# Patient Record
Sex: FEMALE | Race: ASIAN | NOT HISPANIC OR LATINO | Employment: OTHER | URBAN - METROPOLITAN AREA
[De-identification: names, ages, dates, MRNs, and addresses within clinical notes are randomized per-mention and may not be internally consistent; named-entity substitution may affect disease eponyms.]

---

## 2017-01-23 ENCOUNTER — COMMUNICATION - HEALTHEAST (OUTPATIENT)
Dept: FAMILY MEDICINE | Facility: CLINIC | Age: 47
End: 2017-01-23

## 2017-01-23 DIAGNOSIS — E66.9 OBESITY: ICD-10-CM

## 2017-01-23 DIAGNOSIS — I16.0 HYPERTENSIVE URGENCY: ICD-10-CM

## 2017-01-23 DIAGNOSIS — E78.5 HYPERLIPIDEMIA: ICD-10-CM

## 2017-02-20 ENCOUNTER — RECORDS - HEALTHEAST (OUTPATIENT)
Dept: MAMMOGRAPHY | Facility: CLINIC | Age: 47
End: 2017-02-20

## 2017-02-20 ENCOUNTER — OFFICE VISIT - HEALTHEAST (OUTPATIENT)
Dept: FAMILY MEDICINE | Facility: CLINIC | Age: 47
End: 2017-02-20

## 2017-02-20 DIAGNOSIS — I10 ESSENTIAL HYPERTENSION WITH GOAL BLOOD PRESSURE LESS THAN 140/90: ICD-10-CM

## 2017-02-20 DIAGNOSIS — G43.109 MIGRAINE WITH AURA AND WITHOUT STATUS MIGRAINOSUS, NOT INTRACTABLE: ICD-10-CM

## 2017-02-20 DIAGNOSIS — Z12.39 ENCOUNTER FOR OTHER SCREENING FOR MALIGNANT NEOPLASM OF BREAST: ICD-10-CM

## 2017-02-20 DIAGNOSIS — E78.5 HYPERLIPIDEMIA, UNSPECIFIED HYPERLIPIDEMIA TYPE: ICD-10-CM

## 2017-02-20 DIAGNOSIS — E66.09 NON MORBID OBESITY DUE TO EXCESS CALORIES: ICD-10-CM

## 2017-03-30 ENCOUNTER — OFFICE VISIT - HEALTHEAST (OUTPATIENT)
Dept: FAMILY MEDICINE | Facility: CLINIC | Age: 47
End: 2017-03-30

## 2017-03-30 DIAGNOSIS — R51.9 CHRONIC RIGHT-SIDED HEADACHES: ICD-10-CM

## 2017-03-30 DIAGNOSIS — G89.29 CHRONIC RIGHT-SIDED HEADACHES: ICD-10-CM

## 2017-03-30 DIAGNOSIS — I10 ESSENTIAL HYPERTENSION WITH GOAL BLOOD PRESSURE LESS THAN 140/90: ICD-10-CM

## 2017-05-15 ENCOUNTER — OFFICE VISIT - HEALTHEAST (OUTPATIENT)
Dept: FAMILY MEDICINE | Facility: CLINIC | Age: 47
End: 2017-05-15

## 2017-05-15 DIAGNOSIS — R73.03 PREDIABETES: ICD-10-CM

## 2017-05-15 DIAGNOSIS — I10 ESSENTIAL HYPERTENSION WITH GOAL BLOOD PRESSURE LESS THAN 140/90: ICD-10-CM

## 2017-05-15 DIAGNOSIS — E78.5 HYPERLIPIDEMIA, UNSPECIFIED HYPERLIPIDEMIA TYPE: ICD-10-CM

## 2017-05-15 LAB — HBA1C MFR BLD: 6 % (ref 3.5–6)

## 2017-05-22 ENCOUNTER — COMMUNICATION - HEALTHEAST (OUTPATIENT)
Dept: FAMILY MEDICINE | Facility: CLINIC | Age: 47
End: 2017-05-22

## 2017-05-24 ENCOUNTER — COMMUNICATION - HEALTHEAST (OUTPATIENT)
Dept: FAMILY MEDICINE | Facility: CLINIC | Age: 47
End: 2017-05-24

## 2017-05-24 DIAGNOSIS — R51.9 PERSISTENT HEADACHES: ICD-10-CM

## 2017-06-21 ENCOUNTER — RECORDS - HEALTHEAST (OUTPATIENT)
Dept: ADMINISTRATIVE | Facility: OTHER | Age: 47
End: 2017-06-21

## 2017-06-23 ENCOUNTER — OFFICE VISIT - HEALTHEAST (OUTPATIENT)
Dept: NURSING | Facility: CLINIC | Age: 47
End: 2017-06-23

## 2017-06-23 DIAGNOSIS — G43.109 MIGRAINE WITH AURA: ICD-10-CM

## 2017-06-23 DIAGNOSIS — R12 HEARTBURN: ICD-10-CM

## 2017-06-23 DIAGNOSIS — I10 ESSENTIAL HYPERTENSION WITH GOAL BLOOD PRESSURE LESS THAN 140/90: ICD-10-CM

## 2017-07-21 ENCOUNTER — COMMUNICATION - HEALTHEAST (OUTPATIENT)
Dept: NURSING | Facility: CLINIC | Age: 47
End: 2017-07-21

## 2017-07-31 ENCOUNTER — OFFICE VISIT - HEALTHEAST (OUTPATIENT)
Dept: FAMILY MEDICINE | Facility: CLINIC | Age: 47
End: 2017-07-31

## 2017-07-31 DIAGNOSIS — I10 ESSENTIAL HYPERTENSION WITH GOAL BLOOD PRESSURE LESS THAN 140/90: ICD-10-CM

## 2017-07-31 DIAGNOSIS — H04.123 BILATERAL DRY EYES: ICD-10-CM

## 2017-07-31 DIAGNOSIS — E87.6 HYPOKALEMIA: ICD-10-CM

## 2017-08-01 ENCOUNTER — COMMUNICATION - HEALTHEAST (OUTPATIENT)
Dept: FAMILY MEDICINE | Facility: CLINIC | Age: 47
End: 2017-08-01

## 2017-08-01 ENCOUNTER — OFFICE VISIT - HEALTHEAST (OUTPATIENT)
Dept: CARDIOLOGY | Facility: CLINIC | Age: 47
End: 2017-08-01

## 2017-08-01 DIAGNOSIS — I20.89 EXERTIONAL ANGINA (H): ICD-10-CM

## 2017-08-01 DIAGNOSIS — I10 ESSENTIAL HYPERTENSION WITH GOAL BLOOD PRESSURE LESS THAN 140/90: ICD-10-CM

## 2017-08-01 ASSESSMENT — MIFFLIN-ST. JEOR: SCORE: 1307.61

## 2017-08-03 ENCOUNTER — RECORDS - HEALTHEAST (OUTPATIENT)
Dept: ADMINISTRATIVE | Facility: OTHER | Age: 47
End: 2017-08-03

## 2017-08-15 ENCOUNTER — OFFICE VISIT - HEALTHEAST (OUTPATIENT)
Dept: NURSING | Facility: CLINIC | Age: 47
End: 2017-08-15

## 2017-08-15 DIAGNOSIS — Z79.899 MEDICATION MANAGEMENT: ICD-10-CM

## 2017-08-15 DIAGNOSIS — R12 HEARTBURN: ICD-10-CM

## 2017-08-15 DIAGNOSIS — R51.9 PERSISTENT HEADACHES: ICD-10-CM

## 2017-08-15 DIAGNOSIS — I10 ESSENTIAL HYPERTENSION WITH GOAL BLOOD PRESSURE LESS THAN 140/90: ICD-10-CM

## 2017-08-21 ENCOUNTER — COMMUNICATION - HEALTHEAST (OUTPATIENT)
Dept: FAMILY MEDICINE | Facility: CLINIC | Age: 47
End: 2017-08-21

## 2017-08-21 DIAGNOSIS — G43.109 MIGRAINE WITH AURA: ICD-10-CM

## 2017-08-21 DIAGNOSIS — I16.0 HYPERTENSIVE URGENCY: ICD-10-CM

## 2017-08-30 ENCOUNTER — RECORDS - HEALTHEAST (OUTPATIENT)
Dept: ADMINISTRATIVE | Facility: OTHER | Age: 47
End: 2017-08-30

## 2017-09-19 ENCOUNTER — COMMUNICATION - HEALTHEAST (OUTPATIENT)
Dept: ADMINISTRATIVE | Facility: CLINIC | Age: 47
End: 2017-09-19

## 2017-10-29 ENCOUNTER — HEALTH MAINTENANCE LETTER (OUTPATIENT)
Age: 47
End: 2017-10-29

## 2017-10-31 ENCOUNTER — SURGERY - HEALTHEAST (OUTPATIENT)
Dept: CARDIOLOGY | Facility: CLINIC | Age: 47
End: 2017-10-31

## 2017-10-31 ASSESSMENT — MIFFLIN-ST. JEOR
SCORE: 1314.14
SCORE: 1328.93

## 2017-11-01 ENCOUNTER — COMMUNICATION - HEALTHEAST (OUTPATIENT)
Dept: FAMILY MEDICINE | Facility: CLINIC | Age: 47
End: 2017-11-01

## 2017-11-01 ASSESSMENT — MIFFLIN-ST. JEOR: SCORE: 1317.14

## 2017-11-03 ENCOUNTER — OFFICE VISIT - HEALTHEAST (OUTPATIENT)
Dept: FAMILY MEDICINE | Facility: CLINIC | Age: 47
End: 2017-11-03

## 2017-11-03 DIAGNOSIS — I50.23 ACUTE ON CHRONIC SYSTOLIC CONGESTIVE HEART FAILURE (H): ICD-10-CM

## 2017-11-03 ASSESSMENT — MIFFLIN-ST. JEOR: SCORE: 1303.08

## 2017-11-11 ENCOUNTER — COMMUNICATION - HEALTHEAST (OUTPATIENT)
Dept: FAMILY MEDICINE | Facility: CLINIC | Age: 47
End: 2017-11-11

## 2017-11-11 DIAGNOSIS — I10 ESSENTIAL HYPERTENSION WITH GOAL BLOOD PRESSURE LESS THAN 140/90: ICD-10-CM

## 2017-11-14 ENCOUNTER — AMBULATORY - HEALTHEAST (OUTPATIENT)
Dept: SLEEP MEDICINE | Facility: CLINIC | Age: 47
End: 2017-11-14

## 2017-11-14 ENCOUNTER — OFFICE VISIT - HEALTHEAST (OUTPATIENT)
Dept: CARDIOLOGY | Facility: CLINIC | Age: 47
End: 2017-11-14

## 2017-11-14 ENCOUNTER — AMBULATORY - HEALTHEAST (OUTPATIENT)
Dept: CARDIOLOGY | Facility: CLINIC | Age: 47
End: 2017-11-14

## 2017-11-14 ENCOUNTER — COMMUNICATION - HEALTHEAST (OUTPATIENT)
Dept: FAMILY MEDICINE | Facility: CLINIC | Age: 47
End: 2017-11-14

## 2017-11-14 DIAGNOSIS — I42.8 NONISCHEMIC CARDIOMYOPATHY (H): ICD-10-CM

## 2017-11-14 DIAGNOSIS — I10 ESSENTIAL HYPERTENSION: ICD-10-CM

## 2017-11-14 DIAGNOSIS — I50.22 CHRONIC SYSTOLIC HEART FAILURE (H): ICD-10-CM

## 2017-11-14 DIAGNOSIS — I20.0 ACCELERATING ANGINA (H): ICD-10-CM

## 2017-11-14 ASSESSMENT — MIFFLIN-ST. JEOR: SCORE: 1313.28

## 2017-11-20 ENCOUNTER — OFFICE VISIT - HEALTHEAST (OUTPATIENT)
Dept: FAMILY MEDICINE | Facility: CLINIC | Age: 47
End: 2017-11-20

## 2017-11-20 DIAGNOSIS — Z23 NEED FOR IMMUNIZATION AGAINST INFLUENZA: ICD-10-CM

## 2017-11-20 DIAGNOSIS — I10 ESSENTIAL HYPERTENSION: ICD-10-CM

## 2017-11-20 DIAGNOSIS — R00.0 TACHYCARDIA: ICD-10-CM

## 2017-11-20 DIAGNOSIS — E78.2 MIXED HYPERLIPIDEMIA: ICD-10-CM

## 2017-11-20 DIAGNOSIS — I50.22 CHRONIC SYSTOLIC HEART FAILURE (H): ICD-10-CM

## 2017-11-20 DIAGNOSIS — R09.89 PHLEGM IN THROAT: ICD-10-CM

## 2017-11-20 DIAGNOSIS — L85.3 DRY SKIN: ICD-10-CM

## 2017-11-20 DIAGNOSIS — I42.8 NONISCHEMIC CARDIOMYOPATHY (H): ICD-10-CM

## 2017-12-06 ENCOUNTER — OFFICE VISIT - HEALTHEAST (OUTPATIENT)
Dept: CARDIOLOGY | Facility: CLINIC | Age: 47
End: 2017-12-06

## 2017-12-06 ENCOUNTER — AMBULATORY - HEALTHEAST (OUTPATIENT)
Dept: CARDIOLOGY | Facility: CLINIC | Age: 47
End: 2017-12-06

## 2017-12-06 DIAGNOSIS — I50.22 CHRONIC SYSTOLIC HEART FAILURE (H): ICD-10-CM

## 2017-12-06 DIAGNOSIS — I42.8 NONISCHEMIC CARDIOMYOPATHY (H): ICD-10-CM

## 2017-12-06 ASSESSMENT — MIFFLIN-ST. JEOR: SCORE: 1349.57

## 2017-12-08 ENCOUNTER — COMMUNICATION - HEALTHEAST (OUTPATIENT)
Dept: FAMILY MEDICINE | Facility: CLINIC | Age: 47
End: 2017-12-08

## 2017-12-15 ENCOUNTER — OFFICE VISIT - HEALTHEAST (OUTPATIENT)
Dept: NURSING | Facility: CLINIC | Age: 47
End: 2017-12-15

## 2017-12-15 DIAGNOSIS — I42.8 NONISCHEMIC CARDIOMYOPATHY (H): ICD-10-CM

## 2017-12-15 DIAGNOSIS — Z79.899 MEDICATION MANAGEMENT: ICD-10-CM

## 2017-12-15 DIAGNOSIS — I20.89 EXERTIONAL ANGINA (H): ICD-10-CM

## 2017-12-15 DIAGNOSIS — I10 ESSENTIAL HYPERTENSION: ICD-10-CM

## 2017-12-15 DIAGNOSIS — R05.9 COUGH: ICD-10-CM

## 2017-12-21 ENCOUNTER — OFFICE VISIT - HEALTHEAST (OUTPATIENT)
Dept: CARDIOLOGY | Facility: CLINIC | Age: 47
End: 2017-12-21

## 2017-12-21 DIAGNOSIS — I50.22 CHRONIC SYSTOLIC HEART FAILURE (H): ICD-10-CM

## 2017-12-21 DIAGNOSIS — I10 ESSENTIAL HYPERTENSION: ICD-10-CM

## 2017-12-21 DIAGNOSIS — I42.8 NONISCHEMIC CARDIOMYOPATHY (H): ICD-10-CM

## 2017-12-21 ASSESSMENT — MIFFLIN-ST. JEOR: SCORE: 1331.43

## 2017-12-22 ENCOUNTER — COMMUNICATION - HEALTHEAST (OUTPATIENT)
Dept: CARDIOLOGY | Facility: CLINIC | Age: 47
End: 2017-12-22

## 2018-01-04 ENCOUNTER — OFFICE VISIT - HEALTHEAST (OUTPATIENT)
Dept: FAMILY MEDICINE | Facility: CLINIC | Age: 48
End: 2018-01-04

## 2018-01-04 ENCOUNTER — RECORDS - HEALTHEAST (OUTPATIENT)
Dept: GENERAL RADIOLOGY | Facility: CLINIC | Age: 48
End: 2018-01-04

## 2018-01-04 DIAGNOSIS — I42.8 NONISCHEMIC CARDIOMYOPATHY (H): ICD-10-CM

## 2018-01-04 DIAGNOSIS — R05.9 COUGH: ICD-10-CM

## 2018-01-04 DIAGNOSIS — R05.8 PRODUCTIVE COUGH: ICD-10-CM

## 2018-01-04 DIAGNOSIS — N39.46 MIXED INCONTINENCE: ICD-10-CM

## 2018-01-04 DIAGNOSIS — R73.03 PREDIABETES: ICD-10-CM

## 2018-01-04 DIAGNOSIS — J02.0 STREP THROAT: ICD-10-CM

## 2018-01-04 DIAGNOSIS — I10 ESSENTIAL HYPERTENSION: ICD-10-CM

## 2018-01-04 LAB
ALBUMIN UR-MCNC: NEGATIVE MG/DL
ANION GAP SERPL CALCULATED.3IONS-SCNC: 7 MMOL/L (ref 5–18)
APPEARANCE UR: CLEAR
BILIRUB UR QL STRIP: NEGATIVE
BUN SERPL-MCNC: 20 MG/DL (ref 8–22)
CALCIUM SERPL-MCNC: 8.7 MG/DL (ref 8.5–10.5)
CHLORIDE BLD-SCNC: 104 MMOL/L (ref 98–107)
CO2 SERPL-SCNC: 29 MMOL/L (ref 22–31)
COLOR UR AUTO: YELLOW
CREAT SERPL-MCNC: 0.72 MG/DL (ref 0.6–1.1)
DEPRECATED S PYO AG THROAT QL EIA: ABNORMAL
GFR SERPL CREATININE-BSD FRML MDRD: >60 ML/MIN/1.73M2
GLUCOSE BLD-MCNC: 112 MG/DL (ref 70–125)
GLUCOSE UR STRIP-MCNC: NEGATIVE MG/DL
HBA1C MFR BLD: 6.4 % (ref 3.5–6)
HGB UR QL STRIP: NEGATIVE
KETONES UR STRIP-MCNC: NEGATIVE MG/DL
LEUKOCYTE ESTERASE UR QL STRIP: NEGATIVE
NITRATE UR QL: NEGATIVE
PH UR STRIP: 6 [PH] (ref 5–8)
POTASSIUM BLD-SCNC: 3.9 MMOL/L (ref 3.5–5)
SODIUM SERPL-SCNC: 140 MMOL/L (ref 136–145)
SP GR UR STRIP: 1.01 (ref 1–1.03)
UROBILINOGEN UR STRIP-ACNC: NORMAL

## 2018-01-08 LAB
QTF INTERPRETATION: NORMAL
QTF MITOGEN - NIL: 5.89 IU/ML
QTF NIL: 0.16 IU/ML
QTF RESULT: NEGATIVE
QTF TB ANTIGEN - NIL: -0.01 IU/ML

## 2018-01-10 ENCOUNTER — COMMUNICATION - HEALTHEAST (OUTPATIENT)
Dept: FAMILY MEDICINE | Facility: CLINIC | Age: 48
End: 2018-01-10

## 2018-01-18 ENCOUNTER — OFFICE VISIT - HEALTHEAST (OUTPATIENT)
Dept: CARDIOLOGY | Facility: CLINIC | Age: 48
End: 2018-01-18

## 2018-01-18 DIAGNOSIS — E78.2 MIXED HYPERLIPIDEMIA: ICD-10-CM

## 2018-01-18 DIAGNOSIS — I50.22 CHRONIC SYSTOLIC HEART FAILURE (H): ICD-10-CM

## 2018-01-18 DIAGNOSIS — I25.119 CORONARY ARTERY DISEASE INVOLVING NATIVE CORONARY ARTERY OF NATIVE HEART WITH ANGINA PECTORIS (H): ICD-10-CM

## 2018-01-18 DIAGNOSIS — I42.8 NONISCHEMIC CARDIOMYOPATHY (H): ICD-10-CM

## 2018-01-18 DIAGNOSIS — I10 ESSENTIAL HYPERTENSION: ICD-10-CM

## 2018-01-18 ASSESSMENT — MIFFLIN-ST. JEOR: SCORE: 1340.5

## 2018-01-22 ENCOUNTER — OFFICE VISIT - HEALTHEAST (OUTPATIENT)
Dept: FAMILY MEDICINE | Facility: CLINIC | Age: 48
End: 2018-01-22

## 2018-01-22 DIAGNOSIS — R50.9 FEVER: ICD-10-CM

## 2018-01-22 DIAGNOSIS — R68.89 FLU-LIKE SYMPTOMS: ICD-10-CM

## 2018-01-22 LAB
DEPRECATED S PYO AG THROAT QL EIA: NORMAL
FLUAV AG SPEC QL IA: NORMAL
FLUBV AG SPEC QL IA: NORMAL

## 2018-01-23 LAB — GROUP A STREP BY PCR: NORMAL

## 2018-02-01 ENCOUNTER — COMMUNICATION - HEALTHEAST (OUTPATIENT)
Dept: FAMILY MEDICINE | Facility: CLINIC | Age: 48
End: 2018-02-01

## 2018-02-08 ENCOUNTER — OFFICE VISIT - HEALTHEAST (OUTPATIENT)
Dept: FAMILY MEDICINE | Facility: CLINIC | Age: 48
End: 2018-02-08

## 2018-02-08 DIAGNOSIS — Z09 HOSPITAL DISCHARGE FOLLOW-UP: ICD-10-CM

## 2018-02-08 DIAGNOSIS — I10 ESSENTIAL HYPERTENSION: ICD-10-CM

## 2018-02-08 DIAGNOSIS — S37.009A KIDNEY INJURY: ICD-10-CM

## 2018-02-08 LAB
ALBUMIN UR-MCNC: NEGATIVE MG/DL
APPEARANCE UR: CLEAR
BILIRUB UR QL STRIP: NEGATIVE
COLOR UR AUTO: YELLOW
GLUCOSE UR STRIP-MCNC: NEGATIVE MG/DL
HGB UR QL STRIP: NEGATIVE
KETONES UR STRIP-MCNC: NEGATIVE MG/DL
LEUKOCYTE ESTERASE UR QL STRIP: NEGATIVE
NITRATE UR QL: NEGATIVE
PH UR STRIP: 7.5 [PH] (ref 5–8)
SP GR UR STRIP: 1.02 (ref 1–1.03)
UROBILINOGEN UR STRIP-ACNC: NORMAL

## 2018-02-12 ENCOUNTER — OFFICE VISIT - HEALTHEAST (OUTPATIENT)
Dept: FAMILY MEDICINE | Facility: CLINIC | Age: 48
End: 2018-02-12

## 2018-02-12 DIAGNOSIS — N15.1 RENAL ABSCESS: ICD-10-CM

## 2018-02-12 DIAGNOSIS — R05.3 CHRONIC COUGH: ICD-10-CM

## 2018-02-12 DIAGNOSIS — N93.9 ABNORMAL UTERINE BLEEDING: ICD-10-CM

## 2018-02-12 LAB
ALBUMIN UR-MCNC: NEGATIVE MG/DL
APPEARANCE UR: CLEAR
BILIRUB UR QL STRIP: NEGATIVE
COLOR UR AUTO: YELLOW
FLUAV AG SPEC QL IA: NORMAL
FLUBV AG SPEC QL IA: NORMAL
GLUCOSE UR STRIP-MCNC: NEGATIVE MG/DL
HGB UR QL STRIP: NEGATIVE
KETONES UR STRIP-MCNC: NEGATIVE MG/DL
LEUKOCYTE ESTERASE UR QL STRIP: NEGATIVE
NITRATE UR QL: NEGATIVE
PH UR STRIP: 6 [PH] (ref 5–8)
SP GR UR STRIP: >=1.03 (ref 1–1.03)
UROBILINOGEN UR STRIP-ACNC: NORMAL

## 2018-02-15 ENCOUNTER — OFFICE VISIT - HEALTHEAST (OUTPATIENT)
Dept: NURSING | Facility: CLINIC | Age: 48
End: 2018-02-15

## 2018-02-15 DIAGNOSIS — R12 HEARTBURN: ICD-10-CM

## 2018-02-15 DIAGNOSIS — R30.0 BURNING WITH URINATION: ICD-10-CM

## 2018-02-15 DIAGNOSIS — I50.22 CHRONIC SYSTOLIC HEART FAILURE (H): ICD-10-CM

## 2018-02-15 DIAGNOSIS — R05.3 CHRONIC COUGH: ICD-10-CM

## 2018-02-15 DIAGNOSIS — I10 ESSENTIAL HYPERTENSION: ICD-10-CM

## 2018-02-16 ENCOUNTER — OFFICE VISIT - HEALTHEAST (OUTPATIENT)
Dept: CARDIOLOGY | Facility: CLINIC | Age: 48
End: 2018-02-16

## 2018-02-16 ENCOUNTER — COMMUNICATION - HEALTHEAST (OUTPATIENT)
Dept: FAMILY MEDICINE | Facility: CLINIC | Age: 48
End: 2018-02-16

## 2018-02-16 DIAGNOSIS — I50.22 CHRONIC SYSTOLIC HEART FAILURE (H): ICD-10-CM

## 2018-02-16 DIAGNOSIS — I42.8 NONISCHEMIC CARDIOMYOPATHY (H): ICD-10-CM

## 2018-02-16 LAB — BACTERIA SPEC CULT: NO GROWTH

## 2018-02-16 ASSESSMENT — MIFFLIN-ST. JEOR: SCORE: 1376.79

## 2018-02-20 ENCOUNTER — HOSPITAL ENCOUNTER (OUTPATIENT)
Dept: CT IMAGING | Facility: HOSPITAL | Age: 48
Discharge: HOME | End: 2018-02-20
Attending: INTERPRETER

## 2018-02-20 ENCOUNTER — COMMUNICATION - HEALTHEAST (OUTPATIENT)
Dept: FAMILY MEDICINE | Facility: CLINIC | Age: 48
End: 2018-02-20

## 2018-02-20 ENCOUNTER — HOSPITAL ENCOUNTER (OUTPATIENT)
Dept: ULTRASOUND IMAGING | Facility: HOSPITAL | Age: 48
Discharge: HOME | End: 2018-02-20
Attending: FAMILY MEDICINE

## 2018-02-20 DIAGNOSIS — N12 PYELONEPHRITIS: ICD-10-CM

## 2018-02-20 DIAGNOSIS — N93.9 ABNORMAL UTERINE BLEEDING: ICD-10-CM

## 2018-02-26 ENCOUNTER — OFFICE VISIT - HEALTHEAST (OUTPATIENT)
Dept: FAMILY MEDICINE | Facility: CLINIC | Age: 48
End: 2018-02-26

## 2018-02-26 DIAGNOSIS — R05.3 CHRONIC COUGH: ICD-10-CM

## 2018-02-26 DIAGNOSIS — R60.0 LOWER EXTREMITY EDEMA: ICD-10-CM

## 2018-03-01 ENCOUNTER — AMBULATORY - HEALTHEAST (OUTPATIENT)
Dept: PULMONOLOGY | Facility: OTHER | Age: 48
End: 2018-03-01

## 2018-03-01 DIAGNOSIS — R05.9 COUGH: ICD-10-CM

## 2018-03-12 ENCOUNTER — COMMUNICATION - HEALTHEAST (OUTPATIENT)
Dept: FAMILY MEDICINE | Facility: CLINIC | Age: 48
End: 2018-03-12

## 2018-03-12 DIAGNOSIS — I16.0 HYPERTENSIVE URGENCY: ICD-10-CM

## 2018-03-12 DIAGNOSIS — G43.109 MIGRAINE WITH AURA: ICD-10-CM

## 2018-03-12 DIAGNOSIS — I50.22 CHRONIC SYSTOLIC HEART FAILURE (H): ICD-10-CM

## 2018-03-20 DIAGNOSIS — R05.3 CHRONIC COUGH: Primary | ICD-10-CM

## 2018-03-22 ENCOUNTER — RECORDS - HEALTHEAST (OUTPATIENT)
Dept: ADMINISTRATIVE | Facility: OTHER | Age: 48
End: 2018-03-22

## 2018-03-22 ENCOUNTER — RADIANT APPOINTMENT (OUTPATIENT)
Dept: GENERAL RADIOLOGY | Facility: CLINIC | Age: 48
End: 2018-03-22
Attending: INTERNAL MEDICINE
Payer: MEDICARE

## 2018-03-22 ENCOUNTER — OFFICE VISIT (OUTPATIENT)
Dept: PULMONOLOGY | Facility: CLINIC | Age: 48
End: 2018-03-22
Attending: INTERNAL MEDICINE
Payer: MEDICARE

## 2018-03-22 VITALS
OXYGEN SATURATION: 99 % | RESPIRATION RATE: 16 BRPM | SYSTOLIC BLOOD PRESSURE: 114 MMHG | HEART RATE: 65 BPM | DIASTOLIC BLOOD PRESSURE: 70 MMHG

## 2018-03-22 DIAGNOSIS — R05.3 CHRONIC COUGH: ICD-10-CM

## 2018-03-22 DIAGNOSIS — J45.20 MILD INTERMITTENT ASTHMA, UNSPECIFIED WHETHER COMPLICATED: ICD-10-CM

## 2018-03-22 DIAGNOSIS — K21.9 GASTROESOPHAGEAL REFLUX DISEASE, ESOPHAGITIS PRESENCE NOT SPECIFIED: Primary | ICD-10-CM

## 2018-03-22 DIAGNOSIS — R05.9 COUGH: Primary | ICD-10-CM

## 2018-03-22 LAB
DLCOUNC-%PRED-PRE: 78 %
DLCOUNC-PRE: 16.61 ML/MIN/MMHG
DLCOUNC-PRED: 21.05 ML/MIN/MMHG
ERV-%PRED-PRE: 24 %
ERV-PRE: 0.1 L
ERV-PRED: 0.4 L
EXPTIME-PRE: 6.85 SEC
FEF2575-%PRED-PRE: 110 %
FEF2575-PRE: 2.64 L/SEC
FEF2575-PRED: 2.39 L/SEC
FEFMAX-%PRED-PRE: 76 %
FEFMAX-PRE: 4.65 L/SEC
FEFMAX-PRED: 6.11 L/SEC
FEV1-%PRED-PRE: 88 %
FEV1-PRE: 1.98 L
FEV1FEV6-PRE: 86 %
FEV1FEV6-PRED: 82 %
FEV1FVC-PRE: 86 %
FEV1FVC-PRED: 80 %
FEV1SVC-PRE: 89 %
FEV1SVC-PRED: 76 %
FIFMAX-PRE: 2.74 L/SEC
FRCPLETH-%PRED-PRE: 60 %
FRCPLETH-PRE: 1.47 L
FRCPLETH-PRED: 2.43 L
FVC-%PRED-PRE: 84 %
FVC-PRE: 2.3 L
FVC-PRED: 2.72 L
IC-%PRED-PRE: 83 %
IC-PRE: 2.13 L
IC-PRED: 2.54 L
RVPLETH-%PRED-PRE: 91 %
RVPLETH-PRE: 1.38 L
RVPLETH-PRED: 1.51 L
TLCPLETH-%PRED-PRE: 86 %
TLCPLETH-PRE: 3.61 L
TLCPLETH-PRED: 4.18 L
VA-%PRED-PRE: 67 %
VA-PRE: 2.93 L
VC-%PRED-PRE: 75 %
VC-PRE: 2.23 L
VC-PRED: 2.94 L

## 2018-03-22 RX ORDER — OMEPRAZOLE 40 MG/1
20 CAPSULE, DELAYED RELEASE ORAL 2 TIMES DAILY
COMMUNITY
Start: 2018-02-15 | End: 2018-03-22

## 2018-03-22 RX ORDER — ASPIRIN 81 MG/1
81 TABLET ORAL
COMMUNITY
Start: 2017-12-15 | End: 2021-12-07

## 2018-03-22 RX ORDER — ATORVASTATIN CALCIUM 80 MG/1
80 TABLET, FILM COATED ORAL
COMMUNITY
Start: 2017-11-14 | End: 2021-09-05

## 2018-03-22 RX ORDER — ACETAMINOPHEN 500 MG
500 TABLET ORAL
COMMUNITY
Start: 2018-01-22 | End: 2021-12-07

## 2018-03-22 RX ORDER — NITROGLYCERIN 0.4 MG/1
0.4 TABLET SUBLINGUAL
COMMUNITY
Start: 2017-11-01 | End: 2021-12-07

## 2018-03-22 RX ORDER — NORTRIPTYLINE HCL 25 MG
25 CAPSULE ORAL
COMMUNITY
End: 2021-07-16

## 2018-03-22 RX ORDER — POTASSIUM CHLORIDE 750 MG/1
10 TABLET, EXTENDED RELEASE ORAL
COMMUNITY
Start: 2017-12-06 | End: 2021-08-17

## 2018-03-22 RX ORDER — FLUTICASONE PROPIONATE 50 MCG
1-2 SPRAY, SUSPENSION (ML) NASAL DAILY
Qty: 3 BOTTLE | Refills: 1 | Status: SHIPPED | OUTPATIENT
Start: 2018-03-22 | End: 2021-07-16

## 2018-03-22 RX ORDER — FUROSEMIDE 20 MG
20 TABLET ORAL
COMMUNITY
End: 2021-07-16

## 2018-03-22 RX ORDER — ISOSORBIDE MONONITRATE 60 MG/1
60 TABLET, EXTENDED RELEASE ORAL
COMMUNITY
Start: 2018-01-18 | End: 2021-11-26

## 2018-03-22 RX ORDER — AMLODIPINE BESYLATE 10 MG/1
10 TABLET ORAL
COMMUNITY
Start: 2018-01-11 | End: 2021-07-16

## 2018-03-22 RX ORDER — METOPROLOL SUCCINATE 100 MG/1
100 TABLET, EXTENDED RELEASE ORAL
COMMUNITY
Start: 2017-11-14 | End: 2021-12-07

## 2018-03-22 RX ORDER — OXYCODONE HYDROCHLORIDE 5 MG/1
5 TABLET ORAL
COMMUNITY
Start: 2018-02-01 | End: 2021-07-16

## 2018-03-22 ASSESSMENT — PAIN SCALES - GENERAL: PAINLEVEL: NO PAIN (0)

## 2018-03-22 NOTE — LETTER
"3/22/2018       RE: Leora Sanchez  [address] [place] [address]     Dear Colleague,    Thank you for referring your patient, Leora Sanchez, to the [facility] at [facility]. Please see a copy of my visit note below.    Pulmonary Clinic New Patient Consult  Reason for Consult: Chronic Cough  History of Present Illness    Leora Sanchez is a 47 yof female with a history of HTN and GERD who presents to pulmonary clinic today for further evaluation of chronic cough.  Her cough started in October 2017 and was initially occurring \"on and off\".  At first, she thought it was the flu but she never really felt sick.  [facility] records suggest she was seen by pulmonary at [facility] as early as August but those records are not available for review. The cough worsened in January and has become consistent since then.   She describes feeling a \"spicy taste\" in her upper chest/lower throat that causes her throat to feel \"itchy\" and causes her to cough.  Cough was initially dry but is now largely productive of a white and sticky phlegm.  She denies any hemoptysis, fever, chills, or shortness of breath with rest or exertion.  She does describe shortness of breath with coughing jags as well as headache from coughing.  Sleeping flat seems to worsen the cough and it is a little better if she tries to sleep sitting up.  In the past she has tried Robitussin, Tessalon Perles, Flonase, and albuterol without significant relief.  She is also on Prilosec 40 mg daily.  She had a cardiology consult to look for cardiac etiology of her cough in February 2018.  Echo showed reduced ejection fraction of 50%, attributable to nonischemic cardiomyopathy, but normal valves and normal right ventricular size and function.  She had an ear procedure in 1996 for problems with what sounds like recurrent ear infections.  She denies any history of allergies, hayfever, recurrent sinus infections, " or recurrent pneumonias.  She denies any history of asthma.  She is currently on 40 mg daily of Prilosec.  She eats dinner between 6 and 730 and goes to bed around 10.  She denies any snacks but does drink tea and coffee in proximity to bedtime.  Her cough is worse at night.    She is a never smoker.  She lives in [place], [place].  She is never worked outside the home.  She denies any known exposure to asbestos.  She has no pets at home.  She denies any significant exposure to birds, pillows or comforter stuffed with down feathers, or recent travel outside the area.    No known family history of lung disease.        Review of Systems:  10 of 14 systems reviewed and are negative unless otherwise stated in HPI.    Past Medical History:   Diagnosis Date     GERD (gastroesophageal reflux disease)      Hypertension        Past Surgical History:   Procedure Laterality Date     ENT SURGERY         Social History     Social History     Marital status:      Spouse name: N/A     Number of children: N/A     Years of education: N/A     Social History Main Topics     Smoking status: Never Smoker     Smokeless tobacco: Never Used     Alcohol use None     Drug use: None     Sexual activity: Not Asked     Other Topics Concern     None     Social History Narrative         Allergies   Allergen Reactions     Nkda [No Known Drug Allergies]          Current Outpatient Prescriptions:      amLODIPine (NORVASC) 10 MG tablet, Take 10 mg by mouth, Disp: , Rfl:      aspirin EC 81 MG EC tablet, Take 81 mg by mouth, Disp: , Rfl:      atorvastatin (LIPITOR) 80 MG tablet, Take 80 mg by mouth, Disp: , Rfl:      isosorbide mononitrate (IMDUR) 60 MG 24 hr tablet, Take 60 mg by mouth, Disp: , Rfl:      acetaminophen (MAPAP) 500 MG tablet, Take 500 mg by mouth, Disp: , Rfl:      metoprolol succinate (TOPROL-XL) 100 MG 24 hr tablet, Take 100 mg by mouth, Disp: , Rfl:      nitroGLYcerin (NITROSTAT) 0.4 MG sublingual tablet, Place 0.4 mg under  the tongue, Disp: , Rfl:      potassium chloride (K-TAB,KLOR-CON) 10 MEQ tablet, Take 10 mEq by mouth, Disp: , Rfl:      oxyCODONE IR (ROXICODONE) 5 MG tablet, Take 5 mg by mouth, Disp: , Rfl:      omeprazole (PRILOSEC) 20 MG CR capsule, Take 1 capsule (20 mg) by mouth 2 times daily, Disp: 90 capsule, Rfl: 3     beclomethasone (QVAR) 40 MCG/ACT Inhaler, Inhale 2 puffs into the lungs 2 times daily, Disp: 3 Inhaler, Rfl: 1     fluticasone (FLONASE) 50 MCG/ACT spray, Spray 1-2 sprays into both nostrils daily, Disp: 3 Bottle, Rfl: 1     losartan-hydrochlorothiazide (HYZAAR) 100-25 MG per tablet, Take 1 tablet by mouth daily, Disp: 30 tablet, Rfl: 6     furosemide (LASIX) 20 MG tablet, Take 20 mg by mouth, Disp: , Rfl:      nortriptyline (PAMELOR) 25 MG capsule, Take 25 mg by mouth, Disp: , Rfl:      ibuprofen (ADVIL,MOTRIN) 800 MG tablet, Take 1 tablet (800 mg) by mouth 3 times daily (with meals) Take 800 mg by mouth 3 times daily (with meals). As needed, Disp: 90 tablet, Rfl: 3     meclizine (ANTIVERT) 25 MG tablet, Take 1 tablet by mouth 3 times daily as needed for 30 days., Disp: 90 tablet, Rfl: 1     citalopram (CELEXA) 40 MG tablet, Take 40 mg by mouth daily., Disp: , Rfl:       Physical Exam:  /70 (BP Location: Right arm, Patient Position: Chair, Cuff Size: Adult Regular)  Pulse 65  Resp 16  SpO2 99%  GENERAL: Well developed, well nourished, alert, and in no apparent distress.  HEENT: Normocephalic, atraumatic. PERRL, EOMI. Oral mucosa is moist. No perioral cyanosis.  NECK: supple, no masses, no thyromegaly.  RESP:  Normal respiratory effort.  CTAB.  No rales, wheezes, rhonchi.  Normal to percussion.  No cyanosis or clubbing.  CV: Normal S1, S2, regular rhythm, normal rate. No murmur.  No LE edema.   ABDOMEN:  Soft, non-tender, non-distended.   SKIN: warm and dry. No rash.  NEURO: AAOx3.  Normal gait.  No focal neuro deficits.  PSYCH: mentation appears normal. and affect normal/bright    Results:  PFTs:  Normal-appearing flow volume loop.  Ratio 86%, FVC 84%, FEV1 88%, TLC 86%, RV 91%, DLCO 78%.  Study demonstrates normal pulmonary function with normal lung volumes and normal gas exchange.  There is no prior study available for comparison.  Imaging (personally reviewed in clinic today):  CXR: Peribronchial cuffing with no focal airspace opacity.    Assessment and Plan:   Leora Sanchez is a 47 year old female with a history of hypertension and acid reflux who presents to pulmonary clinic today for further evaluation and management of chronic cough.  We discussed that the 3 most common causes of chronic cough in adults are postnasal drip, acid reflux, and reactive airways disease.  Current recommendations suggest treatment of all 3 concurrently for best results.  I acknowledged that she has been treated for a couple of these things before but, to the best of my and her knowledge, has not been on concurrent treatment for a prolonged period of time. Thus, I have suggested that she begin taking Flonase 2 squirts once daily and I have adjusted her Prilosec dosing to 20 mg twice daily.  She was reminded of importance of taking the first dose on an empty stomach 30 minutes prior to eating or drinking anything.  I additionally reviewed with her lifestyle modifications which may help reduce acid reflux including head of bed elevation, avoidance of trigger foods, and avoidance of eating or drinking anything within 2 hours of bedtime.  Based on her chest x-ray, there is higher suspicion for component of reactive airways disease and so I have also suggested the addition of low-dose inhaled steroid through a chamber.  She was instructed to rinse her mouth vigorously with water after each use to minimize risk of thrush.  To her that it does take some time for this therapy to work and that it was going to work I would expect that she would notice improvement within the next 4-6 weeks.  If upon her return she is not symptomatically  improved, we could consider further investigation with something such as HRCT to look for bronchiectasis or parenchymal lung disease could consider pH and impedance study to look for acid or nonacid reflux.  Consideration could also be given to treatment for neurogenic or have a cough with something such as gabapentin or amitriptyline.  The above findings and plan were explained to the patient at length via the .  She voiced understanding.  Questions and concerns were answered to the patient's satisfaction.  She was provided with my contact information should new questions or concerns arise in the interim.  she should return to clinic in 3 months.  Cristy Lind MD  Pulmonary and Critical Care Medicine    The above note was dictated using voice recognition software and may include typographical errors. Please contact the author for any clarifications.  I spent a total of 60 minutes face to face with Leora Geeg during today's office visit. Over 50% of this time was spent counseling the patient and/or coordinating care regarding their pulmonary disease.    Again, thank you for allowing me to participate in the care of your patient.      Sincerely,    Wendy Lind MD

## 2018-03-22 NOTE — PATIENT INSTRUCTIONS
-CHANGE Prilosec from 40 mg once daily to 20 mg twice daily -- please remember to take your morning dose on an empty stomach  -START Flonase 2 squirts each nostril once daily  -START inhaler as described in clinic

## 2018-03-22 NOTE — PROGRESS NOTES
"Pulmonary Clinic New Patient Consult  Reason for Consult: Chronic Cough  History of Present Illness    Leora Sanchez is a 47 yof female with a history of HTN and GERD who presents to pulmonary clinic today for further evaluation of chronic cough.  Her cough started in October 2017 and was initially occurring \"on and off\".  At first, she thought it was the flu but she never really felt sick.  [facility] records suggest she was seen by pulmonary at [facility] as early as August but those records are not available for review. The cough worsened in January and has become consistent since then.   She describes feeling a \"spicy taste\" in her upper chest/lower throat that causes her throat to feel \"itchy\" and causes her to cough.  Cough was initially dry but is now largely productive of a white and sticky phlegm.  She denies any hemoptysis, fever, chills, or shortness of breath with rest or exertion.  She does describe shortness of breath with coughing jags as well as headache from coughing.  Sleeping flat seems to worsen the cough and it is a little better if she tries to sleep sitting up.  In the past she has tried Robitussin, Tessalon Perles, Flonase, and albuterol without significant relief.  She is also on Prilosec 40 mg daily.  She had a cardiology consult to look for cardiac etiology of her cough in February 2018.  Echo showed reduced ejection fraction of 50%, attributable to nonischemic cardiomyopathy, but normal valves and normal right ventricular size and function.  She had an ear procedure in 1996 for problems with what sounds like recurrent ear infections.  She denies any history of allergies, hayfever, recurrent sinus infections, or recurrent pneumonias.  She denies any history of asthma.  She is currently on 40 mg daily of Prilosec.  She eats dinner between 6 and 730 and goes to bed around 10.  She denies any snacks but does drink tea and coffee in proximity to bedtime.  Her cough is worse at night.    She is a never " smoker.  She lives in [place], [place].  She is never worked outside the home.  She denies any known exposure to asbestos.  She has no pets at home.  She denies any significant exposure to birds, pillows or comforter stuffed with down feathers, or recent travel outside the area.    No known family history of lung disease.        Review of Systems:  10 of 14 systems reviewed and are negative unless otherwise stated in HPI.    Past Medical History:   Diagnosis Date     GERD (gastroesophageal reflux disease)      Hypertension        Past Surgical History:   Procedure Laterality Date     ENT SURGERY         Social History     Social History     Marital status:      Spouse name: N/A     Number of children: N/A     Years of education: N/A     Social History Main Topics     Smoking status: Never Smoker     Smokeless tobacco: Never Used     Alcohol use None     Drug use: None     Sexual activity: Not Asked     Other Topics Concern     None     Social History Narrative         Allergies   Allergen Reactions     Nkda [No Known Drug Allergies]          Current Outpatient Prescriptions:      amLODIPine (NORVASC) 10 MG tablet, Take 10 mg by mouth, Disp: , Rfl:      aspirin EC 81 MG EC tablet, Take 81 mg by mouth, Disp: , Rfl:      atorvastatin (LIPITOR) 80 MG tablet, Take 80 mg by mouth, Disp: , Rfl:      isosorbide mononitrate (IMDUR) 60 MG 24 hr tablet, Take 60 mg by mouth, Disp: , Rfl:      acetaminophen (MAPAP) 500 MG tablet, Take 500 mg by mouth, Disp: , Rfl:      metoprolol succinate (TOPROL-XL) 100 MG 24 hr tablet, Take 100 mg by mouth, Disp: , Rfl:      nitroGLYcerin (NITROSTAT) 0.4 MG sublingual tablet, Place 0.4 mg under the tongue, Disp: , Rfl:      potassium chloride (K-TAB,KLOR-CON) 10 MEQ tablet, Take 10 mEq by mouth, Disp: , Rfl:      oxyCODONE IR (ROXICODONE) 5 MG tablet, Take 5 mg by mouth, Disp: , Rfl:      omeprazole (PRILOSEC) 20 MG CR capsule, Take 1 capsule (20 mg) by mouth 2 times daily, Disp: 90  capsule, Rfl: 3     beclomethasone (QVAR) 40 MCG/ACT Inhaler, Inhale 2 puffs into the lungs 2 times daily, Disp: 3 Inhaler, Rfl: 1     fluticasone (FLONASE) 50 MCG/ACT spray, Spray 1-2 sprays into both nostrils daily, Disp: 3 Bottle, Rfl: 1     losartan-hydrochlorothiazide (HYZAAR) 100-25 MG per tablet, Take 1 tablet by mouth daily, Disp: 30 tablet, Rfl: 6     furosemide (LASIX) 20 MG tablet, Take 20 mg by mouth, Disp: , Rfl:      nortriptyline (PAMELOR) 25 MG capsule, Take 25 mg by mouth, Disp: , Rfl:      ibuprofen (ADVIL,MOTRIN) 800 MG tablet, Take 1 tablet (800 mg) by mouth 3 times daily (with meals) Take 800 mg by mouth 3 times daily (with meals). As needed, Disp: 90 tablet, Rfl: 3     meclizine (ANTIVERT) 25 MG tablet, Take 1 tablet by mouth 3 times daily as needed for 30 days., Disp: 90 tablet, Rfl: 1     citalopram (CELEXA) 40 MG tablet, Take 40 mg by mouth daily., Disp: , Rfl:       Physical Exam:  /70 (BP Location: Right arm, Patient Position: Chair, Cuff Size: Adult Regular)  Pulse 65  Resp 16  SpO2 99%  GENERAL: Well developed, well nourished, alert, and in no apparent distress.  HEENT: Normocephalic, atraumatic. PERRL, EOMI. Oral mucosa is moist. No perioral cyanosis.  NECK: supple, no masses, no thyromegaly.  RESP:  Normal respiratory effort.  CTAB.  No rales, wheezes, rhonchi.  Normal to percussion.  No cyanosis or clubbing.  CV: Normal S1, S2, regular rhythm, normal rate. No murmur.  No LE edema.   ABDOMEN:  Soft, non-tender, non-distended.   SKIN: warm and dry. No rash.  NEURO: AAOx3.  Normal gait.  No focal neuro deficits.  PSYCH: mentation appears normal. and affect normal/bright    Results:  PFTs: Normal-appearing flow volume loop.  Ratio 86%, FVC 84%, FEV1 88%, TLC 86%, RV 91%, DLCO 78%.  Study demonstrates normal pulmonary function with normal lung volumes and normal gas exchange.  There is no prior study available for comparison.  Imaging (personally reviewed in clinic today):  CXR:  Peribronchial cuffing with no focal airspace opacity.    Assessment and Plan:   Leora Sanchez is a 47 year old female with a history of hypertension and acid reflux who presents to pulmonary clinic today for further evaluation and management of chronic cough.  We discussed that the 3 most common causes of chronic cough in adults are postnasal drip, acid reflux, and reactive airways disease.  Current recommendations suggest treatment of all 3 concurrently for best results.  I acknowledged that she has been treated for a couple of these things before but, to the best of my and her knowledge, has not been on concurrent treatment for a prolonged period of time. Thus, I have suggested that she begin taking Flonase 2 squirts once daily and I have adjusted her Prilosec dosing to 20 mg twice daily.  She was reminded of importance of taking the first dose on an empty stomach 30 minutes prior to eating or drinking anything.  I additionally reviewed with her lifestyle modifications which may help reduce acid reflux including head of bed elevation, avoidance of trigger foods, and avoidance of eating or drinking anything within 2 hours of bedtime.  Based on her chest x-ray, there is higher suspicion for component of reactive airways disease and so I have also suggested the addition of low-dose inhaled steroid through a chamber.  She was instructed to rinse her mouth vigorously with water after each use to minimize risk of thrush.  To her that it does take some time for this therapy to work and that it was going to work I would expect that she would notice improvement within the next 4-6 weeks.  If upon her return she is not symptomatically improved, we could consider further investigation with something such as HRCT to look for bronchiectasis or parenchymal lung disease could consider pH and impedance study to look for acid or nonacid reflux.  Consideration could also be given to treatment for neurogenic or have a cough with  something such as gabapentin or amitriptyline.  The above findings and plan were explained to the patient at length via the .  She voiced understanding.  Questions and concerns were answered to the patient's satisfaction.  She was provided with my contact information should new questions or concerns arise in the interim.  she should return to clinic in 3 months.  Cristy Lind MD  Pulmonary and Critical Care Medicine    The above note was dictated using voice recognition software and may include typographical errors. Please contact the author for any clarifications.  I spent a total of 60 minutes face to face with Leora Sanchez during today's office visit. Over 50% of this time was spent counseling the patient and/or coordinating care regarding their pulmonary disease.

## 2018-03-22 NOTE — MR AVS SNAPSHOT
After Visit Summary   3/22/2018    Leora Sanchez    MRN: [id number]           Patient Information     Date Of Birth          1970        Visit Information        Provider Department      3/22/2018 3:45 PM Wendy Lind MD; PARAG/BARBI GREGORY [facility]        Today's Diagnoses     Gastroesophageal reflux disease, esophagitis presence not specified    -  1    Chronic cough        Mild intermittent asthma, unspecified whether complicated          Care Instructions    -CHANGE Prilosec from 40 mg once daily to 20 mg twice daily -- please remember to take your morning dose on an empty stomach  -START Flonase 2 squirts each nostril once daily  -START inhaler as described in clinic          Follow-ups after your visit        Follow-up notes from your care team     Return in about 3 months (around 6/22/2018).      Your next 10 appointments already scheduled     Jun 18, 2018 10:00 AM CDT   (Arrive by 9:45 AM)   Return Visit with Wendy Lind MD   [facility] ([facility])    [address] [place] [address]  [address] [place] [id number] [phone]              Who to contact     If you have questions or need follow up information about today's clinic visit or your schedule please contact [facility] directly at [phone].  Normal or non-critical lab and imaging results will be communicated to you by MyChart, letter or phone within 4 business days after the clinic has received the results. If you do not hear from us within 7 days, please contact the clinic through MyChart or phone. If you have a critical or abnormal lab result, we will notify you by phone as soon as possible.  Submit refill requests through EverCloud or call your pharmacy and they will forward the refill request to us. Please allow 3 business days for your refill to be completed.          Additional Information About  "Your Visit        [facility] [employer] Information     [employer] lets you send messages to your doctor, view your test results, renew your prescriptions, schedule appointments and more. To sign up, go to [email or web address] . Click on \"Log in\" on the left side of the screen, which will take you to the Welcome page. Then click on \"Sign up Now\" on the right side of the page.     You will be asked to enter the access code listed below, as well as some personal information. Please follow the directions to create your username and password.     Your access code is: [email or web address]  Expires: 2018  6:30 AM     Your access code will  in 90 days. If you need help or a new code, please call your [facility] clinic or [phone].        Care EveryWhere ID     This is your Care EveryWhere ID. This could be used by other organizations to access your [facility] medical records  [id number]        Your Vitals Were     Pulse Respirations Pulse Oximetry             65 16 99%          Blood Pressure from Last 3 Encounters:   18 114/70   13 124/75    Weight from Last 3 Encounters:   13 79.3 kg (174 lb 12.8 oz)              Today, you had the following     No orders found for display         Today's Medication Changes          These changes are accurate as of 3/22/18  4:26 PM.  If you have any questions, ask your nurse or doctor.               Start taking these medicines.        Dose/Directions    beclomethasone 40 MCG/ACT Inhaler   Commonly known as:  QVAR   Used for:  Gastroesophageal reflux disease, esophagitis presence not specified, Chronic cough, Mild intermittent asthma, unspecified whether complicated   Started by:  Wendy Lind MD        Dose:  2 puff   Inhale 2 puffs into the lungs 2 times daily   Quantity:  3 Inhaler   Refills:  1       fluticasone 50 MCG/ACT spray   Commonly known as:  FLONASE   Used for:  Gastroesophageal reflux disease, esophagitis presence not specified, Chronic cough, Mild " intermittent asthma, unspecified whether complicated   Started by:  Wendy Lind MD        Dose:  1-2 spray   Spray 1-2 sprays into both nostrils daily   Quantity:  3 Bottle   Refills:  1         These medicines have changed or have updated prescriptions.        Dose/Directions    omeprazole 20 MG CR capsule   Commonly known as:  priLOSEC   This may have changed:  medication strength   Used for:  Gastroesophageal reflux disease, esophagitis presence not specified, Chronic cough, Mild intermittent asthma, unspecified whether complicated   Changed by:  Wendy Lind MD        Dose:  20 mg   Take 1 capsule (20 mg) by mouth 2 times daily   Quantity:  90 capsule   Refills:  3            Where to get your medicines      These medications were sent to [facility] - [place], [place] - [address] [place], [place] [address]  [address], [place] [address]     Phone:  [phone]     beclomethasone 40 MCG/ACT Inhaler    fluticasone 50 MCG/ACT spray    omeprazole 20 MG CR capsule                Primary Care Provider Office Phone # Fax #    Jaky Gaspar [phone]       [address] [address]        Equal Access to Services     [facility] : Hadii gracia montelongo hadasho Soomaali, waaxda luqadaha, qaybta kaalmada adejoy, shara cloud . So [facility] [phone].    ATENCIÓN: Si habla español, tiene a gould disposición servicios gratuitos de asistencia lingüística. [facility] [phone].    We comply with applicable federal civil rights laws and [place] laws. We do not discriminate on the basis of race, color, national origin, age, disability, sex, sexual orientation, or gender identity.            Thank you!     Thank you for choosing [facility]  for your care. Our goal is always to provide you with excellent care. Hearing back from our patients is one way we can continue to improve our services. Please take a  few minutes to complete the written survey that you may receive in the mail after your visit with us. Thank you!             Your Updated Medication List - Protect others around you: Learn how to safely use, store and throw away your medicines at www.disposemymeds.org.          This list is accurate as of 3/22/18  4:26 PM.  Always use your most recent med list.                   Brand Name Dispense Instructions for use Diagnosis    amLODIPine 10 MG tablet    NORVASC     Take 10 mg by mouth        aspirin EC 81 MG EC tablet      Take 81 mg by mouth        atorvastatin 80 MG tablet    LIPITOR     Take 80 mg by mouth        beclomethasone 40 MCG/ACT Inhaler    QVAR    3 Inhaler    Inhale 2 puffs into the lungs 2 times daily    Gastroesophageal reflux disease, esophagitis presence not specified, Chronic cough, Mild intermittent asthma, unspecified whether complicated       citalopram 40 MG tablet    celeXA     Take 40 mg by mouth daily.        fluticasone 50 MCG/ACT spray    FLONASE    3 Bottle    Spray 1-2 sprays into both nostrils daily    Gastroesophageal reflux disease, esophagitis presence not specified, Chronic cough, Mild intermittent asthma, unspecified whether complicated       furosemide 20 MG tablet    LASIX     Take 20 mg by mouth        ibuprofen 800 MG tablet    ADVIL/MOTRIN    90 tablet    Take 1 tablet (800 mg) by mouth 3 times daily (with meals) Take 800 mg by mouth 3 times daily (with meals). As needed    Multiple joint pain       isosorbide mononitrate 60 MG 24 hr tablet    IMDUR     Take 60 mg by mouth        losartan-hydrochlorothiazide 100-25 MG per tablet    HYZAAR    30 tablet    Take 1 tablet by mouth daily    HTN (hypertension)       MAPAP 500 MG tablet   Generic drug:  acetaminophen      Take 500 mg by mouth        meclizine 25 MG tablet    ANTIVERT    90 tablet    Take 1 tablet by mouth 3 times daily as needed for 30 days.    Vertigo       metoprolol succinate 100 MG 24 hr tablet    TOPROL-XL      Take 100 mg by mouth        nitroGLYcerin 0.4 MG sublingual tablet    NITROSTAT     Place 0.4 mg under the tongue        nortriptyline 25 MG capsule    PAMELOR     Take 25 mg by mouth        omeprazole 20 MG CR capsule    priLOSEC    90 capsule    Take 1 capsule (20 mg) by mouth 2 times daily    Gastroesophageal reflux disease, esophagitis presence not specified, Chronic cough, Mild intermittent asthma, unspecified whether complicated       oxyCODONE IR 5 MG tablet    ROXICODONE     Take 5 mg by mouth        potassium chloride 10 MEQ tablet    K-TAB,KLOR-CON     Take 10 mEq by mouth

## 2018-03-26 ENCOUNTER — TELEPHONE (OUTPATIENT)
Dept: PULMONOLOGY | Facility: CLINIC | Age: 48
End: 2018-03-26

## 2018-03-26 DIAGNOSIS — R05.9 COUGH: Primary | ICD-10-CM

## 2018-03-26 DIAGNOSIS — J45.20 MILD INTERMITTENT ASTHMA: ICD-10-CM

## 2018-03-26 RX ORDER — FLUTICASONE PROPIONATE 44 UG/1
2 AEROSOL, METERED RESPIRATORY (INHALATION) 2 TIMES DAILY
Qty: 1 INHALER | Refills: 1 | Status: SHIPPED | OUTPATIENT
Start: 2018-03-26 | End: 2018-06-12

## 2018-03-26 NOTE — TELEPHONE ENCOUNTER
Contacted by pt's pharmacy stating QVAR not on formulary.  Order place for flovent HFA per Dr. Lind

## 2018-03-29 ENCOUNTER — OFFICE VISIT - HEALTHEAST (OUTPATIENT)
Dept: FAMILY MEDICINE | Facility: CLINIC | Age: 48
End: 2018-03-29

## 2018-03-29 DIAGNOSIS — R12 HEARTBURN: ICD-10-CM

## 2018-03-29 DIAGNOSIS — R05.3 CHRONIC COUGH: ICD-10-CM

## 2018-03-29 DIAGNOSIS — N15.1 RENAL ABSCESS: ICD-10-CM

## 2018-03-29 DIAGNOSIS — I16.0 HYPERTENSIVE URGENCY: ICD-10-CM

## 2018-03-29 DIAGNOSIS — R06.09 DOE (DYSPNEA ON EXERTION): ICD-10-CM

## 2018-03-29 DIAGNOSIS — I10 ESSENTIAL HYPERTENSION: ICD-10-CM

## 2018-03-29 DIAGNOSIS — R60.0 LOWER EXTREMITY EDEMA: ICD-10-CM

## 2018-03-29 DIAGNOSIS — I50.22 CHRONIC SYSTOLIC HEART FAILURE (H): ICD-10-CM

## 2018-03-29 LAB
ALBUMIN SERPL-MCNC: 3.4 G/DL (ref 3.5–5)
ALBUMIN UR-MCNC: NEGATIVE MG/DL
ALP SERPL-CCNC: 78 U/L (ref 45–120)
ALT SERPL W P-5'-P-CCNC: 22 U/L (ref 0–45)
ANION GAP SERPL CALCULATED.3IONS-SCNC: 7 MMOL/L (ref 5–18)
APPEARANCE UR: CLEAR
AST SERPL W P-5'-P-CCNC: 19 U/L (ref 0–40)
BASOPHILS # BLD AUTO: 0 THOU/UL (ref 0–0.2)
BASOPHILS NFR BLD AUTO: 0 % (ref 0–2)
BILIRUB SERPL-MCNC: 0.4 MG/DL (ref 0–1)
BILIRUB UR QL STRIP: NEGATIVE
BUN SERPL-MCNC: 13 MG/DL (ref 8–22)
CALCIUM SERPL-MCNC: 8.6 MG/DL (ref 8.5–10.5)
CHLORIDE BLD-SCNC: 107 MMOL/L (ref 98–107)
CO2 SERPL-SCNC: 26 MMOL/L (ref 22–31)
COLOR UR AUTO: YELLOW
CREAT SERPL-MCNC: 0.67 MG/DL (ref 0.6–1.1)
D DIMER PPP FEU-MCNC: 0.5 FEU UG/ML
EOSINOPHIL # BLD AUTO: 0.2 THOU/UL (ref 0–0.4)
EOSINOPHIL NFR BLD AUTO: 4 % (ref 0–6)
ERYTHROCYTE [DISTWIDTH] IN BLOOD BY AUTOMATED COUNT: 12.6 % (ref 11–14.5)
GFR SERPL CREATININE-BSD FRML MDRD: >60 ML/MIN/1.73M2
GLUCOSE BLD-MCNC: 115 MG/DL (ref 70–125)
GLUCOSE UR STRIP-MCNC: NEGATIVE MG/DL
HCT VFR BLD AUTO: 37.7 % (ref 35–47)
HGB BLD-MCNC: 12.1 G/DL (ref 12–16)
HGB UR QL STRIP: NEGATIVE
KETONES UR STRIP-MCNC: NEGATIVE MG/DL
LEUKOCYTE ESTERASE UR QL STRIP: NEGATIVE
LYMPHOCYTES # BLD AUTO: 1.6 THOU/UL (ref 0.8–4.4)
LYMPHOCYTES NFR BLD AUTO: 34 % (ref 20–40)
MCH RBC QN AUTO: 30.5 PG (ref 27–34)
MCHC RBC AUTO-ENTMCNC: 32.2 G/DL (ref 32–36)
MCV RBC AUTO: 95 FL (ref 80–100)
MONOCYTES # BLD AUTO: 0.3 THOU/UL (ref 0–0.9)
MONOCYTES NFR BLD AUTO: 7 % (ref 2–10)
NEUTROPHILS # BLD AUTO: 2.6 THOU/UL (ref 2–7.7)
NEUTROPHILS NFR BLD AUTO: 55 % (ref 50–70)
NITRATE UR QL: NEGATIVE
PH UR STRIP: 7 [PH] (ref 5–8)
PLATELET # BLD AUTO: 261 THOU/UL (ref 140–440)
PMV BLD AUTO: 7.4 FL (ref 7–10)
POTASSIUM BLD-SCNC: 4.5 MMOL/L (ref 3.5–5)
PROT SERPL-MCNC: 6.7 G/DL (ref 6–8)
RBC # BLD AUTO: 3.97 MILL/UL (ref 3.8–5.4)
SODIUM SERPL-SCNC: 140 MMOL/L (ref 136–145)
SP GR UR STRIP: 1.02 (ref 1–1.03)
TROPONIN I SERPL-MCNC: <0.01 NG/ML (ref 0–0.29)
UROBILINOGEN UR STRIP-ACNC: NORMAL
WBC: 4.7 THOU/UL (ref 4–11)

## 2018-03-30 LAB
ATRIAL RATE - MUSE: 62 BPM
DIASTOLIC BLOOD PRESSURE - MUSE: NORMAL MMHG
INTERPRETATION ECG - MUSE: NORMAL
P AXIS - MUSE: 7 DEGREES
PR INTERVAL - MUSE: 152 MS
QRS DURATION - MUSE: 84 MS
QT - MUSE: 466 MS
QTC - MUSE: 472 MS
R AXIS - MUSE: -1 DEGREES
SYSTOLIC BLOOD PRESSURE - MUSE: NORMAL MMHG
T AXIS - MUSE: 192 DEGREES
VENTRICULAR RATE- MUSE: 62 BPM

## 2018-04-02 ENCOUNTER — OFFICE VISIT - HEALTHEAST (OUTPATIENT)
Dept: CARDIOLOGY | Facility: CLINIC | Age: 48
End: 2018-04-02

## 2018-04-02 ENCOUNTER — RECORDS - HEALTHEAST (OUTPATIENT)
Dept: ADMINISTRATIVE | Facility: OTHER | Age: 48
End: 2018-04-02

## 2018-04-02 ENCOUNTER — AMBULATORY - HEALTHEAST (OUTPATIENT)
Dept: CARDIOLOGY | Facility: CLINIC | Age: 48
End: 2018-04-02

## 2018-04-02 DIAGNOSIS — I10 ESSENTIAL HYPERTENSION: ICD-10-CM

## 2018-04-02 DIAGNOSIS — R60.0 LOWER EXTREMITY EDEMA: ICD-10-CM

## 2018-04-02 DIAGNOSIS — I50.23 ACUTE ON CHRONIC SYSTOLIC HEART FAILURE (H): ICD-10-CM

## 2018-04-02 ASSESSMENT — MIFFLIN-ST. JEOR: SCORE: 1363.18

## 2018-04-08 ENCOUNTER — COMMUNICATION - HEALTHEAST (OUTPATIENT)
Dept: NURSING | Facility: CLINIC | Age: 48
End: 2018-04-08

## 2018-04-08 DIAGNOSIS — R12 HEARTBURN: ICD-10-CM

## 2018-04-26 ENCOUNTER — OFFICE VISIT - HEALTHEAST (OUTPATIENT)
Dept: FAMILY MEDICINE | Facility: CLINIC | Age: 48
End: 2018-04-26

## 2018-04-26 DIAGNOSIS — I50.23 ACUTE ON CHRONIC SYSTOLIC HEART FAILURE (H): ICD-10-CM

## 2018-04-26 DIAGNOSIS — M54.6 ACUTE BILATERAL THORACIC BACK PAIN: ICD-10-CM

## 2018-04-26 DIAGNOSIS — R60.0 LOWER EXTREMITY EDEMA: ICD-10-CM

## 2018-05-02 ENCOUNTER — OFFICE VISIT - HEALTHEAST (OUTPATIENT)
Dept: CARDIOLOGY | Facility: CLINIC | Age: 48
End: 2018-05-02

## 2018-05-02 DIAGNOSIS — I42.8 NONISCHEMIC CARDIOMYOPATHY (H): ICD-10-CM

## 2018-05-02 DIAGNOSIS — I50.20 HEART FAILURE WITH REDUCED EJECTION FRACTION, NYHA CLASS III (H): ICD-10-CM

## 2018-05-02 DIAGNOSIS — I25.10 CORONARY ARTERY DISEASE INVOLVING NATIVE CORONARY ARTERY OF NATIVE HEART: ICD-10-CM

## 2018-05-02 DIAGNOSIS — R60.0 LOWER EXTREMITY EDEMA: ICD-10-CM

## 2018-05-02 DIAGNOSIS — I10 ESSENTIAL HYPERTENSION: ICD-10-CM

## 2018-05-02 LAB
ANION GAP SERPL CALCULATED.3IONS-SCNC: 9 MMOL/L (ref 5–18)
BUN SERPL-MCNC: 12 MG/DL (ref 8–22)
CALCIUM SERPL-MCNC: 8.7 MG/DL (ref 8.5–10.5)
CHLORIDE BLD-SCNC: 104 MMOL/L (ref 98–107)
CO2 SERPL-SCNC: 27 MMOL/L (ref 22–31)
CREAT SERPL-MCNC: 0.6 MG/DL (ref 0.6–1.1)
GFR SERPL CREATININE-BSD FRML MDRD: >60 ML/MIN/1.73M2
GLUCOSE BLD-MCNC: 80 MG/DL (ref 70–125)
POTASSIUM BLD-SCNC: 4 MMOL/L (ref 3.5–5)
SODIUM SERPL-SCNC: 140 MMOL/L (ref 136–145)

## 2018-05-02 ASSESSMENT — MIFFLIN-ST. JEOR: SCORE: 1399.47

## 2018-05-03 ENCOUNTER — COMMUNICATION - HEALTHEAST (OUTPATIENT)
Dept: CARDIOLOGY | Facility: CLINIC | Age: 48
End: 2018-05-03

## 2018-05-04 ENCOUNTER — COMMUNICATION - HEALTHEAST (OUTPATIENT)
Dept: CARDIOLOGY | Facility: CLINIC | Age: 48
End: 2018-05-04

## 2018-05-04 DIAGNOSIS — R06.09 DYSPNEA ON EXERTION: ICD-10-CM

## 2018-05-17 ENCOUNTER — OFFICE VISIT - HEALTHEAST (OUTPATIENT)
Dept: PHARMACY | Facility: CLINIC | Age: 48
End: 2018-05-17

## 2018-05-17 DIAGNOSIS — I25.10 CORONARY ARTERY DISEASE INVOLVING NATIVE CORONARY ARTERY OF NATIVE HEART: ICD-10-CM

## 2018-05-17 DIAGNOSIS — I42.8 NONISCHEMIC CARDIOMYOPATHY (H): ICD-10-CM

## 2018-05-17 DIAGNOSIS — I20.89 EXERTIONAL ANGINA (H): ICD-10-CM

## 2018-05-17 DIAGNOSIS — R05.3 CHRONIC COUGH: ICD-10-CM

## 2018-05-17 DIAGNOSIS — I10 ESSENTIAL HYPERTENSION: ICD-10-CM

## 2018-05-21 ENCOUNTER — HOSPITAL ENCOUNTER (OUTPATIENT)
Dept: CARDIOLOGY | Facility: HOSPITAL | Age: 48
Discharge: HOME | End: 2018-05-21
Attending: INTERNAL MEDICINE

## 2018-05-21 ENCOUNTER — HOSPITAL ENCOUNTER (OUTPATIENT)
Dept: NUCLEAR MEDICINE | Facility: HOSPITAL | Age: 48
Discharge: HOME | End: 2018-05-21
Attending: INTERNAL MEDICINE

## 2018-05-21 DIAGNOSIS — R06.09 DYSPNEA ON EXERTION: ICD-10-CM

## 2018-05-21 DIAGNOSIS — R06.09 OTHER FORMS OF DYSPNEA: ICD-10-CM

## 2018-05-21 LAB
CV STRESS CURRENT BP HE: NORMAL
CV STRESS CURRENT HR HE: 106
CV STRESS CURRENT HR HE: 106
CV STRESS CURRENT HR HE: 108
CV STRESS CURRENT HR HE: 108
CV STRESS CURRENT HR HE: 109
CV STRESS CURRENT HR HE: 111
CV STRESS CURRENT HR HE: 114
CV STRESS CURRENT HR HE: 116
CV STRESS CURRENT HR HE: 117
CV STRESS CURRENT HR HE: 120
CV STRESS CURRENT HR HE: 120
CV STRESS CURRENT HR HE: 121
CV STRESS CURRENT HR HE: 127
CV STRESS CURRENT HR HE: 128
CV STRESS CURRENT HR HE: 136
CV STRESS CURRENT HR HE: 137
CV STRESS CURRENT HR HE: 144
CV STRESS CURRENT HR HE: 146
CV STRESS CURRENT HR HE: 149
CV STRESS CURRENT HR HE: 150
CV STRESS CURRENT HR HE: 154
CV STRESS CURRENT HR HE: 155
CV STRESS CURRENT HR HE: 156
CV STRESS CURRENT HR HE: 96
CV STRESS DEVIATION TIME HE: NORMAL
CV STRESS ECHO PERCENT HR HE: NORMAL
CV STRESS EXERCISE STAGE HE: NORMAL
CV STRESS EXERCISE STAGE REACHED HE: NORMAL
CV STRESS FINAL RESTING BP HE: NORMAL
CV STRESS FINAL RESTING HR HE: 114
CV STRESS MAX HR HE: 156
CV STRESS MAX TREADMILL GRADE HE: 12
CV STRESS MAX TREADMILL SPEED HE: 2.5
CV STRESS PEAK DIA BP HE: NORMAL
CV STRESS PEAK SYS BP HE: NORMAL
CV STRESS PHASE HE: NORMAL
CV STRESS PROTOCOL HE: NORMAL
CV STRESS RESTING PT POSITION HE: NORMAL
CV STRESS ST DEVIATION AMOUNT HE: NORMAL
CV STRESS ST DEVIATION ELEVATION HE: NORMAL
CV STRESS ST EVELATION AMOUNT HE: NORMAL
CV STRESS TEST TYPE HE: NORMAL
CV STRESS TOTAL STAGE TIME MIN 1 HE: NORMAL
NUC STRESS EJECTION FRACTION: 64 %
STRESS ECHO BASELINE BP: NORMAL
STRESS ECHO BASELINE HR: 86
STRESS ECHO CALCULATED PERCENT HR: 91 %
STRESS ECHO LAST STRESS BP: NORMAL
STRESS ECHO LAST STRESS HR: 154
STRESS ECHO POST ESTIMATED WORKLOAD: 7.1
STRESS ECHO POST EXERCISE DUR MIN: 5
STRESS ECHO POST EXERCISE DUR SEC: 50
STRESS ECHO TARGET HR: 146

## 2018-05-21 ASSESSMENT — MIFFLIN-ST. JEOR: SCORE: 1377.93

## 2018-05-24 ENCOUNTER — OFFICE VISIT - HEALTHEAST (OUTPATIENT)
Dept: CARDIOLOGY | Facility: CLINIC | Age: 48
End: 2018-05-24

## 2018-05-24 DIAGNOSIS — I50.22 CHRONIC SYSTOLIC HEART FAILURE (H): ICD-10-CM

## 2018-05-24 DIAGNOSIS — I42.8 NONISCHEMIC CARDIOMYOPATHY (H): ICD-10-CM

## 2018-05-24 DIAGNOSIS — I10 ESSENTIAL HYPERTENSION: ICD-10-CM

## 2018-05-24 ASSESSMENT — MIFFLIN-ST. JEOR: SCORE: 1373.39

## 2018-05-30 ENCOUNTER — RECORDS - HEALTHEAST (OUTPATIENT)
Dept: ADMINISTRATIVE | Facility: OTHER | Age: 48
End: 2018-05-30

## 2018-06-12 DIAGNOSIS — J45.20 MILD INTERMITTENT ASTHMA: ICD-10-CM

## 2018-06-12 DIAGNOSIS — R05.9 COUGH: ICD-10-CM

## 2018-06-12 RX ORDER — FLUTICASONE PROPIONATE 44 UG/1
2 AEROSOL, METERED RESPIRATORY (INHALATION) 2 TIMES DAILY
Qty: 1 INHALER | Refills: 3 | Status: SHIPPED | OUTPATIENT
Start: 2018-06-12 | End: 2018-10-09

## 2018-06-22 ENCOUNTER — COMMUNICATION - HEALTHEAST (OUTPATIENT)
Dept: FAMILY MEDICINE | Facility: CLINIC | Age: 48
End: 2018-06-22

## 2018-06-22 DIAGNOSIS — I50.23 ACUTE ON CHRONIC SYSTOLIC HEART FAILURE (H): ICD-10-CM

## 2018-06-22 DIAGNOSIS — R60.0 LOWER EXTREMITY EDEMA: ICD-10-CM

## 2018-06-25 ENCOUNTER — COMMUNICATION - HEALTHEAST (OUTPATIENT)
Dept: FAMILY MEDICINE | Facility: CLINIC | Age: 48
End: 2018-06-25

## 2018-07-25 ENCOUNTER — OFFICE VISIT - HEALTHEAST (OUTPATIENT)
Dept: FAMILY MEDICINE | Facility: CLINIC | Age: 48
End: 2018-07-25

## 2018-07-25 ENCOUNTER — AMBULATORY - HEALTHEAST (OUTPATIENT)
Dept: PULMONOLOGY | Facility: OTHER | Age: 48
End: 2018-07-25

## 2018-07-25 DIAGNOSIS — M54.6 RIGHT-SIDED THORACIC BACK PAIN: ICD-10-CM

## 2018-07-25 DIAGNOSIS — R05.3 CHRONIC COUGH: ICD-10-CM

## 2018-07-25 DIAGNOSIS — R68.89 FLU-LIKE SYMPTOMS: ICD-10-CM

## 2018-07-25 DIAGNOSIS — R05.9 COUGH: ICD-10-CM

## 2018-07-25 LAB
ALBUMIN SERPL-MCNC: 3.9 G/DL (ref 3.5–5)
ALBUMIN UR-MCNC: NEGATIVE MG/DL
ALP SERPL-CCNC: 96 U/L (ref 45–120)
ALT SERPL W P-5'-P-CCNC: 25 U/L (ref 0–45)
APPEARANCE UR: CLEAR
AST SERPL W P-5'-P-CCNC: 20 U/L (ref 0–40)
BILIRUB DIRECT SERPL-MCNC: 0.2 MG/DL
BILIRUB SERPL-MCNC: 0.8 MG/DL (ref 0–1)
BILIRUB UR QL STRIP: NEGATIVE
COLOR UR AUTO: YELLOW
GLUCOSE UR STRIP-MCNC: NEGATIVE MG/DL
HGB UR QL STRIP: NEGATIVE
KETONES UR STRIP-MCNC: NEGATIVE MG/DL
LEUKOCYTE ESTERASE UR QL STRIP: NEGATIVE
LIPASE SERPL-CCNC: 26 U/L (ref 0–52)
NITRATE UR QL: NEGATIVE
PH UR STRIP: 7.5 [PH] (ref 5–8)
PROT SERPL-MCNC: 7.2 G/DL (ref 6–8)
SP GR UR STRIP: 1.01 (ref 1–1.03)
UROBILINOGEN UR STRIP-ACNC: NORMAL
WBC: 6.6 THOU/UL (ref 4–11)

## 2018-07-26 LAB — BACTERIA SPEC CULT: NO GROWTH

## 2018-08-02 ENCOUNTER — COMMUNICATION - HEALTHEAST (OUTPATIENT)
Dept: PHARMACY | Facility: CLINIC | Age: 48
End: 2018-08-02

## 2018-08-06 ENCOUNTER — OFFICE VISIT - HEALTHEAST (OUTPATIENT)
Dept: CARDIOLOGY | Facility: CLINIC | Age: 48
End: 2018-08-06

## 2018-08-06 DIAGNOSIS — E78.2 MIXED HYPERLIPIDEMIA: ICD-10-CM

## 2018-08-06 DIAGNOSIS — I10 ESSENTIAL HYPERTENSION: ICD-10-CM

## 2018-08-06 DIAGNOSIS — I42.8 NONISCHEMIC CARDIOMYOPATHY (H): ICD-10-CM

## 2018-08-06 DIAGNOSIS — I25.10 CORONARY ARTERY DISEASE INVOLVING NATIVE CORONARY ARTERY OF NATIVE HEART WITHOUT ANGINA PECTORIS: ICD-10-CM

## 2018-08-06 DIAGNOSIS — I50.22 CHRONIC SYSTOLIC HEART FAILURE (H): ICD-10-CM

## 2018-08-06 ASSESSMENT — MIFFLIN-ST. JEOR: SCORE: 1385.86

## 2018-08-15 ENCOUNTER — OFFICE VISIT - HEALTHEAST (OUTPATIENT)
Dept: PHARMACY | Facility: CLINIC | Age: 48
End: 2018-08-15

## 2018-08-15 ENCOUNTER — OFFICE VISIT - HEALTHEAST (OUTPATIENT)
Dept: FAMILY MEDICINE | Facility: CLINIC | Age: 48
End: 2018-08-15

## 2018-08-15 DIAGNOSIS — G44.209 TENSION HEADACHE: ICD-10-CM

## 2018-08-15 DIAGNOSIS — R60.0 LOWER EXTREMITY EDEMA: ICD-10-CM

## 2018-08-15 DIAGNOSIS — I50.23 ACUTE ON CHRONIC SYSTOLIC HEART FAILURE (H): ICD-10-CM

## 2018-08-15 DIAGNOSIS — R05.3 CHRONIC COUGH: ICD-10-CM

## 2018-08-15 DIAGNOSIS — G44.229 CHRONIC TENSION-TYPE HEADACHE, NOT INTRACTABLE: ICD-10-CM

## 2018-08-15 DIAGNOSIS — I10 ESSENTIAL HYPERTENSION: ICD-10-CM

## 2018-08-15 DIAGNOSIS — Z12.31 VISIT FOR SCREENING MAMMOGRAM: ICD-10-CM

## 2018-08-21 ENCOUNTER — OFFICE VISIT - HEALTHEAST (OUTPATIENT)
Dept: FAMILY MEDICINE | Facility: CLINIC | Age: 48
End: 2018-08-21

## 2018-08-21 DIAGNOSIS — E78.2 MIXED HYPERLIPIDEMIA: ICD-10-CM

## 2018-08-21 DIAGNOSIS — N39.46 MIXED INCONTINENCE: ICD-10-CM

## 2018-08-21 DIAGNOSIS — I10 ESSENTIAL HYPERTENSION: ICD-10-CM

## 2018-08-21 DIAGNOSIS — R42 VERTIGO: ICD-10-CM

## 2018-08-21 DIAGNOSIS — R73.03 PREDIABETES: ICD-10-CM

## 2018-08-21 LAB
ALBUMIN UR-MCNC: NEGATIVE MG/DL
ANION GAP SERPL CALCULATED.3IONS-SCNC: 6 MMOL/L (ref 5–18)
APPEARANCE UR: CLEAR
BILIRUB UR QL STRIP: NEGATIVE
BUN SERPL-MCNC: 17 MG/DL (ref 8–22)
CALCIUM SERPL-MCNC: 8.8 MG/DL (ref 8.5–10.5)
CHLORIDE BLD-SCNC: 108 MMOL/L (ref 98–107)
CO2 SERPL-SCNC: 27 MMOL/L (ref 22–31)
COLOR UR AUTO: YELLOW
CREAT SERPL-MCNC: 0.77 MG/DL (ref 0.6–1.1)
GFR SERPL CREATININE-BSD FRML MDRD: >60 ML/MIN/1.73M2
GLUCOSE BLD-MCNC: 118 MG/DL (ref 70–125)
GLUCOSE UR STRIP-MCNC: NEGATIVE MG/DL
HBA1C MFR BLD: 6.1 % (ref 3.5–6)
HGB UR QL STRIP: NEGATIVE
KETONES UR STRIP-MCNC: NEGATIVE MG/DL
LDLC SERPL CALC-MCNC: 92 MG/DL
LEUKOCYTE ESTERASE UR QL STRIP: NEGATIVE
NITRATE UR QL: NEGATIVE
PH UR STRIP: 7 [PH] (ref 5–8)
POTASSIUM BLD-SCNC: 4.8 MMOL/L (ref 3.5–5)
SODIUM SERPL-SCNC: 141 MMOL/L (ref 136–145)
SP GR UR STRIP: 1.01 (ref 1–1.03)
UROBILINOGEN UR STRIP-ACNC: NORMAL

## 2018-08-24 ENCOUNTER — COMMUNICATION - HEALTHEAST (OUTPATIENT)
Dept: FAMILY MEDICINE | Facility: CLINIC | Age: 48
End: 2018-08-24

## 2018-09-18 ENCOUNTER — OFFICE VISIT - HEALTHEAST (OUTPATIENT)
Dept: PULMONOLOGY | Facility: OTHER | Age: 48
End: 2018-09-18

## 2018-09-18 ENCOUNTER — RECORDS - HEALTHEAST (OUTPATIENT)
Dept: ADMINISTRATIVE | Facility: OTHER | Age: 48
End: 2018-09-18

## 2018-09-18 ENCOUNTER — RECORDS - HEALTHEAST (OUTPATIENT)
Dept: PULMONOLOGY | Facility: OTHER | Age: 48
End: 2018-09-18

## 2018-09-18 DIAGNOSIS — R05.3 CHRONIC COUGH: ICD-10-CM

## 2018-09-18 DIAGNOSIS — R05.9 COUGH: ICD-10-CM

## 2018-09-18 ASSESSMENT — MIFFLIN-ST. JEOR: SCORE: 1376.79

## 2018-09-20 ENCOUNTER — RECORDS - HEALTHEAST (OUTPATIENT)
Dept: ADMINISTRATIVE | Facility: OTHER | Age: 48
End: 2018-09-20

## 2018-09-24 ENCOUNTER — COMMUNICATION - HEALTHEAST (OUTPATIENT)
Dept: PULMONOLOGY | Facility: OTHER | Age: 48
End: 2018-09-24

## 2018-10-07 ENCOUNTER — COMMUNICATION - HEALTHEAST (OUTPATIENT)
Dept: FAMILY MEDICINE | Facility: CLINIC | Age: 48
End: 2018-10-07

## 2018-10-07 DIAGNOSIS — G43.109 MIGRAINE WITH AURA: ICD-10-CM

## 2018-10-09 DIAGNOSIS — J45.20 MILD INTERMITTENT ASTHMA: ICD-10-CM

## 2018-10-09 DIAGNOSIS — R05.9 COUGH: ICD-10-CM

## 2018-10-09 RX ORDER — FLUTICASONE PROPIONATE 44 UG/1
2 AEROSOL, METERED RESPIRATORY (INHALATION) 2 TIMES DAILY
Qty: 1 INHALER | Refills: 3 | Status: SHIPPED | OUTPATIENT
Start: 2018-10-09 | End: 2019-01-30

## 2018-10-15 ENCOUNTER — COMMUNICATION - HEALTHEAST (OUTPATIENT)
Dept: FAMILY MEDICINE | Facility: CLINIC | Age: 48
End: 2018-10-15

## 2018-10-15 ENCOUNTER — AMBULATORY - HEALTHEAST (OUTPATIENT)
Dept: NURSING | Facility: CLINIC | Age: 48
End: 2018-10-15

## 2018-10-15 DIAGNOSIS — R05.3 CHRONIC COUGH: ICD-10-CM

## 2018-10-15 DIAGNOSIS — Z23 NEED FOR INFLUENZA VACCINATION: ICD-10-CM

## 2018-10-17 ENCOUNTER — OFFICE VISIT - HEALTHEAST (OUTPATIENT)
Dept: PULMONOLOGY | Facility: OTHER | Age: 48
End: 2018-10-17

## 2018-10-17 DIAGNOSIS — I10 HTN (HYPERTENSION): ICD-10-CM

## 2018-10-17 DIAGNOSIS — K21.9 GERD (GASTROESOPHAGEAL REFLUX DISEASE): ICD-10-CM

## 2018-10-17 DIAGNOSIS — R04.2 HEMOPTYSIS: ICD-10-CM

## 2018-10-17 DIAGNOSIS — R05.9 COUGH: ICD-10-CM

## 2018-10-17 ASSESSMENT — MIFFLIN-ST. JEOR: SCORE: 1390.39

## 2018-11-01 ENCOUNTER — OFFICE VISIT - HEALTHEAST (OUTPATIENT)
Dept: CARDIOLOGY | Facility: CLINIC | Age: 48
End: 2018-11-01

## 2018-11-01 DIAGNOSIS — I10 ESSENTIAL HYPERTENSION: ICD-10-CM

## 2018-11-01 DIAGNOSIS — I42.8 NONISCHEMIC CARDIOMYOPATHY (H): ICD-10-CM

## 2018-11-01 DIAGNOSIS — I50.22 CHRONIC SYSTOLIC HEART FAILURE (H): ICD-10-CM

## 2018-11-01 LAB
ANION GAP SERPL CALCULATED.3IONS-SCNC: 12 MMOL/L (ref 5–18)
BUN SERPL-MCNC: 18 MG/DL (ref 8–22)
CALCIUM SERPL-MCNC: 9.4 MG/DL (ref 8.5–10.5)
CHLORIDE BLD-SCNC: 102 MMOL/L (ref 98–107)
CO2 SERPL-SCNC: 22 MMOL/L (ref 22–31)
CREAT SERPL-MCNC: 0.7 MG/DL (ref 0.6–1.1)
GFR SERPL CREATININE-BSD FRML MDRD: >60 ML/MIN/1.73M2
GLUCOSE BLD-MCNC: 153 MG/DL (ref 70–125)
POTASSIUM BLD-SCNC: 4.8 MMOL/L (ref 3.5–5)
SODIUM SERPL-SCNC: 136 MMOL/L (ref 136–145)

## 2018-11-01 ASSESSMENT — MIFFLIN-ST. JEOR: SCORE: 1390.39

## 2018-11-26 ENCOUNTER — COMMUNICATION - HEALTHEAST (OUTPATIENT)
Dept: ADMINISTRATIVE | Facility: CLINIC | Age: 48
End: 2018-11-26

## 2018-12-06 ENCOUNTER — COMMUNICATION - HEALTHEAST (OUTPATIENT)
Dept: CARDIOLOGY | Facility: CLINIC | Age: 48
End: 2018-12-06

## 2018-12-06 DIAGNOSIS — I50.22 CHRONIC SYSTOLIC HEART FAILURE (H): ICD-10-CM

## 2018-12-15 ENCOUNTER — COMMUNICATION - HEALTHEAST (OUTPATIENT)
Dept: PULMONOLOGY | Facility: OTHER | Age: 48
End: 2018-12-15

## 2018-12-15 DIAGNOSIS — R05.9 COUGH: ICD-10-CM

## 2018-12-17 ENCOUNTER — COMMUNICATION - HEALTHEAST (OUTPATIENT)
Dept: CARE COORDINATION | Facility: CLINIC | Age: 48
End: 2018-12-17

## 2018-12-18 ENCOUNTER — COMMUNICATION - HEALTHEAST (OUTPATIENT)
Dept: FAMILY MEDICINE | Facility: CLINIC | Age: 48
End: 2018-12-18

## 2018-12-18 DIAGNOSIS — I50.23 ACUTE ON CHRONIC SYSTOLIC HEART FAILURE (H): ICD-10-CM

## 2018-12-18 DIAGNOSIS — R60.0 LOWER EXTREMITY EDEMA: ICD-10-CM

## 2018-12-21 ENCOUNTER — OFFICE VISIT - HEALTHEAST (OUTPATIENT)
Dept: FAMILY MEDICINE | Facility: CLINIC | Age: 48
End: 2018-12-21

## 2018-12-21 DIAGNOSIS — Z09 HOSPITAL DISCHARGE FOLLOW-UP: ICD-10-CM

## 2018-12-21 DIAGNOSIS — J06.9 URI (UPPER RESPIRATORY INFECTION): ICD-10-CM

## 2018-12-21 DIAGNOSIS — I10 ESSENTIAL HYPERTENSION: ICD-10-CM

## 2018-12-21 DIAGNOSIS — I25.10 NONOCCLUSIVE CORONARY ATHEROSCLEROSIS OF NATIVE CORONARY ARTERY: ICD-10-CM

## 2018-12-21 DIAGNOSIS — E66.01 MORBID OBESITY (H): ICD-10-CM

## 2018-12-21 DIAGNOSIS — I50.22 CHRONIC SYSTOLIC HEART FAILURE (H): ICD-10-CM

## 2018-12-21 ASSESSMENT — MIFFLIN-ST. JEOR: SCORE: 1388.13

## 2018-12-30 ENCOUNTER — COMMUNICATION - HEALTHEAST (OUTPATIENT)
Dept: CARDIOLOGY | Facility: CLINIC | Age: 48
End: 2018-12-30

## 2018-12-30 DIAGNOSIS — I25.119 CORONARY ARTERY DISEASE INVOLVING NATIVE CORONARY ARTERY OF NATIVE HEART WITH ANGINA PECTORIS (H): ICD-10-CM

## 2019-01-10 ENCOUNTER — COMMUNICATION - HEALTHEAST (OUTPATIENT)
Dept: CARDIOLOGY | Facility: CLINIC | Age: 49
End: 2019-01-10

## 2019-01-10 DIAGNOSIS — I20.0 ACCELERATING ANGINA (H): ICD-10-CM

## 2019-01-11 ENCOUNTER — OFFICE VISIT - HEALTHEAST (OUTPATIENT)
Dept: PHARMACY | Facility: CLINIC | Age: 49
End: 2019-01-11

## 2019-01-11 DIAGNOSIS — I20.0 ACCELERATING ANGINA (H): ICD-10-CM

## 2019-01-11 DIAGNOSIS — G43.109 MIGRAINE WITH AURA AND WITHOUT STATUS MIGRAINOSUS, NOT INTRACTABLE: ICD-10-CM

## 2019-01-11 DIAGNOSIS — I50.22 CHRONIC SYSTOLIC HEART FAILURE (H): ICD-10-CM

## 2019-01-11 DIAGNOSIS — I25.10 CORONARY ARTERY DISEASE INVOLVING NATIVE CORONARY ARTERY OF NATIVE HEART, ANGINA PRESENCE UNSPECIFIED: ICD-10-CM

## 2019-01-11 DIAGNOSIS — I42.8 NONISCHEMIC CARDIOMYOPATHY (H): ICD-10-CM

## 2019-01-11 DIAGNOSIS — R06.09 DYSPNEA ON EXERTION: ICD-10-CM

## 2019-01-11 DIAGNOSIS — G44.229 CHRONIC TENSION-TYPE HEADACHE, NOT INTRACTABLE: ICD-10-CM

## 2019-01-11 DIAGNOSIS — I10 ESSENTIAL HYPERTENSION: ICD-10-CM

## 2019-01-11 DIAGNOSIS — G43.109 MIGRAINE WITH AURA: ICD-10-CM

## 2019-01-28 ENCOUNTER — OFFICE VISIT - HEALTHEAST (OUTPATIENT)
Dept: PULMONOLOGY | Facility: OTHER | Age: 49
End: 2019-01-28

## 2019-01-28 DIAGNOSIS — K21.9 GASTROESOPHAGEAL REFLUX DISEASE WITHOUT ESOPHAGITIS: ICD-10-CM

## 2019-01-28 DIAGNOSIS — R09.82 PND (POST-NASAL DRIP): ICD-10-CM

## 2019-01-28 DIAGNOSIS — R05.3 CHRONIC COUGH: ICD-10-CM

## 2019-01-28 ASSESSMENT — MIFFLIN-ST. JEOR: SCORE: 1391.54

## 2019-01-30 DIAGNOSIS — J45.20 MILD INTERMITTENT ASTHMA: ICD-10-CM

## 2019-01-30 DIAGNOSIS — R05.9 COUGH: ICD-10-CM

## 2019-01-30 RX ORDER — FLUTICASONE PROPIONATE 44 UG/1
2 AEROSOL, METERED RESPIRATORY (INHALATION) 2 TIMES DAILY
Qty: 1 INHALER | Refills: 1 | Status: SHIPPED | OUTPATIENT
Start: 2019-01-30 | End: 2019-04-05

## 2019-02-22 ENCOUNTER — OFFICE VISIT - HEALTHEAST (OUTPATIENT)
Dept: PHARMACY | Facility: CLINIC | Age: 49
End: 2019-02-22

## 2019-02-22 DIAGNOSIS — J30.2 SEASONAL ALLERGIC RHINITIS: ICD-10-CM

## 2019-02-22 DIAGNOSIS — R06.02 SOB (SHORTNESS OF BREATH): ICD-10-CM

## 2019-02-22 DIAGNOSIS — I25.10 CORONARY ARTERY DISEASE INVOLVING NATIVE CORONARY ARTERY OF NATIVE HEART, ANGINA PRESENCE UNSPECIFIED: ICD-10-CM

## 2019-02-22 DIAGNOSIS — R42 DIZZINESS: ICD-10-CM

## 2019-02-22 DIAGNOSIS — G43.109 MIGRAINE WITH AURA AND WITHOUT STATUS MIGRAINOSUS, NOT INTRACTABLE: ICD-10-CM

## 2019-02-22 DIAGNOSIS — I10 ESSENTIAL HYPERTENSION: ICD-10-CM

## 2019-02-22 DIAGNOSIS — J30.2 SEASONAL ALLERGIC RHINITIS, UNSPECIFIED TRIGGER: ICD-10-CM

## 2019-02-22 DIAGNOSIS — G44.229 CHRONIC TENSION-TYPE HEADACHE, NOT INTRACTABLE: ICD-10-CM

## 2019-03-04 ENCOUNTER — OFFICE VISIT - HEALTHEAST (OUTPATIENT)
Dept: FAMILY MEDICINE | Facility: CLINIC | Age: 49
End: 2019-03-04

## 2019-03-04 DIAGNOSIS — I10 ESSENTIAL HYPERTENSION: ICD-10-CM

## 2019-03-04 DIAGNOSIS — R35.89 POLYURIA: ICD-10-CM

## 2019-03-04 DIAGNOSIS — R73.03 PREDIABETES: ICD-10-CM

## 2019-03-04 DIAGNOSIS — E66.01 MORBID OBESITY (H): ICD-10-CM

## 2019-03-04 DIAGNOSIS — F32.1 MODERATE MAJOR DEPRESSION (H): ICD-10-CM

## 2019-03-04 DIAGNOSIS — R05.3 CHRONIC COUGH: ICD-10-CM

## 2019-03-04 DIAGNOSIS — Z12.31 VISIT FOR SCREENING MAMMOGRAM: ICD-10-CM

## 2019-03-04 DIAGNOSIS — I50.22 CHRONIC SYSTOLIC HEART FAILURE (H): ICD-10-CM

## 2019-03-04 LAB
ALBUMIN UR-MCNC: ABNORMAL MG/DL
ANION GAP SERPL CALCULATED.3IONS-SCNC: 10 MMOL/L (ref 5–18)
APPEARANCE UR: CLEAR
BACTERIA #/AREA URNS HPF: ABNORMAL HPF
BILIRUB UR QL STRIP: NEGATIVE
BUN SERPL-MCNC: 16 MG/DL (ref 8–22)
CALCIUM SERPL-MCNC: 9.5 MG/DL (ref 8.5–10.5)
CHLORIDE BLD-SCNC: 103 MMOL/L (ref 98–107)
CO2 SERPL-SCNC: 30 MMOL/L (ref 22–31)
COLOR UR AUTO: YELLOW
CREAT SERPL-MCNC: 0.7 MG/DL (ref 0.6–1.1)
GFR SERPL CREATININE-BSD FRML MDRD: >60 ML/MIN/1.73M2
GLUCOSE BLD-MCNC: 140 MG/DL (ref 70–125)
GLUCOSE UR STRIP-MCNC: NEGATIVE MG/DL
HBA1C MFR BLD: 6.2 % (ref 3.5–6)
HGB UR QL STRIP: NEGATIVE
KETONES UR STRIP-MCNC: NEGATIVE MG/DL
LEUKOCYTE ESTERASE UR QL STRIP: ABNORMAL
NITRATE UR QL: NEGATIVE
PH UR STRIP: 6 [PH] (ref 5–8)
POTASSIUM BLD-SCNC: 3.7 MMOL/L (ref 3.5–5)
RBC #/AREA URNS AUTO: ABNORMAL HPF
SODIUM SERPL-SCNC: 143 MMOL/L (ref 136–145)
SP GR UR STRIP: 1.02 (ref 1–1.03)
SQUAMOUS #/AREA URNS AUTO: ABNORMAL LPF
UROBILINOGEN UR STRIP-ACNC: ABNORMAL
WBC #/AREA URNS AUTO: ABNORMAL HPF

## 2019-03-05 LAB
BACTERIA SPEC CULT: NO GROWTH
BNP SERPL-MCNC: 45 PG/ML (ref 0–71)

## 2019-03-07 ENCOUNTER — COMMUNICATION - HEALTHEAST (OUTPATIENT)
Dept: FAMILY MEDICINE | Facility: CLINIC | Age: 49
End: 2019-03-07

## 2019-03-07 DIAGNOSIS — I20.89 EXERTIONAL ANGINA (H): ICD-10-CM

## 2019-03-07 DIAGNOSIS — I42.8 NONISCHEMIC CARDIOMYOPATHY (H): ICD-10-CM

## 2019-03-07 DIAGNOSIS — G43.109 MIGRAINE WITH AURA: ICD-10-CM

## 2019-04-04 ENCOUNTER — COMMUNICATION - HEALTHEAST (OUTPATIENT)
Dept: NURSING | Facility: CLINIC | Age: 49
End: 2019-04-04

## 2019-04-04 ENCOUNTER — COMMUNICATION - HEALTHEAST (OUTPATIENT)
Dept: CARDIOLOGY | Facility: CLINIC | Age: 49
End: 2019-04-04

## 2019-04-04 DIAGNOSIS — I25.119 CORONARY ARTERY DISEASE INVOLVING NATIVE CORONARY ARTERY OF NATIVE HEART WITH ANGINA PECTORIS (H): ICD-10-CM

## 2019-04-04 DIAGNOSIS — R12 HEARTBURN: ICD-10-CM

## 2019-04-05 DIAGNOSIS — R05.9 COUGH: ICD-10-CM

## 2019-04-05 DIAGNOSIS — J45.20 MILD INTERMITTENT ASTHMA: ICD-10-CM

## 2019-04-05 RX ORDER — FLUTICASONE PROPIONATE 44 UG/1
2 AEROSOL, METERED RESPIRATORY (INHALATION) 2 TIMES DAILY
Qty: 1 INHALER | Refills: 1 | Status: SHIPPED | OUTPATIENT
Start: 2019-04-05 | End: 2021-07-16

## 2019-04-24 ENCOUNTER — COMMUNICATION - HEALTHEAST (OUTPATIENT)
Dept: FAMILY MEDICINE | Facility: CLINIC | Age: 49
End: 2019-04-24

## 2019-04-24 DIAGNOSIS — G43.109 MIGRAINE WITH AURA AND WITHOUT STATUS MIGRAINOSUS, NOT INTRACTABLE: ICD-10-CM

## 2019-05-13 ENCOUNTER — SURGERY - HEALTHEAST (OUTPATIENT)
Dept: CARDIOLOGY | Facility: CLINIC | Age: 49
End: 2019-05-13

## 2019-05-16 ENCOUNTER — COMMUNICATION - HEALTHEAST (OUTPATIENT)
Dept: CARE COORDINATION | Facility: CLINIC | Age: 49
End: 2019-05-16

## 2019-05-20 ENCOUNTER — OFFICE VISIT - HEALTHEAST (OUTPATIENT)
Dept: FAMILY MEDICINE | Facility: CLINIC | Age: 49
End: 2019-05-20

## 2019-05-20 DIAGNOSIS — I20.0 ACCELERATING ANGINA (H): ICD-10-CM

## 2019-05-20 DIAGNOSIS — R12 HEARTBURN: ICD-10-CM

## 2019-05-20 DIAGNOSIS — M79.661 PAIN IN BOTH LOWER LEGS: ICD-10-CM

## 2019-05-20 DIAGNOSIS — I25.10 CORONARY ARTERY DISEASE INVOLVING NATIVE CORONARY ARTERY OF NATIVE HEART: ICD-10-CM

## 2019-05-20 DIAGNOSIS — I50.22 CHRONIC SYSTOLIC HEART FAILURE (H): ICD-10-CM

## 2019-05-20 DIAGNOSIS — M79.662 PAIN IN BOTH LOWER LEGS: ICD-10-CM

## 2019-05-20 DIAGNOSIS — R60.0 LOWER EXTREMITY EDEMA: ICD-10-CM

## 2019-05-20 DIAGNOSIS — I10 ESSENTIAL HYPERTENSION: ICD-10-CM

## 2019-06-14 ENCOUNTER — OFFICE VISIT - HEALTHEAST (OUTPATIENT)
Dept: FAMILY MEDICINE | Facility: CLINIC | Age: 49
End: 2019-06-14

## 2019-06-14 ENCOUNTER — AMBULATORY - HEALTHEAST (OUTPATIENT)
Dept: ADMINISTRATIVE | Facility: CLINIC | Age: 49
End: 2019-06-14

## 2019-06-14 DIAGNOSIS — R42 VERTIGO: ICD-10-CM

## 2019-06-14 DIAGNOSIS — H90.5 SENSORINEURAL HEARING LOSS (SNHL) OF RIGHT EAR, UNSPECIFIED HEARING STATUS ON CONTRALATERAL SIDE: ICD-10-CM

## 2019-06-14 DIAGNOSIS — R51.9 CHRONIC NONINTRACTABLE HEADACHE, UNSPECIFIED HEADACHE TYPE: ICD-10-CM

## 2019-06-14 DIAGNOSIS — I10 ESSENTIAL HYPERTENSION: ICD-10-CM

## 2019-06-14 DIAGNOSIS — Z12.31 VISIT FOR SCREENING MAMMOGRAM: ICD-10-CM

## 2019-06-14 DIAGNOSIS — G89.29 CHRONIC NONINTRACTABLE HEADACHE, UNSPECIFIED HEADACHE TYPE: ICD-10-CM

## 2019-06-14 LAB
ANION GAP SERPL CALCULATED.3IONS-SCNC: 9 MMOL/L (ref 5–18)
BUN SERPL-MCNC: 14 MG/DL (ref 8–22)
CALCIUM SERPL-MCNC: 8.9 MG/DL (ref 8.5–10.5)
CHLORIDE BLD-SCNC: 108 MMOL/L (ref 98–107)
CO2 SERPL-SCNC: 26 MMOL/L (ref 22–31)
CREAT SERPL-MCNC: 0.7 MG/DL (ref 0.6–1.1)
GFR SERPL CREATININE-BSD FRML MDRD: >60 ML/MIN/1.73M2
GLUCOSE BLD-MCNC: 102 MG/DL (ref 70–125)
POTASSIUM BLD-SCNC: 4.1 MMOL/L (ref 3.5–5)
SODIUM SERPL-SCNC: 143 MMOL/L (ref 136–145)

## 2019-06-17 ENCOUNTER — COMMUNICATION - HEALTHEAST (OUTPATIENT)
Dept: FAMILY MEDICINE | Facility: CLINIC | Age: 49
End: 2019-06-17

## 2019-07-16 ENCOUNTER — HOSPITAL ENCOUNTER (OUTPATIENT)
Dept: CT IMAGING | Facility: HOSPITAL | Age: 49
Discharge: HOME | End: 2019-07-16
Attending: FAMILY MEDICINE

## 2019-07-25 ENCOUNTER — COMMUNICATION - HEALTHEAST (OUTPATIENT)
Dept: FAMILY MEDICINE | Facility: CLINIC | Age: 49
End: 2019-07-25

## 2019-08-08 ENCOUNTER — COMMUNICATION - HEALTHEAST (OUTPATIENT)
Dept: FAMILY MEDICINE | Facility: CLINIC | Age: 49
End: 2019-08-08

## 2019-08-08 DIAGNOSIS — R42 VERTIGO: ICD-10-CM

## 2019-08-08 DIAGNOSIS — H90.5 SENSORINEURAL HEARING LOSS (SNHL) OF RIGHT EAR, UNSPECIFIED HEARING STATUS ON CONTRALATERAL SIDE: ICD-10-CM

## 2019-08-20 ENCOUNTER — COMMUNICATION - HEALTHEAST (OUTPATIENT)
Dept: OTOLARYNGOLOGY | Facility: CLINIC | Age: 49
End: 2019-08-20

## 2019-08-22 ENCOUNTER — OFFICE VISIT - HEALTHEAST (OUTPATIENT)
Dept: FAMILY MEDICINE | Facility: CLINIC | Age: 49
End: 2019-08-22

## 2019-08-22 DIAGNOSIS — R73.03 PREDIABETES: ICD-10-CM

## 2019-08-22 DIAGNOSIS — I10 ESSENTIAL HYPERTENSION: ICD-10-CM

## 2019-08-22 DIAGNOSIS — I25.10 CORONARY ARTERY DISEASE INVOLVING NATIVE CORONARY ARTERY OF NATIVE HEART: ICD-10-CM

## 2019-08-22 DIAGNOSIS — Z12.11 COLON CANCER SCREENING: ICD-10-CM

## 2019-08-22 DIAGNOSIS — Z11.4 ENCOUNTER FOR SCREENING FOR HIV: ICD-10-CM

## 2019-08-22 DIAGNOSIS — I50.22 CHRONIC SYSTOLIC HEART FAILURE (H): ICD-10-CM

## 2019-08-22 LAB
ANION GAP SERPL CALCULATED.3IONS-SCNC: 10 MMOL/L (ref 5–18)
BUN SERPL-MCNC: 12 MG/DL (ref 8–22)
CALCIUM SERPL-MCNC: 9.2 MG/DL (ref 8.5–10.5)
CHLORIDE BLD-SCNC: 105 MMOL/L (ref 98–107)
CO2 SERPL-SCNC: 27 MMOL/L (ref 22–31)
CREAT SERPL-MCNC: 0.72 MG/DL (ref 0.6–1.1)
GFR SERPL CREATININE-BSD FRML MDRD: >60 ML/MIN/1.73M2
GLUCOSE BLD-MCNC: 154 MG/DL (ref 70–125)
HBA1C MFR BLD: 6.3 % (ref 3.5–6)
HIV 1+2 AB+HIV1 P24 AG SERPL QL IA: NEGATIVE
POTASSIUM BLD-SCNC: 4.4 MMOL/L (ref 3.5–5)
SODIUM SERPL-SCNC: 142 MMOL/L (ref 136–145)

## 2019-08-23 ENCOUNTER — COMMUNICATION - HEALTHEAST (OUTPATIENT)
Dept: FAMILY MEDICINE | Facility: CLINIC | Age: 49
End: 2019-08-23

## 2019-09-20 ENCOUNTER — OFFICE VISIT - HEALTHEAST (OUTPATIENT)
Dept: OTOLARYNGOLOGY | Facility: CLINIC | Age: 49
End: 2019-09-20

## 2019-09-20 ENCOUNTER — OFFICE VISIT - HEALTHEAST (OUTPATIENT)
Dept: AUDIOLOGY | Facility: CLINIC | Age: 49
End: 2019-09-20

## 2019-09-20 DIAGNOSIS — H90.A31 MIXED CONDUCTIVE AND SENSORINEURAL HEARING LOSS OF RIGHT EAR WITH RESTRICTED HEARING OF LEFT EAR: ICD-10-CM

## 2019-09-20 DIAGNOSIS — H70.91 INFECTION OF MASTOID BOWL, RIGHT: ICD-10-CM

## 2019-09-29 ENCOUNTER — COMMUNICATION - HEALTHEAST (OUTPATIENT)
Dept: CARDIOLOGY | Facility: CLINIC | Age: 49
End: 2019-09-29

## 2019-09-29 DIAGNOSIS — I25.119 CORONARY ARTERY DISEASE INVOLVING NATIVE CORONARY ARTERY OF NATIVE HEART WITH ANGINA PECTORIS (H): ICD-10-CM

## 2019-10-09 ENCOUNTER — OFFICE VISIT - HEALTHEAST (OUTPATIENT)
Dept: OTOLARYNGOLOGY | Facility: CLINIC | Age: 49
End: 2019-10-09

## 2019-10-09 DIAGNOSIS — R42 VERTIGO: ICD-10-CM

## 2019-10-09 DIAGNOSIS — H70.91 INFECTION OF MASTOID BOWL, RIGHT: ICD-10-CM

## 2019-10-23 ENCOUNTER — OFFICE VISIT - HEALTHEAST (OUTPATIENT)
Dept: CARDIOLOGY | Facility: CLINIC | Age: 49
End: 2019-10-23

## 2019-10-23 DIAGNOSIS — I25.10 NONOCCLUSIVE CORONARY ATHEROSCLEROSIS OF NATIVE CORONARY ARTERY: ICD-10-CM

## 2019-10-23 DIAGNOSIS — I10 ESSENTIAL HYPERTENSION: ICD-10-CM

## 2019-10-23 DIAGNOSIS — I42.8 NONISCHEMIC CARDIOMYOPATHY (H): ICD-10-CM

## 2019-10-23 DIAGNOSIS — I50.22 CHRONIC SYSTOLIC HEART FAILURE (H): ICD-10-CM

## 2019-10-23 ASSESSMENT — MIFFLIN-ST. JEOR: SCORE: 1400.61

## 2019-10-25 ENCOUNTER — OFFICE VISIT - HEALTHEAST (OUTPATIENT)
Dept: FAMILY MEDICINE | Facility: CLINIC | Age: 49
End: 2019-10-25

## 2019-10-25 DIAGNOSIS — I10 ESSENTIAL HYPERTENSION: ICD-10-CM

## 2019-10-25 DIAGNOSIS — E66.01 CLASS 2 SEVERE OBESITY DUE TO EXCESS CALORIES WITH SERIOUS COMORBIDITY AND BODY MASS INDEX (BMI) OF 39.0 TO 39.9 IN ADULT (H): ICD-10-CM

## 2019-10-25 DIAGNOSIS — R07.9 CHEST PAIN, UNSPECIFIED TYPE: ICD-10-CM

## 2019-10-25 DIAGNOSIS — R12 HEARTBURN: ICD-10-CM

## 2019-10-25 DIAGNOSIS — E66.812 CLASS 2 SEVERE OBESITY DUE TO EXCESS CALORIES WITH SERIOUS COMORBIDITY AND BODY MASS INDEX (BMI) OF 39.0 TO 39.9 IN ADULT (H): ICD-10-CM

## 2019-10-25 DIAGNOSIS — I25.10 NONOCCLUSIVE CORONARY ATHEROSCLEROSIS OF NATIVE CORONARY ARTERY: ICD-10-CM

## 2019-10-25 DIAGNOSIS — Z71.85 VACCINE COUNSELING: ICD-10-CM

## 2019-10-25 LAB
ALBUMIN UR-MCNC: NEGATIVE MG/DL
APPEARANCE UR: CLEAR
BILIRUB UR QL STRIP: NEGATIVE
COLOR UR AUTO: YELLOW
GLUCOSE UR STRIP-MCNC: NEGATIVE MG/DL
HGB UR QL STRIP: NEGATIVE
KETONES UR STRIP-MCNC: NEGATIVE MG/DL
LEUKOCYTE ESTERASE UR QL STRIP: NEGATIVE
NITRATE UR QL: NEGATIVE
PH UR STRIP: 7 [PH] (ref 5–8)
SP GR UR STRIP: 1.02 (ref 1–1.03)
UROBILINOGEN UR STRIP-ACNC: NORMAL

## 2019-10-25 ASSESSMENT — PATIENT HEALTH QUESTIONNAIRE - PHQ9: SUM OF ALL RESPONSES TO PHQ QUESTIONS 1-9: 4

## 2019-10-31 ENCOUNTER — COMMUNICATION - HEALTHEAST (OUTPATIENT)
Dept: FAMILY MEDICINE | Facility: CLINIC | Age: 49
End: 2019-10-31

## 2019-11-21 ENCOUNTER — OFFICE VISIT - HEALTHEAST (OUTPATIENT)
Dept: FAMILY MEDICINE | Facility: CLINIC | Age: 49
End: 2019-11-21

## 2019-11-21 DIAGNOSIS — R91.8 PULMONARY NODULES: ICD-10-CM

## 2019-11-21 DIAGNOSIS — R12 HEARTBURN: ICD-10-CM

## 2019-11-21 DIAGNOSIS — I25.10 CORONARY ARTERY DISEASE INVOLVING NATIVE CORONARY ARTERY OF NATIVE HEART: ICD-10-CM

## 2019-11-21 DIAGNOSIS — I10 ESSENTIAL HYPERTENSION: ICD-10-CM

## 2019-11-21 DIAGNOSIS — G89.29 CHRONIC NONINTRACTABLE HEADACHE, UNSPECIFIED HEADACHE TYPE: ICD-10-CM

## 2019-11-21 DIAGNOSIS — R51.9 CHRONIC NONINTRACTABLE HEADACHE, UNSPECIFIED HEADACHE TYPE: ICD-10-CM

## 2019-11-21 DIAGNOSIS — I50.22 CHRONIC SYSTOLIC HEART FAILURE (H): ICD-10-CM

## 2019-11-21 DIAGNOSIS — Z12.11 COLON CANCER SCREENING: ICD-10-CM

## 2019-11-21 DIAGNOSIS — N83.202 CYST OF LEFT OVARY: ICD-10-CM

## 2019-12-04 ENCOUNTER — OFFICE VISIT - HEALTHEAST (OUTPATIENT)
Dept: CARDIOLOGY | Facility: CLINIC | Age: 49
End: 2019-12-04

## 2019-12-04 DIAGNOSIS — I42.8 NONISCHEMIC CARDIOMYOPATHY (H): ICD-10-CM

## 2019-12-04 DIAGNOSIS — I25.10 NONOCCLUSIVE CORONARY ATHEROSCLEROSIS OF NATIVE CORONARY ARTERY: ICD-10-CM

## 2019-12-04 DIAGNOSIS — I10 ESSENTIAL HYPERTENSION: ICD-10-CM

## 2019-12-04 DIAGNOSIS — E78.5 HYPERLIPIDEMIA LDL GOAL <70: ICD-10-CM

## 2019-12-04 DIAGNOSIS — I50.22 CHRONIC SYSTOLIC HEART FAILURE (H): ICD-10-CM

## 2019-12-04 ASSESSMENT — MIFFLIN-ST. JEOR: SCORE: 1396.07

## 2019-12-13 ENCOUNTER — COMMUNICATION - HEALTHEAST (OUTPATIENT)
Dept: FAMILY MEDICINE | Facility: CLINIC | Age: 49
End: 2019-12-13

## 2019-12-13 ENCOUNTER — HOSPITAL ENCOUNTER (OUTPATIENT)
Dept: ULTRASOUND IMAGING | Facility: HOSPITAL | Age: 49
Discharge: HOME | End: 2019-12-13
Attending: FAMILY MEDICINE

## 2019-12-13 DIAGNOSIS — N83.202 CYST OF LEFT OVARY: ICD-10-CM

## 2020-03-09 ENCOUNTER — COMMUNICATION - HEALTHEAST (OUTPATIENT)
Dept: FAMILY MEDICINE | Facility: CLINIC | Age: 50
End: 2020-03-09

## 2020-03-16 ENCOUNTER — COMMUNICATION - HEALTHEAST (OUTPATIENT)
Dept: FAMILY MEDICINE | Facility: CLINIC | Age: 50
End: 2020-03-16

## 2020-03-23 ENCOUNTER — COMMUNICATION - HEALTHEAST (OUTPATIENT)
Dept: CARDIOLOGY | Facility: CLINIC | Age: 50
End: 2020-03-23

## 2020-03-23 DIAGNOSIS — I25.119 CORONARY ARTERY DISEASE INVOLVING NATIVE CORONARY ARTERY OF NATIVE HEART WITH ANGINA PECTORIS (H): ICD-10-CM

## 2020-04-01 ENCOUNTER — OFFICE VISIT - HEALTHEAST (OUTPATIENT)
Dept: FAMILY MEDICINE | Facility: CLINIC | Age: 50
End: 2020-04-01

## 2020-04-01 DIAGNOSIS — I10 ESSENTIAL HYPERTENSION: ICD-10-CM

## 2020-04-01 DIAGNOSIS — I50.22 CHRONIC SYSTOLIC HEART FAILURE (H): ICD-10-CM

## 2020-04-01 DIAGNOSIS — I25.10 NONOCCLUSIVE CORONARY ATHEROSCLEROSIS OF NATIVE CORONARY ARTERY: ICD-10-CM

## 2020-04-01 DIAGNOSIS — R12 HEARTBURN: ICD-10-CM

## 2020-04-01 DIAGNOSIS — R07.89 ATYPICAL CHEST PAIN: ICD-10-CM

## 2020-04-02 ENCOUNTER — AMBULATORY - HEALTHEAST (OUTPATIENT)
Dept: LAB | Facility: CLINIC | Age: 50
End: 2020-04-02

## 2020-04-02 ENCOUNTER — AMBULATORY - HEALTHEAST (OUTPATIENT)
Dept: NURSING | Facility: CLINIC | Age: 50
End: 2020-04-02

## 2020-04-02 DIAGNOSIS — I10 ESSENTIAL HYPERTENSION: ICD-10-CM

## 2020-04-02 DIAGNOSIS — I50.22 CHRONIC SYSTOLIC HEART FAILURE (H): ICD-10-CM

## 2020-04-02 LAB
ANION GAP SERPL CALCULATED.3IONS-SCNC: 13 MMOL/L (ref 5–18)
BUN SERPL-MCNC: 43 MG/DL (ref 8–22)
CALCIUM SERPL-MCNC: 9.7 MG/DL (ref 8.5–10.5)
CHLORIDE BLD-SCNC: 101 MMOL/L (ref 98–107)
CO2 SERPL-SCNC: 29 MMOL/L (ref 22–31)
CREAT SERPL-MCNC: 1.12 MG/DL (ref 0.6–1.1)
GFR SERPL CREATININE-BSD FRML MDRD: 51 ML/MIN/1.73M2
GLUCOSE BLD-MCNC: 94 MG/DL (ref 70–125)
POTASSIUM BLD-SCNC: 4.3 MMOL/L (ref 3.5–5)
SODIUM SERPL-SCNC: 143 MMOL/L (ref 136–145)

## 2020-04-03 ENCOUNTER — COMMUNICATION - HEALTHEAST (OUTPATIENT)
Dept: FAMILY MEDICINE | Facility: CLINIC | Age: 50
End: 2020-04-03

## 2020-04-23 ENCOUNTER — OFFICE VISIT - HEALTHEAST (OUTPATIENT)
Dept: CARDIOLOGY | Facility: CLINIC | Age: 50
End: 2020-04-23

## 2020-04-23 DIAGNOSIS — I25.119 CORONARY ARTERY DISEASE INVOLVING NATIVE CORONARY ARTERY OF NATIVE HEART WITH ANGINA PECTORIS (H): ICD-10-CM

## 2020-06-04 ENCOUNTER — OFFICE VISIT - HEALTHEAST (OUTPATIENT)
Dept: CARDIOLOGY | Facility: CLINIC | Age: 50
End: 2020-06-04

## 2020-06-04 DIAGNOSIS — I50.22 CHRONIC SYSTOLIC HEART FAILURE (H): ICD-10-CM

## 2020-06-04 DIAGNOSIS — E78.5 HYPERLIPIDEMIA LDL GOAL <70: ICD-10-CM

## 2020-06-04 DIAGNOSIS — I10 ESSENTIAL HYPERTENSION: ICD-10-CM

## 2020-06-04 DIAGNOSIS — I42.8 NONISCHEMIC CARDIOMYOPATHY (H): ICD-10-CM

## 2020-06-04 DIAGNOSIS — I25.119 CORONARY ARTERY DISEASE INVOLVING NATIVE CORONARY ARTERY OF NATIVE HEART WITH ANGINA PECTORIS (H): ICD-10-CM

## 2020-06-06 ENCOUNTER — COMMUNICATION - HEALTHEAST (OUTPATIENT)
Dept: FAMILY MEDICINE | Facility: CLINIC | Age: 50
End: 2020-06-06

## 2020-06-06 DIAGNOSIS — R60.0 LOWER EXTREMITY EDEMA: ICD-10-CM

## 2020-06-06 DIAGNOSIS — I50.22 CHRONIC SYSTOLIC HEART FAILURE (H): ICD-10-CM

## 2020-06-16 ENCOUNTER — COMMUNICATION - HEALTHEAST (OUTPATIENT)
Dept: FAMILY MEDICINE | Facility: CLINIC | Age: 50
End: 2020-06-16

## 2020-06-16 DIAGNOSIS — R12 HEARTBURN: ICD-10-CM

## 2020-06-24 ENCOUNTER — COMMUNICATION - HEALTHEAST (OUTPATIENT)
Dept: FAMILY MEDICINE | Facility: CLINIC | Age: 50
End: 2020-06-24

## 2020-06-24 DIAGNOSIS — I25.10 CORONARY ARTERY DISEASE INVOLVING NATIVE CORONARY ARTERY OF NATIVE HEART: ICD-10-CM

## 2020-07-18 ENCOUNTER — COMMUNICATION - HEALTHEAST (OUTPATIENT)
Dept: FAMILY MEDICINE | Facility: CLINIC | Age: 50
End: 2020-07-18

## 2020-07-18 DIAGNOSIS — R12 HEARTBURN: ICD-10-CM

## 2020-07-18 DIAGNOSIS — I50.22 CHRONIC SYSTOLIC HEART FAILURE (H): ICD-10-CM

## 2020-07-18 DIAGNOSIS — I10 ESSENTIAL HYPERTENSION: ICD-10-CM

## 2020-08-10 ENCOUNTER — COMMUNICATION - HEALTHEAST (OUTPATIENT)
Dept: FAMILY MEDICINE | Facility: CLINIC | Age: 50
End: 2020-08-10

## 2020-08-10 DIAGNOSIS — I50.22 CHRONIC SYSTOLIC HEART FAILURE (H): ICD-10-CM

## 2020-08-10 DIAGNOSIS — I25.10 CORONARY ARTERY DISEASE INVOLVING NATIVE CORONARY ARTERY OF NATIVE HEART: ICD-10-CM

## 2020-08-10 DIAGNOSIS — I10 ESSENTIAL HYPERTENSION: ICD-10-CM

## 2020-08-19 ENCOUNTER — COMMUNICATION - HEALTHEAST (OUTPATIENT)
Dept: FAMILY MEDICINE | Facility: CLINIC | Age: 50
End: 2020-08-19

## 2020-08-20 ENCOUNTER — OFFICE VISIT - HEALTHEAST (OUTPATIENT)
Dept: FAMILY MEDICINE | Facility: CLINIC | Age: 50
End: 2020-08-20

## 2020-08-20 DIAGNOSIS — Z12.31 ENCOUNTER FOR SCREENING MAMMOGRAM FOR BREAST CANCER: ICD-10-CM

## 2020-08-20 DIAGNOSIS — Z12.11 SCREEN FOR COLON CANCER: ICD-10-CM

## 2020-08-20 DIAGNOSIS — I20.0 ACCELERATING ANGINA (H): ICD-10-CM

## 2020-08-20 DIAGNOSIS — R07.89 OTHER CHEST PAIN: ICD-10-CM

## 2020-08-20 DIAGNOSIS — F32.1 MODERATE MAJOR DEPRESSION (H): ICD-10-CM

## 2020-08-20 DIAGNOSIS — E66.01 CLASS 2 SEVERE OBESITY DUE TO EXCESS CALORIES WITH SERIOUS COMORBIDITY AND BODY MASS INDEX (BMI) OF 39.0 TO 39.9 IN ADULT (H): ICD-10-CM

## 2020-08-20 DIAGNOSIS — I16.0 HYPERTENSIVE URGENCY: ICD-10-CM

## 2020-08-20 DIAGNOSIS — E11.65 TYPE 2 DIABETES MELLITUS WITH HYPERGLYCEMIA, WITHOUT LONG-TERM CURRENT USE OF INSULIN (H): ICD-10-CM

## 2020-08-20 DIAGNOSIS — E78.5 HYPERLIPIDEMIA LDL GOAL <70: ICD-10-CM

## 2020-08-20 DIAGNOSIS — I50.20 HEART FAILURE WITH REDUCED EJECTION FRACTION (H): ICD-10-CM

## 2020-08-20 DIAGNOSIS — I10 ESSENTIAL HYPERTENSION: ICD-10-CM

## 2020-08-20 DIAGNOSIS — R10.11 RUQ PAIN: ICD-10-CM

## 2020-08-20 DIAGNOSIS — N18.30 CKD (CHRONIC KIDNEY DISEASE) STAGE 3, GFR 30-59 ML/MIN (H): ICD-10-CM

## 2020-08-20 DIAGNOSIS — E66.812 CLASS 2 SEVERE OBESITY DUE TO EXCESS CALORIES WITH SERIOUS COMORBIDITY AND BODY MASS INDEX (BMI) OF 39.0 TO 39.9 IN ADULT (H): ICD-10-CM

## 2020-08-20 DIAGNOSIS — R06.01 ORTHOPNEA: ICD-10-CM

## 2020-08-20 LAB
ALBUMIN SERPL-MCNC: 4.1 G/DL (ref 3.5–5)
ALBUMIN UR-MCNC: NEGATIVE MG/DL
ALP SERPL-CCNC: 79 U/L (ref 45–120)
ALT SERPL W P-5'-P-CCNC: 61 U/L (ref 0–45)
ANION GAP SERPL CALCULATED.3IONS-SCNC: 14 MMOL/L (ref 5–18)
APPEARANCE UR: CLEAR
AST SERPL W P-5'-P-CCNC: 37 U/L (ref 0–40)
ATRIAL RATE - MUSE: 86 BPM
BILIRUB SERPL-MCNC: 0.5 MG/DL (ref 0–1)
BILIRUB UR QL STRIP: NEGATIVE
BUN SERPL-MCNC: 32 MG/DL (ref 8–22)
CALCIUM SERPL-MCNC: 9.8 MG/DL (ref 8.5–10.5)
CHLORIDE BLD-SCNC: 97 MMOL/L (ref 98–107)
CO2 SERPL-SCNC: 33 MMOL/L (ref 22–31)
COLOR UR AUTO: YELLOW
CREAT SERPL-MCNC: 0.88 MG/DL (ref 0.6–1.1)
CREAT UR-MCNC: 23.6 MG/DL
DIASTOLIC BLOOD PRESSURE - MUSE: NORMAL
GFR SERPL CREATININE-BSD FRML MDRD: >60 ML/MIN/1.73M2
GLUCOSE BLD-MCNC: 141 MG/DL (ref 70–125)
GLUCOSE UR STRIP-MCNC: NEGATIVE MG/DL
HBA1C MFR BLD: 8.5 %
HGB UR QL STRIP: NEGATIVE
INTERPRETATION ECG - MUSE: NORMAL
KETONES UR STRIP-MCNC: NEGATIVE MG/DL
LDLC SERPL CALC-MCNC: 116 MG/DL
LEUKOCYTE ESTERASE UR QL STRIP: ABNORMAL
MICROALBUMIN UR-MCNC: <0.5 MG/DL (ref 0–1.99)
MICROALBUMIN/CREAT UR: NORMAL MG/G{CREAT}
NITRATE UR QL: NEGATIVE
P AXIS - MUSE: 18 DEGREES
PH UR STRIP: 7 [PH] (ref 5–8)
POTASSIUM BLD-SCNC: 3.4 MMOL/L (ref 3.5–5)
PR INTERVAL - MUSE: 136 MS
PROT SERPL-MCNC: 7.5 G/DL (ref 6–8)
QRS DURATION - MUSE: 92 MS
QT - MUSE: 418 MS
QTC - MUSE: 500 MS
R AXIS - MUSE: -14 DEGREES
RBC #/AREA URNS AUTO: ABNORMAL HPF
SODIUM SERPL-SCNC: 144 MMOL/L (ref 136–145)
SP GR UR STRIP: 1.02 (ref 1–1.03)
SQUAMOUS #/AREA URNS AUTO: ABNORMAL LPF
SYSTOLIC BLOOD PRESSURE - MUSE: NORMAL
T AXIS - MUSE: 144 DEGREES
TROPONIN I SERPL-MCNC: <0.01 NG/ML (ref 0–0.29)
UROBILINOGEN UR STRIP-ACNC: ABNORMAL
VENTRICULAR RATE- MUSE: 86 BPM
WBC #/AREA URNS AUTO: ABNORMAL HPF

## 2020-08-20 ASSESSMENT — PATIENT HEALTH QUESTIONNAIRE - PHQ9: SUM OF ALL RESPONSES TO PHQ QUESTIONS 1-9: 15

## 2020-08-21 LAB
BACTERIA SPEC CULT: NO GROWTH
BNP SERPL-MCNC: 12 PG/ML (ref 0–74)
D DIMER PPP FEU-MCNC: 0.36 FEU UG/ML

## 2020-09-03 ENCOUNTER — COMMUNICATION - HEALTHEAST (OUTPATIENT)
Dept: PHARMACY | Facility: CLINIC | Age: 50
End: 2020-09-03

## 2020-09-08 ENCOUNTER — COMMUNICATION - HEALTHEAST (OUTPATIENT)
Dept: FAMILY MEDICINE | Facility: CLINIC | Age: 50
End: 2020-09-08

## 2020-09-11 ENCOUNTER — OFFICE VISIT - HEALTHEAST (OUTPATIENT)
Dept: PHARMACY | Facility: CLINIC | Age: 50
End: 2020-09-11

## 2020-09-11 DIAGNOSIS — I25.10 CORONARY ARTERY DISEASE INVOLVING NATIVE CORONARY ARTERY OF NATIVE HEART: ICD-10-CM

## 2020-09-11 DIAGNOSIS — I10 ESSENTIAL HYPERTENSION: ICD-10-CM

## 2020-09-11 DIAGNOSIS — I50.22 CHRONIC SYSTOLIC HEART FAILURE (H): ICD-10-CM

## 2020-09-11 DIAGNOSIS — I20.0 ACCELERATING ANGINA (H): ICD-10-CM

## 2020-09-11 DIAGNOSIS — F33.41 RECURRENT MAJOR DEPRESSIVE DISORDER, IN PARTIAL REMISSION (H): ICD-10-CM

## 2020-09-11 DIAGNOSIS — E11.65 TYPE 2 DIABETES MELLITUS WITH HYPERGLYCEMIA, WITHOUT LONG-TERM CURRENT USE OF INSULIN (H): ICD-10-CM

## 2020-09-11 DIAGNOSIS — I25.10 CORONARY ARTERY DISEASE INVOLVING NATIVE CORONARY ARTERY OF NATIVE HEART, ANGINA PRESENCE UNSPECIFIED: ICD-10-CM

## 2020-09-11 DIAGNOSIS — I25.119 CORONARY ARTERY DISEASE INVOLVING NATIVE CORONARY ARTERY OF NATIVE HEART WITH ANGINA PECTORIS (H): ICD-10-CM

## 2020-09-11 DIAGNOSIS — R60.0 LOWER EXTREMITY EDEMA: ICD-10-CM

## 2020-09-11 DIAGNOSIS — R12 HEARTBURN: ICD-10-CM

## 2020-09-11 DIAGNOSIS — G44.229 CHRONIC TENSION-TYPE HEADACHE, NOT INTRACTABLE: ICD-10-CM

## 2020-09-11 PROCEDURE — 99605 MTMS BY PHARM NP 15 MIN: CPT | Performed by: PHARMACIST

## 2020-09-11 PROCEDURE — 99607 MTMS BY PHARM ADDL 15 MIN: CPT | Performed by: PHARMACIST

## 2020-09-14 ENCOUNTER — OFFICE VISIT - HEALTHEAST (OUTPATIENT)
Dept: EDUCATION SERVICES | Facility: CLINIC | Age: 50
End: 2020-09-14

## 2020-09-14 DIAGNOSIS — E11.9 DIABETES MELLITUS, TYPE 2 (H): ICD-10-CM

## 2020-09-17 ENCOUNTER — COMMUNICATION - HEALTHEAST (OUTPATIENT)
Dept: CARDIOLOGY | Facility: CLINIC | Age: 50
End: 2020-09-17

## 2020-09-18 ENCOUNTER — OFFICE VISIT - HEALTHEAST (OUTPATIENT)
Dept: CARDIOLOGY | Facility: CLINIC | Age: 50
End: 2020-09-18

## 2020-09-18 DIAGNOSIS — I50.20 HEART FAILURE WITH REDUCED EJECTION FRACTION (H): ICD-10-CM

## 2020-09-18 DIAGNOSIS — I42.8 NONISCHEMIC CARDIOMYOPATHY (H): ICD-10-CM

## 2020-09-18 DIAGNOSIS — N18.30 CKD (CHRONIC KIDNEY DISEASE) STAGE 3, GFR 30-59 ML/MIN (H): ICD-10-CM

## 2020-09-18 DIAGNOSIS — I10 ESSENTIAL HYPERTENSION: ICD-10-CM

## 2020-09-18 LAB
ANION GAP SERPL CALCULATED.3IONS-SCNC: 10 MMOL/L (ref 5–18)
BUN SERPL-MCNC: 27 MG/DL (ref 8–22)
CALCIUM SERPL-MCNC: 9.4 MG/DL (ref 8.5–10.5)
CHLORIDE BLD-SCNC: 105 MMOL/L (ref 98–107)
CO2 SERPL-SCNC: 29 MMOL/L (ref 22–31)
CREAT SERPL-MCNC: 1.25 MG/DL (ref 0.6–1.1)
GFR SERPL CREATININE-BSD FRML MDRD: 45 ML/MIN/1.73M2
GLUCOSE BLD-MCNC: 137 MG/DL (ref 70–125)
POTASSIUM BLD-SCNC: 4.1 MMOL/L (ref 3.5–5)
SODIUM SERPL-SCNC: 144 MMOL/L (ref 136–145)

## 2020-09-18 ASSESSMENT — MIFFLIN-ST. JEOR: SCORE: 1409.22

## 2020-09-21 ENCOUNTER — COMMUNICATION - HEALTHEAST (OUTPATIENT)
Dept: CARDIOLOGY | Facility: CLINIC | Age: 50
End: 2020-09-21

## 2020-10-06 ENCOUNTER — COMMUNICATION - HEALTHEAST (OUTPATIENT)
Dept: FAMILY MEDICINE | Facility: CLINIC | Age: 50
End: 2020-10-06

## 2020-10-06 DIAGNOSIS — R60.0 LOWER EXTREMITY EDEMA: ICD-10-CM

## 2020-10-06 DIAGNOSIS — I50.22 CHRONIC SYSTOLIC HEART FAILURE (H): ICD-10-CM

## 2020-11-17 ENCOUNTER — COMMUNICATION - HEALTHEAST (OUTPATIENT)
Dept: CARDIOLOGY | Facility: CLINIC | Age: 50
End: 2020-11-17

## 2020-11-27 ENCOUNTER — OFFICE VISIT - HEALTHEAST (OUTPATIENT)
Dept: FAMILY MEDICINE | Facility: CLINIC | Age: 50
End: 2020-11-27

## 2020-11-27 DIAGNOSIS — I10 ESSENTIAL HYPERTENSION: ICD-10-CM

## 2020-11-27 DIAGNOSIS — E11.65 TYPE 2 DIABETES MELLITUS WITH HYPERGLYCEMIA, WITHOUT LONG-TERM CURRENT USE OF INSULIN (H): ICD-10-CM

## 2020-11-27 DIAGNOSIS — R91.8 PULMONARY NODULES: ICD-10-CM

## 2020-11-27 DIAGNOSIS — N18.31 STAGE 3A CHRONIC KIDNEY DISEASE (H): ICD-10-CM

## 2020-11-27 DIAGNOSIS — R60.0 LOWER EXTREMITY EDEMA: ICD-10-CM

## 2020-11-27 DIAGNOSIS — E78.5 HYPERLIPIDEMIA LDL GOAL <70: ICD-10-CM

## 2020-11-27 DIAGNOSIS — I50.22 CHRONIC SYSTOLIC HEART FAILURE (H): ICD-10-CM

## 2020-11-27 DIAGNOSIS — F33.41 RECURRENT MAJOR DEPRESSIVE DISORDER, IN PARTIAL REMISSION (H): ICD-10-CM

## 2020-11-30 ENCOUNTER — COMMUNICATION - HEALTHEAST (OUTPATIENT)
Dept: NURSING | Facility: CLINIC | Age: 50
End: 2020-11-30

## 2020-12-08 ENCOUNTER — OFFICE VISIT - HEALTHEAST (OUTPATIENT)
Dept: CARDIOLOGY | Facility: CLINIC | Age: 50
End: 2020-12-08

## 2020-12-08 DIAGNOSIS — I42.8 NONISCHEMIC CARDIOMYOPATHY (H): ICD-10-CM

## 2020-12-08 DIAGNOSIS — I25.10 CORONARY ARTERY DISEASE INVOLVING NATIVE CORONARY ARTERY OF NATIVE HEART WITHOUT ANGINA PECTORIS: ICD-10-CM

## 2020-12-08 DIAGNOSIS — I10 ESSENTIAL HYPERTENSION: ICD-10-CM

## 2020-12-08 DIAGNOSIS — N18.31 STAGE 3A CHRONIC KIDNEY DISEASE (H): ICD-10-CM

## 2020-12-08 DIAGNOSIS — I50.20 HEART FAILURE WITH REDUCED EJECTION FRACTION (H): ICD-10-CM

## 2020-12-08 DIAGNOSIS — E78.5 HYPERLIPIDEMIA LDL GOAL <70: ICD-10-CM

## 2020-12-08 ASSESSMENT — MIFFLIN-ST. JEOR: SCORE: 1427.36

## 2020-12-10 ENCOUNTER — COMMUNICATION - HEALTHEAST (OUTPATIENT)
Dept: NURSING | Facility: CLINIC | Age: 50
End: 2020-12-10

## 2020-12-10 DIAGNOSIS — E11.65 TYPE 2 DIABETES MELLITUS WITH HYPERGLYCEMIA, WITHOUT LONG-TERM CURRENT USE OF INSULIN (H): ICD-10-CM

## 2020-12-10 ASSESSMENT — ACTIVITIES OF DAILY LIVING (ADL): DEPENDENT_IADLS:: INDEPENDENT;TRANSPORTATION

## 2020-12-15 ENCOUNTER — AMBULATORY - HEALTHEAST (OUTPATIENT)
Dept: LAB | Facility: CLINIC | Age: 50
End: 2020-12-15

## 2020-12-15 ENCOUNTER — AMBULATORY - HEALTHEAST (OUTPATIENT)
Dept: NURSING | Facility: CLINIC | Age: 50
End: 2020-12-15

## 2020-12-15 DIAGNOSIS — Z01.30 BLOOD PRESSURE CHECK: ICD-10-CM

## 2020-12-15 DIAGNOSIS — I10 ESSENTIAL HYPERTENSION: ICD-10-CM

## 2020-12-15 DIAGNOSIS — E11.65 TYPE 2 DIABETES MELLITUS WITH HYPERGLYCEMIA, WITHOUT LONG-TERM CURRENT USE OF INSULIN (H): ICD-10-CM

## 2020-12-15 DIAGNOSIS — E78.5 HYPERLIPIDEMIA LDL GOAL <70: ICD-10-CM

## 2020-12-15 LAB
ALBUMIN SERPL-MCNC: 3.9 G/DL (ref 3.5–5)
ALP SERPL-CCNC: 74 U/L (ref 45–120)
ALT SERPL W P-5'-P-CCNC: 33 U/L (ref 0–45)
ANION GAP SERPL CALCULATED.3IONS-SCNC: 9 MMOL/L (ref 5–18)
AST SERPL W P-5'-P-CCNC: 24 U/L (ref 0–40)
BILIRUB SERPL-MCNC: 0.8 MG/DL (ref 0–1)
BUN SERPL-MCNC: 31 MG/DL (ref 8–22)
CALCIUM SERPL-MCNC: 9.3 MG/DL (ref 8.5–10.5)
CHLORIDE BLD-SCNC: 103 MMOL/L (ref 98–107)
CHOLEST SERPL-MCNC: 177 MG/DL
CO2 SERPL-SCNC: 30 MMOL/L (ref 22–31)
CREAT SERPL-MCNC: 0.95 MG/DL (ref 0.6–1.1)
FASTING STATUS PATIENT QL REPORTED: YES
GFR SERPL CREATININE-BSD FRML MDRD: >60 ML/MIN/1.73M2
GLUCOSE BLD-MCNC: 158 MG/DL (ref 70–125)
HBA1C MFR BLD: 6.4 %
HDLC SERPL-MCNC: 55 MG/DL
LDLC SERPL CALC-MCNC: 88 MG/DL
POTASSIUM BLD-SCNC: 4.4 MMOL/L (ref 3.5–5)
PROT SERPL-MCNC: 6.8 G/DL (ref 6–8)
SODIUM SERPL-SCNC: 142 MMOL/L (ref 136–145)
TRIGL SERPL-MCNC: 170 MG/DL
TSH SERPL DL<=0.005 MIU/L-ACNC: 1.97 UIU/ML (ref 0.3–5)

## 2020-12-15 ASSESSMENT — MIFFLIN-ST. JEOR: SCORE: 1419.41

## 2020-12-16 ENCOUNTER — COMMUNICATION - HEALTHEAST (OUTPATIENT)
Dept: FAMILY MEDICINE | Facility: CLINIC | Age: 50
End: 2020-12-16

## 2021-01-11 ENCOUNTER — COMMUNICATION - HEALTHEAST (OUTPATIENT)
Dept: NURSING | Facility: CLINIC | Age: 51
End: 2021-01-11

## 2021-01-18 ENCOUNTER — COMMUNICATION - HEALTHEAST (OUTPATIENT)
Dept: NURSING | Facility: CLINIC | Age: 51
End: 2021-01-18

## 2021-01-25 ENCOUNTER — COMMUNICATION - HEALTHEAST (OUTPATIENT)
Dept: NURSING | Facility: CLINIC | Age: 51
End: 2021-01-25

## 2021-02-01 ENCOUNTER — COMMUNICATION - HEALTHEAST (OUTPATIENT)
Dept: NURSING | Facility: CLINIC | Age: 51
End: 2021-02-01

## 2021-02-01 ENCOUNTER — COMMUNICATION - HEALTHEAST (OUTPATIENT)
Dept: CARE COORDINATION | Facility: CLINIC | Age: 51
End: 2021-02-01

## 2021-02-01 DIAGNOSIS — I10 ESSENTIAL HYPERTENSION: ICD-10-CM

## 2021-02-01 DIAGNOSIS — E11.65 TYPE 2 DIABETES MELLITUS WITH HYPERGLYCEMIA, WITHOUT LONG-TERM CURRENT USE OF INSULIN (H): ICD-10-CM

## 2021-02-03 ENCOUNTER — AMBULATORY - HEALTHEAST (OUTPATIENT)
Dept: FAMILY MEDICINE | Facility: CLINIC | Age: 51
End: 2021-02-03

## 2021-02-03 DIAGNOSIS — I10 ESSENTIAL HYPERTENSION: ICD-10-CM

## 2021-02-03 DIAGNOSIS — E11.65 TYPE 2 DIABETES MELLITUS WITH HYPERGLYCEMIA, WITHOUT LONG-TERM CURRENT USE OF INSULIN (H): ICD-10-CM

## 2021-02-15 ENCOUNTER — COMMUNICATION - HEALTHEAST (OUTPATIENT)
Dept: NURSING | Facility: CLINIC | Age: 51
End: 2021-02-15

## 2021-02-17 ENCOUNTER — COMMUNICATION - HEALTHEAST (OUTPATIENT)
Dept: FAMILY MEDICINE | Facility: CLINIC | Age: 51
End: 2021-02-17

## 2021-02-17 DIAGNOSIS — E11.65 TYPE 2 DIABETES MELLITUS WITH HYPERGLYCEMIA, WITHOUT LONG-TERM CURRENT USE OF INSULIN (H): ICD-10-CM

## 2021-02-18 ENCOUNTER — HOSPITAL ENCOUNTER (OUTPATIENT)
Dept: MRI IMAGING | Facility: HOSPITAL | Age: 51
Discharge: HOME | End: 2021-02-18
Attending: INTERNAL MEDICINE

## 2021-02-18 DIAGNOSIS — I25.10 CORONARY ARTERY DISEASE INVOLVING NATIVE CORONARY ARTERY OF NATIVE HEART WITHOUT ANGINA PECTORIS: ICD-10-CM

## 2021-02-18 LAB
CREAT BLD-MCNC: 0.8 MG/DL (ref 0.6–1.1)
GFR SERPL CREATININE-BSD FRML MDRD: >60 ML/MIN/1.73M2

## 2021-02-18 ASSESSMENT — MIFFLIN-ST. JEOR: SCORE: 1422.15

## 2021-02-19 ENCOUNTER — COMMUNICATION - HEALTHEAST (OUTPATIENT)
Dept: SCHEDULING | Facility: CLINIC | Age: 51
End: 2021-02-19

## 2021-02-19 ENCOUNTER — NURSE TRIAGE (OUTPATIENT)
Dept: NURSING | Facility: CLINIC | Age: 51
End: 2021-02-19

## 2021-02-19 ENCOUNTER — COMMUNICATION - HEALTHEAST (OUTPATIENT)
Dept: FAMILY MEDICINE | Facility: CLINIC | Age: 51
End: 2021-02-19

## 2021-02-19 LAB
ATRIAL RATE - MUSE: 47 BPM
ATRIAL RATE - MUSE: 59 BPM
CCTA EJECTION FRACTION: 67 %
CCTA INTERVENTRICULAR SETPUM: 1 CM (ref 0.6–1.1)
CCTA LEFT INTERNAL DIMENSION IN SYSTOLE: 3.2 CM (ref 2.1–4)
CCTA LEFT VENTRICULAR INTERNAL DIMENSION IN DIASTOLE: 5.3 CM (ref 3.5–6)
CCTA LEFT VENTRICULAR MASS: 174.52 G
CCTA POSTERIOR WALL: 0.8 CM (ref 0.6–1.1)
DIASTOLIC BLOOD PRESSURE - MUSE: NORMAL
DIASTOLIC BLOOD PRESSURE - MUSE: NORMAL
INTERPRETATION ECG - MUSE: NORMAL
INTERPRETATION ECG - MUSE: NORMAL
MR CARDIAC LEFT VENTRIAL CARDIAC INDEX: 2 L/MIN/M2 (ref 1.75–3.8)
MR CARDIAC LEFT VENTRICAL CARDIAC OUTPUT: 3.7 L/MIN (ref 2.8–8.8)
MR CARDIAC LEFT VENTRICULAR DIASTOLIC VOLUME INDEX: 65.09 ML/M2 (ref 41–81)
MR CARDIAC LEFT VENTRICULAR MASS INDEX: 68.89 G/M2 (ref 63–95)
MR CARDIAC LEFT VENTRICULAR MASS: 127 G (ref 75–175)
MR CARDIAC LEFT VENTRICULAR STROKE VOLUME INDEX: 42.31 ML/M2 (ref 26–56)
MR CARDIAC LEFT VENTRICULAR SYSTOLIC VOLUME INDEX: 22.78 ML/M2 (ref 12–20)
MR EJECTION FRACTION: 65 %
MR HEIGHT: 1.51 M
MR LEFT VENTRICULAR DYSTOLIC VOLUMEN: 120 ML (ref 52–141)
MR LEFT VENTRICULAR STROKE VOLUMEN: 78 ML (ref 33–97)
MR LEFT VENTRICULAR SYSTOLIC VOLUME: 42 ML (ref 13–51)
MR WEIGHT: 89.4 KG
P AXIS - MUSE: 13 DEGREES
P AXIS - MUSE: 21 DEGREES
PR INTERVAL - MUSE: 158 MS
PR INTERVAL - MUSE: 164 MS
QRS DURATION - MUSE: 90 MS
QRS DURATION - MUSE: 92 MS
QT - MUSE: 400 MS
QT - MUSE: 456 MS
QTC - MUSE: 396 MS
QTC - MUSE: 403 MS
R AXIS - MUSE: -10 DEGREES
R AXIS - MUSE: -11 DEGREES
SYSTOLIC BLOOD PRESSURE - MUSE: NORMAL
SYSTOLIC BLOOD PRESSURE - MUSE: NORMAL
T AXIS - MUSE: -13 DEGREES
T AXIS - MUSE: -52 DEGREES
VENTRICULAR RATE- MUSE: 47 BPM
VENTRICULAR RATE- MUSE: 59 BPM

## 2021-02-22 ENCOUNTER — COMMUNICATION - HEALTHEAST (OUTPATIENT)
Dept: NURSING | Facility: CLINIC | Age: 51
End: 2021-02-22

## 2021-02-23 ENCOUNTER — OFFICE VISIT - HEALTHEAST (OUTPATIENT)
Dept: CARDIOLOGY | Facility: CLINIC | Age: 51
End: 2021-02-23

## 2021-02-23 DIAGNOSIS — E78.5 HYPERLIPIDEMIA LDL GOAL <70: ICD-10-CM

## 2021-02-23 DIAGNOSIS — I25.10 NONOCCLUSIVE CORONARY ATHEROSCLEROSIS OF NATIVE CORONARY ARTERY: ICD-10-CM

## 2021-02-23 DIAGNOSIS — I42.8 NONISCHEMIC CARDIOMYOPATHY (H): ICD-10-CM

## 2021-02-23 DIAGNOSIS — E11.65 TYPE 2 DIABETES MELLITUS WITH HYPERGLYCEMIA, WITHOUT LONG-TERM CURRENT USE OF INSULIN (H): ICD-10-CM

## 2021-02-23 DIAGNOSIS — I50.32 CHRONIC DIASTOLIC HEART FAILURE WITH PRESERVED EJECTION FRACTION (H): ICD-10-CM

## 2021-02-23 DIAGNOSIS — I10 ESSENTIAL HYPERTENSION: ICD-10-CM

## 2021-02-23 ASSESSMENT — MIFFLIN-ST. JEOR: SCORE: 1438.73

## 2021-03-01 ENCOUNTER — COMMUNICATION - HEALTHEAST (OUTPATIENT)
Dept: NURSING | Facility: CLINIC | Age: 51
End: 2021-03-01

## 2021-03-02 ENCOUNTER — OFFICE VISIT - HEALTHEAST (OUTPATIENT)
Dept: FAMILY MEDICINE | Facility: CLINIC | Age: 51
End: 2021-03-02

## 2021-03-02 DIAGNOSIS — G44.229 CHRONIC TENSION-TYPE HEADACHE, NOT INTRACTABLE: ICD-10-CM

## 2021-03-02 DIAGNOSIS — Z23 ENCOUNTER FOR IMMUNIZATION: ICD-10-CM

## 2021-03-02 DIAGNOSIS — F33.41 RECURRENT MAJOR DEPRESSIVE DISORDER, IN PARTIAL REMISSION (H): ICD-10-CM

## 2021-03-02 DIAGNOSIS — E11.65 TYPE 2 DIABETES MELLITUS WITH HYPERGLYCEMIA, WITHOUT LONG-TERM CURRENT USE OF INSULIN (H): ICD-10-CM

## 2021-03-02 DIAGNOSIS — E66.812 CLASS 2 SEVERE OBESITY DUE TO EXCESS CALORIES WITH SERIOUS COMORBIDITY AND BODY MASS INDEX (BMI) OF 39.0 TO 39.9 IN ADULT (H): ICD-10-CM

## 2021-03-02 DIAGNOSIS — E66.01 CLASS 2 SEVERE OBESITY DUE TO EXCESS CALORIES WITH SERIOUS COMORBIDITY AND BODY MASS INDEX (BMI) OF 39.0 TO 39.9 IN ADULT (H): ICD-10-CM

## 2021-03-02 DIAGNOSIS — Z12.11 COLON CANCER SCREENING: ICD-10-CM

## 2021-03-02 DIAGNOSIS — Z12.31 ENCOUNTER FOR SCREENING MAMMOGRAM FOR MALIGNANT NEOPLASM OF BREAST: ICD-10-CM

## 2021-03-02 DIAGNOSIS — I25.119 CORONARY ARTERY DISEASE INVOLVING NATIVE CORONARY ARTERY OF NATIVE HEART WITH ANGINA PECTORIS (H): ICD-10-CM

## 2021-03-02 DIAGNOSIS — N18.31 STAGE 3A CHRONIC KIDNEY DISEASE (H): ICD-10-CM

## 2021-03-02 ASSESSMENT — PATIENT HEALTH QUESTIONNAIRE - PHQ9: SUM OF ALL RESPONSES TO PHQ QUESTIONS 1-9: 0

## 2021-03-05 ENCOUNTER — AMBULATORY - HEALTHEAST (OUTPATIENT)
Dept: FAMILY MEDICINE | Facility: CLINIC | Age: 51
End: 2021-03-05

## 2021-03-05 DIAGNOSIS — Z12.11 COLON CANCER SCREENING: ICD-10-CM

## 2021-03-16 ENCOUNTER — COMMUNICATION - HEALTHEAST (OUTPATIENT)
Dept: CARE COORDINATION | Facility: CLINIC | Age: 51
End: 2021-03-16

## 2021-03-17 ENCOUNTER — COMMUNICATION - HEALTHEAST (OUTPATIENT)
Dept: FAMILY MEDICINE | Facility: CLINIC | Age: 51
End: 2021-03-17

## 2021-03-23 ENCOUNTER — OFFICE VISIT - HEALTHEAST (OUTPATIENT)
Dept: FAMILY MEDICINE | Facility: CLINIC | Age: 51
End: 2021-03-23

## 2021-03-23 DIAGNOSIS — R51.9 CHRONIC NONINTRACTABLE HEADACHE, UNSPECIFIED HEADACHE TYPE: ICD-10-CM

## 2021-03-23 DIAGNOSIS — I25.10 NONOCCLUSIVE CORONARY ATHEROSCLEROSIS OF NATIVE CORONARY ARTERY: ICD-10-CM

## 2021-03-23 DIAGNOSIS — Z12.11 SCREEN FOR COLON CANCER: ICD-10-CM

## 2021-03-23 DIAGNOSIS — N18.31 STAGE 3A CHRONIC KIDNEY DISEASE (H): ICD-10-CM

## 2021-03-23 DIAGNOSIS — I10 ESSENTIAL HYPERTENSION: ICD-10-CM

## 2021-03-23 DIAGNOSIS — E11.65 TYPE 2 DIABETES MELLITUS WITH HYPERGLYCEMIA, WITHOUT LONG-TERM CURRENT USE OF INSULIN (H): ICD-10-CM

## 2021-03-23 DIAGNOSIS — G89.29 CHRONIC NONINTRACTABLE HEADACHE, UNSPECIFIED HEADACHE TYPE: ICD-10-CM

## 2021-03-25 ENCOUNTER — COMMUNICATION - HEALTHEAST (OUTPATIENT)
Dept: NURSING | Facility: CLINIC | Age: 51
End: 2021-03-25

## 2021-03-29 ENCOUNTER — COMMUNICATION - HEALTHEAST (OUTPATIENT)
Dept: FAMILY MEDICINE | Facility: CLINIC | Age: 51
End: 2021-03-29

## 2021-03-29 DIAGNOSIS — I50.22 CHRONIC SYSTOLIC HEART FAILURE (H): ICD-10-CM

## 2021-03-29 DIAGNOSIS — R60.0 LOWER EXTREMITY EDEMA: ICD-10-CM

## 2021-04-01 ENCOUNTER — COMMUNICATION - HEALTHEAST (OUTPATIENT)
Dept: NURSING | Facility: CLINIC | Age: 51
End: 2021-04-01

## 2021-04-05 ENCOUNTER — COMMUNICATION - HEALTHEAST (OUTPATIENT)
Dept: FAMILY MEDICINE | Facility: CLINIC | Age: 51
End: 2021-04-05

## 2021-04-14 ENCOUNTER — COMMUNICATION - HEALTHEAST (OUTPATIENT)
Dept: NURSING | Facility: CLINIC | Age: 51
End: 2021-04-14

## 2021-04-15 ENCOUNTER — AMBULATORY - HEALTHEAST (OUTPATIENT)
Dept: FAMILY MEDICINE | Facility: CLINIC | Age: 51
End: 2021-04-15

## 2021-04-15 ENCOUNTER — AMBULATORY - HEALTHEAST (OUTPATIENT)
Dept: NURSING | Facility: CLINIC | Age: 51
End: 2021-04-15

## 2021-04-15 ENCOUNTER — AMBULATORY - HEALTHEAST (OUTPATIENT)
Dept: LAB | Facility: CLINIC | Age: 51
End: 2021-04-15

## 2021-04-15 ENCOUNTER — COMMUNICATION - HEALTHEAST (OUTPATIENT)
Dept: FAMILY MEDICINE | Facility: CLINIC | Age: 51
End: 2021-04-15

## 2021-04-15 DIAGNOSIS — E11.65 TYPE 2 DIABETES MELLITUS WITH HYPERGLYCEMIA, WITHOUT LONG-TERM CURRENT USE OF INSULIN (H): ICD-10-CM

## 2021-04-15 DIAGNOSIS — I10 ESSENTIAL HYPERTENSION: ICD-10-CM

## 2021-04-15 DIAGNOSIS — I25.119 CORONARY ARTERY DISEASE INVOLVING NATIVE CORONARY ARTERY OF NATIVE HEART WITH ANGINA PECTORIS (H): ICD-10-CM

## 2021-04-15 LAB
ANION GAP SERPL CALCULATED.3IONS-SCNC: 10 MMOL/L (ref 5–18)
BUN SERPL-MCNC: 28 MG/DL (ref 8–22)
CALCIUM SERPL-MCNC: 9 MG/DL (ref 8.5–10.5)
CHLORIDE BLD-SCNC: 107 MMOL/L (ref 98–107)
CO2 SERPL-SCNC: 26 MMOL/L (ref 22–31)
CREAT SERPL-MCNC: 1.16 MG/DL (ref 0.6–1.1)
ERYTHROCYTE [DISTWIDTH] IN BLOOD BY AUTOMATED COUNT: 12.8 % (ref 11–14.5)
GFR SERPL CREATININE-BSD FRML MDRD: 49 ML/MIN/1.73M2
GLUCOSE BLD-MCNC: 110 MG/DL (ref 70–125)
HBA1C MFR BLD: 7 %
HCT VFR BLD AUTO: 36.8 % (ref 35–47)
HGB BLD-MCNC: 11.9 G/DL (ref 12–16)
MCH RBC QN AUTO: 30.4 PG (ref 27–34)
MCHC RBC AUTO-ENTMCNC: 32.3 G/DL (ref 32–36)
MCV RBC AUTO: 94 FL (ref 80–100)
PLATELET # BLD AUTO: 280 THOU/UL (ref 140–440)
PMV BLD AUTO: 9.6 FL (ref 7–10)
POTASSIUM BLD-SCNC: 4.5 MMOL/L (ref 3.5–5)
RBC # BLD AUTO: 3.91 MILL/UL (ref 3.8–5.4)
SODIUM SERPL-SCNC: 143 MMOL/L (ref 136–145)
WBC: 5.9 THOU/UL (ref 4–11)

## 2021-04-15 ASSESSMENT — MIFFLIN-ST. JEOR: SCORE: 1430.56

## 2021-04-16 ENCOUNTER — COMMUNICATION - HEALTHEAST (OUTPATIENT)
Dept: FAMILY MEDICINE | Facility: CLINIC | Age: 51
End: 2021-04-16

## 2021-05-03 ENCOUNTER — COMMUNICATION - HEALTHEAST (OUTPATIENT)
Dept: FAMILY MEDICINE | Facility: CLINIC | Age: 51
End: 2021-05-03

## 2021-05-03 DIAGNOSIS — E11.65 TYPE 2 DIABETES MELLITUS WITH HYPERGLYCEMIA, WITHOUT LONG-TERM CURRENT USE OF INSULIN (H): ICD-10-CM

## 2021-05-05 ENCOUNTER — OFFICE VISIT - HEALTHEAST (OUTPATIENT)
Dept: PHARMACY | Facility: CLINIC | Age: 51
End: 2021-05-05

## 2021-05-05 DIAGNOSIS — F33.41 RECURRENT MAJOR DEPRESSIVE DISORDER, IN PARTIAL REMISSION (H): ICD-10-CM

## 2021-05-05 DIAGNOSIS — I20.0 ACCELERATING ANGINA (H): ICD-10-CM

## 2021-05-05 DIAGNOSIS — I50.22 CHRONIC SYSTOLIC HEART FAILURE (H): ICD-10-CM

## 2021-05-05 DIAGNOSIS — G44.229 CHRONIC TENSION-TYPE HEADACHE, NOT INTRACTABLE: ICD-10-CM

## 2021-05-05 DIAGNOSIS — I25.10 CORONARY ARTERY DISEASE INVOLVING NATIVE CORONARY ARTERY OF NATIVE HEART: ICD-10-CM

## 2021-05-05 DIAGNOSIS — I25.119 CORONARY ARTERY DISEASE INVOLVING NATIVE CORONARY ARTERY OF NATIVE HEART WITH ANGINA PECTORIS (H): ICD-10-CM

## 2021-05-05 DIAGNOSIS — I10 ESSENTIAL HYPERTENSION: ICD-10-CM

## 2021-05-05 DIAGNOSIS — I25.10 CORONARY ARTERY DISEASE INVOLVING NATIVE CORONARY ARTERY OF NATIVE HEART, ANGINA PRESENCE UNSPECIFIED: ICD-10-CM

## 2021-05-05 DIAGNOSIS — E11.65 TYPE 2 DIABETES MELLITUS WITH HYPERGLYCEMIA, WITHOUT LONG-TERM CURRENT USE OF INSULIN (H): ICD-10-CM

## 2021-05-05 PROCEDURE — 99605 MTMS BY PHARM NP 15 MIN: CPT | Performed by: PHARMACIST

## 2021-05-05 PROCEDURE — 99607 MTMS BY PHARM ADDL 15 MIN: CPT | Performed by: PHARMACIST

## 2021-05-06 ENCOUNTER — COMMUNICATION - HEALTHEAST (OUTPATIENT)
Dept: CARE COORDINATION | Facility: CLINIC | Age: 51
End: 2021-05-06

## 2021-05-19 ENCOUNTER — COMMUNICATION - HEALTHEAST (OUTPATIENT)
Dept: NURSING | Facility: CLINIC | Age: 51
End: 2021-05-19

## 2021-05-20 ENCOUNTER — RECORDS - HEALTHEAST (OUTPATIENT)
Dept: ADMINISTRATIVE | Facility: OTHER | Age: 51
End: 2021-05-20

## 2021-05-20 ENCOUNTER — AMBULATORY - HEALTHEAST (OUTPATIENT)
Dept: NURSING | Facility: CLINIC | Age: 51
End: 2021-05-20

## 2021-05-25 ENCOUNTER — AMBULATORY - HEALTHEAST (OUTPATIENT)
Dept: PHARMACY | Facility: CLINIC | Age: 51
End: 2021-05-25

## 2021-05-25 DIAGNOSIS — I50.22 CHRONIC SYSTOLIC HEART FAILURE (H): ICD-10-CM

## 2021-05-25 DIAGNOSIS — E11.9 DIABETES MELLITUS, TYPE 2 (H): ICD-10-CM

## 2021-05-25 DIAGNOSIS — E11.65 TYPE 2 DIABETES MELLITUS WITH HYPERGLYCEMIA, WITHOUT LONG-TERM CURRENT USE OF INSULIN (H): ICD-10-CM

## 2021-05-25 DIAGNOSIS — R12 HEARTBURN: ICD-10-CM

## 2021-05-25 DIAGNOSIS — G44.229 CHRONIC TENSION-TYPE HEADACHE, NOT INTRACTABLE: ICD-10-CM

## 2021-05-25 DIAGNOSIS — I10 ESSENTIAL HYPERTENSION: ICD-10-CM

## 2021-05-25 DIAGNOSIS — I25.10 CORONARY ARTERY DISEASE INVOLVING NATIVE CORONARY ARTERY OF NATIVE HEART, ANGINA PRESENCE UNSPECIFIED: ICD-10-CM

## 2021-05-25 PROCEDURE — 99607 MTMS BY PHARM ADDL 15 MIN: CPT | Performed by: PHARMACIST

## 2021-05-25 PROCEDURE — 99606 MTMS BY PHARM EST 15 MIN: CPT | Performed by: PHARMACIST

## 2021-05-26 ASSESSMENT — PATIENT HEALTH QUESTIONNAIRE - PHQ9: SUM OF ALL RESPONSES TO PHQ QUESTIONS 1-9: 4

## 2021-05-27 VITALS
DIASTOLIC BLOOD PRESSURE: 87 MMHG | DIASTOLIC BLOOD PRESSURE: 85 MMHG | SYSTOLIC BLOOD PRESSURE: 137 MMHG | HEART RATE: 59 BPM | SYSTOLIC BLOOD PRESSURE: 153 MMHG | HEART RATE: 61 BPM

## 2021-05-27 NOTE — TELEPHONE ENCOUNTER
Refill Approved    Rx renewed per Medication Renewal Policy. Medication was last renewed on 4/9/18.    Ana Maria Chinchilla, Care Connection Triage/Med Refill 4/5/2019     Requested Prescriptions   Pending Prescriptions Disp Refills     omeprazole (PRILOSEC) 40 MG capsule [Pharmacy Med Name: 0MEPRAZOLE DR 40 MG CAPSULE 40 CAP] 30 capsule 11     Sig: TAKE 1 PILL BY MOUTH EVERY DAY/ TXHUA HNUB NOJ 1 LUB TSHUAJ PAB ZOO LUB PLAB    GI Medications Refill Protocol Passed - 4/4/2019 10:07 AM       Passed - PCP or prescribing provider visit in last 12 or next 3 months.    Last office visit with prescriber/PCP: 3/4/2019 Jaky Gaspar MD OR same dept: Visit date not found OR same specialty: Visit date not found  Last physical: Visit date not found Last MTM visit: 2/15/2018 Ariane Ly, PharmD   Next visit within 3 mo: Visit date not found  Next physical within 3 mo: Visit date not found  Prescriber OR PCP: Jaky Gaspar MD  Last diagnosis associated with med order: 1. Heartburn  - omeprazole (PRILOSEC) 40 MG capsule [Pharmacy Med Name: 0MEPRAZOLE DR 40 MG CAPSULE 40 CAP]; TAKE 1 PILL BY MOUTH EVERY DAY/ TXHUA HNUB NOJ 1 LUB TSHUAJ PAB ZOO LUB PLAB  Dispense: 30 capsule; Refill: 11    If protocol passes may refill for 12 months if within 3 months of last provider visit (or a total of 15 months).

## 2021-05-28 ENCOUNTER — COMMUNICATION - HEALTHEAST (OUTPATIENT)
Dept: ADMINISTRATIVE | Facility: CLINIC | Age: 51
End: 2021-05-28

## 2021-05-28 ENCOUNTER — COMMUNICATION - HEALTHEAST (OUTPATIENT)
Dept: CARDIOLOGY | Facility: CLINIC | Age: 51
End: 2021-05-28
Payer: COMMERCIAL

## 2021-05-28 DIAGNOSIS — I25.119 CORONARY ARTERY DISEASE INVOLVING NATIVE CORONARY ARTERY OF NATIVE HEART WITH ANGINA PECTORIS (H): ICD-10-CM

## 2021-05-28 ASSESSMENT — ASTHMA QUESTIONNAIRES: ACT_TOTALSCORE: 25

## 2021-05-28 NOTE — TELEPHONE ENCOUNTER
RN cannot approve Refill Request    RN can NOT refill this medication PCP messaged that patient is overdue for Labs.      Ana Maria Chinchlila, Care Connection Triage/Med Refill 4/26/2019    Requested Prescriptions   Pending Prescriptions Disp Refills     venlafaxine (EFFEXOR-XR) 75 MG 24 hr capsule [Pharmacy Med Name: VENLAFAXINE HCL ER 75 MG CA 75 CAP] 30 capsule 10     Sig: TAKE 1 CAPSULE (75 MG TOTAL) BY MOUTH DAILY/ TXHUA HNUB NOJ 1 LUB TSHUAJ PAB YANET NYUAJ SIAB       Venlafaxine/Desvenlafaxine Refill Protocol Failed - 4/24/2019 12:59 PM        Failed - Fasting lipid cascade in last year     Cholesterol   Date Value Ref Range Status   10/31/2017 195 <=199 mg/dL Final     Triglycerides   Date Value Ref Range Status   10/31/2017 118 <=149 mg/dL Final     HDL Cholesterol   Date Value Ref Range Status   10/31/2017 56 >=50 mg/dL Final     LDL Calculated   Date Value Ref Range Status   10/31/2017 115 <=129 mg/dL Final     Patient Fasting > 8hrs?   Date Value Ref Range Status   06/07/2016 Yes  Final             Passed - LFT or AST or ALT in last year     Albumin   Date Value Ref Range Status   12/11/2018 3.5 3.5 - 5.0 g/dL Final     Bilirubin, Total   Date Value Ref Range Status   12/11/2018 0.3 0.0 - 1.0 mg/dL Final     Bilirubin, Direct   Date Value Ref Range Status   12/11/2018 <0.1 <=0.5 mg/dL Final     Alkaline Phosphatase   Date Value Ref Range Status   12/11/2018 77 45 - 120 U/L Final     AST   Date Value Ref Range Status   12/11/2018 18 0 - 40 U/L Final     ALT   Date Value Ref Range Status   12/11/2018 17 0 - 45 U/L Final     Protein, Total   Date Value Ref Range Status   12/11/2018 6.4 6.0 - 8.0 g/dL Final                Passed - PCP or prescribing provider visit in last year     Last office visit with prescriber/PCP: 3/4/2019 Jaky Gaspar MD OR same dept: 3/4/2019 Jaky Gaspar MD OR same specialty: 3/4/2019 Jaky Gaspar MD  Last physical: Visit date not found Last MTM visit: Visit date not  found   Next visit within 3 mo: Visit date not found  Next physical within 3 mo: Visit date not found  Prescriber OR PCP: Jaky Gaspar MD  Last diagnosis associated with med order: 1. Migraine with aura and without status migrainosus, not intractable  - venlafaxine (EFFEXOR-XR) 75 MG 24 hr capsule [Pharmacy Med Name: VENLAFAXINE HCL ER 75 MG CA 75 CAP]; TAKE 1 CAPSULE (75 MG TOTAL) BY MOUTH DAILY/ DAGOBERTO HNUB NOJ 1 LUB TSHUAJ PAB YANET NYUAJ SIAB  Dispense: 30 capsule; Refill: 10    If protocol passes may refill for 12 months if within 3 months of last provider visit (or a total of 15 months).             Passed - Blood Pressure in last year     BP Readings from Last 1 Encounters:   03/04/19 160/90

## 2021-05-28 NOTE — PROGRESS NOTES
[facility] Office Visit    Chief Complaint:  Chief Complaint   Patient presents with     Hospital Visit Follow Up     Heart Concern         Assessment/Plan:  1. Coronary artery disease involving native coronary artery of native heart  Recent angios without any high-grade stenosis.  Most recent chest pain related to musculoskeletal and anxiety.  Continue aspirin and statin therapy with focus on blood pressure control.  Continue ARB.  Continue Lasix and Owens, beta-blocker.  Careful about chronic PPI use.  - aspirin 81 MG EC tablet; Take 1 tablet (81 mg total) by mouth daily.  Dispense: 90 tablet; Refill: 4  - atorvastatin (LIPITOR) 80 MG tablet; TAKE 1 TABLET (80 MG TOTAL) BY MOUTH DAILY/ TXJANEA HNUB NOJ 1 LUB [facility] PAB YANET NTSV MUAJ ROJ  Dispense: 90 tablet; Refill: 4  - losartan (COZAAR) 25 MG tablet; Take 1 tablet (25 mg total) by mouth daily.  Dispense: 90 tablet; Refill: 4    2. Accelerating angina (H)  Resolved likely not related to heart.    3. Lower extremity edema  Stable on Lasix 40 mg twice daily.  Will recheck labs at her next scheduled follow-up mid June.  - furosemide (LASIX) 40 MG tablet; TAKE 1 TABLET (40 MG TOTAL) BY MOUTH 2 (TWO) TIMES A DAY AT 9AM AND 6PM./ NOJ 1 LUB 2 ZAUG IB HNUB THAUM 9AM THIAB 6PM PAB YANET PHOB VOG  Dispense: 60 tablet; Refill: 11    4. Heartburn  Stable on daily PPI.  Continue to consider stepdown therapy  - omeprazole (PRILOSEC) 40 MG capsule; TAKE 1 PILL BY MOUTH EVERY DAY/ TXJANEA UB NOJ 1 LUB [facility] PAB ZOO LUB PLAB  Dispense: 90 capsule; Refill: 4    5. Chronic systolic heart failure (H)  Stable on Lasix twice daily, ARB, potassium replacement, beta-blocker, statin therapy, aspirin  - furosemide (LASIX) 40 MG tablet; TAKE 1 TABLET (40 MG TOTAL) BY MOUTH 2 (TWO) TIMES A DAY AT 9AM AND 6PM./ NOJ 1 LUB 2 ZAUG IB HNUB THAUM 9AM THIAB 6PM PAB YANET PHOB VOG  Dispense: 60 tablet; Refill: 11  - losartan (COZAAR) 25 MG tablet; Take 1 tablet (25 mg total) by  mouth daily.  Dispense: 90 tablet; Refill: 4  - potassium chloride (KLOR-CON) 10 MEQ CR tablet; TAKE 1 PILL BY MOUTH EVERY DAY/TXHUA HNUB NOJ 1 LUB TSHUAJ  Dispense: 90 tablet; Refill: 4    6. Essential hypertension  BP Readings from Last 3 Encounters:   05/20/19 112/72   05/14/19 121/71   05/13/19 137/90       well controlled today.  Plan for bloodpressure management includes ongoing focus on healthy DASH type diet and increased activity, encouraged to avoid tobacco products and limit alcohol use, stress reduction, continue amlodipine 5 mg daily, Lasix 40 mg twice daily, Imdur 60 mg daily, losartan 25 mg daily, metoprolol  mg daily.  Labwork and meds ordered and reviewed as below  Lab Results   Component Value Date    K 4.3 05/13/2019      Lab Results   Component Value Date    CREATININE 0.81 05/13/2019       - amLODIPine (NORVASC) 5 MG tablet; Take 1 tablet (5 mg total) by mouth daily.  Dispense: 90 tablet; Refill: 4  - losartan (COZAAR) 25 MG tablet; Take 1 tablet (25 mg total) by mouth daily.  Dispense: 90 tablet; Refill: 4    7. Pain in both lower legs  Likely multifactorial.  Continue to stay active and walk.  Continue Tylenol therapy.  - acetaminophen (MAPAP EXTRA STRENGTH) 500 MG tablet; Take 1 tablet (500 mg total) by mouth 3 (three) times a day as needed.  Dispense: 100 tablet; Refill: 4      Return in about 1 month (around 6/20/2019) for Recheck.    Patient Education/AVS:  There are no Patient Instructions on file for this visit.    HPI:   Leora Sanchez is a 48 y.o. female c/o hospital f/u as below.  Provider was running late and intepreter was not available at same time as provider so patient had to leave before full evaluation today.  Were able to talk briefly in English and with help of MA today. Pt notes she has f/u scheduled 6/14/19 for ongoing cares.  Overall feeling well no more chest pain.  Needs refills before going on a trip to [place] next week.  Family is taking her and her mom on a trip to help  them relax so they don't get sick.      Not taking any of her depression meds b/c cause her bp to go high and makes her feel dizzy.      Pain from knees and toes and wondering what she can take for pain      ROS:  Constitutional, CV, Resp, GI, , MSK, skin, neuro, psych all negative except as outlined in the HPI above and patient denies any other symptoms.      History summarized from1-2:d/c summary 5/10-5/14.  Admitted for chest discomfort.  abnormla stress test on 5/10/19 and so transferred to [facility] for antgio.  Angio did not have any high-grade stenosis.  Chest pain resolved and asked to f/u with pcp.  [place] to be related to anxiety.  ekg did show T wave inversion but trop were neg.  Elevated bp in eD related to anxiety resolved with rest and resuming home meds.    Radiology tests reviewed-1: Chest x-ray May 10 stable no acute pulmonary findings.  Lab tests reviewed-1: 5/2019  Medicine tests reviewed-1: Echo- technically limited.  45% EF with global hypokinesis.    Angio: Conclusion          Prox LAD lesion is 50% stenosed.    Radial access was not utilized for .    1st Mrg lesion is 45% stenosed.    Radial access was not utilized for .    The LM vessel was moderate and is considered normal.    Estimated blood loss was <20 ml.    The RCA was moderate.         Physical Exam:  /72 (Patient Site: Right Arm, Patient Position: Sitting, Cuff Size: Adult Regular)   Pulse 64   Wt 190 lb (86.2 kg)   BMI 38.38 kg/m   Body mass index is 38.38 kg/m . No LMP recorded. Patient is perimenopausal.  Vital signs reviewed  Wt Readings from Last 3 Encounters:   05/20/19 190 lb (86.2 kg)   05/14/19 192 lb 3.2 oz (87.2 kg)   05/13/19 187 lb 12.8 oz (85.2 kg)     Social History     Tobacco Use   Smoking Status Never Smoker   Smokeless Tobacco Never Used   Tobacco Comment    no passive exposure     Social History     Substance and Sexual Activity   Sexual Activity Yes     Partners: Male     Birth control/protection: None      No data recorded  PHQ-9 Total Score: 16 (2/22/2019  4:00 PM)    PHQ-2 Total Score: 5 (2/22/2019  4:00 PM)  Depression Follow-up Plan: mental health care education (2/22/2019  4:00 PM)    No data recorded    All normal as below except abnormalities include: Weight close to her baseline 1 88-1 90.  Patient appears well grossly normal physical.  General is a  48 y.o. female sitting comfortably in no apparent distress.   Neck: Supple without lymphadenopathy or thyromegally  CV: Regular rate and rhythm S1S2 without rubs, murmurs or gallops,   Lungs: Clear to auscultation bilaterally  Extremities: Warm, No Edema, 2+ Pedal and radial pulses bilaterally  Skin: No lesions or rashes noted  Neuro/MSK: Able to ambulate around the exam room with equal movement, strength and normal coordination of the upper and lower extremeties symmetrically    Results for orders placed or performed during the hospital encounter of 05/13/19   D-dimer, Quantitative   Result Value Ref Range    D-Dimer, Quant 0.39 <=0.50 FEU ug/mL   POCT ACT (Celite)   Result Value Ref Range    Activated Clotting Time  (H) 105 - 167 seconds   Echo Complete   Result Value Ref Range    Weight 3,004.8 lbs    /75 mmHg    HR 55 bpm    IVSd 0.812 0.6 - 0.9 cm    LVIDd 5.18 3.8 - 5.2 cm    LVIDs 4.06 (!) 2.2 - 3.5 cm    LVOT diam 2.2 cm    LVOT mean gradient 1 mmHg    LVOT peak VTI 15.2 cm    LVOT mean foc 52.2 cm/s    LVOT peak fco 76.9 cm/s    LVOT peak gradient 2 mmHg    LV PWd 0.777 0.6 - 0.9 cm    MV E' lat fco 5.11 cm/s    MV E' med fco 4.57 cm/s    AV mean fco 78.1 cm/s    AV mean gradient 3 mmHg    AV VTI 21.4 cm    AV peak fco 108 cm/s    AO ascending 3.5 cm    LA size 3.2 cm    MV decel time 180 ms    MV peak A fco 64.5 cm/s    MV peak E fco 60.6 cm/s    IVS/PW ratio 1.0     LV FS 21.6 28 - 44 %    LA volume 43.7 mL    LV mass 143.0 g    AV area 2.7 cm2    AV DIM IND fco 0.7     MV E/A Ratio 0.9     LVOT area 3.80 cm2    LVOT SV 57.8 cm3    AV  peak gradient 4.7 mmHg    LV volume diastolic 129 46 - 106 cm3    TAPSE 1.9 cm    MV med E/e' ratio 13.3     MV lat E/e' ratio 11.9     LV CO 3.2 l/min    LA area 2 15.4 cm2    LA area 1 15.7 cm2    Weight 188 lbs    MV Avg E/e' Ratio 12.5 cm/s    LA length 4.7 cm    AV DIM IND VTI 0.7     Echo LVEF Estimated 45 %       Med list and active problem list reviewed and updated as part of this encounter    Current Outpatient Medications on File Prior to Visit   Medication Sig Dispense Refill     isosorbide mononitrate (IMDUR) 60 MG 24 hr tablet TAKE 1 TABLET (60 MG TOTAL) BY MOUTH DAILY/ TXHUA HNUB NOJ 1 LUB TSHUAJ PAB LUB PLAWV 90 tablet 1     metoprolol succinate (TOPROL-XL) 100 MG 24 hr tablet TAKE 1 TABLET (100 MG TOTAL) BY MOUTH DAILY/ TXHUA HNUB NOJ 1 LUB TSHUAJ PAB ZOO NTSHAV SIAB 90 tablet 3     nitroglycerin (NITROSTAT) 0.4 MG SL tablet Place 1 tablet (0.4 mg total) under the tongue every 5 (five) minutes as needed for chest pain. 30 tablet 1     No current facility-administered medications on file prior to visit.          Jaky Gaspar MD

## 2021-05-28 NOTE — PROGRESS NOTES
"TCM DISCHARGE FOLLOW UP CALL    Discharge Date:  5/14/2019  Reason for hospital stay (discharge diagnosis)::  Abnormal stress test, Chest pain, Dyspnea on exertion, EKG abnormalities  Are you feeling better, the same or worse since your discharge?:  Patient is feeling better (Denies CP, SOB.  States \"I feel fine.\"  Puncture site (radial) \"healing.\")  Do you feel like you have a plan in the event of a health emergency?: Yes ()    As part of your discharge plan, were  home care services ordered for you?: No    Did you receive any new medications, or was there a change to your medications?: No    Do you have any follow up visits scheduled with your PCP or Specialist?:  Yes, with PCP (Dr. Gaspar 5/20/19)  (RN) Is PCP appt scheduled soon enough (within 14 days of discharge date)?: Yes        Rita Morales RN Care Manager, Population Health    "

## 2021-05-29 NOTE — TELEPHONE ENCOUNTER
"Called and left message for pt to call clinic back using  line: Ian ID#[id number].  \"okay to relay message below\"  "
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
## 2021-05-30 VITALS — BODY MASS INDEX: 37.22 KG/M2 | WEIGHT: 189 LBS

## 2021-05-30 VITALS — WEIGHT: 188 LBS | BODY MASS INDEX: 37.02 KG/M2

## 2021-05-31 VITALS — WEIGHT: 172 LBS | BODY MASS INDEX: 33.59 KG/M2

## 2021-05-31 VITALS — WEIGHT: 177 LBS | HEIGHT: 60 IN | BODY MASS INDEX: 34.75 KG/M2

## 2021-05-31 VITALS — HEIGHT: 60 IN | WEIGHT: 181 LBS | BODY MASS INDEX: 35.53 KG/M2

## 2021-05-31 VITALS — HEIGHT: 60 IN | BODY MASS INDEX: 33.18 KG/M2 | WEIGHT: 169 LBS

## 2021-05-31 VITALS — WEIGHT: 173 LBS | HEIGHT: 60 IN | BODY MASS INDEX: 33.96 KG/M2

## 2021-05-31 VITALS — HEIGHT: 60 IN | WEIGHT: 170 LBS | BODY MASS INDEX: 33.38 KG/M2

## 2021-05-31 VITALS — WEIGHT: 172.1 LBS | HEIGHT: 60 IN | BODY MASS INDEX: 33.79 KG/M2

## 2021-05-31 VITALS — BODY MASS INDEX: 34.16 KG/M2 | WEIGHT: 172 LBS

## 2021-05-31 VITALS — BODY MASS INDEX: 35.14 KG/M2 | HEIGHT: 60 IN | WEIGHT: 179 LBS

## 2021-05-31 VITALS — WEIGHT: 181 LBS | BODY MASS INDEX: 35.65 KG/M2

## 2021-05-31 VITALS — WEIGHT: 181 LBS | BODY MASS INDEX: 35.95 KG/M2

## 2021-05-31 NOTE — TELEPHONE ENCOUNTER
I was able to review results of her ear CT scan.  I would like her to f/u with ENT- Dr Service next to see what he thinks about the CT scan and if there is anything that could help her dizziness and hearing issues.

## 2021-05-31 NOTE — PROGRESS NOTES
[facility] Office Visit    Chief Complaint:  Chief Complaint   Patient presents with     Follow-up         Assessment/Plan:  1. Essential hypertension  BP Readings from Last 3 Encounters:   08/22/19 136/86   06/14/19 136/88   05/20/19 112/72       sub optimal controlled today.  Plan for bloodpressure management includes ongoing focus on healthy DASH type diet and increased activity, encouraged to avoid tobacco products and limit alcohol use, stress reduction, increase losartan to 50mg daily, continue amlodipine 5mg daily, metoprolol xl 100mg daily.  Labwork and meds ordered and reviewed as below  Lab Results   Component Value Date    K 4.1 06/14/2019      Lab Results   Component Value Date    CREATININE 0.70 06/14/2019       - Basic Metabolic Panel  - losartan (COZAAR) 50 MG tablet; Take 1 tablet (50 mg total) by mouth daily.  Dispense: 30 tablet; Refill: 3    2. Prediabetes  Continue to monitor labs every 6 months.  Work on low carb diet and daily walking.    - Glycosylated Hemoglobin A1c  - Basic Metabolic Panel    3. Encounter for screening for HIV  - HIV Antigen/Antibody Screening [place]    4. Coronary artery disease involving native coronary artery of native heart  Asymptomatic currently.  Increase ARB to help with bp control.  Continue high dose statin, asa, b-blocker, imdur, prn ntg.  Careful about chronic use of ppi.    - losartan (COZAAR) 50 MG tablet; Take 1 tablet (50 mg total) by mouth daily.  Dispense: 30 tablet; Refill: 3    5. Chronic systolic heart failure (H)  As above.   - losartan (COZAAR) 50 MG tablet; Take 1 tablet (50 mg total) by mouth daily.  Dispense: 30 tablet; Refill: 3    6. Colon cancer screening  - Josh    Return in about 3 months (around 11/22/2019) for Recheck.  The following high BMI interventions were performed this visit: encouragement to exercise and lifestyle education regarding diet    Patient Education/AVS:  There are no Patient Instructions on file for  this visit.    HPI:   Leora Sanchez is a 49 y.o. female c/o f/u on her high blood pressure and her head/ ear CT scan. Reviewed CT report and recommendations to f/u with Dr Barber ENT for next steps on her hearing and dizziness.      Pt is otherwise doing well.  No shortness of breath or significant cough over past month.  Minimal heartburn.  Minimal swelling.  No chest pain or use of NTG.  Has been taking meds as prescribed and does note that bp has been higher and not lower last 1-2 months 130-140s/80-90s.  Headache stable along with dizziness and hearing/ringing in ear that had surgery.      ROS:  Constitutional, CV, Resp, GI, , MSK, skin, neuro, psych all negative except as outlined in the HPI above and patient denies any other symptoms.      CT of auditory canals 7/2019 reviewed:   CONCLUSION:  1.  There is osseous thinning with possible dehiscence of the superior margins of the superior semicircular canals bilaterally. Recommend clinical correlation for sound-induced vertigo (Tullio phenomenon).  2.  Postoperative changes of right canal wall down mastoidectomy and near total ossicular resection. There is moderate soft tissue fullness within the right middle ear cavity. The mastoidectomy bowl is otherwise clear.  3.  Retraction of the right tympanic membrane.    Lab tests reviewed-1: 3/2019    Physical Exam:  /86 (Patient Site: Left Arm, Patient Position: Sitting, Cuff Size: Adult Large)   Pulse 84   Resp 24   Wt 192 lb (87.1 kg)   LMP 03/14/2019 (Approximate)   BMI 38.78 kg/m   Body mass index is 38.78 kg/m . Patient's last menstrual period was 03/14/2019 (approximate).  Vital signs reviewed  Wt Readings from Last 3 Encounters:   08/22/19 192 lb (87.1 kg)   06/14/19 192 lb (87.1 kg)   05/20/19 190 lb (86.2 kg)     Social History     Tobacco Use   Smoking Status Never Smoker   Smokeless Tobacco Never Used   Tobacco Comment    no passive exposure     Social History     Substance and Sexual Activity    Sexual Activity Yes     Partners: Male     Birth control/protection: None     No data recorded  PHQ-9 Total Score: 9 (6/14/2019  1:00 PM)    PHQ-2 Total Score: 1 (6/14/2019  1:00 PM)  Depression Follow-up Plan: mental health care education (2/22/2019  4:00 PM)    ACT Total Score: 25 (6/14/2019  1:37 PM)      All normal as below except abnormalities include: pt appears well at baseline.  No cough or shortness of breath noted today.  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    General is a  49 y.o. female sitting comfortably in no apparent distress.   HEENT:  TM are clear bilaterally.  Eye, nasal, oral exams within normal   Neck: Supple without lymphadenopathy or thyromegally  CV: Regular rate and rhythm S1S2 without rubs, murmurs or gallops,   Lungs: Clear to auscultation bilaterally  Abd:  +BS, soft NT/ND,  No masses or organomegally  Extremities: Warm, No Edema, 2+ Pedal and radial pulses bilaterally  Skin: No lesions or rashes noted  Neuro/MSK: Able to ambulate around the exam room with equal movement, strength and normal coordination of the upper and lower extremeties symmetrically    Results for orders placed or performed in visit on 08/22/19   Glycosylated Hemoglobin A1c   Result Value Ref Range    Hemoglobin A1c 6.3 (H) 3.5 - 6.0 %       Med list and active problem list reviewed and updated as part of this encounter    Current Outpatient Medications on File Prior to Visit   Medication Sig Dispense Refill     acetaminophen (MAPAP EXTRA STRENGTH) 500 MG tablet Take 1 tablet (500 mg total) by mouth 3 (three) times a day as needed. 100 tablet 4     amLODIPine (NORVASC) 5 MG tablet Take 1 tablet (5 mg total) by mouth daily. 90 tablet 4     aspirin 81 MG EC tablet Take 1 tablet (81 mg total) by mouth daily. 90 tablet 4     atorvastatin (LIPITOR) 80 MG tablet TAKE 1 TABLET (80 MG TOTAL) BY MOUTH DAILY/ TXHUA HNUB NOJ 1 LUB TSHUAJ PAB YANET NTSHAV MUAJ ROJ 90 tablet 4     furosemide (LASIX) 40 MG tablet  TAKE 1 TABLET (40 MG TOTAL) BY MOUTH 2 (TWO) TIMES A DAY AT 9AM AND 6PM./ NOJ 1 LUB 2 ZAUG IB HNUB THAUM 9AM THIAB 6PM PAB YANET PHOB VOG 60 tablet 11     gabapentin (NEURONTIN) 300 MG capsule Take 1 capsule (300 mg total) by mouth 3 (three) times a day as needed (headache). 90 capsule 3     isosorbide mononitrate (IMDUR) 60 MG 24 hr tablet TAKE 1 TABLET (60 MG TOTAL) BY MOUTH DAILY/ TXHUA HNUB NOJ 1 LUB [facility] PAB LUB PLAWV 90 tablet 1     metoprolol succinate (TOPROL-XL) 100 MG 24 hr tablet TAKE 1 TABLET (100 MG TOTAL) BY MOUTH DAILY/ TXA HNUB NOJ 1 [facility] ZOO NTSHAV SIAB 90 tablet 3     nitroglycerin (NITROSTAT) 0.4 MG SL tablet Place 1 tablet (0.4 mg total) under the tongue every 5 (five) minutes as needed for chest pain. 30 tablet 1     nortriptyline (PAMELOR) 50 MG capsule TAKE 1 CAPSULE (50 MG TOTAL) BY MOUTH AT BEDTIME/ TXA O THAUM MUS PW NOJ 1 LUB.  3     omeprazole (PRILOSEC) 40 MG capsule TAKE 1 PILL BY MOUTH EVERY DAY/ TXA HNUB NOJ 1 LUB [facility] ZOO LUB PLAB 90 capsule 4     potassium chloride (KLOR-CON) 10 MEQ CR tablet TAKE 1 PILL BY MOUTH EVERY DAY/TXHUA HNUB NOJ 1 LUB TSHUAJ 90 tablet 4     venlafaxine (EFFEXOR-XR) 75 MG 24 hr capsule TAKE 1 CAPSULE (75 MG TOTAL) BY MOUTH DAILY/ TXHUA HNUB NOJ 1 LUB [facility] PAB YANET NYUAJ SIAB  11     [DISCONTINUED] losartan (COZAAR) 25 MG tablet Take 1 tablet (25 mg total) by mouth daily. 90 tablet 4     No current facility-administered medications on file prior to visit.          Jaky Gaspar MD

## 2021-06-01 VITALS — BODY MASS INDEX: 37.16 KG/M2 | WEIGHT: 184 LBS

## 2021-06-01 VITALS — WEIGHT: 187 LBS | BODY MASS INDEX: 36.71 KG/M2 | HEIGHT: 60 IN

## 2021-06-01 VITALS — WEIGHT: 189 LBS | HEIGHT: 60 IN | BODY MASS INDEX: 37.11 KG/M2

## 2021-06-01 VITALS — BODY MASS INDEX: 37.14 KG/M2 | WEIGHT: 187 LBS

## 2021-06-01 VITALS — HEIGHT: 60 IN | BODY MASS INDEX: 36.12 KG/M2 | WEIGHT: 184 LBS

## 2021-06-01 VITALS — BODY MASS INDEX: 37.73 KG/M2 | WEIGHT: 190 LBS

## 2021-06-01 VITALS — WEIGHT: 186 LBS | BODY MASS INDEX: 37.57 KG/M2

## 2021-06-01 VITALS — BODY MASS INDEX: 37.69 KG/M2 | WEIGHT: 192 LBS | HEIGHT: 60 IN

## 2021-06-01 VITALS — WEIGHT: 191 LBS | BODY MASS INDEX: 37.93 KG/M2

## 2021-06-01 VITALS — BODY MASS INDEX: 38.1 KG/M2 | WEIGHT: 189 LBS | HEIGHT: 59 IN

## 2021-06-01 VITALS — WEIGHT: 190 LBS | BODY MASS INDEX: 37.73 KG/M2

## 2021-06-01 VITALS — WEIGHT: 193 LBS | BODY MASS INDEX: 38.33 KG/M2

## 2021-06-01 VITALS — WEIGHT: 184 LBS | BODY MASS INDEX: 37.16 KG/M2

## 2021-06-01 VITALS — HEIGHT: 59 IN | WEIGHT: 188 LBS | BODY MASS INDEX: 37.9 KG/M2

## 2021-06-01 VITALS — WEIGHT: 183 LBS | BODY MASS INDEX: 36.34 KG/M2

## 2021-06-01 NOTE — PROGRESS NOTES
Leora Sanchez is a 49 y.o. female seen in consultation at the request of Dr. Gaspar for vertigo.  Onset: After revision CWD mastoidectomy a couple of years ago  Episodic vs Chronic: chronic with waxing and wanning  Length of episodes: NA  Description of episodes: sammy  Last episode: NA  Frequency of episodes: NA  Positional: SHe notes it turning her head side to side  Change in hearing with episodes:  no  Otologic history of infections or surgery: Has history of two mastoid surgeries in the past.  Has been treated by ENT in the past couple of years requiring mastoid bowl cleanings.  Las surgery was 2 years ago.  History of headaches: yes - particularly on th right side  Headaches with episodes: yes  History of head trauma: no  Previous evaluations: Her prior surgeon indicated they did not know why she was dizzy per patient.    Previous medications: no    ALLERGY:  No Known Allergies    MEDICATIONS:     Current Outpatient Medications on File Prior to Visit   Medication Sig Dispense Refill     acetaminophen (MAPAP EXTRA STRENGTH) 500 MG tablet Take 1 tablet (500 mg total) by mouth 3 (three) times a day as needed. 100 tablet 4     amLODIPine (NORVASC) 5 MG tablet Take 1 tablet (5 mg total) by mouth daily. 90 tablet 4     aspirin 81 MG EC tablet Take 1 tablet (81 mg total) by mouth daily. 90 tablet 4     atorvastatin (LIPITOR) 80 MG tablet TAKE 1 TABLET (80 MG TOTAL) BY MOUTH DAILY/ TXHUA HNUB NOJ 1 LUB [facility] PAB YANET NTSHAV MUAJ ROJ 90 tablet 4     furosemide (LASIX) 40 MG tablet TAKE 1 TABLET (40 MG TOTAL) BY MOUTH 2 (TWO) TIMES A DAY AT 9AM AND 6PM./ NOJ 1 LUB 2 ZAUG IB HNUB THAUM 9AM THIAB 6PM PAB YANET PHOB VOG 60 tablet 11     gabapentin (NEURONTIN) 300 MG capsule Take 1 capsule (300 mg total) by mouth 3 (three) times a day as needed (headache). 90 capsule 3     isosorbide mononitrate (IMDUR) 60 MG 24 hr tablet TAKE 1 TABLET (60 MG TOTAL) BY MOUTH DAILY/ TXHUA HNUB NOJ 1 LUB TSHUAJ PAB LUB PLAWV 90 tablet 1      losartan (COZAAR) 50 MG tablet Take 1 tablet (50 mg total) by mouth daily. 30 tablet 3     metoprolol succinate (TOPROL-XL) 100 MG 24 hr tablet TAKE 1 TABLET (100 MG TOTAL) BY MOUTH DAILY/ TXHUA HNUB NOJ 1 LUB [facility] PAB ZOO NTSHAV SIAB 90 tablet 3     nitroglycerin (NITROSTAT) 0.4 MG SL tablet Place 1 tablet (0.4 mg total) under the tongue every 5 (five) minutes as needed for chest pain. 30 tablet 1     nortriptyline (PAMELOR) 50 MG capsule TAKE 1 CAPSULE (50 MG TOTAL) BY MOUTH AT BEDTIME/ TXHUA O THAUM MUS PW NOJ 1 LUB.  3     omeprazole (PRILOSEC) 40 MG capsule TAKE 1 PILL BY MOUTH EVERY DAY/ TXHUA HNUB NOJ 1 LUB [facility] PAB ZOO LUB PLAB 90 capsule 4     potassium chloride (KLOR-CON) 10 MEQ CR tablet TAKE 1 PILL BY MOUTH EVERY DAY/TXHUA HNUB NOJ 1 [facility] 4     venlafaxine (EFFEXOR-XR) 75 MG 24 hr capsule TAKE 1 CAPSULE (75 MG TOTAL) BY MOUTH DAILY/ TXHUA HNUB NOJ 1 LUB [facility] YANET NYUAJ SIAB  11     No current facility-administered medications on file prior to visit.        Past Medical/Surgical History, Family History and Social History reviewed in detail and documented separately in the medical record.    Complete Review of Systems:  A 10-point review was performed.  Pertinent positives are noted in the HPI and on a separate scanned document in the chart.    EXAM:  There were no vitals filed for this visit.    Nurse documentation reviewed  and documented separately.    General Appearance: Pleasant, alert, appropriate appearance for age. No acute distress    Head Exam: Normal. Normocephalic, atraumatic.    Eye Exam: Normal external eye, conjunctiva, lids, cornea. Extra-ocular movements are intact.    Left external ear: normal  Left otoscopic exam: Normal EAC. Normal TM     Right external ear: normal  Right otoscopic exam: some dried green purulence seen medial mastoid cavity.  PROCEDURE  Mastoid debridement  The right ear is examined under the microscope and crusts are removed with suction and  microdissection technique.  THere appears to be a cartilage graft but the inferior aspect of the drum appears to be absent.  There is mucous and purulence in the middle ear.    Nose Exam: Normal external nose. Septum midline. Nasal mucosa normal.  Inferior turbinates normal.    OroPharynx Exam: Dental hygiene adequate. Normal tongue. Normal buccal mucosa. Normal palate.  Normal pharynx. Normal tonsils.    Neck Exam: Supple, no masses or nodes. Trachea and larynx midline.    Thyroid Exam: No tenderness, nodules or enlargement.    Salivary Glands: nontender without masses    Neuro: Alert and oriented times 3, CN 2-12 grossly intact, no nystagmus, PERRL, EOMI, normal speech and gait    Chest/Respiratory Exam: Normal chest wall motion and respiratory effort. No audible stridor or wheezing.    Cardiovascular Exam: Regular rate and rhythm.  No cyanosis, clubbing or edema.    Pulses: carotid pulses normal    Vestibular:  Gait is normal, tandem gait is normal, Romberg is normal, [place]-Hallpike is normal, Finger-nose-finger is normal, Fukuda is normal    ASSESSMENT:  1. Infection of mastoid bowl, right        PLAN: Findings, assessment, and management options were discussed. Will initially treat the infection to see what effect this has on her symptoms.  If no improvement, consider OT/PT.  It is possible with the headaches that she has a central component as well.  VNG difficult secondary to operative changes on the right.

## 2021-06-02 ENCOUNTER — RECORDS - HEALTHEAST (OUTPATIENT)
Dept: ADMINISTRATIVE | Facility: CLINIC | Age: 51
End: 2021-06-02

## 2021-06-02 VITALS — BODY MASS INDEX: 37.3 KG/M2 | WEIGHT: 190 LBS | HEIGHT: 60 IN

## 2021-06-02 VITALS — WEIGHT: 191.25 LBS | HEIGHT: 59 IN | BODY MASS INDEX: 38.56 KG/M2

## 2021-06-02 VITALS — BODY MASS INDEX: 38.78 KG/M2 | WEIGHT: 192 LBS

## 2021-06-02 VITALS — WEIGHT: 187 LBS | BODY MASS INDEX: 36.71 KG/M2 | HEIGHT: 60 IN

## 2021-06-02 VITALS — WEIGHT: 193 LBS | BODY MASS INDEX: 38.98 KG/M2

## 2021-06-02 VITALS — BODY MASS INDEX: 38.71 KG/M2 | HEIGHT: 59 IN | WEIGHT: 192 LBS

## 2021-06-02 VITALS — WEIGHT: 196 LBS | BODY MASS INDEX: 39.59 KG/M2

## 2021-06-02 NOTE — PROGRESS NOTES
HPI:  She reports her ear feels improved since last we saw her.  She notes her headache is better as well.  She has had the same imbalance since her original surgery in 1996.    Past medical history, surgical history, social history, family history, medications, and allergies have been reviewed with the patient and are documented above.    Review of Systems: a 10-system review was performed. Pertinent positives are noted in the HPI and on a separate scanned document in the chart.    PHYSICAL EXAMINATION:  GEN: no acute distress, normocephalic  EYES: extraocular movements are intact, pupils are equal and round. Sclera clear.   EARS: Right mastoid bowl clean. There is a perforation to the tympanic membrane with a pedicled cartilage graft  NEURO: CN II-XII are intact bilaterally. alert and oriented. No nystagmus. Gait is normal.  PULM: breathing comfortably on room air, normal chest expansion with respiration  HEART: regular rate and rhythm, no peripheral edema      MEDICAL DECISION-MAKING: Will get her set up with OT to see if we can improve what is likely an uncompensated peripheral weakness from surgery.  Follow up 3-6 for mastoid bowl cleaning.

## 2021-06-02 NOTE — PATIENT INSTRUCTIONS - HE
Leora Sanchez,    It was a pleasure to see you today at [facility].     My recommendations after this visit include:  - Please follow up with Dr Roy in 2 months   - Please follow up with Sendy Banda in 6 months   - No changes to your medications    Sendy Banda, CNP

## 2021-06-02 NOTE — TELEPHONE ENCOUNTER
Called and informed patient of test results. Patient verbalized understanding. Closing encounter

## 2021-06-02 NOTE — PROGRESS NOTES
[facility] Office Visit    Chief Complaint:  Chief Complaint   Patient presents with     Follow-up     10/12/19 at [place] - Chest Pain          Assessment/Plan:  1. Chest pain, unspecified type  reveiwed ED visit including some vague symptoms as below including polyuria and abd discomfort.  Will continue eval checking for urinary issues.  Otherwise improved.  See plan as below for heartburn management    - Urinalysis-UC if Indicated    2. Essential hypertension  BP Readings from Last 3 Encounters:   10/25/19 138/82   10/23/19 130/80   10/12/19 112/67       well controlled today.  Plan for bloodpressure management includes ongoing focus on healthy DASH type diet and increased activity, encouraged to avoid tobacco products and limit alcohol use, stress reduction, continue amlodipine 5mg daily, lasix 40mg daily, imdur 60mg daily, losartan 50mg daily, metoprolol xl 100mg daily wiht kcl 10meq daily.  Labwork and meds ordered and reviewed as below  Lab Results   Component Value Date    K 3.6 10/12/2019      Lab Results   Component Value Date    CREATININE 1.16 (H) 10/12/2019         3. Class 2 severe obesity due to excess calories with serious comorbidity and body mass index (BMI) of 39.0 to 39.9 in adult (H)  Reviewed dietary changes to help with heartburn and diabetes prevention and reviewed importance of daily movement.      4. Nonocclusive coronary atherosclerosis of native coronary artery  Stable- continue scheduled fu with cardiology as scheduled.      5. Vaccine counseling    - Pneumococcal polysaccharide vaccine 23-valent 3 yo or older, subq/IM    6. Heartburn  Chronic despite ppi daily- conitnue prilosec 40mg daily.  Weight loss and dietary changes needed.  Tends to eat very spicy foods and can't tolerate this any more.  rx for carafate sent to pharmacy to use as needed.  Check stool for H pylori and treat as indicated.  Consider GI consult next for on going sx.  No anemia noted to suggest  GI bleeding.    - sucralfate (CARAFATE) 1 gram tablet; Take 1 tablet (1 g total) by mouth 4 (four) times a day.  Dispense: 120 tablet; Refill: 0  - H. pylori Antigen, Stool(HPSAG)      Return in about 4 weeks (around 11/22/2019) for Recheck.  The following high BMI interventions were performed this visit: encouragement to exercise and lifestyle education regarding diet    Patient Education/AVS:  There are no Patient Instructions on file for this visit.    HPI:   Leora Sanchez is a 49 y.o. female c/o f/u on ED visit for chest pain.  Pt notes she had some chest pain that radiated to her upper back and headache.  Noted some chills, polyuria and difficulty passing urine.  Tried NTG.  Not better and went to ED.  Pain only resolved with drinking sticky liquid to help her stomach.  Has continued to take ppi stomach pill chronically and didn't start the carafate yet.  Still getting stomach pain and wondering if she is getting an ulcer again.  She notes that if she gets hungry or eats anything spicy/fatty she will get a burning sensation and nausea. Has never had a scope before. Has cut back on rice and eating more fruits now and this seems to make stinging/burning sensation worse.  Episode to the ED started after eating hot/spicy pepper. Finds that taking 2 of her stomach pills when she gets this problem to be helpful.      ROS:  Constitutional, CV, Resp, GI, , MSK, skin, neuro, psych all negative except as outlined in the HPI above and patient denies any other symptoms.      History summarized from1-2:ED visit 10/12/19 for chest pain.  Not cardiac.  F/u with cardiology 10/23/19 scheduled. Likely GI and put on carafate and PPI.   10/23/19- cardiology f/u reviewed- rapid access clinic.  No cardiac sx.  Continue curent meds.  F/u with primary cardiologist in [address] [place] [address] and heart failure clinic in 6 months.  Work on weight loss, low sodium diet.   Radiology tests reviewed-1: normal abd u/s- stable fatty liver.   CXR- dilated  thoracic aorta.   Chest/abd/pelvic CTA-  Stable 6mm groundglass nodule MONIQUE  Lab tests reviewed-1: 10/2019 all wnl- no urine testing done  Medicine tests reviewed-1: ekg x 2 in ED stable.      Physical Exam:  /82 (Patient Site: Left Arm, Patient Position: Sitting, Cuff Size: Adult Large)   Pulse 68   Resp 16   Wt 196 lb (88.9 kg)   LMP 03/14/2019 (Approximate)   BMI 39.59 kg/m   Body mass index is 39.59 kg/m . Patient's last menstrual period was 03/14/2019 (approximate).  Vital signs reviewed  Wt Readings from Last 3 Encounters:   10/25/19 196 lb (88.9 kg)   10/23/19 194 lb (88 kg)   10/12/19 190 lb (86.2 kg)     Social History     Tobacco Use   Smoking Status Never Smoker   Smokeless Tobacco Never Used   Tobacco Comment    no passive exposure     Social History     Substance and Sexual Activity   Sexual Activity Yes     Partners: Male     Birth control/protection: None     No data recorded  PHQ-9 Total Score: 4 (10/25/2019 11:00 AM)    PHQ-2 Total Score: 0 (10/25/2019 11:00 AM)  Depression Follow-up Plan: mental health care education (2/22/2019  4:00 PM)    ACT Total Score: 25 (6/14/2019  1:37 PM)      All normal as below except abnormalities include: pt appears well and at her baseline.  Comfortable and able to move around room without difficulty. No shortness of breath or cough noted today.  Mild epigastric pain with deep palpation but otherwise benign abd exam.  No cva/flank pain.    General is a  49 y.o. female sitting comfortably in no apparent distress.   HEENT:  oral exams within normal   Neck: Supple without lymphadenopathy or thyromegally  CV: Regular rate and rhythm S1S2 without rubs, murmurs or gallops,   Lungs: Clear to auscultation bilaterally  Abd:  +BS, soft ND,  No masses or organomegally  Extremities: Warm, No Edema, 2+ Pedal and radial pulses bilaterally  Skin: No lesions or rashes noted  Neuro/MSK: Able to ambulate around the exam room with equal movement, strength and normal  coordination of the upper and lower extremeties symmetrically    Results for orders placed or performed in visit on 10/25/19   Urinalysis-UC if Indicated   Result Value Ref Range    Color, UA Yellow Colorless, Yellow, Straw, Light Yellow    Clarity, UA Clear Clear    Glucose, UA Negative Negative    Bilirubin, UA Negative Negative    Ketones, UA Negative Negative    Specific Gravity, UA 1.020 1.005 - 1.030    Blood, UA Negative Negative    pH, UA 7.0 5.0 - 8.0    Protein, UA Negative Negative mg/dL    Urobilinogen, UA 0.2 E.U./dL 0.2 E.U./dL, 1.0 E.U./dL    Nitrite, UA Negative Negative    Leukocytes, UA Negative Negative       Med list and active problem list reviewed and updated as part of this encounter    Current Outpatient Medications on File Prior to Visit   Medication Sig Dispense Refill     acetaminophen (MAPAP EXTRA STRENGTH) 500 MG tablet Take 1 tablet (500 mg total) by mouth 3 (three) times a day as needed. 100 tablet 4     amLODIPine (NORVASC) 5 MG tablet Take 1 tablet (5 mg total) by mouth daily. 90 tablet 4     aspirin 81 MG EC tablet Take 1 tablet (81 mg total) by mouth daily. 90 tablet 4     atorvastatin (LIPITOR) 80 MG tablet TAKE 1 TABLET (80 MG TOTAL) BY MOUTH DAILY/ TXHUA HNUB NOJ 1 LUB [facility] PAB YANET NTSHAV MUAJ ROJ 90 tablet 4     FLOVENT HFA 44 mcg/actuation inhaler        furosemide (LASIX) 40 MG tablet TAKE 1 TABLET (40 MG TOTAL) BY MOUTH 2 (TWO) TIMES A DAY AT 9AM AND 6PM./ NOJ 1 LUB 2 ZAUG IB HNUB THAUM 9AM THIAB 6PM PAB YANET PHOB VOG 60 tablet 11     gabapentin (NEURONTIN) 300 MG capsule Take 1 capsule (300 mg total) by mouth 3 (three) times a day as needed (headache). 90 capsule 3     isosorbide mononitrate (IMDUR) 60 MG 24 hr tablet TAKE 1 TABLET (60 MG TOTAL) BY MOUTH DAILY/ TXHUA HNUB NOJ 1 LUB TSHUAJ PAB LUB PLAWV 90 tablet 1     losartan (COZAAR) 50 MG tablet Take 1 tablet (50 mg total) by mouth daily. 30 tablet 3     metoprolol succinate (TOPROL-XL) 100 MG 24 hr tablet TAKE 1 TABLET  (100 MG TOTAL) BY MOUTH DAILY/ TXHUA HNUB NOJ 1 LUB [facility] ZOO NTSHAV SIAB 90 tablet 3     nitroglycerin (NITROSTAT) 0.4 MG SL tablet Place 1 tablet (0.4 mg total) under the tongue every 5 (five) minutes as needed for chest pain. 30 tablet 1     nortriptyline (PAMELOR) 50 MG capsule TAKE 1 CAPSULE (50 MG TOTAL) BY MOUTH AT BEDTIME/ TXA O THAUM MUS PW NOJ 1 LUB.  3     omeprazole (PRILOSEC) 40 MG capsule TAKE 1 PILL BY MOUTH EVERY DAY/ TXHUA HNUB NOJ 1 LUB [facility] ZOO LUB PLAB 90 capsule 4     potassium chloride (KLOR-CON) 10 MEQ CR tablet TAKE 1 PILL BY MOUTH EVERY DAY/TXHUA HNUB NOJ 1 [facility] 90 tablet 4     venlafaxine (EFFEXOR-XR) 75 MG 24 hr capsule TAKE 1 CAPSULE (75 MG TOTAL) BY MOUTH DAILY/ TXHUA HNUB NOJ 1 [facility] YANET NYUAJ SIAB  11     No current facility-administered medications on file prior to visit.          Jaky Gaspar MD

## 2021-06-03 ENCOUNTER — COMMUNICATION - HEALTHEAST (OUTPATIENT)
Dept: FAMILY MEDICINE | Facility: CLINIC | Age: 51
End: 2021-06-03

## 2021-06-03 VITALS
WEIGHT: 196 LBS | DIASTOLIC BLOOD PRESSURE: 82 MMHG | HEART RATE: 68 BPM | SYSTOLIC BLOOD PRESSURE: 138 MMHG | RESPIRATION RATE: 16 BRPM | BODY MASS INDEX: 39.59 KG/M2

## 2021-06-03 VITALS — BODY MASS INDEX: 38.38 KG/M2 | WEIGHT: 190 LBS

## 2021-06-03 VITALS — WEIGHT: 192 LBS | BODY MASS INDEX: 38.78 KG/M2

## 2021-06-03 VITALS
SYSTOLIC BLOOD PRESSURE: 130 MMHG | HEART RATE: 79 BPM | WEIGHT: 194 LBS | RESPIRATION RATE: 14 BRPM | BODY MASS INDEX: 39.11 KG/M2 | DIASTOLIC BLOOD PRESSURE: 80 MMHG | HEIGHT: 59 IN

## 2021-06-03 VITALS — WEIGHT: 192.2 LBS | BODY MASS INDEX: 38.82 KG/M2

## 2021-06-03 DIAGNOSIS — E11.9 DIABETES MELLITUS, TYPE 2 (H): ICD-10-CM

## 2021-06-03 NOTE — PROGRESS NOTES
[facility] Office Visit    Chief Complaint:  Chief Complaint   Patient presents with     Follow-up     Medication Refill         Assessment/Plan:  1. Heartburn  Chronic and recurrent likely related to dietary choices and weight.  Awaiting h pylori stool testing from last visit- mom with similar sx.  Continue high dosePPI daily along with carafate that she needs to .  Careful about KCL     2. Essential hypertension  BP Readings from Last 3 Encounters:   12/04/19 130/80   11/21/19 (!) 172/98   10/25/19 138/82       uncontrolled today.  Plan for bloodpressure management includes ongoing focus on healthy DASH type diet and increased activity, encouraged to avoid tobacco products and limit alcohol use, stress reduction, continue metoprolol xl 100mg daily, increase losartan from 50 to 100mg daily, isosorbide 60mg daily, norvasc 5mg daily.  Labwork and meds ordered and reviewed as below  Lab Results   Component Value Date    K 3.6 10/12/2019      Lab Results   Component Value Date    CREATININE 1.16 (H) 10/12/2019       - losartan (COZAAR) 100 MG tablet; Take 1 tablet (100 mg total) by mouth daily.  Dispense: 30 tablet; Refill: 4  - Amb Referral to Medication Management    3. Coronary artery disease involving native coronary artery of native heart  Reviewed need for improved bp control as above.  Continue asa daily, statin, arb, b-blocker, imdur.  Wean down on ppi as able.    - losartan (COZAAR) 100 MG tablet; Take 1 tablet (100 mg total) by mouth daily.  Dispense: 30 tablet; Refill: 4    4. Chronic systolic heart failure (H)  As above  - losartan (COZAAR) 100 MG tablet; Take 1 tablet (100 mg total) by mouth daily.  Dispense: 30 tablet; Refill: 4    5. Chronic nonintractable headache, unspecified headache type  Improved at this point.  Continue tylenol as needed.  bp control needed.  notriptyline hs- not sure if pt is taking.  Appreciate MTM.  Several meds can certainly contribute to headaches.       6. Pulmonary nodules  Noted and f/u scheduled in her chart.  Stable from 12/2018 to 10/2019.  Repeat CT in 2 years until 5 years have passed- repeat 2021.      7. Cyst of left ovary  Due for f/u with pelvic us  - US Pelvis With Transvaginal Non OB; Future    8. Colon cancer screening  - Cologuard      Return in about 4 months (around 3/21/2020) for Recheck.  The following high BMI interventions were performed this visit: encouragement to exercise and lifestyle education regarding diet    Patient Education/AVS:  There are no Patient Instructions on file for this visit.    HPI:   Leora Sanchez is a 49 y.o. female c/o scheduled f/u and needing refills.  Pt notes she is tired.  Did take bp meds this morning as usual.  Has been having a head cold with pressure and headache.  Did take some tylenol this am to help with pain.  Did also get some OTC meds for headache sx that she has been taking for a couple of days - excedrin migraine- 2 tabs this morning.      Heartburn still getting discomfort and symptoms.  Stomach medicine she got last time doesn't seem to be strong enough.  Did get another spell since her last ED visit where she got severe chest/epigastric pain that came on after she was hungry and ate some food.  The she got very pale and weak.  Pt notes she is taking her PPI daily on an empty stomach.  Did get the carafate and has been trying to eat one whenever she eats.  Trying to be careful with her diet- less spicy and fatty.  Notes that if she goes too long without eating will get very hungry and after eating will feel sick.  Did not do the H pylori stool test yet- has needed supplies at home.      Asthma- pt notes she doesn't have asthma any more.  Not using the inhaler any more.     No longer taking her depression meds b/c not covered by insurance and didn't want to pay for this.  Notes her mood is doing okay.     ROS:  Constitutional, CV, Resp, GI, , MSK, skin, neuro, psych all negative except as outlined  in the HPI above and patient denies any other symptoms.      Radiology tests reviewed-1: 2/2018 pelvic us showing moderately complex 1.1cm left ovarian cyst  6mm lung nodule 2019 CT scan.   Lab tests reviewed-1: 2019      Physical Exam:  BP (!) 172/98   Pulse 70   Resp 20   Wt 192 lb (87.1 kg)   LMP 03/14/2019 (Approximate)   SpO2 96%   BMI 38.78 kg/m   Body mass index is 38.78 kg/m . Patient's last menstrual period was 03/14/2019 (approximate).  Vital signs reviewed  Wt Readings from Last 3 Encounters:   12/04/19 193 lb (87.5 kg)   11/21/19 192 lb (87.1 kg)   10/25/19 196 lb (88.9 kg)     Social History     Tobacco Use   Smoking Status Never Smoker   Smokeless Tobacco Never Used   Tobacco Comment    no passive exposure     Social History     Substance and Sexual Activity   Sexual Activity Yes     Partners: Male     Birth control/protection: None     No data recorded  PHQ-9 Total Score: 4 (10/25/2019 11:00 AM)    PHQ-2 Total Score: 0 (10/25/2019 11:00 AM)  Depression Follow-up Plan: mental health care education (2/22/2019  4:00 PM)    ACT Total Score: 25 (6/14/2019  1:37 PM)      All normal as below except abnormalities include: pt appears well today.  Grossly normal and stable exam.  Mild epigastric pain on palpation.    General is a  49 y.o. female sitting comfortably in no apparent distress.   Neck: Supple without lymphadenopathy or thyromegally  CV: Regular rate and rhythm S1S2 without rubs, murmurs or gallops,   Lungs: Clear to auscultation bilaterally  Abd:  +BS, soft ND,  No masses or organomegally  Extremities: Warm, No Edema, 2+ Pedal and radial pulses bilaterally  Skin: No lesions or rashes noted  Neuro/MSK: Able to ambulate around the exam room with equal movement, strength and normal coordination of the upper and lower extremeties symmetrically    Results for orders placed or performed in visit on 10/25/19   Urinalysis-UC if Indicated   Result Value Ref Range    Color, UA Yellow Colorless, Yellow,  Straw, Light Yellow    Clarity, UA Clear Clear    Glucose, UA Negative Negative    Bilirubin, UA Negative Negative    Ketones, UA Negative Negative    Specific Gravity, UA 1.020 1.005 - 1.030    Blood, UA Negative Negative    pH, UA 7.0 5.0 - 8.0    Protein, UA Negative Negative mg/dL    Urobilinogen, UA 0.2 E.U./dL 0.2 E.U./dL, 1.0 E.U./dL    Nitrite, UA Negative Negative    Leukocytes, UA Negative Negative       Med list and active problem list reviewed and updated as part of this encounter    Current Outpatient Medications on File Prior to Visit   Medication Sig Dispense Refill     acetaminophen (MAPAP EXTRA STRENGTH) 500 MG tablet Take 1 tablet (500 mg total) by mouth 3 (three) times a day as needed. 100 tablet 4     amLODIPine (NORVASC) 5 MG tablet Take 1 tablet (5 mg total) by mouth daily. 90 tablet 4     aspirin 81 MG EC tablet Take 1 tablet (81 mg total) by mouth daily. 90 tablet 4     atorvastatin (LIPITOR) 80 MG tablet TAKE 1 TABLET (80 MG TOTAL) BY MOUTH DAILY/ TXHUA HNUB NOJ 1 [facility] ROJ 90 tablet 4     furosemide (LASIX) 40 MG tablet TAKE 1 TABLET (40 MG TOTAL) BY MOUTH 2 (TWO) TIMES A DAY AT 9AM AND 6PM./ NOJ 1 LUB 2 ZAUG IB HNUB THAUM 9AM THIAB 6PM [facility] PHOB VOG 60 tablet 11     gabapentin (NEURONTIN) 300 MG capsule Take 1 capsule (300 mg total) by mouth 3 (three) times a day as needed (headache). 90 capsule 3     isosorbide mononitrate (IMDUR) 60 MG 24 hr tablet TAKE 1 TABLET (60 MG TOTAL) BY MOUTH DAILY/ TXHUA HNUB NOJ 1 LUB [facility] LUB PLAWV 90 tablet 1     metoprolol succinate (TOPROL-XL) 100 MG 24 hr tablet TAKE 1 TABLET (100 MG TOTAL) BY MOUTH DAILY/ TXHUA HNUB NOJ 1 LUB [facility] 90 tablet 3     nitroglycerin (NITROSTAT) 0.4 MG SL tablet Place 1 tablet (0.4 mg total) under the tongue every 5 (five) minutes as needed for chest pain. 30 tablet 1     nortriptyline (PAMELOR) 50 MG capsule TAKE 1 CAPSULE (50 MG TOTAL) BY MOUTH AT BEDTIME/ [facility]  THAUM MUS PW NOJ 1 LUB.  3     omeprazole (PRILOSEC) 40 MG capsule TAKE 1 PILL BY MOUTH EVERY DAY/ TXHUA HNUB NOJ 1 LUB TSHUAJ PAB ZOO LUB PLAB 90 capsule 4     potassium chloride (KLOR-CON) 10 MEQ CR tablet TAKE 1 PILL BY MOUTH EVERY DAY/TXHUA HNUB NOJ 1 LUB TSHUAJ 90 tablet 4     sucralfate (CARAFATE) 1 gram tablet Take 1 tablet (1 g total) by mouth 4 (four) times a day. 120 tablet 0     No current facility-administered medications on file prior to visit.          Jaky Gaspar MD

## 2021-06-04 VITALS
HEART RATE: 70 BPM | SYSTOLIC BLOOD PRESSURE: 172 MMHG | DIASTOLIC BLOOD PRESSURE: 98 MMHG | RESPIRATION RATE: 20 BRPM | BODY MASS INDEX: 38.78 KG/M2 | WEIGHT: 192 LBS | OXYGEN SATURATION: 96 %

## 2021-06-04 VITALS
DIASTOLIC BLOOD PRESSURE: 80 MMHG | HEIGHT: 59 IN | SYSTOLIC BLOOD PRESSURE: 130 MMHG | WEIGHT: 193 LBS | BODY MASS INDEX: 38.91 KG/M2 | RESPIRATION RATE: 16 BRPM | HEART RATE: 88 BPM

## 2021-06-04 NOTE — TELEPHONE ENCOUNTER
Please let pt know that uterus appears healthy.     They were not able to see her ovaries because of gas in her colon.      This is usually a good thing and means the ovaries are not swollen.      We can consider getting another ultrasound in the future if we are worried about her ovaries.     Okay to just give this some time.

## 2021-06-04 NOTE — TELEPHONE ENCOUNTER
Called and spoke with patient via  line #[phone].  Relayed message from Dr. Gaspar. Patient verbalized understanding.

## 2021-06-05 VITALS — WEIGHT: 197 LBS | BODY MASS INDEX: 38.68 KG/M2 | HEIGHT: 60 IN

## 2021-06-06 NOTE — TELEPHONE ENCOUNTER
DOD: called and spoke to the pt and per pt said she stopped taking this months ago and does not need a refill. Please delete/denied Rx so we can close encounter. Thanks.

## 2021-06-06 NOTE — TELEPHONE ENCOUNTER
DOD: Dr. Lemon, Medication refill requested. Please authorize medication if appropriate. Thank you.

## 2021-06-06 NOTE — TELEPHONE ENCOUNTER
Spoke with patient's mother regarding cancelling her appointment.  Patient's mother stated she would inform her daughter of the cancellation as well

## 2021-06-07 NOTE — PROGRESS NOTES
I met with Leora Sanchez at the request of doc  to recheck her blood pressure.  Blood pressure medications on the MAR were reviewed with patient.    Patient has taken all medications as per usual regimen: Yes  Patient reports tolerating them without any issues or concerns: Yes    Vitals:    04/02/20 1439   BP: 118/74   Patient Site: Left Arm   Patient Position: Sitting   Cuff Size: Adult Large   Pulse: 60       Blood pressure was taken, previous encounter was reviewed, recorded blood pressure below 140/90.  Patient was discharged and the note will be sent to the provider for final review.

## 2021-06-07 NOTE — PROGRESS NOTES
Review of Systems - ALL WNL    PT SHE SAYS IF SHES CARRYING HEAVY STUFF SHE GETS shortness of breath    NO OTHER CONCERNS    Florence Sarabia, CMA

## 2021-06-07 NOTE — PATIENT INSTRUCTIONS - HE
May MARICHUY Sanchez,    It was a pleasure to see you today at [facility].     My recommendations after this visit include:  - Please follow up with Dr Roy in June  - Please follow up with Sendy Banda in 6 months  - Continue current medications    Sendy Banda, CNP      
Patient information on fall and injury prevention

## 2021-06-07 NOTE — TELEPHONE ENCOUNTER
----- Message from Shen Null MD sent at 4/3/2020  7:54 AM CDT -----  Please call patient.  Tell patient:  Lab and bp good.  Phone visit one month

## 2021-06-07 NOTE — PROGRESS NOTES
"Leora Sanchez is a 49 y.o. female who is being evaluated via a billable telephone visit.      The patient has been notified of following:     \"This telephone visit will be conducted via a call between you and your physician/provider. We have found that certain health care needs can be provided without the need for a physical exam.  This service lets us provide the care you need with a short phone conversation.  If a prescription is necessary we can send it directly to your pharmacy.  If lab work is needed we can place an order for that and you can then stop by our lab to have the test done at a later time.    If during the course of the call the physician/provider feels a telephone visit is not appropriate, you will not be charged for this service.\"     Patient has given verbal consent to a Telephone visit? Yes    Leora Sanchez complains of    Chief Complaint   Patient presents with     Hospital Visit Follow Up     for chest pain and headaches       I have reviewed and updated the patient's Past Medical History, Social History, Family History and Medication List.    ALLERGIES  Patient has no known allergies.     In house  used: [facility]        Phone call duration:  33 minutes  Post hosp visit.  Left hosp two days ago  1120  Has sxs two wks prior to hosp visit.  Dizzy headache sweaty chest pain.  hosp overnight.  Was told check kidney heart and ok.  But bp high and potassium not good.  Was given medication.  prehosp had two meds and was under stress.  For Hypertension     States did not miss any medication.    Amlodipine was taking but was told to stop yesterday  losart /d   100  Tramadol 50 new   Every six hours.   Only four left.  K daily  10  Sucralfate q 4 h  80 /d statin  Metoprolol 25/d  Metoprolol 50/d  Lasix 40 two times a day  isosor 60/d  omep 40  nortrip 50 hs    No more chest pain but still mild headache mild dizzy  Coronary disease denies chest pain  Hyperlipidemia on statin denies muscle " "pain  Hypertension denies chest pain      ASSESSMENT/PLAN:    Atypical cp   Uncontrolled Hypertension     follow up hosp visit  Pt not taking toprol correctly.  Educated. 75 two times a day   follow up labs tomorrow and follow up visit per results of bp and bmp     1. Essential hypertension  Basic Metabolic Panel   2. Nonocclusive coronary atherosclerosis of native coronary artery     3. Atypical chest pain     4. Heartburn     5. Chronic systolic heart failure (H)  Basic Metabolic Panel     Chronic issues stable/ same treatment  Current Outpatient Medications on File Prior to Visit   Medication Sig Dispense Refill     acetaminophen (TYLENOL) 500 MG tablet Take 1,000 mg by mouth 3 (three) times a day.       aspirin 81 MG EC tablet Take 1 tablet (81 mg total) by mouth daily. 90 tablet 4     atorvastatin (LIPITOR) 80 MG tablet TAKE 1 TABLET (80 MG TOTAL) BY MOUTH DAILY/ [facility]J 1 [facility] NT[facility] MUAJ ROJ 90 tablet 4     blood pressure monitor Kit Check your blood pressure daily, 1-2 hours after taking your blood pressure medicine.  Rest for 5 minutes before checking blood pressure, sitting quietly with feet supported on floor and your back resting against a chair. If blood pressure is >170/105 then call MD.  Dx: essential HTN 1 each 0     camphor-menthoL (TIGER BALM) Oint Apply 1 application topically 2 (two) times a day as needed. \"Tiger Balm\"       furosemide (LASIX) 40 MG tablet TAKE 1 TABLET (40 MG TOTAL) BY MOUTH 2 (TWO) TIMES A DAY AT 9AM AND 6PM./ NOJ 1 LUB 2 ZAUG IB HNUB THAUM 9AM THIAB 6PM PAB YANET PHOB VOG 60 tablet 11     isosorbide mononitrate (IMDUR) 60 MG 24 hr tablet TAKE 1 TABLET (60 MG TOTAL) BY MOUTH DAILY/ TXOUMAR HNUB NOJ 1 LUB TSHUAJ PAB LUB PLAWV 90 tablet 1     losartan (COZAAR) 100 MG tablet Take 1 tablet (100 mg total) by mouth daily. 30 tablet 4     metoprolol succinate (TOPROL-XL) 25 MG Take 3 tablets (75 mg total) by mouth 2 (two) times a day. 60 tablet 0     nitroglycerin " (NITROSTAT) 0.4 MG SL tablet Place 1 tablet (0.4 mg total) under the tongue every 5 (five) minutes as needed for chest pain. 30 tablet 1     nortriptyline (PAMELOR) 50 MG capsule TAKE 1 CAPSULE (50 MG TOTAL) BY MOUTH AT BEDTIME/ TXHUA HMO THAUM MUS PW NOJ 1 LUB.  3     omeprazole (PRILOSEC) 40 MG capsule TAKE 1 PILL BY MOUTH EVERY DAY/ TXHUA HNUB NOJ 1 LUB TSHUAJ PAB ZOO LUB PLAB 90 capsule 4     potassium chloride (K-DUR,KLOR-CON) 20 MEQ tablet Take 1 tablet (20 mEq total) by mouth daily. 30 tablet 0     sucralfate (CARAFATE) 1 gram tablet Take 1 tablet (1 g total) by mouth 4 (four) times a day. (Patient taking differently: Take 1 g by mouth 4 (four) times a day as needed. ) 120 tablet 0     traMADoL (ULTRAM) 50 mg tablet Take 1 tablet (50 mg total) by mouth every 6 (six) hours as needed. 8 tablet 0     No current facility-administered medications on file prior to visit.       11:53 AM

## 2021-06-08 NOTE — PROGRESS NOTES
Review of Systems - History obtained from the patient with   General ROS: negative  Psychological ROS: positive for - anxiety  Ophthalmic ROS: positive for - visual disturbance  ENT ROS: positive for - hearing loss  Hematological and Lymphatic ROS: positive for - easy bruising  Respiratory ROS: negative  Cardiovascular ROS: positive for - shortness of breath with activity, palpitations, chest pain (more like tightness) and leg swelling  Gastrointestinal ROS: negative  Genito-Urinary ROS: positive for - frequent urination at night  Musculoskeletal ROS: positive for - joint pain  Neurological ROS: positive for - confusion  Dermatological ROS: negative

## 2021-06-08 NOTE — TELEPHONE ENCOUNTER
Requested Prescriptions     Pending Prescriptions Disp Refills     furosemide (LASIX) 40 MG tablet [Pharmacy Med Name: FUROSEMIDE 40 MG TABS 40 TAB] 60 tablet 3     Sig: TAKE 1 TABLET (40 MG TOTAL) BY MOUTH 2 (TWO) TIMES A DAY AT 9AM AND 6PM./ NOCHILO 1 LUB 2 JOSE MIGUEL BLISS THAUM 9AM THIAB 6PM LAURA PUCKETTG

## 2021-06-09 ENCOUNTER — COMMUNICATION - HEALTHEAST (OUTPATIENT)
Dept: CARE COORDINATION | Facility: CLINIC | Age: 51
End: 2021-06-09

## 2021-06-09 ENCOUNTER — AMBULATORY - HEALTHEAST (OUTPATIENT)
Dept: PHARMACY | Facility: CLINIC | Age: 51
End: 2021-06-09

## 2021-06-09 DIAGNOSIS — G44.229 CHRONIC TENSION-TYPE HEADACHE, NOT INTRACTABLE: ICD-10-CM

## 2021-06-09 DIAGNOSIS — I10 ESSENTIAL HYPERTENSION: ICD-10-CM

## 2021-06-09 DIAGNOSIS — E11.65 TYPE 2 DIABETES MELLITUS WITH HYPERGLYCEMIA, WITHOUT LONG-TERM CURRENT USE OF INSULIN (H): ICD-10-CM

## 2021-06-09 DIAGNOSIS — I25.10 CORONARY ARTERY DISEASE INVOLVING NATIVE CORONARY ARTERY OF NATIVE HEART, ANGINA PRESENCE UNSPECIFIED: ICD-10-CM

## 2021-06-09 DIAGNOSIS — I50.22 CHRONIC SYSTOLIC HEART FAILURE (H): ICD-10-CM

## 2021-06-09 PROCEDURE — 99606 MTMS BY PHARM EST 15 MIN: CPT | Performed by: PHARMACIST

## 2021-06-09 PROCEDURE — 99607 MTMS BY PHARM ADDL 15 MIN: CPT | Performed by: PHARMACIST

## 2021-06-09 NOTE — PROGRESS NOTES
[facility] Office Visit    Chief Complaint:  Chief Complaint   Patient presents with     Follow-up     Medication          Assessment/Plan:  1. Essential hypertension with goal blood pressure less than 140/90  Uncontrolled off medications for couple of days now.  Patient should restart her amlodipine 10 mg and losartan 100/25 mg daily she is to pick these up at the pharmacy and start taking them again she just recently got a refill after being out for a couple of days.  Recheck in 6 weeks.  Historically well-controlled on this drug regimen.    2. Chronic right-sided headaches  Certainly getting her blood pressure down will be helpful.  Continue Tylenol as needed.  Try course of Flexeril as needed for headaches.  And continue amitriptyline 25 mg at bedtime.  Consider gabapentin or trigger point injections to help with her headaches in the future as needed.    Return in about 6 weeks (around 5/11/2017) for Recheck.      Patient Education/AVS:  There are no Patient Instructions on file for this visit.    HPI:   Leora Sanchez is a 46 y.o. female c/o f/u on her migraines and blood pressure.      Seen by ENT yesterday regarding her right ear pain and headaches. Had her ears cleared out and given some ear drops to help with the redness and recurrent infections after her ear surgery.  ENT does agree that some of her headaches may be related to her ear issues.  Has a hard time sleeping flat on her back due to increased pressure and pain.      Told that her bp was really good yesterday at her ENT office.  They refilled one of her bp meds (she thinks it is the bigger one with the stronger dose) and it was still in her 's car this morning so didn't take both of her bp meds today.  Liked taking this med at bedtime to help her sleep and not have head pain.      Has been using the imitrex for her headaches and notes that she needs to take 2 every time she gets a headache.  Finds that when she takes 1 pill it  helps a little but is cautious about the headache returning and so will take a 2nd pill and the headache will go away for 5-6 hours before it comes back again.  Tends to get headaches every day but will get intense headaches less often.      Physical Exam:  BP (!) 180/94 (Patient Site: Left Arm, Patient Position: Sitting, Cuff Size: Adult Large)  Pulse 76  Resp 20  Wt 189 lb (85.7 kg)  BMI 37.22 kg/m2 Body mass index is 37.22 kg/(m^2). No LMP recorded.  Vital signs reviewed  Wt Readings from Last 3 Encounters:   03/30/17 189 lb (85.7 kg)   02/20/17 188 lb (85.3 kg)   12/29/16 183 lb (83 kg)     History   Smoking Status     Never Smoker   Smokeless Tobacco     Never Used     History   Sexual Activity     Sexual activity: Yes     Partners: Male     Birth control/ protection: None     No Data Recorded  No Data Recorded  PHQ-2 Total Score: 6 (2/20/2017  3:00 PM)  Depression Follow-up Plan: mental health care assessment (2/20/2017  3:00 PM)  No Data Recorded    All normal as below except abnormalities include: Evidence of surgery to the right ear canal present.  No obvious infection or drainage noted today.  Patient does note tenderness around this area chronically and tightness.  General is a  46 y.o. female sitting comfortably in no apparent distress.   HEENT:  TM are clear bilaterally.  Eye, nasal, oral exams within normal   Neck: Supple without lymphadenopathy or thyromegally  CV: Regular rate and rhythm S1S2 without rubs, murmurs or gallops,   Lungs: Clear to auscultation bilaterally  Extremities: Warm, No Edema, 2+ Pedal and radial pulses bilaterally  Skin: No lesions or rashes noted  Neuro/MSK: Able to ambulate around the exam room with equal movement, strength and normal coordination of the upper and lower extremeties symmetrically    Results for orders placed or performed in visit on 12/29/16   Basic Metabolic Panel   Result Value Ref Range    Sodium 141 136 - 145 mmol/L    Potassium 3.6 3.5 - 5.0 mmol/L     Chloride 103 98 - 107 mmol/L    CO2 27 22 - 31 mmol/L    Anion Gap, Calculation 11 5 - 18 mmol/L    Glucose 125 70 - 125 mg/dL    Calcium 9.8 8.5 - 10.5 mg/dL    BUN 13 8 - 22 mg/dL    Creatinine 0.72 0.60 - 1.10 mg/dL    GFR MDRD Af Amer >60 >60 mL/min/1.73m2    GFR MDRD Non Af Amer >60 >60 mL/min/1.73m2       ROS:  10 point review of symptoms all negative except as outlined in the HPI above.    Med list and active problem list reviewed and updated as part of this encounter    Current Outpatient Prescriptions on File Prior to Visit   Medication Sig Dispense Refill     amitriptyline (ELAVIL) 25 MG tablet TAKE 1 PILL BY MOUTH EVERY DAY AT BEDTIME/TXHUA HMO THAUM MUS PW NOJ 1 LUB. 30 tablet 10     amLODIPine (NORVASC) 10 MG tablet TAKE 1 PILL BY MOUTH DAILY/ TXHUA HNUB NOJ 1 LUB TSHUAJ PAB YANET NTSHAV SIAB 30 tablet 6     artificial tears,hypromellose, (GENTEAL) 0.3 % Drop 1-2gtts/eye q1hour as needed 15 mL 3     losartan-hydrochlorothiazide (HYZAAR) 100-25 mg per tablet Take 1 tablet by mouth daily. 90 tablet 4     MAPAP EXTRA STRENGTH 500 mg tablet   5     omeprazole (PRILOSEC) 20 MG capsule Take 1 capsule (20 mg total) by mouth daily. 90 capsule 4     [DISCONTINUED] SUMAtriptan (IMITREX) 50 MG tablet Take 1 at the onset of headache and repeat again in 2 hours if needed.  Only 2 pills in 24 hours 10 tablet 0     No current facility-administered medications on file prior to visit.          Jaky Gaspar MD    This document was created using voice recognition software which may contain typographical errors.

## 2021-06-09 NOTE — PROGRESS NOTES
[facility] Office Visit    Chief Complaint:  Chief Complaint   Patient presents with     Follow-up         Assessment/Plan:  1. Essential hypertension with goal blood pressure less than 140/90  Well controlled.  Continue on Losartan/HCTZ 100/25mg daily, amlodipine 10mg daily.  F/u in 3 months to recheck labs.      2. Hyperlipidemia, unspecified hyperlipidemia type  No h/o dm or CVD.  Not currently on birth control and still menstruating.  Okay to stop statin and recheck fasting lipids in 3-6 months.      3. Non morbid obesity due to excess calories  Continue to work on activity and diet.      4. Migraine with aura and without status migrainosus, not intractable  Sx very consistent with migraines.  No red flags.  Will try imitrex as needed and f/u in 6 weeks.  Consider prophylactic meds due to frequency of headaches.    - SUMAtriptan (IMITREX) 50 MG tablet; Take 1 at the onset of headache and repeat again in 2 hours if needed.  Only 2 pills in 24 hours  Dispense: 10 tablet; Refill: 0    Return in about 6 weeks (around 4/3/2017) for Recheck.      Patient Education/AVS:  There are no Patient Instructions on file for this visit.    HPI:   Leora Sanchez is a 46 y.o. female c/o f/u on her meds and her headaches.  Met with pharmacist to help with med education. Stopped the hydralazine and still getting some dizziness when she gets a severe headache. Wondering what she can take for severe headache- when she gets it she cannot bend or move at all.  Has had mild headaches for 10+ years and this is okay.  Will get severe headaches 3-4x/month and it will last for about 1 full day and night.  Cannot leave the house or do anything due to dizziness, blurry vision, severe pain.  Has to lay still in bed in a dark quiet room.  Right eye is very sensitive to light and will get a watery eye on the right side.  With light will have increased pressure behind her right eye.  Does get nauseated but no vomiting.  Headaches  started when she got her ear surgery in 1995/1996 when she was in her mid twenties.  Still getting a monthly period but getting lighter but some periods will be heavy.  Headaches are not any different with her periods.  Has never tried medication for migraines in the past.  Will get a tingly/pounding sensation on the right side of her head and then have some sparkling lights on the right side before she gets her headache.      Did get a mammogram today    History summarized from1-2: Patient met with pharmacist on December 29-recommended trying amitriptyline on a regular basis at bedtime to help with sleep and headaches.  For her blood pressure hydralazine was stopped and continue losartan hydrochlorothiazide and amlodipine.  For her heartburn continue on a PPI and not ranitidine.  She is on a statin without clear indication to consider stopping this in the future.  Old Records-1:na  Radiology tests reviewed-1: na  Lab tests reviewed-1: 2016  Medicine tests reviewed-1: na    Physical Exam:  Visit Vitals     /78 (Patient Site: Right Arm, Patient Position: Sitting, Cuff Size: Adult Regular)     Pulse 76     Resp 20     Wt 188 lb (85.3 kg)     LMP 02/13/2017 (Within Days)     BMI 37.02 kg/m2    Body mass index is 37.02 kg/(m^2). Patient's last menstrual period was 02/13/2017 (within days).  Vital signs reviewed  Wt Readings from Last 3 Encounters:   02/20/17 188 lb (85.3 kg)   12/29/16 183 lb (83 kg)   11/07/16 185 lb (83.9 kg)     History   Smoking Status     Never Smoker   Smokeless Tobacco     Never Used     No Data Recorded  No Data Recorded  PHQ-2 Total Score: 6 (2/20/2017  3:00 PM)  Depression Follow-up Plan: mental health care assessment (2/20/2017  3:00 PM)    All normal as below except abnormalities include: grossly normal exam.    General is a  46 y.o. female sitting comfortably in no apparent distress.   HEENT:  TM are clear bilaterally.  Eye, nasal, oral exams within normal   Neck: Supple without  lymphadenopathy or thyromegally  Extremities: Warm, No Edema, 2+ Pedal and radial pulses bilaterally  Skin: No lesions or rashes noted  Neuro/MSK: Able to ambulate around the exam room with equal movement, strength and normal coordination of the upper and lower extremeties symmetrically    Results for orders placed or performed in visit on 12/29/16   Basic Metabolic Panel   Result Value Ref Range    Sodium 141 136 - 145 mmol/L    Potassium 3.6 3.5 - 5.0 mmol/L    Chloride 103 98 - 107 mmol/L    CO2 27 22 - 31 mmol/L    Anion Gap, Calculation 11 5 - 18 mmol/L    Glucose 125 70 - 125 mg/dL    Calcium 9.8 8.5 - 10.5 mg/dL    BUN 13 8 - 22 mg/dL    Creatinine 0.72 0.60 - 1.10 mg/dL    GFR MDRD Af Amer >60 >60 mL/min/1.73m2    GFR MDRD Non Af Amer >60 >60 mL/min/1.73m2       ROS:  10 point review of symptoms all negative except as outlined in the HPI above.    Med list and active problem list reviewed and updated as part of this encounter    Current Outpatient Prescriptions on File Prior to Visit   Medication Sig Dispense Refill     amitriptyline (ELAVIL) 25 MG tablet TAKE 1 PILL BY MOUTH EVERY DAY AT BEDTIME/TXA O THAUM MUS PW NOJ 1 LUB. 30 tablet 10     amLODIPine (NORVASC) 10 MG tablet TAKE 1 PILL BY MOUTH DAILY/ TXHUA HNUB NOJ 1 LUB TSHUAJ PAB YANET NTSHAV SIAB 30 tablet 6     artificial tears,hypromellose, (GENTEAL) 0.3 % Drop 1-2gtts/eye q1hour as needed 15 mL 3     losartan-hydrochlorothiazide (HYZAAR) 100-25 mg per tablet Take 1 tablet by mouth daily. 90 tablet 4     omeprazole (PRILOSEC) 20 MG capsule Take 1 capsule (20 mg total) by mouth daily. 90 capsule 4     [DISCONTINUED] atorvastatin (LIPITOR) 20 MG tablet TAKE 1 PILL BY MOUTH EVERY DAY/ TXHUA HNUB NOJ 1 LUB TSHUAJ PAB YANET NTSHAV MUAJ ROJ 30 tablet 6     No current facility-administered medications on file prior to visit.          Jaky Gaspar MD    This document was created using voice recognition software which may contain typographical errors.

## 2021-06-09 NOTE — TELEPHONE ENCOUNTER
RN cannot approve Refill Request    RN can NOT refill this medication medication not on med list. Last office visit: 11/21/2019 Jaky Gaspar MD Last Physical: Visit date not found Last MTM visit: Visit date not found Last visit same specialty: 11/21/2019 Jaky Gaspar MD.  Next visit within 3 mo: Visit date not found  Next physical within 3 mo: Visit date not found      Charla Hurst, [facility] Triage/Med Refill 7/19/2020    Requested Prescriptions   Pending Prescriptions Disp Refills     amLODIPine (NORVASC) 5 MG tablet [Pharmacy Med Name: AMLODIPINE BESYLATE 5 MG TA 5 TAB] 90 tablet 4     Sig: TAKE 1 PILL BY MOUTH DAILY FOR BLOOD PRESSURE / TXHUA HNUB NOJ 1 LUB [facility] NTSHAV SIAB       Calcium-Channel Blockers Protocol Passed - 7/18/2020 10:31 AM        Passed - PCP or prescribing provider visit in past 12 months or next 3 months     Last office visit with prescriber/PCP: 11/21/2019 Jaky Gaspar MD OR same dept: 11/21/2019 Jaky Gaspar MD OR same specialty: 11/21/2019 Jaky Gaspar MD  Last physical: Visit date not found Last MTM visit: Visit date not found   Next visit within 3 mo: Visit date not found  Next physical within 3 mo: Visit date not found  Prescriber OR PCP: Jaky Gaspar MD  Last diagnosis associated with med order: 1. Essential hypertension  - amLODIPine (NORVASC) 5 MG tablet [Pharmacy Med Name: AMLODIPINE BESYLATE 5 MG TA 5 TAB]; TAKE 1 PILL BY MOUTH DAILY FOR BLOOD PRESSURE / TXHUA HNUB NOJ 1 LUB [facility] PAB ZOO YANET NTSHAV SIAB  Dispense: 90 tablet; Refill: 4    2. Chronic systolic heart failure (H)  - potassium chloride (KLOR-CON) 10 MEQ CR tablet [Pharmacy Med Name: POTASSIUM CHLORIDE ER 10 ME 10 TAB]; TAKE 1 PILL BY MOUTH EVERY DAY/TXHUA HNUB NOJ 1 LUB TSHUAJ  Dispense: 90 tablet; Refill: 4    If protocol passes may refill for 12 months if within 3 months of last provider visit (or a total of 15 months).             Passed - Blood  pressure filed in past 12 months     BP Readings from Last 1 Encounters:   04/02/20 118/74                potassium chloride (KLOR-CON) 10 MEQ CR tablet [Pharmacy Med Name: POTASSIUM CHLORIDE ER 10 ME 10 TAB] 90 tablet 4     Sig: TAKE 1 PILL BY MOUTH EVERY DAY/TXA HNUB NOJ 1 LUB TSHUAJ       Potassium Supplements Refill Protocol Passed - 7/18/2020 10:31 AM        Passed - PCP or prescribing provider visit in past 12 months       Last office visit with prescriber/PCP: 11/21/2019 Jaky Gaspar MD OR same dept: 11/21/2019 Jaky Gaspar MD OR same specialty: 11/21/2019 Jaky Gaspar MD  Last physical: Visit date not found Last MTM visit: Visit date not found   Next visit within 3 mo: Visit date not found  Next physical within 3 mo: Visit date not found  Prescriber OR PCP: Jaky Gaspar MD  Last diagnosis associated with med order: 1. Essential hypertension  - amLODIPine (NORVASC) 5 MG tablet [Pharmacy Med Name: AMLODIPINE BESYLATE 5 MG TA 5 TAB]; TAKE 1 PILL BY MOUTH DAILY [facility] BLOOD PRESSURE [facility]UB TSHUA PAB ZOO YANET NTSHAV SIAB  Dispense: 90 tablet; Refill: 4    2. Chronic systolic heart failure (H)  - potassium chloride (KLOR-CON) 10 MEQ CR tablet [Pharmacy Med Name: POTASSIUM CHLORIDE ER 10 ME 10 TAB]; TAKE 1 PILL BY MOUTH EVERY DAY/TXHUA HNUB NOJ 1 LUB TSHUAJ  Dispense: 90 tablet; Refill: 4    If protocol passes may refill for 12 months if within 3 months of last provider visit (or a total of 15 months).             Passed - Potassium level in last 12 months     Lab Results   Component Value Date    Potassium 4.3 04/02/2020

## 2021-06-10 ENCOUNTER — COMMUNICATION - HEALTHEAST (OUTPATIENT)
Dept: FAMILY MEDICINE | Facility: CLINIC | Age: 51
End: 2021-06-10

## 2021-06-10 ENCOUNTER — COMMUNICATION - HEALTHEAST (OUTPATIENT)
Dept: ADMINISTRATIVE | Facility: CLINIC | Age: 51
End: 2021-06-10

## 2021-06-10 DIAGNOSIS — E11.65 TYPE 2 DIABETES MELLITUS WITH HYPERGLYCEMIA, WITHOUT LONG-TERM CURRENT USE OF INSULIN (H): ICD-10-CM

## 2021-06-10 NOTE — PROGRESS NOTES
[facility] IN-OFFICE Visit    Chief Complaint:  Chief Complaint   Patient presents with     Abdominal Pain     Medication Refill         Assessment/Plan:  1. RUQ pain  Pt indicates that she feels like she has a UTI.  Check for uti and treat as indicated.  Other labs as below to continue workup for pain.  Relatively benign but limited exam today due to body habitus and previous surgeries.    - Urinalysis-UC if Indicated  - Culture, Urine    2. Type 2 diabetes mellitus with hyperglycemia, without long-term current use of insulin (H)  Un Controlled- new onset.  Addressed smoking status and aspirin therapy.  Recommended annual eye exam and dental cares. Reviewed foot cares and foot exam.  Blood pressure and lipid management reviewed today.  Vaccines reviewed and updated.  Plan for glucose management includes ongoing focus on healthy diabetic diet and increased activity, restart metformin xr 500mg once daily and f/u with mtm and diabetes education.  Labs ordered as below including:     Lab Results   Component Value Date    HGBA1C 8.5 (H) 08/20/2020   , No results found for: LDL,   Lab Results   Component Value Date    CREATININE 0.88 08/20/2020       - Comprehensive Metabolic Panel  - Glycosylated Hemoglobin A1c  - Microalbumin, Random Urine    3. CKD (chronic kidney disease) stage 3, GFR 30-59 ml/min (H)  Cr improved and stable. Watch cr carefully with starting metformin.    - Comprehensive Metabolic Panel    4. Moderate major depression (H)  PHQ-9 Total Score: 15 (8/20/2020  2:07 PM)  pt mood worse with pain and debility.  cuttently off meds.  Was on effexor 37.5-75mg daily up to this year.  Pt declines restarting but will continue to monitor.      5. Class 2 severe obesity due to excess calories with serious comorbidity and body mass index (BMI) of 39.0 to 39.9 in adult (H)  Continue to work on LSM to improve glycemic control- diabetic diet.  Activity as tolerated on daily basis.      6.  Screen for colon cancer  - Josh    7. Essential hypertension  BP Readings from Last 3 Encounters:   08/20/20 (!) 148/98   04/02/20 118/74   03/30/20 (!) 162/94       uncontrolled today.  Plan for bloodpressure management includes ongoing focus on healthy DASH type diet and increased activity, encouraged to avoid tobacco products and limit alcohol use, stress reduction, Mtm to review meds.  Should be on amlodipine 5mg (10mg did cause edema) losartan 100mg daily, metoprolol xl 75mg bid (Per cardiology should be on 100mg daily- will send in this rx), imdur 60mg daily, .  Labwork and meds ordered and reviewed as below  Lab Results   Component Value Date    K 3.4 (L) 08/20/2020      Lab Results   Component Value Date    CREATININE 0.88 08/20/2020       - Comprehensive Metabolic Panel    8. Hyperlipidemia LDL goal <70  Continue high dose statin lipitor 80mg  - LDL Cholesterol, Direct    9. Orthopnea  Concerning for underlying cardiac or pulmonary issue.  Pt may not be following med regimen as reviewed today- pt not sure what meds she takes, etc.  Check for acute exacerbation of CHF with CXR and BNP today.  Check for anemia as below.  F/u cardiac injury with troponin and EKG as below.  R/o PE with d-dimer as below.  Check for infectious issues with CXR.  O2 sat 94% without clear cause. Weight up 5lb?  Okay to increase imdur next if needed per cardiology consul 6/2020  - Comprehensive Metabolic Panel  - BNP(B-type Natriuretic Peptide)  - XR Chest 2 Views    10. Heart failure with reduced ejection fraction (H)  As above- check for acute exacerbation. Should be CCB, b-blocker, ARB, lasix, imdur, asa, statin.  Needs improved glucose control again as above.      11. Accelerating angina (H)  Mild chest pain but more pulmonary symptoms currently.  Refill ntg as requested.    - nitroglycerin (NITROSTAT) 0.4 MG SL tablet; Place 1 tablet (0.4 mg total) under the tongue every 5 (five) minutes as needed for chest pain.  Dispense:  30 tablet; Refill: 1  - Troponin I  - D-dimer, Quantitative  - Electrocardiogram Perform and Read    12. Encounter for screening mammogram for breast cancer  - Mammo Screening Bilateral; Future      Return in about 3 months (around 11/20/2020) for Clinic visit.  The following are part of a depression follow up plan for the patient:  implementation of measures to provide psychological support  The following high BMI interventions were performed this visit: encouragement to exercise and lifestyle education regarding diet    Patient Education/AVS:  There are no Patient Instructions on file for this visit.    HPI:   Leora Sanchez is a 50 y.o. female c/o WORRIED ABOUT her kidneys.  Started about 2 weeks ago.  Started with some mild stomach pain but getting to have increased low back pain similar to last time she had kidney problems.  In the past she had urinary tract infection with painful urination and difficulty urinating and blood in her urine.  Notes pain in upper back and right upper chest as part of this process.  Radiates down by her right kidney. Notes that she feels full/bloated in her abd/chest and has to be sitting up otherwise she has trouble breathing.  Feels like there is fluid in her chest and around her kidney.  Has noted some weight gain from so much fluid in her upper abdomen and back and chest.     Hospitalized 3/2020 and found to have elevated a1c but pt wasn't sure if she has diabetes.  Mom has diabetes.  Hasn't been checking sugars or doing anything about this because she wasn't sure.      Pt did not bring in meds but did review based on recall.  Pt's sister speaks English and works as MA- she helps make sure her meds are refilled and her  helps her take them correctly.    Asa, amlodipine, cholesterol pill, water pill she increased to 3x/day due to stomach bloating, heart pill 60mg daily, blood pressure pill 3 tabs am and pm maybe?, stomach pill in morning, potassium small one now.      Needs a  refill on the pill you put under your tongue.      Has been out of the 100mg bp med for 2 months- did get refill but waiting for delivery.     ROS:  Constitutional, ENT, CV, Resp, GI, , MSK, skin, neuro, psych all negative except as outlined in the HPI above and patient denies any other symptoms.      History summarized from1-2:virtual cardiac f/u 6/4/20 reviewed- lasix 40mg two times a day, losartan 100mg daily, metoprolol xl 100mg daily.   Old Records-1: Outside allergies, meds, problems and immunizations were reconciled as needed  Radiology tests reviewed-1: 2020  Lab tests reviewed-1: 2020  Medicine tests reviewed-1:   EKG from 2020 reviewed    Health Maintenance reviewed and ordered as appropriate as part of shared decision making with patient.     Social History     Tobacco Use   Smoking Status Never Smoker   Smokeless Tobacco Never Used   Tobacco Comment    no passive exposure     Social History     Substance and Sexual Activity   Sexual Activity Yes     Partners: Male     Birth control/protection: None     Social History     Social History Narrative    Lives with  who can read and speak English and helps her with meds       Physical Exam:  BP (!) 148/98 (Patient Site: Left Arm, Patient Position: Sitting, Cuff Size: Adult Large)   Pulse 90   Temp 97.9  F (36.6  C) (Oral)   Wt 200 lb (90.7 kg)   LMP 03/14/2019 (Approximate)   BMI 40.40 kg/m   Body mass index is 40.4 kg/m . Patient's last menstrual period was 03/14/2019 (approximate).  Vital signs reviewed  Wt Readings from Last 3 Encounters:   08/20/20 200 lb (90.7 kg)   06/04/20 193 lb (87.5 kg)   03/30/20 195 lb 3.2 oz (88.5 kg)     No data recorded  PHQ-9 Total Score: 15 (8/20/2020  2:07 PM)    PHQ-2 Total Score: 2 (8/20/2020  2:07 PM)    ACT Total Score: 25 (8/20/2020  2:10 PM)      All normal as below except abnormalities include: pt appears mildly dyspneic but able to talk in complete sentences.  Able to walk to lab with walker comfortably  and at a normal pace.  Normal heart exam.  Lungs clear and normal.  Tr edema in both feet/ankles but minimal in shins.    General is a  50 y.o. female sitting comfortably in no apparent distress wearing a mask.  HEENT:  Eye exam normal   Neck: Supple without lymphadenopathy or thyromegally  CV: Regular rate and rhythm S1S2 without rubs, murmurs or gallops,   Lungs: Clear to auscultation bilaterally  Abd:  +BS, soft NT/ND,  No masses or organomegally  Extremities: Warm, tr Edema, 2+ Pedal and radial pulses bilaterally  Skin: No lesions or rashes noted  Neuro/MSK: Able to ambulate around the exam room with equal movement, strength and normal coordination of the upper and lower extremeties symmetrically    Results for orders placed or performed in visit on 08/20/20   Culture, Urine    Specimen: Urine   Result Value Ref Range    Culture No Growth    Comprehensive Metabolic Panel   Result Value Ref Range    Sodium 144 136 - 145 mmol/L    Potassium 3.4 (L) 3.5 - 5.0 mmol/L    Chloride 97 (L) 98 - 107 mmol/L    CO2 33 (H) 22 - 31 mmol/L    Anion Gap, Calculation 14 5 - 18 mmol/L    Glucose 141 (H) 70 - 125 mg/dL    BUN 32 (H) 8 - 22 mg/dL    Creatinine 0.88 0.60 - 1.10 mg/dL    GFR MDRD Af Amer >60 >60 mL/min/1.73m2    GFR MDRD Non Af Amer >60 >60 mL/min/1.73m2    Bilirubin, Total 0.5 0.0 - 1.0 mg/dL    Calcium 9.8 8.5 - 10.5 mg/dL    Protein, Total 7.5 6.0 - 8.0 g/dL    Albumin 4.1 3.5 - 5.0 g/dL    Alkaline Phosphatase 79 45 - 120 U/L    AST 37 0 - 40 U/L    ALT 61 (H) 0 - 45 U/L   LDL Cholesterol, Direct   Result Value Ref Range    Direct  <=129 mg/dl   Glycosylated Hemoglobin A1c   Result Value Ref Range    Hemoglobin A1c 8.5 (H) <=5.6 %   Microalbumin, Random Urine   Result Value Ref Range    Microalbumin, Random Urine <0.50 0.00 - 1.99 mg/dL    Creatinine, Urine 23.6 mg/dL    Microalbumin/Creatinine Ratio Random Urine     BNP(B-type Natriuretic Peptide)   Result Value Ref Range    BNP 12 0 - 74 pg/mL    Urinalysis-UC if Indicated   Result Value Ref Range    Color, UA Yellow Colorless, Yellow, Straw, Light Yellow    Clarity, UA Clear Clear    Glucose, UA Negative Negative    Bilirubin, UA Negative Negative    Ketones, UA Negative Negative    Specific Gravity, UA 1.020 1.005 - 1.030    Blood, UA Negative Negative    pH, UA 7.0 5.0 - 8.0    Protein, UA Negative Negative mg/dL    Urobilinogen, UA 0.2 E.U./dL 0.2 E.U./dL, 1.0 E.U./dL    Nitrite, UA Negative Negative    Leukocytes, UA Trace (!) Negative    RBC, UA 0-2 None Seen, 0-2 hpf    WBC, UA 0-5 None Seen, 0-5 hpf    Squam Epithel, UA 0-5 None Seen, 0-5 lpf   Troponin I   Result Value Ref Range    Troponin I <0.01 0.00 - 0.29 ng/mL   D-dimer, Quantitative   Result Value Ref Range    D-Dimer, Quant 0.36 <=0.50 FEU ug/mL   Electrocardiogram Perform and Read   Result Value Ref Range    SYSTOLIC BLOOD PRESSURE      DIASTOLIC BLOOD PRESSURE      VENTRICULAR RATE 86 BPM    ATRIAL RATE 86 BPM    P-R INTERVAL 136 ms    QRS DURATION 92 ms    Q-T INTERVAL 418 ms    QTC CALCULATION (BEZET) 500 ms    P Axis 18 degrees    R AXIS -14 degrees    T AXIS 144 degrees    MUSE DIAGNOSIS       Normal sinus rhythm  Left ventricular hypertrophy with repolarization abnormality  Abnormal ECG  When compared with ECG of 29-MAR-2020 18:12,  T wave inversion no longer evident in Anterior leads  QT has lengthened  Confirmed by PHILOMENA VINSON, GWEN LOC:JN ([id number]) on 8/20/2020 4:07:33 PM         Med list and active problem list reviewed and updated as part of this encounter    Current Outpatient Medications on File Prior to Visit   Medication Sig Dispense Refill     acetaminophen (TYLENOL) 500 MG tablet Take 1,000 mg by mouth 3 (three) times a day.       amLODIPine (NORVASC) 5 MG tablet TAKE 1 PILL BY MOUTH DAILY FOR BLOOD PRESSURE / TXHUA HNUB NOJ 1 LUB TSHUAJ PAB ZOO YANET NTSHAV SIAB 90 tablet 4     aspirin 81 MG EC tablet Take 1 tablet (81 mg total) by mouth daily. 90 tablet 4     atorvastatin  "(LIPITOR) 80 MG tablet TAKE 1 TABLET (80 MG TOTAL) BY MOUTH DAILY/ [facility] ROJ 90 tablet 4     blood pressure monitor Kit Check your blood pressure daily, 1-2 hours after taking your blood pressure medicine.  Rest for 5 minutes before checking blood pressure, sitting quietly with feet supported on floor and your back resting against a chair. If blood pressure is >170/105 then call MD.  Dx: essential HTN 1 each 0     camphor-menthoL (TIGER BALM) Oint Apply 1 application topically 2 (two) times a day as needed. \"Tiger Balm\"       furosemide (LASIX) 40 MG tablet TAKE 1 TABLET (40 MG TOTAL) BY MOUTH 2 (TWO) TIMES A DAY AT 9AM AND 6PM./ NO 1 LUB 2 ZAUG IB UB THAUM 9AM THIAB 6PM [facility] PHOB VOG 60 tablet 3     isosorbide mononitrate (IMDUR) 60 MG 24 hr tablet Take 1 tablet (60 mg total) by mouth daily. 90 tablet 3     losartan (COZAAR) 100 MG tablet TAKE 1 PILL BY MOUTH DAILY FOR BLOOD PRESSURE / [facility] tablet 11     metoprolol succinate (TOPROL-XL) 25 MG Take 3 tablets (75 mg total) by mouth 2 (two) times a day. 60 tablet 0     nortriptyline (PAMELOR) 50 MG capsule TAKE 1 CAPSULE (50 MG TOTAL) BY MOUTH AT BEDTIME/ TXA O [facility] MUS  NOJ 1 LUB.  3     omeprazole (PRILOSEC) 40 MG capsule TAKE 1 PILL BY MOUTH EVERY DAY/ TXA UB NOJ 1 [facility] LUB PLAB 90 capsule 4     potassium chloride (KLOR-CON) 10 MEQ CR tablet TAKE 1 PILL BY MOUTH EVERY DAY/TXA UB NOJ 1 [facility] 90 tablet 4     sucralfate (CARAFATE) 1 gram tablet Take 1 tablet (1 g total) by mouth 4 (four) times a day as needed.       [DISCONTINUED] traMADoL (ULTRAM) 50 mg tablet Take 1 tablet (50 mg total) by mouth every 6 (six) hours as needed. 8 tablet 0     No current facility-administered medications on file prior to visit.          Jaky Gaspar MD    "

## 2021-06-10 NOTE — TELEPHONE ENCOUNTER
----- Message from Jaky Gaspar MD sent at 8/18/2020 12:22 PM CDT -----  Please schedule pt for in person visit this week for abdominal pain

## 2021-06-10 NOTE — PROGRESS NOTES
[facility] Office Visit    Chief Complaint:  Chief Complaint   Patient presents with     Follow-up     medication          Assessment/Plan:  1. Essential hypertension with goal blood pressure less than 140/90  Uncontrolled when she doesn't take her meds!  Labs as below to screen for secondary causes for her high blood pressure. Refilled meds and reminded to take meds and get prompt refills.  Start metoprolol XR 25mg daily, Continue losartan-hctz 100/25mg daily and amlodipine 10mg daily.  Stress reduction and basic self cares needed.  Sleep study may be helpful if needed.    - Basic Metabolic Panel  - Glycosylated Hemoglobin A1c  - Renin Activity  - Aldosterone, Serum  - metoprolol succinate (TOPROL-XL) 50 MG 24 hr tablet; Take 1 tablet (50 mg total) by mouth daily.  Dispense: 30 tablet; Refill: 3  - Drug Abuse 1+, Urine  - Microalbumin, Random Urine    2. Hyperlipidemia, unspecified hyperlipidemia type  No indication for statin at this point- family h/o diabetes.    - Glycosylated Hemoglobin A1c    3. Prediabetes   Continue to work on diet and exercise, etc.  Screen for diabetes every 6-12 months.    - Glycosylated Hemoglobin A1c    Return in about 2 months (around 7/15/2017) for Recheck.  The following high BMI interventions were performed this visit: encouragement to exercise and lifestyle education regarding diet    Patient Education/AVS:  Patient Instructions   Start new blood pressure pill metoprolol 50mg daily    Keep taking your other 2 blood pills every day (total of 3 blood pressure pills now)    Think about a sleep study- this may be making your blood pressure go high    See Ariane in 4 weeks    See Dr Gaspar in 2 months      HPI:   Leora Sanchez is a 46 y.o. female c/o recheck on her blood pressure.  Pt notes that over the past several days she hasn't been able to rest much because of working with a Shaman to help her mom get better.  Has been eating her bp meds daily and did check at  [employer] last week and top 134.  Hasn't slept in over 36hrs at this point because of hosting relatives, up all night talking with them.  Has 2 funerals coming up in the next month and stress really won't get better in near future.  Has mild headache and not as severe as a couple weeks ago.  No herbal or drug use.  Sleeps well with meds from midnight- 4/5am, otherwise will toss and turn all night.  No snoring but her  has commented that when she lays flat on her back she stops breathing until he wakes her up.  Pt will think about a sleep study.      Has upcoming apt with ear doctor regarding some ear drainage again.      Lab tests reviewed-1: 2017  Medicine tests reviewed-1: na    Physical Exam:  BP (!) 185/106  Pulse 72  Resp 20  Wt 181 lb (82.1 kg)  LMP 05/08/2017  BMI 35.65 kg/m2 Body mass index is 35.65 kg/(m^2). Patient's last menstrual period was 05/08/2017.  Vital signs reviewed  Wt Readings from Last 3 Encounters:   05/15/17 181 lb (82.1 kg)   03/30/17 189 lb (85.7 kg)   02/20/17 188 lb (85.3 kg)     History   Smoking Status     Never Smoker   Smokeless Tobacco     Never Used     History   Sexual Activity     Sexual activity: Yes     Partners: Male     Birth control/ protection: None     No Data Recorded  No Data Recorded  PHQ-2 Total Score: 6 (2/20/2017  3:00 PM)  Depression Follow-up Plan: mental health care assessment (2/20/2017  3:00 PM)  No Data Recorded    All normal as below except abnormalities include: appears tired, flat affect  General is a  46 y.o. female sitting comfortably in no apparent distress.   Neck: Supple without lymphadenopathy or thyromegally  CV: Regular rate and rhythm S1S2 without rubs, murmurs or gallops,   Lungs: Clear to auscultation bilaterally  Extremities: Warm, No Edema, 2+ Pedal and radial pulses bilaterally  Skin: No lesions or rashes noted  Neuro/MSK: Able to ambulate around the exam room with equal movement, strength and normal coordination of the upper and  lower extremeties symmetrically    Results for orders placed or performed in visit on 05/15/17   Basic Metabolic Panel   Result Value Ref Range    Sodium 143 136 - 145 mmol/L    Potassium 3.7 3.5 - 5.0 mmol/L    Chloride 108 (H) 98 - 107 mmol/L    CO2 24 22 - 31 mmol/L    Anion Gap, Calculation 11 5 - 18 mmol/L    Glucose 89 70 - 125 mg/dL    Calcium 8.9 8.5 - 10.5 mg/dL    BUN 19 8 - 22 mg/dL    Creatinine 0.72 0.60 - 1.10 mg/dL    GFR MDRD Af Amer >60 >60 mL/min/1.73m2    GFR MDRD Non Af Amer >60 >60 mL/min/1.73m2   Glycosylated Hemoglobin A1c   Result Value Ref Range    Hemoglobin A1c 6.0 3.5 - 6.0 %   Renin Activity   Result Value Ref Range    Renin Activity 3.7 ng/mL/h   Aldosterone, Serum   Result Value Ref Range    ALDOSTERONE, S 13 <=21 ng/dL   Drug Abuse 1+, Urine   Result Value Ref Range    Amphetamines Screen Negative Screen Negative    Benzodiazepines Screen Negative Screen Negative    Opiates Screen Negative Screen Negative    Phencyclidine Screen Negative Screen Negative    THC Screen Negative Screen Negative    Barbiturates Screen Negative Screen Negative    Cocaine Metabolite Screen Negative Screen Negative    Methadone Screen Negative Screen Negative    Oxycodone Screen Negative Screen Negative    Creatinine, Urine 130.2 mg/dL   Microalbumin, Random Urine   Result Value Ref Range    Microalbumin, Random Urine 4.62 (H) 0.00 - 1.99 mg/dL    Creatinine, Urine 130.2 mg/dL    Microalbumin/Creatinine Ratio Random Urine 35.5 (H) <=19.9 mg/g       ROS:  10 point review of symptoms all negative except as outlined in the HPI above.    Med list and active problem list reviewed and updated as part of this encounter    Current Outpatient Prescriptions on File Prior to Visit   Medication Sig Dispense Refill     amLODIPine (NORVASC) 10 MG tablet TAKE 1 PILL BY MOUTH DAILY/ TXHUA HNUB NOJ 1 LUB TSHUAJ PAB YANET NTSHAV SIAB 30 tablet 6     artificial tears,hypromellose, (GENTEAL) 0.3 % Drop 1-2gtts/eye q1hour as  needed 15 mL 3     cyclobenzaprine (FLEXERIL) 10 MG tablet Take 1 tablet (10 mg total) by mouth 2 (two) times a day as needed for muscle spasms. 60 tablet 1     losartan-hydrochlorothiazide (HYZAAR) 100-25 mg per tablet Take 1 tablet by mouth daily. 90 tablet 4     MAPAP EXTRA STRENGTH 500 mg tablet   5     omeprazole (PRILOSEC) 20 MG capsule Take 1 capsule (20 mg total) by mouth daily. 90 capsule 4     No current facility-administered medications on file prior to visit.          Jaky Gaspar MD    This document was created using voice recognition software which may contain typographical errors.

## 2021-06-11 NOTE — TELEPHONE ENCOUNTER
Wellness Screening Tool  Symptom Screening:  Do you have one of the following NEW symptoms:    Fever (subjective or >100.0)?  No    A new cough?  No    Shortness of breath?  No     Chills? No     New loss of taste or smell? No     Generalized body aches? No     New persistent headache? No     New sore throat? No     Nausea, vomiting, or diarrhea?  No    Within the past 2 weeks, have you been exposed to someone with a known positive illness below:    COVID-19 (known or suspected)?  No    Chicken pox?  No    Mealses?  No    Pertussis?  No    Patient notified of visitor policy- They may have one person accompany them to their appointment, but they will need to wear a mask and will be screened upon arrival for symptoms: Yes  Pt informed to wear a mask: Yes  Pt notified if they develop any symptoms listed above, prior to their appointment, they are to call the clinic directly at [phone] for further instructions.  Yes  Patient's appointment status: Patient will be seen in clinic as scheduled on 9/18

## 2021-06-11 NOTE — PATIENT INSTRUCTIONS - HE
May MARICHUY Sanchez,    It was a pleasure to see you today at [facility].     My recommendations after this visit include:  - Please follow up with Dr oRy in November   - Please follow up with Sendy Banda in 5 months  - I have checked labs and will contact you with results  - Continue current medications    Sendy Banda, CNP

## 2021-06-11 NOTE — PROGRESS NOTES
"                        Medication Therapy Management Follow Up Visit       ASSESSMENT AND PLAN    Hypertension: Controlled to goal of less than 140/90. It appears she is tolerating the metoprolol well.   -Continue current therapy.     Chronic Headache: Currently uncontrolled. Discussed with May that the amitriptyline could help with the headaches if she takes it every night. However, it is likely that the dry mouth and excessive drowsiness are side effects of the amitriptyline. Discussed switching to nortriptyline, which is less anticholinergic, and therefore likely to cause side effects.  - Stopped amitriptyline  - Started nortriptyline 25 mg qHS (headaches and sleep)    Heartburn: Currently uncontrolled. Discussed with May that taking the omeprazole every day will work better than just as needed at controlling her heartburn symptoms, and will likely prevent the symptoms. May is agreeable to try this at this time.  - Take omeprazole 20 mg daily      FOLLOW-UP PLAN  May was advised to follow up in one month.      SUBJECTIVE AND OBJECTIVE  Leora Sanchez is a 46 y.o. female who was referred by Jaky Gaspar MD for [facility] services.  May's chief concern today is medication management.   is accompanied by a [employer] .    Hypertension: She is currently on amlodipine 10 mg daily, losartan-hctz 100/25 mg daily, and metoprolol XL 50 mg daily. The metoprolol was started about a month ago and she notes that she is tolerating it well. Hx of dizziness and fainting spells for \"years\" - long before starting medication. She does occasionally feel off balance when standing up too quickly. She thinks the metoprolol improves the \"pressure in her head when bending over\".     BP Readings from Last 3 Encounters:   06/23/17 132/86   05/15/17 (!) 185/106   03/30/17 (!) 180/94       Chronic Headache: She complains of a constant, daily headache. She takes tylenol 1,000 mg every day in the morning and notes that helps for about " "an hour and then the pain returns. She states the pain is burning and throbbing. In addition to the tylenol, she uses tigerbalm and needles to help with pain. She also uses amitriptyline as a \"sleeping pill\". She does not take the amitriptyline every night because it causes her to fall into a deep sleep and then she wakes up with bladder pain. She notes the bladder pain is because she is not able to wake up to go to the bathroom during the night. Normally, she wakes up because she drinks 5 bottles of water before bedtime to help with her dry throat.    Heartburn: She is currently on Omeprazole. She notes that she takes this as needed for heartburn. She gets heartburn almost every day.      We reviewed May's medication list with them, discussing reason for use, directions for use, and potential side effects of each medication.  Indication, safety, efficacy, and convenience was assessed for all reviewed medications.      Current Outpatient Prescriptions   Medication Sig Dispense Refill     amitriptyline (ELAVIL) 25 MG tablet TAKE 1 PILL BY MOUTH EVERY DAY AT BEDTIME/TXHUA HMO THAUM MUS PW NOJ 1 LUB. 30 tablet 10     amLODIPine (NORVASC) 10 MG tablet TAKE 1 PILL BY MOUTH DAILY/ TXHUA HNUB NOJ 1 LUB TSHUAJ PAB YANET NTSHAV SIAB 30 tablet 6     artificial tears,hypromellose, (GENTEAL) 0.3 % Drop 1-2gtts/eye q1hour as needed 15 mL 3     cyclobenzaprine (FLEXERIL) 10 MG tablet Take 1 tablet (10 mg total) by mouth 2 (two) times a day as needed for muscle spasms. 60 tablet 1     losartan-hydrochlorothiazide (HYZAAR) 100-25 mg per tablet Take 1 tablet by mouth daily. 90 tablet 4     MAPAP EXTRA STRENGTH 500 mg tablet   5     metoprolol succinate (TOPROL-XL) 50 MG 24 hr tablet Take 1 tablet (50 mg total) by mouth daily. 30 tablet 3     omeprazole (PRILOSEC) 20 MG capsule Take 1 capsule (20 mg total) by mouth daily. 90 capsule 4     No current facility-administered medications for this visit.        May was provided with a " printed AVS, and this care plan was communicated via EMR with her primary care provider, Jaky Gaspar MD, and is the authorizing prescriber for this visit.  Direct supervision was available by either the patient's PCP or another available physician when needed.    This note has been dictated using voice recognition software. Any grammatical or context distortions are unintentional and inherent to the software.       Time spent: 60 minutes    Quinton ChristieD  MTM Pharmacist at [facility]

## 2021-06-11 NOTE — TELEPHONE ENCOUNTER
----- Message from Jaky Gaspar MD sent at 9/2/2020  2:05 PM CDT -----  Please call patient with following message:   She has diabetes now- I'd like her to start metformin once a day- I sent this to her pharmacy  I'd like her to meet with our pharmacist and our diabetes educator.   Her heart and lung tests looked okay.    Her cholesterol is still high- I'd like her to work with our pharmacist to make sure her meds are correct.      No urine infection.

## 2021-06-11 NOTE — TELEPHONE ENCOUNTER
Lab results are reviewed today with the assist of the [facility] . May was advised to improve her fluid intake to 60oz daily. She will work on this to better hydrate and flush her kidneys. sk/RN

## 2021-06-11 NOTE — TELEPHONE ENCOUNTER
Received referral from Dr Gaspar for patient to schedule MTM appt with Art Thakkar. If patient calls back please schedule.

## 2021-06-11 NOTE — PROGRESS NOTES
Assessment: pt seen today for DM education. She is newly diagnosed.  is present as well and interprets for visit. He notes he has a daughter from another marriage that was diagnosed with T1 DM at age 15. So he is very familiar with DM. Discussed the new diagnosis and difference between T1 and T2. Pt's mother has T2 DM.   Reviewed BG and A1c goals. Pt was provided a meter and was able to check BG w/o difficulty. BG was 186 mg/dl in clinic today about 2 hours after breakfast of black coffee and a croissant.   Discussed DM meal plan, cho vs non cho foods, portions and goals.   Pt notes her appetite is low and all her meals are light as she does not want her BG to be too high.   She is typically eating 3 light meals/day.   Discussed the importance of exercise and encourage pt to increase as she is able. She does some walking outside right now and has a garden in her back yard that she takes care of.     Plan: start checking BG 1-3x/day rotating before and after meals. OK to eat cho foods, watch portions, goal 3-4/meal and 1-2/snacks. Work on increasing exercise.     Subjective and Objective:      Leora Sanchez is referred by Dr. Gaspar for Diabetes Education.     Lab Results   Component Value Date    HGBA1C 8.5 (H) 08/20/2020       Current diabetes medications:  Metformin 500 mg two times a day      Follow up:   Pt will f/u with PCP in November as scheduled for A1c check. She will f/u here if any questions/concerns or if her A1c is not improving.     Education:     Monitoring   Meter (per above goals): Assessed, Discussed, Literature provided and Patient returned demonstration  Monitoring: Assessed, Discussed and Literature provided  BG goals: Discussed and Literature provided    Nutrition Management  Nutrition Management: Assessed, Discussed and Literature provided  Weight: Discussed  Portions/Balance: Assessed, Discussed and Literature provided  Carb ID/Count: Assessed, Discussed and Literature provided  Label  Reading: Assessed, Discussed and Literature provided  Heart Healthy Fats: Assessed, Discussed and Literature provided  Menu Planning: Assessed and Discussed  Dining Out: Needs instruction/review at follow-up  Physical Activity: Assessed and Discussed  Medications: Discussed    Diabetes Disease Process: Discussed    Acute Complications: Prevent, Detect, Treat:  Hypoglycemia: Discussed  Hyperglycemia: Assessed and Discussed  Sick Days: Discussed  Driving: Needs instruction/review at follow-up    Chronic Complications  Foot Care:Discussed  Skin Care: Needs instruction/review at follow-up  Eye: Discussed  ABC: Discussed and Literature provided  Teeth:Needs instruction/review at follow-up  Goal Setting and Problem Solving: Assessed and Discussed  Barriers: Assessed and Discussed  Psychosocial Adjustments: Assessed and Discussed      Time spent with the patient: 60 minutes for diabetes education and counseling.   Previous Education: no  Visit Type:DSMT  Hours Remaining: DSMT 9 and MNT 3      Jael Bennett  9/14/2020

## 2021-06-12 NOTE — PROGRESS NOTES
Called [facility] and spoke with Cristy from Medical Records. Cristy will fax most recent notes to the clinic

## 2021-06-12 NOTE — PROGRESS NOTES
[facility] Office Visit    Chief Complaint:  Chief Complaint   Patient presents with     Follow-up     blood pressure, requesting eye drops     Sore Throat     worsen last week, painful to eat and swallow, chest tightness, went to hospital on 7/28          Assessment/Plan:  1. Essential hypertension with goal blood pressure less than 140/90  Uncontrolled- has been borderline for a while and now with pseudophed too high.  Stop pseudophed...  Continue amlodipine 10mg daily.  Continue losaratn/hctz 100/25mg daily.  Add metoprolol 50mg daily.  Pt needs to be VERY careful about avoiding pregnancy since she is still actively menstruating and not using formal birth control.  She states she doesn't think she is able to get pregnant any more.   - metoprolol succinate (TOPROL-XL) 50 MG 24 hr tablet; Take 1 tablet (50 mg total) by mouth daily.  Dispense: 90 tablet; Refill: 3    2. Hypokalemia  Problem in past likely related to bp meds and some acute illness- recheck today.    - Basic Metabolic Panel    3. Bilateral dry eyes  Pt to use artificial tears as needed for unclear eye sx.    - artificial tears, hypromellose, (GONIOVISC) 2.5 % ophthalmic solution; 1-2gtts/eye as needed for discomfort  Dispense: 15 mL; Refill: 12    Return in about 6 weeks (around 9/11/2017).  The following high BMI interventions were performed this visit: encouragement to exercise and lifestyle education regarding diet    Patient Education/AVS:  Patient Instructions   Stop taking pseudophed    Add new blood pressure pill once a day- metoprolol 50mg daily    Eye drops as needed for pus or pressure      HPI:   Leroa Sanchez is a 47 y.o. female c/o f/u from eD visit last week for throat/chest pain.  Did start potassium pills, pseudophed and zofran to help as recommended.  Sx started last week that progressively got worse with Last week felt like her throat was swollen- couldn't swallow.  Used tiger balm on throat to help. Noted that  whenever she would eat or drink it would come back up again.  No sick contacts. Did feel like a cold at first but never got better- got worse.  Was sleeping on couch so she could sit up more and woke up in middle of night with a bad headache and when she got up to get her  she almost passed out similar to when she had a very heavy period and was anemic.  Was told in ED that her hgb was low and that she was deydrated from not eating or drinking enough from her throat swelling.  Pt notes she is feelign dizzy and wants to check to see if the hole in her ear is back again.  Has h/o ear surgery and generally speaking when she can plug her ear her dizziness gets better. Has been having a lot of drainage from her nose down the back of her throat.  Constantly clearing her throat of clear bubbly mucous.  This mucous makes it very hard for her to breath, especially at night when she is laying flat.  Last week had red tongue with sores/blisters on it.  Wondering if she can get a refill on an eye pill medication she took for pus once.  Notes that when she pushes on the soft tissue below her eyes she notes yellow pus will come out.  Has heart f/u tomorrow regarding her chest pain. Pt notes she has been having chest tightening with walking on treadmill over past year that has gotten worse and now happens at rest.  Throat is feeling better but not back to normal - swelling and burning is better but not normal.     Did not bring other meds with her- notes her  sets up pillbox for her.  2 bp pill, 1 stomach pill and headache pill in the mornings.  At night 1 bp pill and sleeping pill and headache pill.      Pt states she saw ENT earlier this summer and has f/u in Sept.  Dx with infection and tx with ear drops.      History summarized from1-2:ED visit 7/28/17- presented with Chest pain, throat congestion/tightness/pain for past 24hrs.  Dx with viral cough and hypokalemia.  Sent home with zofran, potassium and  [facility]    ENT referral summer 2016 for dizziness- prescribed hallpike maneuver's to help.     Old Records-1:[facility] records requested from 2017  Radiology tests reviewed-1: normal CXR  Lab tests reviewed-1: strep test neg, Potassium 2.6, , d-dimer neg, trop neg, normal Hm2,   Medicine tests reviewed-1: EKG- normal- stable    Physical Exam:  /86  Pulse 96  Resp 18  Wt 172 lb (78 kg)  LMP 07/28/2017  BMI 33.59 kg/m2 Body mass index is 33.59 kg/(m^2). Patient's last menstrual period was 07/28/2017.  Vital signs reviewed  Wt Readings from Last 3 Encounters:   08/01/17 170 lb (77.1 kg)   07/31/17 172 lb (78 kg)   07/28/17 172 lb (78 kg)     History   Smoking Status     Never Smoker   Smokeless Tobacco     Never Used     History   Sexual Activity     Sexual activity: Yes     Partners: Male     Birth control/ protection: None     No Data Recorded  PHQ-9 Total Score: 21 (7/31/2017  5:00 PM)  PHQ-2 Total Score: 6 (7/31/2017  5:00 PM)  Depression Follow-up Plan: mental health care assessment (2/20/2017  3:00 PM)  No Data Recorded    All normal as below except abnormalities include: bilateral TM rupture.  Right ear surgery defect noted to be clean and dry.  Pt unable to express pus from her eyes as explained.    General is a  47 y.o. female sitting comfortably in no apparent distress.   HEENT:  Eye, nasal, oral exams within normal   Neck: Supple without lymphadenopathy or thyromegally  CV: Regular rate and rhythm S1S2 without rubs, murmurs or gallops,   Lungs: Clear to auscultation bilaterally  Abd:  +BS, soft NT/ND,  No masses or organomegally  Extremities: Warm, No Edema, 2+ Pedal and radial pulses bilaterally  Skin: No lesions or rashes noted  Neuro/MSK: Able to ambulate around the exam room with equal movement, strength and normal coordination of the upper and lower extremeties symmetrically    Results for orders placed or performed in visit on 07/31/17   Basic Metabolic Panel   Result Value Ref  Range    Sodium 141 136 - 145 mmol/L    Potassium 4.7 3.5 - 5.0 mmol/L    Chloride 107 98 - 107 mmol/L    CO2 27 22 - 31 mmol/L    Anion Gap, Calculation 7 5 - 18 mmol/L    Glucose 111 70 - 125 mg/dL    Calcium 9.3 8.5 - 10.5 mg/dL    BUN 14 8 - 22 mg/dL    Creatinine 0.86 0.60 - 1.10 mg/dL    GFR MDRD Af Amer >60 >60 mL/min/1.73m2    GFR MDRD Non Af Amer >60 >60 mL/min/1.73m2       ROS:  10 point review of symptoms all negative except as outlined in the HPI above.    Med list and active problem list reviewed and updated as part of this encounter    Current Outpatient Prescriptions on File Prior to Visit   Medication Sig Dispense Refill     amLODIPine (NORVASC) 10 MG tablet Take 10 mg by mouth daily. TXHUA HNUB NOJ 1 LUB TSHUAJ PAB YANET NTSHAV SIAB       losartan-hydrochlorothiazide (HYZAAR) 100-25 mg per tablet Take 1 tablet by mouth daily. 90 tablet 4     potassium chloride SA (K-DUR,KLOR-CON) 10 MEQ tablet Take 1 tablet (10 mEq total) by mouth 2 (two) times a day. 20 tablet 0     cyclobenzaprine (FLEXERIL) 10 MG tablet Take 1 tablet (10 mg total) by mouth 2 (two) times a day as needed for muscle spasms. 60 tablet 1     MAPAP EXTRA STRENGTH 500 mg tablet Take 500-1,000 mg by mouth every 6 (six) hours as needed.   5     nortriptyline (PAMELOR) 25 MG capsule Take 1 capsule (25 mg total) by mouth at bedtime. 30 capsule 1     omeprazole (PRILOSEC) 20 MG capsule Take 1 capsule (20 mg total) by mouth daily. 90 capsule 4     No current facility-administered medications on file prior to visit.          Jaky Gaspar MD    This document was created using voice recognition software which may contain typographical errors.

## 2021-06-12 NOTE — PROGRESS NOTES
Called and left a detail message for Medical records to send records to fax machine [phone]. Will be on the look out.   [facility] phone number: [phone]

## 2021-06-12 NOTE — PROGRESS NOTES
"                        Medication Therapy Management Follow Up Visit       ASSESSMENT AND PLAN    1. Heartburn  Uncontrolled. Refilled:   - omeprazole (PRILOSEC) 20 MG capsule; Take 1 capsule (20 mg total) by mouth daily.  Dispense: 90 capsule; Refill: 4    2. Essential hypertension with goal blood pressure less than 140/90  Borderline controlled  -Continue current therapy  -Noted Qtc > 450. Borderline prolonged. Recommend recheck EKG prior to starting any additional QTc prolonging medications in the future    3. Persistent headaches  Uncontrolled. Discussed that nortriptyline would be much more effective if taken daily rather than PRN. Unclear which sleeping medication patient feels that this interacts with.   -Patient will call and clarify name of sleeping medication  -Continue nortriptyline 25 mg qHS    4. Medication management  Unclear if patient knows the indication of her medication well. Description of medication seems that there may be some confusion. She will call to clarify sleep medication. Will follow up with her sooner if, in fact, she is mistaken about indications.   -Bring in medications to follow up    FOLLOW-UP PLAN  May was advised to follow up in will await clarification phone call. If all according to plan, 3 month follow up. Otherwise 1 month.      SUBJECTIVE AND OBJECTIVE  Leora Sanchez is a 47 y.o. female who was referred by Jaky Gaspar MD for MTM services.  May's chief concern today is \"I am out of my stomach medication\".   is accompanied by a [employer] . She did not bring in meds today and does not know the names.     May's main concern today is running out of omeprazole. Ran out ~ 1 month ago and has begun experiencing worsened heartburn symptoms. She is not certain why it wasn't refilled.     She was recently seen in the ED and by cardiology 2/2 exertional chest pain/angina. Started on isosorbide MN 30 mg daily, in addition to her standing regimen of amlodipine 5 mg daily, " "Metoprolol ER 50 mg daily, and lisinopril/HCTZ 100/25 mg daily. No side effects or concerns with therapy. She feels that her headaches have improved since adding more BP medication. No chest pain or SOB. Noted QTc 459 on EKG. Occasional coffee/tea. Does not eat much salt.     Still experiencing daily headaches. \"Mild every day, but severe 1-2 times per week\". Takes occasional APAP as needed and OTC ibuprofen. Switched from amitriptyline to nortriptyline at previous [facility] visit 2/2 side effects. She is very happy with the nortriptyline - \"it doesn't make me drowsy\", but she still takes this only on an as needed basis, 1-2 times per week. \"It makes the headache go away\". She doesn't take every day because \"I take too many medications\", and when she takes in combo with her \"sleeping medicine\", she becomes overly drowsy and cannot wake up easily. Slept so hard with the combo that it caused her to we the bed. Unclear what the sleeping medication is. \"It is brown\". Not diphenhydramine. Per pharmacy, none filled recently. ??? OTC product. Acupuncture is assistive for her headaches.    Still complaining of \"thick saliva\". Has follow up scheduled with ENT.     Patient's  sets up pillboxes.     BP Readings from Last 3 Encounters:   08/15/17 140/82   08/01/17 130/88   07/31/17 153/86     We reviewed May's medication list with them, discussing reason for use, directions for use, and potential side effects of each medication.  Indication, safety, efficacy, and convenience was assessed for all reviewed medications.      Current Outpatient Prescriptions   Medication Sig Dispense Refill     amLODIPine (NORVASC) 10 MG tablet Take 10 mg by mouth daily. TXHUA HNUB NOJ 1 LUB TSHUAJ PAB YANET NTSHAV SIAB       artificial tears, hypromellose, (GONIOVISC) 2.5 % ophthalmic solution 1-2gtts/eye as needed for discomfort 15 mL 12     aspirin 81 MG EC tablet Take 1 tablet (81 mg total) by mouth daily. 30 tablet 6     cyclobenzaprine " (FLEXERIL) 10 MG tablet Take 1 tablet (10 mg total) by mouth 2 (two) times a day as needed for muscle spasms. 60 tablet 1     isosorbide mononitrate (IMDUR) 30 MG 24 hr tablet Take 1 tablet (30 mg total) by mouth daily. 30 tablet 6     losartan-hydrochlorothiazide (HYZAAR) 100-25 mg per tablet Take 1 tablet by mouth daily. 90 tablet 4     MAPAP EXTRA STRENGTH 500 mg tablet Take 500-1,000 mg by mouth every 6 (six) hours as needed.   5     metoprolol succinate (TOPROL-XL) 50 MG 24 hr tablet Take 1 tablet (50 mg total) by mouth daily. 90 tablet 3     nortriptyline (PAMELOR) 25 MG capsule Take 1 capsule (25 mg total) by mouth at bedtime. 30 capsule 1     omeprazole (PRILOSEC) 20 MG capsule Take 1 capsule (20 mg total) by mouth daily. 90 capsule 4     potassium chloride (KLOR-CON) 10 MEQ CR tablet   0     potassium chloride SA (K-DUR,KLOR-CON) 10 MEQ tablet Take 1 tablet (10 mEq total) by mouth 2 (two) times a day. 20 tablet 0     No current facility-administered medications for this visit.        May was provided with a printed AVS, and this care plan was communicated via EMR with her primary care provider, Jaky Gaspar MD, and is the authorizing prescriber for this visit.  Direct supervision was available by either the patient's PCP or another available physician when needed.    This note has been dictated using voice recognition software. Any grammatical or context distortions are unintentional and inherent to the software.       Time spent: 45 minutes    Ariane Ly, QuintonD  MTM Pharmacist at [facility]

## 2021-06-13 NOTE — PROGRESS NOTES
Clinic Care Coordination Contact:  Community Health Worker Initial Outreach    Reason: [id number] - Ambulatory referral to Care Management (Primary Care)  Note    Pt needs help with drug coverage        CHW Initial Information Gathering:  Referral Source: PCP  Preferred Hospital: [facility]  [phone]  Preferred Urgent Care: [facility], [phone]  Current living arrangement:: I live in a private home with family, I live in a private home with spouse  Type of residence:: Other(house)  Community Resources: [facility]  Supplies Currently Used at Home: None  Equipment Currently Used at Home: none  Informal Support system:: Children, Family, Spouse  No PCP office visit in Past Year: No  Transportation means:: Family, Regular car, Other(has spouse and children for help)  CHW Additional Questions  MyChart active?: No  Patient agreeable to assistance with activating MyChart?: No    Patient accepts CC: Yes. Patient scheduled for assessment with [facility] on 12- at 10:00AM. Patient noted desire to discuss medical insurance coverage.  request per pt.      Patient reported:   -Current rental: $1,100.00 monthly.  -In the process of moving to the new [place] in December 2020. Monthly mortgage will be $1,500.00-$$1,600.00 a month.   -Live with family. Household of 4 people (my mother, spouse, son, and myself).  -drive and have a car.   -Spouse and son helps when needed.   -Use no cane or other equipment when up and walk.     Plan:  Patient initial assessment appt scheduled 12/10/2020 at 10:00AM with [facility]  via phone visit appt.

## 2021-06-13 NOTE — PROGRESS NOTES
ASSESSMENT/PLAN:  1. Acute on chronic systolic congestive heart failure  Basic Metabolic Panel       This is a 48 yo female with known underlying cardiac disease.  Was recently hospitalized due to acute on chronic CHF.  She does not have good understanding about this, nor of her hospitalization. We have reviewed notes, consultations, labs and procedures.   She appears stable currently  - we have reviewed medications and medication changes.  Will check BMP and have patient follow up in 2 weeks with her primary provider.        There are no discontinued medications.  Patient Instructions   Follow up with Dr. Gaspar in 2 weeks.   Continue with current medications.        Chief Complaint:  Chief Complaint   Patient presents with     Hospital Visit Follow Up     [facility]       HPI:   Leora Sanchez is a 47 y.o. female c/o  Here for inpatient follow up    Seen at [facility] - for chest pain, shortness of breath;  thought it was the heart  Sent to [facility] -  For angios,   Discharge summary shows diagnoses:  1. Sob     2. Acute Hypoxic respiratory failure     3. Acute systolic congestive heart failure     4. CMP with EF 37%     5. Conary artery disease with angina : s/p coronary angiogram - no significant abnormality     6. Dyslipidemia     7. Essential hypertension     8. Pneumonia, community acquired    Patient believes they fixed her arteries - although there is one more artery they need to fix - given Nitroglycerin to use prn for pain  Went home on Wednesday night, 11/1 - hasn't had to use the medicine     Angio report reads:    Estimated blood loss was <20 ml.    The LM vessel was injected.    The LAD vessel was injected .    Mid LAD lesion 50% stenosed.    Pressure wire/FFR was performed on the lesion. pre diagnositic: 0.94. post diagnostic: 0.83.    Pressure wire/FFR was performed on the lesion.    The left circumflex was injected.    The RCA was injected.    Echo report reads:    Left Ventricle: The calculated  "left ventricular ejection fraction is 37%. This represents a moderately decreased ejection fraction. Cavity is mildly increased. E/e' > 15, suggesting elevated LV filling pressures.    Right Ventricle: Normal size and systolic function. TAPSE is normal,    Estimated central venous pressure equal to 3 mmHg.    No tricuspid valve regurgitation. Pulmonary artery pressure could not be obtained.    No previous study for comparison.    Patient is feeling better since hospitalization -   Thinks she had lots of saliva in past - thinks it was related to her heart  Now that they \"fixed\" that artery, she thinks her saliva is increased   Did make a scheduled appointment with cardiology       PMH:   Patient Active Problem List    Diagnosis Date Noted     CHF (congestive heart failure) 10/30/2017     Precordial pain 10/30/2017     Hypokalemia      Accelerating angina      Dysidria      Hyperlipemia 2016     Ganglion cyst of left foot 2016     Essential hypertension 2016     Non morbid obesity due to excess calories 2016     Heartburn 2016     Migraine with aura 2016     Bilateral dry eyes 2016     Mixed incontinence 2016     Past Medical History:   Diagnosis Date     Anxiety      CHF (congestive heart failure)      Depression      Hyperlipidemia      Hypertension      Pregnancy          Spontaneous       TM (tympanic membrane disorder)      Past Surgical History:   Procedure Laterality Date     CV LEFT HEART CATHETERIZATION WO LEFT VETRICULOGRAM Left 10/31/2017    Procedure: Left Heart Catheterization Without Left Ventriculogram;  Surgeon: Jonathan Duque MD;  Location: [facility] Cath Lab;  Service:      DILATION AND CURETTAGE OF UTERUS       INNER EAR SURGERY Right      MASTOIDECTOMY Right      AL CATH PLACEMENT & NJX CORONARY ART ANGIO IMG S&I N/A 10/31/2017    Procedure: Coronary Angiogram;  Surgeon: Jonathan Duque MD;  Location: [facility]" Cath Lab;  Service: Cardiology     Social History     Social History     Marital status:      Spouse name: N/A     Number of children: 8     Years of education: N/A     Occupational History     [employer] Not Employed     For chronic headaches since ear surgery     Social History Main Topics     Smoking status: Never Smoker     Smokeless tobacco: Never Used     Alcohol use No     Drug use: No     Sexual activity: Yes     Partners: Male     Birth control/ protection: None     Other Topics Concern     Not on file     Social History Narrative    Lives with  who can read and speak English and helps her with meds       Meds:    Current Outpatient Prescriptions:      amLODIPine (NORVASC) 10 MG tablet, Take 10 mg by mouth daily. TXHUA HNUB NOJ 1 LUB TSHUAJ PAB YANET NTSHAV SIAB, Disp: , Rfl:      artificial tears, hypromellose, (GONIOLSOL) 2.5 % ophthalmic solution, Administer 1-2 drops to both eyes 4 (four) times a day as needed., Disp: , Rfl:      aspirin 81 MG EC tablet, Take 1 tablet (81 mg total) by mouth daily., Disp: 30 tablet, Rfl: 6     atorvastatin (LIPITOR) 80 MG tablet, Take 1 tablet (80 mg total) by mouth daily., Disp: 30 tablet, Rfl: 1     cetirizine (ZYRTEC) 10 MG tablet, Take 10 mg by mouth every evening. , Disp: , Rfl:      cyclobenzaprine (FLEXERIL) 10 MG tablet, Take 1 tablet (10 mg total) by mouth 2 (two) times a day as needed for muscle spasms., Disp: 60 tablet, Rfl: 1     fluticasone (FLONASE) 50 mcg/actuation nasal spray, 1 spray into each nostril daily as needed., Disp: , Rfl:      furosemide (LASIX) 40 MG tablet, Take 1 tablet (40 mg total) by mouth daily., Disp: 30 tablet, Rfl: 1     isosorbide mononitrate (IMDUR) 30 MG 24 hr tablet, Take 1 tablet (30 mg total) by mouth daily., Disp: 30 tablet, Rfl: 6     MAPAP EXTRA STRENGTH 500 mg tablet, Take 500-1,000 mg by mouth every 6 (six) hours as needed. , Disp: , Rfl: 5     metoprolol succinate (TOPROL-XL) 100 MG 24 hr tablet, Take 1 tablet (100  mg total) by mouth daily., Disp: 30 tablet, Rfl: 1     nitroglycerin (NITROSTAT) 0.4 MG SL tablet, Place 1 tablet (0.4 mg total) under the tongue every 5 (five) minutes as needed for chest pain., Disp: 30 tablet, Rfl: 1     nortriptyline (PAMELOR) 25 MG capsule, Take 25 mg by mouth at bedtime. TXHUA HMO THAUM MUS PW NOJ 1 LUB., Disp: , Rfl:      omeprazole (PRILOSEC) 20 MG capsule, Take 1 capsule (20 mg total) by mouth daily., Disp: 90 capsule, Rfl: 4     senna-docusate (PERICOLACE) 8.6-50 mg tablet, Take 1 tablet by mouth daily as needed for constipation., Disp: , Rfl: 0     VENTOLIN HFA 90 mcg/actuation inhaler, Inhale 1-2 puffs every 4 (four) hours as needed., Disp: , Rfl:     Allergies:  No Known Allergies    ROS:  Pertinent positives as noted in HPI; otherwise 12 point ROS negative.      Physical Exam:  EXAM:  /62  Pulse 97  Resp 20  Ht 5' (1.524 m)  Wt 169 lb (76.7 kg)  SpO2 95%  BMI 33.01 kg/m2   Gen:  NAD, appears well, well-hydrated  HEENT:  TMs nl, oropharynx benign, nasal mucosa nl, conjunctiva clear  Neck:  Supple, no adenopathy, no thyromegaly, no carotid bruits, no JVD  Lungs:  Clear to auscultation bilaterally  Cor:  RRR no murmur  Abd:  Soft, nontender, BS+, no masses, no guarding or rebound, no HSM  Extr:  Neg.  Neuro:  No asymmetry  Skin:  Warm/dry        Results:  Results for orders placed or performed in visit on 11/03/17   Basic Metabolic Panel   Result Value Ref Range    Sodium 141 136 - 145 mmol/L    Potassium 3.8 3.5 - 5.0 mmol/L    Chloride 105 98 - 107 mmol/L    CO2 26 22 - 31 mmol/L    Anion Gap, Calculation 10 5 - 18 mmol/L    Glucose 121 70 - 125 mg/dL    Calcium 9.2 8.5 - 10.5 mg/dL    BUN 23 (H) 8 - 22 mg/dL    Creatinine 0.76 0.60 - 1.10 mg/dL    GFR MDRD Af Amer >60 >60 mL/min/1.73m2    GFR MDRD Non Af Amer >60 >60 mL/min/1.73m2

## 2021-06-13 NOTE — PROGRESS NOTES
"I met with Leora Sanchez at the request of Jaky Gaspar MD   to recheck her blood pressure.  Blood pressure medications on the MAR were reviewed with patient.    Patient has taken all medications as per usual regimen: Yes  Patient reports tolerating them without any issues or concerns: Yes    Vitals:    12/15/20 0913   BP: 103/67   Patient Site: Right Arm   Patient Position: Sitting   Cuff Size: Adult Large   Pulse: 62   Temp: 97  F (36.1  C)   TempSrc: Other   Weight: 197 lb 8 oz (89.6 kg)   Height: 4' 11.5\" (1.511 m)       Blood pressure was taken, previous encounter was reviewed, recorded blood pressure below 140/90.  Patient was discharged and the note will be sent to the provider for final review.            "

## 2021-06-14 NOTE — PROGRESS NOTES
Assessment/Plan:     1. Nonischemic cardiomyopathy with systolic dysfunction, NYHA class III: Leora Sanchez appears not compensated.  She complains of a productive cough at night today.  Denies any fevers or chills.  She continues to have dyspnea on exertion and fatigue.  She does have trace to mild lower extremity edema today.  I will check a BMP and BNP.  Based on notes in her chart her primary doctor reduced her Lasix to 20 mg daily.  She states she is taking 40 mg daily and never change this.  I encouraged her to check her medications at home and call us if she is not taking 40 mg daily.  Her weights have increased the last few weeks due to not following a low-sodium diet.  I increased her lisinopril to 5 mg daily today also.      Heart failure treatment includes:  - Beta blocker therapy with metroprolol succinate 100 mg daily  - ACEI therapy with lisinopril 5 mg daily  - Diuretic therapy with furosemide 40 mg daily    2. Hypertension: Uncontrolled.  Blood pressure 152/84.  She continues metoprolol succinate 100 mg daily, amlodipine 10 mg daily and I increased her lisinopril to 5 mg daily.    3. Obstructive sleep apnea: I ordered a sleep referral for a sleep study at our last appointment.  Sleep medicine received the order and it was approved.  She has not scheduled a sleep study yet.    Follow-up in the heart failure clinic in 2 weeks and with Dr. Roy on January 17 as scheduled    Subjective:     Leora Sanchez is seen at [facility] heart failure clinic today for continued follow-up.  She follows up for nonischemic cardiomyopathy with systolic dysfunction.  Her most recent echocardiogram was done October 30, 2017 which showed an ejection fraction of 37% and elevated LV filling pressures.  She had a coronary angiogram on October 31, 2017 which showed nonobstructive CAD with approximately 50% mid LAD lesion.  She has a past medical history significant for hypertension and hyperlipidemia.    Today, she comes in  with complaints of a productive cough at night that has cleared to white sputum.  This has been worse for the last week or so.  She is sleeping with her head elevated on 3 pillows.  She continues to have dyspnea on exertion which has not worsened.  She continues to have dizziness due to inner ear problems.  She also has fatigue and mild lower extremity edema.  She denies any fevers or chills.  She denies any PND.  She denies PND, palpitations, chest pain and abdominal fullness/bloating.      She is monitoring home weights which have increased from 171 pounds prior to  to 178 pounds currently.   She is trying to follow a low sodium diet.  She states she has family in town and she has been eating out frequently.    Review of Systems:   General: WNL  Eyes: WNL  Ears/Nose/Throat: WNL  Lungs: WNL  Heart: Shortness of Breath with activity  Stomach: WNL  Bladder: Frequent Urination at Night  Muscle/Joints: Muscle Weakness  Skin: WNL  Nervous System: Dizziness  Mental Health: Depression, Anxiety     Blood: Easy Bruising     Patient Active Problem List   Diagnosis     Ganglion cyst of left foot     Essential hypertension     Non morbid obesity due to excess calories     Heartburn     Migraine with aura     Bilateral dry eyes     Mixed incontinence     Hyperlipemia     Accelerating angina     Dysidria     Nonischemic cardiomyopathy     Heart failure with reduced ejection fraction       Past Medical History:   Diagnosis Date     Anxiety      CHF (congestive heart failure)      Depression      Hyperlipidemia      Hypertension      Pregnancy          Spontaneous       TM (tympanic membrane disorder)        Past Surgical History:   Procedure Laterality Date     CV LEFT HEART CATHETERIZATION WO LEFT VETRICULOGRAM Left 10/31/2017    Procedure: Left Heart Catheterization Without Left Ventriculogram;  Surgeon: Jonathan Duque MD;  Location: [facility] Cath Lab;  Service:      DILATION AND CURETTAGE OF  UTERUS  2010     INNER EAR SURGERY Right 1994     MASTOIDECTOMY Right 1996     TX CATH PLACEMENT & NJX CORONARY ART ANGIO IMG S&I N/A 10/31/2017    Procedure: Coronary Angiogram;  Surgeon: Jonathan Duque MD;  Location: [facility] Cath Lab;  Service: Cardiology       Family History   Problem Relation Age of Onset     Diabetes Mother      Hypertension Mother      Uterine cancer Mother      Diverticulitis Mother      No Medical Problems Father      No Medical Problems Sister      No Medical Problems Daughter      No Medical Problems Son        Social History     Social History     Marital status:      Spouse name: N/A     Number of children: 8     Years of education: N/A     Occupational History     [employer] Not Employed     For chronic headaches since ear surgery     Social History Main Topics     Smoking status: Never Smoker     Smokeless tobacco: Never Used     Alcohol use No     Drug use: No     Sexual activity: Yes     Partners: Male     Birth control/ protection: None     Other Topics Concern     Not on file     Social History Narrative    Lives with  who can read and speak English and helps her with meds       Current Outpatient Prescriptions   Medication Sig Dispense Refill     amLODIPine (NORVASC) 10 MG tablet Take 10 mg by mouth daily. TXHUA HNUB NOJ 1 LUB TSHUAJ PAB YANET NTSHAV SIAB       artificial tears, hypromellose, (GONIOLSOL) 2.5 % ophthalmic solution Administer 1-2 drops to both eyes 4 (four) times a day as needed.       aspirin 81 MG EC tablet Take 1 tablet (81 mg total) by mouth daily. 30 tablet 6     atorvastatin (LIPITOR) 80 MG tablet Take 1 tablet (80 mg total) by mouth daily. 90 tablet 3     cetirizine (ZYRTEC) 10 MG tablet Take 10 mg by mouth every evening.        cyclobenzaprine (FLEXERIL) 10 MG tablet Take 1 tablet (10 mg total) by mouth 2 (two) times a day as needed for muscle spasms. 60 tablet 1     fluticasone (FLONASE) 50 mcg/actuation nasal spray 1 spray into each nostril  daily as needed.       isosorbide mononitrate (IMDUR) 30 MG 24 hr tablet Take 1 tablet (30 mg total) by mouth daily. 30 tablet 6     MAPAP EXTRA STRENGTH 500 mg tablet Take 500-1,000 mg by mouth every 6 (six) hours as needed.   5     metoprolol succinate (TOPROL-XL) 100 MG 24 hr tablet Take 1 tablet (100 mg total) by mouth daily. 90 tablet 3     nitroglycerin (NITROSTAT) 0.4 MG SL tablet Place 1 tablet (0.4 mg total) under the tongue every 5 (five) minutes as needed for chest pain. 30 tablet 1     nortriptyline (PAMELOR) 25 MG capsule Take 25 mg by mouth at bedtime. TXHUA HMO THAUM MUS PW NOJ 1 LUB.       omeprazole (PRILOSEC) 20 MG capsule Take 1 capsule (20 mg total) by mouth daily. 90 capsule 4     senna-docusate (PERICOLACE) 8.6-50 mg tablet Take 1 tablet by mouth daily as needed for constipation.  0     VENTOLIN HFA 90 mcg/actuation inhaler Inhale 1-2 puffs every 4 (four) hours as needed.       furosemide (LASIX) 40 MG tablet Take 1 tablet (40 mg total) by mouth daily. 90 tablet 3     lisinopril (PRINIVIL,ZESTRIL) 5 MG tablet Take 1 tablet (5 mg total) by mouth daily. 30 tablet 11     No current facility-administered medications for this visit.        No Known Allergies    Objective:     Vitals:    12/06/17 0805   BP: 152/84   Pulse: 66   Resp: 18     Wt Readings from Last 3 Encounters:   12/06/17 181 lb (82.1 kg)   11/20/17 172 lb (78 kg)   11/14/17 173 lb (78.5 kg)       General Appearance:   Alert, cooperative and in no acute distress.   HEENT:  No scleral icterus; the mucous membranes were pink and moist.   Neck: JVP normal. No HJR.   Chest: The spine was straight. The chest was symmetric.   Lungs:   Respirations unlabored; the lungs are clear to auscultation.   Cardiovascular:   Regular rhythm. S1 and S2 without murmur, clicks or rubs. Radial and posterior tibial pulses are intact and symmetrical.    Abdomen:  Soft, nontender, nondistended, bowel sounds present   Extremities: No cyanosis, clubbing. Trace  bilateral lower extremtiy edema.   Skin: No xanthelasma.   Neurologic: Mood and affect are appropriate.         Lab Review   BMP and BNP pending          Cardiographics  Echocardiogram: 10/30/2017  Summary     Left Ventricle: The calculated left ventricular ejection fraction is 37%. This represents a moderately decreased ejection fraction. Cavity is mildly increased. E/e' > 15, suggesting elevated LV filling pressures.    Right Ventricle: Normal size and systolic function. TAPSE is normal,    Estimated central venous pressure equal to 3 mmHg.    No tricuspid valve regurgitation. Pulmonary artery pressure could not be obtained.    No previous study for comparison.     Coronary angiogram 10/31/2017  Conclusion        Estimated blood loss was <20 ml.    The LM vessel was injected.    The LAD vessel was injected .    Mid LAD lesion 50% stenosed.    Pressure wire/FFR was performed on the lesion. pre diagnositic: 0.94. post diagnostic: 0.83.    Pressure wire/FFR was performed on the lesion.    The left circumflex was injected.    The RCA was injected.      Leora Sanchez is a 47 y.o. old female with HTN and CMP EF ~37 who is here for w/u of CHF and CP     Findings:  LM:long, mildly irregular  LAD:50% mid-vessel narrowing immediately proximal to a moderate sized D branch. FFR 0.94 rest 0.83 w/ adenosine x3 mins  Lcx:large, mildly irregular  RCA:dominant, 10-30% diffuse mild irregularity     LVEDP:28         25 minutes were spent face to face with the patient with greater than 50% spent on education and counseling.      Sendy Banda, [facility]   Heart Failure Clinic

## 2021-06-14 NOTE — PROGRESS NOTES
Assessment/Plan:     1. Nonischemic cardiomyopathy with systolic dysfunction, NYHA class III: Leora Sanchez appears well compensated.  No signs of fluid retention today.  We discussed heart failure, following a low salt diet, monitoring weights, and heart failure treatment. She met with a heart failure nurse clinician to discuss heart failure management.  I started her on lisinopril 2.5 mg daily.  She will have a BMP at her primary doctor next week.  She states she will purchase a scale to start monitoring daily weights.      Heart failure treatment includes:  - Beta blocker therapy with metroprolol succinate 100 mg daily  - ACEI therapy with lisinopril 2.5 mg daily  - Diuretic therapy with furosemide 40 mg daily    2. Obstructive sleep apnea: Clover Sleepiness Scale score was 17 and STOP bang score of 3.  We discussed the correlation between heart failure and obstructive sleep apnea.  I have placed a referral for a sleep study.    3. Hypertension: Uncontrolled.  Blood pressure 150/90 with a recheck of 130/80.  Started her on lisinopril 2.5 mg daily.  She continues metoprolol 100 mg daily and amlodipine 10 mg daily.    4. Chest tightness: This occurs when she is dyspneic with activity.  On her coronary angiogram there was no obstructive coronary artery disease.  She continues isosorbide 30 mg daily.  She has not taken nitroglycerin for the chest tightness.  We went over on how to administer this medication today.    Follow-up in the heart failure clinic in 3 weeks and with Dr. Roy in 6 weeks    Subjective:     Leora Sanchez is seen at [facility] heart failure clinic today for post-hospitalization follow-up.  An  is present for the duration of the appointment.  She was hospitalized at [facility] from October 30 - November 1, 2017 with chest pain, shortness of breath, community-acquired pneumonia.  Her BNP was 469.  She was at [facility] and was transferred to [facility] for coronary  angiogram due to chest pain.  Her coronary angiogram showed no obstructive coronary artery disease.  She was started on antibiotics for her pneumonia and IV diuretics which improved symptoms.  She states her cough has improved.  The most recent evaluation of Her ejection fraction was 37% from an  echo on 10/30/2017.  Her past medical history is also significant for hypertension and dyslipidemia.      Since being discharged from the hospital, May feels that she is improving.She continues to have fatigue, dyspnea on exertion, and dizziness. Her dizziness is not new and has been present for 20 years due to inner ear surgery.  She complains of chest tightness on occasion when it is difficult for her to breathe with activity.  Denies shortness of breath, orthopnea, PND, palpitations, abdominal fullness/bloating and lower extremity edema.      Leora Sanchez s weight at discharge was 172 pounds.  She is not monitoring home weights due to not having a scale. Her clinic weight today is 173 pounds. She is following a low sodium diet.      Medication reconciliation was done today.    Review of Systems:   General: Night Sweats  Eyes: Visual Distubance  Ears/Nose/Throat: Hearing Loss  Lungs: WNL  Heart: WNL  Stomach: WNL  Bladder: WNL  Muscle/Joints: Muscle Weakness  Skin: WNL  Nervous System: Daytime Sleepiness, Dizziness  Mental Health: Depression, Anxiety     Blood: WNL    Patient Active Problem List   Diagnosis     Ganglion cyst of left foot     Essential hypertension     Non morbid obesity due to excess calories     Heartburn     Migraine with aura     Bilateral dry eyes     Mixed incontinence     Hyperlipemia     Precordial pain     Hypokalemia     Accelerating angina     Dysidria     Nonischemic cardiomyopathy     Heart failure with reduced ejection fraction       Past Medical History:   Diagnosis Date     Anxiety      CHF (congestive heart failure)      Depression      Hyperlipidemia      Hypertension      Pregnancy           Spontaneous       TM (tympanic membrane disorder)        Past Surgical History:   Procedure Laterality Date     CV LEFT HEART CATHETERIZATION WO LEFT VETRICULOGRAM Left 10/31/2017    Procedure: Left Heart Catheterization Without Left Ventriculogram;  Surgeon: Jonathan Duque MD;  Location: [facility] Cath Lab;  Service:      DILATION AND CURETTAGE OF UTERUS  2010     INNER EAR SURGERY Right 1994     MASTOIDECTOMY Right 1996     VT CATH PLACEMENT & NJX CORONARY ART ANGIO IMG S&I N/A 10/31/2017    Procedure: Coronary Angiogram;  Surgeon: Jonathan Duque MD;  Location: [facility] Cath Lab;  Service: Cardiology       Family History   Problem Relation Age of Onset     Diabetes Mother      Hypertension Mother      Uterine cancer Mother      Diverticulitis Mother      No Medical Problems Father      No Medical Problems Sister      No Medical Problems Daughter      No Medical Problems Son        Social History     Social History     Marital status:      Spouse name: N/A     Number of children: 8     Years of education: N/A     Occupational History     [employer] Not Employed     For chronic headaches since ear surgery     Social History Main Topics     Smoking status: Never Smoker     Smokeless tobacco: Never Used     Alcohol use No     Drug use: No     Sexual activity: Yes     Partners: Male     Birth control/ protection: None     Other Topics Concern     Not on file     Social History Narrative    Lives with  who can read and speak English and helps her with meds       Current Outpatient Prescriptions   Medication Sig Dispense Refill     amLODIPine (NORVASC) 10 MG tablet Take 10 mg by mouth daily. TXHUA HNUB NOJ 1 LUB TSHUAJ PAB YANET NTSHAV SIAB       artificial tears, hypromellose, (GONIOLSOL) 2.5 % ophthalmic solution Administer 1-2 drops to both eyes 4 (four) times a day as needed.       atorvastatin (LIPITOR) 80 MG tablet Take 1 tablet (80 mg total) by mouth daily. 90 tablet 3      cetirizine (ZYRTEC) 10 MG tablet Take 10 mg by mouth every evening.        fluticasone (FLONASE) 50 mcg/actuation nasal spray 1 spray into each nostril daily as needed.       furosemide (LASIX) 40 MG tablet Take 1 tablet (40 mg total) by mouth daily. 90 tablet 3     isosorbide mononitrate (IMDUR) 30 MG 24 hr tablet Take 1 tablet (30 mg total) by mouth daily. 30 tablet 6     metoprolol succinate (TOPROL-XL) 100 MG 24 hr tablet Take 1 tablet (100 mg total) by mouth daily. 90 tablet 3     nitroglycerin (NITROSTAT) 0.4 MG SL tablet Place 1 tablet (0.4 mg total) under the tongue every 5 (five) minutes as needed for chest pain. 30 tablet 1     nortriptyline (PAMELOR) 25 MG capsule Take 25 mg by mouth at bedtime. TXHUA HMO THAUM MUS PW NOJ 1 LUB.       VENTOLIN HFA 90 mcg/actuation inhaler Inhale 1-2 puffs every 4 (four) hours as needed.       aspirin 81 MG EC tablet Take 1 tablet (81 mg total) by mouth daily. 30 tablet 6     cyclobenzaprine (FLEXERIL) 10 MG tablet Take 1 tablet (10 mg total) by mouth 2 (two) times a day as needed for muscle spasms. 60 tablet 1     lisinopril (PRINIVIL,ZESTRIL) 2.5 MG tablet Take 1 tablet (2.5 mg total) by mouth daily. 30 tablet 11     MAPAP EXTRA STRENGTH 500 mg tablet Take 500-1,000 mg by mouth every 6 (six) hours as needed.   5     omeprazole (PRILOSEC) 20 MG capsule Take 1 capsule (20 mg total) by mouth daily. 90 capsule 4     senna-docusate (PERICOLACE) 8.6-50 mg tablet Take 1 tablet by mouth daily as needed for constipation.  0     No current facility-administered medications for this visit.        No Known Allergies    Objective:     Vitals:    11/14/17 0907   BP: 130/80   Pulse:    Resp:      Wt Readings from Last 3 Encounters:   11/14/17 173 lb (78.5 kg)   11/03/17 169 lb (76.7 kg)   11/01/17 172 lb 1.6 oz (78.1 kg)       General Appearance:   Alert, cooperative and in no acute distress.   HEENT:  No scleral icterus; the mucous membranes were pink and moist.   Neck: JVP normal. No  HJR   Chest: The spine was straight. The chest was symmetric.   Lungs:   Respirations unlabored; the lungs are clear to auscultation.   Cardiovascular:   Regular rhythm. S1 and S2 without murmur, clicks or rubs. Radial and posterior tibial pulses are intact and symmetrical.    Abdomen:  Soft, nontender, nondistended, bowel sounds present   Extremities: No cyanosis or edema.   Skin: No xanthelasma.   Neurologic: Mood and affect are appropriate.         Lab Review   Lab Results   Component Value Date    CREATININE 0.76 11/03/2017    BUN 23 (H) 11/03/2017     11/03/2017    K 3.8 11/03/2017     11/03/2017    CO2 26 11/03/2017     Lab Results   Component Value Date     (H) 10/30/2017     BNP (pg/mL)   Date Value   10/30/2017 469 (H)   07/28/2017 172 (H)     Creatinine (mg/dL)   Date Value   11/03/2017 0.76   11/01/2017 0.64   10/30/2017 0.62   07/31/2017 0.86       Cardiographics  Echocardiogram: 10/30/2017  Summary     Left Ventricle: The calculated left ventricular ejection fraction is 37%. This represents a moderately decreased ejection fraction. Cavity is mildly increased. E/e' > 15, suggesting elevated LV filling pressures.    Right Ventricle: Normal size and systolic function. TAPSE is normal,    Estimated central venous pressure equal to 3 mmHg.    No tricuspid valve regurgitation. Pulmonary artery pressure could not be obtained.    No previous study for comparison.         Coronary angiogram 10/31/2017  May MARICHUY Sanchez is a 47 y.o. old female with HTN and CMP EF ~37 who is here for w/u of CHF and CP     Findings:  LM:long, mildly irregular  LAD:50% mid-vessel narrowing immediately proximal to a moderate sized D branch. FFR 0.94 rest 0.83 w/ adenosine x3 mins  Lcx:large, mildly irregular  RCA:dominant, 10-30% diffuse mild irregularity     LVEDP:28     Access:  R Radial artery     Closure:   VascBand     Impression/plan:  Pt. is a 47 y.o. old female with HTN and CMP EF ~37 who is here for w/u of CHF  and CP.  - no obstructive CAD w/ ~50% mid-LAD lesion that was FFR-negative w/ a value of 0.83. Elevated L-sided filling pressures  - will continue losaran/HCTZ, imdur, amlodipine, increase furosemide to 40mg daily  - continue ASA 81mg daily , ongoing w/u for possible causes of her CMP, could consider MRI viability/ischemia assessment  - continue aggressive risk factor modification    40 minutes were face to face spent with the patient with greater than 50% spent on education and counseling.      Sendy Banda, [facility]   Heart Failure Clinic

## 2021-06-14 NOTE — PROGRESS NOTES
Clinic Care Coordination Contact    Follow Up Progress Note      Assessment: RN CC outreach via interpretive services  Patient only spoke briefly as she noted she was not feeling well and wanted to rest, attempt to assess and offer to schedule follow up with PCP , patient declined      Patient reports that she does not use the meter at home that she purchases as it does not seem to be accuate an that she has spoke with PCP about managing and would prefer to rest.     RN CC followed up with  and he is available to support patient,  RN CC discussed options for getting monitor and coverage status  Patient  will follow up with medicare navigator to discuss if still an option to add part D to plan, will report back to CCC team    Discussed follow up with DE to support understanding, declined at this time -RN CC will offer consideration at nextr outreach to continue to review options    Medication not reconcile during call as patient wanted to rest  RN CC will review at next outreach    RN CC asked that patient or  call [facility] with update following call with senior linkage line and schedule follow up with SW and RN          Goals addressed this encounter:   Goals Addressed                 This Visit's Progress       Patient Stated      Other (pt-stated)   10%     Goal Statement: I would like to discuss with CCC team seeking advise for a blood pressure monitor and glucose monitor within the next 2-4 weeks.     Date Goal set: 1-  Barriers: Language  Strengths: spouse  Date to Achieve By: 4-  Patient expressed understanding of goal: yes  Action steps to achieve this goal:  1. I will take med as my  set it into my pill/med box for me.  2. I will attend phone visit appt scheduled today, 1- at 3:00PM with CCC RN to discuss MEV and seeking advise for a blood pressure and glucose monitors.   3. I will follow CCC RN recommendation.   4. I will connect with PCP as needed.   4  I will  connect with ccc team for updates.     01/11/21  RN spoke with patient and spouse -pt states meter that she purchased OOP is not very good.   Discussed options - patient  will follow up on coverage and follow up  at next outreach           Problem Solving (pt-stated)        Goal Statement: I want to have a Medicare Part D plan within 1 month.    Date Goal set: 12/10/20  Barriers: Language  Strengths:   Date to Achieve By: 1/30/2020  Patient expressed understanding of goal: Yes  Action steps to achieve this goal:  1. I will shared with CCC RN today, 1- at 3:00PM that my  did called Senior Linkage Line and spoke with an Medicare Navigator about Part D plans and it was denied due to my income is over.   2. I will check with the CCC RN today for any other option and possible to discuss any pharmacy program.   01/11/21  RN CC spoke with   and patient, shared contact inforamtion and action steps.   will call and report back to CCC team with update             Plan:   Patient and  will call senior linkage line and discuss coverage  Patient and  will follow up with [facility] team to support goals and plan of care   Community Health Worker to outreach per standard work and updated on goal progression      RN CC will outreach in 4-6 weeks to support ongoing recommendations and plan of care will be available sooner if needed.

## 2021-06-14 NOTE — PROGRESS NOTES
Clinic Care Coordination Contact    Follow Up Progress Note      Assessment: RN CC outreach and spoke with patient   Patient states that she does have her medications and that  sets up medication box for her   was not home, patient unable to review medications    Patient notes that she got a new glucometer yesterday, pharmacy was able to support understanding of use  Patient has not used at home yet, RN CC offered to support initial use at home, patient declined - will wait for  to come home   RN CC asked for patient to record reading to share with care team at next outreach - patient verbalized understanding  Chart review notes patient missed visit with [facility] to discuss insurance coverage for medication - patient recalls missed call,open to reschedule, RN CC recommended  also join call to support understanding - patient in agreement - reschedule visit    Reviewed up coming visits scheduled    RN CC schedule follow up to review findings and goal progression            Goals addressed this encounter:   Goals Addressed                 This Visit's Progress       Patient Stated      Other (pt-stated)   20%     Goal Statement: I will develop regular use of glucometer and blood pressure monitor within 2 months      Date Goal set: 1-  Barriers: Language  Strengths: informal supports - spouse  Date to Achieve By: 4-  Patient expressed understanding of goal: yes  Action steps to achieve this goal:  1. I will purchase BP machine and use and record findings to care team   2. I will use newly purchased glucometer and record findings and report to care team   3. report to my Community Health Worker if any additional resources or support needed.    01/11/21  RN spoke with patient and spouse -pt states meter that she purchased OOP is not very good.   Discussed options - patient  will follow up on coverage and follow up  at next outreach     01/25/21  Patient states that recently  purchased Glucometer but has not used it yet.  Rn CC offered to support understanding of use - patient states that pharmacy showed them - does not need any additional instructions  Will report reading to RN with next outreach          Problem Solving (pt-stated)   10%     Goal Statement: I want to have a Medicare Part D plan within 1 month.    Date Goal set: 12/10/20  Barriers: Language  Strengths: informal support   Date to Achieve By: 1/30/2020  Patient expressed understanding of goal: Yes  Action steps to achieve this goal:  1. I will shared with [facility] RN today, 1- at 3:00PM that my  did called [facility] Line and spoke with an Medicare Navigator about Part D plans and it was denied due to my income is over.   2. I will check with the CCC RN today for any other option and possible to discuss any pharmacy program.   01/11/21  RN CC spoke with   and patient, shared contact inforamtion and action steps.   will call and report back to CCC team with update    01/25/21  Patient missed call with SW CC, rescheduled for 2/1   was not home at time of call - no new updates              Plan:     Patient to use new glucometer to monitor blood sugars and review finding with care team   Patient to follow recommendations for medication dosing  Patient and  will speak with SW CC to support medication coverage concern   Community Health Worker to outreach per standard work and updated on goal progression      RN CC will outreach in 4-6 weeks to support ongoing recommendations and plan of care will be available sooner if needed.

## 2021-06-14 NOTE — PROGRESS NOTES
[facility] Office Visit    Chief Complaint:  Chief Complaint   Patient presents with     Follow-up     ER on 10/29, chest pain, short of breath, heart beats rapidly sometimes, requesting medication for pneumonia         Assessment/Plan:  1. Need for immunization against influenza  - Influenza, Seasonal Quad, Preservative Free 36+ Months    2. Essential hypertension  BP Readings from Last 3 Encounters:   12/06/17 152/84   11/20/17 96/58   11/14/17 130/80       well controlled today.  Plan for bloodpressure management includes ongoing focus on healthy DASH type diet and increased activity, encouraged to avoid tobacco products and limit alcohol use, stress reduction, daily lisinopril 2.5 mg daily, Imdur 30 mg daily, amlodipine 10 mg daily, metoprolol 100mg daily (did not have this pill bottle with her today),  Needs MTM to review meds- high risk for med mix up and adherence concerns.   Labwork and meds ordered and reviewed as below  Lab Results   Component Value Date    K 3.3 (L) 12/06/2017      Lab Results   Component Value Date    CREATININE 0.66 12/06/2017       - Amb Referral to Medication Management  - Comprehensive Metabolic Panel    3. Chronic systolic heart failure  Follow-up with congestive heart failure clinic next week as scheduled.  Continue ACE inhibitor and beta-blocker, aspirin diuresis and statin therapy.  - Amb Referral to Medication Management  - Comprehensive Metabolic Panel  - BNP(B-type Natriuretic Peptide)  - Thyroid Cascade    4. Mixed hyperlipidemia  Continue high-dose statin therapy atorvastatin 80 mg daily.    5. Nonischemic cardiomyopathy  Plan as above.  Continue to monitor thyroid.  - Thyroid Cascade    6. Dry skin  Check for thyroid issues with her dry skin and heart issues recently getting worse.  - Thyroid Cascade    7. Tachycardia  Patient does have tachycardia noted today in clinic.  She does not have her beta-blocker with her unsure if she is actually taking this  medication or if she just left the bottle at home today.  Continue to monitor.    8. Phlegm in throat  She has a sensation of some phlegm and mucus in the back of her throat.  Wonder about reflux versus dehydration and side effect from medications.  Patient is encouraged to push hydration fluids and consider sucking on hard candy gum chewing etc. to help with salivation.    Return in about 3 months (around 2018) for Recheck.    Patient Education/AVS:  There are no Patient Instructions on file for this visit.    HPI:   Leora Sanchez is a 47 y.o. female c/o f/u from recent hospitalization.  Pt notes that she is under a lot of stress caring for her mother with chronic illness and herself.  Still notes palipiations and fast heart beat.  Still has a lot of mucous for her lungs and wondering if she can get something to help with her phlegm.  Will just have saliva come up from back of her throat.  Did have cousin who  of throat cancer and did see EnT and told everything looked okay.      Did bring in meds to review:  Nortriptyline 25mg hs, lisinopril 2.5mg daily, imdur 30mg daily, omeprazole 20mg daily, amlodipine 10mg daily, cetirizine 10mg daily, furosemide 40mg daily, ntg 0.4mg daily, atorvastatin 80mg daily,     Not feeling dizzy or lightheaded.  Has stable headache.  Feels like heart is shaky.  No chest pain today.  Hasn't taken NTG since she was in the hospital.  When she has mild chest pain her pain goes away with massage to her chest.  Has sleep study scheduled.  No edema or SOB.  Hands and skin has been very dry since leaving hospital.      Took a med 7 years ago from his allergy doctor- was suppose to be for her stomach.  Took 5 days and the stomach got better.  Told taht only 2 pharmacies in [place] will fill this medication.  Would like to have this pill refilled again.      ROS:  Constitutional, CV, Resp, GI, , MSK, skin, neuro, psych all negative except as outlined in the HPI above.    History summarized  from1-2:d/c summary 11/1/17 reviewed- dx with pneumonia with hypoxia and respiratory failure and acute CHF with EF 37%.  Started on lasix 40mg daily.  Metoprolol increased from 50 to 100mg daily due to high bp.  Started on atorvastatin 80mg daily.  Complete course of doxycycline.  ntg prn.  Senn[place]ot prn.    Pulmonary sleep study has been ordered.   Dr Gonsalez 11/3/17- bmp checked and med changes reviewed.   CHF clinic 11/14/17- ongoing htg- continue metoprolol 100mg daily and amlodipine 10mg.  Started on lisinopril 2.5mg daily.     Old Records-1:na  Radiology tests reviewed-1: CXR mild cardiomegaly and pulmonary congestion.  Right perihilar infiltrate.    Lab tests reviewed-1: 2017  Medicine tests reviewed-1: coronary Angio without significant stenosis noted.     Physical Exam:  BP 96/58 (Patient Site: Left Arm, Patient Position: Sitting, Cuff Size: Adult Large)  Pulse (!) 104  Resp 18  Wt 172 lb (78 kg)  LMP 10/16/2017 (Approximate)  BMI 34.16 kg/m2 Body mass index is 34.16 kg/(m^2). Patient's last menstrual period was 10/16/2017 (approximate).  Vital signs reviewed  Wt Readings from Last 3 Encounters:   12/06/17 181 lb (82.1 kg)   11/20/17 172 lb (78 kg)   11/14/17 173 lb (78.5 kg)     History   Smoking Status     Never Smoker   Smokeless Tobacco     Never Used     History   Sexual Activity     Sexual activity: Yes     Partners: Male     Birth control/ protection: None     No Data Recorded  PHQ-9 Total Score: 17 (11/20/2017  5:00 PM)  PHQ-2 Total Score: 5 (11/20/2017  5:00 PM)  Depression Follow-up Plan: mental health care assessment (2/20/2017  3:00 PM)  No Data Recorded    All normal as below except abnormalities include: Patient appears well.  She is here with her mother who also is scheduled to see me today both have chronic medical conditions including hypertension and heart disease.  She is moving around the room without difficulty.  She is alert and oriented ×3 and engaged in history and planning.  She  is somewhat knowledgeable about her medications but is also not totally clear on everything.  Tachycardia noted but heart exam otherwise normal.  Bibasilar atelectasis noted in lung exam otherwise good air entry.  General is a  47 y.o. female sitting comfortably in no apparent distress.   HEENT:  TM are clear bilaterally.  Eye, nasal, oral exams within normal   Neck: Supple without lymphadenopathy or thyromegally  CV: Regular rhythm S1S2 without rubs, murmurs or gallops,   Abd:  +BS, soft NT/ND,  No masses or organomegally  Extremities: Warm, No Edema, 2+ Pedal and radial pulses bilaterally  Skin: No lesions or rashes noted  Neuro/MSK: Able to ambulate around the exam room with equal movement, strength and normal coordination of the upper and lower extremeties symmetrically    Results for orders placed or performed in visit on 11/20/17   Comprehensive Metabolic Panel   Result Value Ref Range    Sodium 147 (H) 136 - 145 mmol/L    Potassium 3.3 (L) 3.5 - 5.0 mmol/L    Chloride 108 (H) 98 - 107 mmol/L    CO2 19 (L) 22 - 31 mmol/L    Anion Gap, Calculation 20 (H) 5 - 18 mmol/L    Glucose 164 (H) 70 - 125 mg/dL    BUN 19 8 - 22 mg/dL    Creatinine 0.74 0.60 - 1.10 mg/dL    GFR MDRD Af Amer >60 >60 mL/min/1.73m2    GFR MDRD Non Af Amer >60 >60 mL/min/1.73m2    Bilirubin, Total 0.6 0.0 - 1.0 mg/dL    Calcium 8.9 8.5 - 10.5 mg/dL    Protein, Total 7.2 6.0 - 8.0 g/dL    Albumin 3.6 3.5 - 5.0 g/dL    Alkaline Phosphatase 77 45 - 120 U/L    AST 20 0 - 40 U/L    ALT 23 0 - 45 U/L   BNP(B-type Natriuretic Peptide)   Result Value Ref Range    BNP 10 0 - 69 pg/mL   Thyroid Cascade   Result Value Ref Range    TSH 1.63 0.30 - 5.00 uIU/mL       Med list and active problem list reviewed and updated as part of this encounter    Current Outpatient Prescriptions on File Prior to Visit   Medication Sig Dispense Refill     amLODIPine (NORVASC) 10 MG tablet Take 10 mg by mouth daily. TXHUA HNUB NOJ 1 LUB TSHUAJ PAB YANET NTSHAV SIAB        artificial tears, hypromellose, (GONIOLSOL) 2.5 % ophthalmic solution Administer 1-2 drops to both eyes 4 (four) times a day as needed.       aspirin 81 MG EC tablet Take 1 tablet (81 mg total) by mouth daily. 30 tablet 6     atorvastatin (LIPITOR) 80 MG tablet Take 1 tablet (80 mg total) by mouth daily. 90 tablet 3     cetirizine (ZYRTEC) 10 MG tablet Take 10 mg by mouth every evening.        cyclobenzaprine (FLEXERIL) 10 MG tablet Take 1 tablet (10 mg total) by mouth 2 (two) times a day as needed for muscle spasms. 60 tablet 1     fluticasone (FLONASE) 50 mcg/actuation nasal spray 1 spray into each nostril daily as needed.       isosorbide mononitrate (IMDUR) 30 MG 24 hr tablet Take 1 tablet (30 mg total) by mouth daily. 30 tablet 6     MAPAP EXTRA STRENGTH 500 mg tablet Take 500-1,000 mg by mouth every 6 (six) hours as needed.   5     metoprolol succinate (TOPROL-XL) 100 MG 24 hr tablet Take 1 tablet (100 mg total) by mouth daily. 90 tablet 3     nitroglycerin (NITROSTAT) 0.4 MG SL tablet Place 1 tablet (0.4 mg total) under the tongue every 5 (five) minutes as needed for chest pain. 30 tablet 1     nortriptyline (PAMELOR) 25 MG capsule Take 25 mg by mouth at bedtime. TXHUA HMO THAUM MUS PW NOJ 1 LUB.       omeprazole (PRILOSEC) 20 MG capsule Take 1 capsule (20 mg total) by mouth daily. 90 capsule 4     senna-docusate (PERICOLACE) 8.6-50 mg tablet Take 1 tablet by mouth daily as needed for constipation.  0     VENTOLIN HFA 90 mcg/actuation inhaler Inhale 1-2 puffs every 4 (four) hours as needed.       No current facility-administered medications on file prior to visit.          Jaky Gaspar MD

## 2021-06-14 NOTE — PROGRESS NOTES
Clinic Care Coordination Contact  Care Team Conversations     SW attempted to reach May with an  to follow up on if they were able to get Medicare Part D with the help of [employer]. May didn't answer, GEO left voicemail. May has an appointment with CCC RN on 1/25/2021

## 2021-06-14 NOTE — PROGRESS NOTES
Medication Therapy Management Follow Up Visit       ASSESSMENT AND PLAN    1. Exertional angina  No symptoms. Refilled:  - aspirin 81 MG EC tablet; Take 1 tablet (81 mg total) by mouth daily.  Dispense: 30 tablet; Refill: 6  - isosorbide mononitrate (IMDUR) 30 MG 24 hr tablet; Take 1 tablet (30 mg total) by mouth daily.  Dispense: 30 tablet; Refill: 6    2. Nonischemic cardiomyopathy  On appropriate therapy. Patient mistakenly did not yet increase lisinopril or start KCl as directed. Per pharmacy, these were dispensed on 12/6, she should have a large supply of each at home. Bottle of 2.5 mg lisinopril marked. She was encouraged to find the other medications and start both. Patient may be a good Entresto candidate.     3. Essential hypertension  Uncontrolled - due to not taking amlodipine and incorrect strength of lisinopril.   -D/C Amlodipine (will plan to maximize other therapies first)  -Increase lisinopril to 5 mg daily as prescribed    4. Cough  Possibly could be related to ACEI, however, timeline doesn't quite fit. Cough started while on losartan pre-hospitalization, which has lower likelihood for cough side effect due to lesser effect on bradykinin. Cough did not change when changed to lisinopril. Continue to monitor. Consider trial off (or change to valsartan) if cough does not resolve over time, or worsens with ACEI dose increases. Mucus undergoing workup.   -- guaiFENesin ER (MUCINEX) 600 mg 12 hr tablet; 1-2 tablets every 12 hours as needed for mucus  Dispense: 120 tablet; Refill: 0    5. Medication management  Difficulty managing medication changes. Given pillbox. Instructed on appropriate use.     FOLLOW-UP PLAN  May was advised to follow up in 6 weeks.        SUBJECTIVE AND OBJECTIVE  Leora Sanchez is a 47 y.o. female who was referred by Jaky Gaspar MD for MTM services.  May's chief concern today is medication management.   is accompanied by a [facility]  and her  "mother.     She did bring her medications today which are administered from the bottles daily by her . Brought the following: Atorvastatin 80 mg, lasix 40 mg, imdur 30 mg (bottle empty - \"last took this morning\"), lisinopril 2.5 mg, metoprolol  mg, nortriptyline 25 mg, and omeprazole 20 mg. Of note, she is missing aspirin 81 mg tablet (\"ran out yesterday\"), KCl (recently started), amlodipine (\"haven't had for many months\") and the increased lisinopril dose (5 mg).     Discharged on 11/1/17 s/p new diagnosis of CHF w/ reduced EF. Following with cardiology. No SOB today. No chest pain. No dizziness. No edema noted today.    She complains of dry cough that started in July. \"Different type of cough than my acid reflux cough\". This is very bothersome. She also complains of \"stick and soapy saliva' that has been present for many months. She has tried cough syrups from OTC with moderate success. States this is being worked up by her PCP.     BP Readings from Last 3 Encounters:   12/15/17 140/82   12/06/17 152/84   11/20/17 96/58     Current Outpatient Prescriptions   Medication Sig Dispense Refill     artificial tears, hypromellose, (GONIOLSOL) 2.5 % ophthalmic solution Administer 1-2 drops to both eyes 4 (four) times a day as needed.       aspirin 81 MG EC tablet Take 1 tablet (81 mg total) by mouth daily. 30 tablet 6     atorvastatin (LIPITOR) 80 MG tablet Take 1 tablet (80 mg total) by mouth daily. 90 tablet 3     cetirizine (ZYRTEC) 10 MG tablet Take 10 mg by mouth every evening.        fluticasone (FLONASE) 50 mcg/actuation nasal spray 1 spray into each nostril daily as needed.       furosemide (LASIX) 40 MG tablet Take 1 tablet (40 mg total) by mouth daily. 90 tablet 3     guaiFENesin ER (MUCINEX) 600 mg 12 hr tablet 1-2 tablets every 12 hours as needed for mucus 120 tablet 0     isosorbide mononitrate (IMDUR) 30 MG 24 hr tablet Take 1 tablet (30 mg total) by mouth daily. 30 tablet 6     lisinopril " (PRINIVIL,ZESTRIL) 5 MG tablet Take 1 tablet (5 mg total) by mouth daily. 30 tablet 11     MAPAP EXTRA STRENGTH 500 mg tablet Take 500-1,000 mg by mouth every 6 (six) hours as needed.   5     metoprolol succinate (TOPROL-XL) 100 MG 24 hr tablet Take 1 tablet (100 mg total) by mouth daily. 90 tablet 3     nitroglycerin (NITROSTAT) 0.4 MG SL tablet Place 1 tablet (0.4 mg total) under the tongue every 5 (five) minutes as needed for chest pain. 30 tablet 1     nortriptyline (PAMELOR) 25 MG capsule Take 25 mg by mouth at bedtime. TXHUA HMO THAUM MUS PW NOJ 1 LUB.       omeprazole (PRILOSEC) 20 MG capsule Take 1 capsule (20 mg total) by mouth daily. 90 capsule 4     potassium chloride SA (K-DUR,KLOR-CON) 10 MEQ tablet Take 1 tablet (10 mEq total) by mouth daily. 90 tablet 3     senna-docusate (PERICOLACE) 8.6-50 mg tablet Take 1 tablet by mouth daily as needed for constipation.  0     VENTOLIN HFA 90 mcg/actuation inhaler Inhale 1-2 puffs every 4 (four) hours as needed.       No current facility-administered medications for this visit.        We reviewed May's medication list with them, discussing reason for use, directions for use, and potential side effects of each medication.  Indication, safety, efficacy, and convenience was assessed for all reviewed medications.      May was provided with a printed AVS, and this care plan was communicated via EMR with her primary care provider, Jaky Gaspar MD, and is the authorizing prescriber for this visit.  Direct supervision was available by either the patient's PCP or another available physician when needed.    This note has been dictated using voice recognition software. Any grammatical or context distortions are unintentional and inherent to the software.       Time spent: 45 minutes    Ariane Ly, PharmD  MTM Pharmacist at [facility]

## 2021-06-14 NOTE — PROGRESS NOTES
Patient seen in clinic for HF education s/p recent hospital discharge 10/31/17  Reviewed HF Binder that includes the  HF Sx Awareness & Action plan  handout and  A Stronger Pump  booklet and Weight log booklet highlighting :  __X_patient s type of heart failure _X__Na management in diet  __X_importance of daily wts  _X__Fluid Guidelines, if applicable  __X_medication review and importance of compliance     Instructed patient in signs and sx of heart failure, reiterated when to call clinic - reviewed HF hotline # [phone] and after hours call # [phone].  Majority of time was spent reviewing: Reviewing low sodium diet. Patient will buy a scale and have  review all written education. All education was done with the use of  services.   Patient verbalized understanding of HF discussion.  Plan for f/u with continued HF education reviewed.  Patient will f/up with HF CNP in 3 weeks and Dr. Roy in 6 weeks.

## 2021-06-14 NOTE — PROGRESS NOTES
Assessment/Plan:     1. Nonischemic cardiomyopathy with systolic dysfunction, NYHA class II: Leora Sanchez appears well compensated.  No signs of fluid retention today.  She continues to have chronic productive cough and dyspnea on exertion.  I encouraged her to follow-up with her primary doctor regarding this productive cough.  She denies any fevers or chills.  She is not weighing herself on a daily basis which I recommend that she start doing again.  She is following a low sodium diet.  I increased her lisinopril to 10 mg daily.  BMP pending.      Heart failure treatment includes:  - Beta blocker therapy with metroprolol succinate 100 mg daily  - ACEI therapy with lisinopril 10 mg daily  - Diuretic therapy with furosemide 40 mg daily    Follow-up with Dr. Roy on January 17 as scheduled in the heart failure clinic in 8 weeks or sooner if needed    Subjective:     Leora Sanchez is seen at [facility] heart failure clinic today for continued follow-up.  She follows up for nonischemic cardiomyopathy with systolic dysfunction.  Her most recent echocardiogram was done October 30, 2017 which showed an ejection fraction at 37% and elevated LV filling pressures.  She had a coronary angiogram on October 31, 2017 which showed nonobstructive CAD with approximately 50% mid LAD lesion.  She has a past medical history significant for hypertension and hyperlipidemia.    During the last clinic visit, I increased her lisinopril to 5 mg daily.  She states she tolerated this well.  I also started her on potassium supplement due to hypokalemia.  He continues to have a productive cough and states that the guaifenesin that the pharmacist ordered did not help.  She denies any fevers or chills.  She continues to have fatigue and dyspnea on exertion.  She denies any other acute heart failure symptoms.  She denies shortness of breath, orthopnea, PND, palpitations, chest pain, abdominal fullness/bloating and lower extremity edema.       She is not monitoring home weights.  Her clinic weight is down 4 pounds since our last visit.  She is following a low sodium diet.       Review of Systems:   General: WNL  Eyes: Visual Distubance  Ears/Nose/Throat: Hearing Loss  Lungs: Cough  Heart: WNL  Stomach: WNL  Bladder: WNL  Muscle/Joints: WNL  Skin: WNL  Nervous System: WNL  Mental Health: WNL     Blood: WNL     Patient Active Problem List   Diagnosis     Ganglion cyst of left foot     Essential hypertension     Non morbid obesity due to excess calories     Heartburn     Migraine with aura     Bilateral dry eyes     Mixed incontinence     Hyperlipemia     Accelerating angina     Dysidria     Nonischemic cardiomyopathy     Heart failure with reduced ejection fraction       Past Medical History:   Diagnosis Date     Anxiety      CHF (congestive heart failure)      Depression      Hyperlipidemia      Hypertension      Pregnancy          Spontaneous       TM (tympanic membrane disorder)        Past Surgical History:   Procedure Laterality Date     CV LEFT HEART CATHETERIZATION WO LEFT VETRICULOGRAM Left 10/31/2017    Procedure: Left Heart Catheterization Without Left Ventriculogram;  Surgeon: Jonathan Duque MD;  Location: [facility] Cath Lab;  Service:      DILATION AND CURETTAGE OF UTERUS  2010     INNER EAR SURGERY Right      MASTOIDECTOMY Right      MI CATH PLACEMENT & NJX CORONARY ART ANGIO IMG S&I N/A 10/31/2017    Procedure: Coronary Angiogram;  Surgeon: Jonathan Duque MD;  Location: [facility] Cath Lab;  Service: Cardiology       Family History   Problem Relation Age of Onset     Diabetes Mother      Hypertension Mother      Uterine cancer Mother      Diverticulitis Mother      No Medical Problems Father      No Medical Problems Sister      No Medical Problems Daughter      No Medical Problems Son        Social History     Social History     Marital status:      Spouse name: N/A     Number of children: 8      Years of education: N/A     Occupational History     [employer] Not Employed     For chronic headaches since ear surgery     Social History Main Topics     Smoking status: Never Smoker     Smokeless tobacco: Never Used     Alcohol use No     Drug use: No     Sexual activity: Yes     Partners: Male     Birth control/ protection: None     Other Topics Concern     Not on file     Social History Narrative    Lives with  who can read and speak English and helps her with meds       Current Outpatient Prescriptions   Medication Sig Dispense Refill     artificial tears, hypromellose, (GONIOLSOL) 2.5 % ophthalmic solution Administer 1-2 drops to both eyes 4 (four) times a day as needed.       aspirin 81 MG EC tablet Take 1 tablet (81 mg total) by mouth daily. 30 tablet 6     atorvastatin (LIPITOR) 80 MG tablet Take 1 tablet (80 mg total) by mouth daily. 90 tablet 3     cetirizine (ZYRTEC) 10 MG tablet Take 10 mg by mouth every evening.        fluticasone (FLONASE) 50 mcg/actuation nasal spray 1 spray into each nostril daily as needed.       furosemide (LASIX) 40 MG tablet Take 1 tablet (40 mg total) by mouth daily. 90 tablet 3     guaiFENesin ER (MUCINEX) 600 mg 12 hr tablet 1-2 tablets every 12 hours as needed for mucus 120 tablet 0     isosorbide mononitrate (IMDUR) 30 MG 24 hr tablet Take 1 tablet (30 mg total) by mouth daily. 30 tablet 6     MAPAP EXTRA STRENGTH 500 mg tablet Take 500-1,000 mg by mouth every 6 (six) hours as needed.   5     metoprolol succinate (TOPROL-XL) 100 MG 24 hr tablet Take 1 tablet (100 mg total) by mouth daily. 90 tablet 3     nitroglycerin (NITROSTAT) 0.4 MG SL tablet Place 1 tablet (0.4 mg total) under the tongue every 5 (five) minutes as needed for chest pain. 30 tablet 1     nortriptyline (PAMELOR) 25 MG capsule Take 25 mg by mouth at bedtime. TXHUA HMO THAUM MUS PW NOJ 1 LUB.       omeprazole (PRILOSEC) 20 MG capsule Take 1 capsule (20 mg total) by mouth daily. 90 capsule 4     potassium  chloride SA (K-DUR,KLOR-CON) 10 MEQ tablet Take 1 tablet (10 mEq total) by mouth daily. 90 tablet 3     senna-docusate (PERICOLACE) 8.6-50 mg tablet Take 1 tablet by mouth daily as needed for constipation.  0     VENTOLIN HFA 90 mcg/actuation inhaler Inhale 1-2 puffs every 4 (four) hours as needed.       lisinopril (PRINIVIL,ZESTRIL) 10 MG tablet Take 1 tablet (10 mg total) by mouth daily. 30 tablet 11     No current facility-administered medications for this visit.        No Known Allergies    Objective:     Vitals:    12/21/17 1529   BP: 120/82   Pulse: 68   Resp: 20     Wt Readings from Last 3 Encounters:   12/21/17 177 lb (80.3 kg)   12/06/17 181 lb (82.1 kg)   11/20/17 172 lb (78 kg)       General Appearance:   Alert, cooperative and in no acute distress.   HEENT:  No scleral icterus; the mucous membranes were pink and moist.   Neck: JVP normal. No HJR.   Chest: The spine was straight. The chest was symmetric.   Lungs:   Respirations unlabored; the lungs are clear to auscultation.   Cardiovascular:   Regular rhythm. S1 and S2 without murmur, clicks or rubs. Radial and posterior tibial pulses are intact and symmetrical.    Abdomen:  Soft, nontender, nondistended, bowel sounds present   Extremities: No cyanosis, clubbing, or edema.   Skin: No xanthelasma.   Neurologic: Mood and affect are appropriate.         Lab Review   BMP pending          Cardiographics  Echocardiogram: 10/30/2017  Summary     Left Ventricle: The calculated left ventricular ejection fraction is 37%. This represents a moderately decreased ejection fraction. Cavity is mildly increased. E/e' > 15, suggesting elevated LV filling pressures.    Right Ventricle: Normal size and systolic function. TAPSE is normal,    Estimated central venous pressure equal to 3 mmHg.    No tricuspid valve regurgitation. Pulmonary artery pressure could not be obtained.    No previous study for comparison.         Coronary angiogram 10/31/2017  Conclusion          Estimated blood loss was <20 ml.    The LM vessel was injected.    The LAD vessel was injected .    Mid LAD lesion 50% stenosed.    Pressure wire/FFR was performed on the lesion. pre diagnositic: 0.94. post diagnostic: 0.83.    Pressure wire/FFR was performed on the lesion.    The left circumflex was injected.    The RCA was injected.      Leora Sanchez is a 47 y.o. old female with HTN and CMP EF ~37 who is here for w/u of CHF and CP      Findings:  LM:long, mildly irregular  LAD:50% mid-vessel narrowing immediately proximal to a moderate sized D branch. FFR 0.94 rest 0.83 w/ adenosine x3 mins  Lcx:large, mildly irregular  RCA:dominant, 10-30% diffuse mild irregularity      LVEDP:28            25 minutes were spent face to face with the patient with greater than 50% spent on education and counseling.      Sendy Banda, [facility]   Heart Failure Clinic

## 2021-06-14 NOTE — TELEPHONE ENCOUNTER
I spoke with patient's  regarding a Glucometer and Blood Pressure Monitor. He spoke with a Medicare Navigator and the Pharmacy, he is wondering if a [facility] these two items [facility] He was informed if it goes to one of those it would be covered. Please assist, thank you!!

## 2021-06-14 NOTE — PROGRESS NOTES
Clinic Care Coordination Contact:    Community Health Worker Follow Up    Goals:   Goals Addressed                 This Visit's Progress      Other (pt-stated)        Goal Statement: I would like to discuss with CCC team seeking advise for a blood pressure monitor and glucose monitor within the next 2-4 weeks.     Date Goal set: 1-  Barriers: Language  Strengths: spouse  Date to Achieve By: 4-  Patient expressed understanding of goal: yes  Action steps to achieve this goal:  1. I will take med as my  set it into my pill/med box for me.  2. I will attend phone visit appt scheduled today, 1- at 3:00PM with CCC RN to discuss MEV and seeking advise for a blood pressure and glucose monitors.   3. I will follow CCC RN recommendation.   4. I will connect with PCP as needed.   4  I will connect with ccc team for updates.           Problem Solving (pt-stated)   10%     Goal Statement: I want to have a Medicare Part D plan within 1 month.    Date Goal set: 12/10/20  Barriers: Language  Strengths:   Date to Achieve By: 1/30/2020  Patient expressed understanding of goal: Yes  Action steps to achieve this goal:  1. I will shared with CCC RN today, 1- at 3:00PM that my  did called [facility] and spoke with an Medicare Navigator about Part D plans and it was denied due to my income is over.   2. I will check with the CCC RN today for any other option and possible to discuss any pharmacy program.     (Updated: 1-; MX)          Discussion: The CHW was able to connect with the pt today. Explain reason of called. Goal updated. Pt add a new goal and pt agreed to appt with CCC RN. Pt is scheduled today, 1- at 3:00PM with CCC RN (Note: CCC RN is updated). Pt declined to speak with a triage nurse regarding vision and pt reported below. Pt confirmed that she will connect with 911 emergency line for help or go to the ED/hospital if need and family is home.     Patient  reported:   -spouse set up med/pill box.   -don't know my med.   -had ear surgery 30 years ago and been cause light dizziness but no major concerns.   -Have high blood sugar and high blood pressure. Needs help with getting a BP monitor.   -My medical insurance not cover blood sugar supplies and testing strips.   -Been having blurring vision for long time due to blood sugar. Still have blurring vision and tolerable; not urgent concerns. Needs help with getting a glucose monitor.   -No other needs at this time.     CHW Next Follow-up: one month following up goals.    CHW Plan: 2-.

## 2021-06-14 NOTE — PROGRESS NOTES
Clinic Care Coordination Contact    Follow Up Progress Note      Assessment: CCC SW attempted to reach May with an . Her  answered, he did not need an .    He reported he spoke with [facility] and May does not qualify for Medicare Part D right now.    He was able to get medications at [facility]. He reported that Medicare Navigator and [facility] said he will need prescriptions for Blood Pressure Monitor and Glucometer sent to [facility] or [facility] to get covered.     SW messaged PCP care pool for support    Goals addressed this encounter:   Goals Addressed                 This Visit's Progress      COMPLETED: Problem Solving (pt-stated)        I do not qualify for Medicare Part D at this time             Intervention/Education provided during outreach: See above     Outreach Frequency: monthly    Plan:   Standard Outreach

## 2021-06-15 NOTE — TELEPHONE ENCOUNTER
Patient called and would like to know if provider is okay to schedule a  VV on Tuesday regarding rt eye drainage with yellow and blood discharge for 1 week. Patient is available on Tuesdays only. Please call patient to advise.

## 2021-06-15 NOTE — TELEPHONE ENCOUNTER
Refill Approved    Rx renewed per Medication Renewal Policy. Medication was last renewed on 9/11/20.    Rojelio Garcia, [facility] Triage/Med Refill 2/18/2021     Requested Prescriptions   Pending Prescriptions Disp Refills     metFORMIN (GLUCOPHAGE-XR) 500 MG 24 hr tablet [Pharmacy Med Name: METFORMIN HCL  MG  Tablet] 90 tablet 1     Sig: TAKE 1 PILL (500 MG TOTAL) BY MOUTH DAILY WITH BREAKFAST/ TXHUA HNUB NOJ 1 LUB NROG TSHAIS PAB ZOO NTSHAV QAB ZIB       Metformin Refill Protocol Passed - 2/17/2021  4:42 PM        Passed - Blood pressure in last 12 months     BP Readings from Last 1 Encounters:   12/15/20 103/67             Passed - LFT or AST or ALT in last 12 months     Albumin   Date Value Ref Range Status   12/15/2020 3.9 3.5 - 5.0 g/dL Final     Bilirubin, Total   Date Value Ref Range Status   12/15/2020 0.8 0.0 - 1.0 mg/dL Final     Bilirubin, Direct   Date Value Ref Range Status   03/29/2020 0.2 <=0.5 mg/dL Final     Alkaline Phosphatase   Date Value Ref Range Status   12/15/2020 74 45 - 120 U/L Final     AST   Date Value Ref Range Status   12/15/2020 24 0 - 40 U/L Final     ALT   Date Value Ref Range Status   12/15/2020 33 0 - 45 U/L Final     Protein, Total   Date Value Ref Range Status   12/15/2020 6.8 6.0 - 8.0 g/dL Final                Passed - GFR or Serum Creatinine in last 6 months     GFR MDRD Non Af Amer   Date Value Ref Range Status   12/15/2020 >60 >60 mL/min/1.73m2 Final     GFR MDRD Af Amer   Date Value Ref Range Status   12/15/2020 >60 >60 mL/min/1.73m2 Final             Passed - Visit with PCP or prescribing provider visit in last 6 months or next 3 months     Last office visit with prescriber/PCP: Visit date not found OR same dept: 8/20/2020 Jaky Gaspar MD OR same specialty: 8/20/2020 Jaky Gaspar MD Last physical: Visit date not found Last MTM visit: Visit date not found         Next appt within 3 mo: Visit date not found  Next physical within 3 mo: Visit  date not found  Prescriber OR PCP: Jaky Gaspar MD  Last diagnosis associated with med order: 1. Type 2 diabetes mellitus with hyperglycemia, without long-term current use of insulin (H)  - metFORMIN (GLUCOPHAGE-XR) 500 MG 24 hr tablet [Pharmacy Med Name: METFORMIN HCL  MG  Tablet]; TAKE 1 PILL (500 MG TOTAL) BY MOUTH DAILY WITH BREAKFAST/ TXHUA HNUB NOJ 1 LUB NROG TSHAIS PAB ZOO NTSHAV QAB ZIB  Dispense: 90 tablet; Refill: 1     If protocol passes may refill for 12 months if within 3 months of last provider visit (or a total of 15 months).           Passed - A1C in last 6 months     Hemoglobin A1c   Date Value Ref Range Status   12/15/2020 6.4 (H) <=5.6 % Final               Passed - Microalbumin in last year      Microalbumin, Random Urine   Date Value Ref Range Status   08/20/2020 <0.50 0.00 - 1.99 mg/dL Final

## 2021-06-15 NOTE — PROGRESS NOTES
[facility] Office Visit    Chief Complaint:  Chief Complaint   Patient presents with     Cough     short of breath, unable to sleep, sore throat, mucus, x2 weeks, painful when eating/swallowing, denies fever     Urinary Incontinence     x3 weeks, patient states sometimes not aware that she peed on herself         Assessment/Plan:  1. Strep throat  Certainly contributing to her symptoms.  Difficult to really determine timing of her symptoms in relationship to each other.  We will treat her strep throat with Bicillin injection per patient preference.  This will certainly help with her sore throat and may help with some of her difficulties at nighttime when she lays down with swallowing and phlegm possibly.  - Rapid Strep A Screen-Throat  - penicillin G benzathine injection 1.2 Million Units (BICILLIN-LA); Inject 2 mL (1.2 Million Units total) into the shoulder, thigh, or buttocks once.    2. Mixed incontinence  Patient is a long-standing history of urinary incontinence that seems to be getting worse.  Once again difficult to assess the timing of the change in the symptoms that this is been more gradual or more sudden change.  Patient has had some high sugars in the past will check A1c which does show some prediabetes.  This certainly can contribute to urinary issues.  No evidence of urinary tract infection noted today.  Certainly her frequent cough and infections may be contributing to her incontinence as well.  Certainly her diuresis may be contributing to once again unclear if she is taking this and if she is taking the 20 or 40 mg dosing.  She is asked to bring her pills to her next appointment with cardiology.  At this point will work on her acute shoes and consider consultation with either urology or OB/GYN.  - Urinalysis-UC if Indicated  - Basic Metabolic Panel  - Glycosylated Hemoglobin A1c    3. Essential hypertension  BP Readings from Last 3 Encounters:   01/04/18 116/78   12/21/17 120/82    12/15/17 140/82       well controlled today.  Plan for bloodpressure management includes ongoing focus on healthy DASH type diet and increased activity, encouraged to avoid tobacco products and limit alcohol use, stress reduction, patient did not bring in her medications for review today.  Did review med list with her through an  he seems to be on isosorbide 30 mg daily, lisinopril probably increased dose of 10 mg daily, metoprolol  mg daily.  Unclear if patient is taking furosemide at all and if she is if she is taking 20 or 40 mg daily.  Labwork and meds ordered and reviewed as below  Lab Results   Component Value Date    K 3.9 01/04/2018      Lab Results   Component Value Date    CREATININE 0.72 01/04/2018       - Basic Metabolic Panel    4. Nonischemic cardiomyopathy  Continue ACE inhibitor, data blocker, aspirin therapy, high-dose statin therapy Lipitor 80 mg daily, isosorbide.  Continue to monitor sugars for diabetes and work on lipid glucose and blood pressure management.  She is not a smoker.    5. Productive cough  Patient has had cough on and off for the last several months may be as long as a year.  Chest x-ray from October hospitalization reviewed showing right perihilar infiltrate.  Repeat chest x-ray today is improved.  Screening for tuberculosis today treatment would be for latent TB if positive.  She does not attend an adult day center in the [place].  Discussed the importance of taking her medications including her diuresis since her cough does seem to get worse at night.  Also discussed the importance of reflux management, watching her intake close to bedtime including limiting her beverages and food intake 2 hours prior to bedtime taking her heartburn medications etc.  - QTF-Mycobacterium tuberculosis by QuantiFERON-TB Gold  - XR Chest 2 Views; Future    6. Prediabetes  Discussed dietary changes increased activity medications etc.  Continue to monitor.  - Glycosylated  Hemoglobin A1c    Administrations This Visit     penicillin G benzathine injection 1.2 Million Units (BICILLIN-LA)     Admin Date Action Dose Route Administered By             01/04/2018 Given 1.2 Million Units Intramuscular Anel Sanchez CMA                        Return in about 3 months (around 4/4/2018) for Recheck.  The following high BMI interventions were performed this visit: encouragement to exercise and lifestyle education regarding diet    Patient Education/AVS:  There are no Patient Instructions on file for this visit.    HPI:   Leora Sanchez is a 47 y.o. female c/o not feeling well for past couple of weeks.      Has had a sore throat and a lot of mucous.  Hard to lay flat in bed due to pain and mucous.  Coughing up a lot of phlegm.  Pt notes productive cough for a long time now.  Notes that when she coughs she will get chest pain and then will use her SL NTG to help with chest pain and this seems to help her breath better.  Pt doesn't think she has ever had a mantoux that she knows of.  Has never been told she has TB that she remembers.  Notes she has been coughing since before her hospitalization in 10/2017 and then got a little better but has come back again in past couple of weeks.  Cough seems to be worse at night when she lays flat.  Has to sleep sitting up otherwise feels like something is coming up out of her chest/throat.  Cough getting worse in past couple of nights.  couging up white sticky mucous- no blood.  Sore throat really bad last couple of days.      Has increased urinary incontinence- has always had a problem leaking urine but last couple of weeks pt finds she will leak urine without any urge to go to the bathroom.  Has to wear a diaper at all times now.  Has checked her sugar with her mom's meter and this morning it was 81.  Hands are much dryer than usual.  No swelling in her feet/legs.      ROS:  Constitutional, CV, Resp, GI, , MSK, skin, neuro, psych all negative except as outlined  in the HPI above.    History summarized from1-2:2/21/17- seen by cardiology and noted to have a chronic productive cough.  Asked to f/u with PMD.  Lisinopril increased from 5 to 10mg.  Continue lasix 40mg daily.    Old Records-1:na  Radiology tests reviewed-1: CXR 10/2017- mild cardiac enlargement and pulmonary congestion.  Right perihilar infiltrate.  Trace effusions.    Lab tests reviewed-1: 2017- normal tSH in past several months.    Medicine tests reviewed-1: na    Physical Exam:  /78 (Patient Site: Left Arm, Patient Position: Sitting, Cuff Size: Adult Regular)  Pulse 100  Temp 98.2  F (36.8  C) (Oral)   Resp 22  Wt 181 lb (82.1 kg)  LMP 12/14/2017 (Approximate)  SpO2 97%  BMI 35.95 kg/m2 Body mass index is 35.95 kg/(m^2). Patient's last menstrual period was 12/14/2017 (approximate).  Vital signs reviewed  Wt Readings from Last 3 Encounters:   01/04/18 181 lb (82.1 kg)   12/21/17 177 lb (80.3 kg)   12/06/17 181 lb (82.1 kg)     History   Smoking Status     Never Smoker   Smokeless Tobacco     Never Used     History   Sexual Activity     Sexual activity: Yes     Partners: Male     Birth control/ protection: None     No Data Recorded  PHQ-9 Total Score: 17 (11/20/2017  5:00 PM)  PHQ-2 Total Score: 5 (11/20/2017  5:00 PM)  Depression Follow-up Plan: mental health care assessment (2/20/2017  3:00 PM)  No Data Recorded    All normal as below except abnormalities include: Patient appears well although tired.  She does have a hoarse quality to her voice.  Tonsils are mildly enlarged and erythematous no exudate noted however.  No lymphadenopathy apathy present.  Remainder of HEENT exam is normal.  No obvious cough noted on examination today but she does seem to clear her throat a lot.  Heart sounds are distant regular but she does seem to have a soft 2 out of 6 murmur systolic.  Decreased breath sounds at the lung bases although lungs do sound clear no obvious inspiratory crackles or wheezing heard.  No  edema noted in her lower extremities today.  General is a  47 y.o. female sitting comfortably in no apparent distress.   HEENT:  TM are clear bilaterally.  Eye, nasal exams within normal   Neck: Supple without lymphadenopathy or thyromegally  CV: Regular rate and rhythm S1S2 without rubs, murmurs or gallops,   Lungs: Clear to auscultation bilaterally  Abd:  +BS, soft NT/ND,  No masses or organomegally  Extremities: Warm, No Edema, 2+ Pedal and radial pulses bilaterally  Skin: No lesions or rashes noted  Neuro/MSK: Able to ambulate around the exam room with equal movement, strength and normal coordination of the upper and lower extremeties symmetrically    Results for orders placed or performed in visit on 01/04/18   Rapid Strep A Screen-Throat   Result Value Ref Range    Rapid Strep A Antigen Group A Strep detected (!) No Group A Strep detected, presumptive negative   Urinalysis-UC if Indicated   Result Value Ref Range    Color, UA Yellow Colorless, Yellow, Straw, Light Yellow    Clarity, UA Clear Clear    Glucose, UA Negative Negative    Bilirubin, UA Negative Negative    Ketones, UA Negative Negative    Specific Gravity, UA 1.015 1.005 - 1.030    Blood, UA Negative Negative    pH, UA 6.0 5.0 - 8.0    Protein, UA Negative Negative mg/dL    Urobilinogen, UA 0.2 E.U./dL 0.2 E.U./dL, 1.0 E.U./dL    Nitrite, UA Negative Negative    Leukocytes, UA Negative Negative   Basic Metabolic Panel   Result Value Ref Range    Sodium 140 136 - 145 mmol/L    Potassium 3.9 3.5 - 5.0 mmol/L    Chloride 104 98 - 107 mmol/L    CO2 29 22 - 31 mmol/L    Anion Gap, Calculation 7 5 - 18 mmol/L    Glucose 112 70 - 125 mg/dL    Calcium 8.7 8.5 - 10.5 mg/dL    BUN 20 8 - 22 mg/dL    Creatinine 0.72 0.60 - 1.10 mg/dL    GFR MDRD Af Amer >60 >60 mL/min/1.73m2    GFR MDRD Non Af Amer >60 >60 mL/min/1.73m2   Glycosylated Hemoglobin A1c   Result Value Ref Range    Hemoglobin A1c 6.4 (H) 3.5 - 6.0 %       Med list and active problem list reviewed  and updated as part of this encounter    Current Outpatient Prescriptions on File Prior to Visit   Medication Sig Dispense Refill     artificial tears, hypromellose, (GONIOLSOL) 2.5 % ophthalmic solution Administer 1-2 drops to both eyes 4 (four) times a day as needed.       aspirin 81 MG EC tablet Take 1 tablet (81 mg total) by mouth daily. 30 tablet 6     atorvastatin (LIPITOR) 80 MG tablet Take 1 tablet (80 mg total) by mouth daily. 90 tablet 3     cetirizine (ZYRTEC) 10 MG tablet Take 10 mg by mouth every evening.        fluticasone (FLONASE) 50 mcg/actuation nasal spray 1 spray into each nostril daily as needed.       guaiFENesin ER (MUCINEX) 600 mg 12 hr tablet 1-2 tablets every 12 hours as needed for mucus 120 tablet 0     isosorbide mononitrate (IMDUR) 30 MG 24 hr tablet Take 1 tablet (30 mg total) by mouth daily. 30 tablet 6     lisinopril (PRINIVIL,ZESTRIL) 10 MG tablet Take 1 tablet (10 mg total) by mouth daily. 30 tablet 11     MAPAP EXTRA STRENGTH 500 mg tablet Take 500-1,000 mg by mouth every 6 (six) hours as needed.   5     metoprolol succinate (TOPROL-XL) 100 MG 24 hr tablet Take 1 tablet (100 mg total) by mouth daily. 90 tablet 3     nitroglycerin (NITROSTAT) 0.4 MG SL tablet Place 1 tablet (0.4 mg total) under the tongue every 5 (five) minutes as needed for chest pain. 30 tablet 1     nortriptyline (PAMELOR) 25 MG capsule Take 25 mg by mouth at bedtime. TXHUA HMO THAUM MUS PW NOJ 1 LUB.       omeprazole (PRILOSEC) 20 MG capsule Take 1 capsule (20 mg total) by mouth daily. 90 capsule 4     potassium chloride SA (K-DUR,KLOR-CON) 10 MEQ tablet Take 1 tablet (10 mEq total) by mouth daily. 90 tablet 3     VENTOLIN HFA 90 mcg/actuation inhaler Inhale 1-2 puffs every 4 (four) hours as needed.       senna-docusate (PERICOLACE) 8.6-50 mg tablet Take 1 tablet by mouth daily as needed for constipation.  0     No current facility-administered medications on file prior to visit.          Jaky Gaspar MD

## 2021-06-15 NOTE — PROGRESS NOTES
Clinic Care Coordination Contact    Follow Up Progress Note      Assessment: RN CC outreach and spoke with patient   Patient was recently evaluated in ED for eye drainage  Recommend follow up with PCP  Support scheduling follow up and will outreach following visit to support PCP recommendations for follow up     Goals addressed this encounter:   Goals Addressed                 This Visit's Progress       Patient Stated      Medical (pt-stated)        Goal Statement: I will attend visit with PCP within 1-2 weeks   Date Goal set: 02/22/21    Barriers: access  Strengths: pt engagement  Date to Achieve By: March   Patient expressed understanding of goal: yes  Action steps to achieve this goal:  1. I will schedule and attend visit   2. I will report to my Community Health Worker if any additional resources or support needed.                      Outreach Frequency: monthly    Plan:   Patient will attend visit with PCP  RN CC will outreach within one month to review visit and support recommendations

## 2021-06-15 NOTE — TELEPHONE ENCOUNTER
C/o yellow discharge and pain in R eye. Occasionally pinkish-yellow discharge. Sx began 1 week ago. Denies injury to eye. Denies FB or chemical in eye.  Denies redness or swelling of eyelid or fever. Advised see provider w/i 4 hrs. Intends to go to [facility] now.    Additional Information    Negative: Eye exposure to chemical or fumes    Negative: Redness of white of eye (sclera), but no pus or only a small amount of brief pus    Negative: SEVERE eye pain (e.g., excruciating)    Negative: [1] Eyelids are very swollen (shut or almost) AND [2] fever    Negative: [1] Eyelid (outer) is very red (or tender to touch) AND [2] fever    Negative: Patient sounds very sick or weak to the triager    MODERATE eye pain (e.g., interferes with normal activities)    Protocols used: EYE - PUS OR [id number]

## 2021-06-15 NOTE — PROGRESS NOTES
Leora Sanchez is a 50 y.o. female who is being evaluated via a billable telephone visit.      What phone number would you like to be contacted at? [phone]  How would you like to obtain your AVS? AVS Preference: Mail a copy.    [facility] PH[facility] Visit  Phone : Kandy  Pt called 12:48 for her visit message left that I would try back later today.   Called again at 1:20 and left another message I would try back one more time this afternoon.   Unable to reach pt today.      Chief Complaint:  Chief Complaint   Patient presents with     Medication Refill       Assessment/Plan:  1. Recurrent major depressive disorder, in partial remission (H)    2. Coronary artery disease involving native coronary artery of native heart with angina pectoris (H)  - HM2(CBC w/o Differential); Future    3. Stage 3a chronic kidney disease    4. Chronic tension-type headache, not intractable  - acetaminophen (TYLENOL) 500 MG tablet; Take 2 tablets (1,000 mg total) by mouth 3 (three) times a day as needed.  Dispense: 100 tablet; Refill: 6    5. Class 2 severe obesity due to excess calories with serious comorbidity and body mass index (BMI) of 39.0 to 39.9 in adult (H)    6. Encounter for immunization  - Tdap vaccine,  8yo or older,  IM; Future    7. Colon cancer screening  - Cologuard; Future    8. Encounter for screening mammogram for malignant neoplasm of breast  - Mammo Screening Bilateral; Future    9. Type 2 diabetes mellitus with hyperglycemia, without long-term current use of insulin (H)  - Glycosylated Hemoglobin A1c; Future  - Basic Metabolic Panel; Future    Return in about 4 weeks (around 3/30/2021) for Clinic visit.      HPI:   Leora Sanchez is a 50 y.o. female and presents to clinic today for the following health issues needing refills and labs.  Unable to reach pt today so she rescheduled.      Social History     Social History Narrative    Lives with  who can read and speak English and helps  her with meds       Physical Exam:  Vitals from last visit reviewed.   Per pt report at home:      Patient's last menstrual period was 03/14/2019 (approximate).  Wt Readings from Last 3 Encounters:   02/23/21 201 lb 12.8 oz (91.5 kg)   02/19/21 197 lb (89.4 kg)   02/18/21 197 lb (89.4 kg)       No data recorded  PHQ-9 Total Score: 0 (3/2/2021 10:41 AM)    PHQ-2 Total Score: 0 (3/2/2021 10:41 AM)    ACT Total Score: 25 (8/20/2020  2:10 PM)      GENERAL: Patient sounds well and able to participate in history and planning without difficulty.       Phone call duration:  0 minutes    Jaky Gaspar MD  [facility]

## 2021-06-15 NOTE — PROGRESS NOTES
This encounter was created solely for the purpose of releasing the future order that was placed for Cologuard.  This is a necessary step in order for the results to be abstracted once they are available.    Lurdes Moser

## 2021-06-15 NOTE — PROGRESS NOTES
Clinic Care Coordination Contact:    Community Health Worker Follow Up    Goals:   Goals Addressed                 This Visit's Progress      Medical (pt-stated)   50%     Goal Statement: I will attend visit with PCP within 1-2 weeks.    Date Goal set: 02/22/21    Barriers: access  Strengths: pt engagement  Date to Achieve By: March   Patient expressed understanding of goal: yes  Action steps to achieve this goal:  1. I will attend phone visit appt scheduled 3- at 10:40AM with Dr. Gaspar for further advise regards to this goal and possible Rx script send to [facility] pharmacy instead of [facility].   2. I will report to my Community Health Worker if any additional resources or support needed.     (Updated: 3-)          Other (pt-stated)   30%     Goal Statement: I will develop regular use of glucometer and blood pressure monitor within 2 months      Date Goal set: 1-  Barriers: Language  Strengths: informal supports - spouse  Date to Achieve By: 4-  Patient expressed understanding of goal: yes  Action steps to achieve this goal:  1. I will purchase BP machine and use and record findings to care team.  2. I will use newly purchased glucometer and record findings and report to care team.  3. I will attend phone visit appt scheduled 3- at 10:40AM with Dr. Gaspar for further advise regards to this goal and possible Rx script send to [facility] pharmacy instead of [facility].   4. I will report to my Community Health Worker if any additional resources or support needed.    01/11/21  RN spoke with patient and spouse -pt states meter that she purchased OOP is not very good.   Discussed options - patient  will follow up on coverage and follow up  at next outreach     01/25/21  Patient states that recently purchased Glucometer but has not used it yet.  Rn CC offered to support understanding of use - patient states that pharmacy showed them - does not need any additional instructions  Will  report reading to RN with next outreach    (Updated: 3-)            Discussion: CHW was able to connect with the patient. Reminds and encouraged pt to attend appt scheduled tomorrow, 3-2-2021 at 10:40AM with Dr. Gaspar via phone visit appt per appt desk. Goals were updated.     Patient reported; Prefer pharmacy is [facility] but unable to provide BP monitor or glucose monitor for me due to medical insurance reason. [facility] pharmacy refer me to discuss with my doctor about send Rx's to [facility] pharmacy for further assistance. I am doing well. Will attend tomorrow appt with PCP. No other questions or concerns at this time.     CHW Next Follow-up: goals follow up. Possible to discuss goal's completion.     Outreach frequency: monthly    CHW Plan: 4-

## 2021-06-15 NOTE — TELEPHONE ENCOUNTER
RN attempted to call patient via [facility] :     - left a non-detailed message for patient to call back nurse line and speak to any triage RN. (Nurseline given through voicemail by ). Per MD note below Dr. Gaspar would like patients sx triaged.     Kelley Moreira RN, BSN Nurse Triage Advisor 6:16 PM 2/19/2021

## 2021-06-15 NOTE — TELEPHONE ENCOUNTER
Yes- we can try this if needed  Okay to see one of my partners    Okay to go to walk in care over the weekend.     Please call to triage these concerning symptoms.

## 2021-06-15 NOTE — PROGRESS NOTES
[facility] Clinic Office Visit    Chief Complaint:  Chief Complaint   Patient presents with     Cough     seen on 1/4/18 for cough and strep, felt better after penicillin injection, 10 days later cough came back, mucus, chills and fever last night, bodyaches         Assessment/Plan:  1. Flu-like symptoms  Testing today reassuring- no evidence for flu or strep.  Pt reporting worsening sx likely related to an acute infection.  Cough predominant sx at this time.  Will treat with z-pack and cough syrup and f/u next week if not better, sooner if she gets worse.    - Influenza A/B Rapid Test  - Rapid Strep A Screen-Throat  - Group A Strep, RNA Direct Detection, Throat  - azithromycin (ZITHROMAX Z-STEFAN) 250 MG tablet; 2 tabs (500 mg ) day #1, then 1 tab (250 mg) days #2-5, total 5 days  Dispense: 6 tablet; Refill: 0  - dextromethorphan-guaifenesin (GUAIFENESIN-DM)  mg/5 mL Liqd; Take 5 mL by mouth every 6 (six) hours as needed.  Dispense: 237 mL; Refill: 1  - MAPAP EXTRA STRENGTH 500 mg tablet; Take 1 tablet (500 mg total) by mouth every 4 (four) hours as needed.  Dispense: 100 tablet; Refill: 5    2. Fever  - Rapid Strep A Screen-Throat  - Group A Strep, RNA Direct Detection, Throat      Return in about 3 months (around 4/22/2018), or if symptoms worsen or fail to improve, for Recheck.    Patient Education/AVS:  There are no Patient Instructions on file for this visit.    HPI:   Leora Sanchez is a 47 y.o. female c/o not feeling well in past couple of days.     Pt was seen 1/4/18 and dx with strep throat.  Got shot of bicillin at that time and started to feel better quickly.  Starting last week 1/18/17 she started to feel sick again- headaches, hard to sleep due to postnasal drainage and cough.  Notes she is coughing up mucous like white soap.  Has to sleep on couch to sit up more. Chills and sleeping with 4 blankets to stay warm.  Wondering if she can get that shot again.  Coughing to the point of leaking  urine.  Notes some wheezing sensation but otherwise breathing okay.  Got a recurrent cold sore on her lips last week as well.  2-3 days ago was coughing so much she also got some chest pain and tightness and took NTG x2 doses and this helped.      ROS:  Constitutional, CV, Resp, GI, , MSK, skin, neuro, psych all negative except as outlined in the HPI above.    History summarized from1-2:cardiology visit last week reviewed- increase isosorbide from 30 to 60mg  Old Records-1:na  Radiology tests reviewed-1: CXR 1/4/18 reviewed- negative/clear.    Lab tests reviewed-1: strep positive 1/4/18  Medicine tests reviewed-1: na    Physical Exam:  /62 (Patient Site: Left Arm, Patient Position: Sitting, Cuff Size: Adult Regular)  Pulse 79  Temp 97.8  F (36.6  C)  Wt 183 lb (83 kg)  LMP 12/25/2017 (Approximate)  SpO2 97%  BMI 36.34 kg/m2 Body mass index is 36.34 kg/(m^2). Patient's last menstrual period was 12/25/2017 (approximate).  Vital signs reviewed  Wt Readings from Last 3 Encounters:   01/22/18 183 lb (83 kg)   01/18/18 179 lb (81.2 kg)   01/04/18 181 lb (82.1 kg)     History   Smoking Status     Never Smoker   Smokeless Tobacco     Never Used     History   Sexual Activity     Sexual activity: Yes     Partners: Male     Birth control/ protection: None     No Data Recorded  PHQ-9 Total Score: 17 (11/20/2017  5:00 PM)  PHQ-2 Total Score: 5 (11/20/2017  5:00 PM)  Depression Follow-up Plan: mental health care assessment (2/20/2017  3:00 PM)  No Data Recorded    All normal as below except abnormalities include: pt appears tired.  Hoarse quality to her voice.  Frequent clearing of her throat- post nasal drainage seen.  Chronic L TM rupture stable- ear dry.  Bibasilar atalectasis/crackles heard. Moving air well.  No wheezing.  Does not fully clear.    General is a  47 y.o. female sitting comfortably in no apparent distress.   HEENT:  TM are clear bilaterally.  Eye, nasal, oral exams within normal   Neck: Supple  without lymphadenopathy or thyromegally  CV: Regular rate and rhythm S1S2 without rubs, murmurs or gallops,   Abd:  +BS, soft NT/ND,  No masses or organomegally  Extremities: Warm, No Edema, 2+ Pedal and radial pulses bilaterally  Skin: No lesions or rashes noted  Neuro/MSK: Able to ambulate around the exam room with equal movement, strength and normal coordination of the upper and lower extremeties symmetrically    Results for orders placed or performed in visit on 01/22/18   Influenza A/B Rapid Test   Result Value Ref Range    Influenza  A, Rapid Antigen No Influenza A antigen detected No Influenza A antigen detected    Influenza B, Rapid Antigen No Influenza B antigen detected No Influenza B antigen detected   Rapid Strep A Screen-Throat   Result Value Ref Range    Rapid Strep A Antigen No Group A Strep detected, presumptive negative No Group A Strep detected, presumptive negative   Group A Strep, RNA Direct Detection, Throat   Result Value Ref Range    Group A Strep by PCR No Group A Strep rRNA detected No Group A Strep rRNA detected       Med list and active problem list reviewed and updated as part of this encounter    Current Outpatient Prescriptions on File Prior to Visit   Medication Sig Dispense Refill     amLODIPine (NORVASC) 10 MG tablet Take 10 mg by mouth daily.  6     artificial tears, hypromellose, (GONIOLSOL) 2.5 % ophthalmic solution Administer 1-2 drops to both eyes 4 (four) times a day as needed.       aspirin 81 MG EC tablet Take 1 tablet (81 mg total) by mouth daily. 30 tablet 6     atorvastatin (LIPITOR) 80 MG tablet Take 1 tablet (80 mg total) by mouth daily. 90 tablet 3     cetirizine (ZYRTEC) 10 MG tablet Take 10 mg by mouth every evening.        fluticasone (FLONASE) 50 mcg/actuation nasal spray 1 spray into each nostril daily as needed.       furosemide (LASIX) 20 MG tablet Take 20 mg by mouth daily.       guaiFENesin ER (MUCINEX) 600 mg 12 hr tablet 1-2 tablets every 12 hours as needed  for mucus 120 tablet 0     isosorbide mononitrate (IMDUR) 60 MG 24 hr tablet Take 1 tablet (60 mg total) by mouth daily. 30 tablet 11     lisinopril (PRINIVIL,ZESTRIL) 10 MG tablet Take 1 tablet (10 mg total) by mouth daily. 30 tablet 11     lisinopril (PRINIVIL,ZESTRIL) 5 MG tablet Take 5 mg by mouth daily.  11     metoprolol succinate (TOPROL-XL) 100 MG 24 hr tablet Take 1 tablet (100 mg total) by mouth daily. 90 tablet 3     nitroglycerin (NITROSTAT) 0.4 MG SL tablet Place 1 tablet (0.4 mg total) under the tongue every 5 (five) minutes as needed for chest pain. 30 tablet 1     nortriptyline (PAMELOR) 25 MG capsule Take 25 mg by mouth at bedtime. TXHUA O THAUM MUS PW NOJ 1 LUB.       omeprazole (PRILOSEC) 20 MG capsule Take 1 capsule (20 mg total) by mouth daily. 90 capsule 4     senna-docusate (PERICOLACE) 8.6-50 mg tablet Take 1 tablet by mouth daily as needed for constipation.  0     VENTOLIN HFA 90 mcg/actuation inhaler Inhale 1-2 puffs every 4 (four) hours as needed.       No current facility-administered medications on file prior to visit.          Jaky Gaspar MD

## 2021-06-15 NOTE — PROGRESS NOTES
Clinic Care Coordination Contact:  [facility]/Voicemail    Reason: CCC CHW Follow Up    Clinical Data: Care Coordinator Outreach:  Outreach attempted x 1.  Left message on patient's voicemail with call back information and requested return call.  Plan: Care Coordinator will try to reach patient again in 10 business days around 3/1/2021.

## 2021-06-15 NOTE — PROGRESS NOTES
[facility] Clinic Office Visit    Chief Complaint:  Chief Complaint   Patient presents with     Hospital Visit Follow Up     pyelonephritis, went to [facility] on 1/28-2/1, feeling okay now     Cough     x2 days, at night         Assessment/Plan:  1. Kidney injury  Due to pyelo now treating.  Has f/u with urology scheduled after her CT scan.  Mild sx ongoing but better now.  U/a reassuring.  Complete full course of ab as prescribed and f/u as scheduled.  No need to recheck labs today since d/c labs were reassuring back to baseline.    - Urinalysis    2. Hospital discharge follow-up  Pt stable after recent bout of pyelo and possible renal abscess.  Continue meds as prescribed.      3. Essential hypertension  BP Readings from Last 3 Encounters:   02/26/18 128/76   02/16/18 134/84   02/15/18 100/62       well controlled today.  Plan for bloodpressure management includes ongoing focus on healthy DASH type diet and increased activity, encouraged to avoid tobacco products and limit alcohol use, stress reduction, continue metoprolol xl 100mg daily, isosorbide 60mg daily, and amlodipine 10mg daily.  Labwork and meds ordered and reviewed as below  Lab Results   Component Value Date    K 4.5 02/01/2018      Lab Results   Component Value Date    CREATININE 0.71 02/01/2018         No Follow-up on file.  The following high BMI interventions were performed this visit: encouragement to exercise and lifestyle education regarding diet    Patient Education/AVS:  There are no Patient Instructions on file for this visit.    HPI:   Leora Sanchez is a 47 y.o. female c/o f/u from recent hospitalization for pyelo.  No more fever.  Completing full course of levaquin now.  Has ct scan and f/u urology scheduled.  Flank pain is decreased and only there when standing for long periods of time.  Taking oxycodone 5mg a couple times a day as needed for pain.  Urine is normal color and appearance and volume and frequency.  No changes in her  home meds. Normal eating and drinking.     ROS:  Constitutional, CV, Resp, GI, , MSK, skin, neuro, psych all negative except as outlined in the HPI above.    History summarized from1-2:d/c summary 2/1/18 reviewed:  Admitted with UTI and found to have right pyelo with ESBL positive UCx.  Right renal abscess.  BETITO resolved with tx and hydration.  Needs f/u CT in 1 week to f/u on renal abscess.  Sent home on levoquin 750mg daily for 10 more days.  Oxycodone as needed for pain.  Fu/ with [facility]D [facility] scheduled for 2/21/18 at 8am.    Old Records-1:na  Radiology tests reviewed-1: CT 1/28/18 slight perinephric soft tissue stranding.    CT 1/29/18- right pyelo possible 1cm intrarenal abscess.  No stone or hydronephrosis.    Lab tests reviewed-1: WBC 13.1 down to 7.5 on dc.  Hgb 11 stable.  Cr 1.2 down to 0.71.    Medicine tests reviewed-1: EKG no significant change since 10/2017.    Echo- EF 50%.  No valvular heart disease noted.  Mild left atrial enlargement.  Improvement from 10/2017.    Physical Exam:  /88 (Patient Site: Right Arm, Patient Position: Sitting, Cuff Size: Adult Regular)  Pulse 84  Resp 20  Wt 184 lb (83.5 kg)  BMI 37.16 kg/m2 Body mass index is 37.16 kg/(m^2). No LMP recorded. Patient is not currently having periods (Reason: Perimenopausal).  Vital signs reviewed  Wt Readings from Last 3 Encounters:   02/26/18 187 lb (84.8 kg)   02/16/18 187 lb (84.8 kg)   02/12/18 184 lb (83.5 kg)     History   Smoking Status     Never Smoker   Smokeless Tobacco     Never Used     History   Sexual Activity     Sexual activity: Yes     Partners: Male     Birth control/ protection: None     No Data Recorded  PHQ-9 Total Score: 17 (11/20/2017  5:00 PM)  PHQ-2 Total Score: 5 (11/20/2017  5:00 PM)  No Data Recorded    All normal as below except abnormalities include: exam was done by student NP under my supervision.  Normal exam.  No CVA tenderness.    General is a  47 y.o. female sitting comfortably in no  apparent distress.   CV: Regular rate and rhythm S1S2 without rubs, murmurs or gallops,   Lungs: Clear to auscultation bilaterally  Abd:  +BS, soft NT/ND,  No masses or organomegally  Skin: No lesions or rashes noted  Neuro/MSK: Able to ambulate around the exam room with equal movement, strength and normal coordination of the upper and lower extremeties symmetrically    Results for orders placed or performed in visit on 02/08/18   Urinalysis   Result Value Ref Range    Color, UA Yellow Colorless, Yellow, Straw, Light Yellow    Clarity, UA Clear Clear    Glucose, UA Negative Negative    Bilirubin, UA Negative Negative    Ketones, UA Negative Negative    Specific Gravity, UA 1.025 1.005 - 1.030    Blood, UA Negative Negative    pH, UA 7.5 5.0 - 8.0    Protein, UA Negative Negative mg/dL    Urobilinogen, UA 0.2 E.U./dL 0.2 E.U./dL, 1.0 E.U./dL    Nitrite, UA Negative Negative    Leukocytes, UA Negative Negative       Med list and active problem list reviewed and updated as part of this encounter    Current Outpatient Prescriptions on File Prior to Visit   Medication Sig Dispense Refill     amLODIPine (NORVASC) 10 MG tablet Take 10 mg by mouth daily.  6     aspirin 81 MG EC tablet Take 1 tablet (81 mg total) by mouth daily. 30 tablet 6     atorvastatin (LIPITOR) 80 MG tablet Take 1 tablet (80 mg total) by mouth daily. 90 tablet 3     isosorbide mononitrate (IMDUR) 60 MG 24 hr tablet Take 1 tablet (60 mg total) by mouth daily. 30 tablet 11     MAPAP EXTRA STRENGTH 500 mg tablet Take 1 tablet (500 mg total) by mouth every 4 (four) hours as needed. 100 tablet 5     metoprolol succinate (TOPROL-XL) 100 MG 24 hr tablet Take 1 tablet (100 mg total) by mouth daily. 90 tablet 3     nitroglycerin (NITROSTAT) 0.4 MG SL tablet Place 1 tablet (0.4 mg total) under the tongue every 5 (five) minutes as needed for chest pain. 30 tablet 1     nortriptyline (PAMELOR) 25 MG capsule Take 25 mg by mouth at bedtime. TXHUA O THAUM MUS PW  NOJ 1 LUB.       oxyCODONE (ROXICODONE) 5 MG immediate release tablet Take 1 tablet (5 mg total) by mouth every 8 (eight) hours as needed. 20 tablet 0     potassium chloride (KLOR-CON) 10 MEQ CR tablet Take 10 mEq by mouth daily.        furosemide (LASIX) 20 MG tablet Take 20 mg by mouth daily.       No current facility-administered medications on file prior to visit.          Jaky Gaspar MD

## 2021-06-16 NOTE — TELEPHONE ENCOUNTER
Left message x 2. Please review message thread below and advise the patient as indicated. Please schedule if necessary or indicated in message thread.

## 2021-06-16 NOTE — TELEPHONE ENCOUNTER
----- Message from STEPHENIE Melo sent at 3/16/2021  2:54 PM CDT -----  Regarding: Schedule  Hello,    Patient missed appointment with Dr. Gaspar on 3/02, please reach out to reschedule.    Thank you!

## 2021-06-16 NOTE — TELEPHONE ENCOUNTER
Called pt to relay below results.  Left VM to call clinic. Thanks.         ----- Message from Jaky Gaspar MD sent at 4/15/2021  7:31 PM CDT -----  Please call patient with following message:   Her blood pressure is still a little high- I would like her to schedule MTM next to review meds  Her diabetes is also a little worse  Her kidney tests and anemia are about the same.

## 2021-06-16 NOTE — PROGRESS NOTES
"                        Medication Therapy Management Initial Visit       ASSESSMENT AND PLAN    1. Burning with urination  Discussed with PCP. Verbal order for urine culture   - Culture, Urine    2. Chronic cough  Uncontrolled. Etiology not clear. Too soon to know if ACEI has been contributing to cough. Advised patient that if it is related to ACEI, it may take several weeks to resolve.   - Per discussion with Dr. Gaspar: benzonatate (TESSALON) 200 MG capsule; Take 1 capsule (200 mg total) by mouth 3 (three) times a day as needed for cough.  Dispense: 30 capsule; Refill: 0    3. Chronic systolic heart failure  On appropriate therapy (exception ACEI which is appropriately held). Continue current therapy.     4. Essential hypertension  Controlled. Continue current therapy    5. Heartburn  Symptoms of burning controlled. Possible that cough may be related to reflux per discussion with PCP  -Encouraged daily adherence to PPI  - Slight dose increase: omeprazole (PRILOSEC) 40 MG capsule; Take 1 capsule (40 mg total) by mouth daily.  Dispense: 30 capsule; Refill: 1      FOLLOW-UP PLAN  May was advised to follow up in three months.        SUBJECTIVE AND OBJECTIVE  Leora Sanchez is a 47 y.o. female who was referred by Jaky Gaspar MD for MT services.  May's chief concern today is cough.   is accompanied by a [facility] .    Hospitalized from 1/28-2/1 for ESBL pyelonephritis with sensitivity to levofloxacin. Discharged home to complete 10 day course of levofloxacin, which she completed. Today still has some burning with urination. Saw Dr. Gaspar two days ago -  wnl at that time.     Largest concern still is cough which has been present since the summer. Cough is present all the time but mostly worse at bedtime. Dry cough with occasional \"foamy mucus\". She feels that her cough improved when she was on antibiotics. Undergoing workup with PCP. May stopped her lisinopril 2 days ago. No change to cough yet. Per " "discussion with PCP, possible component of GERD as well. She has omeprazole 20 mg daily which she admits to taking \"as needed\", but usually 4-5 days per week. She sleeps in a separate room from her  since the cough is so problematic. Sleeps with head propped up on pillows. Coughs yrup not helpful     CHF w/ reduced EF. EF of 50% in January. Compensated per cardiology. Follows with cardiology. On amlodipine 10 mg daily (new w/ held lisinopril), metoprolol  mg daily, aspirin 81 mg daily, Imdur ER 60 mg daily, and lasix 20 mg daily. On atorvastatin 80 mg daily as well. KCl 10 meq daily    Nortriptyline 25 mg qHS for sleep and pain. She feels this helps.     Oxycodone PRN pain. She doesn't use often. Has nearly full bottle remaining. More often uses tylenol with success.     Current Outpatient Prescriptions   Medication Sig Dispense Refill     amLODIPine (NORVASC) 10 MG tablet Take 10 mg by mouth daily.  6     aspirin 81 MG EC tablet Take 1 tablet (81 mg total) by mouth daily. 30 tablet 6     atorvastatin (LIPITOR) 80 MG tablet Take 1 tablet (80 mg total) by mouth daily. 90 tablet 3     benzonatate (TESSALON) 200 MG capsule Take 1 capsule (200 mg total) by mouth 3 (three) times a day as needed for cough. 30 capsule 0     dextromethorphan-guaifenesin (GUAIFENESIN-DM)  mg/5 mL Liqd Take 5 mL by mouth every 6 (six) hours as needed. 237 mL 1     furosemide (LASIX) 20 MG tablet Take 20 mg by mouth daily.       isosorbide mononitrate (IMDUR) 60 MG 24 hr tablet Take 1 tablet (60 mg total) by mouth daily. 30 tablet 11     MAPAP EXTRA STRENGTH 500 mg tablet Take 1 tablet (500 mg total) by mouth every 4 (four) hours as needed. 100 tablet 5     metoprolol succinate (TOPROL-XL) 100 MG 24 hr tablet Take 1 tablet (100 mg total) by mouth daily. 90 tablet 3     nitroglycerin (NITROSTAT) 0.4 MG SL tablet Place 1 tablet (0.4 mg total) under the tongue every 5 (five) minutes as needed for chest pain. 30 tablet 1     " nortriptyline (PAMELOR) 25 MG capsule Take 25 mg by mouth at bedtime. TXHUA HMO THAUM MUS PW NOJ 1 LUB.       omeprazole (PRILOSEC) 40 MG capsule Take 1 capsule (40 mg total) by mouth daily. 30 capsule 1     oxyCODONE (ROXICODONE) 5 MG immediate release tablet Take 1 tablet (5 mg total) by mouth every 8 (eight) hours as needed. 20 tablet 0     potassium chloride (KLOR-CON) 10 MEQ CR tablet Take 10 mEq by mouth daily.        VENTOLIN HFA 90 mcg/actuation inhaler Inhale 1-2 puffs every 4 (four) hours as needed.       No current facility-administered medications for this visit.        We reviewed May's medication list with them, discussing reason for use, directions for use, and potential side effects of each medication.  Indication, safety, efficacy, and convenience was assessed for all medications.     May was provided with a printed AVS, and this care plan was communicated via EMR with her primary care provider, Jaky Gaspar MD, and is the authorizing prescriber for this visit.  Direct supervision was available by either the patient's PCP or another available physician when needed.    This note has been dictated using voice recognition software. Any grammatical or context distortions are unintentional and inherent to the software.       Time spent: 30 minutes    Ariane Ly, QuintonD  MTM Pharmacist at [facility]

## 2021-06-16 NOTE — PROGRESS NOTES
Clinic Care Coordination Contact    Situation: Patient chart reviewed by care coordinator.    Background: SW completed chart review    Assessment: Patient missed appointment with PCP on 3/02/2021. SW messaged C scheduling to please reach out to reschedule patient. Patient goals are now more medications focused, CCC SW removed from care team and CCC RN is now lead. Patient has appointment with CCC RN on 4/01/2021    Plan/Recommendations: Standard Outreach

## 2021-06-16 NOTE — TELEPHONE ENCOUNTER
----- Message from Amanuel Escobar CMA sent at 3/25/2021  2:08 PM CDT -----    ----- Message -----  From: Jaky Gaspar MD  Sent: 3/23/2021  11:39 AM CDT  To: [facility] Scheduling Registration Pool    Please schedule pt for labs and vitals and mammogram.    Schedule AWV in 3-4 months

## 2021-06-16 NOTE — PROGRESS NOTES
Clinic Care Coordination Contact  [facility]/Voicemail       Clinical Data: Care Coordinator Outreach  Outreach attempted x 1.  Left message on patient's voicemail with call back information and requested return call.  Plan: Community Health Worker to outreach per standard work and updated on goal progression  Community Health Worker Delegation:   Delegation: No Show for RN Appointment. Please Follow unsuccessful outreach, no show standard work.

## 2021-06-16 NOTE — TELEPHONE ENCOUNTER
Called and spoke with patient. Per patient she is trying to apply for insurance. Will call back once she gets her insurance all set up. Patient declined to schedule at this time. Completing task.

## 2021-06-16 NOTE — PROGRESS NOTES
Assessment/Plan:     1. Nonischemic cardiomyopathy with systolic dysfunction, NYHA class II: Leora Sanchez appears well compensated.  No signs of fluid retention.  She has fatigue and mild dyspnea on exertion.  Her ejection fraction improved from 37% to 50%.  No changes to medications.  She continues to follow a low-sodium diet.    Follow-up in the heart failure clinic in 3 months and with Dr. Roy in July    Subjective:     Leora Sanchez is seen at [facility] heart failure clinic today for continued follow-up.  She follows up for nonischemic cardiomyopathy with systolic dysfunction.  Her most recent echocardiogram on January 29, 2018 showed an improved ejection fraction from 37% to 50%. She had a coronary angiogram on October 31, 2017 which showed nonobstructive CAD with approximately 50% mid LAD lesion.  She has a past medical history significant for hypertension and hyperlipidemia.     Today, she comes in with complaints of fatigue and mild dyspnea on exertion.  She denies any orthopnea or PND.  She was recently hospitalized from January 28 - February 1, 2018 due to right pyelonephritis and the right intrarenal abscess.  She was treated with antibiotics.  She denies any fevers or chills.  She denies lightheadedness, shortness of breath, orthopnea, PND, palpitations, chest pain, abdominal fullness/bloating and lower extremity edema.      She is not monitoring home weights.  She is following a low sodium diet.  She participates in regular physical activity including walking.      Review of Systems:   General: WNL  Eyes: WNL  Ears/Nose/Throat: WNL  Lungs: WNL  Heart: WNL  Stomach: WNL  Bladder: WNL  Muscle/Joints: WNL  Skin: WNL  Nervous System: WNL  Mental Health: WNL     Blood: WNL     Patient Active Problem List   Diagnosis     Ganglion cyst of left foot     Essential hypertension     Non morbid obesity due to excess calories     Heartburn     Migraine with aura     Bilateral dry eyes     Mixed incontinence      Hyperlipemia     Dysidria     Nonischemic cardiomyopathy     Heart failure with reduced ejection fraction     Prediabetes     Pyelonephritis     BETITO (acute kidney injury)     Leukocytosis, unspecified type     Renal abscess     Right flank pain     Chronic cough       Past Medical History:   Diagnosis Date     Anxiety      CHF (congestive heart failure)      Depression      Hyperlipidemia      Hypertension      Pregnancy          Spontaneous       TM (tympanic membrane disorder)        Past Surgical History:   Procedure Laterality Date     CV LEFT HEART CATHETERIZATION WO LEFT VETRICULOGRAM Left 10/31/2017    Procedure: Left Heart Catheterization Without Left Ventriculogram;  Surgeon: Jonathan Duque MD;  Location: [facility] Cath Lab;  Service:      DILATION AND CURETTAGE OF UTERUS  2010     INNER EAR SURGERY Right 1994     MASTOIDECTOMY Right 1996     HI CATH PLACEMENT & NJX CORONARY ART ANGIO IMG S&I N/A 10/31/2017    Procedure: Coronary Angiogram;  Surgeon: Jonathan Duque MD;  Location: [facility] Cath Lab;  Service: Cardiology       Family History   Problem Relation Age of Onset     Diabetes Mother      Hypertension Mother      Uterine cancer Mother      Diverticulitis Mother      No Medical Problems Father      No Medical Problems Sister      No Medical Problems Daughter      No Medical Problems Son        Social History     Social History     Marital status:      Spouse name: N/A     Number of children: 8     Years of education: N/A     Occupational History     [employer] Not Employed     For chronic headaches since ear surgery     Social History Main Topics     Smoking status: Never Smoker     Smokeless tobacco: Never Used     Alcohol use No     Drug use: No     Sexual activity: Yes     Partners: Male     Birth control/ protection: None     Other Topics Concern     Not on file     Social History Narrative    Lives with  who can read and speak English and helps her with meds        Current Outpatient Prescriptions   Medication Sig Dispense Refill     amLODIPine (NORVASC) 10 MG tablet Take 10 mg by mouth daily.  6     aspirin 81 MG EC tablet Take 1 tablet (81 mg total) by mouth daily. 30 tablet 6     atorvastatin (LIPITOR) 80 MG tablet Take 1 tablet (80 mg total) by mouth daily. 90 tablet 3     benzonatate (TESSALON) 200 MG capsule Take 1 capsule (200 mg total) by mouth 3 (three) times a day as needed for cough. 30 capsule 0     dextromethorphan-guaifenesin (GUAIFENESIN-DM)  mg/5 mL Liqd Take 5 mL by mouth every 6 (six) hours as needed. 237 mL 1     furosemide (LASIX) 20 MG tablet Take 20 mg by mouth daily.       isosorbide mononitrate (IMDUR) 60 MG 24 hr tablet Take 1 tablet (60 mg total) by mouth daily. 30 tablet 11     MAPAP EXTRA STRENGTH 500 mg tablet Take 1 tablet (500 mg total) by mouth every 4 (four) hours as needed. 100 tablet 5     metoprolol succinate (TOPROL-XL) 100 MG 24 hr tablet Take 1 tablet (100 mg total) by mouth daily. 90 tablet 3     nitroglycerin (NITROSTAT) 0.4 MG SL tablet Place 1 tablet (0.4 mg total) under the tongue every 5 (five) minutes as needed for chest pain. 30 tablet 1     nortriptyline (PAMELOR) 25 MG capsule Take 25 mg by mouth at bedtime. TXHUA HMO THAUM MUS PW NOJ 1 LUB.       omeprazole (PRILOSEC) 40 MG capsule Take 1 capsule (40 mg total) by mouth daily. 30 capsule 1     oxyCODONE (ROXICODONE) 5 MG immediate release tablet Take 1 tablet (5 mg total) by mouth every 8 (eight) hours as needed. 20 tablet 0     potassium chloride (KLOR-CON) 10 MEQ CR tablet Take 10 mEq by mouth daily.        VENTOLIN HFA 90 mcg/actuation inhaler Inhale 1-2 puffs every 4 (four) hours as needed.       No current facility-administered medications for this visit.        No Known Allergies    Objective:     Vitals:    02/16/18 1518   BP: 134/84   Pulse: 72   Resp: 20     Wt Readings from Last 3 Encounters:   02/16/18 187 lb (84.8 kg)   02/12/18 184 lb (83.5 kg)    02/08/18 184 lb (83.5 kg)       General Appearance:   Alert, cooperative and in no acute distress.   HEENT:  No scleral icterus; the mucous membranes were pink and moist.   Neck: JVP normal. No HJR.   Chest: The spine was straight. The chest was symmetric.   Lungs:   Respirations unlabored; the lungs are clear to auscultation.   Cardiovascular:   Regular rhythm. S1 and S2 without murmur, clicks or rubs. Radial and posterior tibial pulses are intact and symmetrical.    Abdomen:  Soft, nontender, nondistended, bowel sounds present   Extremities: No cyanosis, clubbing, or edema.   Skin: No xanthelasma.   Neurologic: Mood and affect are appropriate.         Lab Review   Lab Results   Component Value Date    CREATININE 0.71 02/01/2018    BUN 13 02/01/2018     02/01/2018    K 4.5 02/01/2018     02/01/2018    CO2 27 02/01/2018     Lab Results   Component Value Date    BNP 95 (H) 12/06/2017     BNP (pg/mL)   Date Value   12/06/2017 95 (H)   11/20/2017 10   10/30/2017 469 (H)     Creatinine (mg/dL)   Date Value   02/01/2018 0.71   01/30/2018 0.83   01/29/2018 0.85   01/28/2018 1.21 (H)       Cardiographics  Echocardiogram: 1/29/2018  Summary   1. The left ventricle is normal in size. Left ventricular systolic performance is at the lower limits of normal. The ejection fraction is estimated to be 50%.   2. No significant valvular heart disease is identified on this study.   3. Normal right ventricular size and systolic performance.   4. There is mild left atrial enlargement.             20 minutes were spent face to face with the patient with greater than 50% spent on education and counseling.      Sendy Banda, [facility]   Heart Failure Clinic

## 2021-06-16 NOTE — TELEPHONE ENCOUNTER
Left message #2 at [phone]. Sending letter out and postponing task out to 2 weeks and will try again if an appointment hasn't been made. If patient returns call back, please help patient schedule an appointment per message below. Thanks!

## 2021-06-16 NOTE — PROGRESS NOTES
Clinic Care Coordination Contact:    Opened encounter by error.    CHW Next Follow-up: goals follow up. Possible to discuss goal's completion.      Outreach frequency: monthly     CHW Plan: 4-

## 2021-06-16 NOTE — PROGRESS NOTES
[facility] Clinic Office Visit    Chief Complaint:  Chief Complaint   Patient presents with     Cough     ongoing for 2 months, requesting medication for cough, chills, mucus, denies fever, denies sore throat         Assessment/Plan:  1. Chronic cough  Reviewing records pt has seen doctors for similar cough multiple times since at least early summer 2017.  Does not seem to be different or worsening, just comes and goes.  Unclear etiology.  Unlikely to be cardiac based on cardiology evaluation.  Possible postnasal drainage/rhinitis that may be allergic.  Seems to always improve with antibiotics per pt.  Seems to be worse at night and has frothy mucous  Associated with it.  Wonder about reflux?  Continue PPI.  Consider reglan and GI evaluation?  Was on ARB last summer and switched to ACE-I Nov 2017.  Stop ACE-I to see if this helps cough.  F/u to recheck bp and if cough not better consider pulmonary consultation next.    - Influenza A/B Rapid Test    2. Abnormal uterine bleeding  Mom with h/o uterine cancer.  Bleeding likely perimenopausal pattern.  Check u/s as next step if normal continue to monitor and anticipate menopause soon.    - US Pelvis With Transvaginal Non OB; Future    3. Renal abscess  Completed course of antibiotics.  Has CT and urology f/u scheduled.  Recheck urine today due to ongoing urinary sx.  Pt is on lasix that may explain persistent polyuria.    - Urinalysis-UC if Indicated      Return in about 6 weeks (around 3/26/2018) for Recheck.    Patient Education/AVS:  Patient Instructions   Stop your lisinopril to see if this helps with your cough    If not better in next couple of weeks will get you back in with lung doctor as next step.        HPI:   Leora Sanchez is a 47 y.o. female c/o ongoing cough.  Starts at night and has bubbling saliva and often chokes.  Has to sit up on sofa to rest.  Feels like there is something in her throat that is causing her to cough.  This first started  earlier this year and was dx with strept throat 1/4/18. Normal CXR at that time.  Treated with PCN shot and got better.  Cough came back after 10-14 days and came back for evaluation 1/22/18 and tx with azithromycin at that time.  Pt notes taht the cough and bubbling mucous went away after 1-2 days.  She then developed UTI/Pyelo with positive blood cx.  Just completed a 10 day course of Levaquin yesterday.   This cough started 2-3 days ago.  Cough seems to be getting worse.  At night she reports bubbling soap in her throat.  Whenever she walks fast she has a lot of saliva that just comes out. Does not SOB with walking fast or if she walks while lifting something that started about 2months ago.  Did have anigo 2017 and has known CVD  Most recent EF 50% 1/2018.  Had 2 baloon's placed in her coronary arteries.  LAGUNAS is getting worse compared to 10/2017 when she had her angioplasty.   Cough with bubbles started in July and went to ED for chest pain and throat pain. Treated cough with OTC that seemed to help.  Was on ARB at that time.  Cough was off and on.  Did go to ENT and pulmonary at [facility] in late summer for scope and told the saliva seemed to be from her lungs- see review below.   Cough got worse again in October and went to hospital bc she almost passed out.  Only new pills after the cardiac hosp was lasix.  Has been on losartan/hctz and this was stopped with hospitalization 10/2017.  Started on lisinopril 2.5mg 11/2017 and dose slowly increased to 10mg daily.       Has noted that she is peeing a lot more again- during day every couple of hours.  At night every hour.  Starting to burn a little when she pees.      Pt remembers doctors in hospital recently telling her that she had something wrong with her uterus.  She and her  have talked and decided that she should have her uterus removed at this point.  They are not going to have babies and her mom had uterine cancer.  Pt has been having abnormal uterine  bleeding.  Notes her period is normal for first 2 days and then has bleeding like black coffee followed by a couple days of heavy bleeding.  This has been ongoing for 6 months.   Last 2 months have been irregular- getting a little spaced out.  Around 11/15 again 12/20 and again around 1/20.  Worried about getting pregnant with all of her health problems.  Had bleeding every day with nexplanon after her last baby was born so had this removed and then tried the pill in 2010 and stopped it b/c she never got a period.      ROS:  Constitutional, CV, Resp, GI, , MSK, skin, neuro, psych all negative except as outlined in the HPI above.    History summarized from1-2:12/15/17- MTM noting cough maybe started in July.    8/30/17 [facility]- dr Gordillo.  Dx with chronic cough, postnasal drainage, allergic and non allergic rhinitis.  tx with prednisone, zpack, albuterol, probiotics, albuterol, and f/u in 1-2 weeks if not better.    Old Records-1:Care Everywhere consent signed and records obtained  Radiology tests reviewed-1: CT abd/pelvis 1/29/18 showed normal uterus and adnexa.    Lab tests reviewed-1: [id number]  Medicine tests reviewed-1: normal PFTs 2017 with [facility]    Physical Exam:  /76 (Patient Site: Right Arm, Patient Position: Sitting, Cuff Size: Adult Regular)  Pulse 88  Temp 97.2  F (36.2  C) (Oral)   Resp 20  Wt 184 lb (83.5 kg)  SpO2 98%  BMI 37.16 kg/m2 Body mass index is 37.16 kg/(m^2). No LMP recorded. Patient is not currently having periods (Reason: Perimenopausal).  Vital signs reviewed  Wt Readings from Last 3 Encounters:   02/26/18 187 lb (84.8 kg)   02/16/18 187 lb (84.8 kg)   02/12/18 184 lb (83.5 kg)     History   Smoking Status     Never Smoker   Smokeless Tobacco     Never Used     History   Sexual Activity     Sexual activity: Yes     Partners: Male     Birth control/ protection: None     No Data Recorded  PHQ-9 Total Score: 17 (11/20/2017  5:00 PM)  PHQ-2 Total Score: 5 (11/20/2017   5:00 PM)  No Data Recorded    All normal as below except abnormalities include: pt has nasal quality to her voice and is seen frequently clearing her throat/coughing slightly.  Otherwise normal exam. No wheezing or crackles. Moving air well.  Right ear canal s/p surgical opening.  No epigastric pain.  No GI or pelvic masses but exam limited due to body habitus.  No pelvic tenderness or cva/flank pain.    General is a  47 y.o. female sitting comfortably in no apparent distress.   HEENT:  TM are clear bilaterally.  Eye, nasal, oral exams within normal   Neck: Supple without lymphadenopathy or thyromegally  CV: Regular rate and rhythm S1S2 without rubs, murmurs or gallops,   Lungs: Clear to auscultation bilaterally  Abd:  +BS, soft NT/ND,  No masses or organomegally  Extremities: Warm, No Edema, 2+ Pedal and radial pulses bilaterally  Skin: No lesions or rashes noted  Neuro/MSK: Able to ambulate around the exam room with equal movement, strength and normal coordination of the upper and lower extremeties symmetrically    Results for orders placed or performed in visit on 02/12/18   Influenza A/B Rapid Test   Result Value Ref Range    Influenza  A, Rapid Antigen No Influenza A antigen detected No Influenza A antigen detected    Influenza B, Rapid Antigen No Influenza B antigen detected No Influenza B antigen detected   Urinalysis-UC if Indicated   Result Value Ref Range    Color, UA Yellow Colorless, Yellow, Straw, Light Yellow    Clarity, UA Clear Clear    Glucose, UA Negative Negative    Bilirubin, UA Negative Negative    Ketones, UA Negative Negative    Specific Gravity, UA >=1.030 1.005 - 1.030    Blood, UA Negative Negative    pH, UA 6.0 5.0 - 8.0    Protein, UA Negative Negative mg/dL    Urobilinogen, UA 0.2 E.U./dL 0.2 E.U./dL, 1.0 E.U./dL    Nitrite, UA Negative Negative    Leukocytes, UA Negative Negative       Med list and active problem list reviewed and updated as part of this encounter    Current Outpatient  Prescriptions on File Prior to Visit   Medication Sig Dispense Refill     amLODIPine (NORVASC) 10 MG tablet Take 10 mg by mouth daily.  6     aspirin 81 MG EC tablet Take 1 tablet (81 mg total) by mouth daily. 30 tablet 6     atorvastatin (LIPITOR) 80 MG tablet Take 1 tablet (80 mg total) by mouth daily. 90 tablet 3     furosemide (LASIX) 20 MG tablet Take 20 mg by mouth daily.       isosorbide mononitrate (IMDUR) 60 MG 24 hr tablet Take 1 tablet (60 mg total) by mouth daily. 30 tablet 11     MAPAP EXTRA STRENGTH 500 mg tablet Take 1 tablet (500 mg total) by mouth every 4 (four) hours as needed. 100 tablet 5     metoprolol succinate (TOPROL-XL) 100 MG 24 hr tablet Take 1 tablet (100 mg total) by mouth daily. 90 tablet 3     nitroglycerin (NITROSTAT) 0.4 MG SL tablet Place 1 tablet (0.4 mg total) under the tongue every 5 (five) minutes as needed for chest pain. 30 tablet 1     nortriptyline (PAMELOR) 25 MG capsule Take 25 mg by mouth at bedtime. TXHUA HMO THAUM MUS PW NOJ 1 LUB.       oxyCODONE (ROXICODONE) 5 MG immediate release tablet Take 1 tablet (5 mg total) by mouth every 8 (eight) hours as needed. 20 tablet 0     potassium chloride (KLOR-CON) 10 MEQ CR tablet Take 10 mEq by mouth daily.        No current facility-administered medications on file prior to visit.          Jaky Gaspar MD

## 2021-06-16 NOTE — TELEPHONE ENCOUNTER
Refill Approved    Rx renewed per Medication Renewal Policy. Medication was last renewed on 11/27/20.    Rojelio Garcia, [facility] Triage/Med Refill 3/30/2021     Requested Prescriptions   Pending Prescriptions Disp Refills     furosemide (LASIX) 40 MG tablet [Pharmacy Med Name: FUROSEMIDE 40 MG TAB 40 Tablet] 60 tablet 3     Sig: TAKE 1 TABLET (40 MG TOTAL) BY MOUTH 2 (TWO) TIMES A DAY AT 9AM AND 6PM./ TXJOBY BLISS NO 1 [facility] 2 [facility] 9AM THIAB 6PM PAB PHOB VOG       Diuretics/Combination Diuretics Refill Protocol  Passed - 3/29/2021  8:23 AM        Passed - Visit with PCP or prescribing provider visit in past 12 months     Last office visit with prescriber/PCP: 8/20/2020 Jaky Gaspar MD OR same dept: 8/20/2020 Jaky Gaspar MD OR same specialty: 8/20/2020 Jaky Gaspar MD  Last physical: Visit date not found Last MTM visit: Visit date not found   Next visit within 3 mo: Visit date not found  Next physical within 3 mo: Visit date not found  Prescriber OR PCP: Jaky Gaspar MD  Last diagnosis associated with med order: 1. Lower extremity edema  - furosemide (LASIX) 40 MG tablet [Pharmacy Med Name: FUROSEMIDE 40 MG TAB 40 Tablet]; TAKE 1 TABLET (40 MG TOTAL) BY MOUTH 2 (TWO) TIMES A DAY AT 9AM AND 6PM./ DAGOBERTO BLISS NO 1 [facility] 2 [facility] 9AM THIAB 6PM PAB PHOB VOG  Dispense: 60 tablet; Refill: 3    2. Chronic systolic heart failure (H)  - furosemide (LASIX) 40 MG tablet [Pharmacy Med Name: FUROSEMIDE 40 MG TAB 40 Tablet]; TAKE 1 TABLET (40 MG TOTAL) BY MOUTH 2 (TWO) TIMES A DAY AT 9AM AND 6PM./ TXJOBY BLISS NO 1 [facility] 2 [facility] THA 9AM THIAB 6PM PAB PHOB VOG  Dispense: 60 tablet; Refill: 3    If protocol passes may refill for 12 months if within 3 months of last provider visit (or a total of 15 months).             Passed - Serum Potassium in past 12 months      Lab Results   Component Value Date    Potassium 4.4 12/15/2020             Passed - Serum Sodium in past 12  months      Lab Results   Component Value Date    Sodium 142 12/15/2020             Passed - Blood pressure on file in past 12 months     BP Readings from Last 1 Encounters:   02/23/21 122/82             Passed - Serum Creatinine in past 12 months      Creatinine   Date Value Ref Range Status   12/15/2020 0.95 0.60 - 1.10 mg/dL Final

## 2021-06-16 NOTE — PROGRESS NOTES
Chief Complaint:  Chief Complaint   Patient presents with     Cough     x2 months, mucus, pain in lower back on right side when coughing, unable to sleep at night, have to sit up to sleep     Edema     noticed yesterday night, both legs from knees down to feet       HPI:   Leora Sanchez is a 47 y.o. female with past history of pyelonephritis and cardiac issues, here with concerns for cough and edema.  1.  Cough.  She has had a chronic cough for some time.  She has had multiple workups for this.  She saw a pulmonary specialist at [facility] on 8/30/2017, please see that note for details.  Workup there was essentially normal.  She feels her cough might be getting worse.  She says it often starts with burning in her chest, then moved to a dry cough, then she coughs up some phlegm.  This all causes her to feel very tired and weak.  This can start at random times, no obvious triggers.  She notes that sleeping flat seems to make her cough much worse.  She has been able to prop herself up with several pillows which decreases coughing.  However, over the past few days she has propped herself up with numerous pillows and it has not been as helpful as in the past.  She has tried multiple treatments for cough including Robitussin-DM, Tessalon Perles, steroid nasal spray, cough drops, increased water, albuterol inhaler.  None of these have been very helpful.  She tested negative for flu on 2/12/2018.  She last saw cardiology in 2/16/2018, note reviewed today.  They felt that her cough was likely not due to cardiac issues.    2.  Lower extremity edema.  She first noticed lower extremity edema bilaterally last night.  She was been sleeping upright sitting on the couch, as this helps her cough less and sleep a little better.  When she sleeps this way, her legs hang down vertically.  She is wondering if sleeping this way is causing her legs to be a bit swollen.  Swelling does seem to improve slightly during the day.      Current Outpatient  Prescriptions:      amLODIPine (NORVASC) 10 MG tablet, Take 10 mg by mouth daily., Disp: , Rfl: 6     aspirin 81 MG EC tablet, Take 1 tablet (81 mg total) by mouth daily., Disp: 30 tablet, Rfl: 6     atorvastatin (LIPITOR) 80 MG tablet, Take 1 tablet (80 mg total) by mouth daily., Disp: 90 tablet, Rfl: 3     furosemide (LASIX) 20 MG tablet, Take 20 mg by mouth daily., Disp: , Rfl:      isosorbide mononitrate (IMDUR) 60 MG 24 hr tablet, Take 1 tablet (60 mg total) by mouth daily., Disp: 30 tablet, Rfl: 11     MAPAP EXTRA STRENGTH 500 mg tablet, Take 1 tablet (500 mg total) by mouth every 4 (four) hours as needed., Disp: 100 tablet, Rfl: 5     metoprolol succinate (TOPROL-XL) 100 MG 24 hr tablet, Take 1 tablet (100 mg total) by mouth daily., Disp: 90 tablet, Rfl: 3     nitroglycerin (NITROSTAT) 0.4 MG SL tablet, Place 1 tablet (0.4 mg total) under the tongue every 5 (five) minutes as needed for chest pain., Disp: 30 tablet, Rfl: 1     nortriptyline (PAMELOR) 25 MG capsule, Take 25 mg by mouth at bedtime. CORBYA O ANGYUM MUS PW NOJ 1 LUB., Disp: , Rfl:      omeprazole (PRILOSEC) 40 MG capsule, Take 1 capsule (40 mg total) by mouth daily., Disp: 30 capsule, Rfl: 1     oxyCODONE (ROXICODONE) 5 MG immediate release tablet, Take 1 tablet (5 mg total) by mouth every 8 (eight) hours as needed., Disp: 20 tablet, Rfl: 0     potassium chloride (KLOR-CON) 10 MEQ CR tablet, Take 10 mEq by mouth daily. , Disp: , Rfl:     Physical Exam:  Vitals:    02/26/18 1447   BP: 128/76   Pulse: 100   Resp: 18   Temp: 98.7  F (37.1  C)   SpO2: 96%     Body mass index is 37.14 kg/(m^2).    Vital signs stable as recorded above  General: Patient is alert and oriented x 3, in no apparent distress  Ears: TMs non-erythematous with good light reflex bilaterally  Throat: no erythema, edema, or exudate noted  Lymphatic: No cervical lymph node enlargement  Cardiac: Regular rate and rhythm; no murmurs  Pulmonary: Lung clear to auscultation bilaterally;  no crackles, rales, rhonchi, or wheezing noted  Musculoskeletal: Bilateral lower extremities have minimal 1+ slightly pitting edema    Results for orders placed or performed in visit on 02/15/18   Culture, Urine   Result Value Ref Range    Culture No Growth      Assessment/Plan:  1.  Chronic cough.  She has had multiple workups for this before.  Saw [facility] on 8/30/2017, note reviewed today.  Workup there was negative.  She has not followed up with them.  Has also been following with cardiology.  Most recent visit on 2/16/2018.  They did not feel cough had a cardiac origin.  Negative influenza test earlier this month.  She has tried and failed multiple medicines for cough including Robitussin-DM, Tessalon Perles, steroid nasal spray, albuterol inhaler, cough drops.  For now continue with symptomatic treatment for cough.  Encouraged increased water.  Referral made to pulmonology for follow-up.  Could also consider GI.  She is taking 40 mg of omeprazole daily.    2.  Lower extremity edema.  Seems to be most likely due to her sleeping position, which has been upright on a couch recently due to her coughing.  I reviewed some lifestyle changes to encourage her to keep her legs more horizontal with her body when trying to sleep.  Also discussed trying to elevate her legs during the day if possible.  Continue to monitor.  Edema is pretty minimal today.    This dictation uses voice recognition software, which may contain typographical errors.

## 2021-06-16 NOTE — PROGRESS NOTES
"Clinic Care Coordination Contact  [facility]/Voicemail    Reason:   -CHW Outreach Follow Up Call  -Per CCC RN encounter 4-: \"Community Health Worker Delegation:   Delegation: No Show for RN Appointment. Please Follow unsuccessful outreach, no show standard work.\"    Clinical Data: Care Coordinator Outreach:  Outreach attempted x 1.  Left message on patient's voicemail with call back information and requested return call.  Plan: Care Coordinator will try to reach patient again in 7-10 business days around 4-.               "

## 2021-06-16 NOTE — PROGRESS NOTES
Clinic Care Coordination Contact    Community Health Worker Follow Up    Goals:   Goals Addressed                 This Visit's Progress      Medical (pt-stated)   70%     Goal Statement: I will attend visit with PCP within 1-2 weeks.    Date Goal set: 02/22/21    Barriers: access  Strengths: pt engagement  Date to Achieve By: March   Patient expressed understanding of goal: yes  Action steps to achieve this goal:  1. I will attend phone visit appt scheduled 3- and 3- with Dr. Gaspar.  2. I will attend tomorrow appt 4- at 9:45AM with [facility] CSS/nurse for vital check.   3. I will report to my Community Health Worker if any additional resources or support needed.     (Updated: 4-)          COMPLETED: Other (pt-stated)   100%     Goal Statement: I will develop regular use of glucometer and blood pressure monitor within 2 months.     Date Goal set: 1-  Barriers: Language  Strengths: informal supports - spouse  Date to Achieve By: 4-  Patient expressed understanding of goal: yes  Personal action steps:   1. I will purchase BP machine and use and record findings to care team.  2. I will use newly purchased glucometer and record findings and report to care team.  3. I was aware that my health care medical insurance do not cover the blood pressure monitor. Will think about purchase one out of pocket.  4. I has a clear understanding. Met goal.     01/11/21  RN spoke with patient and spouse -pt states meter that she purchased OOP is not very good.   Discussed options - patient  will follow up on coverage and follow up  at next outreach     01/25/21  Patient states that recently purchased Glucometer but has not used it yet.  Rn CC offered to support understanding of use - patient states that pharmacy showed them - does not need any additional instructions  Will report reading to RN with next outreach    (Date goal completed: 4-)            Discussion: [facility] CHW was able to  connect with the patient today regards to follow up. Goals updated. One goal completed due to pt met goal per pt. Reminded pt for tomorrow appt with [facility] CSS/nurse. Pt is aware to connect with [facility] service with any additional resources need and PCP office for any questions and blood pressure check.     Patient reported:   -Glucometer and blood pressure monitor: blood pressure monitor not cover by health medical insurance. Was able to purchased an glucometer out of pocket. Will think about purchasing an blood pressure monitor out of pocket.   -Spouse unemployed.   -Will attend tomorrow appt with [facility] nurse.   -Doing well. No other questions.     CHW Next Follow-up: goal following up.    Outreach frequency: monthly    CHW Plan: 5-

## 2021-06-16 NOTE — TELEPHONE ENCOUNTER
Left message #3 at [phone]. If patient returns call back, please help patient schedule an appointment per message below. Thanks! No letter was sent out. Sending letter out and postponing task out to two weeks and will check to see if patient made an appointment. If no appointment is made when task comes back, we are completing the task.

## 2021-06-16 NOTE — TELEPHONE ENCOUNTER
Left message #1 at [phone]. Postponing task out to a week and will try again. If patient returns call back, please help patient schedule an appointment per message below. Thanks!

## 2021-06-16 NOTE — TELEPHONE ENCOUNTER
----- Message from Jaky Gaspar MD sent at 4/15/2021  7:31 PM CDT -----  Please help pt schedule covid vaccine    Please help schedule AWV and mammogram in next 1-2 months.

## 2021-06-16 NOTE — PROGRESS NOTES
Leora Sanchez is a 50 y.o. female who is being evaluated via a billable telephone visit.      What phone number would you like to be contacted at? [phone]   How would you like to obtain your AVS? AVS Preference: Mail a copy.    [facility] PHONE Visit  Phone : mansi    Chief Complaint:  Chief Complaint   Patient presents with     Follow-up       Assessment/Plan:  1. Screen for colon cancer  Never got mailed to her ealier this month.  Will order today.    - Cologuard    2. Essential hypertension  Historically well controlled.  Refills are all up to date.  Advise vital and lab check this spring and follow-up in person in 3-4 months     3. Type 2 diabetes mellitus with hyperglycemia, without long-term current use of insulin (H)  Historically well controlled.  Labs this spring and follow-up in person 3-4 months.  covid vaccine scheduled.      4. Stage 3a chronic kidney disease  Continue on current meds and follow-up in 3-4 months.      5. Chronic nonintractable headache, unspecified headache type  Continue with tylenol 1000mg three times a day as needed.      6. Nonocclusive coronary atherosclerosis of native coronary artery  Utd with cardiology recommendations.  Able to stay active and watch [employer].  Continue same meds.      Return in about 4 months (around 7/23/2021) for Annual physical.      HPI:   Leora Sanchez is a 50 y.o. female and presents to clinic today for the following health issues follow up.     Still getting pus draining from eyes and irritation- wanting a medicine for this.  Did go to ED for this.    Wants meds refilled as needed.    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      scheduled to get covid vaccine next week.     Willing to do mammogram and cologard but hasn't done this yet.      Headache okay with 1000mg tylenol three times a day.     Social History     Social History Narrative    Lives with  who can read and speak English and helps her with meds        Physical Exam:  Vitals from last visit reviewed.   Per pt report at home:      Patient's last menstrual period was 03/14/2019 (approximate).  Wt Readings from Last 3 Encounters:   02/23/21 201 lb 12.8 oz (91.5 kg)   02/19/21 197 lb (89.4 kg)   02/18/21 197 lb (89.4 kg)       No data recorded  PHQ-9 Total Score: 0 (3/2/2021 10:41 AM)    PHQ-2 Total Score: 0 (3/2/2021 10:41 AM)    ACT Total Score: 25 (8/20/2020  2:10 PM)      GENERAL: Patient sounds well and able to participate in history and planning without difficulty.     Phone call duration:  16 minutes    Jaky Gaspar MD  [facility]

## 2021-06-17 NOTE — PROGRESS NOTES
Clinic Care Coordination Contact    Situation: Patient chart reviewed by care coordinator.    Background: RN CC review for status update      Assessment: Patient attended visit with PCP   PCP notes need for mammogram   Patient notes trying to apply for insurance    Plan/Recommendations:  Upon next CHW outreach, schedule reassessment with CC to support review of follow up with recommendations and resource needs

## 2021-06-17 NOTE — PROGRESS NOTES
5/6 Addendum: Per cardiology, increase Imdur to 90 mg daily. Sent Rx for 30 mg tabs to be taken with 60 mg tabs.      Called pt with  to inform.     Pt notes she wasn't able to get her metformin when she went to the pharmacy. [facility]   Wants prescriptions sent [facility]. Resent.     Art Thakkar, PharmD  Medication Therapy Management (MTM) Pharmacist  [facility]

## 2021-06-17 NOTE — PROGRESS NOTES
MTM Initial Encounter  Assessment & Plan                                                       Medication Adherence/Access: Med use appropriate as described.     Diabetes: Last A1C at goal <8% (ADA). But fasting sugars elevated as pt has been out of metformin. Will send updated Rx for two times a day dosing.     CAD:  LDL above goal <70. Statin adherence seems appropriate. Based on pt's 2013 LDL, seems she did get the expected 50% reduction from high intensity statin. Likely will need additional agents to get to goal. This was not discussed today due to time. Review at follow-up. As Ezetimibe only expected to lower LDL by about 15%, likely would not be enough to bring pt to goal. May need to consider alternatives such as PCSK-9 inhibitors. Given CAD, would benefit from continued aspirin therapy. Will refill Nitroglycerin today - as pt is needing consistently, may benefit from increased dose of Isosorbide.     CHF/Hypertension: Last BP above goal <130/80. Pulse below goal . Will have pt come in for repeat BP check before adjusting medications.     Depression/Headaches/Pain: Did not use Duloxetine for pain/headaches because sedation prevented pt from waking at night to urinate. Can try Venlafaxine as this is not likely to cause drowsiness but may help with pain. Reviewed with patient that it will take several weeks and may need some dose titration to be effective.     Urinary Frequency: Not addressed in detail today. Recommended avoiding fluids for 2 hours before bedtime. Briefly discussed Kegel exercises, but not sure pt understood how to do them. Will reviews at follow-up.    Heartburn: Not addressed today due to time. Will review at follow-up.      Plan                                                       -Refill metformin   -Refill aspirin and nitroglycerin   -Consider increased Isosorbide (PharmD to discuss with Cardiology)   -Start Venlafaxine 75 mg ER once daily       Follow Up  2 weeks nurse visit for  "BP check   Return in about 3 weeks (around 5/26/2021) for Medication Management Pharmacist, Via Phone.   *Pt states she can only come to clinic on Thursdays       Subjective & Objective                                                     May MARICHUY Sanchez is a 50 y.o. female called for an initial visit for Medication Therapy Management. She was referred to me from Jaky Gaspar MD. [employer]  available by phone.     Pt previously seen by MTM in Sept 2020 then lost to follow-up.     Chief Complaint: Medication Management - Has been out of Metformin for two weeks.       Medication Adherence/Access: Meds reviewed over the phone.     Requesting more refills on Lasix and Imdur.         Diabetes:  Pt currently taking Metformin 500 mg ER daily. Was using 2 tablets daily since last MTM visit. Recently she ran out in 45 days vs 90 days and pharmacy wouldn't refill.     patient is not currently experiencing side effects.   SMBG: once daily 134 this morning. Over the last few weeks has been 120-130s. Prior to running out of metformin fasting was in the low 100s.     Patient is not experiencing hypoglycemia   Recent symptoms of high blood sugar? Polyuria, polydipsia.   ACEi/ARB:  Losartan 100 mg daily   Statin: Atorvastatin 80 mg daily   Aspirin 81 mg daily    Diet/Exercise: Not addressed today.     Lab Results   Component Value Date    HGBA1C 7.0 (H) 04/15/2021    HGBA1C 6.4 (H) 12/15/2020    HGBA1C 8.5 (H) 08/20/2020     Lab Results   Component Value Date    MICROALBUR <0.50 08/20/2020    LDLCALC 88 12/15/2020    CREATININE 1.16 (H) 04/15/2021         CAD: Prescribed Aspirin 81 mg daily, Atorvastatin 80 mg daily, Isosorbide mononitrate 60 mg ER daily, Nitroglycerin 0.4 mg as needed.     Out of Nitroglycerin. Having chest pain 1-2 times per month. Each episode lasts for about 30 minutes.     Doesn't have aspirin \"the doctor didn't give me anymore\"       Lab Results   Component Value Date    LDLCALC 88 " "12/15/2020        CHF/Hypertension: Prescribed Furosemide 40 mg two times a day, Losartan 100 mg daily, Metoprolol Succinate 100 mg daily, and Potassium 10 mEq daily     Doesn't have a cuff to check BP at home.     Wt Readings from Last 3 Encounters:   04/15/21 200 lb (90.7 kg)   02/23/21 201 lb 12.8 oz (91.5 kg)   02/19/21 197 lb (89.4 kg)     BP Readings from Last 3 Encounters:   04/15/21 145/88   02/23/21 122/82   02/19/21 (!) 186/94     Pulse Readings from Last 3 Encounters:   04/15/21 (!) 52   02/23/21 (!) 56   02/19/21 87         Results for orders placed or performed in visit on 04/15/21   Basic Metabolic Panel   Result Value Ref Range    Sodium 143 136 - 145 mmol/L    Potassium 4.5 3.5 - 5.0 mmol/L    Chloride 107 98 - 107 mmol/L    CO2 26 22 - 31 mmol/L    Anion Gap, Calculation 10 5 - 18 mmol/L    Glucose 110 70 - 125 mg/dL    Calcium 9.0 8.5 - 10.5 mg/dL    BUN 28 (H) 8 - 22 mg/dL    Creatinine 1.16 (H) 0.60 - 1.10 mg/dL    GFR MDRD Af Amer 60 (L) >60 mL/min/1.73m2    GFR MDRD Non Af Amer 49 (L) >60 mL/min/1.73m2          Depression/Pain/Headaches: Prescribed Acetaminophen 500 mg 2 tabs three times a day, camphor-menthol ointment two times a day as needed,     Notes she's having bad headaches and wondering if there's something stronger than Acetaminophen that can be used.     Had neurosurgery in the 90s. Notes that she has \"needle pinch\" headaches in her head. Headache is preventing her from sleeping.     Not currently using antidepressant.     At last MTM visit in September, prescribed Duloxetine to help with mood, headaches, and insomnia, but pt discontinued. Per provider notes from November - pt report it caused drowsiness. Today patient reports it wasn't helpful.     Also reports that she's urinating frequently at night 5-6 times per night. When she was using Duloxetine, was so drowsy that she wasn't waking up and was having incontinence.     No more stress or worries. Children have been helping so " not feeling so stressed.       PHQ-9 Total Score: 0 (3/2/2021 10:41 AM)    No results found for: [id number]       Heartburn: Prescribed Omeprazole 40 mg daily         PMH: reviewed in EPIC   Allergies/ADRs: reviewed in EPIC   Alcohol:   Social History     Substance and Sexual Activity   Alcohol Use No        Tobacco:   Social History     Tobacco Use   Smoking Status Never Smoker   Smokeless Tobacco Never Used   Tobacco Comment    no passive exposure     Today's Vitals:   There were no vitals filed for this visit.  ----------------    The patient declined an after visit summary    I spent 53 minutes with this patient today.   All changes were made via collaborative practice agreement with Jaky Gaspar MD. A copy of the visit note was provided to the patient's provider.     Art Thakkar PharmD  Medication Therapy Management (MTM) Pharmacist  [facility]      Telemedicine Visit Details    Type of service:  Telephone     Start Time: 11:10 PM  End Time (time video/phone call stopped): 12:03 PM    Originating Location (pt. Location): Home    Distant Location (provider location):  [facility] MEDICATION THERAPY [facility]NAGEMENT [facility]    Mode of Communication:   Telephone        Current Outpatient Medications   Medication Sig Dispense Refill     atorvastatin (LIPITOR) 80 MG tablet TAKE 1 TABLET (80 MG TOTAL) BY MOUTH DAILY/ TXJANEA HNUB NOJ 1 LUB TSHUAJ PAB YANET NTSHAV MUAJ ROJ 90 tablet 4     blood glucose meter (GLUCOMETER) Use 1 each As Directed daily. Dispense glucometer brand per patient's insurance at pharmacy discretion. 1 each 0     blood glucose test (CONTOUR NEXT TEST STRIPS) strips Use 1 each As Directed 3 (three) times a day. 100 strip 4     blood pressure monitor Kit Use to check blood pressure every morning 1 each 0     furosemide (LASIX) 40 MG tablet TAKE 1 TABLET (40 MG TOTAL) BY MOUTH 2 (TWO) TIMES A DAY AT 9AM AND 6PM./ TXHUA HNUB NOJ 1 LUB TSHUAJ 2 ZAUG THAUM 9AM THIAB 6PM PAB  "PHOB  tablet 2     generic lancets Use 1 each As Directed 3 (three) times a day. 100 each 3     isosorbide mononitrate (IMDUR) 60 MG 24 hr tablet Take 1 tablet (60 mg total) by mouth daily. 90 tablet 3     losartan (COZAAR) 100 MG tablet TAKE 1 PILL BY MOUTH DAILY FOR BLOOD PRESSURE / TXHUA HNUB NOJ 1 LUB TSHUAJ PAB ZOO YANET NTSHAV SIAB 30 tablet 11     metoprolol succinate (TOPROL-XL) 100 MG 24 hr tablet Take 1 tablet (100 mg total) by mouth daily. 90 tablet 4     omeprazole (PRILOSEC) 40 MG capsule        potassium chloride (KLOR-CON) 10 MEQ CR tablet TAKE 1 PILL BY MOUTH EVERY DAY/TXHUA HNUB NOJ 1 LUB TSHUAJ 90 tablet 4     acetaminophen (TYLENOL) 500 MG tablet Take 2 tablets (1,000 mg total) by mouth 3 (three) times a day as needed. 100 tablet 6     aspirin 81 MG EC tablet Take 1 tablet (81 mg total) by mouth daily. 90 tablet 4     camphor-menthoL (TIGER BALM) Oint Apply 1 application topically 2 (two) times a day as needed. \"Tiger Balm\"       metFORMIN (GLUCOPHAGE-XR) 500 MG 24 hr tablet Take 1 tablet (500 mg total) by mouth 2 (two) times a day. 180 tablet 1     nitroglycerin (NITROSTAT) 0.4 MG SL tablet Place 1 tablet (0.4 mg total) under the tongue every 5 (five) minutes as needed for chest pain. 30 tablet 1     venlafaxine (EFFEXOR-XR) 75 MG 24 hr capsule Take 1 capsule (75 mg total) by mouth daily. 30 capsule 3     No current facility-administered medications for this visit.         Medication Therapy Recommendations  Accelerating angina (H)    Current Medication: isosorbide mononitrate (IMDUR) 60 MG 24 hr tablet   Rationale: Dose too low - Dosage too low - Effectiveness   Recommendation: Increase Dose   Status: Contact Provider - Awaiting Response         Chronic headaches    Rationale: Untreated condition - Needs additional medication therapy - Indication   Recommendation: venlafaxine 75 MG 24 hr capsule - daily   Status: Accepted per CPA         Type 2 diabetes mellitus with hyperglycemia, without " long-term current use of insulin (H)    Current Medication: metFORMIN (GLUCOPHAGE-XR) 500 MG 24 hr tablet (Discontinued)   Rationale: Medication product not available - Adherence - Adherence   Recommendation: Provide Adherence Intervention   Status: Accepted per CPA   Note: Refill metformin, aspirin, nitroglycerin

## 2021-06-17 NOTE — PROGRESS NOTES
[facility] Office Visit    Chief Complaint:  Chief Complaint   Patient presents with     Hospital Visit Follow Up     left flank pain, cough, still experiencing pain     Follow-up     medication and lab         Assessment/Plan:  1. Acute on chronic systolic heart failure  Pt did not bring her medications today for review.  Patient insists that she is on 40 mg of furosemide but pharmacy records indicate that they administered 20 mg of furosemide April 8.  Whenever patient is on she is currently taking it once a day.  Of asked her to double whatever she is on and take 1 in the morning and 1 in midday.  She will follow-up with the cardiology heart failure clinic next week as scheduled.  She does have pharmacy consultation scheduled for later in May to review her medications and make sure everything is accurate and correct.  Her symptoms of heart failure seem to be increased mucus sputum production and cough when reclining or in supine position.  Weight gain shortness of breath and dyspnea on exertion also noted.  - furosemide (LASIX) 40 MG tablet; Take 1 tablet (40 mg total) by mouth 2 (two) times a day at 9am and 6pm.  Dispense: 60 tablet; Refill: 1    2. Lower extremity edema  She does have some trace to 1+ edema in both of her feet and ankles to the midshin but not overtly dramatic compared to her symptoms that she is describing.  Plan as above with regards to doubling her Lasix twice daily and follow-up with cardiology as scheduled.  - furosemide (LASIX) 40 MG tablet; Take 1 tablet (40 mg total) by mouth 2 (two) times a day at 9am and 6pm.  Dispense: 60 tablet; Refill: 1    3. Acute bilateral thoracic back pain  Left flank pain for which she went to the emergency room has resolved with some massage and time.  She is now having some right sided mid back pain.  Patient reassured that there is no evidence of urinary tract infection or kidney stones.  She has no CVA tenderness.  I think this is more  of a musculoskeletal issue possibly related to her shortness of breath and change in her beat breathing pattern muscles used for breathing etc.  Increase her diuresis as above.  Consider chiropractic or massage therapy.  Tylenol as needed for pain.    Return in about 3 months (around 7/26/2018).  The following high BMI interventions were performed this visit: encouragement to exercise and lifestyle education regarding diet    Patient Education/AVS:  There are no Patient Instructions on file for this visit.    HPI:   Leora Sanchez is a 47 y.o. female c/o f/u from ED 4/15/18 for cough and back pain.      Left flank/back pain for a couple weeks that got bad on night before evaluation.  Pain worse with exertion.  Deep pain.  Cough worse than usual.  Gets a bad back pain on the left side and makes it hard to walk and bend.  Worried it may be her kidney and was reassured that it wasn't her kidney.  Pain on left side is a little better and now having similar pain on the right flank/mid back. Only notes pain with bending over, twisting or grabbing.  No pain at rest.  Pt did have relative help with massage and this helped her left back pain.      Over the winter pt got increased chest pain, LAGUNAS, swollen, tight, etc.  Was seen on 3/29/18 and found to have increased weight and some EKG changes.  Increased lasix to 40mg daily and this helped her pee more.  Seen by Cardiology and they had her take lasix 40mg bid for a couple of days and then back to 40mg daily.  Pt notes now that her body feels tight, her chest and back feel tight and painful. Was supposed to cut her amlodipine to 5mg but list from pharmacy indicates they are giving her amlodipine 10mg.  Also furosemide is 20mg.  Lisinopril 10mg.  Whenever her body feels tight she struggles with phlegm, cough and breathing whenever she lays flat.  Pt notes she drinks a lot of water due to dry throat.     Weight 1/30/18 182 (RSC)  2/16/18 187 (RSC)  3/29/18 191 (RSC- increased to  40mg daily)  4/2/18 184 (cardiology clinic)  4/15/18 180 (ED)  4/26/18 193 (RSC)    ROS:  Constitutional, CV, Resp, GI, , MSK, skin, neuro, psych all negative except as outlined in the HPI above.    History summarized from1-2:ED 4/15/18- eval for left flank/back pain.  Normal imaging, urine and blood tests. Pain improved with toradol.  F/u with pMD for further eval as needed.    Cardiology 4/2/18- increased lasix to 40mg bid and then back to daily after 4 days.  If ongoing LAGUNAS consider repeat cardiac cath.    Radiology tests reviewed-1: neg CXR.  Normal CT abd/pelvis.    Lab tests reviewed-1: all normal   Medicine tests reviewed-1: EKG improved from 3/2018.      Physical Exam:  /88 (Patient Site: Right Arm, Patient Position: Sitting, Cuff Size: Adult Large)  Pulse 70  Resp 18  Wt 193 lb (87.5 kg)  SpO2 96%  BMI 38.33 kg/m2 Body mass index is 38.33 kg/(m^2). No LMP recorded. Patient is not currently having periods (Reason: Perimenopausal).  Vital signs reviewed  Wt Readings from Last 3 Encounters:   04/26/18 193 lb (87.5 kg)   04/15/18 180 lb (81.6 kg)   04/02/18 184 lb (83.5 kg)     History   Smoking Status     Never Smoker   Smokeless Tobacco     Never Used     History   Sexual Activity     Sexual activity: Yes     Partners: Male     Birth control/ protection: None     No Data Recorded  PHQ-9 Total Score: 17 (4/26/2018  5:00 PM)  PHQ-2 Total Score: 2 (4/26/2018  5:00 PM)  Depression Follow-up Plan: mental health care assessment (4/26/2018  5:00 PM)  No Data Recorded    All normal as below except abnormalities include: Patient appears well and at her baseline.  She is talking comfortably in complete sentences.  She has trace to 1+ edema in her feet and ankles and mid shins bilaterally.  No evidence of erythema warmth or tenderness.  No skin breakdown noted.  Is no CVA or flank tenderness specifically.  He has some diffuse musculoskeletal pain in her right mid back on palpation.  No point tenderness over  the spinal processes.  Abdominal exam is benign.  Remainder of exam is normal including lungs that are clear.  General is a  47 y.o. female sitting comfortably in no apparent distress.   Neck: Supple without lymphadenopathy or thyromegally  CV: Regular rate and rhythm S1S2 without rubs, murmurs or gallops,   Lungs: Clear to auscultation bilaterally  Abd:  +BS, soft NT/ND,  No masses or organomegally  Extremities: Warm, 2+ Pedal and radial pulses bilaterally  Skin: No lesions or rashes noted  Neuro/MSK: Able to ambulate around the exam room with equal movement, strength and normal coordination of the upper and lower extremeties symmetrically    Results for orders placed or performed during the hospital encounter of 04/15/18   Urinalysis-UC if Indicated   Result Value Ref Range    Color, UA Yellow Colorless, Yellow, Straw, Light Yellow    Clarity, UA Clear Clear    Glucose, UA Negative Negative    Bilirubin, UA Negative Negative    Ketones, UA Negative Negative, 60 mg/dL    Specific Gravity, UA 1.009 1.001 - 1.030    Blood, UA Negative Negative    pH, UA 5.0 4.5 - 8.0    Protein, UA Negative Negative mg/dL    Urobilinogen, UA <2.0 E.U./dL <2.0 E.U./dL, 2.0 E.U./dL    Nitrite, UA Negative Negative    Leukocytes, UA Negative Negative   Comprehensive Metabolic Panel   Result Value Ref Range    Sodium 138 136 - 145 mmol/L    Potassium 4.2 3.5 - 5.0 mmol/L    Chloride 108 (H) 98 - 107 mmol/L    CO2 21 (L) 22 - 31 mmol/L    Anion Gap, Calculation 9 5 - 18 mmol/L    Glucose 119 70 - 125 mg/dL    BUN 19 8 - 22 mg/dL    Creatinine 0.70 0.60 - 1.10 mg/dL    GFR MDRD Af Amer >60 >60 mL/min/1.73m2    GFR MDRD Non Af Amer >60 >60 mL/min/1.73m2    Bilirubin, Total 0.6 0.0 - 1.0 mg/dL    Calcium 8.8 8.5 - 10.5 mg/dL    Protein, Total 7.2 6.0 - 8.0 g/dL    Albumin 3.5 3.5 - 5.0 g/dL    Alkaline Phosphatase 80 45 - 120 U/L    AST 17 0 - 40 U/L    ALT 11 0 - 45 U/L   Lactic Acid   Result Value Ref Range    Lactic Acid 1.0 0.5 - 2.2  mmol/L   Lipase   Result Value Ref Range    Lipase 28 0 - 52 U/L   HM2 (CBC W/O DIFF)   Result Value Ref Range    WBC 7.3 4.0 - 11.0 thou/uL    RBC 4.04 3.80 - 5.40 mill/uL    Hemoglobin 12.4 12.0 - 16.0 g/dL    Hematocrit 36.9 35.0 - 47.0 %    MCV 91 80 - 100 fL    MCH 30.7 27.0 - 34.0 pg    MCHC 33.6 32.0 - 36.0 g/dL    RDW 12.3 11.0 - 14.5 %    Platelets 263 140 - 440 thou/uL    MPV 9.4 8.5 - 12.5 fL   BNP(B-type Natriuretic Peptide)   Result Value Ref Range    BNP 18 0 - 69 pg/mL   Troponin I   Result Value Ref Range    Troponin I <0.01 0.00 - 0.29 ng/mL   POCT pregnancy, urine   Result Value Ref Range    POC Preg, Urine Negative Negative    POCt Kit Lot Number [id number]     POCT Kit Expiration Date 11/30/2019     Pos Control Valid Control Valid Control    Neg Control Valid Control Valid Control    Dipstick Lot Number [id number]     Dipstick Expiration Date 12/31/2018     POC Specific Gravity, Urine 1.015    ECG 12 lead nursing unit performed   Result Value Ref Range    SYSTOLIC BLOOD PRESSURE  mmHg    DIASTOLIC BLOOD PRESSURE  mmHg    VENTRICULAR RATE 73 BPM    ATRIAL RATE 73 BPM    P-R INTERVAL 152 ms    QRS DURATION 84 ms    Q-T INTERVAL 390 ms    QTC CALCULATION (BEZET) 429 ms    P Axis 22 degrees    R AXIS -3 degrees    T AXIS 255 degrees    MUSE DIAGNOSIS       Normal sinus rhythm  Nonspecific T wave abnormality  Abnormal ECG  When compared with ECG of 29-MAR-2018 16:41,  No significant change was found  Confirmed by IVANNA VINSON, LUPE LOC:SJ ([id number]) on 4/15/2018 2:47:19 PM         Med list and active problem list reviewed and updated as part of this encounter    Current Outpatient Prescriptions on File Prior to Visit   Medication Sig Dispense Refill     albuterol (PROAIR HFA;PROVENTIL HFA;VENTOLIN HFA) 90 mcg/actuation inhaler Inhale 2 puffs every 6 (six) hours as needed for wheezing. 1 each 0     amLODIPine (NORVASC) 5 MG tablet Take 1 tablet (5 mg total) by mouth daily. 30 tablet 1     aspirin 81 MG EC  tablet Take 1 tablet (81 mg total) by mouth daily. 30 tablet 6     atorvastatin (LIPITOR) 80 MG tablet Take 1 tablet (80 mg total) by mouth daily. 90 tablet 3     fluticasone (FLONASE) 50 mcg/actuation nasal spray   1     fluticasone (FLOVENT HFA) 44 mcg/actuation inhaler Inhale 2 puffs.       isosorbide mononitrate (IMDUR) 60 MG 24 hr tablet Take 1 tablet (60 mg total) by mouth daily. 30 tablet 11     MAPAP EXTRA STRENGTH 500 mg tablet Take 1 tablet (500 mg total) by mouth every 4 (four) hours as needed. 100 tablet 5     metoprolol succinate (TOPROL-XL) 100 MG 24 hr tablet Take 1 tablet (100 mg total) by mouth daily. 90 tablet 3     nitroglycerin (NITROSTAT) 0.4 MG SL tablet Place 1 tablet (0.4 mg total) under the tongue every 5 (five) minutes as needed for chest pain. 30 tablet 1     nortriptyline (PAMELOR) 25 MG capsule Take 1 capsule (25 mg total) by mouth at bedtime. TXHUA HMO THAUM MUS PW NOJ 1 LUB 30 capsule 6     omeprazole (PRILOSEC) 40 MG capsule TAKE 1 PILL BY MOUTH EVERY DAY/ TXHUA HNUB NOJ 1 LUB TSHUAJ PAB ZOO LUB PLAB 30 capsule 11     potassium chloride (KLOR-CON) 10 MEQ CR tablet Take 10 mEq by mouth daily.        No current facility-administered medications on file prior to visit.          Jaky Gaspar MD

## 2021-06-17 NOTE — PROGRESS NOTES
Assessment/Plan:     1. History of nonischemic cardiomyopathy with systolic dysfunction, [facility] Class III:.  Her most recent echocardiogram showed an EF of 50% in February 2018.  However, she continues to have shortness of breath on overexertion or lifting heavy objects.  She also complains of feeling abdominal bloating, lower extremity edema and mild orthopnea.  She saw Dr. Kang in early April and was increased her furosemide dose.  She has been urinating more but not feeling improvement in her symptoms.  She has been drinking at least 3-3 and half liters of fluid per day.  She continues to have cough-seen by pulmonary who recently.  Her primary doctor stopped her lisinopril but has not noted improvement in her cough.    Recommended to continue on the same dose of furosemide 40 mg twice a day.  Obtaining BMP level today.  Most recent BNP level is within normal limits.  Instructed to limit fluid intake to less than 2 L per day.  Also recommended to stay on low salt diet <2g/day, monitor daily weight, and stay physically active as tolerated. Further encouraged to call if experiencing persistent weight gain with HF symptoms.      Heart failure treatment includes:  -  Last BMP was stable on 4/15/2018.  - Diuretic therapy with furosemide 40 mg twice a day    2. Hypertension: Blood pressure today was 150/90 and recheck was 140/80.  Reports being compliant with blood pressure medicines.  We discussed about low-sodium diet, weight loss, DASH diet and regular physical exercise.  She is currently on metoprolol succinate 100 mg every day, furosemide 40 mg twice a day, and Norvasc 5 mg every day.    3. History of nonobstructive coronary artery disease: She reports some chest tightness with shortness of breath on overexertion or lifting heavy objects.  Her last coronary angiogram in October 2017 showed 50% stenosis and mid LAD.  Dr. Kang has strongly recommended to consider repeat cardiac catheterization if her symptoms does not  improve with increased  diuresis.  Will discuss this with Dr. Roy for further recommendation or workup.    4.  Obstructive sleep apnea: She scored high for the sleep apnea assessment in the past and was recommended to follow-up with the sleep medicine.  She has not scheduled appointment yet and is not quite interested.  We discussed about the correlation between sleep apnea, overweight, hypertension, and coronary artery disease.    Follow-up with Sendy Banda CNP in 2 weeks and follow-up with Manuela in July 2018 per his recommendation.     Subjective:     Leora Sanchez is a 47 years old [facility] female with significant PMH of nonischemic cardiomyopathy, hypertension, hyperlipidemia, overweight with a BMI of 38, and nonobstructive coronary artery disease who is seen at [facility] heart failure clinic today for continued follow-up.  Her most recent echocardiogram done in February 2018 showed an EF of 50%.  She was recently seen by Dr. Kang in first week of April with worsening heart failure symptoms her furosemide dose was increased and recommended to follow-up in the heart failure clinic in a week.     Patient presented to the heart failure clinic accompanied by an .  She continues to have shortness of breath and chest tightness on overexertion or lifting heavy objects abdominal bloating,, and swelling in her lower extremities.  She has been taking the higher dose of furosemide 40 mg twice a day.  She has been urinating more but does not feel improvement in her symptoms.  She reports drinking about 3-3 and half liter fluid per day. She denies fatigue, lightheadedness, shortness of breath, PND, palpitations and chest pain.     She reports following low-sodium diet but she does not weigh herself at home.  [place]    Review of Systems:   General: WNL  Eyes: WNL  Ears/Nose/Throat: WNL  Lungs: WNL  Heart: WNL  Stomach: WNL  Bladder: WNL  Muscle/Joints:  WNL  Skin: WNL  Nervous System: WNL  Mental Health: WNL     Blood: WNL     Patient Active Problem List   Diagnosis     Ganglion cyst of left foot     Essential hypertension     Non morbid obesity due to excess calories     Heartburn     Migraine with aura     Bilateral dry eyes     Mixed incontinence     Hyperlipemia     Dysidria     Nonischemic cardiomyopathy     Heart failure with reduced ejection fraction     Prediabetes     Pyelonephritis     Renal abscess     Chronic cough     Cyst of left ovary     Lower extremity edema     Coronary artery disease involving native coronary artery of native heart       Past Medical History:   Diagnosis Date     Anxiety      CHF (congestive heart failure)      Depression      Hyperlipidemia      Hypertension      Pregnancy          Spontaneous       TM (tympanic membrane disorder)        Past Surgical History:   Procedure Laterality Date     CV LEFT HEART CATHETERIZATION WO LEFT VETRICULOGRAM Left 10/31/2017    Procedure: Left Heart Catheterization Without Left Ventriculogram;  Surgeon: Jonathan Duque MD;  Location: [facility];  Service:      DILATION AND CURETTAGE OF UTERUS  2010     INNER EAR SURGERY Right 1994     MASTOIDECTOMY Right      SD CATH PLACEMENT & NJX CORONARY ART ANGIO IMG S&I N/A 10/31/2017    Procedure: Coronary Angiogram;  Surgeon: Jonathan Duque MD;  Location: [facility];  Service: Cardiology       Family History   Problem Relation Age of Onset     Diabetes Mother      Hypertension Mother      Uterine cancer Mother      Diverticulitis Mother      No Medical Problems Father      No Medical Problems Sister      No Medical Problems Daughter      No Medical Problems Son        Social History     Social History     Marital status:      Spouse name: N/A     Number of children: 8     Years of education: N/A     Occupational History     [employer] Not Employed     For chronic headaches since ear surgery     Social History  Main Topics     Smoking status: Never Smoker     Smokeless tobacco: Never Used     Alcohol use No     Drug use: No     Sexual activity: Yes     Partners: Male     Birth control/ protection: None     Other Topics Concern     Not on file     Social History Narrative    Lives with  who can read and speak English and helps her with meds       Current Outpatient Prescriptions   Medication Sig Dispense Refill     albuterol (PROAIR HFA;PROVENTIL HFA;VENTOLIN HFA) 90 mcg/actuation inhaler Inhale 2 puffs every 6 (six) hours as needed for wheezing. 1 each 0     amLODIPine (NORVASC) 5 MG tablet Take 1 tablet (5 mg total) by mouth daily. 30 tablet 1     aspirin 81 MG EC tablet Take 1 tablet (81 mg total) by mouth daily. 30 tablet 6     atorvastatin (LIPITOR) 80 MG tablet Take 1 tablet (80 mg total) by mouth daily. 90 tablet 3     fluticasone (FLONASE) 50 mcg/actuation nasal spray   1     fluticasone (FLOVENT HFA) 44 mcg/actuation inhaler Inhale 2 puffs.       furosemide (LASIX) 40 MG tablet Take 1 tablet (40 mg total) by mouth 2 (two) times a day at 9am and 6pm. 60 tablet 1     isosorbide mononitrate (IMDUR) 60 MG 24 hr tablet Take 1 tablet (60 mg total) by mouth daily. 30 tablet 11     MAPAP EXTRA STRENGTH 500 mg tablet Take 1 tablet (500 mg total) by mouth every 4 (four) hours as needed. 100 tablet 5     metoprolol succinate (TOPROL-XL) 100 MG 24 hr tablet Take 1 tablet (100 mg total) by mouth daily. 90 tablet 3     nitroglycerin (NITROSTAT) 0.4 MG SL tablet Place 1 tablet (0.4 mg total) under the tongue every 5 (five) minutes as needed for chest pain. 30 tablet 1     nortriptyline (PAMELOR) 25 MG capsule Take 1 capsule (25 mg total) by mouth at bedtime. TXHUA HMO THAUM MUS PW NOJ 1 LUB 30 capsule 6     omeprazole (PRILOSEC) 40 MG capsule TAKE 1 PILL BY MOUTH EVERY DAY/ TXHUA HNUB NOJ 1 LUB TSHUAJ PAB ZOO LUB PLAB 30 capsule 11     potassium chloride (KLOR-CON) 10 MEQ CR tablet Take 10 mEq by mouth daily.        No  current facility-administered medications for this visit.        No Known Allergies    Objective:     Vitals:    05/02/18 1326   BP: 150/90   Pulse: 71     Wt Readings from Last 3 Encounters:   05/02/18 192 lb (87.1 kg)   04/26/18 193 lb (87.5 kg)   04/15/18 180 lb (81.6 kg)       General Appearance:   Alert, cooperative and in no acute distress.   HEENT:  No scleral icterus; the mucous membranes were pink and moist.   Neck: JVP mild. No HJR.   Chest: The spine was straight. The chest was symmetric.   Lungs:   Respirations unlabored; the lungs are clear to auscultation.   Cardiovascular:   Regular rhythm. S1 and S2 without murmur, clicks or rubs. Radial and posterior tibial pulses are intact and symmetrical.    Abdomen:  Soft, nontender, nondistended, bowel sounds present   Extremities: No cyanosis, clubbing, except mild edema in lower extremities.   Skin: No xanthelasma.   Neurologic: Mood and affect are appropriate.         Lab Review   Lab Results   Component Value Date    CREATININE 0.70 04/15/2018    BUN 19 04/15/2018     04/15/2018    K 4.2 04/15/2018     (H) 04/15/2018    CO2 21 (L) 04/15/2018     Lab Results   Component Value Date    BNP 18 04/15/2018     BNP (pg/mL)   Date Value   04/15/2018 18   12/06/2017 95 (H)   11/20/2017 10     Creatinine (mg/dL)   Date Value   04/15/2018 0.70   03/29/2018 0.67   02/01/2018 0.71   01/30/2018 0.83     Cardiographics  Summary   1. The left ventricle is normal in size. Left ventricular systolic performance is at the lower limits of normal. The ejection fraction is estimated to be 50%.   2. No significant valvular heart disease is identified on this study.   3. Normal right ventricular size and systolic performance.   4. There is mild left atrial enlargement.      When compared to the prior real-time echocardiogram dated 30 October 2017, there has been an interval improvement in left ventricular systolic performance.       35  minutes were spent face to face  with the patient with greater than 50% spent on education and counseling.      Cody Garcia, [facility]   Heart Failure Clinic    This note has been dictated using voice recognition software. Any grammatical, typographical, or context distortions are unintentional and inherent to the software

## 2021-06-17 NOTE — PROGRESS NOTES
I met with Leora Sanchez at the request of   to recheck her blood pressure.  Blood pressure medications on the MAR were reviewed with patient.    Patient has taken all medications as per usual regimen: Yes  Patient reports tolerating them without any issues or concerns: Yes    Vitals:    05/20/21 0825 05/20/21 0827   BP: 153/87 137/85   Patient Site: Left Arm Left Arm   Patient Position: Sitting Sitting   Cuff Size: Adult Large Adult Large   Pulse: (!) 59 61       After 5 minutes, the patient's blood pressure was < 140/90, the previous encounter was reviewed, recorded blood pressure below 140/90.  Patient was discharged and the note will be sent to the provider for final review.

## 2021-06-17 NOTE — PROGRESS NOTES
"Clinic Care Coordination Contact    Community Health Worker Follow Up    Goals:   Goals Addressed                 This Visit's Progress      Medical (pt-stated)   70%     Goal Statement: I will attend visit with PCP within 1-2 weeks.    Date Goal set: 02/22/21    Barriers: access  Strengths: pt engagement  Date to Achieve By: March   Patient expressed understanding of goal: yes  Action steps to achieve this goal:  1. I have completed appt visit with Dr. Gaspar on 3/02/2021, 3/23/2021, and PharmD on 5/05/2021.   2. I will attend tomorrow appt 5/20/2021 at 8:30AM with [facility] CSS/nurse for BP check.  3. I will check with [facility]  scheduling tomorrow in the clinic regarding to reschedule  my appt 5/25/2021 at 10:30AM with PharmD to different date due to baby sitting my daughter-in-law baby on that day.   4. I will report to my Community Health Worker if any additional resources or support needed.     (Updated: 5-)            Discussion: [facility] CHW was able to connect with the patient today regarding to follow up. Goal updated. Pt stated that she is doing well and no other questions or concerns at this time. Pt is reminded for the following appt: 5- at 8:30AM with [facility] Nurse for \"BP check\" per appt desk/notes, and 5- at 10:30AM with PharmD for \"med review f/u\" per appt desk/notes. Pt stated that she is baby sitting her daughter-in-law baby and might reschedule her appt scheduled 5/25 with PharmD. CHW refer pt to check with [facility] scheduling team tomorrow 5/20 in the clinic for further assistance regarding to appt with PharmD. CHW suggested pt to follow up with PCP as recommendation and connect with [facility] team for any additional resources need. Pt confirmed understand.     CHW Next Follow-up: goal follow up.    Outreach frequency: monthly    CHW Plan: 6/21/2021  "

## 2021-06-17 NOTE — TELEPHONE ENCOUNTER
DOD  Patient called and stated that she has been out of her diabetes medication for 2 weeks. Please review and sign if appropriate.

## 2021-06-17 NOTE — PROGRESS NOTES
Called [facility] after the visit.      Venlafaxine was scheduled to fill on 5/28 - unclear why   Isosorbide 30 mg ER -  Not sure why it wasn't filled before - will filled today   Aspirin 81 mg - Not covered. Pharmacy will inform pt to purchase OTC.

## 2021-06-17 NOTE — PROGRESS NOTES
[facility] Office Visit    Chief Complaint:  Chief Complaint   Patient presents with     Follow-up     Sore Throat     x3-4 days, sore from coughing, headaches, chest pain     Edema     both feet and legs, x3-4 weeks         Assessment/Plan:  1. Chronic cough  Has seen [facility] pulm summer 2017 and [facility] this month.  No clear cause.  Off ACE-I for 6+ weeks with no improvement.  Probably okay to restart.  Cardiac source for lung congestion- CHF now with increased swelling and LAGUNAS.  Cough and mucous worse with laying flat- orthopnea vs GERD? Continue high dose PPI daily.  Continue tx of postnasal drainage with nasal steroid.  Asthma component likely- pt has not started on inhaled steroid b/c no formulary covered med sent to pharmacy yet- will let pulmonary address.  Albuterol rescue inhaler was sent in for pt to use when she does have chough or SoB to start with.  Recommend f/u with MTM to reivew inhaler technique and meds.    - albuterol (PROAIR HFA;PROVENTIL HFA;VENTOLIN HFA) 90 mcg/actuation inhaler; Inhale 2 puffs every 6 (six) hours as needed for wheezing.  Dispense: 1 each; Refill: 0  - Amb Referral to Medication Management    2. Essential hypertension  BP Readings from Last 3 Encounters:   03/29/18 116/80   02/26/18 128/76   02/16/18 134/84       well controlled today.  Plan for bloodpressure management includes ongoing focus on healthy DASH type diet and increased activity, encouraged to avoid tobacco products and limit alcohol use, stress reduction, cut back on amlodipine to 5mg daily due to increased swellign and low bp today.  Consider restarting ACE-I since no improvement in cough off this med. May need to increase imdur due to ongoing chest pain-defer to cardiology.  Continue metoprolol xl 100mg daily  .  Labwork and meds ordered and reviewed as below  Lab Results   Component Value Date    K 4.5 03/29/2018      Lab Results   Component Value Date    CREATININE 0.67 03/29/2018       -  Amb Referral to Medication Management  - amLODIPine (NORVASC) 5 MG tablet; Take 1 tablet (5 mg total) by mouth daily.  Dispense: 30 tablet; Refill: 1    4. Chronic systolic heart failure  Now with ongoing and worsening cough, LAGUNAS, possible orthopnea, edema, fatigue, etc.  Decrease amlodipine- may need to stop.  Compression stockings.  Increase lasix to 40mg daily.  Continue b-blocker, statin, asa, imdur.  EKG changes noted- cardiology contacted and they will work on getting her in to rapid access clinic at an appropriate interval.    - Amb Referral to Medication Management  - amLODIPine (NORVASC) 5 MG tablet; Take 1 tablet (5 mg total) by mouth daily.  Dispense: 30 tablet; Refill: 1  - Compression stockings  - furosemide (LASIX) 40 MG tablet; Take 1 tablet (40 mg total) by mouth daily.  Dispense: 30 tablet; Refill: 1  - Ambulatory referral to Cardiology    5. Heartburn  Continue high dose PPI to help with heartburn and chronic cough.      6. Lower extremity edema  Weight gain noted.  Known CHF and recent cardiac stent placement.  Compression stockings ordered.  Increase lasix to 40mg daily.  Decrease or stop amlodipine.  F/u with cardiology as recommended.    - Amb Referral to Medication Management  - Compression stockings  - furosemide (LASIX) 40 MG tablet; Take 1 tablet (40 mg total) by mouth daily.  Dispense: 30 tablet; Refill: 1    7. LAGUNAS (dyspnea on exertion)  Plan as above with regards to med changes and f/u with cardiology based on EkG changes, weight gain, symptom progression, etc.  Unable to get CXR today b/c radiology closed.    - HM1(CBC and Differential)  - Comprehensive Metabolic Panel  - D-dimer, Quantitative  - Troponin I  - Electrocardiogram Perform - Clinic  - [facility] (CBC with Diff)  - Ambulatory referral to Cardiology    8. Renal abscess  Normal u/a today reassuring and no likely contributing to her current sx.    - Urinalysis-UC if Indicated    Return in about 4 weeks (around 4/26/2018) for  Recheck.    Patient Education/AVS:  Patient Instructions   Cut your amlodipine 1/2 (5mg) once a day and when you get your new bottle 1 a day    Take 2 water pills every morning and when you get new bottle it will be 1 pill a day.      We will order compression stockings for your legs    Use the rescue inhaler 2 puffs every 4 hours as needed for coughing    Start the new inhaler from the lung doctor when you get that    See Ariane in 2 weeks     See Dr Gaspar in 4 weeks      HPI:   Leora Sanchez is a 47 y.o. female c/o cough and sore throat, fatigue, and increased swelling in her legs.      Did f/u with lung doctor at [place] last week as outlined below.  Gave her a rx for omeprazole 20mg daily but she already has 40mg tabs at home.  Pt thought this was a cough medicine and so was taking it twice a day for past week. Did get nasal spray to use in each nostril twice a day.  Did not get any inhalers at this point.  Does not have a rescue inhaler.  Not taking any ARB or ACE-I at this time- this was stopped 2/2018 and cough not any better or worse.       Both legs get very swollen if she has been standing for 30 minutes.  Better if she sits and rests.  Does take her water pill daily.  Not sure if she is taking potassium with this.  Swelling is in both legs below the knee.  No pain.  No redness or open sores.  Would be open to getting compression stockings if possible.  Pt is getting more winded even with speaking on the phone for long periods of time or with housework/chores.  No chest pain but does get a tightness in chest on and off for past week- present at rest and work with chores.  Also started to get pain in her throat and neck over past week with coughing more.      Still peeing a lot every 30-60min and leaks urine when she walks around.  Can't make it to the bathroom.  Has f/u with urology coming up to recheck on her recent renal abscess.      ROS:  Constitutional, CV, Resp, GI, , MSK, skin, neuro, psych all  negative except as outlined in the HPI above.    History summarized from1-2:pulmonary consult at [facility]:  Chronic cough- likely multifactorial.  Recommend PPI daily for 2-3 months, flonase daily for 2-3 months,   Lab tests reviewed-1: 2017/2018  Medicine tests reviewed-1: EKG from 2017 and 2018 compared to today's EKG with t-wave inversion noted and prolonged QT noted today.      Physical Exam:  /80 (Patient Site: Left Arm, Patient Position: Sitting, Cuff Size: Adult Large)  Pulse 70  Temp 97.4  F (36.3  C) (Oral)   Resp 16  Wt 191 lb (86.6 kg)  SpO2 97%  BMI 37.93 kg/m2 Body mass index is 37.93 kg/(m^2). No LMP recorded. Patient is not currently having periods (Reason: Perimenopausal).  Vital signs reviewed  Wt Readings from Last 3 Encounters:   03/29/18 191 lb (86.6 kg)   02/26/18 187 lb (84.8 kg)   02/16/18 187 lb (84.8 kg)     History   Smoking Status     Never Smoker   Smokeless Tobacco     Never Used     History   Sexual Activity     Sexual activity: Yes     Partners: Male     Birth control/ protection: None     No Data Recorded  PHQ-9 Total Score: 17 (11/20/2017  5:00 PM)  PHQ-2 Total Score: 5 (11/20/2017  5:00 PM)  No Data Recorded    All normal as below except abnormalities include: pt appears tired with flat affect.  Able to speak easily and move around with out obvious dyspnea.  No obvious JVD noted today.  1+ pitting edema in both legs up to the knees- no redness or pain on palpation of calves.  Lungs are clear and moving air well- no crackles or wheezing heard today.  Pt has nasal quality to her voice and is frequently clearing her throat and spitting up mucous/phlegm- white/clear/frothy.  No significant cough noted today.  Cardiac exam limited due to distant heart sounds but regular without obvious murmur noted.    General is a  47 y.o. female sitting comfortably in no apparent distress.   HEENT:  Eye, nasal, oral exams within normal   Neck: Supple without lymphadenopathy or thyromegally  CV:  Regular rate and rhythm S1S2 without rubs, murmurs or gallops,   Lungs: Clear to auscultation bilaterally  Abd:  +BS, soft NT/ND,  No masses or organomegally  Extremities: Warm, 2+ Pedal and radial pulses bilaterally  Skin: No lesions or rashes noted  Neuro/MSK: Able to ambulate around the exam room with equal movement, strength and normal coordination of the upper and lower extremeties symmetrically    Results for orders placed or performed in visit on 03/29/18   Comprehensive Metabolic Panel   Result Value Ref Range    Sodium 140 136 - 145 mmol/L    Potassium 4.5 3.5 - 5.0 mmol/L    Chloride 107 98 - 107 mmol/L    CO2 26 22 - 31 mmol/L    Anion Gap, Calculation 7 5 - 18 mmol/L    Glucose 115 70 - 125 mg/dL    BUN 13 8 - 22 mg/dL    Creatinine 0.67 0.60 - 1.10 mg/dL    GFR MDRD Af Amer >60 >60 mL/min/1.73m2    GFR MDRD Non Af Amer >60 >60 mL/min/1.73m2    Bilirubin, Total 0.4 0.0 - 1.0 mg/dL    Calcium 8.6 8.5 - 10.5 mg/dL    Protein, Total 6.7 6.0 - 8.0 g/dL    Albumin 3.4 (L) 3.5 - 5.0 g/dL    Alkaline Phosphatase 78 45 - 120 U/L    AST 19 0 - 40 U/L    ALT 22 0 - 45 U/L   D-dimer, Quantitative   Result Value Ref Range    D-Dimer, Quant 0.50 <=0.50 FEU ug/mL   Troponin I   Result Value Ref Range    Troponin I <0.01 0.00 - 0.29 ng/mL   Urinalysis-UC if Indicated   Result Value Ref Range    Color, UA Yellow Colorless, Yellow, Straw, Light Yellow    Clarity, UA Clear Clear    Glucose, UA Negative Negative    Bilirubin, UA Negative Negative    Ketones, UA Negative Negative    Specific Gravity, UA 1.020 1.005 - 1.030    Blood, UA Negative Negative    pH, UA 7.0 5.0 - 8.0    Protein, UA Negative Negative mg/dL    Urobilinogen, UA 0.2 E.U./dL 0.2 E.U./dL, 1.0 E.U./dL    Nitrite, UA Negative Negative    Leukocytes, UA Negative Negative   HM1 (CBC with Diff)   Result Value Ref Range    WBC 4.7 4.0 - 11.0 thou/uL    RBC 3.97 3.80 - 5.40 mill/uL    Hemoglobin 12.1 12.0 - 16.0 g/dL    Hematocrit 37.7 35.0 - 47.0 %    MCV 95  80 - 100 fL    MCH 30.5 27.0 - 34.0 pg    MCHC 32.2 32.0 - 36.0 g/dL    RDW 12.6 11.0 - 14.5 %    Platelets 261 140 - 440 thou/uL    MPV 7.4 7.0 - 10.0 fL    Neutrophils % 55 50 - 70 %    Lymphocytes % 34 20 - 40 %    Monocytes % 7 2 - 10 %    Eosinophils % 4 0 - 6 %    Basophils % 0 0 - 2 %    Neutrophils Absolute 2.6 2.0 - 7.7 thou/uL    Lymphocytes Absolute 1.6 0.8 - 4.4 thou/uL    Monocytes Absolute 0.3 0.0 - 0.9 thou/uL    Eosinophils Absolute 0.2 0.0 - 0.4 thou/uL    Basophils Absolute 0.0 0.0 - 0.2 thou/uL   Electrocardiogram Perform - Clinic   Result Value Ref Range    SYSTOLIC BLOOD PRESSURE  mmHg    DIASTOLIC BLOOD PRESSURE  mmHg    VENTRICULAR RATE 62 BPM    ATRIAL RATE 62 BPM    P-R INTERVAL 152 ms    QRS DURATION 84 ms    Q-T INTERVAL 466 ms    QTC CALCULATION (BEZET) 472 ms    P Axis 7 degrees    R AXIS -1 degrees    T AXIS 192 degrees    MUSE DIAGNOSIS       Normal sinus rhythm  ST & T wave abnormality, consider inferior ischemia  ST & T wave abnormality, consider anterolateral ischemia  Prolonged QT  Abnormal ECG  When compared with ECG of 28-JAN-2018 20:57,  Significant changes have occurred  Confirmed by SMITHA GARY MD LOC:[facility] ([id number]) on 3/30/2018 11:15:28 AM         Med list and active problem list reviewed and updated as part of this encounter    Current Outpatient Prescriptions on File Prior to Visit   Medication Sig Dispense Refill     aspirin 81 MG EC tablet Take 1 tablet (81 mg total) by mouth daily. 30 tablet 6     atorvastatin (LIPITOR) 80 MG tablet Take 1 tablet (80 mg total) by mouth daily. 90 tablet 3     isosorbide mononitrate (IMDUR) 60 MG 24 hr tablet Take 1 tablet (60 mg total) by mouth daily. 30 tablet 11     MAPAP EXTRA STRENGTH 500 mg tablet Take 1 tablet (500 mg total) by mouth every 4 (four) hours as needed. 100 tablet 5     metoprolol succinate (TOPROL-XL) 100 MG 24 hr tablet Take 1 tablet (100 mg total) by mouth daily. 90 tablet 3     nitroglycerin (NITROSTAT) 0.4 MG SL  tablet Place 1 tablet (0.4 mg total) under the tongue every 5 (five) minutes as needed for chest pain. 30 tablet 1     nortriptyline (PAMELOR) 25 MG capsule Take 1 capsule (25 mg total) by mouth at bedtime. TXHUA HMO THAUM MUS PW NOJ 1 LUB 30 capsule 6     omeprazole (PRILOSEC) 40 MG capsule Take 1 capsule (40 mg total) by mouth daily. 30 capsule 1     potassium chloride (KLOR-CON) 10 MEQ CR tablet Take 10 mEq by mouth daily.        No current facility-administered medications on file prior to visit.          Jaky Gaspar MD

## 2021-06-17 NOTE — PROGRESS NOTES
Medication Therapy Management (MTM) Encounter    Assessment:                                                      Medication Adherence/Access: Continued difficulty with adherence. PharmD to contact pharmacy regarding medications that were not filled.     Diabetes: Last A1C at goal <8% (ADA). But fasting improving now that pt is back on metformin two times a day. Pt has limited testing because she's been purchasing strips OTC. Reviewed they would be covered at a pharmacy that can bill Part B. Will send to [facility] per pt request.     CAD:  LDL above goal <70. Statin adherence seems appropriate. Based on pt's 2013 LDL, seems she did get the expected 50% reduction from high intensity statin. Likely will need additional agents to get to goal. This was not discussed today due to time. Review at follow-up. As Ezetimibe only expected to lower LDL by about 15%, likely would not be enough to bring pt to goal. May need to consider alternatives such as PCSK-9 inhibitors. Given CAD, would benefit from continued aspirin therapy.     CHF/Hypertension: Last BP above goal <130/80. Pulse at goal . Did not get the increased dose of isosorbide. Will plan for BP recheck after pt has started.     Depression/Headaches/Pain: Did not get Venlafaxine from pharmacy. PharmD to call to inquire.       Heartburn: Not addressed today due to time. Will review at follow-up.        Plan:                                                       -Pharm.D. to call [facility] regarding isosorbide, aspirin, venlafaxine  -Test strips and lancets sent to [facility]    Follow Up   Return in about 2 weeks (around 6/8/2021) for Medication Management Pharmacist, Via Phone.      Subjective & Objective                                                     Leora Sanchez is a 50 y.o. female called for a follow up visit for Medication Therapy Management. She was referred to me from Jaky Gaspar MD.  [employer]  (Vishnu) by phone.     Reason for visit:  Medication management follow-up    Medication Adherence/Access: Med reviewed over the phone.    Was not able to get the medicine sent in at last MTM visit except for Metformin and nitroglycerin.  Patient unsure why.    Diabetes:  Pt currently prescribed Metformin 500 mg ER twice daily. patient is not currently experiencing side effects.   SMBG: once daily    Ranges (patient reported) :     Fastin yesterday.  148 on May 20 was the highest in the last week.    Patient is not experiencing hypoglycemia   Recent symptoms of high blood sugar?  Polyuria, polydipsia  ACEi/ARB: Losartan 100 mg daily  Statin: Atorvastatin 80 mg daily  Aspirin: taking 81mg daily,       Lab Results   Component Value Date    HGBA1C 7.0 (H) 04/15/2021    HGBA1C 6.4 (H) 12/15/2020    HGBA1C 8.5 (H) 2020     Lab Results   Component Value Date    MICROALBUR <0.50 2020    LDLCALC 88 12/15/2020    CREATININE 1.16 (H) 04/15/2021         CAD: Prescribed aspirin 81 mg daily, atorvastatin 80 mg daily, isosorbide mononitrate 60 mg ER daily +30 mg ER daily (dose increased at last MTM visit), nitroglycerin 0.4 mg as needed.    Still having chest pains 1-2 times per month each episode lasts for about 30 minutes.    Still unable to get aspirin-unsure why    Lab Results   Component Value Date    LDLCALC 88 12/15/2020        CHF/Hypertension: Prescribed furosemide 40 mg twice daily, losartan 100 mg daily, metoprolol succinate 100 mg daily, and potassium 10 mEq daily    Does not have BP cuff at home    Wt Readings from Last 3 Encounters:   04/15/21 200 lb (90.7 kg)   21 201 lb 12.8 oz (91.5 kg)   21 197 lb (89.4 kg)     BP Readings from Last 3 Encounters:   21 137/85   04/15/21 145/88   21 122/82     Pulse Readings from Last 3 Encounters:   21 61   04/15/21 (!) 52   21 (!) 56      Results for orders placed or performed in visit on 04/15/21   Basic Metabolic Panel   Result Value Ref Range    Sodium 143 136 -  145 mmol/L    Potassium 4.5 3.5 - 5.0 mmol/L    Chloride 107 98 - 107 mmol/L    CO2 26 22 - 31 mmol/L    Anion Gap, Calculation 10 5 - 18 mmol/L    Glucose 110 70 - 125 mg/dL    Calcium 9.0 8.5 - 10.5 mg/dL    BUN 28 (H) 8 - 22 mg/dL    Creatinine 1.16 (H) 0.60 - 1.10 mg/dL    GFR MDRD Af Amer 60 (L) >60 mL/min/1.73m2    GFR MDRD Non Af Amer 49 (L) >60 mL/min/1.73m2        Pain/headaches: Prescribed acetaminophen 500 mg 2 tabs 3 times a day, camphor-menthol ointment twice daily as needed.  At last visit, started venlafaxine 75 mg ER once daily.    Did not get venlafaxine-still having bad headaches    Heartburn: Prescribed omeprazole 40 mg daily        PMH: reviewed in EPIC   Allergies/ADRs: reviewed in EPIC   Alcohol:   Social History     Substance and Sexual Activity   Alcohol Use No       Tobacco:   Social History     Tobacco Use   Smoking Status Never Smoker   Smokeless Tobacco Never Used   Tobacco Comment    no passive exposure     Today's Vitals: There were no vitals filed for this visit.  ----------------      The patient declined an after visit summary    I spent 22 minutes with this patient today.   All changes were made via collaborative practice agreement with Jaky Gaspar MD. A copy of the visit note was provided to the patient's provider.     Art Thakkar PharmD  Medication Therapy Management (MTM) Pharmacist  [facility]      Telemedicine Visit Details    Type of service:  Telephone     Start Time: 10:44 AM  End Time: 11:06 AM     Originating Location (pt. Location): Home    Distant Location (provider location):  [place] MEDICATION THERAPY MANAGEMENT [facility]    Mode of Communication: Telephone    Current Outpatient Medications   Medication Sig Dispense Refill     acetaminophen (TYLENOL) 500 MG tablet Take 2 tablets (1,000 mg total) by mouth 3 (three) times a day as needed. 100 tablet 6     aspirin 81 MG EC tablet Take 1 tablet (81 mg total) by mouth daily. 90  "tablet 4     atorvastatin (LIPITOR) 80 MG tablet TAKE 1 TABLET (80 MG TOTAL) BY MOUTH DAILY/ [facility] YANET [facility] MUAJ ROJ 90 tablet 4     blood glucose meter (GLUCOMETER) Use 1 each As Directed daily. Dispense glucometer brand per patient's insurance at pharmacy discretion. 1 each 0     blood glucose test (CONTOUR NEXT TEST STRIPS) strips Use 1 each As Directed 3 (three) times a day. 100 strip 4     camphor-menthoL (TIGER BALM) Oint Apply 1 application topically 2 (two) times a day as needed. \"Tiger Balm\"       furosemide (LASIX) 40 MG tablet TAKE 1 TABLET (40 MG TOTAL) BY MOUTH 2 (TWO) TIMES A DAY AT 9AM AND 6PM./ TXJOBY [facility] NO 1 [facility] 2 ZAUG THAUM 9AM THIAB 6PM PAB PHOB  tablet 2     generic lancets Use 1 each As Directed 3 (three) times a day. 100 each 3     isosorbide mononitrate (IMDUR) 30 MG 24 hr tablet Take 1 tablet (30 mg total) by mouth daily. Take with 60 mg tab for total of 90 mg daily. 30 tablet 1     isosorbide mononitrate (IMDUR) 60 MG 24 hr tablet Take 1 tablet (60 mg total) by mouth daily. 90 tablet 3     losartan (COZAAR) 100 MG tablet TAKE 1 PILL BY MOUTH DAILY FOR BLOOD PRESSURE / [facility] ZOO [facility] SIAB 30 tablet 11     metFORMIN (GLUCOPHAGE-XR) 500 MG 24 hr tablet Take 1 tablet (500 mg total) by mouth 2 (two) times a day. 180 tablet 1     metoprolol succinate (TOPROL-XL) 100 MG 24 hr tablet Take 1 tablet (100 mg total) by mouth daily. 90 tablet 4     nitroglycerin (NITROSTAT) 0.4 MG SL tablet Place 1 tablet (0.4 mg total) under the tongue every 5 (five) minutes as needed for chest pain. 30 tablet 1     omeprazole (PRILOSEC) 40 MG capsule        potassium chloride (KLOR-CON) 10 MEQ CR tablet TAKE 1 PILL BY MOUTH EVERY DAY[facility] 90 tablet 4     venlafaxine (EFFEXOR-XR) 75 MG 24 hr capsule Take 1 capsule (75 mg total) by mouth daily. 30 capsule 3     No current facility-administered medications for this visit.  "        Medication Therapy Recommendations  Coronary artery disease involving native coronary artery of native heart, angina presence unspecified    Current Medication: aspirin 81 MG EC tablet   Rationale: Medication product not available - Adherence - Adherence   Recommendation: Provide Adherence Intervention   Status: Accepted per CPA         Type 2 diabetes mellitus with hyperglycemia, without long-term current use of insulin (H)    Current Medication: blood glucose test (CONTOUR NEXT TEST STRIPS) strips (Discontinued)   Rationale: Medication product not available - Adherence - Adherence   Recommendation: Provide Adherence Intervention   Status: Accepted per CPA

## 2021-06-18 NOTE — PROGRESS NOTES
MTM Follow Up Encounter    Assessment/Plan     1. Chronic cough  Uncontrolled. Discussed rationale for nasal spray and flovent inhaler. Discussion about controller vs rescue types of inhalers. Recommended utilization of spacer.   -Continue current therapy. Encouraged to start Flovent and flonase    2. Coronary artery disease involving native coronary artery of native heart  3. Nonischemic cardiomyopathy  4. Exertional angina  5. Essential hypertension  Stable. SOB slightly improved with increased lasix dose. BP well controlled as patient self-resumed lisinopril and has been taking a higher dose of amlodipine than prescribed. Medication list updated to reflect her current use.  - Restart: aspirin 81 MG EC tablet; Take 1 tablet (81 mg total) by mouth daily.  Dispense: 100 tablet; Refill: 11  - Refilled: lisinopril (PRINIVIL,ZESTRIL) 10 MG tablet; Take 1 tablet (10 mg total) by mouth daily.  Dispense: 90 tablet; Refill: 3  - Refill: metoprolol succinate (TOPROL-XL) 100 MG 24 hr tablet; Take 1 tablet (100 mg total) by mouth daily.  Dispense: 90 tablet; Refill: 3  -Continue amlodipine 10 mg daily and imdur 60 mg daily   -Recommended recheck BMP at cardiology follow up (patient declined lab today)  -Continue atorvastatin 80 mg daily    Medication Adherence/Access: Several errors with med refills noted today. Will contact pharmacy and ensure previous medications are discontinued to avoid accidental refills. Patient given updated medication list to reflect her currently described use. Her son will contact clinic if he is filling meds differently than she provided today.     Follow Up  Return in about 3 months (around 8/17/2018). 2 weeks with cardiology. 6 weeks PCP.         Subjective/Objective   May MARICHUY Sanchez is a 47 y.o. female coming in for a follow up visit for Medication Therapy Management. She was referred to me from Jaky Gaspar MD.     Chronic Cough: Recently saw pulmonary - they recommended flonase BID,  "omeprazole, and initiation of flovent 2 puffs BID. She has an albuterol inhaler at home. She didn't start her flovent yet. She doesn't understand the differences between the inhalers. She doesn't have s/s of heartburn. She still complains of cough, primarily worse at night. Cough didn't resolve with holding ACEI - she self-resumed this 2-3 weeks ago.     Hx of CAD, nonischemic cardiomyopathy, angina, and HTN: lisinopril had been held for several months to rule out ACEI induced cough. She brought a half-empty bottle with her today. She has resumed taking lisinopril 10 mg daily \"2-3 weeks ago\". The pharmacy filled it and her son placed it in her box. She has increased lasix to 40 mg BID as prescribed. No LE edema. Taking metoprolol  mg daily, amlodipine 10 mg daily (prescribed 5 mg dose), imdur 60 mg daily, KCL, and atorvastatin 80 mg daily. No adverse effects. No dizziness, blurry vision, or lightheadedness.     She brought in all of her medications today. All are accounted for with the exception of aspirin. She doesn't think she takes any longer. Son is setting up medications in pillbox but he is not here today. She identified each tablet from the bottles today and is positive that these are the only medications in the boxes. Pillboxes helped with adherence.   Errors: Refilled 10 mg amlodipine rather than 5 mg amlodipine. Lisinopril has been refilled consistently since 2017. Patient reports holding but didn't notice improvement in cough\", so she has resumed 2-3 weeks ago    PMH: reviewed in EPIC   Allergies/ADRs: reviewed in EPIC   Alcohol: reviewed in EPIC   Tobacco:   History   Smoking Status     Never Smoker   Smokeless Tobacco     Never Used     Today's Vitals:   Vitals:    05/17/18 1455   BP: 122/70   Pulse: 60   Weight: 190 lb (86.2 kg)     ----------------    Much or all of the text in this note was generated through the use of Dragon Dictate voice-to-text software. Errors in spelling or words which " seem out of context are unintentional. Sound alike errors, in particular, may have escaped editing.    The patient was given a summary of these recommendations as an after visit summary    I spent 30 minutes with this patient today; .. All changes were made via collaborative practice agreement with Jaky Gaspar MD. A copy of the visit note was provided to the patient's provider.     Ariane Ly, PharmD, BCACP  MTM Pharmacist   [facility]         Current Outpatient Prescriptions   Medication Sig Dispense Refill     amLODIPine (NORVASC) 10 MG tablet Take 10 mg by mouth daily.       atorvastatin (LIPITOR) 80 MG tablet Take 1 tablet (80 mg total) by mouth daily. 90 tablet 3     fluticasone (FLONASE) 50 mcg/actuation nasal spray   1     fluticasone (FLOVENT HFA) 44 mcg/actuation inhaler Inhale 2 puffs.       furosemide (LASIX) 40 MG tablet Take 1 tablet (40 mg total) by mouth 2 (two) times a day at 9am and 6pm. 60 tablet 1     isosorbide mononitrate (IMDUR) 60 MG 24 hr tablet Take 1 tablet (60 mg total) by mouth daily. 30 tablet 11     lisinopril (PRINIVIL,ZESTRIL) 10 MG tablet Take 1 tablet (10 mg total) by mouth daily. 90 tablet 3     metoprolol succinate (TOPROL-XL) 100 MG 24 hr tablet Take 1 tablet (100 mg total) by mouth daily. 90 tablet 3     nortriptyline (PAMELOR) 25 MG capsule Take 1 capsule (25 mg total) by mouth at bedtime. TXHUA HMO THAUM MUS PW NOJ 1 LUB 30 capsule 6     omeprazole (PRILOSEC) 40 MG capsule TAKE 1 PILL BY MOUTH EVERY DAY/ TXHUA HNUB NOJ 1 LUB TSHUAJ PAB ZOO LUB PLAB 30 capsule 11     potassium chloride (KLOR-CON) 10 MEQ CR tablet Take 10 mEq by mouth daily.        albuterol (PROAIR HFA;PROVENTIL HFA;VENTOLIN HFA) 90 mcg/actuation inhaler Inhale 2 puffs every 6 (six) hours as needed for wheezing. 1 each 0     aspirin 81 MG EC tablet Take 1 tablet (81 mg total) by mouth daily. 100 tablet 11     MAPAP EXTRA STRENGTH 500 mg tablet Take 1 tablet (500 mg  total) by mouth every 4 (four) hours as needed. 100 tablet 5     nitroglycerin (NITROSTAT) 0.4 MG SL tablet Place 1 tablet (0.4 mg total) under the tongue every 5 (five) minutes as needed for chest pain. 30 tablet 1     No current facility-administered medications for this visit.

## 2021-06-19 NOTE — PROGRESS NOTES
MTM Follow Up Encounter  Assessment & Plan                                                       1. Chronic cough  Uncontrolled. From a medication standpoint, she had already stopped lisinopril with no reduction in cough (resumed several months ago). Symptoms started ~ 1-2 months after change from amitriptyline to nortriptyline. Possible that dry mouth could precipitate cough. Discussed that this may be more unlikely, but since patient reports no benefit from nortriptyline, it may be beneficial to hold nortriptyline at this point to rule this out  -Stopped nortriptyline  -FU with pulmonary as scheduled    2. Essential hypertension  3. Chronic systolic heart failure (H)  4. Lower extremity edema  Uncontrolled (/104).  In light of patient's significant cardiac comorbidities and symptoms (headache, blurry vision), I did consult Dr. Brown to rule out an acute cause.  He believes that headaches are related to unintentional nonadherence to amlodipine and severe tension headache.  She does not have significant edema since self reducing her Lasix.  No significant weight changes.  Reasonable to continue with her same dose.  Dose updated in epic (40 mg daily).  -Restart amlodipine (low-dose of 5 mg daily as blood pressure was well controlled previously at cardiology clinic 2 weeks ago with the expectation that BP will lower once headache pain resolves). Future escalation of lisinopril due to mortality benefit in CHF. Will try to consolidate and reduce pill burden once BPs are under better control. Monitor for edema with amlodipine  -Continue Imdur 60 mg daily, lisinopril 10 mg daily, metoprolol  mg daily, and Lasix 40 mg daily at bedtime.    5. Chronic tension-type headache, not intractable  Uncontrolled. Nortriptyline has not been assistive. Reasonable to stop due to concerns about cough/dry mouth (see above). Continue PRN APAP. Future consideration for effexor or other headache prevention therapies    Follow  "Up  1 week BP recheck. 1 month MTM      Subjective & Objective                                                       May MARICHUY Sanchez is a 48 y.o. female coming in for a follow up visit for Medication Therapy Management. She was referred to me from Jaky Gaspar MD     Chronic cough: Stopped taking flovent as she doesn't feel that it is working. She had been taking 2 puffs two times a day. Now has been off for at least 3-4 months. Using flonase PRN stuffy nose with some success. Still complaining of cough and sputum. Also experiencing dry mouth. She is frustrated because she doesn't know what's causing it.     Side/Back Pain: She does have side/back pain and saw Sheila recently. Has been taking PRN tylenol, but it hasn't been working. She reports that it is slowly improving.     Pt with complex cardiac hx significant for: Hx of CAD, nonischemic cardiomyopathy, angina, and HTN: Current meds include: lasix 40 mg daily at bedtime (not taking prescribed AM dose due to frequent urination during the day), imdur 60 mg daily, metoprolol  mg daily, aspirin 81 mg daily (bottle not present but she states it is at home and assures me she is taking), and amlodipine 10 mg (bottle not present and she believes she hasn't taken recently, last filled in 4/2018). She isn't having any LE edema. No chest pain or shortness of breath. She is experiencing dizziness and headaches which have been \"pretty bad\" for the last 2 days. She does have hx of chronic headaches on nortriptyline. Last /104 today    Chronic headache: On nortriptyline approximately 1 year.  She does not feel like it has been providing benefit for headache prevention or sleep.  She does not sleep well in general.  She doesn't believe it works well for her depression either. Noting some dry mouth    PMH: reviewed in EPIC   Allergies/ADRs: reviewed in EPIC   Alcohol: reviewed in EPIC   Tobacco:   History   Smoking Status     Never Smoker   Smokeless Tobacco     " Never Used     Today's Vitals:   Vitals:    08/15/18 1505 08/15/18 1511   BP: (!) 158/96 170/90   Pulse: 67 70   Weight: 190 lb (86.2 kg)      ----------------    Much or all of the text in this note was generated through the use of Dragon Dictate voice-to-text software. Errors in spelling or words which seem out of context are unintentional. Sound alike errors, in particular, may have escaped editing.    The patient declined an after visit summary    I spent 60 minutes with this patient today;   All changes were made via collaborative practice agreement with Jaky Gaspar MD. A copy of the visit note was provided to the patient's provider.     Ariane Ly, PharmD, BCACP  MTM Pharmacist   [facility]         Current Outpatient Prescriptions   Medication Sig Dispense Refill     acetaminophen (MAPAP EXTRA STRENGTH) 500 MG tablet Take 1 tablet (500 mg total) by mouth every 4 (four) hours as needed. 100 tablet 0     albuterol (PROAIR HFA;PROVENTIL HFA;VENTOLIN HFA) 90 mcg/actuation inhaler Inhale 2 puffs every 6 (six) hours as needed for wheezing. 1 each 0     amLODIPine (NORVASC) 10 MG tablet Take 10 mg by mouth daily.       aspirin 81 MG EC tablet Take 1 tablet (81 mg total) by mouth daily. 100 tablet 11     atorvastatin (LIPITOR) 80 MG tablet Take 1 tablet (80 mg total) by mouth daily. 90 tablet 3     capsaicin (ZOSTRIX) 0.025 % cream Apply to affected areas 2-3 times a day, as needed. 60 g 0     fluticasone (FLONASE) 50 mcg/actuation nasal spray   1     fluticasone (FLOVENT HFA) 44 mcg/actuation inhaler Inhale 2 puffs.       furosemide (LASIX) 40 MG tablet TAKE 1 TABLET (40 MG TOTAL) BY MOUTH 2 (TWO) TIMES A DAY AT 9AM AND 6PM./ NOJ 1 LUB 2 ZAUG IB HNUB THAUM 9AM THIAB 6PM PAB YANET PHOB VOG 60 tablet 5     isosorbide mononitrate (IMDUR) 60 MG 24 hr tablet Take 1 tablet (60 mg total) by mouth daily. 30 tablet 11     lisinopril (PRINIVIL,ZESTRIL) 10 MG tablet Take 1 tablet (10 mg  total) by mouth daily. 90 tablet 3     metoprolol succinate (TOPROL-XL) 100 MG 24 hr tablet Take 1 tablet (100 mg total) by mouth daily. 90 tablet 3     nitroglycerin (NITROSTAT) 0.4 MG SL tablet Place 1 tablet (0.4 mg total) under the tongue every 5 (five) minutes as needed for chest pain. 30 tablet 1     nortriptyline (PAMELOR) 25 MG capsule Take 1 capsule (25 mg total) by mouth at bedtime. TXHUA HMO THAUM MUS PW NOJ 1 LUB 30 capsule 6     omeprazole (PRILOSEC) 40 MG capsule TAKE 1 PILL BY MOUTH EVERY DAY/ TXJANEA HNUB NOJ 1 LUB TSHUAJ PAB ZOO LUB PLAB 30 capsule 11     potassium chloride (KLOR-CON) 10 MEQ CR tablet Take 10 mEq by mouth daily.        No current facility-administered medications for this visit.

## 2021-06-19 NOTE — PROGRESS NOTES
Chief Complaint:  Chief Complaint   Patient presents with     back pain-Rt side     she thinks its her kidney that is acting up again.     follow up chronic cough-worsen at night when laying down     she would like something to help with this.       HPI:   Leora Sanchez is a 48 y.o. female multiple chronic medical issues including heart failure, c/o 2 concerns:    1.  Right back pain.  She has had pain on the right mid back for 1 week.  It seemed to get worse yesterday.  Her pain is not improved today.  She feels like her stomach is very bloated.  She notes that movement or carrying things make her pain worse.  Pain is also worse when she is coughing.  No fevers, vomiting, or nausea.  She then states she occasionally vomits if she is coughing too much.  No dysuria, no blood in her urine.  She has had increased urinary frequency.  She was hospitalized in January 2018 for pyelonephritis.  Discharge summary reviewed today.  She was again seen at the ER in April 2018 for similar concern, but ultimately workup was negative for pyelonephritis.  She saw PCP on 4/26/2018 for thoracic back pain, among other concerns.  In reviewing that note, symptoms today are fairly similar to what they were at that visit in April.  Ultimately PCP advised symptomatic treatment for probable musculoskeletal pain.    2.  Chronic cough.  Cough is worse at nighttime.  She has had multiple evaluations for this before, including ENT, pulmonary, and diagnostic studies.  She is tried several treatments including nasal spray, Flovent, omeprazole.  Last pulmonology visit from March 2018 was reviewed today.  Patient has not followed up with them.  Also reviewed most recent notes from PCP, cardiology, and ENT.  Patient says cough has not really changed.  Cough is definitely less if she uses 1 or 2 pillows underneath her head.  No lower extremity edema noted.  And 1 of the specialists notes, there is a mention made the patient scored high on a sleep apnea  screening test, so she should follow-up with sleep clinic.  She never did this.  She says today that she is no longer having any apneic episodes, as reported by her , so she does not think she needs to follow-up with sleep medicine.  She also notes sometimes she gets a burning in her stomach before the cough starts.  Her cough often bothers her right-sided back pain as well.  She is currently using steroid nasal spray and taking omeprazole.  These have not helped her cough.  She has a controller inhaler and albuterol inhaler.  She appears to be using is appropriately.  She says neither one of these are helpful with her cough.      Current Outpatient Prescriptions:      acetaminophen (MAPAP EXTRA STRENGTH) 500 MG tablet, Take 1 tablet (500 mg total) by mouth every 4 (four) hours as needed., Disp: 100 tablet, Rfl: 0     albuterol (PROAIR HFA;PROVENTIL HFA;VENTOLIN HFA) 90 mcg/actuation inhaler, Inhale 2 puffs every 6 (six) hours as needed for wheezing., Disp: 1 each, Rfl: 0     amLODIPine (NORVASC) 10 MG tablet, Take 10 mg by mouth daily., Disp: , Rfl:      aspirin 81 MG EC tablet, Take 1 tablet (81 mg total) by mouth daily., Disp: 100 tablet, Rfl: 11     atorvastatin (LIPITOR) 80 MG tablet, Take 1 tablet (80 mg total) by mouth daily., Disp: 90 tablet, Rfl: 3     capsaicin (ZOSTRIX) 0.025 % cream, Apply to affected areas 2-3 times a day, as needed., Disp: 60 g, Rfl: 0     fluticasone (FLONASE) 50 mcg/actuation nasal spray, , Disp: , Rfl: 1     fluticasone (FLOVENT HFA) 44 mcg/actuation inhaler, Inhale 2 puffs., Disp: , Rfl:      furosemide (LASIX) 40 MG tablet, TAKE 1 TABLET (40 MG TOTAL) BY MOUTH 2 (TWO) TIMES A DAY AT 9AM AND 6PM./ NOJ 1 LUB 2 ZAUG IB HNUB THAUM 9AM THIAB 6PM PAB YANET JERONIMO VOG, Disp: 60 tablet, Rfl: 5     isosorbide mononitrate (IMDUR) 60 MG 24 hr tablet, Take 1 tablet (60 mg total) by mouth daily., Disp: 30 tablet, Rfl: 11     lisinopril (PRINIVIL,ZESTRIL) 10 MG tablet, Take 1 tablet (10 mg  total) by mouth daily., Disp: 90 tablet, Rfl: 3     metoprolol succinate (TOPROL-XL) 100 MG 24 hr tablet, Take 1 tablet (100 mg total) by mouth daily., Disp: 90 tablet, Rfl: 3     nitroglycerin (NITROSTAT) 0.4 MG SL tablet, Place 1 tablet (0.4 mg total) under the tongue every 5 (five) minutes as needed for chest pain., Disp: 30 tablet, Rfl: 1     nortriptyline (PAMELOR) 25 MG capsule, Take 1 capsule (25 mg total) by mouth at bedtime. TXHUA HMO THAUM MUS PW NOJ 1 LUB, Disp: 30 capsule, Rfl: 6     omeprazole (PRILOSEC) 40 MG capsule, TAKE 1 PILL BY MOUTH EVERY DAY/ TXA HNUB NOJ 1 LUB TSHUAJ PAB ZOO LUB PLAB, Disp: 30 capsule, Rfl: 11     potassium chloride (KLOR-CON) 10 MEQ CR tablet, Take 10 mEq by mouth daily. , Disp: , Rfl:     Physical Exam:  Vitals:    07/25/18 0922   BP: 128/78   Pulse: 84   Temp: 97.3  F (36.3  C)     Body mass index is 37.57 kg/(m^2).    Vital signs stable as recorded above  General: Patient is alert and oriented x 3, in no apparent distress, appropriately groomed with slightly flat affect  Throat: no erythema, edema, or exudate noted  Lymphatic: No cervical lymph node enlargement  Cardiac: Regular rate and rhythm; no murmurs  Pulmonary: Lung clear to auscultation bilaterally; no crackles, rales, rhonchi, or wheezing noted  Musculoskeletal: Normal strength in major muscle groups in upper and lower extremities bilaterally, patient walks a normal tandem gait.  Main area of pain is the right lateral lower ribs, she has mild pain with direct palpation at the site  Abdomen: Non tender to palpation, no hepatosplenomegaly, negative Sal's sign, no pain over McBurney's point, positive bowel sounds, no masses palpable.  She reports mild discomfort in the right and left pelvic area.  Skin: Skin in the affected area on the right torso is healthy and normal, no rashes    Results for orders placed or performed in visit on 07/25/18   Urinalysis   Result Value Ref Range    Color, UA Yellow Colorless,  Yellow, Straw, Light Yellow    Clarity, UA Clear Clear    Glucose, UA Negative Negative    Bilirubin, UA Negative Negative    Ketones, UA Negative Negative    Specific Gravity, UA 1.015 1.005 - 1.030    Blood, UA Negative Negative    pH, UA 7.5 5.0 - 8.0    Protein, UA Negative Negative mg/dL    Urobilinogen, UA 0.2 E.U./dL 0.2 E.U./dL, 1.0 E.U./dL    Nitrite, UA Negative Negative    Leukocytes, UA Negative Negative   Hepatic Profile   Result Value Ref Range    Bilirubin, Total 0.8 0.0 - 1.0 mg/dL    Bilirubin, Direct 0.2 <=0.5 mg/dL    Protein, Total 7.2 6.0 - 8.0 g/dL    Albumin 3.9 3.5 - 5.0 g/dL    Alkaline Phosphatase 96 45 - 120 U/L    AST 20 0 - 40 U/L    ALT 25 0 - 45 U/L   Lipase   Result Value Ref Range    Lipase 26 0 - 52 U/L   White Blood Count (WBC)   Result Value Ref Range    WBC 6.6 4.0 - 11.0 thou/uL   Urine culture pending.    Assessment/Plan:  1.  Right lateral rib pain.  Based on exam, symptoms, and lab results, this is most likely musculoskeletal in origin.  Patient is very concerned this may be pyelonephritis.  I would have very low suspicion of that given exam and lab results thus far.  We will contact her when urine culture is back.  Reassurance provided.  Tylenol and capsaicin cream prescribed.  She says she is unable to take ibuprofen due to GI issues.  Discussed other symptomatic treatment.  She declines referral for physical therapy.  She will of course contact us if symptoms worsen or change.    2.  Chronic cough, worst at nighttime.  Patient has had significant evaluation for this before, including labs and diagnostic studies.  She is taking omeprazole, using inhalers as recommended, and also using steroid nasal spray.  None of these seem to be helping.  A previous provider had reported that she scored high on a screening test for sleep apnea, and recommended she follow-up with sleep clinic.  She never did that.  She says today that her  reports she is having no more apneic  episodes, so she does not think she needs to follow-up with them.  She has an appointment with cardiology in August.  She would also like to follow-up with pulmonology.  Referral placed today.  She states she would like to see [facility] pulmonology rather than [facility], due to transportation and parking issues.    This dictation uses voice recognition software, which may contain typographical errors.

## 2021-06-19 NOTE — LETTER
Letter by Jaky Gaspar MD at      Author: Jaky Gaspar MD Service: -- Author Type: --    Filed:  Encounter Date: 8/23/2019 Status: (Other)         Leora Sanchez  [address] [place] [address]             August 23, 2019         Dear Ms. Sanchez,    Below are the results from your recent visit:    Resulted Orders   HIV Antigen/Antibody Screening Cascade   Result Value Ref Range    HIV Antigen / Antibody Negative Negative    Narrative    Method is Abbott HIV Ag/Ab for the detection of HIV p24 antigen, HIV-1 antibodies and HIV-2 antibodies.   Glycosylated Hemoglobin A1c   Result Value Ref Range    Hemoglobin A1c 6.3 (H) 3.5 - 6.0 %   Basic Metabolic Panel   Result Value Ref Range    Sodium 142 136 - 145 mmol/L    Potassium 4.4 3.5 - 5.0 mmol/L    Chloride 105 98 - 107 mmol/L    CO2 27 22 - 31 mmol/L    Anion Gap, Calculation 10 5 - 18 mmol/L    Glucose 154 (H) 70 - 125 mg/dL    Calcium 9.2 8.5 - 10.5 mg/dL    BUN 12 8 - 22 mg/dL    Creatinine 0.72 0.60 - 1.10 mg/dL    GFR MDRD Af Amer >60 >60 mL/min/1.73m2    GFR MDRD Non Af Amer >60 >60 mL/min/1.73m2    Narrative    Fasting Glucose reference range is 70-99 mg/dL per  American Diabetes Association (ADA) guidelines.             Your sugars are getting higher- you almost have diabetes.  Please start to decrease your rice and noodles and sweets/starcy foods.  Eat more fruit and veggies and try to walk every day.      Please call with questions or contact us using [employer].    Sincerely,        Electronically signed by Jaky Gaspar MD

## 2021-06-19 NOTE — LETTER
Letter by Jaky Gaspar MD at      Author: Jaky Gaspar MD Service: -- Author Type: --    Filed:  Encounter Date: 11/21/2019 Status: Signed       My Asthma Action Plan     Name: Leora Sanchez   YOB: 1970  Date: 11/21/2019   My doctor: Jaky Gaspar MD   My clinic: [facility]        My Rescue Medicine:   Albuterol (Proair/Ventolin/Proventil HFA) 2-4 puffs EVERY 4 HOURS as needed. Use a spacer if recommended by your provider.   My Asthma Severity:   Intermittent/Exercise Induced  Know your asthma triggers: smoke, upper respiratory infections, pollens, strong odors and fumes, exercise or sports, cold air and Gastric Reflux             GREEN ZONE   Good Control    I feel good    No cough or wheeze    Can work, sleep and play without asthma symptoms     Take your asthma control medicine every day.     1. If exercise triggers your asthma, take your rescue medication    15 minutes before exercise or sports, and    During exercise if you have asthma symptoms  2. Spacer to use with inhaler: If you have a spacer, make sure to use it with your inhaler             YELLOW ZONE Getting Worse  I have ANY of these:    I do not feel good    Cough or wheeze    Chest feels tight    Wake up at night 1. Keep taking your Green Zone medications  2. Start taking your rescue medicine:    every 20 minutes for up to 1 hour. Then every 4 hours for 24-48 hours.  3. If you stay in the Yellow Zone for more than 12-24 hours, contact your doctor.  4. If you do not return to the Green Zone in 12-24 hours or you get worse, start taking your oral steroid medicine if prescribed by your provider.           RED ZONE Medical Alert - Get Help  I have ANY of these:    I feel awful    Medicine is not helping    Breathing getting harder    Trouble walking or talking    Nose opens wide to breathe     1. Take your rescue medicine NOW  2. If your provider has prescribed an oral steroid medicine, start  taking it NOW  3. Call your doctor NOW  4. If you are still in the Red Zone after 20 minutes and you have not reached your doctor:    Take your rescue medicine again and    Call 911 or go to the emergency room right away    See your regular doctor within 2 weeks of an Emergency Room or Urgent Care visit for follow-up treatment.          Annual Reminders:  Meet with Asthma Educator,  Flu Shot in the Fall, consider Pneumonia Vaccination for patients with asthma (aged 19 and older).    Pharmacy:   [facility] - [place], [place] - [address] [place] [address] Pkw[address]  [address]  [address]  [place] [address]  Phone: [phone] Fax: [phone]                            Asthma Triggers  How To Control Things That Make Your Asthma Worse    Triggers are things that make your asthma worse.  Look at the list below to help you find your triggers and what you can do about them.  You can help prevent asthma flare-ups by staying away from your triggers.      Trigger                                                          What you can do   Cigarette Smoke  Tobacco smoke can make asthma worse. Do not allow smoking in your home, car or around you.  Be sure no one smokes at a rusty day care or school.  If you smoke, ask your health care provider for ways to help you quit.  Ask family members to quit too.  Ask your health care provider for a referral to Quit Plan to help you quit smoking, or call 0-851-445-PLAN.     Colds, Flu, Bronchitis  These are common triggers of asthma. Wash your hands often.  Dont touch your eyes, nose or mouth.  Get a flu shot every year.     Dust Mites  These are tiny bugs that live in cloth or carpet. They are too small to see. Wash sheets and blankets in hot water every week.   Encase pillows and mattress in dust mite proof covers.  Avoid having carpet if you can. If you have carpet, vacuum weekly.   Use a dust mask and HEPA vacuum.   Pollen and Outdoor Mold  Some people are allergic to trees,  grass, or weed pollen, or molds. Try to keep your windows closed.  Limit time out doors when pollen count is high.   Ask you health care provider about taking medicine during allergy season.     Animal Dander  Some people are allergic to skin flakes, urine or saliva from pets with fur or feathers. Keep pets with fur or feathers out of your home.    If you cant keep the pet outdoors, then keep the pet out of your bedroom.  Keep the bedroom door closed.  Keep pets off cloth furniture and away from stuffed toys.     Mice, Rats, and Cockroaches  Some people are allergic to the waste from these pests.   Cover food and garbage.  Clean up spills and food crumbs.  Store grease in the refrigerator.   Keep food out of the bedroom.   Indoor Mold  This can be a trigger if your home has high moisture. Fix leaking faucets, pipes, or other sources of water.   Clean moldy surfaces.  Dehumidify basement if it is damp and smelly.   Smoke, Strong Odors, and Sprays  These can reduce air quality. Stay away from strong odors and sprays, such as perfume, powder, hair spray, paints, smoke incense, paint, cleaning products, candles and new carpet.   Exercise or Sports  Some people with asthma have this trigger. Be active!  Ask your doctor about taking medicine before sports or exercise to prevent symptoms.    Warm up for 5-10 minutes before and after sports or exercise.     Other Triggers of Asthma  Cold air:  Cover your nose and mouth with a scarf.  Sometimes laughing or crying can be a trigger.  Some medicines and food can trigger asthma.

## 2021-06-19 NOTE — LETTER
Letter by Danielle Torres at      Author: Danielle Torres Service: -- Author Type: --    Filed:  Encounter Date: 8/20/2019 Status: (Other)         August 20, 2019    [facility]  [address] [place] [address]      Dear May,    Your two appointments are scheduled at our [facility] on 9/20/19 at 12:30 pm with Dr. Barber & Juwan Pandey. We ask that you arrive 15 minutes prior to your appointment time to complete your registration. We strive to avoid clinic delays for our patients, so patients arriving late will need to reschedule.    In preparation for this appointment you will need to bring the following:      Your insurance card and photo identification    Your appointment has been scheduled at our [address], [address] (second floor) [place] [address] location.    If you find yourself unable to keep this appointment, please call us at [phone] to reschedule at your earliest convenience so we can accommodate other patients.    We are excited that you have chosen our program and look forward to serving you!    Sincerely,  [facility] ENT & Audiology Team

## 2021-06-19 NOTE — PROGRESS NOTES
Assessment/ Plan     Essential hypertension  Elevated/accelerated blood pressure.  Start/restart amlodipine and add to lisinopril 10 mg, Imdur, metoprolol and diuretic  Consider increasing lisinopril in the future given mild ventricular dysfunction  Follow-up 4 days  Tension headache  Concern initially that this could be related to symptomatic hypertension  This is unlikely  Headache is reproducible with palpation/compression of right occipital ridge  It is unlikely that blood pressure of 170/ would be symptomatic  Patient's neurologic exam is normal, funduscopic exam is normal  Outpatient treatment, discussed symptomatic management of tension headache  There is no height or weight on file to calculate BMI.    Subjective  CC:  Chief Complaint   Patient presents with     Follow-up     Blood pressure      HPI:  I was consulted by [facility] pharmacist who is doing medication visit on patient with hypertension for very elevated blood pressure at around 170/90+  She is complaining of headache in the last 24 hours.  Thus there was concern that this was symptomatic hypertension.  I spoke with the patient after pharmacist visit today  Headache  Duration- began yesterday  Our concern today is that this may be related to blood pressure.  Location-right posterior, radiates to I  Severity/quality-pounding, back and right side  Radiates   Associated symptoms-no  Timing/context-constant  Things that make pain better?  Has not found anything  Nausea/vomiting?  No  Photophobia?  No    Red flag symptoms; overnight awakening, worsening with valsalva or orthostatic change, change in behavior or mood, gradually worsening pattern?  No    Blurry vision  Has been going on for ever 10 years  Light headed- somce uesterdayu    Accelerated hypertension  Patient was here to see [facility] pharmacist today for follow-up on high blood pressure.  Blood pressure was found to be quite good on recent check with cardiology.  Patient states she is taking all  of her medications.  Those that affect blood pressure would include furosemide 40, Imdur 60, lisinopril 10, Toprol .  Also, amlodipine 10 mg.  Apparently, has not picked up amlodipine at the pharmacy.  Patient Active Problem List   Diagnosis     Ganglion cyst of left foot     Essential hypertension     Non morbid obesity due to excess calories     Heartburn     Migraine with aura     Bilateral dry eyes     Mixed incontinence     Hyperlipemia     Dysidria     Nonischemic cardiomyopathy (H)     Heart failure with reduced ejection fraction (H)     Prediabetes     Pyelonephritis     Renal abscess     Chronic cough     Cyst of left ovary     Lower extremity edema     Coronary artery disease involving native coronary artery of native heart     Current medications reviewed as follows:  Current Outpatient Prescriptions on File Prior to Visit   Medication Sig     acetaminophen (MAPAP EXTRA STRENGTH) 500 MG tablet Take 1 tablet (500 mg total) by mouth every 4 (four) hours as needed.     albuterol (PROAIR HFA;PROVENTIL HFA;VENTOLIN HFA) 90 mcg/actuation inhaler Inhale 2 puffs every 6 (six) hours as needed for wheezing.     aspirin 81 MG EC tablet Take 1 tablet (81 mg total) by mouth daily.     atorvastatin (LIPITOR) 80 MG tablet Take 1 tablet (80 mg total) by mouth daily.     capsaicin (ZOSTRIX) 0.025 % cream Apply to affected areas 2-3 times a day, as needed.     fluticasone (FLONASE) 50 mcg/actuation nasal spray      furosemide (LASIX) 40 MG tablet TAKE 1 TABLET (40 MG TOTAL) BY MOUTH 2 (TWO) TIMES A DAY AT 9AM AND 6PM./ NOJ 1 LUB 2 ZAUG IB HNUB THAUM 9AM THIAB 6PM PAB YANET PHOB VOG     isosorbide mononitrate (IMDUR) 60 MG 24 hr tablet Take 1 tablet (60 mg total) by mouth daily.     lisinopril (PRINIVIL,ZESTRIL) 10 MG tablet Take 1 tablet (10 mg total) by mouth daily.     metoprolol succinate (TOPROL-XL) 100 MG 24 hr tablet Take 1 tablet (100 mg total) by mouth daily.     nitroglycerin (NITROSTAT) 0.4 MG SL tablet Place  1 tablet (0.4 mg total) under the tongue every 5 (five) minutes as needed for chest pain.     nortriptyline (PAMELOR) 25 MG capsule Take 1 capsule (25 mg total) by mouth at bedtime. TXHUA HMO THAUM MUS PW NOJ 1 LUB     omeprazole (PRILOSEC) 40 MG capsule TAKE 1 PILL BY MOUTH EVERY DAY/ TXHUA HNUB NOJ 1 LUB TSHUAJ PAB ZOO LUB PLAB     potassium chloride (KLOR-CON) 10 MEQ CR tablet Take 10 mEq by mouth daily.      [DISCONTINUED] amLODIPine (NORVASC) 10 MG tablet Take 10 mg by mouth daily.     amLODIPine (NORVASC) 5 MG tablet Take 1 tablet (5 mg total) by mouth daily.     [DISCONTINUED] fluticasone (FLOVENT HFA) 44 mcg/actuation inhaler Inhale 2 puffs.     No current facility-administered medications on file prior to visit.      History   Smoking Status     Never Smoker   Smokeless Tobacco     Never Used     Social History     Social History Narrative    Lives with  who can read and speak English and helps her with [employer]     Patient Care Team:  Jaky Gaspar MD as PCP - General (Family Medicine)  Quinton TolliverD as Pharmacist (Pharmacist)  ROS  Negative for chest pain.  Has dizziness but has had dizziness for many years and fatigue, no shortness of breath, palpitations      Objective  Physical Exam  Vitals:    08/15/18 1529   BP: (!) 186/104   Patient Site: Left Arm   Patient Position: Sitting   Cuff Size: Adult Regular   Gen- alert, oriented/ appropriately responsive  HEENT- normal cephalic, atraumatic.   Chest- Normal inspiration and expiration.    Clear to ascultation.    No chest wall deformity or scar.  CV- Heart regular rate and rhythm  normal tones, no murmurs   No gallops or rubs.  Ext- appear well perfused, no edema  Skin- warm and dry,   no visualized rash    Cranial nerves II through XII intact.  Optic fundi normal.  No papilledema.  Strength tested in upper extremities and is normal.  Coordination intact.  Gait normal.  Romberg negative.  Triceps, biceps, patellar, ankle reflexes tested  and are normal.    Diagnostics  None    Please note: Voice recognition software was used in this dictation.  It may therefore contain typographical errors.

## 2021-06-20 NOTE — PROGRESS NOTES
"Assessment and Plan:Leora Sanchez is a 48 y.o. with a past medical history significant for cardiomyopathy with CHF who presents to clinic today for a chronic cough.  She has been coughing for over a year and two parts of her history are suspect.  She has been on lisinopril for many months, and this is a well known cause of chronic cough.  She also has sinus disease with active ear infections more than likely.  This can lead to post-nasal drip.  It is also possible heart failure is causing her cough, but I suspect this is considered optimized, so I would hesitate to blame this as I'm not sure how that would portend a therapeutic strategy from here.   1. ACE induced cough - possible, consider stopping lisinopril, or starting alternative agent.  Given cardiomyopathy, I would prefer her cardiologist choose a path as I don't want to disrupt her heart failure regimen which ACE inhibitors are often a cornerstone of.  2. PND from sinus disease - she sees ENT in TWO days, and I would prefer they decide if her ears are infected and how best to treat the abnormalities seen today.    3. If she is still coughing despite stopping the ACE inhibitor AND addressing her sinus/ear disease, we will bring her back for another investigation.        CCx: cough    HPI: Ms. Sanchez is a 48 year old ong woman who presents with an  to discuss a chronic cough.  She has been coughing for over a year.  It can happen all day long, and is exacerbated when she tries to lay down.  She sometimes coughs up a white \"foam\" but usually it is dry.  She has seen her PCC and has been to the ER for the cough.  She did receive a course of prednisone for the cough in the ER which did help, but didn't eradicate the cough.  She had mild hemoptysis once with the cough, but that resolved.  She isn't really short of breath.  She does have ear pain, and is seeing an ENT about this on Thursday.  She denies fevers or chills.  She intermittently has sinus " drainage and finds Flonase helps but she doesn't use it all the time.  She does notice that when she has more fluid in her system, she coughs more, but she is stable now with no swelling and still coughing.    PMH:  Past Medical History:   Diagnosis Date     Anxiety      Cardiomyopathy (H)      CHF (congestive heart failure) (H)      Coronary artery disease      Depression      High cholesterol      Hyperlipidemia      Hypertension      Pregnancy          Pyelonephritis 2018     Renal abscess      Spontaneous       TM (tympanic membrane disorder)        PSH:  Past Surgical History:   Procedure Laterality Date     CARDIAC CATHETERIZATION       CV LEFT HEART CATHETERIZATION WO LEFT VETRICULOGRAM Left 10/31/2017    Procedure: Left Heart Catheterization Without Left Ventriculogram;  Surgeon: Jonathan Duque MD;  Location: [facility] Cath Lab;  Service:      DILATION AND CURETTAGE OF UTERUS       INNER EAR SURGERY Right      MASTOIDECTOMY Right      MT CATH PLACEMENT & NJX CORONARY ART ANGIO IMG S&I N/A 10/31/2017    Procedure: Coronary Angiogram;  Surgeon: Jonathan Duque MD;  Location: [facility] Cath Lab;  Service: Cardiology       SH:  Social History     Social History     Marital status:      Spouse name: N/A     Number of children: 8     Years of education: N/A     Occupational History     [employer] Not Employed     For chronic headaches since ear surgery     Social History Main Topics     Smoking status: Never Smoker     Smokeless tobacco: Never Used     Alcohol use No     Drug use: No     Sexual activity: Yes     Partners: Male     Birth control/ protection: None     Other Topics Concern     Not on file     Social History Narrative    Lives with  who can read and speak English and helps her with meds       Family history:  Family History   Problem Relation Age of Onset     Diabetes Mother      Hypertension Mother      Uterine cancer Mother      Diverticulitis Mother       No Medical Problems Father      No Medical Problems Sister      No Medical Problems Daughter      No Medical Problems Son      The family history was reviewed and is not pertinent to the chief complaint or HPI.    ROS:  Review of Systems - History obtained from the patient  General ROS: negative  Psychological ROS: negative  ENT ROS: negative - ear pain  Allergy and Immunology ROS: negative  Endocrine ROS: negative  Respiratory ROS: positive for - cough  negative for - pleuritic pain, shortness of breath, sputum changes, stridor or tachypnea  Cardiovascular ROS: no chest pain or palpitations  Gastrointestinal ROS: no abdominal pain, change in bowel habits, or black or bloody stools  Genito-Urinary ROS: no dysuria, trouble voiding, or hematuria  Musculoskeletal ROS: negative  Neurological ROS: no TIA or stroke symptoms  Dermatological ROS: negative      Current Meds:  Current Outpatient Prescriptions   Medication Sig Note     acetaminophen (MAPAP EXTRA STRENGTH) 500 MG tablet Take 1 tablet (500 mg total) by mouth every 4 (four) hours as needed.      albuterol (PROAIR HFA;PROVENTIL HFA;VENTOLIN HFA) 90 mcg/actuation inhaler Inhale 2 puffs every 6 (six) hours as needed for wheezing.      amLODIPine (NORVASC) 5 MG tablet Take 1 tablet (5 mg total) by mouth daily.      aspirin 81 MG EC tablet Take 1 tablet (81 mg total) by mouth daily.      atorvastatin (LIPITOR) 80 MG tablet Take 1 tablet (80 mg total) by mouth daily.      capsaicin (ZOSTRIX) 0.025 % cream Apply to affected areas 2-3 times a day, as needed.      fluticasone (FLONASE) 50 mcg/actuation nasal spray       fluticasone (FLOVENT HFA) 44 mcg/actuation inhaler Inhale 2 puffs. 8/21/2018: Received from: [place] Received Sig: Inhale 2 puffs into the lungs 2 times daily     furosemide (LASIX) 40 MG tablet Take 1 tablet (40 mg total) by mouth daily.      isosorbide mononitrate (IMDUR) 60 MG 24 hr tablet Take 1 tablet (60 mg total) by mouth daily.       lisinopril (PRINIVIL,ZESTRIL) 10 MG tablet Take 1 tablet (10 mg total) by mouth daily.      metoprolol succinate (TOPROL-XL) 100 MG 24 hr tablet Take 1 tablet (100 mg total) by mouth daily.      nitroglycerin (NITROSTAT) 0.4 MG SL tablet Place 1 tablet (0.4 mg total) under the tongue every 5 (five) minutes as needed for chest pain.      omeprazole (PRILOSEC) 40 MG capsule TAKE 1 PILL BY MOUTH EVERY DAY/ TXHUA HNUB NOJ 1 LUB TSHUAJ PAB ZOO LUB PLAB      potassium chloride (KLOR-CON) 10 MEQ CR tablet Take 10 mEq by mouth daily.       meclizine (ANTIVERT) 25 mg tablet Take 1 tablet (25 mg total) by mouth daily as needed for dizziness or nausea.        Labs:  No results found for this or any previous visit (from the past 72 hour(s)).    I have personally reviewed all imaging and PFT data available pertinent to this visit.    Imaging studies:  Cta Chest Pe Run    Result Date: 9/10/2018  CTA CHEST PE RUN 9/10/2018 12:21 PM INDICATION: Cough hemoptysis TECHNIQUE: Helical acquisition through the chest was performed during the arterial phase of contrast enhancement using IV contrast. 2D and 3D reconstructions were performed by the CT technologist. Dose reduction techniques were used. IV CONTRAST: Iohexol (Omni) 100 mL COMPARISON: 4/15/2018 FINDINGS: ANGIOGRAM CHEST: Pulmonary arteries are normal caliber and negative for pulmonary emboli. Normal caliber thoracic aorta with no dissection or aneurysm. RV/LV RATIO: N/A LUNGS AND PLEURA: No airspace disease identified. Heterogeneity of the lungs is likely atelectasis. There is prominent subpleural fat deposition. MEDIASTINUM: The heart is enlarged. No lymphadenopathy. LIMITED UPPER ABDOMEN: Negative. MUSCULOSKELETAL: Negative.     CONCLUSION: 1.  No PE identified. 2.  Cardiomegaly. 3.  Low lung volumes. Prominent subpleural fat deposition.       PFTs:  FEV1/FVC is 88% and is normal.  FEV1 is 2.04L (96%) predicted and is normal.  FVC is 2.31L (91%) predicted and normal.  There was  "no improvement in spirometry after a single inhaled dose of bronchodilator.  TLC is 4.07L (97%) predicted and is normal.  RV is 1.67L (110%) predicted and is normal.  DLCO is 19.56ml/min/hg (93%) predicted and is normal when it is corrected for hemoglobin.  Flow volume loops indicate normal.    Impression:  Full Pulmonary Function Test is abnormal.  PFTs are normal    Dexter Zamora  [facility]          Physical Exam:  /70  Pulse 74  Resp 8  Ht 4' 11.5\" (1.511 m)  Wt 187 lb (84.8 kg)  SpO2 97%  Breastfeeding? No  BMI 37.14 kg/m2  General - Well nourished  Ears/Mouth - OP pink moist, no thrush - left TM is cloudy with a rim of erythema, right TM appears ruptured with a creamy exudate - prior surgical changes  Neck - no cervical lymphadenopathy  Lungs - Clear to ausculation bilaterally   CVS - regular rhythm with no murmurs, rubs or gallups  Abdomen - soft, NT, ND, NABS  Ext - no cyanosis, clubbing or edema  Skin - no rash  Psychology - alert and oriented, answers appropriate        Electronically signed by:    Dexter Zamora MD PhD  [facility] Pulmonary and Critical Care Medicine  "

## 2021-06-20 NOTE — PROGRESS NOTES
[facility] Office Visit    Chief Complaint:  Chief Complaint   Patient presents with     Blood Pressure Check     wants medication for nausea         Assessment/Plan:  1. Essential hypertension  BP Readings from Last 3 Encounters:   08/21/18 134/78   08/15/18 (!) 186/104   08/15/18 170/90       improved controlled today.  Plan for bloodpressure management includes ongoing focus on healthy DASH type diet and increased activity, encouraged to avoid tobacco products and limit alcohol use, stress reduction, continue medications as prescribed including metoprolol  mg daily, lisinopril 10 mg daily, Imdur 60 mg daily, Lasix 40 mg daily, amlodipine 5 mg daily.  Patient notes that with increased stress of her son's wedding and her irregular schedule of eating and sleeping this attributed to her blood pressure.  She does not think she missed many doses of her pills however.  Continue to monitor carefully..  Labwork and meds ordered and reviewed as below  Lab Results   Component Value Date    K 4.8 08/21/2018      Lab Results   Component Value Date    CREATININE 0.77 08/21/2018       - Basic Metabolic Panel  - Glycosylated Hemoglobin A1c    2. Vertigo  Patient does seem to have episodes of vertigo that caused some nausea and spinning sensations.  She is requesting medication to take as needed.  Meclizine prescribed to use as needed for symptoms lasting longer than 10 minutes.  - meclizine (ANTIVERT) 25 mg tablet; Take 1 tablet (25 mg total) by mouth daily as needed for dizziness or nausea.  Dispense: 30 tablet; Refill: 1    3. Prediabetes  Patient has been working on lifestyle changes.  Her A1c has dropped.  Continue to work on healthy lifestyle is reviewed today.  - Glycosylated Hemoglobin A1c    4. Mixed hyperlipidemia  Continue high-dose statin therapy Lipitor 80 mg daily.  - LDL Cholesterol, Direct    5. Mixed incontinence  Patient did request to check to make sure she did not have a urinary tract  infection based on some incontinence and increased urination.  Wonder if this is due to increased adherence to her Lasix.  - Urinalysis-UC if Indicated    Return in about 3 months (around 11/21/2018) for Recheck.  The following high BMI interventions were performed this visit: encouragement to exercise and lifestyle education regarding diet    Patient Education/AVS:  There are no Patient Instructions on file for this visit.    HPI:   Leora Sanchez is a 48 y.o. female c/o here to f/u on high blood pressure and headache noted last week with MTM and then pt was asked to be seen by Dr Brown 8/15/18.  Cardiology consultation recently 8/6/18 had normal bp.  At her visit she said she was doing well with taking her bp meds:  Lasix 40mg daily, imdur 60mg dialy, lisinopril 10mg dialy, toprol xl 100mg daily and amlodipine 10mg daily - may have run out of this medication?  Son did just get  so has been under a lot of stress and not following her regular eating/sleeping/med routine and may have missed meds frequently over past 1-2 weeks.      Notes headache is better but still there. Does get nausea and dizziness at times and wondering if she can get a prn medication.  Notes when she opens her eyes feels like things are moving around and when she closes her eyes feels like she is spinning.  Severe dizziness can last for hours up to 1/2 day and just has to lay down until it passes. Will get this severe episode every 1-2 months.      Cough- mtm recommended trial off nortriptyline and f/u with pulmonology.  Has f/u with pulmonary 9/18/18 and cardiology 9/26/18.  Cough worse at night when she lays flat due to sticky phlegm.  Not bad during the day as long as she avoids strong odors.      ROS:  Constitutional, CV, Resp, GI, , MSK, skin, neuro, psych all negative except as outlined in the HPI above.    History summarized from1-2: MTM and office visit from 8/15/2018 reviewed noting accelerated hypertension possibly since not  taking amlodipine.  Recommended to start/restart her amlodipine and continue lisinopril Imdur and metoprolol and diuresis.  Follow-up with primary care provider this week for recheck.  Lab tests reviewed-1: 2018    Physical Exam:  /78  Pulse 76  Resp 20  Wt 190 lb (86.2 kg)  BMI 37.73 kg/m2 Body mass index is 37.73 kg/(m^2). No LMP recorded. Patient is not currently having periods (Reason: Perimenopausal).  Vital signs reviewed  Wt Readings from Last 3 Encounters:   08/21/18 190 lb (86.2 kg)   08/15/18 190 lb (86.2 kg)   08/06/18 189 lb (85.7 kg)     History   Smoking Status     Never Smoker   Smokeless Tobacco     Never Used     History   Sexual Activity     Sexual activity: Yes     Partners: Male     Birth control/ protection: None     No Data Recorded  PHQ-9 Total Score: 17 (4/26/2018  5:00 PM)  PHQ-2 Total Score: 2 (4/26/2018  5:00 PM)  Depression Follow-up Plan: mental health care assessment (4/26/2018  5:00 PM)  No Data Recorded    All normal as below except abnormalities include: Patient appears well at her baseline.  She has good eye contact.  She is appropriately interactive and engaged in history and planning.  General is a  48 y.o. female sitting comfortably in no apparent distress.   HEENT:  TM are clear bilaterally.  Eye, nasal, oral exams within normal   Neck: Supple without lymphadenopathy or thyromegally  CV: Regular rate and rhythm S1S2 without rubs, murmurs or gallops,   Lungs: Clear to auscultation bilaterally  Extremities: Warm, No Edema, 2+ Pedal and radial pulses bilaterally  Skin: No lesions or rashes noted  Neuro/MSK: Able to ambulate around the exam room with equal movement, strength and normal coordination of the upper and lower extremeties symmetrically    Results for orders placed or performed in visit on 08/21/18   Basic Metabolic Panel   Result Value Ref Range    Sodium 141 136 - 145 mmol/L    Potassium 4.8 3.5 - 5.0 mmol/L    Chloride 108 (H) 98 - 107 mmol/L    CO2 27 22 - 31  mmol/L    Anion Gap, Calculation 6 5 - 18 mmol/L    Glucose 118 70 - 125 mg/dL    Calcium 8.8 8.5 - 10.5 mg/dL    BUN 17 8 - 22 mg/dL    Creatinine 0.77 0.60 - 1.10 mg/dL    GFR MDRD Af Amer >60 >60 mL/min/1.73m2    GFR MDRD Non Af Amer >60 >60 mL/min/1.73m2   Glycosylated Hemoglobin A1c   Result Value Ref Range    Hemoglobin A1c 6.1 (H) 3.5 - 6.0 %   Urinalysis-UC if Indicated   Result Value Ref Range    Color, UA Yellow Colorless, Yellow, Straw, Light Yellow    Clarity, UA Clear Clear    Glucose, UA Negative Negative    Bilirubin, UA Negative Negative    Ketones, UA Negative Negative    Specific Gravity, UA 1.015 1.005 - 1.030    Blood, UA Negative Negative    pH, UA 7.0 5.0 - 8.0    Protein, UA Negative Negative mg/dL    Urobilinogen, UA 0.2 E.U./dL 0.2 E.U./dL, 1.0 E.U./dL    Nitrite, UA Negative Negative    Leukocytes, UA Negative Negative   LDL Cholesterol, Direct   Result Value Ref Range    Direct LDL 92 <=129 mg/dl       Med list and active problem list reviewed and updated as part of this encounter    Current Outpatient Prescriptions on File Prior to Visit   Medication Sig Dispense Refill     acetaminophen (MAPAP EXTRA STRENGTH) 500 MG tablet Take 1 tablet (500 mg total) by mouth every 4 (four) hours as needed. 100 tablet 0     albuterol (PROAIR HFA;PROVENTIL HFA;VENTOLIN HFA) 90 mcg/actuation inhaler Inhale 2 puffs every 6 (six) hours as needed for wheezing. 1 each 0     amLODIPine (NORVASC) 5 MG tablet Take 1 tablet (5 mg total) by mouth daily. 30 tablet 11     aspirin 81 MG EC tablet Take 1 tablet (81 mg total) by mouth daily. 100 tablet 11     atorvastatin (LIPITOR) 80 MG tablet Take 1 tablet (80 mg total) by mouth daily. 90 tablet 3     capsaicin (ZOSTRIX) 0.025 % cream Apply to affected areas 2-3 times a day, as needed. 60 g 0     fluticasone (FLONASE) 50 mcg/actuation nasal spray   1     furosemide (LASIX) 40 MG tablet Take 1 tablet (40 mg total) by mouth daily. 60 tablet 5     isosorbide mononitrate  (IMDUR) 60 MG 24 hr tablet Take 1 tablet (60 mg total) by mouth daily. 30 tablet 11     lisinopril (PRINIVIL,ZESTRIL) 10 MG tablet Take 1 tablet (10 mg total) by mouth daily. 90 tablet 3     metoprolol succinate (TOPROL-XL) 100 MG 24 hr tablet Take 1 tablet (100 mg total) by mouth daily. 90 tablet 3     nitroglycerin (NITROSTAT) 0.4 MG SL tablet Place 1 tablet (0.4 mg total) under the tongue every 5 (five) minutes as needed for chest pain. 30 tablet 1     omeprazole (PRILOSEC) 40 MG capsule TAKE 1 PILL BY MOUTH EVERY DAY/ TXJANEA HNUB NOJ 1 LUB TSHUAJ PAB ZOO LUB PLAB 30 capsule 11     potassium chloride (KLOR-CON) 10 MEQ CR tablet Take 10 mEq by mouth daily.        No current facility-administered medications on file prior to visit.          Jaky Gaspar MD

## 2021-06-20 NOTE — LETTER
Letter by Shen Null MD at      Author: Shen Null MD Service: -- Author Type: --    Filed:  Encounter Date: 4/3/2020 Status: (Other)         Leora Sanchez  [address] [place] [address]         April 8, 2020      Dear Ms. Sanchez,    Below are the results from your recent visit:    Resulted Orders   Basic Metabolic Panel   Result Value Ref Range    Sodium 143 136 - 145 mmol/L    Potassium 4.3 3.5 - 5.0 mmol/L    Chloride 101 98 - 107 mmol/L    CO2 29 22 - 31 mmol/L    Anion Gap, Calculation 13 5 - 18 mmol/L    Glucose 94 70 - 125 mg/dL    Calcium 9.7 8.5 - 10.5 mg/dL    BUN 43 (H) 8 - 22 mg/dL    Creatinine 1.12 (H) 0.60 - 1.10 mg/dL    GFR MDRD Af Amer >60 >60 mL/min/1.73m2    GFR MDRD Non Af Amer 51 (L) >60 mL/min/1.73m2    Narrative    Fasting Glucose reference range is 70-99 mg/dL per  American Diabetes Association (ADA) guidelines.     Your lab and blood pressure good. Please call us to schedule a telephone visit one month from now. Our phone number is [phone].     Please call with questions or contact us using [employer].      Sincerely,        Electronically signed by Shen Null MD

## 2021-06-21 NOTE — PROGRESS NOTES
"Pulmonary Clinic Outpatient Follow-Up  10/17/2018     Assessment and Plan:   48 year old never smoker with a history of hypertension, nonischemic cardiomyopathy, GERD, and chronic cough, presenting for follow up of of her cough in the setting of acute very mild hemoptysis.    #.  Hemoptysis, acute, mild.  Patient reports spots of blood in her phlegm that is otherwise clear.  In setting of chronic daily cough this is consistent with irritation of upper airway mucosa and is unlikely to represent a pulmonary source of bleeding that would require further workup.  #.  Chronic cough, stable.  To date patient has been seen by at least 2 pulmonologists for this problem.  Thus far the consensus has been that her cough has different contributing etiologies.  She has symptoms suggestive of significant postnasal drip and acid reflux (strongly suggested by cough that worsens at night with a supine position).  She may have a component of environmental allergies (despite previous negative testing) that may be causing her rhinorrhea and sneezing.  Even with normal spirometry it is possible that she may have cough-variant asthma.  Her subjective improvement on systemic steroids goes along with the possibility of either allergies or asthma.  Additional consideration should be given to paroxysmal vocal cord dysfunction that can be presenting as the sensation of \"blockage\" or globulus in her throat.  #. GERD, inadequately controlled considering the recurrent sensation of acid reflux that patient endorses.  #.  Hypertension.  Patient is not currently taking lisinopril since it is known to potentially cause cough, and her cough seemed to have temporally improved when she stopped the medication.  It would be prudent to change her lisinopril to another blood pressure medication such as losartan.  Would defer this to cardiology as patient has an appointment in that clinic in upcoming week.      Recommend increasing omeprazole dosing to " "twice a day    Recommend increasing Flonase dosing to twice a day    Recommend patient start taking Claritin (prescription provided) once daily    Recommend that patient try using her Flovent on a regular basis    Do not recommend further steroid bursts or tapers for patient's chronic cough; this medication has a propensity of worsening acid reflux    In the future I will also recommend that patient be referred to speech pathology to help with cough suppression and evaluation for vocal cord dysfunction    Pt should return to clinic in 3 months for a follow up with either myself or Dr. Zamora who saw her in 09/2018.    I appreciate the opportunity to participate in the care of Leora Sanchez.  Please, feel free to contact me at any time.    This report was prepared using speech recognition software.  Any typographical errors are unintentional.  Please, contact me directly for any clarifications of my report.    Kaylee Zhao MD  Pulmonary and Critical Care   [phone]   ______________________________________________________________________________    CC: Chronic cough    HPI:   Leora Sanchez is a 48 y.o. never smoker with history of hypertension, NICM, GERD, and chronic cough, presenting today for follow up of chronic cough.  Patient was seen in the clinic about a month ago, but was referred back due to concerns of acute onset hemoptysis.    Patient reports that she has developed mild hemoptysis for the last 3 days.  She reports minor blood streaking in her phlegm when she coughs.  She quantifies the amount of blood is \"size of a rice grain\".  She reports that she has never had this sort of phlegm in the past, although when chart reviewed appears that she has had similar hemoptysis within the last year.    Patient reaffirms that she has had cough for at least one year.  States that her cough is worse at night, particularly when she is laying back.  She feels that her cough is less pronounced when she is either standing " "upright or walking.  She is currently sleeping while sitting up.  She cannot tell whether or not she has more coughing when she is talking.  Does report rhinorrhea with the cough, and feels that her cough actually precipitates the rhinorrhea.  She denies nasal congestion.  She denies any notable symptoms of postnasal drip.  She also reports that her coughing is usually preceded by sneezing.  Does report that she has a sensation of \"blockage\" in her upper throat that causes her to cough.  When asked whether or not she feels that her cough triggers come from above (phlegm from her nose, postnasal drip sensation) or from below (chest congestion, bronchitis) her throat, she states that she feels that the problem is is within her chest.  Additionally she reports intermittent feeling of \"spicyness\" in her throat (describes this as feeling of a chili pepper in her throat).  She is unaware of any wheezing.    Patient is not currently taking her lisinopril.  She thinks that when she stopped the lisinopril her cough has improved, but then has returned to its baseline.  She is using albuterol inhaler every night and does not feel that it is helpful.  She is using Flonase (2 sprays) every night and she feels that it helps her stuffy nose.  She is using omeprazole once a day.  She is using Flovent only 2-3 times per week.  States that she has been tested for allergies in the past but skin prick testing reportedly was negative.    Patient's interview was conducted with the assistance of a professional Ninua .    ROS:  12 point ROS was reviewed and found to be negative with exceptions as noted in the HPI.    PMH:  Patient Active Problem List    Diagnosis Date Noted     Vertigo 08/21/2018     Coronary artery disease involving native coronary artery of native heart 05/02/2018     Lower extremity edema 03/29/2018     Cyst of left ovary 02/20/2018     Chronic cough 02/12/2018     Prediabetes 01/05/2018     Nonischemic " cardiomyopathy (H) 11/14/2017     Heart failure with reduced ejection fraction (H) 11/14/2017     Dysidria      Hyperlipemia 06/07/2016     Ganglion cyst of left foot 02/08/2016     Essential hypertension 02/08/2016     Non morbid obesity due to excess calories 02/08/2016     Heartburn 02/08/2016     Migraine with aura 02/08/2016     Bilateral dry eyes 02/08/2016     Mixed incontinence 02/08/2016           PSH:  Past Surgical History:   Procedure Laterality Date     CARDIAC CATHETERIZATION       CV LEFT HEART CATHETERIZATION WO LEFT VETRICULOGRAM Left 10/31/2017    Procedure: Left Heart Catheterization Without Left Ventriculogram;  Surgeon: Jonathan Duque MD;  Location: [facility] Lab;  Service:      DILATION AND CURETTAGE OF UTERUS  2010     INNER EAR SURGERY Right 1994     MASTOIDECTOMY Right 1996     ME CATH PLACEMENT & NJX CORONARY ART ANGIO IMG S&I N/A 10/31/2017    Procedure: Coronary Angiogram;  Surgeon: Jonathan Duque MD;  Location: [facility] Cath Lab;  Service: Cardiology       Allergies:  No Known Allergies    Family HX:  Family History   Problem Relation Age of Onset     Diabetes Mother      Hypertension Mother      Uterine cancer Mother      Diverticulitis Mother      No Medical Problems Father      No Medical Problems Sister      No Medical Problems Daughter      No Medical Problems Son        Social Hx:  Social History     Social History     Marital status:      Spouse name: N/A     Number of children: 8     Years of education: N/A     Occupational History     [employer] Not Employed     For chronic headaches since ear surgery     Social History Main Topics     Smoking status: Never Smoker     Smokeless tobacco: Never Used     Alcohol use No     Drug use: No     Sexual activity: Yes     Partners: Male     Birth control/ protection: None     Other Topics Concern     Not on file     Social History Narrative    Lives with  who can read and speak English and helps her with meds  "      Current Meds:  Medication Sig     acetaminophen (MAPAP EXTRA STRENGTH) 500 MG tablet Take 1 tablet (500 mg total) by mouth every 4 (four) hours as needed.     albuterol (PROAIR HFA;PROVENTIL HFA;VENTOLIN HFA) 90 mcg/actuation inhaler Inhale 2 puffs every 6 (six) hours as needed for wheezing.     amLODIPine (NORVASC) 5 MG tablet Take 1 tablet (5 mg total) by mouth daily.     aspirin 81 MG EC tablet Take 1 tablet (81 mg total) by mouth daily.     atorvastatin (LIPITOR) 80 MG tablet Take 1 tablet (80 mg total) by mouth daily.     capsaicin (ZOSTRIX) 0.025 % cream Apply to affected areas 2-3 times a day, as needed.     fluticasone (FLONASE) 50 mcg/actuation nasal spray      fluticasone (FLOVENT HFA) 44 mcg/actuation inhaler Inhale 2 puffs.     furosemide (LASIX) 40 MG tablet Take 1 tablet (40 mg total) by mouth daily.     isosorbide mononitrate (IMDUR) 60 MG 24 hr tablet Take 1 tablet (60 mg total) by mouth daily.     lisinopril (PRINIVIL,ZESTRIL) 10 MG tablet Take 1 tablet (10 mg total) by mouth daily.     meclizine (ANTIVERT) 25 mg tablet Take 1 tablet (25 mg total) by mouth daily as needed for dizziness or nausea.     metoprolol succinate (TOPROL-XL) 100 MG 24 hr tablet Take 1 tablet (100 mg total) by mouth daily.     nitroglycerin (NITROSTAT) 0.4 MG SL tablet Place 1 tablet (0.4 mg total) under the tongue every 5 (five) minutes as needed for chest pain.     nortriptyline (PAMELOR) 25 MG capsule TAKE 1 PILL (25 MG TOTAL) BY MOUTH AT BEDTIME/TXHUA HMO THAUM MUS PW NOJ 1 LUB.     omeprazole (PRILOSEC) 40 MG capsule TAKE 1 PILL BY MOUTH EVERY DAY/ TXHUA HNUB NOJ 1 LUB TSHUAJ PAB ZOO LUB PLAB     potassium chloride (KLOR-CON) 10 MEQ CR tablet Take 10 mEq by mouth daily.      predniSONE (DELTASONE) 20 MG tablet Take 3 tabs daily for 4 days, then 2 tabs daily for 4 days, then 1 tab daily for 4 days, then 1/2 tabled daily for 4 days, then stop.     Physical Exam:  /88  Pulse 82  Resp 10  Ht 4' 11.5\" (1.511 m) "  Wt 190 lb (86.2 kg)  SpO2 96%  Breastfeeding? No  BMI 37.73 kg/m2  Gen: obese H[place] woman, alert, oriented, no distress  HEENT: nasal turbinates are erythematous, no oropharyngeal lesions but notable oropharyngeal erythema, no cervical or supraclavicular lymphadenopathy  CV: RRR, no M/G/R  Resp: CTAB, no focal crackles or wheezes  Abd: soft, nontender, no palpable organomegaly  Skin: no apparent rashes  Ext: no cyanosis, clubbing or edema  Neuro: alert, nonfocal    Labs: personally reviewed in the EMR and are notable for unremarkable CBC with differential on 9/10/18.  Imaging studies: personally reviewed. Below are the Radiology interpretations.  #. CTA chest PE run, 9/10/18:  ANGIOGRAM CHEST: Pulmonary arteries are normal caliber and negative for pulmonary emboli. Normal caliber thoracic aorta with no dissection or aneurysm.  LUNGS AND PLEURA: No airspace disease identified. Heterogeneity of the lungs is likely atelectasis. There is prominent subpleural fat deposition.   MEDIASTINUM: The heart is enlarged. No lymphadenopathy.   LIMITED UPPER ABDOMEN: Negative.  MUSCULOSKELETAL: Negative.    PFT's (9/18/18):  FEV1/FVC is 0.88 and is normal.  FEV1 is 96% predicted and is normal.  FVC is 91% predicted and is normal.  There was no significant change in spirometry after a single inhaled dose of bronchodilator.  TLC is 97% predicted and is normal.  RV is 110% predicted and is normal.  DLCO is 93% predicted and is normal when it   is corrected for hemoglobin.     Impression:  Full Pulmonary Function Test is normal.

## 2021-06-21 NOTE — LETTER
Letter by Jaky Gaspar MD at      Author: Jaky Gaspar MD Service: -- Author Type: --    Filed:  Encounter Date: 4/5/2021 Status: (Other)         May MARICHUY Sanchez  [address] [place] [address]      April 5, 2021      Dear May,    As a valued [facility] patient, your healthcare needs are our priority.  Your health care team has determined that you are due for an appointment regarding your labs and vitals and mammogram.  Need a Annual [facility] Visit in 3-4 months.    To help prevent delays in your care, please call the [facility] at [phone].    We look forward to partnering with you to achieve optimal health and wellbeing.    Sincerely,  Your care team at [facility]

## 2021-06-21 NOTE — LETTER
Letter by Jaky Gaspar MD at      Author: Jaky Gaspar MD Service: -- Author Type: --    Filed:  Encounter Date: 12/16/2020 Status: (Other)         Leora Sanchez  [address] [place] [address]             December 16, 2020         Dear Ms. Sanchez,    Below are the results from your recent visit:    Resulted Orders   Glycosylated Hemoglobin A1c   Result Value Ref Range    Hemoglobin A1c 6.4 (H) <=5.6 %   Comprehensive Metabolic Panel   Result Value Ref Range    Sodium 142 136 - 145 mmol/L    Potassium 4.4 3.5 - 5.0 mmol/L    Chloride 103 98 - 107 mmol/L    CO2 30 22 - 31 mmol/L    Anion Gap, Calculation 9 5 - 18 mmol/L    Glucose 158 (H) 70 - 125 mg/dL    BUN 31 (H) 8 - 22 mg/dL    Creatinine 0.95 0.60 - 1.10 mg/dL    GFR MDRD Af Amer >60 >60 mL/min/1.73m2    GFR MDRD Non Af Amer >60 >60 mL/min/1.73m2    Bilirubin, Total 0.8 0.0 - 1.0 mg/dL    Calcium 9.3 8.5 - 10.5 mg/dL    Protein, Total 6.8 6.0 - 8.0 g/dL    Albumin 3.9 3.5 - 5.0 g/dL    Alkaline Phosphatase 74 45 - 120 U/L    AST 24 0 - 40 U/L    ALT 33 0 - 45 U/L    Narrative    Fasting Glucose reference range is 70-99 mg/dL per  American Diabetes Association (ADA) guidelines.   Lipid Cascade   Result Value Ref Range    Cholesterol 177 <=199 mg/dL    Triglycerides 170 (H) <=149 mg/dL    HDL Cholesterol 55 >=50 mg/dL    LDL Calculated 88 <=129 mg/dL    Patient Fasting > 8hrs? Yes    Thyroid Cascade   Result Value Ref Range    TSH 1.97 0.30 - 5.00 uIU/mL       Your diabetes is doing much better!  Keep up the good work.      Your thyroid level is healthy.     Your cholesterol is healthy.     Your liver and kidney tests are healthy.     Please call with questions or contact us using [employer].    Sincerely,        Electronically signed by Jaky Gaspar MD

## 2021-06-21 NOTE — LETTER
Letter by Luca Ca LGSW at      Author: Luca Ca LGSW Service: -- Author Type: --    Filed:  Encounter Date: 12/10/2020 Status: (Other)       Care Plan  About Me:    Patient Name:  Leora Sanchez    YOB: 1970  Age:         50 y.o.   [facility]    [id number] Telephone Information:  Home Phone [phone]   Mobile [phone]       Address:  [address] [place] [address] Email address:  No e-mail address on record      Emergency Contact(s)  Extended Emergency Contact Information      Name: Juan Sanchez  Address:       [address] [place] [address]  Relation: Spouse          Primary language:  ong     needed? Yes   [employer]:  [phone] op. 1  Other communication barriers: Language barrier  Preferred Method of Communication:  Mail  Current living arrangement: I live in a private home with family, I live in a private home with spouse(Living with son, mother, and )  Mobility Status/ Medical Equipment: Independent    Health Maintenance  Health Maintenance Reviewed: Not assessed    My Access Plan  Medical Emergency 911   Primary Clinic Line Jaky Gaspar MD - [phone]   24 Hour Appointment Line [phone] or  [phone]) (toll-free)   24 Hour Nurse Line [phone] (toll-free)   Preferred Urgent Care [facility], [phone]   [facility]  [phone]   Preferred Pharmacy [facility] - [place], [place] - [address]     Behavioral Health Crisis Line The National Suicide Prevention Lifeline at 1-832.568.2269 or 911             My Care Team Members  Patient Care Team       Relationship Specialty Notifications Start End    Jaky Gaspar MD PCP - General Family Medicine  5/12/16     [facility]    Phone: [phone] Fax: [phone]         [address] [place] [address]    Jaky Gaspar MD Assigned PCP    7/28/19     Phone: [phone] Fax: [phone]         [address] [place] [address]    Art Thakkar, PharmD Pharmacist Pharmacist  10/15/19     Phone: [phone] Fax: [phone]         [facility] [address] [facility] [address]    Service, Alex Thomas MD Assigned Surgical Provider   10/23/20     Phone: [phone] Fax: [phone]         [address] [address]    Sendy Banda, CNP Assigned Heart and Vascular Provider   10/23/20     Phone: [phone] Fax: [phone]         [address] [place] [address]    Luca Ca LGSW Lead Care Coordinator Primary Care - [facility] Admissions 12/10/20     Fax: [phone]         Elisabet Malone, W Community Health Worker Primary Care - [facility] Admissions 12/10/20     Phone: [phone] Fax: [phone]                My Care Plans  Self Management and Treatment Plan  Goals and (Comments)  Goals        General    Problem Solving (pt-stated)     Notes - Note created  12/10/2020 10:49 AM by Luca Ca LGSW    Goal Statement: I want to have a Medicare Part D plan within 1 month  Date Goal set: 12/10/20  Barriers: Language  Strengths:   Date to Achieve By: 1/30/2020  Patient expressed understanding of goal: Yes  Action steps to achieve this goal:  1. I will call [facility] to speak with a Medicare Navigator about Part D plans  2. I will update CCC team                 Advance Care Plans/Directives Type:        My Medical and Care Information  Problem List   Patient Active Problem List   Diagnosis   ? Ganglion cyst of left foot   ? Essential hypertension   ? Heartburn   ? Chronic headaches   ? Bilateral dry eyes   ? Mixed incontinence   ? Hyperlipidemia LDL goal <70   ? Hypokalemia   ? Dysidria   ? Nonischemic cardiomyopathy (H)   ? Heart failure with reduced ejection fraction (H)   ? Vertigo   ? Atypical chest pain   ? Dyspnea on exertion   ? Nonocclusive coronary atherosclerosis of native  coronary artery   ? Recurrent major depressive disorder, in partial remission (H)   ? Class 2 severe obesity due to excess calories with serious comorbidity and body mass index (BMI) of 39.0 to 39.9 in adult (H)   ? Other forms of angina pectoris (H)   ? Right-sided sensorineural hearing loss   ? Pulmonary nodules   ? Chest pain   ? Tension headache   ? Type 2 diabetes mellitus with hyperglycemia, without long-term current use of insulin (H)   ? CKD (chronic kidney disease) stage 3, GFR 30-59 ml/min      Current Medications and Allergies:  See printed Medication Report.    Care Coordination Start Date: 12/10/2020   Frequency of Care Coordination:     Form Last Updated: 12/10/2020

## 2021-06-21 NOTE — LETTER
Letter by Jaky Gaspar MD at      Author: Jaky Gaspar MD Service: -- Author Type: --    Filed:  Encounter Date: 4/16/2021 Status: (Other)         May MARICHUY Sanchez  [address] [place] [address]      April 22, 2021      Dear Ms. Sanchez,      Your blood pressure is still a little high- I would like you to schedule with our Pharmacist next to review your medication.  Your diabetes is also a little worse  kidney tests and anemia are about the same.      Sincerely,        Electronically signed by Jaky Gaspar MD

## 2021-06-21 NOTE — LETTER
Letter by Luca Ca LGSW at      Author: Luca Ca LGSW Service: -- Author Type: --    Filed:  Encounter Date: 12/10/2020 Status: (Other)       CARE COORDINATION    December 10, 2020    May X Sanchez  [address] [place] [address]      Dear May,    I am a clinic care coordinator who works with Jaky Gaspar MD at [facility]. I wanted to thank you for spending the time to talk with me.  Below is a description of clinic care coordination and how I can further assist you.      The clinic care coordination team is made up of a registered nurse,  and community health worker who understand the health care system. The goal of clinic care coordination is to help you manage your health and improve access to the health care system in the most efficient manner. The team can assist you in meeting your health care goals by providing education, coordinating services, strengthening the communication among your providers and supporting you with any resource needs.    Please feel free to contact the Community Health Worker at [phone] with any questions or concerns. We are focused on providing you with the highest-quality healthcare experience possible and that all starts with you.     Sincerely,     Luca Ca    Enclosed: I have enclosed a copy of the Care Plan. This has helpful information and goals that we have talked about. Please keep this in an easy to access place to use as needed.

## 2021-06-22 NOTE — PROGRESS NOTES
Hospital discharge follow up call to pt through [facility] [place], [place] [id number], no answer.  LVM that RN will try back another time, & reminded pt of their upcoming hospital f/u appt w/Dr. Gaspar, 12/21/18, 11:20am.    Rita Morales RN Care Manager, Population Health

## 2021-06-22 NOTE — PROGRESS NOTES
[facility] Office Visit    Chief Complaint:  Chief Complaint   Patient presents with     Follow-up     Inpatient 12/11-13 for chest pain     Dizziness         Assessment/Plan:  1. Essential hypertension  BP Readings from Last 3 Encounters:   12/21/18 138/86   12/13/18 125/80   11/01/18 134/70       marginal controlled today.  Plan for bloodpressure management includes ongoing focus on healthy DASH type diet and increased activity, encouraged to avoid tobacco products and limit alcohol use, stress reduction, pt did increase her losartan to 50mg (2 25mg tabs daily) and will change rx to 50mg tabs to start with her next bottle.  Continue toprol xl 100mg daily, imdur 60mg daily, lasix 40mg daily with kcl.  Labwork and meds ordered and reviewed as below  Lab Results   Component Value Date    K 3.8 12/11/2018      Lab Results   Component Value Date    CREATININE 0.76 12/11/2018       2. Nonocclusive coronary atherosclerosis of native coronary artery  Stable - no further evaluation or interventions needed.  Need to get BP lower if possible. Fu with MD in 6 weeks to recheck bp and labwork and adjust meds as needed.  Cont asa, statin, ARB, lasix, b-blocker, isosorbide    3. Hospital discharge follow-up  As below- pt improving symptomatically    4. Morbid obesity (H)  Pt aware of need to increase activity and change her intake/diet around.      5. Chronic systolic heart failure (H)  Stable and asymptomatic.  Continue meds as reviewed.  F/u in 6 weeks to recheck labs and weight.    - losartan (COZAAR) 50 MG tablet; Take 1 tablet (50 mg total) by mouth daily.  Dispense: 90 tablet; Refill: 1    6. URI (upper respiratory infection)  Likely contributing to some of her sx.  Okay for cough syrup as below short term.    - dextromethorphan-guaiFENesin (ROBITUSSIN-DM)  mg/5 mL liquid; Take 5 mL by mouth every 6 (six) hours as needed.  Dispense: 236 mL; Refill: 2      Return in about 6 weeks (around 2/1/2019)  for mtm.  The following high BMI interventions were performed this visit: encouragement to exercise and lifestyle education regarding diet    Patient Education/AVS:  Patient Instructions   Try stopping claritin for a week to see if dizziness gets better.      When you get your new bottle of losartan you will just take 1 pill a day    Cough syrup as needed over next week to help with cough and congestion and this may help with your dizziness as well          HPI:   Leora Sanchez is a 48 y.o. female c/o hospital follow up and new onset of dizziness.      Has been feeling dizzy since she left the hospital.  Worse in the morning.  Wondering if there is a medicine that can help with this.  Better than when she first left.  Has had to cancel apts b/c too dizzy to leave the house.  Feels unsteady on her feet because things are spinning/shaking.  Has had this feeling before but typically will last for a day and not many days in a row.  Dizziness starts right when she wakes up in the morning- has spinning sensation before she even gets out of bed or takes her meds.  Taking her meds doesn't make any difference better or worse. Did increase her ARB to take 2/day.  Did take meds today.  Vision seems to be a little blurry but not bad.  Hearing is stable- no better or worse.  Did start taking allergy pill daily.  No URI sx- throat is feeling better than when in the hospital.  Did stop the cough medicine- this was helpful.  Cough is still there chronically but better.  Does have f/u with lung clinic scheduled.  Has been coughing up green mucous.      ROS:  Constitutional, CV, Resp, GI, , MSK, skin, neuro, psych all negative except as outlined in the HPI above and patient denies any other symptoms.      History summarized from1-2:d/c 12/13/18- chest painlikely related to reflux and chronic cough- not cardiac.  Medical management for abnormal stress test due to low risk for events in next 10 years.  losaratan increased to 50mg.  Take  "claritin daily.    Radiology tests reviewed-1: CTA- non specific lung nodule 3mm and 5mm RML (low risk patient)- no further f/u needed.    Lab tests reviewed-1: 2018  Medicine tests reviewed-1: nuclear stress test- abnormal small ischemia distal anterior and anterolateral wall but breast attenuation limits specificity.  EF 47%.  More prominent now compared to 5/21/18.    EKG- sinus marya, LVH, prolonged QT, nonspecific st/t wave abnormality.  No significant change compared to 9/2018    Physical Exam:  /86   Pulse 64   Temp 97  F (36.1  C) (Oral)   Ht 4' 11\" (1.499 m)   Wt 191 lb 4 oz (86.8 kg)   SpO2 97%   BMI 38.63 kg/m   Body mass index is 38.63 kg/m . No LMP recorded. Patient is perimenopausal.  Vital signs reviewed  Wt Readings from Last 3 Encounters:   12/21/18 191 lb 4 oz (86.8 kg)   12/13/18 188 lb 1.6 oz (85.3 kg)   11/01/18 190 lb (86.2 kg)     Social History     Tobacco Use   Smoking Status Never Smoker   Smokeless Tobacco Never Used   Tobacco Comment    no passive exposure     Social History     Substance and Sexual Activity   Sexual Activity Yes     Partners: Male     Birth control/protection: None     No Data Recorded  PHQ-9 Total Score: 17 (4/26/2018  5:00 PM)    PHQ-2 Total Score: 2 (4/26/2018  5:00 PM)  Depression Follow-up Plan: mental health care assessment (4/26/2018  5:00 PM)    No Data Recorded    All normal as below except abnormalities include: pt has stable nasal quality to her voice.  Frequently clearing her throat and nose with clear rhinorrhea noted.  Remainder of exam normal.    General is a  48 y.o. female sitting comfortably in no apparent distress.   HEENT:  TM are clear bilaterally.  Eye, nasal, oral exams within normal   Neck: Supple without lymphadenopathy or thyromegally  CV: Regular rate and rhythm S1S2 without rubs, murmurs or gallops,   Lungs: Clear to auscultation bilaterally  Extremities: Warm, No Edema, 2+ Pedal and radial pulses bilaterally  Skin: No lesions or " rashes noted  Neuro/MSK: Able to ambulate around the exam room with equal movement, strength and normal coordination of the upper and lower extremeties symmetrically    Results for orders placed or performed during the hospital encounter of 12/11/18   HM2 (CBC W/O DIFF)   Result Value Ref Range    WBC 8.1 4.0 - 11.0 thou/uL    RBC 4.27 3.80 - 5.40 mill/uL    Hemoglobin 13.0 12.0 - 16.0 g/dL    Hematocrit 39.1 35.0 - 47.0 %    MCV 92 80 - 100 fL    MCH 30.4 27.0 - 34.0 pg    MCHC 33.2 32.0 - 36.0 g/dL    RDW 12.7 11.0 - 14.5 %    Platelets 256 140 - 440 thou/uL    MPV 9.7 8.5 - 12.5 fL   Basic Metabolic Panel   Result Value Ref Range    Sodium 140 136 - 145 mmol/L    Potassium 3.8 3.5 - 5.0 mmol/L    Chloride 106 98 - 107 mmol/L    CO2 25 22 - 31 mmol/L    Anion Gap, Calculation 9 5 - 18 mmol/L    Glucose 128 (H) 70 - 125 mg/dL    Calcium 8.7 8.5 - 10.5 mg/dL    BUN 22 8 - 22 mg/dL    Creatinine 0.76 0.60 - 1.10 mg/dL    GFR MDRD Af Amer >60 >60 mL/min/1.73m2    GFR MDRD Non Af Amer >60 >60 mL/min/1.73m2   Troponin I   Result Value Ref Range    Troponin I <0.01 0.00 - 0.29 ng/mL   Hepatic Profile   Result Value Ref Range    Bilirubin, Total 0.3 0.0 - 1.0 mg/dL    Bilirubin, Direct <0.1 <=0.5 mg/dL    Protein, Total 6.4 6.0 - 8.0 g/dL    Albumin 3.5 3.5 - 5.0 g/dL    Alkaline Phosphatase 77 45 - 120 U/L    AST 18 0 - 40 U/L    ALT 17 0 - 45 U/L   Lipase   Result Value Ref Range    Lipase 33 0 - 52 U/L   INR   Result Value Ref Range    INR 0.86 (L) 0.90 - 1.10   APTT   Result Value Ref Range    PTT 25 24 - 37 seconds   Troponin I   Result Value Ref Range    Troponin I <0.01 0.00 - 0.29 ng/mL   Troponin I   Result Value Ref Range    Troponin I <0.01 0.00 - 0.29 ng/mL   ECG 12 lead nursing unit performed   Result Value Ref Range    SYSTOLIC BLOOD PRESSURE  mmHg    DIASTOLIC BLOOD PRESSURE  mmHg    VENTRICULAR RATE 57 BPM    ATRIAL RATE 57 BPM    P-R INTERVAL 148 ms    QRS DURATION 86 ms    Q-T INTERVAL 484 ms    QTC  CALCULATION (BEZET) 471 ms    P Axis 19 degrees    R AXIS -1 degrees    T AXIS -23 degrees    MUSE DIAGNOSIS       Sinus bradycardia  Minimal voltage criteria for LVH, may be normal variant  Nonspecific ST and T wave abnormality  Prolonged QT  Abnormal ECG  When compared with ECG of 10-SEP-2018 10:44,  No significant change was found  Confirmed by GWEN QUACH MD LOC:JN ([id number]) on 12/12/2018 4:06:57 PM     NM Pharmacologic Stress Test   Result Value Ref Range    Pharmacologic Protocol  Lexiscan     Test Type Pharmacological     Baseline HR 53     Baseline /83     Last Stress HR 71     Last Stress /84     PERCENT HR 85%     ST Deviation Elevation I 0.2mm     Deviation Time III -0.2mm     ST Elevation Amount aVR 0.7mm     ST Deviation Amount he II -0.9mm     Final Resting /83     Final Resting HR 70     Max Treadmill Speed 0.0     Max Treadmill Grade 0.0     Peak Systolic /84     Peak Diastolic /84     Max HR 80     Stress Phase Resting     Stress Resting Pt Position MANUAL EVENT     Current HR 77     Current /84     Stress Phase Stress     Stage Minute EXE 00:00     Exercise Stage STAGE 2     Current HR 59     Current /83     Stress Phase Stress     Stage Minute EXE 01:00     Exercise Stage STAGE 3     Current HR 64     Current /83     Stress Phase Stress     Stage Minute EXE 01:17     Exercise Stage STAGE 3     Current HR 72     Current /83     Stress Phase Stress     Stage Minute EXE 01:25     Exercise Stage STAGE 3     Current HR 74     Current /83     Stress Phase Stress     Stage Minute EXE 01:30     Exercise Stage STAGE 3     Current HR 76     Current /83     Stress Phase Stress     Stage Minute EXE 01:39     Exercise Stage STAGE 3     Current HR 78     Current /75     Stress Phase Stress     Stage Minute EXE 02:00     Exercise Stage STAGE 4     Current HR 80     Current /75     Stress Phase Stress     Stage Minute EXE 02:45      Exercise Stage STAGE 4     Current HR 75     Current /84     Stress Phase Stress     Stage Minute EXE 03:00     Exercise Stage STAGE 5     Current HR 74     Current /84     Stress Phase Stress     Stage Minute EXE 03:11     Exercise Stage STAGE 5     Current HR 77     Current /84     Stress Phase Stress     Stage Minute EXE 04:00     Exercise Stage STAGE 6     Current HR 72     Current /84     Stress Phase Stress     Stage Minute EXE 04:00     Exercise Stage STAGE 6     Current HR 71     Current /84     Stress Phase Recovery     Stage Minute REC 00:41     Exercise Stage Recovery     Current HR 66     Current /83     Stress Phase Recovery     Stage Minute REC 00:59     Exercise Stage Recovery     Current HR 70     Current /83     Stress Phase Recovery     Stage Minute REC 01:59     Exercise Stage Recovery     Current HR 70     Current /83     Stress Phase Recovery     Stage Minute REC 02:34     Exercise Stage Recovery     Current HR 67     Current /83     Stress Phase Recovery     Stage Minute REC 02:59     Exercise Stage Recovery     Current HR 66     Current /83     Stress Phase Recovery     Stage Minute REC 03:59     Exercise Stage Recovery     Current HR 70     Current /83     Stress Phase Recovery     Stage Minute REC 04:01     Exercise Stage Recovery     Current HR 70     Current /83     Calculated Percent HR 47 %    Left Ventricular EF 47 %       Med list and active problem list reviewed and updated as part of this encounter    Current Outpatient Medications on File Prior to Visit   Medication Sig Dispense Refill     acetaminophen (MAPAP EXTRA STRENGTH) 500 MG tablet Take 1 tablet (500 mg total) by mouth every 4 (four) hours as needed. 100 tablet 0     albuterol (PROAIR HFA;PROVENTIL HFA;VENTOLIN HFA) 90 mcg/actuation inhaler Inhale 2 puffs every 6 (six) hours as needed for wheezing. 1 each 0     amLODIPine (NORVASC) 5 MG tablet Take 1  tablet (5 mg total) by mouth daily. 30 tablet 11     aspirin 81 MG EC tablet Take 1 tablet (81 mg total) by mouth daily. 100 tablet 11     atorvastatin (LIPITOR) 80 MG tablet Take 1 tablet (80 mg total) by mouth daily. 90 tablet 3     benzonatate (TESSALON PERLES) 100 MG capsule Take 1 capsule (100 mg total) by mouth 3 (three) times a day as needed for cough. 10 capsule 0     fluticasone (FLONASE) 50 mcg/actuation nasal spray Apply 1 spray into each nostril daily as needed .        1     fluticasone (FLOVENT HFA) 44 mcg/actuation inhaler Inhale 2 puffs 2 (two) times a day as needed .             furosemide (LASIX) 40 MG tablet TAKE 1 TABLET (40 MG TOTAL) BY MOUTH 2 (TWO) TIMES A DAY AT 9AM AND 6PM./ NOJ 1 LUB 2 ZAUG IB HNUB THAUM 9AM THIAB 6PM PAB YANET PHOB VOG 60 tablet 5     isosorbide mononitrate (IMDUR) 60 MG 24 hr tablet Take 1 tablet (60 mg total) by mouth daily. 30 tablet 11     loratadine (CLARITIN) 10 mg tablet TAKE 1 PILL BY MOUTH EVERY DAY FOR ALLERGIES/ TXHUA HNUB NOJ 1 LUB TSHUAJ PAB YANET ALLERGIES 30 tablet 1     metoprolol succinate (TOPROL-XL) 100 MG 24 hr tablet Take 1 tablet (100 mg total) by mouth daily. 90 tablet 3     nitroglycerin (NITROSTAT) 0.4 MG SL tablet Place 1 tablet (0.4 mg total) under the tongue every 5 (five) minutes as needed for chest pain. 30 tablet 1     nortriptyline (PAMELOR) 25 MG capsule TAKE 1 PILL (25 MG TOTAL) BY MOUTH AT BEDTIME/TXHUA HMO THAUM MUS PW NOJ 1 LUB. 30 capsule 6     omeprazole (PRILOSEC) 40 MG capsule TAKE 1 PILL BY MOUTH EVERY DAY/ TXHUA HNUB NOJ 1 LUB TSHUAJ PAB ZOO LUB PLAB 30 capsule 11     potassium chloride (KLOR-CON) 10 MEQ CR tablet TAKE 1 PILL BY MOUTH EVERY DAY/TXHUA HNUB NOJ 1 LUB TSHUAJ 90 tablet 1     No current facility-administered medications on file prior to visit.          Jaky Gaspar MD

## 2021-06-22 NOTE — PROGRESS NOTES
MTM Follow Up Encounter  Assessment & Plan                                                       Medication Adherence/Access: Difficult to assess as patient did not have medications at visit.    Shortness of Breath/Allergies:   Unable to assess as it is unclear what patient is using to treat symptoms.    Essential Hypertension/CHF/Edema   Improved-blood pressure at goal of less than 130/80.  Pulse is at the low and the range.  Low pulse may be leading to dizziness.  Will continue to monitor.  Has not had any chest pain, but would benefit from getting a refill of nitroglycerin as current bottle may be .  -Refill nitroglycerin.  Prescription sent to pharmacy    Coronary atherosclerosis: Stable continue current therapy    Heartburn: Improved -stable per patient report, but unsure exactly how patient is using medication.  Will follow-up to ensure appropriate PPI use.    Chronic headaches: Needs improvement-unclear benefit from nortriptyline, however it would be reasonable to trial a higher dose to see if this has any effect on headaches.  If no improvement with higher dose, could consider discontinuing in the future.   -Increase nortriptyline to 50 mg daily.  Prescription sent to pharmacy      Follow Up  Return in about 1 month (around 2019) for Medication Management Pharmacist.        Subjective & Objective                                                       Leora Sanchez is a 48 y.o. female coming in for a follow up visit for Medication Therapy Management. She was referred to me from Jaky Gaspar MD    Chief Complaint: Continues to have headache and dizziness.     Medication Adherence/Access: Did not bring medications to this visit. Takes medicines from the bottle.  helps with giving her medicines. Takes 8 - 9 tablets once daily. Plus a nortriptyline 25 mg at bedtime.     Shortness of Breath/Allergies:   Reports shortness of breath with activity. Has never been told she has asthma. Not using  any inhalers. Not using anything for allergies either.    Essential Hypertension/CHF/Edema   Pt with complex cardiac hx significant for: Hx of CAD, nonischemic cardiomyopathy, angina, and HTN     Current meds include Amlodipine 5 mg daily, Losartan 50 mg daily, Metoprolol  mg daily, Furosemide 40 mg daily, potasssium 10 MEq daily,  isosorbide mononitrate 60 mg daily, and nitroglycerin 0.4 mg as needed-hasn't needed in the last several months. Current bottle has been open for over 6 months. Denies chest pain.     Having shortness of breath with activity. Due to see pulmonology at the end of the month for further evaluation.     Has been having dizziness since surgery on ear, but this week was more intense.  Scheduled to see ENT on 17 January.      Pulse Readings from Last 3 Encounters:   01/11/19 60   12/21/18 64   12/13/18 82         Coronary atherosclerosis: Using atorvastatin 80 mg daily and aspirin 81 mg daily.    Lab Results   Component Value Date    CHOL 195 10/31/2017    CHOL 206 (H) 06/07/2016     Lab Results   Component Value Date    HDL 56 10/31/2017    HDL 67 06/07/2016     Lab Results   Component Value Date    LDLCALC 115 10/31/2017    LDLCALC 119 06/07/2016     Lab Results   Component Value Date    TRIG 118 10/31/2017    TRIG 100 06/07/2016       Heartburn: Using omeprazole 40 mg daily right before eating- states she's using twice daily, but prescribed once daily. Stated she had chest pain but later said she didn't.  Denies concerns with stomach or heartburn.    Chronic headaches: Currently using acetaminophen 500 mg as needed takes 2 tabs twice -three times daily and nortriptyline 25 mg daily. Thought nortriptyline was for sleep. Doesn't feel like either helps with headaches. Feels heavy, like stabbing, with dizziness, and some nausea. Denies sensitivity to light or sounds. Hasn't found anything that does provide relief. States that pain radiates down to neck and shoulders. Does note some dry  mouth. Has to drink water all of the time. Would still like to try a higher dose of nortriptyline.     PMH: reviewed in EPIC   Allergies/ADRs: reviewed in EPIC   Alcohol: reviewed in EPIC   Tobacco:   Social History     Tobacco Use   Smoking Status Never Smoker   Smokeless Tobacco Never Used   Tobacco Comment    no passive exposure     Today's Vitals:   Vitals:    01/11/19 1400   BP: 116/70   Pulse: 60   Weight: 196 lb (88.9 kg)     ----------------    Much or all of the text in this note was generated through the use of Dragon Dictate voice-to-text software. Errors in spelling or words which seem out of context are unintentional. Sound alike errors, in particular, may have escaped editing.    The patient declined an after visit summary    I spent 30 minutes with this patient today;   All changes were made via collaborative practice agreement with Jaky Gaspar MD. A copy of the visit note was provided to the patient's provider.     Art Thakkar, PharmD  Medication Therapy Management (MTM) Pharmacist  [facility]          Current Outpatient Medications   Medication Sig Dispense Refill     acetaminophen (MAPAP EXTRA STRENGTH) 500 MG tablet Take 1 tablet (500 mg total) by mouth every 4 (four) hours as needed. 100 tablet 0     albuterol (PROAIR HFA;PROVENTIL HFA;VENTOLIN HFA) 90 mcg/actuation inhaler Inhale 2 puffs every 6 (six) hours as needed for wheezing. 1 each 0     amLODIPine (NORVASC) 5 MG tablet Take 1 tablet (5 mg total) by mouth daily. 30 tablet 11     aspirin 81 MG EC tablet Take 1 tablet (81 mg total) by mouth daily. 100 tablet 11     atorvastatin (LIPITOR) 80 MG tablet TAKE 1 TABLET (80 MG TOTAL) BY MOUTH DAILY/ TXHUA HNUB NOJ 1 LUB TSHUAJ PAB YANET NTSHAV MUAJ ROJ 90 tablet 2     benzonatate (TESSALON PERLES) 100 MG capsule Take 1 capsule (100 mg total) by mouth 3 (three) times a day as needed for cough. 10 capsule 0     fluticasone (FLONASE) 50 mcg/actuation nasal spray Apply 1  spray into each nostril daily as needed .        1     fluticasone (FLOVENT HFA) 44 mcg/actuation inhaler Inhale 2 puffs 2 (two) times a day as needed .             furosemide (LASIX) 40 MG tablet TAKE 1 TABLET (40 MG TOTAL) BY MOUTH 2 (TWO) TIMES A DAY AT 9AM AND 6PM./ NOJ 1 LUB 2 ZAUG IB HNUB THAUM 9AM THIAB 6PM PAB YANET PHOB VOG 60 tablet 5     isosorbide mononitrate (IMDUR) 60 MG 24 hr tablet TAKE 1 TABLET (60 MG TOTAL) BY MOUTH DAILY/ TXHUA HNUB NOJ 1 LUB [facility] PAB LUB PLAWV 30 tablet 2     loratadine (CLARITIN) 10 mg tablet TAKE 1 PILL BY MOUTH EVERY DAY FOR ALLERGIES/ TXHUA HNUB NOJ 1 [facility] PAB YANET ALLERGIES 30 tablet 1     losartan (COZAAR) 50 MG tablet Take 1 tablet (50 mg total) by mouth daily. 90 tablet 1     metoprolol succinate (TOPROL-XL) 100 MG 24 hr tablet Take 1 tablet (100 mg total) by mouth daily. 90 tablet 3     nitroglycerin (NITROSTAT) 0.4 MG SL tablet Place 1 tablet (0.4 mg total) under the tongue every 5 (five) minutes as needed for chest pain. 30 tablet 1     nortriptyline (PAMELOR) 50 MG capsule Take 1 capsule (50 mg total) by mouth at bedtime. 30 capsule 1     omeprazole (PRILOSEC) 40 MG capsule TAKE 1 PILL BY MOUTH EVERY DAY/ TXHUA HNUB NOJ 1 LUB [facility] PAB ZOO LUB PLAB 30 capsule 11     potassium chloride (KLOR-CON) 10 MEQ CR tablet TAKE 1 PILL BY MOUTH EVERY DAY/TXHUA HNUB NOJ 1 [facility] 90 tablet 1     No current facility-administered medications for this visit.

## 2021-06-22 NOTE — PROGRESS NOTES
[place] [facility] [place], [place]# [id number], for hospital discharge follow up, no answer.  LVM reminding pt of their appt 12/21/18, w/Dr. Gaspar, at 11:20am.  RN has made two unsuccessful attempts to reach patient.  Encounter closed.    Rita Morales RN Care Manager, Population Health

## 2021-06-23 NOTE — PROGRESS NOTES
Pulmonary Clinic Outpatient Follow-Up  1/28/2019     Assessment and Plan:   48 year old never smoker with a history of hypertension, nonischemic cardiomyopathy, GERD, and chronic cough, presenting for follow up of of her cough & an episode of hemoptysis. She was recently hospitalized for chest discomfort. She had an abnormal stress test, which is being managed medically. Her cough has resolved.     #. Hemoptysis, resolved. Likely secondary to strenuous coughing.  #. Subacute-on-chronic cough, resolved. Differential on prior evaluation included possible asthma +/- PND +/- GERD +/- inadequately controlled allergies. If cough recurs, recommend repeat evaluation & treating the aforementioned medical conditions.  #. Post-nasal drip, resolved. She seems to have responded well to trial of loratadine.  #. GERD, adequately controlled at this time.  #. HTN & nonischemic cardiomyopathy, followed by Cardiology.     Patient will be discharged from the Pulmonary clinic, but may return to the clinic on an as-needed basis should respiratory issues arise.    I appreciate the opportunity to participate in the care of Leora Sanchez.  Please, feel free to contact me at any time.    Kaylee Zhao MD  Pulmonary and Critical Care   ______________________________________________________________________________    CC: follow up cough    HPI:   Leora Sanchez is a 48 y.o. never smoker with history of hypertension, NICM, GERD, and chronic cough, presenting today for follow up of chronic cough. Last seen 10/17/18 w/ plan to increase her omeprazole, Flonase, recommended to start using Flovent regularly, started Claritin. She was hospitalized 12/11/18-12/13/18 for evaluation of chest pain that was presumed to be secondary to reflux; had an abnormal stress test (medical management per Cardiology).     Patient reports that her cough has resolved completely. She credits the 10-day course of the medication she received at the hospital during her  hospitalization. She cannot remember the medication she received. Per discharge summary it seems that she was prescribed loratadine -- she cannot remember if this was the medication. She still has some chest tightness when she goes up the stairs in her house, but she reports that this is due to her heart condition. She states that she discussed this w/ her cardiologist & she feels that this is stable. Denies any further acid reflux. Denies any further PND symptoms. She is not using any of her inhalers.    ROS:  Review of Systems - 10-point ROS reviewed and found to be negative w/ exception as detailed in the HPI.    PMH:  Patient Active Problem List    Diagnosis Date Noted     Obesity (BMI 35.0-39.9) with comorbidity (H) 12/21/2018     Abnormal stress test 12/13/2018     Dyspnea on exertion      Anxiety      Chest pain 12/11/2018     Vertigo 08/21/2018     Cyst of left ovary 02/20/2018     Chronic cough 02/12/2018     Prediabetes 01/05/2018     Nonischemic cardiomyopathy (H) 11/14/2017     Heart failure with reduced ejection fraction (H) 11/14/2017     Nonocclusive coronary atherosclerosis of native coronary artery 10/31/2017     Dysidria      Ganglion cyst of left foot 02/08/2016     Essential hypertension 02/08/2016     Non morbid obesity due to excess calories 02/08/2016     Migraine with aura 02/08/2016     Bilateral dry eyes 02/08/2016     Mixed incontinence 02/08/2016     PSH:  Past Surgical History:   Procedure Laterality Date     CV LEFT HEART CATHETERIZATION WO LEFT VETRICULOGRAM Left 10/31/2017    Procedure: Left Heart Catheterization Without Left Ventriculogram;  Surgeon: Jonathan Duque MD;  Location: [facility] Cath Lab;  Service:      DILATION AND CURETTAGE OF UTERUS  2010     INNER EAR SURGERY Right 1994     MASTOIDECTOMY Right 1996     DC CATH PLACEMENT & NJX CORONARY ART ANGIO IMG S&I N/A 10/31/2017    Procedure: Coronary Angiogram;  Surgeon: Jonathan Duque MD;  Location: [facility] Cath Lab;   Service: Cardiology     Allergies:  No Known Allergies    Family HX:  Family History   Problem Relation Age of Onset     Diabetes Mother      Hypertension Mother      Uterine cancer Mother      Diverticulitis Mother      Other Father          in [place] in [place]     Social Hx:  Social History     Socioeconomic History     Marital status:      Spouse name: Not on file     Number of children: 8     Years of education: Not on file     Highest education level: Not on file   Social Needs     Financial resource strain: Not on file     Food insecurity - worry: Not on file     Food insecurity - inability: Not on file     Transportation needs - medical: Not on file     Transportation needs - non-medical: Not on file   Occupational History     Occupation: SSDI     Employer: DISABLED     Comment: For chronic headaches since ear surgery   Tobacco Use     Smoking status: Never Smoker     Smokeless tobacco: Never Used     Tobacco comment: no passive exposure   Substance and Sexual Activity     Alcohol use: No     Drug use: No     Sexual activity: Yes     Partners: Male     Birth control/protection: None   Other Topics Concern     Not on file   Social History Narrative    Lives with  who can read and speak English and helps her with meds     Current Meds:  Medication Sig     acetaminophen (MAPAP EXTRA STRENGTH) 500 MG tablet Take 1 tablet (500 mg total) by mouth every 4 (four) hours as needed.     albuterol (PROAIR HFA;PROVENTIL HFA;VENTOLIN HFA) 90 mcg/actuation inhaler Inhale 2 puffs every 6 (six) hours as needed for wheezing.     amLODIPine (NORVASC) 5 MG tablet Take 1 tablet (5 mg total) by mouth daily.     aspirin 81 MG EC tablet Take 1 tablet (81 mg total) by mouth daily.     atorvastatin (LIPITOR) 80 MG tablet TAKE 1 TABLET (80 MG TOTAL) BY MOUTH DAILY/ TXHUA HNUB NOJ 1 LUB TSHUAJ PAB YANET NTSHAV MUAJ ROJ     benzonatate (TESSALON PERLES) 100 MG capsule Take 1 capsule (100 mg total) by mouth 3 (three) times  "a day as needed for cough.     fluticasone (FLONASE) 50 mcg/actuation nasal spray Apply 1 spray into each nostril daily as needed .     fluticasone (FLOVENT HFA) 44 mcg/actuation inhaler Inhale 2 puffs 2 (two) times a day as needed .     furosemide (LASIX) 40 MG tablet TAKE 1 TABLET (40 MG TOTAL) BY MOUTH 2 (TWO) TIMES A DAY AT 9AM AND 6PM./ NOJ 1 LUB 2 ZAUG IB HNUB THAUM 9AM THIAB 6PM PAB YANET PHOB VOG     isosorbide mononitrate (IMDUR) 60 MG 24 hr tablet TAKE 1 TABLET (60 MG TOTAL) BY MOUTH DAILY/ TXHUA HNUB NOJ 1 LUB [facility] PAB LUB PLAWV     loratadine (CLARITIN) 10 mg tablet TAKE 1 PILL BY MOUTH EVERY DAY FOR ALLERGIES/ TXHUA HNUB NOJ 1 LUB [facility] PAB YANET ALLERGIES     losartan (COZAAR) 50 MG tablet Take 1 tablet (50 mg total) by mouth daily.     metoprolol succinate (TOPROL-XL) 100 MG 24 hr tablet Take 1 tablet (100 mg total) by mouth daily.     nitroglycerin (NITROSTAT) 0.4 MG SL tablet Place 1 tablet (0.4 mg total) under the tongue every 5 (five) minutes as needed for chest pain.     nortriptyline (PAMELOR) 50 MG capsule Take 1 capsule (50 mg total) by mouth at bedtime.     omeprazole (PRILOSEC) 40 MG capsule TAKE 1 PILL BY MOUTH EVERY DAY/ TXA HNUB NOJ 1 [facility] PAB ZOO LUB PLAB     potassium chloride (KLOR-CON) 10 MEQ CR tablet TAKE 1 PILL BY MOUTH EVERY DAY/TXHUA HNUB NOJ 1 [facility]     Physical Exam:  /88   Pulse 80   Ht 4' 11\" (1.499 m)   Wt 192 lb (87.1 kg)   SpO2 98%   Breastfeeding? No   BMI 38.78 kg/m       Gen: well-groomed obese woman, alert, oriented, no distress  HEENT: PERRL, MMM  CV: RRR, no M/G/R  Resp: equal bilateral air entry, breath sounds clear throughout, no focal crackles or wheezes. Talking in full sentences w/ no respiratory distress  Abd: soft, nontender, no palpable organomegaly  Skin: no apparent rashes  Ext: no cyanosis, clubbing or edema  Neuro: alert, nonfocal    Labs: personally reviewed in the EMR.    Imaging studies: personally reviewed. Below are the " Radiology interpretations.  #. CTA PE protocol, 12/11/18:  ANGIOGRAM CHEST: No filling defects in the central pulmonary arteries.  LUNGS AND PLEURA: 3 mm right middle lobe nodule series 3 image 57. 5 mm nodule series 3 image 96. No infiltrate or pleural effusion.  MEDIASTINUM: No adenopathy or significant pericardial effusion.  LIMITED UPPER ABDOMEN: Hepatic granulomas.  MUSCULOSKELETAL: Degenerative change osseous structures.    PFT's normal testing 9/18/18.

## 2021-06-23 NOTE — PATIENT INSTRUCTIONS - HE
Today you were seen for follow up of your cough.     You should follow up if you have any future breathing problems.     If you have any questions or concerns, please, call our clinic at [phone].

## 2021-06-23 NOTE — PROGRESS NOTES
Thanks for update- no clear answer here- hence delay...     Okay to try increased dose and d/c if not helpful.      I think her headaches are related to health issues, diet, sleep, hydration, stress, etc.

## 2021-06-24 NOTE — PATIENT INSTRUCTIONS - HE
Recommendations from today's MTM visit:                                                    MTM (medication therapy management) is a service provided by a clinical pharmacist designed to help you get the most of out of your medicines. and Today we reviewed what your medicines are for, how to know if they are working, that your medicines are safe and how to make your medicine regimen as easy as possible.     1. Call the pharmacy to get refills of Aspirin, Loratadine      2. Decrease Nortriptyline to 25 mg daily for 2 weeks then stop.     3. Start Venlafaxine 37.5 mg daily for 2 weeks then increase to 75 mg daily.     4. Take Meclizine 25 mg 1 tablet by mouth three times a day     Next MTM visit: Friday, March 22nd at 2:00     To schedule another MTM appointment, please call the clinic directly or you may call the MTM scheduling line at [phone] or toll-free at [phone].     My Clinical Pharmacist's contact information:                                                      It was a pleasure seeing you today!  Please feel free to contact me with any questions or concerns you have.      Art Thakkar, Solo  Medication Therapy Management (MTM) Pharmacist  [facility]     You may receive a survey about the MTM services you received by email and/or US Mail.  I would appreciate your feedback to help me serve you better in the future. Your comments will be anonymous.

## 2021-06-24 NOTE — TELEPHONE ENCOUNTER
Refill Approved    Rx renewed per Medication Renewal Policy. Nortroiptyline was last renewed on 01/11/2019 and Metoprolol last renewed 5/17/2018, last office visit 3/04/2019.    Eleanor Gutierrez, [facility] Triage/Med Refill 3/13/2019     Requested Prescriptions   Pending Prescriptions Disp Refills     nortriptyline (PAMELOR) 50 MG capsule [Pharmacy Med Name: NORTRIPTYLINE HCL 50 MG CAP 50 CAP] 30 capsule 1     Sig: TAKE 1 CAPSULE (50 MG TOTAL) BY MOUTH AT BEDTIME/ [facility] NOJ 1 LUB.    Tricyclics/Misc Antidepressant/Antianxiety Meds Refill Protocol Passed - 3/7/2019 11:47 AM       Passed - PCP or prescribing provider visit in last year    Last office visit with prescriber/PCP: 3/4/2019 Jaky Gaspar MD OR same dept: 3/4/2019 Jaky Gaspar MD OR same specialty: 3/4/2019 Jaky Gaspar MD  Last physical: Visit date not found Last MTM visit: Visit date not found   Next visit within 3 mo: Visit date not found  Next physical within 3 mo: Visit date not found  Prescriber OR PCP: Jaky Gaspar MD  Last diagnosis associated with med order: 1. Migraine with aura  - nortriptyline (PAMELOR) 50 MG capsule [Pharmacy Med Name: NORTRIPTYLINE HCL 50 MG CAP 50 CAP]; TAKE 1 CAPSULE (50 MG TOTAL) BY MOUTH AT BEDTIME[facility] LUB.  Dispense: 30 capsule; Refill: 1    2. Exertional angina (H)  - metoprolol succinate (TOPROL-XL) 100 MG 24 hr tablet [Pharmacy Med Name: METOPROLOL SUCC  MG T 100 TAB]; TAKE 1 TABLET (100 MG TOTAL) BY MOUTH DAILY/ [facility] NOJ 1 LUB [facility] NTSHAV SIAB  Dispense: 90 tablet; Refill: 3    3. Nonischemic cardiomyopathy (H)  - metoprolol succinate (TOPROL-XL) 100 MG 24 hr tablet [Pharmacy Med Name: METOPROLOL SUCC  MG T 100 TAB]; TAKE 1 TABLET (100 MG TOTAL) BY MOUTH DAILY/ TXHUA HNUB NOJ 1 LUB [facility] NTSHAV SIAB  Dispense: 90 tablet; Refill: 3    If protocol passes may refill for 12 months if within 3 months of last  provider visit (or a total of 15 months).             metoprolol succinate (TOPROL-XL) 100 MG 24 hr tablet [Pharmacy Med Name: METOPROLOL SUCC  MG T 100 TAB] 90 tablet 3     Sig: TAKE 1 TABLET (100 MG TOTAL) BY MOUTH DAILY/ [facility] NOJ 1 LUB [facility] NTSHAV SIAB    Beta-Blockers Refill Protocol Passed - 3/7/2019 11:47 AM       Passed - PCP or prescribing provider visit in past 12 months or next 3 months    Last office visit with prescriber/PCP: 3/4/2019 Jaky Gaspar MD OR same dept: 3/4/2019 Jaky Gaspar MD OR same specialty: 3/4/2019 Jaky Gaspar MD  Last physical: Visit date not found Last MTM visit: Visit date not found   Next visit within 3 mo: Visit date not found  Next physical within 3 mo: Visit date not found  Prescriber OR PCP: Jaky Gaspar MD  Last diagnosis associated with med order: 1. Migraine with aura  - nortriptyline (PAMELOR) 50 MG capsule [Pharmacy Med Name: NORTRIPTYLINE HCL 50 MG CAP 50 CAP]; TAKE 1 CAPSULE (50 MG TOTAL) [facility] MOUTH [facility] NOJ [facility] LUB.  Dispense: 30 capsule; Refill: 1    2. Exertional angina (H)  - metoprolol succinate (TOPROL-XL) 100 MG 24 hr tablet [Pharmacy Med Name: METOPROLOL SUCC  MG T 100 TAB]; TAKE 1 TABLET (100 MG TOTAL) BY MOUTH DAILY/ TXA [facility] NOJ 1 LUB [facility] NTSHAV SIAB  Dispense: 90 tablet; Refill: 3    3. Nonischemic cardiomyopathy (H)  - metoprolol succinate (TOPROL-XL) 100 MG 24 hr tablet [Pharmacy Med Name: METOPROLOL SUCC  MG T 100 TAB]; TAKE 1 TABLET (100 MG TOTAL) BY MOUTH DAILY/ TXA UB NOJ 1 LUB [facility] NTSHAV SIAB  Dispense: 90 tablet; Refill: 3    If protocol passes may refill for 12 months if within 3 months of last provider visit (or a total of 15 months).            Passed - Blood pressure filed in past 12 months    BP Readings from Last 1 Encounters:   03/04/19 160/90

## 2021-06-24 NOTE — PROGRESS NOTES
"MTM Follow Up Encounter  Assessment & Plan                                                     Urinary Symptoms: Possible UTI - patient was unable to be triaged for a visit today. Pt to schedule a visit with provider or be seen at urgent care.     Shortness of Breath/Allergies: Partially improved -   shortness of breath has resolved. Continues to have allergy symptoms. Would benefit from restarting Loratadine.   -Pt to call for refills of Loratadine     Essential Hypertension/CHF/Edema   Improved-blood pressure at goal of less than 130/80.    -Continue current therapy     Dizziness: Will continue to look for ENT visit notes. In the meantime, may be reasonable to trial Meclizine in the mean time.   -Meclizine 25 mg three times a day PRN for dizziness     Coronary atherosclerosis: Needs improvement - indicated to continue aspirin.   -Pt to call for refill of aspirin     Heartburn: Improved -symptoms are well controlled.   -Continue current therapy.     Chronic headaches:   Depression:   Needs improvement -increase in nortriptyline was only mildly helpful for headaches.  Patient feels like she would like to try alternative therapies.  Given elevated PHQ 9 which is above goal of <5 and mood not much affected by nortriptyline use, could consider trial of venlafaxine as this is also been effective for her mother's mood and depression.  May also help with chronic headaches.  -Decrease Nortriptyline to 25 mg daily for 2 weeks then stop (has old supply of 25 mg at home)   -Start Venlafaxine 37.5 mg daily for 2 weeks then increase to 75 mg daily.       Follow Up  No Follow-up on file.        Subjective & Objective                                                       Leora Sanchez is a 48 y.o. female coming in for a follow up visit for Medication Therapy Management. She was referred to me from Jaky Gaspar MD    Chief Complaint: Continues to have headache and dizziness. \"I didn't get the medicine you sent last time.\" "     Medication Adherence/Access: Brought medications to visit. No aspirin at visit. Pharmacy usually automatically refills. Fills at [facility]. Throws empty bottles away and sometimes forgets to get refills.     Urinary Symptoms: When holding urine, will sometimes have throbbing pain in lower abdominal area. Similar to pain from previous UTI. Some burning with urination. Urine is sometimes cloudy. Has been occurring for about 1 month now. Hasn't gotten any better or worse during this time.     Shortness of Breath/Allergies:   Reports no shortness of breath with activity. Has never been told she has asthma. Not using any inhalers. Not using anything for allergies either. Discharged from pulmonology services.     Essential Hypertension/CHF/Edema   Pt with complex cardiac hx significant for: Hx of CAD, nonischemic cardiomyopathy, angina, and HTN     Current meds include Amlodipine 5 mg daily, Losartan 50 mg daily, Metoprolol  mg daily, Furosemide 40 mg daily, potasssium 10 MEq daily,  isosorbide mononitrate 60 mg daily, and nitroglycerin 0.4 mg as needed-hasn't needed in the last several months. Denies chest pain.     Has been having dizziness since surgery on ear, but this week was more intense.  Scheduled to see ENT on 17 January, said she went but no records. Reports they said there was nothing that could be done about the dizziness.       Pulse Readings from Last 3 Encounters:   02/22/19 80   01/28/19 80   01/11/19 60       Coronary atherosclerosis: Using atorvastatin 80 mg daily and aspirin 81 mg daily.    Lab Results   Component Value Date    CHOL 195 10/31/2017    CHOL 206 (H) 06/07/2016     Lab Results   Component Value Date    HDL 56 10/31/2017    HDL 67 06/07/2016     Lab Results   Component Value Date    LDLCALC 115 10/31/2017    LDLCALC 119 06/07/2016     Lab Results   Component Value Date    TRIG 118 10/31/2017    TRIG 100 06/07/2016       Heartburn: Using omeprazole 40 mg daily right before eating-  states she's using twice daily, but prescribed once daily. Stated she had chest pain but later said she didn't.  Denies concerns with stomach or heartburn.    Chronic headaches: Currently using acetaminophen 500 mg as needed takes 2 tabs twice -three times daily and nortriptyline 50 mg daily. Feels like it's better with increased dose of Notriptyline but continues to have headaches during the day.     Previously used Excedrin extra strength which she thought helped. Uses a menthol oil from Asian store (like [employer]) - feels like that helps a little bit but having to use a lot.     Feels heavy, like stabbing, with dizziness, and some nausea. Denies sensitivity to light or sounds.     Has a mild headache every day, severe headaches are weekly.    States that pain radiates down to neck and shoulders. Does note dry mouth. Has to drink water all of the time. Denies constipation.       Depression: Not currently using any medication. Worried about health.     PHQ-9 Total Score: 17 (4/26/2018  5:00 PM)    PHQ-9 Total Score: 16 (2/22/2019  4:00 PM)            PMH: reviewed in EPIC   Allergies/ADRs: reviewed in EPIC   Alcohol: reviewed in EPIC   Tobacco:   Social History     Tobacco Use   Smoking Status Never Smoker   Smokeless Tobacco Never Used   Tobacco Comment    no passive exposure     Today's Vitals:   There were no vitals filed for this visit.           ----------------    Much or all of the text in this note was generated through the use of Dragon Dictate voice-to-text software. Errors in spelling or words which seem out of context are unintentional. Sound alike errors, in particular, may have escaped editing.    The patient declined an after visit summary    I spent 30 minutes with this patient today;   All changes were made via collaborative practice agreement with Jaky Gaspar MD. A copy of the visit note was provided to the patient's provider.     Art Thakkar PharmD  Medication Therapy Management (MTM)  Pharmacist  [facility]          Current Outpatient Medications   Medication Sig Dispense Refill     acetaminophen (MAPAP EXTRA STRENGTH) 500 MG tablet Take 1 tablet (500 mg total) by mouth every 4 (four) hours as needed. 100 tablet 0     albuterol (PROAIR HFA;PROVENTIL HFA;VENTOLIN HFA) 90 mcg/actuation inhaler Inhale 2 puffs every 6 (six) hours as needed for wheezing. 1 each 0     amLODIPine (NORVASC) 5 MG tablet Take 1 tablet (5 mg total) by mouth daily. 30 tablet 11     aspirin 81 MG EC tablet Take 1 tablet (81 mg total) by mouth daily. 100 tablet 11     atorvastatin (LIPITOR) 80 MG tablet TAKE 1 TABLET (80 MG TOTAL) BY MOUTH DAILY/ TXHUA HNUB NOJ 1 LUB [facility] YANET NTSHAV MUAJ ROJ 90 tablet 2     benzonatate (TESSALON PERLES) 100 MG capsule Take 1 capsule (100 mg total) by mouth 3 (three) times a day as needed for cough. 10 capsule 0     fluticasone (FLONASE) 50 mcg/actuation nasal spray Apply 1 spray into each nostril daily as needed .        1     fluticasone (FLOVENT HFA) 44 mcg/actuation inhaler Inhale 2 puffs 2 (two) times a day as needed .             furosemide (LASIX) 40 MG tablet TAKE 1 TABLET (40 MG TOTAL) BY MOUTH 2 (TWO) TIMES A DAY AT 9AM AND 6PM./ NOJ 1 LUB 2 ZAUG IB HNUB THAUM 9AM THIAB 6PM PAB YANET PHOB VOG 60 tablet 5     isosorbide mononitrate (IMDUR) 60 MG 24 hr tablet TAKE 1 TABLET (60 MG TOTAL) BY MOUTH DAILY/ TXHUA HNUB NOJ 1 LUB [facility] LUB PLAWV 30 tablet 2     loratadine (CLARITIN) 10 mg tablet TAKE 1 PILL BY MOUTH EVERY DAY FOR ALLERGIES/ TXHUA HNUB NOJ 1 LUB [facility] PAB YANET ALLERGIES 30 tablet 1     losartan (COZAAR) 50 MG tablet Take 1 tablet (50 mg total) by mouth daily. 90 tablet 1     metoprolol succinate (TOPROL-XL) 100 MG 24 hr tablet Take 1 tablet (100 mg total) by mouth daily. 90 tablet 3     nitroglycerin (NITROSTAT) 0.4 MG SL tablet Place 1 tablet (0.4 mg total) under the tongue every 5 (five) minutes as needed for chest pain. 30 tablet 1      nortriptyline (PAMELOR) 50 MG capsule Take 1 capsule (50 mg total) by mouth at bedtime. 30 capsule 1     omeprazole (PRILOSEC) 40 MG capsule TAKE 1 PILL BY MOUTH EVERY DAY/ DAGOBERTO BLISS NOJ 1 LUB TSHUAJ PAB ZOO LUB PLAB 30 capsule 11     potassium chloride (KLOR-CON) 10 MEQ CR tablet TAKE 1 PILL BY MOUTH EVERY DAY/DAGOBERTO BLISS NOJ 1 [facility]     No current facility-administered medications for this visit.

## 2021-06-24 NOTE — PROGRESS NOTES
[facility] Office Visit    Chief Complaint:  Chief Complaint   Patient presents with     follow up         Assessment/Plan:  1. Visit for screening mammogram  Pt declines today- will consider at f/u    2. Morbid obesity (H)  Continue to work on LSM and increased activity, especially since her cough and breathing are resolved and back to normal.      3. Essential hypertension  BP Readings from Last 3 Encounters:   19 160/90   19 130/76   19 138/88       uncontrolled today.  Plan for bloodpressure management includes ongoing focus on healthy DASH type diet and increased activity, encouraged to avoid tobacco products and limit alcohol use, stress reduction, may be related to starting venlafaxine.  Pt doesn't think she got this pill yet but pharmacy notes they gave it to her on 19.  Historically well controlled. Pt confident that bp up due to stress and lack of sleep over past 2-3 days from attending [facility] .  F/u with MTM later this month as scheduled.  If bp remains high consider increasing losartan or switching her venlafaxine to duloxetine. Labwork and meds ordered and reviewed as below  Lab Results   Component Value Date    K 3.8 2018      Lab Results   Component Value Date    CREATININE 0.76 2018       - Basic Metabolic Panel    4. Chronic cough  Resolved.     5. Prediabetes  Continue to follow healthy diet and work on increasing activity.    - Basic Metabolic Panel  - Glycosylated Hemoglobin A1c    6. Moderate major depression (H)  Pt was given rx for venlafaxine per pharmacy phone call but pt doesn't think she is taking it.  She will look at home and review with MTM later this month.  Consider switching to duloxetine if bp remains high.      7. Polyuria  Check ua and treat if needed.    - Urinalysis-UC if Indicated  - Culture, Urine    8. Chronic systolic heart failure (H)  Pt noting increased swelling but weight stable.  Do not increase norvasc unless  needed-10mg did cause edema.    - BNP(B-type Natriuretic Peptide)      Return in about 3 months (around 2019) for Recheck.  The following are part of a depression follow up plan for the patient:  implementation of measures to provide psychological support  The following high BMI interventions were performed this visit: encouragement to exercise and lifestyle education regarding diet    Patient Education/AVS:  There are no Patient Instructions on file for this visit.    HPI:   Leora Sanchez is a 48 y.o. female c/o f/u on her medications. Notes cough is better now.  Did not get the pill for dizziness recommended by pharmacist- pharmacy only gave her the allergy pill not the new dizzy pills.      Legs are swollen and puffy.     Pt notes that if she holds her urine she will get a pinching pain in her back that is relieved with urination.  Started about a month ago and not better or worse.  Bad at night - hard to sleep due to pain.  No urinary sx.  Does note polyuria and burning with urination.  No hematuria.  No vomiting.  Nausea with dizziness only.  No fevers.     Does not want to do a mammogram today- just lab tests.      Pt did take meds today like normal.  Did go out for lunch with mom right before coming in.  Does note she has a headache today.  Has been having a  over the weekend and not much sleep for past several days.  Does have fu on 3/22/19.      Did not bring meds with today for review.      ROS:  Constitutional, CV, Resp, GI, , MSK, skin, neuro, psych all negative except as outlined in the HPI above and patient denies any other symptoms.      History summarized from1-2:MTM 19 reviewed- uti sx- fu with pcp. Restart loratidine for allergy sx and shortness of breath.  htn controlled. Dizziness- trial of meclizine prn okay.  Reviewed need for ongoing asa tx.  Cont tx for heartburn.  Increased nortriptyline for headaches and depression.  start venlafaxine as helpful for her mother.    Pulmonary  1/28/19 reviewed- hemoptysis resolved likely related to hard coughing.  GERD, postnasal drainage, etc improved/resolved with tx.  D/c from pulm but fu as needed.    Radiology tests reviewed-1: last mammo 2/2017  Lab tests reviewed-1: [id number]    Physical Exam:  /90 (Patient Site: Right Arm, Patient Position: Sitting, Cuff Size: Adult Regular)   Pulse 87   Wt 192 lb (87.1 kg)   SpO2 97%   BMI 38.78 kg/m   Body mass index is 38.78 kg/m . No LMP recorded. Patient is perimenopausal.  Vital signs reviewed  Wt Readings from Last 3 Encounters:   03/04/19 192 lb (87.1 kg)   02/22/19 193 lb (87.5 kg)   01/28/19 192 lb (87.1 kg)     Social History     Tobacco Use   Smoking Status Never Smoker   Smokeless Tobacco Never Used   Tobacco Comment    no passive exposure     Social History     Substance and Sexual Activity   Sexual Activity Yes     Partners: Male     Birth control/protection: None     No Data Recorded  PHQ-9 Total Score: 16 (2/22/2019  4:00 PM)    PHQ-2 Total Score: 5 (2/22/2019  4:00 PM)  Depression Follow-up Plan: mental health care education (2/22/2019  4:00 PM)    No Data Recorded    All normal as below except abnormalities include: pt appears at her baseline. Talkative and interactive seems to understand both her and her mother's meds well.  No edema noted today.  Normal heart and lung exams.  No cough noted.    General is a  48 y.o. female sitting comfortably in no apparent distress.   Neck: Supple without lymphadenopathy or thyromegally  CV: Regular rate and rhythm S1S2 without rubs, murmurs or gallops,   Lungs: Clear to auscultation bilaterally  Extremities: Warm, No Edema, 2+ Pedal and radial pulses bilaterally  Skin: No lesions or rashes noted  Neuro/MSK: Able to ambulate around the exam room with equal movement, strength and normal coordination of the upper and lower extremeties symmetrically    Results for orders placed or performed in visit on 03/04/19   Glycosylated Hemoglobin A1c   Result  Value Ref Range    Hemoglobin A1c 6.2 (H) 3.5 - 6.0 %   Urinalysis-UC if Indicated   Result Value Ref Range    Color, UA Yellow Colorless, Yellow, Straw, Light Yellow    Clarity, UA Clear Clear    Glucose, UA Negative Negative    Bilirubin, UA Negative Negative    Ketones, UA Negative Negative    Specific Gravity, UA 1.020 1.005 - 1.030    Blood, UA Negative Negative    pH, UA 6.0 5.0 - 8.0    Protein, UA 30 mg/dL (!) Negative mg/dL    Urobilinogen, UA 0.2 E.U./dL 0.2 E.U./dL, 1.0 E.U./dL    Nitrite, UA Negative Negative    Leukocytes, UA Trace (!) Negative    Bacteria, UA Few (!) None Seen hpf    RBC, UA 0-2 None Seen, 0-2 hpf    WBC, UA 0-5 None Seen, 0-5 hpf    Squam Epithel, UA 0-5 None Seen, 0-5 lpf       Med list and active problem list reviewed and updated as part of this encounter    Current Outpatient Medications on File Prior to Visit   Medication Sig Dispense Refill     acetaminophen (MAPAP EXTRA STRENGTH) 500 MG tablet Take 1 tablet (500 mg total) by mouth every 4 (four) hours as needed. 100 tablet 0     albuterol (PROAIR HFA;PROVENTIL HFA;VENTOLIN HFA) 90 mcg/actuation inhaler Inhale 2 puffs every 6 (six) hours as needed for wheezing. 1 each 0     amLODIPine (NORVASC) 5 MG tablet Take 1 tablet (5 mg total) by mouth daily. 30 tablet 11     aspirin 81 MG EC tablet Take 1 tablet (81 mg total) by mouth daily. 100 tablet 11     atorvastatin (LIPITOR) 80 MG tablet TAKE 1 TABLET (80 MG TOTAL) BY MOUTH DAILY/ TXHUA HNUB NOJ 1 LUB TSHUAJ PAB YANET NTSHAV MUAJ ROJ 90 tablet 2     benzonatate (TESSALON PERLES) 100 MG capsule Take 1 capsule (100 mg total) by mouth 3 (three) times a day as needed for cough. 10 capsule 0     furosemide (LASIX) 40 MG tablet TAKE 1 TABLET (40 MG TOTAL) BY MOUTH 2 (TWO) TIMES A DAY AT 9AM AND 6PM./ NOJ 1 LUB 2 ZAUG IB HNUB THAUM 9AM THIAB 6PM PAB YANET PHOB VOG 60 tablet 5     isosorbide mononitrate (IMDUR) 60 MG 24 hr tablet TAKE 1 TABLET (60 MG TOTAL) BY MOUTH DAILY/ DAGOBERTO BLISS NOJ 1 LUB  [facility] LUB PLAWV 30 tablet 2     loratadine (CLARITIN) 10 mg tablet TAKE 1 PILL BY MOUTH EVERY DAY FOR ALLERGIES/ TXHUA HNUB NOJ 1 [facility] YANET ALLERGIES 30 tablet 1     losartan (COZAAR) 50 MG tablet Take 1 tablet (50 mg total) by mouth daily. 90 tablet 1     meclizine (ANTIVERT) 32 MG chewable tablet Chew 1 tablet (32 mg total) 3 (three) times a day as needed. 30 tablet 1     metoprolol succinate (TOPROL-XL) 100 MG 24 hr tablet Take 1 tablet (100 mg total) by mouth daily. 90 tablet 3     nitroglycerin (NITROSTAT) 0.4 MG SL tablet Place 1 tablet (0.4 mg total) under the tongue every 5 (five) minutes as needed for chest pain. 30 tablet 1     nortriptyline (PAMELOR) 50 MG capsule Take 1 capsule (50 mg total) by mouth at bedtime. 30 capsule 1     omeprazole (PRILOSEC) 40 MG capsule TAKE 1 PILL BY MOUTH EVERY DAY/ TXHUA HNUB NOJ 1 [facility] ZOO LUB PLAB 30 capsule 11     potassium chloride (KLOR-CON) 10 MEQ CR tablet TAKE 1 PILL BY MOUTH EVERY DAY/TXHUA HNUB NOJ 1 [facility] 90 tablet 1     venlafaxine (EFFEXOR XR) 37.5 MG 24 hr capsule Take 1 capsule (37.5 mg total) by mouth daily. 14 capsule 0     venlafaxine (EFFEXOR XR) 75 MG 24 hr capsule Take 1 capsule (75 mg total) by mouth daily. 30 capsule 1     No current facility-administered medications on file prior to visit.          Jaky Gaspar MD

## 2021-06-24 NOTE — PROGRESS NOTES
Called pharmacy and they stated that meclizine is not covered under her insurance she would have to pay out of pocket. They just filled her effexor on the 22nd of Feb. They will contact the patient to proceed. Completing task.

## 2021-06-25 NOTE — TELEPHONE ENCOUNTER
Will switch to once daily.     Pt has MTM Follow-up tomorrow. Will discuss alternating between fasting and post-prandials at that time.

## 2021-06-25 NOTE — PROGRESS NOTES
Progress Notes by Buddy Roy MD at 8/1/2017  3:20 PM     Author: Buddy Roy MD Service: -- Author Type: Physician    Filed: 8/1/2017  5:19 PM Encounter Date: 8/1/2017 Status: Signed    : Buddy Roy MD (Physician)           Click to link to [facility] HEART CARE NOTE    Thank you, Dr. Smith, for asking me to see Leora Sanchez in consultation at [facility] to evaluate chest pain.      Assessment/Plan:   1.  Exertional chest pain: The patient developed intermittent exertional chest pain over last 6 months, getting worse over last 2-3 weeks.  It is typical exertional angina as mentioned below.  Her chest pain has been accelerated over last 2-3 weeks.  Since she has typical exertional angina, I recommended her a coronary angiogram with possible PCI.  I discussed the benefit and possible complications from procedures with the patient.  The patient is aware that the risks of the procedure include but are not limited to: death, myocardial infarction, stroke, kidney dysfunction, vessel trauma, hemorrhage, need for emergency corrective surgery, allergy, and dysrhythmia.  The patient would like to discuss the procedure with her family.  And then she will call me back if the patient is going to take my advice.  Meantime, aspirin 81 mg daily, isosorbide mononitrate 30 mg daily added to her regimen.    2.  Essential hypertension: Continue her current medications of metoprolol 50 mg daily, amlodipine 10 mg daily, losartan-hydrochlorothiazide 100-25 mg daily.  Her blood pressure is controlled.    Thank you for the opportunity to be involved in the care of Leora Sanchez. If you have any questions, please feel free to contact me.  I will see the patient again in 2 months.    Much or all of the text in this note was generated through the use of Dragon Dictate voice-to-text software. Errors in spelling or words which seem out of context are unintentional.   Sound alike errors, in  particular, may have escaped editing.       History of Present Illness:   It is my pleasure to see Leora Sanchez at the [facility] rapid access clinic for evaluation of chest pain. Leora Sanchez is a 47 y.o. female with a medical history of benign essential hypertension, hyperlipidemia.    The patient states that she developed intermittent chest pain over last 6 months, gradually getting worse over last 2-3 weeks.  She described her chest pain as substernal, pressure, 8/10 in intensity, radiated to neck and left upper back, typically associated with exertion, relieved by rest.  The patient has no chest pain at rest.  She also has dyspnea on exertion associated with her symptoms chest pain.  She has no palpitations, dizziness, orthopnea PND or leg swelling.  Her blood pressure and heart rate are controlled.    Past Medical History:     Patient Active Problem List   Diagnosis   ? Ganglion cyst of left foot   ? Essential hypertension   ? Non morbid obesity due to excess calories   ? Heartburn   ? Migraine with aura   ? Bilateral dry eyes   ? Mixed incontinence   ? Hyperlipemia       Past Surgical History:     Past Surgical History:   Procedure Laterality Date   ? DILATION AND CURETTAGE OF UTERUS  2010   ? INNER EAR SURGERY Right 1994   ? MASTOIDECTOMY Right 1996       Family History:     Family History   Problem Relation Age of Onset   ? Diabetes Mother    ? Hypertension Mother    ? Uterine cancer Mother    ? Diverticulitis Mother    ? No Medical Problems Father    ? No Medical Problems Sister    ? No Medical Problems Daughter    ? No Medical Problems Son        Social History:    reports that she has never smoked. She has never used smokeless tobacco. She reports that she does not drink alcohol or use illicit drugs.    Review of Systems:   General: Night Sweats  Eyes: Visual Distubance  Ears/Nose/Throat: Hearing Loss  Lungs: Cough  Heart: Chest Pain, Shortness of Breath with activity, Fainting  (Palpitations)  Stomach: Heartburn, Nausea  Bladder: Frequent Urination at Night  Muscle/Joints: Joint Pain, Muscle Pain  Skin: WNL  Nervous System: Dizziness, Loss of Balance  Mental Health: Confusion, Anxiety     Blood: Easy Bruising    Meds:     Current Outpatient Prescriptions:   ?  amLODIPine (NORVASC) 10 MG tablet, Take 10 mg by mouth daily. TXHUA HNUB NOJ 1 LUB TSHUAJ PAB YANET NTSHAV SIAB, Disp: , Rfl:   ?  artificial tears, hypromellose, (GONIOVISC) 2.5 % ophthalmic solution, 1-2gtts/eye as needed for discomfort, Disp: 15 mL, Rfl: 12  ?  cyclobenzaprine (FLEXERIL) 10 MG tablet, Take 1 tablet (10 mg total) by mouth 2 (two) times a day as needed for muscle spasms., Disp: 60 tablet, Rfl: 1  ?  losartan-hydrochlorothiazide (HYZAAR) 100-25 mg per tablet, Take 1 tablet by mouth daily., Disp: 90 tablet, Rfl: 4  ?  MAPAP EXTRA STRENGTH 500 mg tablet, Take 500-1,000 mg by mouth every 6 (six) hours as needed. , Disp: , Rfl: 5  ?  nortriptyline (PAMELOR) 25 MG capsule, Take 1 capsule (25 mg total) by mouth at bedtime., Disp: 30 capsule, Rfl: 1  ?  omeprazole (PRILOSEC) 20 MG capsule, Take 1 capsule (20 mg total) by mouth daily., Disp: 90 capsule, Rfl: 4  ?  potassium chloride (KLOR-CON) 10 MEQ CR tablet, , Disp: , Rfl: 0  ?  aspirin 81 MG EC tablet, Take 1 tablet (81 mg total) by mouth daily., Disp: 30 tablet, Rfl: 6  ?  isosorbide mononitrate (IMDUR) 30 MG 24 hr tablet, Take 1 tablet (30 mg total) by mouth daily., Disp: 30 tablet, Rfl: 6  ?  metoprolol succinate (TOPROL-XL) 50 MG 24 hr tablet, Take 1 tablet (50 mg total) by mouth daily., Disp: 90 tablet, Rfl: 3  ?  potassium chloride SA (K-DUR,KLOR-CON) 10 MEQ tablet, Take 1 tablet (10 mEq total) by mouth 2 (two) times a day., Disp: 20 tablet, Rfl: 0     Allergies:   Review of patient's allergies indicates no known allergies.    Objective:      Physical Exam  170 lb (77.1 kg)  5' (1.524 m)  Body mass index is 33.2 kg/(m^2).  /88 (Patient Site: Left Arm, Patient  Position: Sitting, Cuff Size: Adult Regular)  Pulse 80  Resp 16  Ht 5' (1.524 m)  Wt 170 lb (77.1 kg)  LMP 07/28/2017  BMI 33.2 kg/m2    General Appearance:   Awake, Alert, No acute distress.   HEENT:  Pupil equal, reactive to light. No scleral icterus; the mucous membranes were moist. No oral ulcers or thrush.    Neck: No cervical bruits. No JVD. No thyromegaly. No lymph node enlargement or tenderness.   Chest: The spine was straight. The chest was symmetric.   Lungs:   Respirations unlabored. Lungs are clear to auscultation. No crackles. No wheezing.   Cardiovascular:   RRR, normal first and second heart sounds with no murmurs. No rubs or gallops.    Abdomen:  Soft. No tenderness. Non-distended. Bowels sounds are present   Extremities: Equal posterior tibial pulses. No leg edema.   Skin: No rashes or ulcers. Warm, Dry.   Musculoskeletal: No tenderness. No deformity.   Neurologic: Mood and affect are appropriate. No focal deficits.         EKG:  Personally reivewed  Normal sinus rhythm  Leftward axis  Left ventricular hypertrophy with repolarization abnormality  Abnormal ECG  When compared with ECG of 12-MAY-2016 16:07,  No significant change was found      Lab Review   Lab Results   Component Value Date     07/31/2017    K 4.7 07/31/2017     07/31/2017    CO2 27 07/31/2017    BUN 14 07/31/2017    CREATININE 0.86 07/31/2017    CALCIUM 9.3 07/31/2017     Lab Results   Component Value Date    WBC 6.9 07/28/2017    HGB 13.3 07/28/2017    HCT 38.6 07/28/2017    MCV 88 07/28/2017     07/28/2017     Lab Results   Component Value Date    CHOL 206 (H) 06/07/2016    TRIG 100 06/07/2016    HDL 67 06/07/2016     Lab Results   Component Value Date    TROPONINI 0.02 07/28/2017     Lab Results   Component Value Date     (H) 07/28/2017     Lab Results   Component Value Date    TSH 1.43 02/08/2016

## 2021-06-25 NOTE — TELEPHONE ENCOUNTER
"Pharmacy comments:    Alternative requested: to be compliant with medicare part b, please send new rx to state direction as \"test 1 time per day\", non-insulin patient can only test 1 time per day vs insulin dependant patient 3x daily.  "

## 2021-06-25 NOTE — TELEPHONE ENCOUNTER
Refill Approved    Rx renewed per Medication Renewal Policy. Medication was last renewed on 2/3/21.    Rojelio Garcia, [facility] Triage/Med Refill 6/13/2021     Requested Prescriptions   Pending Prescriptions Disp Refills     blood glucose meter (GLUCOMETER) 1 each 0     Sig: Use 1 each As Directed daily. Dispense glucometer brand per patient's insurance at pharmacy discretion.       Diabetic Supplies Refill Protocol Passed - 6/10/2021  6:14 PM        Passed - Visit with PCP or prescribing provider visit in last 6 months     Last office visit with prescriber/PCP: 8/20/2020 Jaky Gaspar MD OR same dept: 8/20/2020 Jaky Gaspar MD OR same specialty: 8/20/2020 Jaky Gaspar MD  Last physical: Visit date not found Last MTM visit: Visit date not found   Next visit within 3 mo: Visit date not found  Next physical within 3 mo: Visit date not found  Prescriber OR PCP: Jaky Gaspar MD  Last diagnosis associated with med order: 1. Type 2 diabetes mellitus with hyperglycemia, without long-term current use of insulin (H)  - blood glucose meter (GLUCOMETER); Use 1 each As Directed daily. Dispense glucometer brand per patient's insurance at pharmacy discretion.  Dispense: 1 each; Refill: 0    If protocol passes may refill for 12 months if within 3 months of last provider visit (or a total of 15 months).             Passed - A1C in last 6 months     Hemoglobin A1c   Date Value Ref Range Status   04/15/2021 7.0 (H) <=5.6 % Final

## 2021-06-26 NOTE — PROGRESS NOTES
Medication Therapy Management (MTM) Encounter    Assessment:                                                      Diabetes: Last A1C at goal <8% (ADA). Few fasting sugars available, but at goal . Pt limited to one strip per day from insurance. Will have her alternate checks between fasting and post-prandial.     CAD:  Decrease in chest pain with increase in Isosorbide dose.     Still not using aspirin. Again reviewed indication with pt and the importance for use. Per last discussion with pharmacy it's not covered and pt will need to purchase OTC which pt was agreeable to.     LDL above goal <70. Statin adherence seems appropriate. Based on pt's 2013 LDL, seems she did get the expected 50% reduction from high intensity statin. Likely will need additional agents to get to goal. This was not discussed today due to time. Review at follow-up. As Ezetimibe only expected to lower LDL by about 15%, likely would not be enough to bring pt to goal. May need to consider alternatives such as PCSK-9 inhibitors.     CHF/Hypertension: Last BP above goal <130/80. Pulse at goal . Has now increased isosorbide dose. Plan for repeat BP check in 2 weeks.     Headaches/Pain: Reviewed indication of pain and headache prevention for Venlafaxine vs depression. Pt agreeable to starting.           Plan:                                                       -Patient to check blood glucose once daily -alternate between fasting and 2 hours postprandial  -Patient to purchase aspirin 81 mg OTC.  Use one tab once daily.  -Patient to start venlafaxine, as previously prescribed    Follow Up   7/1 CSS for vitals check   Return in about 4 weeks (around 7/7/2021) for Medication Management Pharmacist, Via Phone.      Subjective & Objective                                                     Leora Sanchez is a 50 y.o. female called for a follow up visit for Medication Therapy Management. She was referred to me from Jaky Gaspar MD.   [facility]  (Vishnu) by phone.     Reason for visit: Medication management follow-up    Medication Adherence/Access: Meds reviewed over the phone.        Diabetes:  Pt currently prescribed Metformin 500 mg ER twice daily. patient is not currently experiencing side effects.   SMBG: once daily    Ranges (patient reported) :     Wasn't able to get supplies from pharmacy as insurance didn't cover for more than 1 strip per day.     Picked up some strips from [employer].   Fasting yesterday was 134. Generally between 109-124.       Patient is not experiencing hypoglycemia   Recent symptoms of high blood sugar?  Polyuria, polydipsia  ACEi/ARB: Losartan 100 mg daily  Statin: Atorvastatin 80 mg daily  Aspirin: taking 81mg daily,       Lab Results   Component Value Date    HGBA1C 7.0 (H) 04/15/2021    HGBA1C 6.4 (H) 12/15/2020    HGBA1C 8.5 (H) 08/20/2020     Lab Results   Component Value Date    MICROALBUR <0.50 08/20/2020    LDLCALC 88 12/15/2020    CREATININE 1.16 (H) 04/15/2021         CAD: Prescribed aspirin 81 mg daily, atorvastatin 80 mg daily, isosorbide mononitrate 60 mg ER daily +30 mg ER daily (dose increased at last MTM visit), nitroglycerin 0.4 mg as needed.    Did not get the aspirin. But now has both doses of isosorbide 30 + 60. Has been taking both but wasn't quite sure what the 30 mg tab was for.     No longer having chest pain/tightness..     Lab Results   Component Value Date    LDLCALC 88 12/15/2020        CHF/Hypertension: Prescribed furosemide 40 mg twice daily, losartan 100 mg daily, metoprolol succinate 100 mg daily, and potassium 10 mEq daily    Does not have BP cuff at home    Wt Readings from Last 3 Encounters:   04/15/21 200 lb (90.7 kg)   02/23/21 201 lb 12.8 oz (91.5 kg)   02/19/21 197 lb (89.4 kg)     BP Readings from Last 3 Encounters:   05/20/21 137/85   04/15/21 145/88   02/23/21 122/82     Pulse Readings from Last 3 Encounters:   05/20/21 61   04/15/21 (!) 52   02/23/21 (!) 56  "     Results for orders placed or performed in visit on 04/15/21   Basic Metabolic Panel   Result Value Ref Range    Sodium 143 136 - 145 mmol/L    Potassium 4.5 3.5 - 5.0 mmol/L    Chloride 107 98 - 107 mmol/L    CO2 26 22 - 31 mmol/L    Anion Gap, Calculation 10 5 - 18 mmol/L    Glucose 110 70 - 125 mg/dL    Calcium 9.0 8.5 - 10.5 mg/dL    BUN 28 (H) 8 - 22 mg/dL    Creatinine 1.16 (H) 0.60 - 1.10 mg/dL    GFR MDRD Af Amer 60 (L) >60 mL/min/1.73m2    GFR MDRD Non Af Amer 49 (L) >60 mL/min/1.73m2        Pain/headaches: Prescribed acetaminophen 500 mg 2 tabs 3 times a day, camphor-menthol ointment twice daily as needed, and venlafaxine 75 mg ER once daily.    \"I don't want to take the depression medicine\"     Previously discontinued Duloxetine because of excessive drowsiness. Was having incontinence because she wouldn't wake up overnight to use the bathroom.     Having headaches and constant pain. More severe pain 1-2 times per week.       PMH: reviewed in EPIC   Allergies/ADRs: reviewed in EPIC   Alcohol:   Social History     Substance and Sexual Activity   Alcohol Use No       Tobacco:   Social History     Tobacco Use   Smoking Status Never Smoker   Smokeless Tobacco Never Used   Tobacco Comment    no passive exposure     Today's Vitals: There were no vitals filed for this visit.  ----------------      The patient declined an after visit summary    I spent 21 minutes with this patient today.   All changes were made via collaborative practice agreement with Jaky Gaspar MD. A copy of the visit note was provided to the patient's provider.     Art Thakkar PharmD  Medication Therapy Management (MTM) Pharmacist  [facility]      Telemedicine Visit Details    Type of service:  Telephone     Start Time: 10:40 AM  End Time: 11:01 AM    Originating Location (pt. Location): Home    Distant Location (provider location):  [place] MEDICATION THERAPY MANAGEMENT [facility]    Mode of " "Communication: Telephone    Current Outpatient Medications   Medication Sig Dispense Refill     acetaminophen (TYLENOL) 500 MG tablet Take 2 tablets (1,000 mg total) by mouth 3 (three) times a day as needed. 100 tablet 6     aspirin 81 MG EC tablet Take 1 tablet (81 mg total) by mouth daily. 90 tablet 4     atorvastatin (LIPITOR) 80 MG tablet TAKE 1 TABLET (80 MG TOTAL) BY MOUTH DAILY/ DAGOBERTO BLISS NO 1 [facility] MUAJ ROJ 90 tablet 4     blood glucose meter (GLUCOMETER) Use 1 each As Directed daily. Dispense glucometer brand per patient's insurance at pharmacy discretion. 1 each 0     blood glucose test (CONTOUR NEXT TEST STRIPS) strips Use 1 each As Directed daily. 100 strip 4     camphor-menthoL (TIGER BALM) Oint Apply 1 application topically 2 (two) times a day as needed. \"Tiger Balm\"       furosemide (LASIX) 40 MG tablet TAKE 1 TABLET (40 MG TOTAL) BY MOUTH 2 (TWO) TIMES A DAY AT 9AM AND 6PM./ DAGOBERTO JON 1 [facility] 2 ZAUG THAUM 9AM THIAB 6PM PAB PHOB  tablet 2     generic lancets Use 1 each As Directed daily. 100 each 3     isosorbide mononitrate (IMDUR) 30 MG 24 hr tablet Take 1 tablet (30 mg total) by mouth daily. Take with 60 mg tab for total of 90 mg daily. 30 tablet 1     isosorbide mononitrate (IMDUR) 60 MG 24 hr tablet TAKE 1 TABLET (60 MG TOTAL) BY MOUTH DAILY./ DAGOBERTO BLISS NOJ 1 [facility] LUB PLAWV 90 tablet 1     losartan (COZAAR) 100 MG tablet TAKE 1 PILL BY MOUTH DAILY FOR BLOOD PRESSURE / DAGOBERTO BLISS NO 1 [facility] ZOO YANET NTSHAV SIAB 30 tablet 11     metFORMIN (GLUCOPHAGE-XR) 500 MG 24 hr tablet Take 1 tablet (500 mg total) by mouth 2 (two) times a day. 180 tablet 1     metoprolol succinate (TOPROL-XL) 100 MG 24 hr tablet Take 1 tablet (100 mg total) by mouth daily. 90 tablet 4     nitroglycerin (NITROSTAT) 0.4 MG SL tablet Place 1 tablet (0.4 mg total) under the tongue every 5 (five) minutes as needed for chest pain. 30 tablet 1     omeprazole (PRILOSEC) 40 " MG capsule        potassium chloride (KLOR-CON) 10 MEQ CR tablet TAKE 1 PILL BY MOUTH EVERY DAY/TXJANEA HNUB NOJ 1 LUB TSHUAJ 90 tablet 4     venlafaxine (EFFEXOR-XR) 75 MG 24 hr capsule Take 1 capsule (75 mg total) by mouth daily. 30 capsule 3     No current facility-administered medications for this visit.         Medication Therapy Recommendations  Chronic headaches    Current Medication: venlafaxine (EFFEXOR-XR) 75 MG 24 hr capsule   Rationale: Patient prefers not to take - Adherence - Adherence   Recommendation: Provide Education   Status: Accepted per CPA         Coronary artery disease involving native coronary artery of native heart, angina presence unspecified    Current Medication: aspirin 81 MG EC tablet   Rationale: Does not understand instructions - Adherence - Adherence   Recommendation: Provide Education   Status: Accepted per CPA         Type 2 diabetes mellitus with hyperglycemia, without long-term current use of insulin (H)    Current Medication: metFORMIN (GLUCOPHAGE-XR) 500 MG 24 hr tablet   Rationale: Medication requires monitoring - Needs additional monitoring   Recommendation: Self-Monitoring   Status: Accepted per CPA   Note: SMBG once daily

## 2021-06-26 NOTE — PROGRESS NOTES
Progress Notes by Sendy Banda CNP at 5/24/2018  2:50 PM     Author: Sendy Banda CNP Service: -- Author Type: Nurse Practitioner    Filed: 5/24/2018  3:23 PM Encounter Date: 5/24/2018 Status: Signed    : Sendy Banda CNP (Nurse Practitioner)           Click to link to [facility] HEART [facility] NOTE      Assessment/Recommendations   Assessment:    1. Nonischemic cardiomyopathy with systolic dysfunction, NYHA class II: Well compensated today.  Improved ejection fraction of 37% to 50%.  She continues to have dyspnea on exertion and a cough especially at night.  Her weights have remained stable.  She is following a low-sodium diet.    2.  Cough: This has been present for quite some time.  She had a chest x-ray which was negative.  She is following up with ENT next week.    3.  Chest pain and dyspnea on exertion: We discussed the results of her nuclear exercise stress tests which was negative for inducible myocardial ischemia or infarction.  She states that her chest pain has resolved.    Plan:  1.  Continue current medications  2.  Continue daily weights and low-sodium diet  3.  Follow-up with ENT regarding cough at night    Leora Sanchez will follow up with Dr. Roy in July and in the heart failure clinic in 4 months or sooner if needed.     History of Present Illness    Ms. Leora Sanchez is a 47 y.o. female seen at [facility] heart failure clinic today for continued follow-up.  There is an  for the duration of the appointment.  She follows up for nonischemic cardiomyopathy with systolic dysfunction.  She she had an echocardiogram in October 2017 which showed an ejection fraction of 37%.  Her most recent echocardiogram was done January 29, 2018 which showed an improved ejection fraction of 50%.  She recently had a nuclear exercise stress tests due to chest pain and dyspnea on exertion.  This was negative for inducible myocardial ischemia or  "infarction with an ejection fraction of 64%.  It showed her exercise capacity to be mildly impaired.  She has a past medical history significant for hypertension and hyperlipidemia.  She is seeing ENT regarding her cough next week.    Today, she comes in with continued dyspnea on exertion and cough.  Her cough is more present during the night.  She has occasional orthopnea with the cough.  She states it feels \"spicy\" and she lays down at night.  She denies any abdominal bloating or lower extremity edema.  She denies fatigue, shortness of breath, PND, palpitations, chest pain, abdominal fullness/bloating and lower extremity edema.      She is monitoring home weights which are stable around 187 pounds.  She is following a low sodium diet.      NM Exercise stress test 5/21/2018  Conclusion     The patient's exercise capacity is mildly impaired.    The stress electrocardiogram is negative for inducible ischemic EKG changes.    The exercise nuclear stress test is negative for inducible myocardial ischemia or infarction. Breast tissue attenuation artifact is noted.    The left ventricular ejection fraction is 64%.    There is no prior study available.          Physical Examination Review of Systems   Vitals:    05/24/18 1457   BP: 106/70   Pulse: 76   Resp: 16     Body mass index is 37.97 kg/(m^2).  Wt Readings from Last 3 Encounters:   05/24/18 188 lb (85.3 kg)   05/21/18 189 lb (85.7 kg)   05/17/18 190 lb (86.2 kg)       General Appearance:     Alert, cooperative and in no acute distress.   ENT/Mouth: membranes moist, no oral lesions or bleeding gums.      EYES:  no scleral icterus, normal conjunctivae   Neck: no carotid bruits or thyromegaly   Chest/Lungs:   lungs are clear to auscultation, no rales or wheezing, respirations unlabored   Cardiovascular:   Regular. Normal first and second heart sounds with no murmurs, rubs, or gallops; the carotid, radial and posterior tibial pulses are intact, Jugular venous pressure " normal, no edema bilateral lower extremities    Abdomen:  Soft, nontender, nondistended, bowel sounds present   Extremities: no cyanosis or clubbing   Skin: warm, dry.    Neurologic: mood and affect are appropriate, alert and oriented x3      General: WNL  Eyes: WNL  Ears/Nose/Throat: WNL  Lungs: Cough  Heart: Shortness of Breath with activity, Leg Swelling  Stomach: WNL  Bladder: WNL  Muscle/Joints: WNL  Skin: WNL  Nervous System: Dizziness  Mental Health: WNL     Blood: WNL     Medical History  Surgical History Family History Social History   Past Medical History:   Diagnosis Date   ? Anxiety    ? Cardiomyopathy    ? CHF (congestive heart failure)    ? Coronary artery disease    ? Depression    ? High cholesterol    ? Hyperlipidemia    ? Hypertension    ? Pregnancy        ? Spontaneous     ? TM (tympanic membrane disorder)     Past Surgical History:   Procedure Laterality Date   ? CARDIAC CATHETERIZATION     ? CV LEFT HEART CATHETERIZATION WO LEFT VETRICULOGRAM Left 10/31/2017    Procedure: Left Heart Catheterization Without Left Ventriculogram;  Surgeon: Jonathan Duque MD;  Location: [facility] Cath Lab;  Service:    ? DILATION AND CURETTAGE OF UTERUS     ? INNER EAR SURGERY Right    ? MASTOIDECTOMY Right    ? UT CATH PLACEMENT & NJX CORONARY ART ANGIO IMG S&I N/A 10/31/2017    Procedure: Coronary Angiogram;  Surgeon: Jonathan Duque MD;  Location: [facility] Cath Lab;  Service: Cardiology    Family History   Problem Relation Age of Onset   ? Diabetes Mother    ? Hypertension Mother    ? Uterine cancer Mother    ? Diverticulitis Mother    ? No Medical Problems Father    ? No Medical Problems Sister    ? No Medical Problems Daughter    ? No Medical Problems Son     Social History     Social History   ? Marital status:      Spouse name: N/A   ? Number of children: 8   ? Years of education: N/A     Occupational History   ? SSDI Not Employed     For chronic headaches since  ear surgery     Social History Main Topics   ? Smoking status: Never Smoker   ? Smokeless tobacco: Never Used   ? Alcohol use No   ? Drug use: No   ? Sexual activity: Yes     Partners: Male     Birth control/ protection: None     Other Topics Concern   ? Not on file     Social History Narrative    Lives with  who can read and speak English and helps her with meds          Medications  Allergies   Current Outpatient Prescriptions   Medication Sig Dispense Refill   ? albuterol (PROAIR HFA;PROVENTIL HFA;VENTOLIN HFA) 90 mcg/actuation inhaler Inhale 2 puffs every 6 (six) hours as needed for wheezing. 1 each 0   ? amLODIPine (NORVASC) 10 MG tablet Take 10 mg by mouth daily.     ? atorvastatin (LIPITOR) 80 MG tablet Take 1 tablet (80 mg total) by mouth daily. 90 tablet 3   ? fluticasone (FLONASE) 50 mcg/actuation nasal spray   1   ? fluticasone (FLOVENT HFA) 44 mcg/actuation inhaler Inhale 2 puffs.     ? furosemide (LASIX) 40 MG tablet Take 1 tablet (40 mg total) by mouth 2 (two) times a day at 9am and 6pm. 60 tablet 1   ? isosorbide mononitrate (IMDUR) 60 MG 24 hr tablet Take 1 tablet (60 mg total) by mouth daily. 30 tablet 11   ? lisinopril (PRINIVIL,ZESTRIL) 10 MG tablet Take 1 tablet (10 mg total) by mouth daily. 90 tablet 3   ? MAPAP EXTRA STRENGTH 500 mg tablet Take 1 tablet (500 mg total) by mouth every 4 (four) hours as needed. 100 tablet 5   ? metoprolol succinate (TOPROL-XL) 100 MG 24 hr tablet Take 1 tablet (100 mg total) by mouth daily. 90 tablet 3   ? nitroglycerin (NITROSTAT) 0.4 MG SL tablet Place 1 tablet (0.4 mg total) under the tongue every 5 (five) minutes as needed for chest pain. 30 tablet 1   ? nortriptyline (PAMELOR) 25 MG capsule Take 1 capsule (25 mg total) by mouth at bedtime. TXHUA HMO THAUM MUS PW NOJ 1 LUB 30 capsule 6   ? omeprazole (PRILOSEC) 40 MG capsule TAKE 1 PILL BY MOUTH EVERY DAY/ TXHUA HNUB NOJ 1 LUB TSHUAJ PAB ZOO LUB PLAB 30 capsule 11   ? potassium chloride (KLOR-CON) 10  MEQ CR tablet Take 10 mEq by mouth daily.      ? aspirin 81 MG EC tablet Take 1 tablet (81 mg total) by mouth daily. 100 tablet 11     No current facility-administered medications for this visit.       No Known Allergies      Lab Results    Chemistry CBC BNP   Lab Results   Component Value Date    CREATININE 0.60 05/02/2018    BUN 12 05/02/2018     05/02/2018    K 4.0 05/02/2018     05/02/2018    CO2 27 05/02/2018     Creatinine (mg/dL)   Date Value   05/02/2018 0.60   04/15/2018 0.70   03/29/2018 0.67   02/01/2018 0.71    Lab Results   Component Value Date    WBC 7.3 04/15/2018    HGB 12.4 04/15/2018    HCT 36.9 04/15/2018    MCV 91 04/15/2018     04/15/2018    Lab Results   Component Value Date    BNP 18 04/15/2018     BNP (pg/mL)   Date Value   04/15/2018 18   12/06/2017 95 (H)   11/20/2017 10          25 minutes were spent with the patient with greater than 50% spent on education and counseling.      Sendy Banda, [facility]   Heart Failure Clinic

## 2021-06-26 NOTE — PROGRESS NOTES
Progress Notes by Buddy Roy MD at 1/18/2018  7:50 AM     Author: Buddy Roy MD Service: -- Author Type: Physician    Filed: 1/18/2018  8:32 AM Encounter Date: 1/18/2018 Status: Signed    : Buddy Roy MD (Physician)           Click to link to [facility]     [facility] HEART CARE NOTE       Assessment/Plan:   1. Nonischemic cardiomyopathy, chronic systolic CHF class II-III: The patient has no complaints of dyspnea on exertion or shortness of breath.  She has minimal bilateral leg edema.  She is on low-salt diet.  Currently she is on Lasix 20 mg daily.  Her blood test at the beginning of this months was reviewed, normal electrolytes and renal function.  The patient is compensated at this time.  Continue current treatment including Lasix 20 mg daily, lisinopril 5 mg daily, metoprolol  mg daily.  Increased Imdur to 60 mg daily.  Continue medical treatment for the next 3-6 months.  Cardiac MRI study at the next visit to reevaluate left ventricular ejection fraction after optimized medical treatment, and also evaluate the etiology of cardiomyopathy.    2.  Coronary artery disease, atypical chest pain: Increase Imdur to 60 mg daily continue metoprolol, aspirin and Lipitor.  Her coronary angiogram showed LAD 50% stenosis.  No artery obstructive coronary artery disease.    3.  Essential hypertension: Her blood pressure has been well controlled with amlodipine 10 mg daily, lisinopril 5 mg daily and metoprolol  mg daily.    4.  Dyslipidemia: She is on Lipitor 80 mg daily.  She said she will recheck lipid profile and liver function at Dr. Gaspar' office at the next visit.    Thank you for the opportunity to be involved in the care of Leora Sanchez. If you have any questions, please feel free to contact me.  I will see the patient again in 6 months.    Much or all of the text in this note was generated through the use of Dragon Dictate voice-to-text software. Errors in spelling or words which seem  out of context are unintentional.   Sound alike errors, in particular, may have escaped editing.       History of Present Illness:   It is my pleasure to see Leora Sanchez at the [facility] clinic for evaluation of Follow-up.  Leora Sanchez is a 47 y.o. female with a medical history of     Past Medical History:     Patient Active Problem List   Diagnosis   ? Ganglion cyst of left foot   ? Essential hypertension   ? Non morbid obesity due to excess calories   ? Heartburn   ? Migraine with aura   ? Bilateral dry eyes   ? Mixed incontinence   ? Hyperlipemia   ? Dysidria   ? Nonischemic cardiomyopathy   ? Heart failure with reduced ejection fraction   ? Prediabetes       Past Surgical History:     Past Surgical History:   Procedure Laterality Date   ? CV LEFT HEART CATHETERIZATION WO LEFT VETRICULOGRAM Left 10/31/2017    Procedure: Left Heart Catheterization Without Left Ventriculogram;  Surgeon: Jonathan Duque MD;  Location: [facility] Cath Lab;  Service:    ? DILATION AND CURETTAGE OF UTERUS  2010   ? INNER EAR SURGERY Right 1994   ? MASTOIDECTOMY Right 1996   ? CO CATH PLACEMENT & NJX CORONARY ART ANGIO IMG S&I N/A 10/31/2017    Procedure: Coronary Angiogram;  Surgeon: Jonathan Duque MD;  Location: [facility] Cath Lab;  Service: Cardiology       Family History:     Family History   Problem Relation Age of Onset   ? Diabetes Mother    ? Hypertension Mother    ? Uterine cancer Mother    ? Diverticulitis Mother    ? No Medical Problems Father    ? No Medical Problems Sister    ? No Medical Problems Daughter    ? No Medical Problems Son         Social History:    reports that she has never smoked. She has never used smokeless tobacco. She reports that she does not drink alcohol or use illicit drugs.    Review of Systems:   General: WNL  Eyes: WNL  Ears/Nose/Throat: WNL  Lungs: WNL  Heart: WNL  Stomach: WNL  Bladder: Frequent Urination at Night  Muscle/Joints: WNL  Skin: WNL  Nervous System: WNL  Mental  Health: WNL     Blood: WNL    Meds:     Current Outpatient Prescriptions:   ?  amLODIPine (NORVASC) 10 MG tablet, Take 10 mg by mouth daily., Disp: , Rfl: 6  ?  artificial tears, hypromellose, (GONIOLSOL) 2.5 % ophthalmic solution, Administer 1-2 drops to both eyes 4 (four) times a day as needed., Disp: , Rfl:   ?  aspirin 81 MG EC tablet, Take 1 tablet (81 mg total) by mouth daily., Disp: 30 tablet, Rfl: 6  ?  atorvastatin (LIPITOR) 80 MG tablet, Take 1 tablet (80 mg total) by mouth daily., Disp: 90 tablet, Rfl: 3  ?  fluticasone (FLONASE) 50 mcg/actuation nasal spray, 1 spray into each nostril daily as needed., Disp: , Rfl:   ?  furosemide (LASIX) 20 MG tablet, Take 20 mg by mouth daily., Disp: , Rfl:   ?  lisinopril (PRINIVIL,ZESTRIL) 5 MG tablet, Take 5 mg by mouth daily., Disp: , Rfl: 11  ?  metoprolol succinate (TOPROL-XL) 100 MG 24 hr tablet, Take 1 tablet (100 mg total) by mouth daily., Disp: 90 tablet, Rfl: 3  ?  nitroglycerin (NITROSTAT) 0.4 MG SL tablet, Place 1 tablet (0.4 mg total) under the tongue every 5 (five) minutes as needed for chest pain., Disp: 30 tablet, Rfl: 1  ?  nortriptyline (PAMELOR) 25 MG capsule, Take 25 mg by mouth at bedtime. [facility]UB., Disp: , Rfl:   ?  omeprazole (PRILOSEC) 20 MG capsule, Take 1 capsule (20 mg total) by mouth daily., Disp: 90 capsule, Rfl: 4  ?  potassium chloride SA (K-DUR,KLOR-CON) 10 MEQ tablet, Take 1 tablet (10 mEq total) by mouth daily., Disp: 90 tablet, Rfl: 3  ?  cetirizine (ZYRTEC) 10 MG tablet, Take 10 mg by mouth every evening. , Disp: , Rfl:   ?  guaiFENesin ER (MUCINEX) 600 mg 12 hr tablet, 1-2 tablets every 12 hours as needed for mucus, Disp: 120 tablet, Rfl: 0  ?  isosorbide mononitrate (IMDUR) 60 MG 24 hr tablet, Take 1 tablet (60 mg total) by mouth daily., Disp: 30 tablet, Rfl: 11  ?  lisinopril (PRINIVIL,ZESTRIL) 10 MG tablet, Take 1 tablet (10 mg total) by mouth daily., Disp: 30 tablet, Rfl: 11  ?  MAPAP EXTRA STRENGTH 500  "mg tablet, Take 500-1,000 mg by mouth every 6 (six) hours as needed. , Disp: , Rfl: 5  ?  senna-docusate (PERICOLACE) 8.6-50 mg tablet, Take 1 tablet by mouth daily as needed for constipation., Disp: , Rfl: 0  ?  VENTOLIN HFA 90 mcg/actuation inhaler, Inhale 1-2 puffs every 4 (four) hours as needed., Disp: , Rfl:     Allergies:   Review of patient's allergies indicates no known allergies.      Objective:      Physical Exam  179 lb (81.2 kg)  4' 11.5\" (1.511 m)  Body mass index is 35.55 kg/(m^2).  /76 (Patient Site: Right Arm, Patient Position: Sitting, Cuff Size: Adult Regular)  Pulse 92  Resp 16  Ht 4' 11.5\" (1.511 m)  Wt 179 lb (81.2 kg)  BMI 35.55 kg/m2    General Appearance:   Awake, Alert, No acute distress.   HEENT:  Pupil equal and reactive to light. No scleral icterus; the mucous membranes were moist.   Neck: No cervical bruits. No JVD. No thyromegaly.     Chest: The spine was straight. The chest was symmetric.   Lungs:   Respirations unlabored; Lungs are clear to auscultation. No crackles. No wheezing.   Cardiovascular:   Regular rhythm and rate, normal first and second heart sounds with no murmurs. No rubs or gallops.    Abdomen:  Soft. No tenderness. Non-distended. Bowels sounds are present   Extremities: Equal tibial pulses. Trace leg edema.   Skin: No rashes or ulcers. Warm, Dry.   Musculoskeletal: No tenderness. No deformity.   Neurologic: Mood and affect are appropriate. No focal deficits.         EKG: Personally reviewed  10-:  Normal sinus rhythm   ST & T wave abnormality, consider lateral ischemia   Prolonged QT   Abnormal ECG   When compared with ECG of 28-JUL-2017 07:16,   Nonspecific T wave abnormality, worse in Inferior leads    Cardiac Imaging Studies  ECHO on 10-:    Left Ventricle: The calculated left ventricular ejection fraction is 37%. This represents a moderately decreased ejection fraction. Cavity is mildly increased. E/e' > 15, suggesting elevated LV filling " pressures.    Right Ventricle: Normal size and systolic function. TAPSE is normal,    Estimated central venous pressure equal to 3 mmHg.    No tricuspid valve regurgitation. Pulmonary artery pressure could not be obtained.    No previous study for comparison.    Coronary angiogram on 10-:  Findings:  LM:long, mildly irregular  LAD:50% mid-vessel narrowing immediately proximal to a moderate sized D branch. FFR 0.94 rest 0.83 w/ adenosine x3 mins  Lcx:large, mildly irregular  RCA:dominant, 10-30% diffuse mild irregularity    Lab Review   Lab Results   Component Value Date     01/04/2018    K 3.9 01/04/2018     01/04/2018    CO2 29 01/04/2018    BUN 20 01/04/2018    CREATININE 0.72 01/04/2018    CALCIUM 8.7 01/04/2018     Lab Results   Component Value Date    WBC 6.7 11/01/2017    HGB 12.7 11/01/2017    HCT 37.6 11/01/2017    MCV 92 11/01/2017     11/01/2017     Lab Results   Component Value Date    CHOL 195 10/31/2017    CHOL 206 (H) 06/07/2016     Lab Results   Component Value Date    HDL 56 10/31/2017    HDL 67 06/07/2016     Lab Results   Component Value Date    LDLCALC 115 10/31/2017    LDLCALC 119 06/07/2016     Lab Results   Component Value Date    TRIG 118 10/31/2017    TRIG 100 06/07/2016     No components found for: CHOLHDL  Lab Results   Component Value Date    TROPONINI 0.01 10/30/2017     Lab Results   Component Value Date    BNP 95 (H) 12/06/2017     Lab Results   Component Value Date    TSH 1.63 11/20/2017

## 2021-06-26 NOTE — PROGRESS NOTES
Progress Notes by Sendy Banda CNP at 11/1/2018  1:30 PM     Author: Sendy Banda CNP Service: -- Author Type: Nurse Practitioner    Filed: 11/1/2018  2:20 PM Encounter Date: 11/1/2018 Status: Signed    : Sendy Banda CNP (Nurse Practitioner)           Click to link to [facility] Heart [facility] HEART [facility] NOTE      Assessment/Recommendations   Assessment:    1. Nonischemic cardiomyopathy with systolic dysfunction,improved LVEF of 50%, NYHA class II: Compensated.  No signs or symptoms of fluid retention.  She does complain of mild fatigue.  She is not monitoring daily weights due to not having a scale.  She states her son moved out and took her scale.  Her clinic weight is stable.  She is following a low-sodium diet.    2.  Hypertension: Slightly elevated.  Blood pressure 134/70.  She was taken off her lisinopril due to her cough.  Her blood pressure was 138/88 when she saw pulmonology.      Plan:  1.  Start losartan 25 mg daily for hypertension  2.  BMP pending  3.  Follow a low-sodium diet  4.  Purchase a new scale to monitor daily weights    Leora Sanchez will follow up with Dr. Roy in February and in the heart failure clinic in 6 months or sooner if needed.     History of Present Illness    Ms. Leora Sanchez is a 48 y.o. female seen at [facility] heart failure clinic today for continued follow-up.  There is an  for the duration of the appointment.  She follows up for nonischemic cardiomyopathy with systolic dysfunction.  Her most recent echo cardiogram was done January 2018 which showed an improved ejection fraction of 50% from her previous of 37%.  She has a past medical history significant for hypertension and hyperlipidemia.  She had been complaining of a chronic cough and she recently saw pulmonology who started her on loratadine.  Today, she states this has helped her cough.    Today, she comes in with fatigue.  She denies any dyspnea on exertion,  orthopnea, or PND.  She states she has had headaches for the last 4 days which she has had a history.  She believes it is due to her blood pressure being elevated.  She denies any abdominal bloating or lower extremity edema.  She states her cough has improved since starting loratadine.  She denies shortness of breath, dyspnea on exertion, orthopnea, PND, palpitations, chest pain, abdominal fullness/bloating and lower extremity edema.      She is not monitoring home weights due to not having a scale anymore. Her clinic weight is stable. She is following a low sodium diet.        ECHO 1/29/2018:   Summary   1. The left ventricle is normal in size. Left ventricular systolic performance is at the lower limits of normal. The ejection fraction is estimated to be 50%.   2. No significant valvular heart disease is identified on this study.   3. Normal right ventricular size and systolic performance.   4. There is mild left atrial enlargement.           Physical Examination Review of Systems   Vitals:    11/01/18 1351   BP: 134/70   Pulse: 70   Resp: 14     Body mass index is 37.73 kg/(m^2).  Wt Readings from Last 3 Encounters:   11/01/18 190 lb (86.2 kg)   10/17/18 190 lb (86.2 kg)   09/18/18 187 lb (84.8 kg)       General Appearance:     Alert, cooperative and in no acute distress.   ENT/Mouth: membranes moist, no oral lesions or bleeding gums.      EYES:  no scleral icterus, normal conjunctivae   Neck: no carotid bruits or thyromegaly   Chest/Lungs:   lungs are clear to auscultation, no rales or wheezing, respirations unlabored   Cardiovascular:   Regular. Normal first and second heart sounds with no murmurs, rubs, or gallops; the carotid, radial and posterior tibial pulses are intact, Jugular venous pressure normal, no edema     Abdomen:  Soft, nontender, nondistended, bowel sounds present   Extremities: no cyanosis or clubbing   Skin: warm, dry.    Neurologic: mood and affect are appropriate, alert and oriented x3       General: WNL  Eyes: Visual Distubance  Ears/Nose/Throat: WNL  Lungs: WNL  Heart: Chest Pain, Arm Pain, Fainting  Stomach: Heartburn  Bladder: Frequent Urination at Night  Muscle/Joints: Joint Pain, Muscle Weakness, Muscle Pain  Skin: WNL  Nervous System: Dizziness, Loss of Balance  Mental Health: WNL     Blood: WNL     Medical History  Surgical History Family History Social History   Past Medical History:   Diagnosis Date   ? Anxiety    ? Cardiomyopathy (H)    ? CHF (congestive heart failure) (H)    ? Coronary artery disease    ? Depression    ? High cholesterol    ? Hyperlipidemia    ? Hypertension    ? Pregnancy        ? Pyelonephritis 2018   ? Renal abscess    ? Spontaneous     ? TM (tympanic membrane disorder)     Past Surgical History:   Procedure Laterality Date   ? CARDIAC CATHETERIZATION     ? CV LEFT HEART CATHETERIZATION WO LEFT VETRICULOGRAM Left 10/31/2017    Procedure: Left Heart Catheterization Without Left Ventriculogram;  Surgeon: Jonathan Duque MD;  Location: [facility] Cath Lab;  Service:    ? DILATION AND CURETTAGE OF UTERUS     ? INNER EAR SURGERY Right    ? MASTOIDECTOMY Right    ? RI CATH PLACEMENT & NJX CORONARY ART ANGIO IMG S&I N/A 10/31/2017    Procedure: Coronary Angiogram;  Surgeon: Jonathan Duque MD;  Location: [facility] Cath Lab;  Service: Cardiology    Family History   Problem Relation Age of Onset   ? Diabetes Mother    ? Hypertension Mother    ? Uterine cancer Mother    ? Diverticulitis Mother    ? No Medical Problems Father    ? No Medical Problems Sister    ? No Medical Problems Daughter    ? No Medical Problems Son     Social History     Social History   ? Marital status:      Spouse name: N/A   ? Number of children: 8   ? Years of education: N/A     Occupational History   ? SSDI Not Employed     For chronic headaches since ear surgery     Social History Main Topics   ? Smoking status: Never Smoker   ? Smokeless tobacco: Never Used    ? Alcohol use No   ? Drug use: No   ? Sexual activity: Yes     Partners: Male     Birth control/ protection: None     Other Topics Concern   ? Not on file     Social History Narrative    Lives with  who can read and speak English and helps her with meds          Medications  Allergies   Current Outpatient Prescriptions   Medication Sig Dispense Refill   ? acetaminophen (MAPAP EXTRA STRENGTH) 500 MG tablet Take 1 tablet (500 mg total) by mouth every 4 (four) hours as needed. 100 tablet 0   ? albuterol (PROAIR HFA;PROVENTIL HFA;VENTOLIN HFA) 90 mcg/actuation inhaler Inhale 2 puffs every 6 (six) hours as needed for wheezing. 1 each 0   ? amLODIPine (NORVASC) 5 MG tablet Take 1 tablet (5 mg total) by mouth daily. 30 tablet 11   ? aspirin 81 MG EC tablet Take 1 tablet (81 mg total) by mouth daily. 100 tablet 11   ? atorvastatin (LIPITOR) 80 MG tablet Take 1 tablet (80 mg total) by mouth daily. 90 tablet 3   ? capsaicin (ZOSTRIX) 0.025 % cream Apply to affected areas 2-3 times a day, as needed. 60 g 0   ? fluticasone (FLONASE) 50 mcg/actuation nasal spray   1   ? fluticasone (FLOVENT HFA) 44 mcg/actuation inhaler Inhale 2 puffs.     ? furosemide (LASIX) 40 MG tablet Take 1 tablet (40 mg total) by mouth daily. 60 tablet 5   ? isosorbide mononitrate (IMDUR) 60 MG 24 hr tablet Take 1 tablet (60 mg total) by mouth daily. 30 tablet 11   ? loratadine (CLARITIN) 10 mg tablet Take 1 tablet (10 mg total) by mouth daily. 30 tablet 1   ? metoprolol succinate (TOPROL-XL) 100 MG 24 hr tablet Take 1 tablet (100 mg total) by mouth daily. 90 tablet 3   ? nitroglycerin (NITROSTAT) 0.4 MG SL tablet Place 1 tablet (0.4 mg total) under the tongue every 5 (five) minutes as needed for chest pain. 30 tablet 1   ? nortriptyline (PAMELOR) 25 MG capsule TAKE 1 PILL (25 MG TOTAL) BY MOUTH AT BEDTIME/TXHUA HMO THAUM MUS PW NOJ 1 LUB. 30 capsule 6   ? omeprazole (PRILOSEC) 40 MG capsule TAKE 1 PILL BY MOUTH EVERY DAY/ TXHUA HNUB NOJ 1 LUB  TSHUAJ PAB ZOO LUB PLAB 30 capsule 11   ? potassium chloride (KLOR-CON) 10 MEQ CR tablet Take 10 mEq by mouth daily.      ? predniSONE (DELTASONE) 20 MG tablet Take 3 tabs daily for 4 days, then 2 tabs daily for 4 days, then 1 tab daily for 4 days, then 1/2 tabled daily for 4 days, then stop. 26 tablet 0   ? losartan (COZAAR) 25 MG tablet Take 1 tablet (25 mg total) by mouth daily. 90 tablet 3     No current facility-administered medications for this visit.       No Known Allergies      Lab Results    Chemistry CBC BNP   Lab Results   Component Value Date    CREATININE 0.64 09/10/2018    BUN 12 09/10/2018     09/10/2018    K 4.0 09/10/2018     (H) 09/10/2018    CO2 26 09/10/2018     Creatinine (mg/dL)   Date Value   09/10/2018 0.64   08/21/2018 0.77   05/02/2018 0.60   04/15/2018 0.70    Lab Results   Component Value Date    WBC 4.8 09/10/2018    HGB 12.6 09/10/2018    HCT 36.3 09/10/2018    MCV 89 09/10/2018     09/10/2018    Lab Results   Component Value Date    BNP 48 09/10/2018     BNP (pg/mL)   Date Value   09/10/2018 48   04/15/2018 18   12/06/2017 95 (H)          25 minutes were spent with the patient with greater than 50% spent on education and counseling.      Sendy Banda, [facility] Heart [facility] [place]   Heart Failure Clinic

## 2021-06-26 NOTE — PROGRESS NOTES
Clinic Care Coordination Contact    Situation: Patient chart reviewed by care coordinator.    Background: RN CC review for status update     Assessment: Patient engaged with care team attended visit with MTM and has follow up scheduled - July   Pt due for annual wellness exam and need several care gaps addressed      Plan/Recommendations: Community Health Worker to outreach per standard work and updated on goal progression  Consideration for support to schedule annual wellness exam and reassessment with CCC at next outreach

## 2021-06-26 NOTE — PROGRESS NOTES
Progress Notes by Buddy Roy MD at 8/6/2018  3:50 PM     Author: Buddy Roy MD Service: -- Author Type: Physician    Filed: 8/6/2018  4:36 PM Encounter Date: 8/6/2018 Status: Signed    : Buddy Roy MD (Physician)           Click to link to [facility] Heart Care     [facility] HEART CARE NOTE       Assessment/Plan:   1. Nonischemic cardiomyopathy, chronic systolic CHF class II: The patient is compensated well.  No signs of fluid retention.  She is on low-salt diet.  Her recent laboratory test on 7- was reported normal electrolytes and renal function.  Continue current Lasix with potassium, lisinopril 10 mg daily, metoprolol  mg daily and Imdur 60 mg daily.    2.  Coronary artery disease: No chest pain.  Continue Imdur, metoprolol, aspirin and Lipitor.  Her coronary angiogram showed LAD 50% stenosis.  No artery obstructive coronary artery disease.    3.  Essential hypertension: Her blood pressure has been well controlled with amlodipine 10 mg daily, lisinopril 10 mg daily and metoprolol  mg daily.    4.  Dyslipidemia: She is on Lipitor 80 mg daily.  She wants to check lipid profile and liver function at Dr. Gaspar' office.    She does not speak English.  Her medical history is translated by a professional [facility] [place]  Thank you for the opportunity to be involved in the care of Leora Sanchez. If you have any questions, please feel free to contact me.  I will see the patient again in 6 months.    Much or all of the text in this note was generated through the use of Dragon Dictate voice-to-text software. Errors in spelling or words which seem out of context are unintentional.   Sound alike errors, in particular, may have escaped editing.        History of Present Illness:   It is my pleasure to see Leora Sanchez at the [facility] Heart Care clinic for evaluation of Follow-up.  Leora Sanchez is a 48 y.o. female with a medical history of coronary artery disease, nonobstructive, nonischemic  cardiomyopathy, improved left ventricular ejection fraction from 37-50%, essential hypertension, hyperlipidemia, obesity.    The patient states that she has been doing quite well.  She had no chest pain, dizziness, orthopnea, PND or leg edema.  She has occasional mild dyspnea on exertion which is better than before.  She had occasional lightheadedness.  Her blood pressure and heart rate are controlled well.  She had no side effects from her current medications.  Her laboratory test on July 31, 2018 was reported normal electrolytes and renal function.  Her BNP also back to normal range.    Past Medical History:     Patient Active Problem List   Diagnosis   ? Ganglion cyst of left foot   ? Essential hypertension   ? Non morbid obesity due to excess calories   ? Heartburn   ? Migraine with aura   ? Bilateral dry eyes   ? Mixed incontinence   ? Hyperlipemia   ? Dysidria   ? Nonischemic cardiomyopathy (H)   ? Heart failure with reduced ejection fraction (H)   ? Prediabetes   ? Pyelonephritis   ? Renal abscess   ? Chronic cough   ? Cyst of left ovary   ? Lower extremity edema   ? Coronary artery disease involving native coronary artery of native heart       Past Surgical History:     Past Surgical History:   Procedure Laterality Date   ? CARDIAC CATHETERIZATION     ? CV LEFT HEART CATHETERIZATION WO LEFT VETRICULOGRAM Left 10/31/2017    Procedure: Left Heart Catheterization Without Left Ventriculogram;  Surgeon: Jonathan Duque MD;  Location: [facility] Cath Lab;  Service:    ? DILATION AND CURETTAGE OF UTERUS  2010   ? INNER EAR SURGERY Right 1994   ? MASTOIDECTOMY Right 1996   ? AK CATH PLACEMENT & NJX CORONARY ART ANGIO IMG S&I N/A 10/31/2017    Procedure: Coronary Angiogram;  Surgeon: Jonathan Duque MD;  Location: [facility] Cath Lab;  Service: Cardiology       Family History:     Family History   Problem Relation Age of Onset   ? Diabetes Mother    ? Hypertension Mother    ? Uterine cancer Mother    ?  Diverticulitis Mother    ? No Medical Problems Father    ? No Medical Problems Sister    ? No Medical Problems Daughter    ? No Medical Problems Son         Social History:    reports that she has never smoked. She has never used smokeless tobacco. She reports that she does not drink alcohol or use illicit drugs.    Review of Systems:   General: WNL  Eyes: WNL  Ears/Nose/Throat: WNL  Lungs: WNL  Heart: Leg Swelling, Irregular Heartbeat  Stomach: Heartburn, Nausea  Bladder: Frequent Urination at Night  Muscle/Joints: Joint Pain  Skin: WNL  Nervous System: Daytime Sleepiness, Dizziness, Loss of Balance  Mental Health: Confusion, Depression     Blood: Easy Bruising    Meds:     Current Outpatient Prescriptions:   ?  acetaminophen (MAPAP EXTRA STRENGTH) 500 MG tablet, Take 1 tablet (500 mg total) by mouth every 4 (four) hours as needed., Disp: 100 tablet, Rfl: 0  ?  albuterol (PROAIR HFA;PROVENTIL HFA;VENTOLIN HFA) 90 mcg/actuation inhaler, Inhale 2 puffs every 6 (six) hours as needed for wheezing., Disp: 1 each, Rfl: 0  ?  amLODIPine (NORVASC) 10 MG tablet, Take 10 mg by mouth daily., Disp: , Rfl:   ?  aspirin 81 MG EC tablet, Take 1 tablet (81 mg total) by mouth daily., Disp: 100 tablet, Rfl: 11  ?  atorvastatin (LIPITOR) 80 MG tablet, Take 1 tablet (80 mg total) by mouth daily., Disp: 90 tablet, Rfl: 3  ?  capsaicin (ZOSTRIX) 0.025 % cream, Apply to affected areas 2-3 times a day, as needed., Disp: 60 g, Rfl: 0  ?  fluticasone (FLONASE) 50 mcg/actuation nasal spray, , Disp: , Rfl: 1  ?  fluticasone (FLOVENT HFA) 44 mcg/actuation inhaler, Inhale 2 puffs., Disp: , Rfl:   ?  furosemide (LASIX) 40 MG tablet, TAKE 1 TABLET (40 MG TOTAL) BY MOUTH 2 (TWO) TIMES A DAY AT 9AM AND 6PM./ NOJ 1 LUB 2 ZAUG IB HNUB THAUM 9AM THIAB 6PM PAB YANET PHOB VOG, Disp: 60 tablet, Rfl: 5  ?  isosorbide mononitrate (IMDUR) 60 MG 24 hr tablet, Take 1 tablet (60 mg total) by mouth daily., Disp: 30 tablet, Rfl: 11  ?  lisinopril  "(PRINIVIL,ZESTRIL) 10 MG tablet, Take 1 tablet (10 mg total) by mouth daily., Disp: 90 tablet, Rfl: 3  ?  metoprolol succinate (TOPROL-XL) 100 MG 24 hr tablet, Take 1 tablet (100 mg total) by mouth daily., Disp: 90 tablet, Rfl: 3  ?  nitroglycerin (NITROSTAT) 0.4 MG SL tablet, Place 1 tablet (0.4 mg total) under the tongue every 5 (five) minutes as needed for chest pain., Disp: 30 tablet, Rfl: 1  ?  nortriptyline (PAMELOR) 25 MG capsule, Take 1 capsule (25 mg total) by mouth at bedtime. TXHUA HMO THAUM MUS PW NOJ 1 LUB, Disp: 30 capsule, Rfl: 6  ?  omeprazole (PRILOSEC) 40 MG capsule, TAKE 1 PILL BY MOUTH EVERY DAY/ TXA HNUB NOJ 1 LUB TSHUAJ PAB ZOO LUB PLAB, Disp: 30 capsule, Rfl: 11  ?  potassium chloride (KLOR-CON) 10 MEQ CR tablet, Take 10 mEq by mouth daily. , Disp: , Rfl:     Allergies:   Review of patient's allergies indicates no known allergies.      Objective:      Physical Exam  189 lb (85.7 kg)  4' 11.5\" (1.511 m)  Body mass index is 37.53 kg/(m^2).  BP 96/70 (Patient Site: Left Arm, Patient Position: Sitting, Cuff Size: Adult Regular)  Pulse 64  Resp 16  Ht 4' 11.5\" (1.511 m)  Wt 189 lb (85.7 kg)  BMI 37.53 kg/m2    General Appearance:   Awake, Alert, No acute distress.   HEENT:  Pupil equal and reactive to light. No scleral icterus; the mucous membranes were moist.   Neck: No cervical bruits. No JVD. No thyromegaly.     Chest: The spine was straight. The chest was symmetric.   Lungs:   Respirations unlabored; Lungs are clear to auscultation. No crackles. No wheezing.   Cardiovascular:   Regular rhythm and rate, normal first and second heart sounds with no murmurs. No rubs or gallops.    Abdomen:  Obese. Soft. No tenderness. Non-distended. Bowels sounds are present   Extremities: Equal tibial pulses. No leg edema.   Skin: No rashes or ulcers. Warm, Dry.   Musculoskeletal: No tenderness. No deformity.   Neurologic: Mood and affect are appropriate. No focal deficits.         EKG: Personally " reviewed  Normal sinus rhythm   Nonspecific T wave abnormality   Abnormal ECG   When compared with ECG of 29-MAR-2018 16:41,   No significant change was found    Cardiac Imaging Studies  ECHO on 10-:    Left Ventricle: The calculated left ventricular ejection fraction is 37%. This represents a moderately decreased ejection fraction. Cavity is mildly increased. E/e' > 15, suggesting elevated LV filling pressures.    Right Ventricle: Normal size and systolic function. TAPSE is normal,    Estimated central venous pressure equal to 3 mmHg.    No tricuspid valve regurgitation. Pulmonary artery pressure could not be obtained.    No previous study for comparison.    ECHO on 1-:  1. The left ventricle is normal in size. Left ventricular systolic performance is at the lower limits of normal. The ejection fraction is estimated to be 50%.   2. No significant valvular heart disease is identified on this study.   3. Normal right ventricular size and systolic performance.   4. There is mild left atrial enlargement.    When compared to the prior real-time echocardiogram dated 30 October 2017, there has been an interval improvement in left ventricular systolic performance.    Coronary angiogram on 10-:  Findings:  LM:long, mildly irregular  LAD:50% mid-vessel narrowing immediately proximal to a moderate sized D branch. FFR 0.94 rest 0.83 w/ adenosine x3 mins  Lcx:large, mildly irregular  RCA:dominant, 10-30% diffuse mild irregularity    Nuclear stress test on 5-:    The patient's exercise capacity is mildly impaired.    The stress electrocardiogram is negative for inducible ischemic EKG changes.    The exercise nuclear stress test is negative for inducible myocardial ischemia or infarction. Breast tissue attenuation artifact is noted.    The left ventricular ejection fraction is 64%.    There is no prior study available.      Lab Review   Lab Results   Component Value Date     05/02/2018    K 4.0  05/02/2018     05/02/2018    CO2 27 05/02/2018    BUN 12 05/02/2018    CREATININE 0.60 05/02/2018    CALCIUM 8.7 05/02/2018     Lab Results   Component Value Date    WBC 6.6 07/25/2018    HGB 12.4 04/15/2018    HCT 36.9 04/15/2018    MCV 91 04/15/2018     04/15/2018     Lab Results   Component Value Date    CHOL 195 10/31/2017    CHOL 206 (H) 06/07/2016     Lab Results   Component Value Date    HDL 56 10/31/2017    HDL 67 06/07/2016     Lab Results   Component Value Date    LDLCALC 115 10/31/2017    LDLCALC 119 06/07/2016     Lab Results   Component Value Date    TRIG 118 10/31/2017    TRIG 100 06/07/2016     No components found for: CHOLHDL  Lab Results   Component Value Date    TROPONINI <0.01 04/15/2018     Lab Results   Component Value Date    BNP 18 04/15/2018     Lab Results   Component Value Date    TSH 1.63 11/20/2017

## 2021-06-26 NOTE — PROGRESS NOTES
Progress Notes by Сергей Kang MD at 4/2/2018  8:50 AM     Author: Сергей Kang MD Service: -- Author Type: Physician    Filed: 4/2/2018  9:54 AM Encounter Date: 4/2/2018 Status: Signed    : Сергей Kang MD (Physician)           Click to link to [facility]     [facility] HEART CARE NOTE    Thank you, Dr. Gaspar, for asking the [facility] team to see Ms. Leora Sanchez to evaluate chest pain and EKG changes.      Assessment/Recommendations   Assessment:    1. Acute on chronic systolic heart failure.  Uncontrolled, characterized by increasing dyspnea on exertion with associated chest pain, PND, and orthopnea.  She is NYHA class III at this time.  2. Nonischemic cardiomyopathy treated with metoprolol succinate.  Most recent LVEF of 50% 2 months ago.  3. Hypertension, adequately controlled.  4. Chest pain, sounds consistent with angina, however given recent cardiac catheterization results from last fall this is likely due to her heart failure exacerbation.    Plan:  1. Increase Lasix to 40 mg twice daily for the next 4 days.  I sent a new prescription to the pharmacy.  2. Continue all other medications as prescribed.  3. If, despite increased diuretic therapy, she continues to have exertional chest discomfort would strongly consider repeat cardiac catheterization as her mid LAD lesion may have progressed.  4. Recommend follow-up with heart failure nurse practitioner in about a week to monitor progress of symptoms.    [employer] language interpretation services via the telephone language line were used for this encounter.       History of Present Illness    Ms. Leora Sanchez is a 47 y.o. female with a history of nonischemic cardiomyopathy, hypertension, and hyperlipidemia who presents for rapid access clinic appointment due to accelerating chest pain and new ischemic EKG changes.  She presents today accompanied by her .  This patient was evaluated initially by Dr. Roy last fall  for typical anginal symptoms.  She was noted to have a reduced LVEF of 37%.  She was referred for cardiac catheterization which identified largely nonobstructive coronary artery disease, however she did have at least 50% obstructive lesion in her mid LAD coronary artery which was flow wired with an FFR of 0.83.  With medical therapy, fortunately, her LVEF has increased to 50% as of January 28 of this year.    Today, Ms. Sanchez reports having chest tightness for about the past week. This occurs with excessive coughing and is associated with tenderness in her back. Not reproducible with palpation. The chest tightness is also exertional and associated with dyspnea after climbing one flight of stairs. She has adjusted her lifestyle and became less active due to the presence of this discomfort. This is different from the symptoms she experienced last fall in that previously she had some episodes of 'fainting' which are not currently present.    She also reports an increase in swelling in her legs as well as paroxysmal nocturnal dyspnea, and orthopnea that has been progressive over the past month. She is unsure of any weight changes as she does not weigh herself regularly.     ECG (personaly reviewed and interpreted): From 3/29/2018: Normal sinus rhythm with ischemic T-wave changes in leads V2 through V5, I, and aVL.  These EKG changes are worse from 1/28/2018, and were not present at the time of the cath on 10/30/2017.    ECHO report reviewed:  Echo results:   Results for orders placed during the hospital encounter of 01/28/18   Echo Complete [ECH10] 01/29/2018    Narrative 1. The left ventricle is normal in size. Left ventricular systolic   performance is at the lower limits of normal. The ejection fraction is   estimated to be 50%.   2. No significant valvular heart disease is identified on this study.   3. Normal right ventricular size and systolic performance.   4. There is mild left atrial enlargement.     When  compared to the prior real-time echocardiogram dated 30 October 2017,   there has been an interval improvement in left ventricular systolic   performance.     Cath 10/30/17:    Estimated blood loss was <20 ml.    The LM vessel was injected.    The LAD vessel was injected .    Mid LAD lesion 50% stenosed.    Pressure wire/FFR was performed on the lesion. pre diagnositic: 0.94. post diagnostic: 0.83.        Pressure wire/FFR was performed on the lesion.    The left circumflex was injected.    The RCA was injected.    Stress test: none recent       Physical Examination Review of Systems   Vitals:    04/02/18 0857   BP: 122/78   Pulse: 88   Resp: 16     Body mass index is 36.54 kg/(m^2).  Wt Readings from Last 3 Encounters:   04/02/18 184 lb (83.5 kg)   03/29/18 191 lb (86.6 kg)   02/26/18 187 lb (84.8 kg)       General Appearance:   no distress, normal body habitus   ENT/Mouth: membranes moist, no oral lesions or bleeding gums.      EYES:  no scleral icterus, normal conjunctivae   Neck: no carotid bruits, no cervical lymphadenopathy   Chest/Lungs:   lungs are clear to auscultation, no rales or wheezing, no sternal scar, equal chest wall expansion    Cardiovascular:   Regular rate and rhythm. Normal first and second heart sounds with no murmurs, rubs, or gallops; the carotid, radial and posterior tibial pulses are intact, Jugular venous pressure difficult to assess due to neck girth, 1+ pitting edema bilaterally    Abdomen:  no organomegaly, masses, bruits, or tenderness; bowel sounds are present   Extremities: no cyanosis or clubbing   Skin: no xanthelasma, warm.    Neurologic: normal gait, normal  bilateral, no tremors     Psychiatric: alert and oriented x3, calm     General: no fevers/chills/sweats  Eyes: no visual disturbances.  Ears/Nose/Throat: negative  Lungs: Cough, Shortness of Breath, Snoring  Heart: chest pain, Leg Swelling  Stomach: negative  Bladder: Frequent Urination at Night and after  diuretics  Muscle/Joints: Joint Pain  Skin: WNL  Nervous System: negative  Mental Health: negative     Blood: WNL     Medical History  Surgical History Family History Social History   Past Medical History:   Diagnosis Date   ? Anxiety    ? CHF (congestive heart failure)    ? Depression    ? Hyperlipidemia    ? Hypertension    ? Pregnancy        ? Spontaneous     ? TM (tympanic membrane disorder)     Past Surgical History:   Procedure Laterality Date   ? CV LEFT HEART CATHETERIZATION WO LEFT VETRICULOGRAM Left 10/31/2017    Procedure: Left Heart Catheterization Without Left Ventriculogram;  Surgeon: Jonathan Duque MD;  Location: [facility] Cath Lab;  Service:    ? DILATION AND CURETTAGE OF UTERUS     ? INNER EAR SURGERY Right    ? MASTOIDECTOMY Right    ? [place] CATH PLACEMENT & NJX CORONARY ART ANGIO IMG S&I N/A 10/31/2017    Procedure: Coronary Angiogram;  Surgeon: Jonathan Duque MD;  Location: [facility] Cath Lab;  Service: Cardiology    Family History   Problem Relation Age of Onset   ? Diabetes Mother    ? Hypertension Mother    ? Uterine cancer Mother    ? Diverticulitis Mother    ? No Medical Problems Father    ? No Medical Problems Sister    ? No Medical Problems Daughter    ? No Medical Problems Son     Social History     Social History   ? Marital status:      Spouse name: N/A   ? Number of children: 8   ? Years of education: N/A     Occupational History   ? SSDI Not Employed     For chronic headaches since ear surgery     Social History Main Topics   ? Smoking status: Never Smoker   ? Smokeless tobacco: Never Used   ? Alcohol use No   ? Drug use: No   ? Sexual activity: Yes     Partners: Male     Birth control/ protection: None     Other Topics Concern   ? Not on file     Social History Narrative    Lives with  who can read and speak English and helps her with meds          Medications  Allergies   Current Outpatient Prescriptions   Medication Sig Dispense  Refill   ? amLODIPine (NORVASC) 5 MG tablet Take 1 tablet (5 mg total) by mouth daily. 30 tablet 1   ? atorvastatin (LIPITOR) 80 MG tablet Take 1 tablet (80 mg total) by mouth daily. 90 tablet 3   ? furosemide (LASIX) 40 MG tablet Take 1 tablet (40 mg total) by mouth daily. 30 tablet 1   ? isosorbide mononitrate (IMDUR) 60 MG 24 hr tablet Take 1 tablet (60 mg total) by mouth daily. 30 tablet 11   ? metoprolol succinate (TOPROL-XL) 100 MG 24 hr tablet Take 1 tablet (100 mg total) by mouth daily. 90 tablet 3   ? nitroglycerin (NITROSTAT) 0.4 MG SL tablet Place 1 tablet (0.4 mg total) under the tongue every 5 (five) minutes as needed for chest pain. 30 tablet 1   ? omeprazole (PRILOSEC) 40 MG capsule Take 1 capsule (40 mg total) by mouth daily. 30 capsule 1   ? potassium chloride (KLOR-CON) 10 MEQ CR tablet Take 10 mEq by mouth daily.      ? albuterol (PROAIR HFA;PROVENTIL HFA;VENTOLIN HFA) 90 mcg/actuation inhaler Inhale 2 puffs every 6 (six) hours as needed for wheezing. 1 each 0   ? aspirin 81 MG EC tablet Take 1 tablet (81 mg total) by mouth daily. 30 tablet 6   ? fluticasone (FLONASE) 50 mcg/actuation nasal spray   1   ? fluticasone (FLOVENT HFA) 44 mcg/actuation inhaler Inhale 2 puffs.     ? MAPAP EXTRA STRENGTH 500 mg tablet Take 1 tablet (500 mg total) by mouth every 4 (four) hours as needed. 100 tablet 5   ? nortriptyline (PAMELOR) 25 MG capsule Take 1 capsule (25 mg total) by mouth at bedtime. TXHUA HMO THAUM MUS PW NOJ 1 LUB 30 capsule 6     No current facility-administered medications for this visit.       No Known Allergies      Lab Results    Chemistry/lipid CBC Cardiac Enzymes/BNP/TSH/INR   Lab Results   Component Value Date    CHOL 195 10/31/2017    HDL 56 10/31/2017    LDLCALC 115 10/31/2017    TRIG 118 10/31/2017    CREATININE 0.67 03/29/2018    BUN 13 03/29/2018    K 4.5 03/29/2018     03/29/2018     03/29/2018    CO2 26 03/29/2018    Lab Results   Component Value Date    WBC 4.7  03/29/2018    HGB 12.1 03/29/2018    HCT 37.7 03/29/2018    MCV 95 03/29/2018     03/29/2018    Lab Results   Component Value Date    TROPONINI <0.01 03/29/2018    BNP 95 (H) 12/06/2017    TSH 1.63 11/20/2017    INR 0.91 02/14/2014        I personally spent 45 minutes in the room with the patient using language translational services this causing the patient's symptoms and plan.    Сергей Kang MD  Noninvasive cardiology  [facility]

## 2021-06-28 ENCOUNTER — COMMUNICATION - HEALTHEAST (OUTPATIENT)
Dept: NURSING | Facility: CLINIC | Age: 51
End: 2021-06-28

## 2021-06-28 NOTE — PROGRESS NOTES
Progress Notes by Sendy Banda CNP at 10/23/2019  8:30 AM     Author: Sendy Banda CNP Service: -- Author Type: Nurse Practitioner    Filed: 10/23/2019  9:19 AM Encounter Date: 10/23/2019 Status: Signed    : Sendy Banda CNP (Nurse Practitioner)             Assessment/Recommendations   Assessment:    1. Nonischemic cardiomyopathy with systolic dysfunction, NYHA class II: Compensated.  No signs or symptoms of fluid retention.  She has mild fatigue.  Her weights have remained stable.    2.  Hypertension: Controlled.  Blood pressure 130/80    3.  Nonocclusive coronary artery disease: Denies any chest pain today.  Status post angiogram May 2019 showing 50% stenosis proximal LAD and 45% stenosis first marginal.  She continues Imdur, atorvastatin, and aspirin.    Plan:  1.  Continue current medications  2.  Continue monitoring weights and low-sodium diet  3.  Encouraged weight loss    Leora Sanchez will follow up with Dr. Roy in 2 months and in the heart failure clinic in 6 months.     History of Present Illness/Subjective    Ms. Leora Sanchez is a 49 y.o. female seen at [facility] heart failure clinic today for continued follow-up.  There is an  for the duration of the appointment.  She follows up for nonischemic cardiomyopathy with systolic dysfunction.  She had an echocardiogram May 2019 which showed ejection fraction of 45%.  She is past medical history significant for hypertension, nonocclusive coronary artery disease, obesity.  She had a positive stress test and underwent coronary angiogram May 13, 2019 which showed 50% stenosis in her proximal LAD 45% stenosis in the first marginal.  She was started on amlodipine to prevent spasm.    Today, she comes in after being seen in the ED a little over a week ago for chest pain.  She states her symptoms included fevers, chills, shooting pain to her back  and nausea.  She states it started 3 days prior to going into the emergency  department.  She took a nitroglycerin at home which did not relieve her pain.  She then went into the emergency department.  She also received nitroglycerin there and a GI cocktail.  She states after receiving the GI cocktail her symptoms were relieved.  Her troponins were negative and there were no changes on EKG.  She could have had a viral occurrence causing her symptoms. She denies lightheadedness, shortness of breath, dyspnea on exertion, orthopnea, PND, palpitations, chest pain, abdominal fullness/bloating and lower extremity edema.            Physical Examination Review of Systems   Vitals:    10/23/19 0847   BP: 130/80   Pulse: 79   Resp: 14     Body mass index is 39.18 kg/m .  Wt Readings from Last 3 Encounters:   10/23/19 194 lb (88 kg)   10/12/19 190 lb (86.2 kg)   08/22/19 192 lb (87.1 kg)       General Appearance:   no distress, normal body habitus   ENT/Mouth: membranes moist, no oral lesions or bleeding gums.      EYES:  no scleral icterus, normal conjunctivae   Neck: no carotid bruits or thyromegaly   Chest/Lungs:   lungs are clear to auscultation, no rales or wheezing, equal chest wall expansion    Cardiovascular:   Regular. Normal first and second heart sounds with no murmurs, rubs, or gallops; Jugular venous pressure normal, no edema     Abdomen:  no organomegaly, masses, bruits, or tenderness; bowel sounds are present   Extremities: no cyanosis or clubbing   Skin: no xanthelasma, warm.    Neurologic: normal  bilateral, no tremors     Psychiatric: alert and oriented x3    General: WNL  Eyes: WNL  Ears/Nose/Throat: WNL  Lungs: WNL  Heart: Chest Pain, Irregular Heartbeat  Stomach: Heartburn, Nausea  Bladder: Frequent Urination at Night  Muscle/Joints: Joint Pain, Muscle Weakness  Skin: WNL  Nervous System: Dizziness, Loss of Balance  Mental Health: WNL     Blood: Easy Bleeding     Medical History  Surgical History Family History Social History   Past Medical History:   Diagnosis Date   ? Anxiety     ? Chronic cough 2018   ? Depression    ? Dyslipidemia, goal LDL below 70 10/31/2017   ? Essential hypertension    ? Nonischemic cardiomyopathy (H)     variable EF depending on the technique, date and reader; BNP normal in 2018   ? Nonocclusive coronary atherosclerosis of native coronary artery 10/31/2017   ? Pregnancy        ? Pyelonephritis 2018   ? Renal abscess    ? Spontaneous  2010   ? TM (tympanic membrane disorder)     Past Surgical History:   Procedure Laterality Date   ? CV CORONARY ANGIOGRAM N/A 2019    Procedure: Coronary Angiogram;  Surgeon: Car Box MD;  Location: [facility] Cath Lab;  Service: Cardiology   ? CV LEFT HEART CATHETERIZATION WO LEFT VETRICULOGRAM Left 10/31/2017    Procedure: Left Heart Catheterization Without Left Ventriculogram;  Surgeon: Jonathan Duque MD;  Location: [facility] Cath Lab;  Service:    ? CV LEFT HEART CATHETERIZATION WO LEFT VETRICULOGRAM Left 2019    Procedure: Left Heart Catheterization Without Left Ventriculogram;  Surgeon: Car Box MD;  Location: [facility] Cath Lab;  Service: Cardiology   ? DILATION AND CURETTAGE OF UTERUS     ? INNER EAR SURGERY Right    ? MASTOIDECTOMY Right    ? [place] CATH PLACEMENT & NJX CORONARY ART ANGIO IMG S&I N/A 10/31/2017    Procedure: Coronary Angiogram;  Surgeon: Jonathan Duque MD;  Location: [facility] Cath Lab;  Service: Cardiology    Family History   Problem Relation Age of Onset   ? Diabetes Mother    ? Hypertension Mother    ? Uterine cancer Mother    ? Diverticulitis Mother    ? Other Father          in war in [place]   ? No Medical Problems Sister    ? No Medical Problems Daughter    ? No Medical Problems Son    ? No Medical Problems Daughter    ? No Medical Problems Daughter    ? No Medical Problems Daughter    ? No Medical Problems Son    ? No Medical Problems Son    ? No Medical Problems Son     Social History     Socioeconomic History   ?  Marital status:      Spouse name: Not on file   ? Number of children: 8   ? Years of education: Not on file   ? Highest education level: Not on file   Occupational History   ? Occupation: SSDI     Employer: DISABLED     Comment: For chronic headaches since ear surgery   Social Needs   ? Financial resource strain: Not on file   ? Food insecurity:     Worry: Not on file     Inability: Not on file   ? Transportation needs:     Medical: Not on file     Non-medical: Not on file   Tobacco Use   ? Smoking status: Never Smoker   ? Smokeless tobacco: Never Used   ? Tobacco comment: no passive exposure   Substance and Sexual Activity   ? Alcohol use: No   ? Drug use: No   ? Sexual activity: Yes     Partners: Male     Birth control/protection: None   Lifestyle   ? Physical activity:     Days per week: Not on file     Minutes per session: Not on file   ? Stress: Not on file   Relationships   ? Social connections:     Talks on phone: Not on file     Gets together: Not on file     Attends Gnosticist service: Not on file     Active member of club or organization: Not on file     Attends meetings of clubs or organizations: Not on file     Relationship status: Not on file   ? Intimate partner violence:     Fear of current or ex partner: Not on file     Emotionally abused: Not on file     Physically abused: Not on file     Forced sexual activity: Not on file   Other Topics Concern   ? Not on file   Social History Narrative    Lives with  who can read and speak English and helps her with meds          Medications  Allergies   Current Outpatient Medications   Medication Sig Dispense Refill   ? acetaminophen (MAPAP EXTRA STRENGTH) 500 MG tablet Take 1 tablet (500 mg total) by mouth 3 (three) times a day as needed. 100 tablet 4   ? amLODIPine (NORVASC) 5 MG tablet Take 1 tablet (5 mg total) by mouth daily. 90 tablet 4   ? aspirin 81 MG EC tablet Take 1 tablet (81 mg total) by mouth daily. 90 tablet 4   ? atorvastatin  (LIPITOR) 80 MG tablet TAKE 1 TABLET (80 MG TOTAL) BY MOUTH DAILY/ TXA HNUB NOJ 1 LUB [facility] PAB YANET NTSHAV MUAJ ROJ 90 tablet 4   ? FLOVENT HFA 44 mcg/actuation inhaler      ? furosemide (LASIX) 40 MG tablet TAKE 1 TABLET (40 MG TOTAL) BY MOUTH 2 (TWO) TIMES A DAY AT 9AM AND 6PM./ NOJ 1 LUB 2 ZAUG IB HNUB THAUM 9AM THIAB 6PM PAB YANET PHOB VOG 60 tablet 11   ? gabapentin (NEURONTIN) 300 MG capsule Take 1 capsule (300 mg total) by mouth 3 (three) times a day as needed (headache). 90 capsule 3   ? isosorbide mononitrate (IMDUR) 60 MG 24 hr tablet TAKE 1 TABLET (60 MG TOTAL) BY MOUTH DAILY/ TXA UB NOJ 1 LUB [facility] LUB PLAWV 90 tablet 1   ? losartan (COZAAR) 50 MG tablet Take 1 tablet (50 mg total) by mouth daily. 30 tablet 3   ? metoprolol succinate (TOPROL-XL) 100 MG 24 hr tablet TAKE 1 TABLET (100 MG TOTAL) BY MOUTH DAILY/ TXA UB NOJ 1 LUB [facility] ZOO NTSHAV SIAB 90 tablet 3   ? nitroglycerin (NITROSTAT) 0.4 MG SL tablet Place 1 tablet (0.4 mg total) under the tongue every 5 (five) minutes as needed for chest pain. 30 tablet 1   ? nortriptyline (PAMELOR) 50 MG capsule TAKE 1 CAPSULE (50 MG TOTAL) BY MOUTH AT BEDTIME/ TXA O THA MUS PW NOJ 1 LUB.  3   ? omeprazole (PRILOSEC) 40 MG capsule TAKE 1 PILL BY MOUTH EVERY DAY/ TXA UB NOJ 1 LUB [facility] ZOO LUB PLAB 90 capsule 4   ? potassium chloride (KLOR-CON) 10 MEQ CR tablet TAKE 1 PILL BY MOUTH EVERY DAY/TXA [facility]B NOJ [facility]UB [facility] tablet 4   ? sucralfate (CARAFATE) 1 gram tablet Take 1 tablet (1 g total) by mouth 4 (four) times a day. 40 tablet 0   ? venlafaxine (EFFEXOR-XR) 75 MG 24 hr capsule TAKE 1 CAPSULE (75 MG TOTAL) BY MOUTH DAILY/ TXA IZAIAH NOJ 1 LUB TSHUAJ PAB YANET NYUAJ SIAB  11     No current facility-administered medications for this visit.     No Known Allergies      Lab Results    Chemistry/lipid CBC Cardiac Enzymes/BNP/TSH/INR   Lab Results   Component Value Date    CHOL 195 10/31/2017    HDL 56 10/31/2017    LDLCALC 115  10/31/2017    TRIG 118 10/31/2017    CREATININE 1.16 (H) 10/12/2019    BUN 21 10/12/2019    K 3.6 10/12/2019     10/12/2019     10/12/2019    CO2 28 10/12/2019    Lab Results   Component Value Date    WBC 7.3 10/12/2019    HGB 14.2 10/12/2019    HCT 41.6 10/12/2019    MCV 89 10/12/2019     10/12/2019    Lab Results   Component Value Date    TROPONINI 0.01 10/12/2019    BNP 29 05/10/2019    TSH 1.63 11/20/2017    INR 0.85 (L) 05/10/2019

## 2021-06-28 NOTE — PROGRESS NOTES
"Progress Notes by Kinza Aly AuD at 9/20/2019 12:30 PM     Author: Kinza Aly AuD Service: -- Author Type: Audiologist    Filed: 9/20/2019  2:12 PM Encounter Date: 9/20/2019 Status: Signed    : Kinza Aly AuD (Audiologist)       Audiology Report    Referring Provider:  Dr. Barber    History:  May X Daniel is seen in conjunction with ENT appointment today. She is seen by Dr. Barber for mastoid bowl cleaning of right ear before testing. Summary: Audiology visit completed. Please see audiogram below or in \"media\" tab for case history and results.     Results:   Transducer: Circumaural headphones    Reliability was good  and there was good  SRT to PTA agreement.       Plan:  The patient is returned to ENT for follow up.  She should return for retesting with ENT recommendation.     Naren Pandey, CCC-A  [place] Licensed Audiologist #[id number]                 "

## 2021-06-28 NOTE — PROGRESS NOTES
Progress Notes by Buddy Roy MD at 12/4/2019  8:50 AM     Author: Buddy Roy MD Service: -- Author Type: Physician    Filed: 12/4/2019 11:17 AM Encounter Date: 12/4/2019 Status: Signed    : Buddy Roy MD (Physician)              Click to link to [facility] HEART CARE NOTE       Assessment/Plan:   1. Nonischemic cardiomyopathy, chronic systolic CHF class II: The patient is compensated well.  No signs of fluid retention.  She is on low-salt diet.  Her recent laboratory tests are reviewed, nothing is significant.  Continue current Lasix with potassium, losartan 100 mg daily, metoprolol  mg daily and Imdur 60 mg daily.    2.  Coronary artery disease, no obstructive lesion per coronary angiogram in May 2019: No chest pain.  Continue Imdur, metoprolol, aspirin and Lipitor.  Her coronary angiogram showed LAD 50% stenosis.     3.  Essential hypertension: Her blood pressure has been well controlled with amlodipine 5 mg daily, losartan 100 mg daily and metoprolol  mg daily.    4.  Dyslipidemia: She is on Lipitor 80 mg daily.       We discussed lifestyle modification, especially emphasized the diet control, exercise, weight loss.  She does not speak English.  Her medical history is translated by a professional [employer] .  Thank you for the opportunity to be involved in the care of Leora Sanchez. If you have any questions, please feel free to contact me.  I will see the patient again in 6 months and as needed.    Much or all of the text in this note was generated through the use of Dragon Dictate voice-to-text software. Errors in spelling or words which seem out of context are unintentional.   Sound alike errors, in particular, may have escaped editing.        History of Present Illness:   It is my pleasure to see Leora Sanchez at the [facility] Heart Care clinic for evaluation of Follow-up.  Leora Sanchez is a 49 y.o. female with a medical history of coronary artery disease,  nonobstructive, nonischemic cardiomyopathy, improved left ventricular ejection fraction from 45-50%, essential hypertension, hyperlipidemia, obesity.    The patient states that she has been doing fine over last 2 months.  She had no chest pain, dizziness, orthopnea, PND or leg edema.  She has occasional mild dyspnea on exertion which has been stable.  She had occasional lightheadedness.  Her blood pressure and heart rate are controlled well.  She had no side effects from her current medications.  Unfortunately, she has no diet control, no regular exercise, her weight is trending up.    Past Medical History:     Patient Active Problem List   Diagnosis   ? Ganglion cyst of left foot   ? Essential hypertension   ? Heartburn   ? Chronic headaches   ? Bilateral dry eyes   ? Mixed incontinence   ? Dysidria   ? Nonischemic cardiomyopathy (H)   ? Heart failure with reduced ejection fraction (H)   ? Prediabetes   ? Cyst of left ovary   ? Vertigo   ? Chest pain   ? Dyspnea on exertion   ? Nonocclusive coronary atherosclerosis of native coronary artery   ? Recurrent major depressive disorder, in partial remission (H)   ? Class 2 severe obesity due to excess calories with serious comorbidity and body mass index (BMI) of 39.0 to 39.9 in adult (H)   ? Other forms of angina pectoris (H)   ? Right-sided sensorineural hearing loss   ? Pulmonary nodules       Past Surgical History:     Past Surgical History:   Procedure Laterality Date   ? CV CORONARY ANGIOGRAM N/A 5/13/2019    Procedure: Coronary Angiogram;  Surgeon: Car Box MD;  Location: [facility] Cath Lab;  Service: Cardiology   ? CV LEFT HEART CATHETERIZATION WO LEFT VETRICULOGRAM Left 10/31/2017    Procedure: Left Heart Catheterization Without Left Ventriculogram;  Surgeon: Jonathan Duque MD;  Location: [facility] Cath Lab;  Service:    ? CV LEFT HEART CATHETERIZATION WO LEFT VETRICULOGRAM Left 5/13/2019    Procedure: Left Heart Catheterization Without  Left Ventriculogram;  Surgeon: Car Box MD;  Location: [facility] Cath Lab;  Service: Cardiology   ? DILATION AND CURETTAGE OF UTERUS     ? INNER EAR SURGERY Right    ? MASTOIDECTOMY Right    ? AR CATH PLACEMENT & NJX CORONARY ART ANGIO IMG S&I N/A 10/31/2017    Procedure: Coronary Angiogram;  Surgeon: Jonathan Duque MD;  Location: [facility] Cath Lab;  Service: Cardiology       Family History:     Family History   Problem Relation Age of Onset   ? Diabetes Mother    ? Hypertension Mother    ? Uterine cancer Mother    ? Diverticulitis Mother    ? Other Father          in war in  [place]   ? No Medical Problems Sister    ? No Medical Problems Daughter    ? No Medical Problems Son    ? No Medical Problems Daughter    ? No Medical Problems Daughter    ? No Medical Problems Daughter    ? No Medical Problems Son    ? No Medical Problems Son    ? No Medical Problems Son         Social History:    reports that she has never smoked. She has never used smokeless tobacco. She reports that she does not drink alcohol or use drugs.    Review of Systems:   General: WNL  Eyes: WNL  Ears/Nose/Throat: WNL  Lungs: WNL  Heart: WNL  Stomach: WNL  Bladder: WNL  Muscle/Joints: WNL  Skin: WNL  Nervous System: WNL  Mental Health: WNL     Blood: WNL    Meds:     Current Outpatient Medications:   ?  acetaminophen (MAPAP EXTRA STRENGTH) 500 MG tablet, Take 1 tablet (500 mg total) by mouth 3 (three) times a day as needed., Disp: 100 tablet, Rfl: 4  ?  amLODIPine (NORVASC) 5 MG tablet, Take 1 tablet (5 mg total) by mouth daily., Disp: 90 tablet, Rfl: 4  ?  aspirin 81 MG EC tablet, Take 1 tablet (81 mg total) by mouth daily., Disp: 90 tablet, Rfl: 4  ?  atorvastatin (LIPITOR) 80 MG tablet, TAKE 1 TABLET (80 MG TOTAL) BY MOUTH DAILY/ TXHUA HNUB NOJ 1 LUB TSHUAJ PAB YANET NTSHAV MUAJ ROJ, Disp: 90 tablet, Rfl: 4  ?  furosemide (LASIX) 40 MG tablet, TAKE 1 TABLET (40 MG TOTAL) BY MOUTH 2 (TWO) TIMES A DAY AT 9AM AND  "6PM./ NOJ 1 LUB 2 ZAUG IB HNUB THAUM 9AM THIAB 6PM PAB YANET PHOB VOG, Disp: 60 tablet, Rfl: 11  ?  isosorbide mononitrate (IMDUR) 60 MG 24 hr tablet, TAKE 1 TABLET (60 MG TOTAL) BY MOUTH DAILY/ [facility] NO 1 [facility] LUB PLAWV, Disp: 90 tablet, Rfl: 1  ?  losartan (COZAAR) 100 MG tablet, Take 1 tablet (100 mg total) by mouth daily., Disp: 30 tablet, Rfl: 4  ?  nitroglycerin (NITROSTAT) 0.4 MG SL tablet, Place 1 tablet (0.4 mg total) under the tongue every 5 (five) minutes as needed for chest pain., Disp: 30 tablet, Rfl: 1  ?  nortriptyline (PAMELOR) 50 MG capsule, TAKE 1 CAPSULE (50 MG TOTAL) BY MOUTH AT BEDTIME/ [facility] NOJ 1 LUB., Disp: , Rfl: 3  ?  omeprazole (PRILOSEC) 40 MG capsule, TAKE 1 PILL BY MOUTH EVERY DAY/ [facility] 1 [facility] LUB PLAB, Disp: 90 capsule, Rfl: 4  ?  potassium chloride (KLOR-CON) 10 MEQ CR tablet, TAKE 1 PILL BY MOUTH EVERY DAY/[facility] 1 [facility], Disp: 90 tablet, Rfl: 4  ?  sucralfate (CARAFATE) 1 gram tablet, Take 1 tablet (1 g total) by mouth 4 (four) times a day., Disp: 120 tablet, Rfl: 0  ?  gabapentin (NEURONTIN) 300 MG capsule, Take 1 capsule (300 mg total) by mouth 3 (three) times a day as needed (headache)., Disp: 90 capsule, Rfl: 3  ?  metoprolol succinate (TOPROL-XL) 100 MG 24 hr tablet, TAKE 1 TABLET (100 MG TOTAL) BY MOUTH DAILY/ [facility] 1 [facility] NTSHAV SIAB, Disp: 90 tablet, Rfl: 3    Allergies:   Patient has no known allergies.      Objective:      Physical Exam  193 lb (87.5 kg)  4' 11\" (1.499 m)  Body mass index is 38.98 kg/m .  /80   Pulse 88   Resp 16   Ht 4' 11\" (1.499 m)   Wt 193 lb (87.5 kg)   LMP 03/14/2019 (Approximate)   BMI 38.98 kg/m      General Appearance:   Awake, Alert, No acute distress.   HEENT:  Pupil equal and reactive to light. No scleral icterus; the mucous membranes were moist.   Neck: No cervical bruits. No JVD. No thyromegaly.     Chest: The spine was straight. The chest " was symmetric.   Lungs:   Respirations unlabored; Lungs are clear to auscultation. No crackles. No wheezing.   Cardiovascular:   Regular rhythm and rate, normal first and second heart sounds with no murmurs. No rubs or gallops.    Abdomen:  Obese. Soft. No tenderness. Non-distended. Bowels sounds are present   Extremities: Equal tibial pulses. No leg edema.   Skin: No rashes or ulcers. Warm, Dry.   Musculoskeletal: No tenderness. No deformity.   Neurologic: Mood and affect are appropriate. No focal deficits.         EKG: Personally reviewed  Normal sinus rhythm   Nonspecific T wave abnormality   Abnormal ECG   When compared with ECG of 29-MAR-2018 16:41,   No significant change was found    Cardiac Imaging Studies  ECHO on 10-:    Left Ventricle: The calculated left ventricular ejection fraction is 37%. This represents a moderately decreased ejection fraction. Cavity is mildly increased. E/e' > 15, suggesting elevated LV filling pressures.    Right Ventricle: Normal size and systolic function. TAPSE is normal,    Estimated central venous pressure equal to 3 mmHg.    No tricuspid valve regurgitation. Pulmonary artery pressure could not be obtained.    No previous study for comparison.    ECHO on 1-:  1. The left ventricle is normal in size. Left ventricular systolic performance is at the lower limits of normal. The ejection fraction is estimated to be 50%.   2. No significant valvular heart disease is identified on this study.   3. Normal right ventricular size and systolic performance.   4. There is mild left atrial enlargement.    When compared to the prior real-time echocardiogram dated 30 October 2017, there has been an interval improvement in left ventricular systolic performance.    ECHO on 5-:    Technically difficult study due to patient's body habitus    Left ventricle ejection fraction is moderately decreased. The estimated left ventricular ejection fraction is 45% with global  hypokinesis.    Right ventricle poorly visualized. CT is normal which would suggest normal right ventricular systolic function.    No significant valvular heart disease identified.    When compared to the previous study dated 1/29/2018, overall left ventricular function appears slightly lower.    Coronary angiogram on 5-:  Angiography via left radial   LM normal  LAD 40-50% prox LAD disease with FFR 0.86  Circ OM 1 40-50% narrowing with FFR 0.96  RCA min dz    Coronary angiogram on 10-:  Findings:  LM:long, mildly irregular  LAD:50% mid-vessel narrowing immediately proximal to a moderate sized D branch. FFR 0.94 rest 0.83 w/ adenosine x3 mins  Lcx:large, mildly irregular  RCA:dominant, 10-30% diffuse mild irregularity    Nuclear stress test on 5-:    The patient's exercise capacity is mildly impaired.    The stress electrocardiogram is negative for inducible ischemic EKG changes.    The exercise nuclear stress test is negative for inducible myocardial ischemia or infarction. Breast tissue attenuation artifact is noted.    The left ventricular ejection fraction is 64%.    There is no prior study available.      Lab Review   Lab Results   Component Value Date     10/12/2019    K 3.6 10/12/2019     10/12/2019    CO2 28 10/12/2019    BUN 21 10/12/2019    CREATININE 1.16 (H) 10/12/2019    CALCIUM 9.7 10/12/2019     Lab Results   Component Value Date    WBC 7.3 10/12/2019    HGB 14.2 10/12/2019    HCT 41.6 10/12/2019    MCV 89 10/12/2019     10/12/2019     Lab Results   Component Value Date    CHOL 195 10/31/2017    CHOL 206 (H) 06/07/2016     Lab Results   Component Value Date    HDL 56 10/31/2017    HDL 67 06/07/2016     Lab Results   Component Value Date    LDLCALC 115 10/31/2017    LDLCALC 119 06/07/2016     Lab Results   Component Value Date    TRIG 118 10/31/2017    TRIG 100 06/07/2016     No components found for: CHOLHDL  Lab Results   Component Value Date    TROPONINI 0.01  10/12/2019     Lab Results   Component Value Date    BNP 29 05/10/2019     Lab Results   Component Value Date    TSH 1.63 11/20/2017

## 2021-06-29 NOTE — PROGRESS NOTES
Progress Notes by Sendy Banda CNP at 9/18/2020  2:10 PM     Author: Sendy Banda CNP Service: -- Author Type: Nurse Practitioner    Filed: 9/18/2020  2:53 PM Encounter Date: 9/18/2020 Status: Signed    : Sendy Banda CNP (Nurse Practitioner)             Assessment/Recommendations   Assessment:    1. Nonischemic cardiomyopathy with systolic dysfunction, NYHA class II: Compensated.  No signs or symptoms of fluid retention.  She has mild fatigue.  She denies dyspnea on exertion, orthopnea, or PND.  Her weight is stable.    2.  Hypertension: Controlled.  Blood pressure today 126/76    3.  Hypokalemia: Potassium level was 3.4 a month ago.  She was recently started on Lasix also.    Plan:  1.  BMP pending  2.  Continue current medications  3.  Continue low-sodium diet and monitoring weights    Leora Sanchez will follow up with Dr Roy in November and in the heart failure clinic in 5 months.     History of Present Illness/Subjective    Ms. Leora Sanchez is a 50 y.o. female seen at [facility] heart failure clinic today for continued follow-up.  He follows up for nonischemic cardiomyopathy with systolic dysfunction.  Her last echocardiogram showed an ejection fraction of 45 to 50%.  She has a past medical history significant for hypertension, CAD, dyslipidemia.  Nonobstructive CAD noted on her angiogram in May 2019.    Today, her main complaints of lower right back pain that has been present for a couple months.  She states she has been using icy hot which has helped.  She denies dyspnea on exertion, abdominal distention or edema.  She denies chest pain.  She denies lightheadedness, shortness of breath, dyspnea on exertion, orthopnea, PND, palpitations, chest pain, abdominal fullness/bloating and lower extremity edema.          Physical Examination Review of Systems   Vitals:    09/18/20 1413   BP: 126/76   Pulse: 60   Resp: 12     Body mass index is 39.79 kg/m .  Wt Readings from Last 3  Encounters:   20 197 lb (89.4 kg)   20 200 lb (90.7 kg)   20 193 lb (87.5 kg)       General Appearance:   no acute distress   ENT/Mouth: membranes moist, no oral lesions or bleeding gums.      EYES:  no scleral icterus, normal conjunctivae   Neck: no carotid bruits or thyromegaly   Chest/Lungs:   lungs are clear to auscultation, no rales or wheezing, equal chest wall expansion    Cardiovascular:   Regular. Normal first and second heart sounds with no murmurs, rubs, or gallops; Jugular venous pressure normal, no edema bilaterally    Abdomen:  no organomegaly, masses, bruits, or tenderness; bowel sounds are present   Extremities: no cyanosis or clubbing   Skin: no xanthelasma, warm.    Neurologic: normal  bilateral, no tremors     Psychiatric: alert and oriented x3    General: WNL  Eyes: WNL  Ears/Nose/Throat: WNL  Lungs: WNL  Heart: WNL  Stomach: WNL  Bladder: WNL  Muscle/Joints: WNL  Skin: WNL  Nervous System: WNL  Mental Health: WNL     Blood: WNL     Medical History  Surgical History Family History Social History   Past Medical History:   Diagnosis Date   ? Anxiety    ? Chronic cough 2018   ? Depression    ? Dyslipidemia, goal LDL below 70 10/31/2017   ? Essential hypertension    ? Nonischemic cardiomyopathy (H)     variable EF depending on the technique, date and reader; BNP normal in 2018   ? Nonocclusive coronary atherosclerosis of native coronary artery 10/31/2017   ? Pregnancy        ? Pyelonephritis 2018   ? Renal abscess    ? Spontaneous     ? TM (tympanic membrane disorder)     Past Surgical History:   Procedure Laterality Date   ? CV CORONARY ANGIOGRAM N/A 2019    Procedure: Coronary Angiogram;  Surgeon: Car Box MD;  Location: [facility] Cath Lab;  Service: Cardiology   ? CV LEFT HEART CATHETERIZATION WO LEFT VETRICULOGRAM Left 10/31/2017    Procedure: Left Heart Catheterization Without Left Ventriculogram;  Surgeon: Jonathan Duque  MD;  Location: [facility] Cath Lab;  Service:    ? CV LEFT HEART CATHETERIZATION WO LEFT VETRICULOGRAM Left 2019    Procedure: Left Heart Catheterization Without Left Ventriculogram;  Surgeon: Car Box MD;  Location: [facility] Cath Lab;  Service: Cardiology   ? DILATION AND CURETTAGE OF UTERUS     ? INNER EAR SURGERY Right    ? MASTOIDECTOMY Right    ? FL CATH PLACEMENT & NJX CORONARY ART ANGIO IMG S&I N/A 10/31/2017    Procedure: Coronary Angiogram;  Surgeon: Jonathan Duque MD;  Location: [facility] Cath Lab;  Service: Cardiology    Family History   Problem Relation Age of Onset   ? Diabetes Mother    ? Hypertension Mother    ? Uterine cancer Mother    ? Diverticulitis Mother    ? Other Father          in war in  [place]   ? No Medical Problems Sister    ? No Medical Problems Daughter    ? No Medical Problems Son    ? No Medical Problems Daughter    ? No Medical Problems Daughter    ? No Medical Problems Daughter    ? No Medical Problems Son    ? No Medical Problems Son    ? No Medical Problems Son     Social History     Socioeconomic History   ? Marital status:      Spouse name: Not on file   ? Number of children: 8   ? Years of education: Not on file   ? Highest education level: Not on file   Occupational History   ? Occupation: SSDI     Employer: DISABLED     Comment: For chronic headaches since ear surgery   Social Needs   ? Financial resource strain: Not on file   ? Food insecurity     Worry: Not on file     Inability: Not on file   ? Transportation needs     Medical: Not on file     Non-medical: Not on file   Tobacco Use   ? Smoking status: Never Smoker   ? Smokeless tobacco: Never Used   ? Tobacco comment: no passive exposure   Substance and Sexual Activity   ? Alcohol use: No   ? Drug use: No   ? Sexual activity: Yes     Partners: Male     Birth control/protection: None   Lifestyle   ? Physical activity     Days per week: Not on file     Minutes per session:  "Not on file   ? Stress: Not on file   Relationships   ? Social connections     Talks on phone: Not on file     Gets together: Not on file     Attends Yazdanism service: Not on file     Active member of club or organization: Not on file     Attends meetings of clubs or organizations: Not on file     Relationship status: Not on file   ? Intimate partner violence     Fear of current or ex partner: Not on file     Emotionally abused: Not on file     Physically abused: Not on file     Forced sexual activity: Not on file   Other Topics Concern   ? Not on file   Social History Narrative    Lives with  who can read and speak English and helps her with meds          Medications  Allergies   Current Outpatient Medications   Medication Sig Dispense Refill   ? acetaminophen (TYLENOL) 500 MG tablet Take 2 tablets (1,000 mg total) by mouth 3 (three) times a day. 100 tablet 3   ? aspirin 81 MG EC tablet Take 1 tablet (81 mg total) by mouth daily. 90 tablet 4   ? atorvastatin (LIPITOR) 80 MG tablet TAKE 1 TABLET (80 MG TOTAL) BY MOUTH DAILY/ TXHUA HNUB NOJ 1 LUB TSHUAJ PAB YANET NTSHAV MUAJ ROJ 90 tablet 4   ? blood glucose test (CONTOUR NEXT TEST STRIPS) strips Use 1 each As Directed 3 (three) times a day. 100 strip 4   ? blood pressure monitor Kit Check your blood pressure daily, 1-2 hours after taking your blood pressure medicine.  Rest for 5 minutes before checking blood pressure, sitting quietly with feet supported on floor and your back resting against a chair. If blood pressure is >170/105 then call MD.  Dx: essential HTN 1 each 0   ? camphor-menthoL (TIGER BALM) Oint Apply 1 application topically 2 (two) times a day as needed. \"Tiger Balm\"     ? DULoxetine (CYMBALTA) 30 MG capsule Take 1 capsule (30 mg total) by mouth at bedtime. 30 capsule 3   ? furosemide (LASIX) 40 MG tablet Take 0.5 tablets (20 mg total) by mouth daily. 60 tablet 3   ? generic lancets Use 1 each As Directed 3 (three) times a day. 100 each 3   ? " isosorbide mononitrate (IMDUR) 60 MG 24 hr tablet Take 1 tablet (60 mg total) by mouth daily. 90 tablet 3   ? losartan (COZAAR) 100 MG tablet TAKE 1 PILL BY MOUTH DAILY FOR BLOOD PRESSURE / TXHUA HNUB NOJ 1 LUB TSHUAJ PAB ZOO YANET NTSHAV SIAB 30 tablet 11   ? metFORMIN (GLUCOPHAGE-XR) 500 MG 24 hr tablet Take 2 tablets (1,000 mg total) by mouth daily with breakfast. 90 tablet 1   ? metoprolol succinate (TOPROL-XL) 100 MG 24 hr tablet Take 1 tablet (100 mg total) by mouth daily. 90 tablet 4   ? nitroglycerin (NITROSTAT) 0.4 MG SL tablet Place 1 tablet (0.4 mg total) under the tongue every 5 (five) minutes as needed for chest pain. 30 tablet 1   ? potassium chloride (KLOR-CON) 10 MEQ CR tablet TAKE 1 PILL BY MOUTH EVERY DAY/TXHUA HNUB NOJ 1 LUB TSHUAJ 90 tablet 4     No current facility-administered medications for this visit.     No Known Allergies      Lab Results    Chemistry/lipid CBC Cardiac Enzymes/BNP/TSH/INR   Lab Results   Component Value Date    CHOL 195 10/31/2017    HDL 56 10/31/2017    LDLCALC 115 10/31/2017    TRIG 118 10/31/2017    CREATININE 0.88 08/20/2020    BUN 32 (H) 08/20/2020    K 3.4 (L) 08/20/2020     08/20/2020    CL 97 (L) 08/20/2020    CO2 33 (H) 08/20/2020    Lab Results   Component Value Date    WBC 5.9 03/29/2020    HGB 11.6 (L) 03/29/2020    HCT 35.6 03/29/2020    MCV 92 03/29/2020     03/29/2020    Lab Results   Component Value Date    TROPONINI <0.01 08/20/2020    BNP 12 08/20/2020    TSH 1.63 11/20/2017    INR 0.90 03/29/2020             This note has been dictated using voice recognition software. Any grammatical, typographical, or context distortions are unintentional and inherent to the software

## 2021-06-29 NOTE — PROGRESS NOTES
"Progress Notes by Sendy Banda CNP at 4/23/2020  1:30 PM     Author: Sendy Banda CNP Service: -- Author Type: Nurse Practitioner    Filed: 4/23/2020  1:46 PM Encounter Date: 4/23/2020 Status: Signed    : Sendy Banda CNP (Nurse Practitioner)           The patient has been notified of following:     \"This telephone visit will be conducted via a call between you and your physician/provider. We have found that certain health care needs can be provided without the need for a physical exam.  This service lets us provide the care you need with a phone conversation.  If a prescription is necessary we can send it directly to your pharmacy.  If lab work is needed we can place an order for that and you can then stop by our lab to have the test done at a later time. If during the course of the call the physician/provider feels a telephone visit is not appropriate, you will not be charged for this service.\" Verbal consent has been obtained for this service by care team member:         HEART CARE PHONE ENCOUNTER        The patient has chosen to have the visit conducted as a telephone visit, to reduce risk of exposure given the current status of Coronavirus in our community. This telephone visit is being conducted via a call between the patient and physician/provider. Health care needs are being provided without a physical exam.     Assessment/Recommendations   Assessment:    1.  Nonischemic cardiomyopathy with systolic dysfunction, LVEF 45 to 50%: She states she continues to have dyspnea on exertion but is stable.  She denies orthopnea or PND.  She denies edema.    2.  Hypertension: She does not have a blood pressure cuff at home.  She was recently hospitalized in March due to hypertensive urgency.  Her metoprolol was increased at that time.  She had a blood pressure of 200/100.    Plan:  1.  Continue current medications  2.  Stressed importance of daily weights  3.  Encouraged her to purchase a " blood pressure cuff to monitor blood pressure at home        Follow Up Plan: Follow up with Dr Roy in June and in the heart failure clinic in 6 months  I have reviewed the note as documented.  This accurately captures the substance of my conversation with the patient.    Total time of call between patient and provider was 12 minutes   Start Time: 1324  Stop Time: 1336       History of Present Illness/Subjective    May MARICHUY Sanchez is a 49 y.o. female who is being evaluated via a billable telephone visit.  There is an  for the duration of the phone visit.  She has a past medical history significant for nonischemic cardiomyopathy with systolic dysfunction, hypertension, CAD, dyslipidemia.  Nonobstructive CAD noted on angiogram in May 2019.  Her last echocardiogram was May 2019 which showed an ejection fraction of 45 to 50%.    She was recently hospitalized in March due to hypertensive urgency.  Her metoprolol was increased.  It was recommended she purchase a blood pressure cuff to monitor blood pressure at home which she has not done.  She continues to have fatigue and dyspnea on exertion.  She denies orthopnea, PND or chest pain.  She denies edema.    She does not have a scale.  I recommended purchasing a scale to monitor daily weights.      I have reviewed and updated the patient's Past Medical History, Social History, Family History and Medication List.     Physical Examination not performed given phone encounter Review of Systems                                                Medical History  Surgical History Family History Social History   Past Medical History:   Diagnosis Date   ? Anxiety    ? Chronic cough 2/12/2018   ? Depression    ? Dyslipidemia, goal LDL below 70 10/31/2017   ? Essential hypertension    ? Nonischemic cardiomyopathy (H)     variable EF depending on the technique, date and reader; BNP normal in 2018   ? Nonocclusive coronary atherosclerosis of native coronary artery 10/31/2017   ?  Pregnancy        ? Pyelonephritis 2018   ? Renal abscess    ? Spontaneous     ? TM (tympanic membrane disorder)     Past Surgical History:   Procedure Laterality Date   ? CV CORONARY ANGIOGRAM N/A 2019    Procedure: Coronary Angiogram;  Surgeon: Car Box MD;  Location: [facility];  Service: Cardiology   ? CV LEFT HEART CATHETERIZATION WO LEFT VETRICULOGRAM Left 10/31/2017    Procedure: Left Heart Catheterization Without Left Ventriculogram;  Surgeon: Jonathan Duque MD;  Location: [facility] Lab;  Service:    ? CV LEFT HEART CATHETERIZATION WO LEFT VETRICULOGRAM Left 2019    Procedure: Left Heart Catheterization Without Left Ventriculogram;  Surgeon: Car Box MD;  Location: [facility] Lab;  Service: Cardiology   ? DILATION AND CURETTAGE OF UTERUS     ? INNER EAR SURGERY Right    ? MASTOIDECTOMY Right    ? [place] CATH PLACEMENT & NJX CORONARY ART ANGIO IMG S&I N/A 10/31/2017    Procedure: Coronary Angiogram;  Surgeon: Jonathan Duque MD;  Location: [facility] Lab;  Service: Cardiology    Family History   Problem Relation Age of Onset   ? Diabetes Mother    ? Hypertension Mother    ? Uterine cancer Mother    ? Diverticulitis Mother    ? Other Father          in war in [place]   ? No Medical Problems Sister    ? No Medical Problems Daughter    ? No Medical Problems Son    ? No Medical Problems Daughter    ? No Medical Problems Daughter    ? No Medical Problems Daughter    ? No Medical Problems Son    ? No Medical Problems Son    ? No Medical Problems Son     Social History     Socioeconomic History   ? Marital status:      Spouse name: Not on file   ? Number of children: 8   ? Years of education: Not on file   ? Highest education level: Not on file   Occupational History   ? Occupation: SSDI     Employer: DISABLED     Comment: For chronic headaches since ear surgery   Social Needs   ? Financial resource  strain: Not on file   ? Food insecurity     Worry: Not on file     Inability: Not on file   ? Transportation needs     Medical: Not on file     Non-medical: Not on file   Tobacco Use   ? Smoking status: Never Smoker   ? Smokeless tobacco: Never Used   ? Tobacco comment: no passive exposure   Substance and Sexual Activity   ? Alcohol use: No   ? Drug use: No   ? Sexual activity: Yes     Partners: Male     Birth control/protection: None   Lifestyle   ? Physical activity     Days per week: Not on file     Minutes per session: Not on file   ? Stress: Not on file   Relationships   ? Social connections     Talks on phone: Not on file     Gets together: Not on file     Attends [employer] service: Not on file     Active member of club or organization: Not on file     Attends meetings of clubs or organizations: Not on file     Relationship status: Not on file   ? Intimate partner violence     Fear of current or ex partner: Not on file     Emotionally abused: Not on file     Physically abused: Not on file     Forced sexual activity: Not on file   Other Topics Concern   ? Not on file   Social History Narrative    Lives with  who can read and speak English and helps her with meds          Medications  Allergies   Current Outpatient Medications   Medication Sig Dispense Refill   ? acetaminophen (TYLENOL) 500 MG tablet Take 1,000 mg by mouth 3 (three) times a day.     ? aspirin 81 MG EC tablet Take 1 tablet (81 mg total) by mouth daily. 90 tablet 4   ? atorvastatin (LIPITOR) 80 MG tablet TAKE 1 TABLET (80 MG TOTAL) BY MOUTH DAILY/ TXHUA HNUB NOJ 1 LUB TSHUAJ PAB YANET NTSHAV MUAJ ROJ 90 tablet 4   ? blood pressure monitor Kit Check your blood pressure daily, 1-2 hours after taking your blood pressure medicine.  Rest for 5 minutes before checking blood pressure, sitting quietly with feet supported on floor and your back resting against a chair. If blood pressure is >170/105 then call MD.  Dx: essential HTN 1 each 0   ?  "camphor-menthoL (TIGER BALM) Oint Apply 1 application topically 2 (two) times a day as needed. \"Tiger Balm\"     ? furosemide (LASIX) 40 MG tablet TAKE 1 TABLET (40 MG TOTAL) BY MOUTH 2 (TWO) TIMES A DAY AT 9AM AND 6PM./ NOJ 1 LUB 2 ZAUG IB HNUB THAUM 9AM THIAB 6PM PAB YANET PHOB VOG 60 tablet 11   ? losartan (COZAAR) 100 MG tablet Take 1 tablet (100 mg total) by mouth daily. 30 tablet 4   ? metoprolol succinate (TOPROL-XL) 25 MG Take 3 tablets (75 mg total) by mouth 2 (two) times a day. 60 tablet 0   ? nortriptyline (PAMELOR) 50 MG capsule TAKE 1 CAPSULE (50 MG TOTAL) BY MOUTH AT BEDTIME/ TXA O THAUM MUS PW NOJ 1 LUB.  3   ? omeprazole (PRILOSEC) 40 MG capsule TAKE 1 PILL BY MOUTH EVERY DAY/ TXA UB NOJ 1 LUB TSHUAJ PAB ZOO LUB PLAB 90 capsule 4   ? potassium chloride (K-DUR,KLOR-CON) 20 MEQ tablet Take 1 tablet (20 mEq total) by mouth daily. 30 tablet 0   ? sucralfate (CARAFATE) 1 gram tablet Take 1 tablet (1 g total) by mouth 4 (four) times a day. (Patient taking differently: Take 1 g by mouth 4 (four) times a day as needed. ) 120 tablet 0   ? traMADoL (ULTRAM) 50 mg tablet Take 1 tablet (50 mg total) by mouth every 6 (six) hours as needed. 8 tablet 0   ? isosorbide mononitrate (IMDUR) 60 MG 24 hr tablet Take 1 tablet (60 mg total) by mouth daily. 90 tablet 1   ? nitroglycerin (NITROSTAT) 0.4 MG SL tablet Place 1 tablet (0.4 mg total) under the tongue every 5 (five) minutes as needed for chest pain. 30 tablet 1     No current facility-administered medications for this visit.     No Known Allergies      Lab Results    Chemistry/lipid CBC Cardiac Enzymes/BNP/TSH/INR   Lab Results   Component Value Date    CHOL 195 10/31/2017    HDL 56 10/31/2017    LDLCALC 115 10/31/2017    TRIG 118 10/31/2017    CREATININE 1.12 (H) 04/02/2020    BUN 43 (H) 04/02/2020    K 4.3 04/02/2020     04/02/2020     04/02/2020    CO2 29 04/02/2020    Lab Results   Component Value Date    WBC 5.9 03/29/2020    HGB 11.6 (L) " 03/29/2020    HCT 35.6 03/29/2020    MCV 92 03/29/2020     03/29/2020    Lab Results   Component Value Date    TROPONINI <0.01 03/30/2020    BNP 58 03/29/2020    TSH 1.63 11/20/2017    INR 0.90 03/29/2020        Sendy Banda

## 2021-06-29 NOTE — PROGRESS NOTES
"Progress Notes by Buddy Roy MD at 6/4/2020  2:10 PM     Author: Buddy Roy MD Service: -- Author Type: Physician    Filed: 6/4/2020  2:42 PM Encounter Date: 6/4/2020 Status: Signed    : Buddy Roy MD (Physician)       The patient has been notified of following:     \"This telephone visit will be conducted via a call between you and your physician/provider. We have found that certain health care needs can be provided without the need for a physical exam.  This service lets us provide the care you need with a phone conversation.  If a prescription is necessary we can send it directly to your pharmacy.  If lab work is needed we can place an order for that and you can then stop by our lab to have the test done at a later time. If during the course of the call the physician/provider feels a telephone visit is not appropriate, you will not be charged for this service.\" Verbal consent has been obtained for this service by care team member:     HEART CARE PHONE ENCOUNTER      The patient has chosen to have the visit conducted as a telephone visit, to reduce risk of exposure given the current status of Coronavirus in our community. This telephone visit is being conducted via a call between the patient and physician/provider. Health care needs are being provided without a physical exam.      Assessment/Plan:   1. Nonischemic cardiomyopathy, chronic systolic CHF class II: The patient is compensated well.  No signs of fluid retention.  She is on low-salt diet.  Her recent laboratory tests are reviewed, nothing is significant.  Continue current Lasix 40 mg two times a day with potassium, losartan 100 mg daily, metoprolol  mg daily  She has more shortness of breath on exertion over last several weeks.  Restart Imdur 60 mg daily to see if she feels better.     2.  Coronary artery disease, no obstructive lesion per coronary angiogram in May 2019: No chest pain.  Continue Imdur, metoprolol, aspirin and Lipitor.  Her " coronary angiogram showed LAD 50% stenosis.      3.  Essential hypertension: Her blood pressure has been well controlled with amlodipine 5 mg daily, losartan 100 mg daily and metoprolol  mg daily.     4.  Dyslipidemia: She is on Lipitor 80 mg daily.     Her medical history is translated by a professional .    Follow Up Plan: Follow up in 2 months and as needed  I have reviewed the note as documented.  This accurately captures the substance of my conversation with the patient.    Total time of call between patient and provider was 6 minutes   Start Time:  2:32 pm  Stop Time:  2:38 pm       History of Present Illness:   Leora Sanchez is a 49 y.o. female who is being evaluated via a billable telephone visit.    Leora Sanchez is a 49 y.o. female with a medical history of coronary artery disease, nonobstructive, nonischemic cardiomyopathy, improved left ventricular ejection fraction from 45-50%, essential hypertension, hyperlipidemia, obesity.     The patient states that she run out of Imdur 60 mg daily.  She felt more shortness of breath on exertion over last several weeks.  She had no chest pain, dizziness, orthopnea, PND or leg edema.  She had occasional lightheadedness.  Her blood pressure and heart rate are controlled well. Her weight has been stable.    I have reviewed and updated the patient's Past Medical History, Social History, Family History and Medication List.  Past Medical History:     Patient Active Problem List   Diagnosis   ? Ganglion cyst of left foot   ? Essential hypertension   ? Heartburn   ? Chronic headaches   ? Bilateral dry eyes   ? Mixed incontinence   ? Hyperlipidemia LDL goal <70   ? Hypokalemia   ? Dysidria   ? Nonischemic cardiomyopathy (H)   ? Heart failure with reduced ejection fraction (H)   ? Prediabetes   ? Cyst of left ovary   ? Vertigo   ? Atypical chest pain   ? Dyspnea on exertion   ? Nonocclusive coronary atherosclerosis of native coronary artery   ? Recurrent major  depressive disorder, in partial remission (H)   ? Class 2 severe obesity due to excess calories with serious comorbidity and body mass index (BMI) of 39.0 to 39.9 in adult (H)   ? Other forms of angina pectoris (H)   ? Right-sided sensorineural hearing loss   ? Pulmonary nodules   ? Hypertensive urgency   ? Chest pain   ? Tension headache       Past Surgical History:     Past Surgical History:   Procedure Laterality Date   ? CV CORONARY ANGIOGRAM N/A 2019    Procedure: Coronary Angiogram;  Surgeon: Car Box MD;  Location: [facility];  Service: Cardiology   ? CV LEFT HEART CATHETERIZATION WO LEFT VETRICULOGRAM Left 10/31/2017    Procedure: Left Heart Catheterization Without Left Ventriculogram;  Surgeon: Jonathan Duque MD;  Location: [facility];  Service:    ? CV LEFT HEART CATHETERIZATION WO LEFT VETRICULOGRAM Left 2019    Procedure: Left Heart Catheterization Without Left Ventriculogram;  Surgeon: Car Box MD;  Location: [facility] Lab;  Service: Cardiology   ? DILATION AND CURETTAGE OF UTERUS     ? INNER EAR SURGERY Right    ? MASTOIDECTOMY Right    ? [place] CATH PLACEMENT & NJX CORONARY ART ANGIO IMG S&I N/A 10/31/2017    Procedure: Coronary Angiogram;  Surgeon: Jonathan Duque MD;  Location: [facility] Lab;  Service: Cardiology       Family History:     Family History   Problem Relation Age of Onset   ? Diabetes Mother    ? Hypertension Mother    ? Uterine cancer Mother    ? Diverticulitis Mother    ? Other Father          in war in  [place]   ? No Medical Problems Sister    ? No Medical Problems Daughter    ? No Medical Problems Son    ? No Medical Problems Daughter    ? No Medical Problems Daughter    ? No Medical Problems Daughter    ? No Medical Problems Son    ? No Medical Problems Son    ? No Medical Problems Son        Social History:    reports that she has never smoked. She has never used smokeless tobacco. She  "reports that she does not drink alcohol or use drugs.    Review of Systems:   General ROS: negative  Psychological ROS: positive for - anxiety  Ophthalmic ROS: positive for - visual disturbance  ENT ROS: positive for - hearing loss  Hematological and Lymphatic ROS: positive for - easy bruising  Respiratory ROS: negative  Cardiovascular ROS: positive for - shortness of breath with activity, palpitations, chest pain (more like tightness) and leg swelling  Gastrointestinal ROS: negative  Genito-Urinary ROS: positive for - frequent urination at night  Musculoskeletal ROS: positive for - joint pain  Neurological ROS: positive for - confusion  Dermatological ROS: negative    Meds:     Current Outpatient Medications:   ?  acetaminophen (TYLENOL) 500 MG tablet, Take 1,000 mg by mouth 3 (three) times a day., Disp: , Rfl:   ?  aspirin 81 MG EC tablet, Take 1 tablet (81 mg total) by mouth daily., Disp: 90 tablet, Rfl: 4  ?  atorvastatin (LIPITOR) 80 MG tablet, TAKE 1 TABLET (80 MG TOTAL) BY MOUTH DAILY/ TXHUA HNUB NOJ 1 LUB TSHUAJ [facility] ROJ, Disp: 90 tablet, Rfl: 4  ?  blood pressure monitor Kit, Check your blood pressure daily, 1-2 hours after taking your blood pressure medicine.  Rest for 5 minutes before checking blood pressure, sitting quietly with feet supported on floor and your back resting against a chair. If blood pressure is >170/105 then call MD.  Dx: essential HTN, Disp: 1 each, Rfl: 0  ?  camphor-menthoL (TIGER BALM) Oint, Apply 1 application topically 2 (two) times a day as needed. \"Tiger Balm\", Disp: , Rfl:   ?  furosemide (LASIX) 40 MG tablet, TAKE 1 TABLET (40 MG TOTAL) BY MOUTH 2 (TWO) TIMES A DAY AT 9AM AND 6PM./ NOJ 1 LUB 2 ZAUG IB HNUB THAUM 9AM THIAB 6PM LAURA JERONIMO VOG, Disp: 60 tablet, Rfl: 11  ?  isosorbide mononitrate (IMDUR) 60 MG 24 hr tablet, Take 1 tablet (60 mg total) by mouth daily., Disp: 90 tablet, Rfl: 3  ?  losartan (COZAAR) 100 MG tablet, Take 1 tablet (100 mg total) by mouth " daily., Disp: 30 tablet, Rfl: 4  ?  metoprolol succinate (TOPROL-XL) 25 MG, Take 3 tablets (75 mg total) by mouth 2 (two) times a day., Disp: 60 tablet, Rfl: 0  ?  nitroglycerin (NITROSTAT) 0.4 MG SL tablet, Place 1 tablet (0.4 mg total) under the tongue every 5 (five) minutes as needed for chest pain., Disp: 30 tablet, Rfl: 1  ?  nortriptyline (PAMELOR) 50 MG capsule, TAKE 1 CAPSULE (50 MG TOTAL) BY MOUTH AT BEDTIME/ TXA HMO THAUM MUS PW NOJ 1 LUB., Disp: , Rfl: 3  ?  omeprazole (PRILOSEC) 40 MG capsule, TAKE 1 PILL BY MOUTH EVERY DAY/ TXA HNUB NOJ 1 LUB TSHUAJ PAB ZOO LUB PLAB, Disp: 90 capsule, Rfl: 4  ?  potassium chloride (K-DUR,KLOR-CON) 20 MEQ tablet, Take 1 tablet (20 mEq total) by mouth daily., Disp: 30 tablet, Rfl: 0  ?  sucralfate (CARAFATE) 1 gram tablet, Take 1 tablet (1 g total) by mouth 4 (four) times a day. (Patient taking differently: Take 1 g by mouth 4 (four) times a day as needed. ), Disp: 120 tablet, Rfl: 0  ?  traMADoL (ULTRAM) 50 mg tablet, Take 1 tablet (50 mg total) by mouth every 6 (six) hours as needed., Disp: 8 tablet, Rfl: 0     Allergies:   Patient has no known allergies.    Objective:      Physical Exam    Physical examination are not performed given phone encounter.  :Physical Examination not performed given phone encounter  Lab Review   Lab Results   Component Value Date     04/02/2020    K 4.3 04/02/2020     04/02/2020    CO2 29 04/02/2020    BUN 43 (H) 04/02/2020    CREATININE 1.12 (H) 04/02/2020    CALCIUM 9.7 04/02/2020     Lab Results   Component Value Date    WBC 5.9 03/29/2020    HGB 11.6 (L) 03/29/2020    HCT 35.6 03/29/2020    MCV 92 03/29/2020     03/29/2020     Lab Results   Component Value Date    CHOL 195 10/31/2017    TRIG 118 10/31/2017    HDL 56 10/31/2017    LDLDIRECT 92 08/21/2018     Lab Results   Component Value Date    TROPONINI <0.01 03/30/2020     Lab Results   Component Value Date    BNP 58 03/29/2020     Lab Results   Component Value  Date    TSH 1.63 11/20/2017

## 2021-06-29 NOTE — PROGRESS NOTES
Progress Notes by Art Thakkar, PharmD at 9/11/2020  9:00 AM     Author: Art Thakkar, PharmD Service: -- Author Type: Pharmacist    Filed: 9/11/2020 10:20 AM Encounter Date: 9/11/2020 Status: Signed    : Art Thakkar, PharmD (Pharmacist)       MTM Initial Encounter  Assessment & Plan                                                       Medication Adherence/Access: Based on quantities on hand and fill dates, likely missing doses frequently. Long-term, may benefit from use of a med box. Was unable to implement this today due to time. Will review at follow-up.       Diabetes: Not at goal - A1C above goal <7% (ADA). Pt does not have a glucometer to test sugars. Will send today - pt can review use at upcoming diabetes ed appt. As A1C is elevated, but pt tolerating metformin will continue titration.   -Sent Glucometer and supplies   -Increase Metformin 500 mg ER to 2 tabs daily       CAD: Not at goal - LDL above goal <70. Statin adherence seems appropriate. Based on pt's 2013 LDL, seems she did get the expected 50% reduction from high intensity statin. Likely will need additional agents to get to goal. This was not discussed today as we focused on adherence to meds that she already has. As Ezetimibe only expected to lower LDL by about 15%, likely would not be enough to bring pt to goal. May need to consider alternatives such as PCSK-9 inhibitors. Given CAD, would benefit from continued aspirin therapy.   -Refill aspirin   -Future consideration: PCSK9 inhibitors (Praluent or Repatha)       CHF/Hypertension: Partially improved - BP at goal today <130/80. Pulse just below goal . Pt admits to not using diuretic daily due to excessive urination, but does have swelling in legs/ankles. Willing to trial half-tab. Given pt has not been taking all over meds regularly, but BP at goal today and we're restarting furosemide, concern for hypotension, therefore will hold off on Amlodipine at this time. At follow-up may  "need to consider reduction of beta-blocker to address low pulse.   -Pt to take Furosemide 40 mg 0.5 tab daily   -Stop Amlodipine   -Pt to take Losartan, Furosemide, Metoprolol Isosorbide daily   -Future consideration: Reduce Metoprolol ER to 50 mg daily     Pain: Not addressed today due to time. Will review at follow-up. Pt did request refills of Acetaminophen.   -Refill Acetaminophen 500 mg     Depression/Headaches: Not at goal - PHQ9 above goal <5. Pt having frequent headaches that are affecting her adherence to other medicines, affecting mood and sleep. Has not been using anything for headache prevention for many months. As she previously found nortriptyline effective, will try alternative today. Not clear if she ever tried Venlafaxine, but given sleep-issues, will opt for Duloxetine at this time.   -Start Duloxetine 30 mg at bedtime       Heartburn: Partially improved - has not had medicines for several months, but if pt is eating regularly and avoiding trigger foods, symptoms well controlled. Will discontinue meds for now. In the future, can consider addition of antacid or H2RA as needed for symptom management.   -Stop Sucralfate and Omeprazole       Follow Up  Return in about 2 weeks (around 9/25/2020) for Medication Management Pharmacist.      Subjective & Objective                                                     Leora Sanchez is a 50 y.o. female coming in for an initial visit for Medication Therapy Management. She was referred to me from Jaky Gaspar MD. [facility]  available by phone.     Pt previously seen by MTM in Feb 2019 then lost to follow-up.     Chief Complaint: \"My doctor recommended I come to talk to you about my cholesterol and diabetes medications\" \"I want to learn more about how to protect myself\"     Medication Adherence/Access: Comes to visit with medications. Takes medicines from the bottle. Takes them once daily. Prescriptions are automatically refilled at the " pharmacy and  picks up refills.       Diabetes:  Pt currently taking Metformin 500 mg ER daily. Has been taking for a few days now. patient is not currently experiencing side effects.   SMBG: never   - Does not have a meter to check sugars.    Patient is not experiencing hypoglycemia   Recent symptoms of high blood sugar? Polyuria, polydipsia.   ACEi/ARB:  Losartan 100 mg daily   Statin: Atorvastatin 80 mg daily   Aspirin 81 mg daily    Diet/Exercise: Not addressed today.     Lab Results   Component Value Date    HGBA1C 8.5 (H) 08/20/2020    HGBA1C 6.8 (H) 03/29/2020    HGBA1C 6.3 (H) 08/22/2019     Lab Results   Component Value Date    MICROALBUR <0.50 08/20/2020    LDLCALC 115 10/31/2017    CREATININE 0.88 08/20/2020         CAD: Prescribed Aspirin 81 mg daily, Atorvastatin 80 mg daily, Isosorbide mononitrate 60 mg ER daily, Nitroglycerin 0.4 mg as needed,     Statin - filled 6/24 #90. ~15 tabs remain.     No aspirin or nitroglycerin at visit.     Hyperlipidemia LDL goal <70       Ref Range & Units  8/20/20 1519     Direct LDL  <=129 mg/dl  116                      CHF/Hypertension: Prescribed Amlodipine 5 mg daily, Furosemide 40 mg daily, Losartan 100 mg daily, Metoprolol Succinate 100 mg daily, and Potassium 10 mEq daily     Furosemide filled 8/3 #60 - still mostly full - second bottle from September. Not taking because it made her pee every hour.    Losartan filled 8/10 #30 - still ~19 tabs   Amlodipine filled 7/20 #90 - still at least 45 tabs remaining.     Denies swelling in legs or ankles, however does have some visible edema in her ankles. Later states she has had this problem for a long time.. Denies shortness of breath.     Wt Readings from Last 3 Encounters:   08/20/20 200 lb (90.7 kg)   06/04/20 193 lb (87.5 kg)   03/30/20 195 lb 3.2 oz (88.5 kg)       BP Readings from Last 3 Encounters:   09/11/20 124/76   08/20/20 (!) 148/98   04/02/20 118/74     Pulse Readings from Last 3 Encounters:  "  09/11/20 (!) 54   08/20/20 90   04/02/20 60        Results for orders placed or performed in visit on 08/20/20   Comprehensive Metabolic Panel   Result Value Ref Range    Sodium 144 136 - 145 mmol/L    Potassium 3.4 (L) 3.5 - 5.0 mmol/L    Chloride 97 (L) 98 - 107 mmol/L    CO2 33 (H) 22 - 31 mmol/L    Anion Gap, Calculation 14 5 - 18 mmol/L    Glucose 141 (H) 70 - 125 mg/dL    BUN 32 (H) 8 - 22 mg/dL    Creatinine 0.88 0.60 - 1.10 mg/dL    GFR MDRD Af Amer >60 >60 mL/min/1.73m2    GFR MDRD Non Af Amer >60 >60 mL/min/1.73m2    Bilirubin, Total 0.5 0.0 - 1.0 mg/dL    Calcium 9.8 8.5 - 10.5 mg/dL    Protein, Total 7.5 6.0 - 8.0 g/dL    Albumin 4.1 3.5 - 5.0 g/dL    Alkaline Phosphatase 79 45 - 120 U/L    AST 37 0 - 40 U/L    ALT 61 (H) 0 - 45 U/L          Pain: Prescribed Acetaminophen 500 mg 2 tabs three times a day, camphor-menthol ointment two times a day as needed,       Depression/Headaches: Prescribed Nortriptyline 50 mg daily. Of note, in Feb MTM visit, this was discontinued and switched to Venlafaxine ER. [place] like nortriptyline was only mildly helpful for headaches. Mother did well with Venlafaxine.      Has a bottle of Nortriptyline filled 12/12/19 - states she takes this at night, but she ran out many months ago.     No venlafaxine at visit.     Had neurosurgery in the 90s. Notes that she has \"needle pinch\" headaches in her head.     Pt states when she took nortriptyline, it didn't help at all. Sounds like she was taking as needed for headache management. Not clear if she ever got the Venlafaxine.     Notes that she's not sleeping well. Up until 2-3 am and falling asleep just for a few hours. Sleep impairment mostly due to headaches.     Difficult to assess mood as pt only referred to feeling like she doesn't want to talk to anybody when having headaches or responds \"on days that I have no depression, it's just every day life\"     PHQ-9 Total Score: 15 (8/20/2020  2:07 PM)      Heartburn: Prescribed " Omeprazole 40 mg daily and sucralfate 1 g four times a day as needed.     Sucralfate empty- Filled 10/25/19 #120   Omeprazole empty filled 3/19/20 #90     If not eating on time, feels hungry and has reflux symptoms.  If eating on regular schedule and not eating spicy foods, no symptoms.       PMH: reviewed in EPIC   Allergies/ADRs: reviewed in EPIC   Alcohol:   Social History     Substance and Sexual Activity   Alcohol Use No        Tobacco:   Social History     Tobacco Use   Smoking Status Never Smoker   Smokeless Tobacco Never Used   Tobacco Comment    no passive exposure     Today's Vitals:   Vitals:    09/11/20 0920   BP: 124/76   Pulse: (!) 54     ----------------    The patient declined an after visit summary    I spent 45 minutes with this patient today.   All changes were made via collaborative practice agreement with Jaky Gaspar MD. A copy of the visit note was provided to the patient's provider.     Art Thakkar, QuintonD  Medication Therapy Management (MTM) Pharmacist  [facility]        Current Outpatient Medications   Medication Sig Dispense Refill   ? acetaminophen (TYLENOL) 500 MG tablet Take 2 tablets (1,000 mg total) by mouth 3 (three) times a day. 100 tablet 3   ? atorvastatin (LIPITOR) 80 MG tablet TAKE 1 TABLET (80 MG TOTAL) BY MOUTH DAILY/ TXHUA HNUB NOJ 1 LUB TSHUAJ PAB YANET NTSHAV MUAJ ROJ 90 tablet 4   ? blood pressure monitor Kit Check your blood pressure daily, 1-2 hours after taking your blood pressure medicine.  Rest for 5 minutes before checking blood pressure, sitting quietly with feet supported on floor and your back resting against a chair. If blood pressure is >170/105 then call MD.  Dx: essential HTN 1 each 0   ? furosemide (LASIX) 40 MG tablet Take 0.5 tablets (20 mg total) by mouth daily. 60 tablet 3   ? isosorbide mononitrate (IMDUR) 60 MG 24 hr tablet Take 1 tablet (60 mg total) by mouth daily. 90 tablet 3   ? losartan (COZAAR) 100 MG tablet TAKE 1  "PILL BY MOUTH DAILY FOR BLOOD PRESSURE / TXHUA HNUB NOJ 1 LUB TSHUAJ PAB ERIC ORELLANAU NTSHAV SIAB 30 tablet 11   ? metFORMIN (GLUCOPHAGE-XR) 500 MG 24 hr tablet Take 2 tablets (1,000 mg total) by mouth daily with breakfast. 90 tablet 1   ? metoprolol succinate (TOPROL-XL) 100 MG 24 hr tablet Take 1 tablet (100 mg total) by mouth daily. 90 tablet 4   ? aspirin 81 MG EC tablet Take 1 tablet (81 mg total) by mouth daily. 90 tablet 4   ? camphor-menthoL (TIGER BALM) Oint Apply 1 application topically 2 (two) times a day as needed. \"Tiger Balm\"     ? DULoxetine (CYMBALTA) 30 MG capsule Take 1 capsule (30 mg total) by mouth at bedtime. 30 capsule 3   ? nitroglycerin (NITROSTAT) 0.4 MG SL tablet Place 1 tablet (0.4 mg total) under the tongue every 5 (five) minutes as needed for chest pain. 30 tablet 1   ? potassium chloride (KLOR-CON) 10 MEQ CR tablet TAKE 1 PILL BY MOUTH EVERY DAY/TXHUA HNUB NOJ 1 LUB TSHUAJ 90 tablet 4     No current facility-administered medications for this visit.                     "

## 2021-06-30 NOTE — PROGRESS NOTES
Progress Notes by Buddy Roy MD at 12/8/2020  8:10 AM     Author: Buddy Roy MD Service: -- Author Type: Physician    Filed: 12/8/2020  8:55 AM Encounter Date: 12/8/2020 Status: Signed    : Buddy Roy MD (Physician)              Click to link to [facility]     [facility] HEART CARE NOTE       Assessment/Plan:   1. Nonischemic cardiomyopathy, chronic systolic CHF class II: The patient is compensated well.  No signs of fluid retention.  Her recent BNP was normal.  She is on low-salt diet.   She is going to have routine labs next Tuesday at Dr. Gaspar' office..  Continue current Lasix 40 mg twice a day with potassium, losartan 100 mg daily, metoprolol  mg daily and Imdur 60 mg daily.  I asked the patient to bring her medication next time. May consider to add hydralazine for her heart failure and hypertension next time.    2.  Coronary artery disease, no obstructive lesion per coronary angiogram in May 2019: The patient complains of intermittent chest pain and dyspnea on exertion.  Continue Imdur, metoprolol, aspirin and Lipitor.  Her coronary angiogram showed LAD 50% stenosis.   Stress cardiac MRI is requested for evaluation of myocardial ischemia, etiology of cardiomyopathy, heart function and structure.    3.  Essential hypertension: Her blood pressure is high.  Continue losartan 100 mg daily and metoprolol  mg daily.  Amlodipine was discontinued, may consider to add hydralazine at the next visit.    4.  Dyslipidemia: She is on Lipitor 80 mg daily.   Recheck lipid profile and liver function next Tuesday as mentioned above.    5.  Obesity, stage III CKD: Will follow up with Dr. Gaspar.    We will follow-up her labs and stress MRI report, discuss the findings with the patient.    She does not speak English.  Her medical history is translated by a professional [facility] .    Thank you for the opportunity to be involved in the care of Leora Sanchez. If you have any questions, please  feel free to contact me.  I will see the patient again in 2 months and as needed.    Much or all of the text in this note was generated through the use of Dragon Dictate voice-to-text software. Errors in spelling or words which seem out of context are unintentional.   Sound alike errors, in particular, may have escaped editing.        History of Present Illness:   It is my pleasure to see Leora Sanchez at the [facility] clinic for evaluation of Follow-up.  Leora Sanchez is a 50 y.o. female with a medical history of coronary artery disease, nonobstructive, nonischemic cardiomyopathy, improved left ventricular ejection fraction from 45-50%, essential hypertension, hyperlipidemia, obesity and stage III CKD.    The patient states that she developed mild chest pain and dyspnea on exertion over last 4 months.  She described her chest pain as located anterior chest, mild in severity, dull pain, no radiation, lasted variable duration.  She also felt chest pressure when she climbs stairs.  She has dyspnea on exertion over last 4 months.  She has exertional palpitations and lightheadedness.  She had no syncope.  She gained 7 pounds over last 8 months.  She has no orthopnea, PND or leg edema today.  She does mention sometimes she has leg edema.  Lasix 40 mg twice a day helped much for leg edema.  Her blood pressure is mildly high, heart rate is well controlled.    Past Medical History:     Patient Active Problem List   Diagnosis   ? Ganglion cyst of left foot   ? Essential hypertension   ? Heartburn   ? Chronic headaches   ? Bilateral dry eyes   ? Mixed incontinence   ? Hyperlipidemia LDL goal <70   ? Hypokalemia   ? Dysidria   ? Nonischemic cardiomyopathy (H)   ? Heart failure with reduced ejection fraction (H)   ? Vertigo   ? Atypical chest pain   ? Dyspnea on exertion   ? Nonocclusive coronary atherosclerosis of native coronary artery   ? Recurrent major depressive disorder, in partial remission (H)   ? Class 2 severe  obesity due to excess calories with serious comorbidity and body mass index (BMI) of 39.0 to 39.9 in adult (H)   ? Other forms of angina pectoris (H)   ? Right-sided sensorineural hearing loss   ? Pulmonary nodules   ? Chest pain   ? Tension headache   ? Type 2 diabetes mellitus with hyperglycemia, without long-term current use of insulin (H)   ? CKD (chronic kidney disease) stage 3, GFR 30-59 ml/min       Past Surgical History:     Past Surgical History:   Procedure Laterality Date   ? CV CORONARY ANGIOGRAM N/A 2019    Procedure: Coronary Angiogram;  Surgeon: Car Box MD;  Location: [facility];  Service: Cardiology   ? CV LEFT HEART CATHETERIZATION WO LEFT VETRICULOGRAM Left 10/31/2017    Procedure: Left Heart Catheterization Without Left Ventriculogram;  Surgeon: Jonathan Duque MD;  Location: [facility];  Service:    ? CV LEFT HEART CATHETERIZATION WO LEFT VETRICULOGRAM Left 2019    Procedure: Left Heart Catheterization Without Left Ventriculogram;  Surgeon: Car Box MD;  Location: [facility];  Service: Cardiology   ? DILATION AND CURETTAGE OF UTERUS     ? INNER EAR SURGERY Right    ? MASTOIDECTOMY Right    ? OH CATH PLACEMENT & NJX CORONARY ART ANGIO IMG S&I N/A 10/31/2017    Procedure: Coronary Angiogram;  Surgeon: Jonathan Duque MD;  Location: [facility];  Service: Cardiology       Family History:     Family History   Problem Relation Age of Onset   ? Diabetes Mother    ? Hypertension Mother    ? Uterine cancer Mother    ? Diverticulitis Mother    ? Other Father          in [place]   ? No Medical Problems Sister    ? No Medical Problems Daughter    ? No Medical Problems Son    ? No Medical Problems Daughter    ? No Medical Problems Daughter    ? No Medical Problems Daughter    ? No Medical Problems Son    ? No Medical Problems Son    ? No Medical Problems Son         Social History:    reports that she  "has never smoked. She has never used smokeless tobacco. She reports that she does not drink alcohol or use drugs.    Review of Systems:   General: Weight Loss  Eyes: Visual Distubance  Ears/Nose/Throat: WNL  Lungs: Snoring  Heart: Chest Pain, Arm Pain, Irregular Heartbeat, Leg Swelling  Stomach: WNL  Bladder: Frequent Urination at Night  Muscle/Joints: Joint Pain, Muscle Weakness, Muscle Pain  Skin: WNL  Nervous System: Dizziness, Loss of Balance  Mental Health: Confusion     Blood: Easy Bleeding    Meds:     Current Outpatient Medications:   ?  acetaminophen (TYLENOL) 500 MG tablet, Take 2 tablets (1,000 mg total) by mouth 3 (three) times a day. (Patient taking differently: Take 1,000 mg by mouth as needed. ), Disp: 100 tablet, Rfl: 3  ?  atorvastatin (LIPITOR) 80 MG tablet, TAKE 1 TABLET (80 MG TOTAL) BY MOUTH DAILY/ TXHUA HNUB NOJ 1 [facility] ROJ, Disp: 90 tablet, Rfl: 4  ?  camphor-menthoL (TIGER BALM) Oint, Apply 1 application topically 2 (two) times a day as needed. \"Tiger Balm\", Disp: , Rfl:   ?  furosemide (LASIX) 40 MG tablet, Take 1 tablet (40 mg total) by mouth 2 (two) times a day at 9am and 6pm., Disp: 60 tablet, Rfl: 3  ?  isosorbide mononitrate (IMDUR) 60 MG 24 hr tablet, Take 1 tablet (60 mg total) by mouth daily., Disp: 90 tablet, Rfl: 3  ?  losartan (COZAAR) 100 MG tablet, TAKE 1 PILL BY MOUTH DAILY FOR BLOOD PRESSURE / TXHUA HNUB NOJ 1 [facility] SIAB, Disp: 30 tablet, Rfl: 11  ?  metFORMIN (GLUCOPHAGE-XR) 500 MG 24 hr tablet, Take 2 tablets (1,000 mg total) by mouth daily with breakfast., Disp: 90 tablet, Rfl: 1  ?  metoprolol succinate (TOPROL-XL) 100 MG 24 hr tablet, Take 1 tablet (100 mg total) by mouth daily., Disp: 90 tablet, Rfl: 4  ?  nitroglycerin (NITROSTAT) 0.4 MG SL tablet, Place 1 tablet (0.4 mg total) under the tongue every 5 (five) minutes as needed for chest pain., Disp: 30 tablet, Rfl: 1  ?  potassium chloride (KLOR-CON) 10 MEQ CR tablet, " "TAKE 1 PILL BY MOUTH EVERY DAY/TXOUMAR HNLADI NOJ 1 LUB TSHUAJ, Disp: 90 tablet, Rfl: 4  ?  aspirin 81 MG EC tablet, Take 1 tablet (81 mg total) by mouth daily., Disp: 90 tablet, Rfl: 4  ?  blood glucose test (CONTOUR NEXT TEST STRIPS) strips, Use 1 each As Directed 3 (three) times a day., Disp: 100 strip, Rfl: 4  ?  generic lancets, Use 1 each As Directed 3 (three) times a day., Disp: 100 each, Rfl: 3    Allergies:   Patient has no known allergies.      Objective:      Physical Exam  201 lb (91.2 kg)  4' 11\" (1.499 m)  Body mass index is 40.6 kg/m .  /88 (Patient Site: Left Arm, Patient Position: Sitting, Cuff Size: Adult Large)   Pulse 60   Resp 16   Ht 4' 11\" (1.499 m)   Wt 201 lb (91.2 kg)   LMP 03/14/2019 (Approximate)   BMI 40.60 kg/m      General Appearance:   Awake, Alert, No acute distress.   HEENT:  Pupil equal and reactive to light. No scleral icterus; the mucous membranes were moist.   Neck: No cervical bruits. No JVD. No thyromegaly.     Chest: The spine was straight. The chest was symmetric.   Lungs:   Respirations unlabored; Lungs are clear to auscultation. No crackles. No wheezing.   Cardiovascular:   Regular rhythm and rate, normal first and second heart sounds with no murmurs. No rubs or gallops.    Abdomen:  Obese. Soft. No tenderness. Non-distended. Bowels sounds are present   Extremities: Equal tibial pulses. No leg edema.   Skin: No rashes or ulcers. Warm, Dry.   Musculoskeletal: No tenderness. No deformity.   Neurologic: Mood and affect are appropriate. No focal deficits.         EKG: Personally reviewed  Normal sinus rhythm   Nonspecific T wave abnormality   Abnormal ECG   When compared with ECG of 29-MAR-2018 16:41,   No significant change was found    Cardiac Imaging Studies  ECHO on 10-:    Left Ventricle: The calculated left ventricular ejection fraction is 37%. This represents a moderately decreased ejection fraction. Cavity is mildly increased. E/e' > 15, suggesting " elevated LV filling pressures.    Right Ventricle: Normal size and systolic function. TAPSE is normal,    Estimated central venous pressure equal to 3 mmHg.    No tricuspid valve regurgitation. Pulmonary artery pressure could not be obtained.    No previous study for comparison.    ECHO on 1-:  1. The left ventricle is normal in size. Left ventricular systolic performance is at the lower limits of normal. The ejection fraction is estimated to be 50%.   2. No significant valvular heart disease is identified on this study.   3. Normal right ventricular size and systolic performance.   4. There is mild left atrial enlargement.    When compared to the prior real-time echocardiogram dated 30 October 2017, there has been an interval improvement in left ventricular systolic performance.    ECHO on 5-:    Technically difficult study due to patient's body habitus    Left ventricle ejection fraction is moderately decreased. The estimated left ventricular ejection fraction is 45% with global hypokinesis.    Right ventricle poorly visualized. CT is normal which would suggest normal right ventricular systolic function.    No significant valvular heart disease identified.    When compared to the previous study dated 1/29/2018, overall left ventricular function appears slightly lower.    Coronary angiogram on 5-:  Angiography via left radial   LM normal  LAD 40-50% prox LAD disease with FFR 0.86  Circ OM 1 40-50% narrowing with FFR 0.96  RCA min dz    Coronary angiogram on 10-:  Findings:  LM:long, mildly irregular  LAD:50% mid-vessel narrowing immediately proximal to a moderate sized D branch. FFR 0.94 rest 0.83 w/ adenosine x3 mins  Lcx:large, mildly irregular  RCA:dominant, 10-30% diffuse mild irregularity    Nuclear stress test on 5-:    The patient's exercise capacity is mildly impaired.    The stress electrocardiogram is negative for inducible ischemic EKG changes.    The exercise nuclear  stress test is negative for inducible myocardial ischemia or infarction. Breast tissue attenuation artifact is noted.    The left ventricular ejection fraction is 64%.    There is no prior study available.      Lab Review   Lab Results   Component Value Date     09/18/2020    K 4.1 09/18/2020     09/18/2020    CO2 29 09/18/2020    BUN 27 (H) 09/18/2020    CREATININE 1.25 (H) 09/18/2020    CALCIUM 9.4 09/18/2020     Lab Results   Component Value Date    WBC 5.9 03/29/2020    HGB 11.6 (L) 03/29/2020    HCT 35.6 03/29/2020    MCV 92 03/29/2020     03/29/2020     Lab Results   Component Value Date    CHOL 195 10/31/2017    CHOL 206 (H) 06/07/2016     Lab Results   Component Value Date    HDL 56 10/31/2017    HDL 67 06/07/2016     Lab Results   Component Value Date    LDLCALC 115 10/31/2017    LDLCALC 119 06/07/2016     Lab Results   Component Value Date    TRIG 118 10/31/2017    TRIG 100 06/07/2016     No components found for: CHOLHDL  Lab Results   Component Value Date    TROPONINI <0.01 08/20/2020     Lab Results   Component Value Date    BNP 12 08/20/2020     Lab Results   Component Value Date    TSH 1.63 11/20/2017

## 2021-06-30 NOTE — PROGRESS NOTES
Progress Notes by Luca Ca LGSW at 12/10/2020 10:00 AM     Author: Luca Ca LGSW Service: -- Author Type:     Filed: 12/10/2020 10:57 AM Encounter Date: 12/10/2020 Status: Signed    : Luca Ca LGSW ()       Clinic Care Coordination Contact  CCC SW contacted May by phone with an  to complete an assessment for enrollment into [facility].    SW provided phone number for Senior Linkage Line to contact Medicare Navigator.     Once May has a Part D plan, she needs help getting a glucometer.      Clinic Care Coordination Contact  OUTREACH    Referral Information:  Referral Source: PCP    Primary Diagnosis: Psychosocial    Chief Complaint   Patient presents with   ? Clinic Care Coordination - Initial        Universal Utilization:   Clinic Utilization  Difficulty keeping appointments:: No  Compliance Concerns: No  No-Show Concerns: No  No PCP office visit in Past Year: No  Utilization    Last refreshed: 12/10/2020 10:34 AM: Hospital Admissions 1           Last refreshed: 12/10/2020 10:34 AM: ED Visits 2           Last refreshed: 12/10/2020 10:34 AM: No Show Count (past year) 1              Current as of: 12/10/2020 10:34 AM              Clinical Concerns:  Current Medical Concerns:  Working with care team on medical diagnoses, no current concerns    Current Behavioral Concerns: None reported    Education Provided to patient: Role of CCC   Pain  Pain (GOAL):: No  Health Maintenance Reviewed: Not assessed  Clinical Pathway: None     Medication Management:  Needs a part D plan, unable to pay for prescriptions and needs a glucometer      Functional Status:  Dependent ADLs:: Independent  Dependent IADLs:: Independent, Transportation  Bed or wheelchair confined:: No  Mobility Status: Independent  Fallen 2 or more times in the past year?: No  Any fall with injury in the past year?: No    Living Situation:  Current living arrangement:: I live in a private home  with family, I live in a private home with spouse(Living with son, mother, and )  Type of residence:: Private home - stairs    Lifestyle & Psychosocial Needs:        Diet:: Regular(Diet/Appetite: Has appetite, Have enough food to eat)  Inadequate nutrition (GOAL):: No  Tube Feeding: No  Inadequate activity/exercise (GOAL):: No  Significant changes in sleep pattern (GOAL): No  Transportation means:: Family, Regular car, Other(has spouse and children for help)     Anabaptist or spiritual beliefs that impact treatment:: No  Mental health DX:: No  Mental health management concern (GOAL):: No  Informal Support system:: Children, Family, Spouse   Socioeconomic History   ? Marital status:      Spouse name: Not on file   ? Number of children: 8   ? Years of education: Not on file   ? Highest education level: Not on file   Occupational History   ? Occupation: SSDI     Employer: DISABLED     Comment: For chronic headaches since ear surgery     Tobacco Use   ? Smoking status: Never Smoker   ? Smokeless tobacco: Never Used   ? Tobacco comment: no passive exposure   Substance and Sexual Activity   ? Alcohol use: No   ? Drug use: No   ? Sexual activity: Yes     Partners: Male     Birth control/protection: None                Resources and Interventions:  Current Resources:      Community Resources: [employer] Programs  Supplies Currently Used at Home: None  Equipment Currently Used at Home: none  Type of Employment: Disability       Advance Care Plan/Directive  Advanced Care Plans/Directives on file:: No  Advanced Care Plan/Directive Status: Not Applicable          Goals:   Goals        General    Problem Solving (pt-stated)     Notes - Note created  12/10/2020 10:49 AM by Luca Ca LGSW    Goal Statement: I want to have a Medicare Part D plan within 1 month  Date Goal set: 12/10/20  Barriers: Language  Strengths:   Date to Achieve By: 1/30/2020  Patient expressed understanding of goal: Yes  Action steps to  achieve this goal:  1. I will call Senior Linkage Line to speak with a Medicare Navigator about Part D plans  2. I will update CCC team              Patient/Caregiver understanding: Reported understanding        Future Appointments              In 5 days [facility], [facility]    In 5 days [facility] CSS [facility]    In 4 weeks BESSIE HC RN; JN MR 2 M [facility], JN    In 2 months Jaky Gaspar MD [facility]          Plan: Standard Outreach

## 2021-06-30 NOTE — PROGRESS NOTES
Progress Notes by Buddy Roy MD at 2/23/2021 10:30 AM     Author: Buddy Roy MD Service: -- Author Type: Physician    Filed: 2/23/2021 10:39 AM Encounter Date: 2/23/2021 Status: Signed    : Buddy Roy MD (Physician)              Click to link to [facility] HEART CARE NOTE       Assessment/Plan:   1. Nonischemic cardiomyopathy, chronic congestive heart failure with preserved ejection fraction, class II: The patient is compensated well.  No signs of fluid retention.  She is on low-salt diet.   Her previous echo was reported ejection fraction 45 to 50%.  However stress cardiac MRI was reported ejection fraction 65%, no previous myocardial infarction or myocardial scar, no obvious valvular disease.  He is compensated.  Continue current Lasix 40 mg twice a day with potassium, losartan 100 mg daily, metoprolol  mg daily and Imdur 60 mg daily.    2.  Coronary artery disease, no obstructive lesion per coronary angiogram in May 2019: As mentioned, her stress cardiac MRI was negative for inducible myocardial ischemia, no previous myocardial infarction.  The patient has normal left ventricular ejection fraction.  Her chest pain most likely noncardiac.  Continue to observe it.    Continue Imdur, metoprolol, aspirin and Lipitor.  Her coronary angiogram showed LAD 50% stenosis.     3.  Essential hypertension: Her blood pressure is controlled with losartan 100 mg daily and metoprolol  mg daily.      4.  Dyslipidemia: She is on Lipitor 80 mg daily.   Recent lipid profile was reported total cholesterol 177, LDL 88, HDL 55.    5.  Obesity, stage III CKD: Creatinine improved to normal range.    She does not speak English.  Her medical history is translated by a professional [facility] .    Thank you for the opportunity to be involved in the care of Leora Sanchez. If you have any questions, please feel free to contact me.  I will see the patient again in 6 months and as needed.    Much  or all of the text in this note was generated through the use of Dragon Dictate voice-to-text software. Errors in spelling or words which seem out of context are unintentional.   Sound alike errors, in particular, may have escaped editing.        History of Present Illness:   It is my pleasure to see Leora Sanchez at the [facility] clinic for evaluation of Follow-up and discussing stress cardiac MRI report.  Leora Sanchez is a 50 y.o. female with a medical history of coronary artery disease, nonobstructive, nonischemic cardiomyopathy, improved left ventricular ejection fraction from 45-50%, essential hypertension, hyperlipidemia, obesity and stage III CKD.    The patient was evaluated for intermittent chest pain by stress cardiac MRI which was done on February 18, 2021.  Her stress cardiac MRI was reported negative for inducible myocardial ischemia.  Normal left ventricular size and systolic function.  The calculated left ventricular ejection fraction is 65%, no previous myocardial infarction.  No obvious valvular disease.     The patient states that she still has some chest pain, substernal, aching pain, lasted for 5 to 10 minutes, not associated with exertion, 3 episodes over last 2 months.  She has no dyspnea on exertion.  She denies any palpitations, dizziness, orthopnea, PND or leg edema.  Her blood pressure and heart rate are well controlled.  Her laboratory tests are reviewed, stable.  She states that she has been taking her medication regularly.      Past Medical History:     Patient Active Problem List   Diagnosis   ? Ganglion cyst of left foot   ? Essential hypertension   ? Heartburn   ? Chronic headaches   ? Bilateral dry eyes   ? Mixed incontinence   ? Hyperlipidemia LDL goal <70   ? Hypokalemia   ? Dysidria   ? Nonischemic cardiomyopathy (H)   ? Heart failure with reduced ejection fraction (H)   ? Vertigo   ? Atypical chest pain   ? Dyspnea on exertion   ? Nonocclusive coronary atherosclerosis of  native coronary artery   ? Recurrent major depressive disorder, in partial remission (H)   ? Class 2 severe obesity due to excess calories with serious comorbidity and body mass index (BMI) of 39.0 to 39.9 in adult (H)   ? Other forms of angina pectoris (H)   ? Right-sided sensorineural hearing loss   ? Pulmonary nodules   ? Chest pain   ? Tension headache   ? Type 2 diabetes mellitus with hyperglycemia, without long-term current use of insulin (H)   ? CKD (chronic kidney disease) stage 3, GFR 30-59 ml/min       Past Surgical History:     Past Surgical History:   Procedure Laterality Date   ? CV CORONARY ANGIOGRAM N/A 2019    Procedure: Coronary Angiogram;  Surgeon: Car Box MD;  Location: [facility] Cath Lab;  Service: Cardiology   ? CV LEFT HEART CATHETERIZATION WO LEFT VETRICULOGRAM Left 10/31/2017    Procedure: Left Heart Catheterization Without Left Ventriculogram;  Surgeon: Jontahan Duque MD;  Location: [facility] Lab;  Service:    ? CV LEFT HEART CATHETERIZATION WO LEFT VETRICULOGRAM Left 2019    Procedure: Left Heart Catheterization Without Left Ventriculogram;  Surgeon: Car Box MD;  Location: [facility] Cath Lab;  Service: Cardiology   ? DILATION AND CURETTAGE OF UTERUS     ? INNER EAR SURGERY Right    ? MASTOIDECTOMY Right    ? WV CATH PLACEMENT & NJX CORONARY ART ANGIO IMG S&I N/A 10/31/2017    Procedure: Coronary Angiogram;  Surgeon: Jonathan Duque MD;  Location: [facility] Lab;  Service: Cardiology       Family History:     Family History   Problem Relation Age of Onset   ? Diabetes Mother    ? Hypertension Mother    ? Uterine cancer Mother    ? Diverticulitis Mother    ? Other Father          in war in  [place]   ? No Medical Problems Sister    ? No Medical Problems Daughter    ? No Medical Problems Son    ? No Medical Problems Daughter    ? No Medical Problems Daughter    ? No Medical Problems Daughter    ? No Medical Problems  "Son    ? No Medical Problems Son    ? No Medical Problems Son         Social History:    reports that she has never smoked. She has never used smokeless tobacco. She reports that she does not drink alcohol or use drugs.    Review of Systems:   General: WNL  Eyes: WNL  Ears/Nose/Throat: WNL  Lungs: WNL  Heart: WNL  Stomach: WNL  Bladder: WNL  Muscle/Joints: WNL  Skin: WNL  Nervous System: WNL  Mental Health: WNL     Blood: WNL    Meds:     Current Outpatient Medications:   ?  acetaminophen (TYLENOL) 500 MG tablet, Take 2 tablets (1,000 mg total) by mouth 3 (three) times a day. (Patient taking differently: Take 1,000 mg by mouth as needed. ), Disp: 100 tablet, Rfl: 3  ?  aspirin 81 MG EC tablet, Take 1 tablet (81 mg total) by mouth daily., Disp: 90 tablet, Rfl: 4  ?  atorvastatin (LIPITOR) 80 MG tablet, TAKE 1 TABLET (80 MG TOTAL) BY MOUTH DAILY/ TXHUA HNUB NOJ 1 LUB TSHUAJ PAB YANET NTSV A ROJ, Disp: 90 tablet, Rfl: 4  ?  blood glucose meter (GLUCOMETER), Use 1 each As Directed daily. Dispense glucometer brand per patient's insurance at pharmacy discretion., Disp: 1 each, Rfl: 0  ?  blood glucose test (CONTOUR NEXT TEST STRIPS) strips, Use 1 each As Directed 3 (three) times a day., Disp: 100 strip, Rfl: 4  ?  blood pressure monitor Kit, Use to check blood pressure every morning, Disp: 1 each, Rfl: 0  ?  camphor-menthoL (TIGER BALM) Oint, Apply 1 application topically 2 (two) times a day as needed. \"Tiger Balm\", Disp: , Rfl:   ?  furosemide (LASIX) 40 MG tablet, Take 1 tablet (40 mg total) by mouth 2 (two) times a day at 9am and 6pm., Disp: 60 tablet, Rfl: 3  ?  generic lancets, Use 1 each As Directed 3 (three) times a day., Disp: 100 each, Rfl: 3  ?  isosorbide mononitrate (IMDUR) 60 MG 24 hr tablet, Take 1 tablet (60 mg total) by mouth daily., Disp: 90 tablet, Rfl: 3  ?  losartan (COZAAR) 100 MG tablet, TAKE 1 PILL BY MOUTH DAILY FOR BLOOD PRESSURE / TXHUA SAEIDUB NOJ 1 LUB TSHUAJ LAURA HOLT NTSHAV LUIS, Disp: 30 " "tablet, Rfl: 11  ?  metFORMIN (GLUCOPHAGE-XR) 500 MG 24 hr tablet, TAKE 1 PILL (500 MG TOTAL) BY MOUTH DAILY WITH BREAKFAST/ TXHUA HNUB NOJ 1 LUB [facility] PAB ZOO NTSHAV QAB ZIB, Disp: 90 tablet, Rfl: 1  ?  metoprolol succinate (TOPROL-XL) 100 MG 24 hr tablet, Take 1 tablet (100 mg total) by mouth daily., Disp: 90 tablet, Rfl: 4  ?  nitroglycerin (NITROSTAT) 0.4 MG SL tablet, Place 1 tablet (0.4 mg total) under the tongue every 5 (five) minutes as needed for chest pain., Disp: 30 tablet, Rfl: 1  ?  omeprazole (PRILOSEC) 40 MG capsule, , Disp: , Rfl:   ?  potassium chloride (KLOR-CON) 10 MEQ CR tablet, TAKE 1 PILL BY MOUTH EVERY DAY/TXHUA HNUB NOJ 1 LUB TSHUAJ, Disp: 90 tablet, Rfl: 4    Allergies:   Patient has no known allergies.      Objective:      Physical Exam  201 lb 12.8 oz (91.5 kg)  4' 11.49\" (1.511 m)  Body mass index is 40.09 kg/m .  /82 (Patient Site: Right Arm, Patient Position: Sitting, Cuff Size: Adult Regular)   Pulse (!) 56   Resp 16   Ht 4' 11.49\" (1.511 m)   Wt 201 lb 12.8 oz (91.5 kg)   LMP 03/14/2019 (Approximate)   BMI 40.09 kg/m      General Appearance:   Awake, Alert, No acute distress.   HEENT:  Pupil equal and reactive to light. No scleral icterus; the mucous membranes were moist.   Neck: No cervical bruits. No JVD. No thyromegaly.     Chest: The spine was straight. The chest was symmetric.   Lungs:   Respirations unlabored; Lungs are clear to auscultation. No crackles. No wheezing.   Cardiovascular:   Regular rhythm and rate, normal first and second heart sounds with no murmurs. No rubs or gallops.    Abdomen:  Obese. Soft. No tenderness. Non-distended. Bowels sounds are present   Extremities: Equal tibial pulses. No leg edema.   Skin: No rashes or ulcers. Warm, Dry.   Musculoskeletal: No tenderness. No deformity.   Neurologic: Mood and affect are appropriate. No focal deficits.         EKG: Personally reviewed  Normal sinus rhythm   Nonspecific T wave abnormality   Abnormal " ECG   When compared with ECG of 29-MAR-2018 16:41,   No significant change was found    Cardiac Imaging Studies  ECHO on 10-:    Left Ventricle: The calculated left ventricular ejection fraction is 37%. This represents a moderately decreased ejection fraction. Cavity is mildly increased. E/e' > 15, suggesting elevated LV filling pressures.    Right Ventricle: Normal size and systolic function. TAPSE is normal,    Estimated central venous pressure equal to 3 mmHg.    No tricuspid valve regurgitation. Pulmonary artery pressure could not be obtained.    No previous study for comparison.    ECHO on 1-:  1. The left ventricle is normal in size. Left ventricular systolic performance is at the lower limits of normal. The ejection fraction is estimated to be 50%.   2. No significant valvular heart disease is identified on this study.   3. Normal right ventricular size and systolic performance.   4. There is mild left atrial enlargement.    When compared to the prior real-time echocardiogram dated 30 October 2017, there has been an interval improvement in left ventricular systolic performance.    ECHO on 5-:    Technically difficult study due to patient's body habitus    Left ventricle ejection fraction is moderately decreased. The estimated left ventricular ejection fraction is 45% with global hypokinesis.    Right ventricle poorly visualized. CT is normal which would suggest normal right ventricular systolic function.    No significant valvular heart disease identified.    When compared to the previous study dated 1/29/2018, overall left ventricular function appears slightly lower.    Coronary angiogram on 5-:  Angiography via left radial   LM normal  LAD 40-50% prox LAD disease with FFR 0.86  Circ OM 1 40-50% narrowing with FFR 0.96  RCA min dz    Coronary angiogram on 10-:  Findings:  LM:long, mildly irregular  LAD:50% mid-vessel narrowing immediately proximal to a moderate sized D  branch. FFR 0.94 rest 0.83 w/ adenosine x3 mins  Lcx:large, mildly irregular  RCA:dominant, 10-30% diffuse mild irregularity    Nuclear stress test on 5-:    The patient's exercise capacity is mildly impaired.    The stress electrocardiogram is negative for inducible ischemic EKG changes.    The exercise nuclear stress test is negative for inducible myocardial ischemia or infarction. Breast tissue attenuation artifact is noted.    The left ventricular ejection fraction is 64%.    There is no prior study available.    Stress cardiac MRI on 2-:  1.  Lexiscan stress cardiac MRI is negative for inducible myocardial ischemia.   2.  Lexiscan stress ECG is negative for inducible myocardial ischemia.  3.  No previous myocardial infarction is identified.  4.  Normal left ventricular size, wall thickness and systolic function. The calculated left ventricular ejection fraction is 65%.  5.  Normal right ventricular size and systolic function.  6.  No obvious valvular disease.    Lab Review   Lab Results   Component Value Date     12/15/2020    K 4.4 12/15/2020     12/15/2020    CO2 30 12/15/2020    BUN 31 (H) 12/15/2020    CREATININE 0.95 12/15/2020    CALCIUM 9.3 12/15/2020     Lab Results   Component Value Date    WBC 5.9 03/29/2020    HGB 11.6 (L) 03/29/2020    HCT 35.6 03/29/2020    MCV 92 03/29/2020     03/29/2020     Lab Results   Component Value Date    CHOL 177 12/15/2020    CHOL 195 10/31/2017    CHOL 206 (H) 06/07/2016     Lab Results   Component Value Date    HDL 55 12/15/2020    HDL 56 10/31/2017    HDL 67 06/07/2016     Lab Results   Component Value Date    LDLCALC 88 12/15/2020    LDLCALC 115 10/31/2017    LDLCALC 119 06/07/2016     Lab Results   Component Value Date    TRIG 170 (H) 12/15/2020    TRIG 118 10/31/2017    TRIG 100 06/07/2016     No components found for: CHOLHDL  Lab Results   Component Value Date    TROPONINI <0.01 08/20/2020     Lab Results   Component Value Date     BNP 12 08/20/2020     Lab Results   Component Value Date    TSH 1.97 12/15/2020

## 2021-07-03 NOTE — ADDENDUM NOTE
Addendum Note by Aleksander Gaspar MD at 8/20/2020  2:00 PM     Author: Aleksander Gaspar MD Service: -- Author Type: Physician    Filed: 9/2/2020  2:05 PM Encounter Date: 8/20/2020 Status: Signed    : Aleksander Gaspar MD (Physician)    Addended by: ALEKSANDER GASPAR on: 9/2/2020 02:05 PM        Modules accepted: Orders

## 2021-07-04 NOTE — LETTER
Letter by Buddy Roy MD at      Author: Buddy Roy MD Service: -- Author Type: --    Filed:  Encounter Date: 5/28/2021 Status: (Other)         May MARICHUY Sanchez  [address] [place] [address]      May 28, 2021      Dear May,    This letter is to remind you that you will be due for your follow up appointment with Dr. Buddy Roy in August, 2021. To help ensure you are in the best health possible, a regular follow-up with your cardiologist is essential.     Please call our Patient Scheduling Line at [phone] to schedule your appointment at your earliest convenience.  If you have recently scheduled an appointment, please disregard this letter.    We look forward to seeing you again. As always, we are available at the number  above for any questions or concerns you may have.      Sincerely,     The Physicians and Staff of [facility]

## 2021-07-04 NOTE — ADDENDUM NOTE
Addendum Note by Loly Chapman CMA at 2/2/2021  3:14 PM     Author: Loly Chapman CMA Service: -- Author Type: Certified Medical Assistant    Filed: 2/2/2021  3:14 PM Encounter Date: 2/1/2021 Status: Signed    : Loly Chapman CMA (Certified Medical Assistant)    Addended by: LOLY CHAPMAN on: 2/2/2021 03:14 PM        Modules accepted: Orders

## 2021-07-04 NOTE — ADDENDUM NOTE
Addendum Note by Art Seay, PharmJOSY at 6/8/2021  4:36 PM     Author: Art Seay PharmD Service: -- Author Type: Pharmacist    Filed: 6/8/2021  4:36 PM Encounter Date: 6/3/2021 Status: Signed    : Art Seay PharmD (Pharmacist)    Addended by: ART SEAY on: 6/8/2021 04:36 PM        Modules accepted: Orders

## 2021-07-04 NOTE — LETTER
Letter by Buddy Roy MD at      Author: Buddy Roy MD Service: -- Author Type: --    Filed:  Encounter Date: 6/10/2021 Status: (Other)         May MARICHUY Sanchez  [address] [place] [address]      Summer 10, 2021      Dear May,    This letter is to remind you that you will be due for your follow up appointment with Dr. Buddy Roy in August, 2021. To help ensure you are in the best health possible, a regular follow-up with your cardiologist is essential.     Please call our Patient Scheduling Line at [phone] to schedule your appointment at your earliest convenience.  If you have recently scheduled an appointment, please disregard this letter.    We look forward to seeing you again. As always, we are available at the number  above for any questions or concerns you may have.      Sincerely,     The Physicians and Staff of [facility]

## 2021-07-04 NOTE — ADDENDUM NOTE
Addendum Note by Art Seay, PharmJOSY at 5/5/2021 11:00 AM     Author: Art Seay PharmD Service: -- Author Type: Pharmacist    Filed: 5/6/2021  1:38 PM Encounter Date: 5/5/2021 Status: Signed    : Art Seay PharmD (Pharmacist)    Addended by: ART SEAY on: 5/6/2021 01:38 PM        Modules accepted: Orders

## 2021-07-07 NOTE — PROGRESS NOTES
Clinic Care Coordination Contact  [facility]/Voicemail    Reason: CCC CHW Follow Up Call    Clinical Data: Care Coordinator Outreach:  Outreach attempted x 1.  Left message on patient's voicemail with call back information and requested return call.  Plan: Care Coordinator will try to reach patient again in 10 business days around 7-.

## 2021-07-13 DIAGNOSIS — I10 ESSENTIAL HYPERTENSION, BENIGN: Primary | ICD-10-CM

## 2021-07-14 ENCOUNTER — ALLIED HEALTH/NURSE VISIT (OUTPATIENT)
Dept: FAMILY MEDICINE | Facility: CLINIC | Age: 51
End: 2021-07-14
Payer: MEDICARE

## 2021-07-14 VITALS
HEART RATE: 57 BPM | TEMPERATURE: 97.8 F | OXYGEN SATURATION: 98 % | SYSTOLIC BLOOD PRESSURE: 131 MMHG | DIASTOLIC BLOOD PRESSURE: 82 MMHG

## 2021-07-14 DIAGNOSIS — Z53.9 ERRONEOUS ENCOUNTER--DISREGARD: Primary | ICD-10-CM

## 2021-07-14 PROBLEM — N17.9 AKI (ACUTE KIDNEY INJURY) (H): Status: RESOLVED | Noted: 2018-01-28 | Resolved: 2018-03-29

## 2021-07-14 PROBLEM — I16.0 HYPERTENSIVE URGENCY: Status: RESOLVED | Noted: 2020-03-29 | Resolved: 2020-08-20

## 2021-07-14 PROBLEM — I50.9 CHF (CONGESTIVE HEART FAILURE) (H): Status: RESOLVED | Noted: 2017-10-30 | Resolved: 2017-11-14

## 2021-07-14 RX ORDER — LOSARTAN POTASSIUM 100 MG/1
TABLET ORAL
Qty: 30 TABLET | Refills: 11 | Status: SHIPPED | OUTPATIENT
Start: 2021-07-14 | End: 2021-12-07

## 2021-07-16 ENCOUNTER — VIRTUAL VISIT (OUTPATIENT)
Dept: PHARMACY | Facility: CLINIC | Age: 51
End: 2021-07-16

## 2021-07-16 DIAGNOSIS — I10 ESSENTIAL HYPERTENSION: ICD-10-CM

## 2021-07-16 DIAGNOSIS — I20.0 ACCELERATING ANGINA (H): ICD-10-CM

## 2021-07-16 DIAGNOSIS — I50.22 CHRONIC SYSTOLIC HEART FAILURE (H): ICD-10-CM

## 2021-07-16 DIAGNOSIS — G44.229 CHRONIC TENSION-TYPE HEADACHE, NOT INTRACTABLE: ICD-10-CM

## 2021-07-16 DIAGNOSIS — I25.10 CORONARY ARTERY DISEASE INVOLVING NATIVE CORONARY ARTERY OF NATIVE HEART, ANGINA PRESENCE UNSPECIFIED: ICD-10-CM

## 2021-07-16 DIAGNOSIS — E11.65 TYPE 2 DIABETES MELLITUS WITH HYPERGLYCEMIA, WITHOUT LONG-TERM CURRENT USE OF INSULIN (H): Primary | ICD-10-CM

## 2021-07-16 DIAGNOSIS — E11.9 TYPE 2 DIABETES MELLITUS WITHOUT COMPLICATION, WITHOUT LONG-TERM CURRENT USE OF INSULIN (H): ICD-10-CM

## 2021-07-16 PROCEDURE — 99606 MTMS BY PHARM EST 15 MIN: CPT | Performed by: PHARMACIST

## 2021-07-16 PROCEDURE — 99607 MTMS BY PHARM ADDL 15 MIN: CPT | Performed by: PHARMACIST

## 2021-07-16 RX ORDER — FUROSEMIDE 40 MG
TABLET ORAL
COMMUNITY
Start: 2021-03-30 | End: 2021-12-07

## 2021-07-16 RX ORDER — VENLAFAXINE HYDROCHLORIDE 150 MG/1
150 CAPSULE, EXTENDED RELEASE ORAL DAILY
Qty: 30 CAPSULE | Refills: 3 | Status: SHIPPED | OUTPATIENT
Start: 2021-07-16 | End: 2021-10-05

## 2021-07-16 RX ORDER — ISOSORBIDE MONONITRATE 30 MG/1
30 TABLET, EXTENDED RELEASE ORAL
COMMUNITY
Start: 2021-05-06 | End: 2021-10-05

## 2021-07-16 RX ORDER — METFORMIN HCL 500 MG
500 TABLET, EXTENDED RELEASE 24 HR ORAL 2 TIMES DAILY
COMMUNITY
Start: 2021-05-06 | End: 2021-11-14

## 2021-07-16 RX ORDER — VENLAFAXINE HYDROCHLORIDE 75 MG/1
75 CAPSULE, EXTENDED RELEASE ORAL DAILY
COMMUNITY
Start: 2021-05-06 | End: 2021-07-16

## 2021-07-16 NOTE — PROGRESS NOTES
"Medication Therapy Management (MTM) Encounter    ASSESSMENT:                            Medication Adherence/Access: Unable to reconcile meds. Likely missing Losartan - prescription was filled at pharmacy on 7/13. Again reviewed that test strips were sent to [facility] because [facility] does not bill Part B. Continues to decline in person visits for med review despite extensive discussion of continued difficulties with med adherence with phone visits.       CAD: Last LDL above goal <70. No chest pain. Will attempt to reconcile meds at follow-up.       CHF/Hypertension: Last BP above goal <130/80. Pulse below goal . Unclear which medicine pt is out of - likely losartan based on fill history. Reviewed that it was recently filled on 7/13 - pt to  from pharmacy and get restarted.        Diabetes:  Last A1C at goal <8%. Report sugars at goal , fasting and <180 PP. Reviewed test strips should be filled at [facility]. Will resend Rx.         Pain/headaches: Reduced headache intensity with start of Venlafaxine. May benefit from increased dose.         PLAN:                            -Pt to  Losartan   -Test strips sent to [facility]   -Increase Venlafaxine to 150 mg ER once daily     Follow-up: Return in about 2 weeks (around 7/30/2021) for Medication Management Pharmacist, by phone.      SUBJECTIVE/OBJECTIVE:                          Leora Sanchez is a 51 year old female called for a follow-up visit. She was referred to me from Jaky Gaspar. [employer]  by phone (ID# [id number]).  Today's visit is a follow-up MTM visit from 6/9/2021.      Reason for visit: Medication Management. \"when I came to the clinic, my blood pressure was high and they said they were going to change the medication, but I didn't get any new medicine\"     Allergies/ADRs: Reviewed in chart  Tobacco: She reports that she has never smoked. She has never used smokeless tobacco.  Alcohol: Social History    Substance and Sexual Activity      " "Alcohol use: No    Past Medical History: Reviewed in chart      Medication Adherence/Access: Pt was not home at the time of the visit. Unable to reconcile meds.     Pt notes that when she runs out of medicine, she usually calls the pharmacy.     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       CAD: Prescribed aspirin 81 mg daily, atorvastatin 80 mg daily, isosorbide mononitrate 60 mg ER daily +30 mg ER daily, nitroglycerin 0.4 mg as needed.    Unclear what she's using. Denies chest pains.     Lab Results   Component Value Date    LDLCALC 88 12/15/2020        CHF/Hypertension: Prescribed furosemide 40 mg twice daily, losartan 100 mg daily, metoprolol succinate 100 mg daily, and potassium 10 mEq daily    \"I only have the [place] one\" doesn't have the \"green one that looks like a cucumber seed\"         Does not have BP cuff at home    BP Readings from Last 3 Encounters:   07/14/21 131/82   07/01/21 (!) 155/90   05/20/21 137/85      Pulse Readings from Last 3 Encounters:   07/14/21 57   07/01/21 52   05/20/21 61     Wt Readings from Last 3 Encounters:   07/01/21 195 lb (88.5 kg)   04/15/21 200 lb (90.7 kg)   02/23/21 201 lb 12.8 oz (91.5 kg)          Last Comprehensive Metabolic Panel:  Sodium   Date Value Ref Range Status   04/15/2021 143 136 - 145 mmol/L Final   04/26/2013 141 136 - 145 mmol/L Final     Potassium   Date Value Ref Range Status   04/15/2021 4.5 3.5 - 5.0 mmol/L Final   04/26/2013 3.8 3.5 - 5.0 mmol/L Final     Chloride   Date Value Ref Range Status   04/15/2021 107 98 - 107 mmol/L Final   04/26/2013 106 98 - 107 mmol/L Final     Carbon Dioxide   Date Value Ref Range Status   08/15/2011 26.0 20.0 - 32.0 mmol/L Final     Carbon Dioxide (CO2)   Date Value Ref Range Status   04/15/2021 26 22 - 31 mmol/L Final     Anion Gap   Date Value Ref Range Status   04/15/2021 10 5 - 18 mmol/L Final     Glucose   Date Value Ref Range Status   04/15/2021 110 70 - 125 mg/dL Final "   04/26/2013 114 70 - 125 mg/dL Final     Comment:          Fasting Glucose reference range is 70-99 mg/dL per       American Diabetes Association (ADA) guidelines.     Urea Nitrogen   Date Value Ref Range Status   04/15/2021 28 (H) 8 - 22 mg/dL Final   04/26/2013 18 8 - 22 mg/dL Final     Creatinine   Date Value Ref Range Status   04/15/2021 1.16 (H) 0.60 - 1.10 mg/dL Final   04/26/2013 0.82 0.60 - 1.10 mg/dL Final     GFR Estimate   Date Value Ref Range Status   04/15/2021 49 (L) >60 mL/min/1.73m2 Final   04/26/2013 > 60 >60 ml/min/1.73m2 Final     Calcium   Date Value Ref Range Status   04/15/2021 9.0 8.5 - 10.5 mg/dL Final   04/26/2013 9.4 8.5 - 10.5 mg/dL Final            Diabetes:  Pt currently prescribed Metformin 500 mg ER twice daily. patient is not currently experiencing side effects.   SMBG: once daily    Ranges (patient reported) :     Reports that she still hasn't been able to get test strips filled. Purchased some strips OTC.     Fasting .   After meals 160-170.       Patient is not experiencing hypoglycemia   Recent symptoms of high blood sugar?  Polyuria, polydipsia  ACEi/ARB: Losartan 100 mg daily  Statin: Atorvastatin 80 mg daily  Aspirin: taking 81mg daily,       Hemoglobin A1C   Date Value Ref Range Status   04/15/2021 7.0 (H) <=5.6 % Final   12/15/2020 6.4 (H) <=5.6 % Final   08/20/2020 8.5 (H) <=5.6 % Final     Comment:     Normal <5.7% Prediabete 5.7-6.4% Diabletes 6.5% or higher - adopted from ADA consensus guidelines   01/18/2011 5.7 4.2 - 6.1 % Final      No results found for: MICROL   LDL Cholesterol Calculated   Date Value Ref Range Status   12/15/2020 88 <=129 mg/dL Final   04/26/2013 106 <130 mg/dL Final     LDL Cholesterol Direct   Date Value Ref Range Status   08/20/2020 116 <=129 mg/dl Final     Creatinine   Date Value Ref Range Status   04/15/2021 1.16 (H) 0.60 - 1.10 mg/dL Final   04/26/2013 0.82 0.60 - 1.10 mg/dL Final            Pain/headaches: Prescribed acetaminophen 500  mg 2 tabs 3 times a day, camphor-menthol ointment twice daily as needed, and venlafaxine 75 mg ER once daily.    Now taking Venlafaxine daily. Headaches haven't been as severe. Still having mild headaches - constantly.       ----------------      I spent 30 minutes with this patient today. All changes were made via collaborative practice agreement with Jaky Gaspar. A copy of the visit note was provided to the patient's primary care provider.    The patient declined a summary of these recommendations.     Art Thakkar, QuintonD  Medication Therapy Management (MTM) Pharmacist  [facility]      Telemedicine Visit Details  Type of service:  Telephone visit  Start Time: 2:40 PM  End Time: 3:10 PM  Originating Location (patient location): Home  Distant Location (provider location):  [facility]     Medication Therapy Recommendations  Chronic tension-type headache, not intractable    Current Medication: venlafaxine (EFFEXOR-XR) 75 MG 24 hr capsule (Discontinued)   Rationale: Dose too low - Dosage too low - Effectiveness   Recommendation: Increase Dose - venlafaxine 150 MG 24 hr capsule - daily   Status: Accepted per CPA         Essential hypertension    Current Medication: losartan (COZAAR) 100 MG tablet   Rationale: Medication product not available - Adherence - Adherence   Recommendation: Provide Adherence Intervention   Status: Accepted per CPA         Type 2 diabetes mellitus with hyperglycemia, without long-term current use of insulin (H)    Current Medication: blood glucose (CONTOUR NEXT TEST) test strip (Discontinued)   Rationale: Medication product not available - Adherence - Adherence   Recommendation: Provide Adherence Intervention   Status: Accepted per CPA

## 2021-07-22 ENCOUNTER — PATIENT OUTREACH (OUTPATIENT)
Dept: CARE COORDINATION | Facility: CLINIC | Age: 51
End: 2021-07-22

## 2021-07-22 NOTE — PROGRESS NOTES
Situation: Patient chart reviewed by care coordinator.     Background: RN CC review for status update      Assessment: Patient engaged with care team and continue to work toward goal progression      Plan/Recommendations:Community Health Worker to outreach per standard work and updated on goal progression, schedule nurse follow up to support with next outreach

## 2021-07-26 RX ORDER — ISOSORBIDE MONONITRATE 30 MG/1
TABLET, EXTENDED RELEASE ORAL
Qty: 30 TABLET | Refills: 1 | OUTPATIENT
Start: 2021-07-26

## 2021-07-26 NOTE — TELEPHONE ENCOUNTER
Disp Refills Start End MATTIE   isosorbide mononitrate (IMDUR) 60 MG 24 hr tablet 90 tablet 1 5/28/2021  No   Sig: TAKE 1 TABLET (60 MG TOTAL) BY MOUTH DAILY./ DAGOBERTO BLISS NOJ 1 LUB TSHUAJ PAB YANET LUB PLAWV   Sent to pharmacy as: isosorbide mononitrate ER 60 mg tablet,extended release 24 hr (IMDUR)   E-Prescribing Status: Receipt confirmed by pharmacy (5/28/2021  8:25 AM CDT)     Medication refill request denied: should already have refills on file.    Modesta Callahan RN  [facility]

## 2021-07-27 ENCOUNTER — TELEPHONE (OUTPATIENT)
Dept: PHARMACY | Facility: CLINIC | Age: 51
End: 2021-07-27

## 2021-07-27 ENCOUNTER — PATIENT OUTREACH (OUTPATIENT)
Dept: CARE COORDINATION | Facility: CLINIC | Age: 51
End: 2021-07-27

## 2021-07-27 NOTE — LETTER
[facility] COORDINATION  [address] [address]  July 27, 2021    May X Sanchez  [address]  [place] [address]      Dear May,    I have been attempting to reach you since our last contact. I would like to continue to work with you and provide any additional support you may need on achieving your health care related goals. I would appreciate if you would give me a call at [phone] to let me know if you would like to continue working together. I know that there are many things that can affect our ability to communicate and I hope we can continue to work together.    All of us at the [facility] are invested in your health and are here to assist you in meeting your goals. Thank you.     Sincerely,  Elisabet Malone, CCC CHW

## 2021-07-27 NOTE — PROGRESS NOTES
Clinic Care Coordination Contact  [facility]/Voicemail    Reason: CCC CHW Follow Up Call     Clinical Data: Care Coordinator Outreach:  Outreach attempted x 2.  Left message on patient's voicemail with call back information and requested return call.    Note/comment:   Patient's goal was transfer from [facility] to [place] due to system migrations.     Plan: Care Coordinator will send unable to contact letter with care coordinator contact information via mail. Care Coordinator will try to reach patient again in 1 month around 8-.

## 2021-07-27 NOTE — TELEPHONE ENCOUNTER
Patient was called for MTM follow-up appointment. Again, was not at home with her medicines. Unable to complete the visit.  (same situation at last visit one week ago).     Reschedule for mid-August.     Art Thakkar PharmD  Medication Therapy Management (MTM) Pharmacist  [facility]

## 2021-08-15 DIAGNOSIS — I10 ESSENTIAL HYPERTENSION, BENIGN: Primary | ICD-10-CM

## 2021-08-17 RX ORDER — POTASSIUM CHLORIDE 750 MG/1
TABLET, EXTENDED RELEASE ORAL
Qty: 90 TABLET | Refills: 3 | Status: SHIPPED | OUTPATIENT
Start: 2021-08-17 | End: 2022-08-20

## 2021-08-18 NOTE — TELEPHONE ENCOUNTER
"Last Written Prescription Date:  7/20/2020  Last Fill Quantity: 90,  # refills: 4   Last office visit provider:  7/16/2021     Requested Prescriptions   Pending Prescriptions Disp Refills     potassium chloride ER (K-TAB/KLOR-CON) 10 MEQ CR tablet [Pharmacy Med Name: POTASSIUM CHLORIDE ER 10 ME 10 Tablet] 90 tablet 4     Sig: TAKE 1 PILL BY MOUTH EVERY DAY/TXJANEA HNUB NOJ 1 LUB TSHUAJ       Potassium Supplements Protocol Passed - 8/15/2021 10:42 AM        Passed - Recent (12 mo) or future (30 days) visit within the authorizing provider's department     Patient has had an office visit with the authorizing provider or a provider within the authorizing providers department within the previous 12 mos or has a future within next 30 days. See \"Patient Info\" tab in inbasket, or \"Choose Columns\" in Meds & Orders section of the refill encounter.              Passed - Medication is active on med list        Passed - Patient is age 18 or older        Passed - Normal serum potassium in past 12 months     Recent Labs   Lab Test 04/15/21  0939   POTASSIUM 4.5                         Nati Latif RN 08/17/21 11:25 PM  "

## 2021-08-31 ENCOUNTER — PATIENT OUTREACH (OUTPATIENT)
Dept: NURSING | Facility: CLINIC | Age: 51
End: 2021-08-31

## 2021-08-31 NOTE — PROGRESS NOTES
Clinic Care Coordination Contact  Community Health Worker Follow Up    Care Gaps:   Health Maintenance Due   Topic Date Due     DIABETIC FOOT EXAM  Never done     ASTHMA ACTION PLAN  Never done     ADVANCE CARE PLANNING  Never done     DEPRESSION ACTION PLAN  Never done     EYE EXAM  Never done     COLORECTAL CANCER SCREENING  Never done     MEDICARE ANNUAL WELLNESS VISIT  Never done     MAMMO SCREENING  02/20/2018     ZOSTER IMMUNIZATION (1 of 2) Never done     DTAP/TDAP/TD IMMUNIZATION (3 - Td or Tdap) 01/18/2021     HPV TEST  02/08/2021     PAP  02/08/2021     ASTHMA CONTROL TEST  02/20/2021     PHQ-9  06/02/2021     A1C  07/15/2021     MICROALBUMIN  08/20/2021     INFLUENZA VACCINE (1) 09/01/2021     Discussed today. Pt declined CHW assisted with coordinate appt at this time due to patient stated that she is currently working with medical bill at the moment and will connect with PCP office to schedule appt when ready per pt.    Goals accomplished today:   Goal Statement: I will attend visit with PCP within 1-2 weeks.  Date Goal set: 02/22/21  Barriers: access  Strengths: pt engagement  Date to Achieve By: March   Patient expressed understanding of goal: yes  Personal Action Steps:  1. I have completed appt visit with Dr. Gaspar on 3/02/2021, and 3/23/2021.  2. I completed appt visitp office in scheduled 7/16/2021 with PharmD.  3. I will follow PCP and PharmD instructions.  4. I will connect with PCP and PharmD office at [phone] in the future if any additional resources or support needed.  (Date goal completed: 8-)    Intervention and Education during outreach:  CHW was able to connect with patient today. Pt met and completed current goal. Patient declined to schedule follow up appt with PCP and PharmD at this time due to medical bill reason. Pt is aware to connect with PCP, PharmD and CCC office with any additional resources need. Was unable to discuss or explain CCC maintenance due to patient time.      Patient reported:   -Worries about medical bills sitting in home. Cannot afford out of pocket. Would like to find new/better health care options/plan at open enrollment in October 2021.  -Doing well and no other concerns at this time.     CHW Next Follow-up: verify new goal, needs, and discuss moving patient towards CCC Maintenance.     Outreach frequency: monthly     CHW Plan: 9-

## 2021-09-04 DIAGNOSIS — I25.10 CORONARY ARTERY DISEASE INVOLVING NATIVE CORONARY ARTERY OF NATIVE HEART: ICD-10-CM

## 2021-09-04 DIAGNOSIS — K21.9 GASTROESOPHAGEAL REFLUX DISEASE WITHOUT ESOPHAGITIS: ICD-10-CM

## 2021-09-04 DIAGNOSIS — I25.119 CORONARY ARTERY DISEASE INVOLVING NATIVE CORONARY ARTERY OF NATIVE HEART WITH ANGINA PECTORIS (H): Primary | ICD-10-CM

## 2021-09-05 RX ORDER — ATORVASTATIN CALCIUM 80 MG/1
TABLET, FILM COATED ORAL
Qty: 90 TABLET | Refills: 2 | Status: SHIPPED | OUTPATIENT
Start: 2021-09-05 | End: 2021-12-07

## 2021-09-05 NOTE — TELEPHONE ENCOUNTER
"  Disp Refills Start End MATTIE    omeprazole (PRILOSEC) 40 MG capsule   12/13/2020  --   Class: Historical Med   omeprazole (PRILOSEC) 40 MG capsule [[id number]]  Patient-reported historical medication  Ordering date: 12/16/20 1731 Authorized by: PROVIDER, HISTORICAL   Frequency:  12/13/20 - Until Discontinued       Routing refill request to provider for review/approval because:  Medication is reported/historical    Last Written Prescription Date:  ???  Last Fill Quantity: ???,  # refills: ???   Last office visit provider:  3/2/21     Requested Prescriptions   Pending Prescriptions Disp Refills     omeprazole (PRILOSEC) 40 MG DR capsule [Pharmacy Med Name: OMEPRAZOLE 40 MG CPDR 40 Capsule] 90 capsule 4     Sig: TAKE 1 PILL BY MOUTH EVERY DAY/ TXOUMAR BLISS NOJ 1 LUB GIO CHÁVEZO LUB PLAB       PPI Protocol Passed - 9/4/2021 10:11 AM        Passed - Not on Clopidogrel (unless Pantoprazole ordered)        Passed - No diagnosis of osteoporosis on record        Passed - Recent (12 mo) or future (30 days) visit within the authorizing provider's specialty     Patient has had an office visit with the authorizing provider or a provider within the authorizing providers department within the previous 12 mos or has a future within next 30 days. See \"Patient Info\" tab in inbasket, or \"Choose Columns\" in Meds & Orders section of the refill encounter.              Passed - Medication is active on med list        Passed - Patient is age 18 or older        Passed - No active pregnacy on record        Passed - No positive pregnancy test in past 12 months         Signed Prescriptions Disp Refills    atorvastatin (LIPITOR) 80 MG tablet 90 tablet 2     Sig: TAKE 1 TABLET (80 MG TOTAL) BY MOUTH DAILY/ TXHUA HNUB NOJ 1 [facility]UB [facility] YANET NTSHAV MUAJ ROJ       Statins Protocol Passed - 9/4/2021 10:11 AM        Passed - LDL on file in past 12 months     Recent Labs   Lab Test 12/15/20  0904   LDL 88             Passed - No abnormal creatine " "kinase in past 12 months     No lab results found.             Passed - Recent (12 mo) or future (30 days) visit within the authorizing provider's specialty     Patient has had an office visit with the authorizing provider or a provider within the authorizing providers department within the previous 12 mos or has a future within next 30 days. See \"Patient Info\" tab in inbasket, or \"Choose Columns\" in Meds & Orders section of the refill encounter.              Passed - Medication is active on med list        Passed - Patient is age 18 or older        Passed - No active pregnancy on record        Passed - No positive pregnancy test in past 12 months             Rojelio Garcia RN 09/05/21 8:46 AM  "

## 2021-09-05 NOTE — TELEPHONE ENCOUNTER
"Disp Refills Start End MATTIE    atorvastatin (LIPITOR) 80 MG tablet 90 tablet 4 6/24/2020  No   Sig: TAKE 1 TABLET (80 MG TOTAL) BY MOUTH DAILY/ DAGOBERTO JNO 1 CANDELARIO SANCHEZ YANET JANGV JACKIEJ SAMARIAJ   Sent to pharmacy as: atorvastatin 80 mg tablet (LIPITOR)   E-Prescribing Status: Receipt confirmed by pharmacy (6/24/2020  9:39 AM CDT)       Last Written Prescription Date:  6/24/20  Last Fill Quantity: 90,  # refills: 4   Last office visit provider:  3/23/21     Requested Prescriptions   Pending Prescriptions Disp Refills     omeprazole (PRILOSEC) 40 MG DR capsule [Pharmacy Med Name: OMEPRAZOLE 40 MG CPDR 40 Capsule] 90 capsule 4     Sig: TAKE 1 PILL BY MOUTH EVERY DAY/ DAGOBERTO JON 1 CANDELARIO CHÁVEZO CANDELARIO PLAB       PPI Protocol Passed - 9/4/2021 10:11 AM        Passed - Not on Clopidogrel (unless Pantoprazole ordered)        Passed - No diagnosis of osteoporosis on record        Passed - Recent (12 mo) or future (30 days) visit within the authorizing provider's specialty     Patient has had an office visit with the authorizing provider or a provider within the authorizing providers department within the previous 12 mos or has a future within next 30 days. See \"Patient Info\" tab in inbasket, or \"Choose Columns\" in Meds & Orders section of the refill encounter.              Passed - Medication is active on med list        Passed - Patient is age 18 or older        Passed - No active pregnacy on record        Passed - No positive pregnancy test in past 12 months           atorvastatin (LIPITOR) 80 MG tablet [Pharmacy Med Name: ATORVASTATIN CALCIUM 80 MG 80 Tablet] 90 tablet 4     Sig: TAKE 1 TABLET (80 MG TOTAL) BY MOUTH DAILY/ DAGOBERTO JON 1 CANDELARIO MAURER LAURA YANET NTSHAV MUJOBYJ ROJ       Statins Protocol Passed - 9/4/2021 10:11 AM        Passed - LDL on file in past 12 months     Recent Labs   Lab Test 12/15/20  0904   LDL 88             Passed - No abnormal creatine kinase in past 12 months     No lab results found.      " "       Passed - Recent (12 mo) or future (30 days) visit within the authorizing provider's specialty     Patient has had an office visit with the authorizing provider or a provider within the authorizing providers department within the previous 12 mos or has a future within next 30 days. See \"Patient Info\" tab in inbasket, or \"Choose Columns\" in Meds & Orders section of the refill encounter.              Passed - Medication is active on med list        Passed - Patient is age 18 or older        Passed - No active pregnancy on record        Passed - No positive pregnancy test in past 12 months             Rojelio Garcia RN 09/05/21 8:42 AM  "

## 2021-09-07 RX ORDER — OMEPRAZOLE 40 MG/1
CAPSULE, DELAYED RELEASE ORAL
Qty: 90 CAPSULE | Refills: 4 | Status: SHIPPED | OUTPATIENT
Start: 2021-09-07 | End: 2022-12-01

## 2021-09-16 ENCOUNTER — PATIENT OUTREACH (OUTPATIENT)
Dept: CARE COORDINATION | Facility: CLINIC | Age: 51
End: 2021-09-16

## 2021-09-16 NOTE — PROGRESS NOTES
Clinic Care Coordination Contact     Situation: Patient chart reviewed by care coordinator.     Background: RN CC review for status update      Assessment: Patient engaged with CHW and continue to work toward goal progression     Pt no show for MTM visit  Support rescheduling      Plan/Recommendations:Community Health Worker to outreach per standard work and updated on goal progression     Schedule reassessment and consideration for goal around medication therapy compliance

## 2021-10-05 DIAGNOSIS — G44.229 CHRONIC TENSION-TYPE HEADACHE, NOT INTRACTABLE: ICD-10-CM

## 2021-10-05 PROBLEM — I50.20 HEART FAILURE WITH REDUCED EJECTION FRACTION (H): Status: ACTIVE | Noted: 2017-10-30

## 2021-10-05 RX ORDER — VENLAFAXINE HYDROCHLORIDE 150 MG/1
150 CAPSULE, EXTENDED RELEASE ORAL DAILY
Qty: 30 CAPSULE | Refills: 11 | Status: SHIPPED | OUTPATIENT
Start: 2021-10-05 | End: 2021-12-07

## 2021-10-06 NOTE — PROGRESS NOTES
Patient has a future F2F appointment on 10/21/21 with Dr. Gaspar. Pt declined to schedule AWV due to high bills. Completing task.

## 2021-10-12 ENCOUNTER — PATIENT OUTREACH (OUTPATIENT)
Dept: CARE COORDINATION | Facility: CLINIC | Age: 51
End: 2021-10-12

## 2021-10-12 NOTE — PROGRESS NOTES
Clinic Care Coordination Contact  Community Health Worker Follow Up    Spoke to Patient (May) with the help of an  (Kristen ID: [id number])    CHW Introduced of intent of call regarding monthly follow up    Patient expressed that she is doing well.     CHW inquired whther Patient is intrested in completing another assessment with CC RN regarding medication review. Patient declined and further indicated that she will be having a medication review with PCP on 10/21/2021. CHW acknowledged and encouraged Patient to contact CHW if futher assiasntance with Medcation post follow up appointment with PCP on 10/21/2021    CHW inquired if Patient would like assistance in finding whether Patient is eligible for help covering some of her Medical bills outside of [place]. Patient expressed that she has already tried to get assistance that she and her  were told that they were not eligible for assistance. CHW acknowledged and ask if Patient would like CC SW to look further into eligibility requirement. Patient expressed that her  spoke to assistance program and explained the reasoning of why she is not eligible. Patient further indicated that her  speaks and understand english well.  CHW acknowledge.      Care Gaps:     Health Maintenance Due   Topic Date Due     DIABETIC FOOT EXAM  Never done     ASTHMA ACTION PLAN  Never done     ADVANCE CARE PLANNING  Never done     DEPRESSION ACTION PLAN  Never done     EYE EXAM  Never done     COLORECTAL CANCER SCREENING  Never done     MEDICARE ANNUAL WELLNESS VISIT  Never done     MAMMO SCREENING  02/20/2018     ZOSTER IMMUNIZATION (1 of 2) Never done     DTAP/TDAP/TD IMMUNIZATION (3 - Td or Tdap) 01/18/2021     HPV TEST  02/08/2021     PAP  02/08/2021     ASTHMA CONTROL TEST  02/20/2021     PHQ-9  06/02/2021     A1C  07/15/2021     MICROALBUMIN  08/20/2021     INFLUENZA VACCINE (1) 09/01/2021     Care Gaps are postponed to Next CHW Outreach    Goals: Completed  within Previous Outreach       Intervention and Education during outreach: CHW encouraged Patient to contact CCC Team for additional support or resources.     CHW Plan: CHW outreach next in one month. If Patient doesn't need further CCC support, discuss moving Patient to Maintenance.     Ayana Bolton, CHW  Clinic Care Coordination   [facility]   Phone: [phone]  [email or web address]

## 2021-11-01 ENCOUNTER — PATIENT OUTREACH (OUTPATIENT)
Dept: CARE COORDINATION | Facility: CLINIC | Age: 51
End: 2021-11-01

## 2021-11-01 NOTE — PROGRESS NOTES
Clinic Care Coordination Contact    Situation: Patient chart reviewed by care coordinator.    Background: RN CC review for status update    Assessment:   Pt canceled and reschedule visit with PCP to address health maintenance and care gaps   Plan/Recommendations:     Patient will schedule and attend recommended follow up visits with speciality providers and primary care provider.  Community Health Worker to outreach per standard work and updated on goal progression      RN CC will outreach following PCP visit to support recommendations with plan of care and reassess goal progression.

## 2021-11-02 ENCOUNTER — IMMUNIZATION (OUTPATIENT)
Dept: FAMILY MEDICINE | Facility: CLINIC | Age: 51
End: 2021-11-02
Payer: MEDICARE

## 2021-11-02 PROCEDURE — G0008 ADMIN INFLUENZA VIRUS VAC: HCPCS

## 2021-11-02 PROCEDURE — 90682 RIV4 VACC RECOMBINANT DNA IM: CPT

## 2021-11-12 DIAGNOSIS — E11.65 TYPE 2 DIABETES MELLITUS WITH HYPERGLYCEMIA, WITHOUT LONG-TERM CURRENT USE OF INSULIN (H): Primary | ICD-10-CM

## 2021-11-14 PROBLEM — E66.812 CLASS 2 SEVERE OBESITY DUE TO EXCESS CALORIES WITH SERIOUS COMORBIDITY AND BODY MASS INDEX (BMI) OF 39.0 TO 39.9 IN ADULT (H): Status: ACTIVE | Noted: 2021-11-14

## 2021-11-14 PROBLEM — F33.41 RECURRENT MAJOR DEPRESSIVE DISORDER, IN PARTIAL REMISSION (H): Status: ACTIVE | Noted: 2019-03-04

## 2021-11-14 PROBLEM — G44.209 TENSION HEADACHE: Status: ACTIVE | Noted: 2021-11-14

## 2021-11-14 PROBLEM — I42.8 NONISCHEMIC CARDIOMYOPATHY (H): Status: ACTIVE | Noted: 2017-11-14

## 2021-11-14 PROBLEM — N18.30 CKD (CHRONIC KIDNEY DISEASE) STAGE 3, GFR 30-59 ML/MIN (H): Status: ACTIVE | Noted: 2020-08-20

## 2021-11-14 PROBLEM — E87.6 HYPOKALEMIA: Status: ACTIVE | Noted: 2021-11-14

## 2021-11-14 PROBLEM — I25.10 NONOCCLUSIVE CORONARY ATHEROSCLEROSIS OF NATIVE CORONARY ARTERY: Status: ACTIVE | Noted: 2017-10-31

## 2021-11-14 PROBLEM — H90.5 RIGHT-SIDED SENSORINEURAL HEARING LOSS: Status: ACTIVE | Noted: 2019-06-14

## 2021-11-14 RX ORDER — METFORMIN HCL 500 MG
TABLET, EXTENDED RELEASE 24 HR ORAL
Qty: 180 TABLET | Refills: 3 | Status: SHIPPED | OUTPATIENT
Start: 2021-11-14 | End: 2022-06-02

## 2021-11-14 NOTE — TELEPHONE ENCOUNTER
Disp Refills Start End MATTIE    metFORMIN (GLUCOPHAGE-XR) 500 MG 24 hr tablet 180 tablet 1 5/6/2021  No   Sig - Route: Take 1 tablet (500 mg total) by mouth 2 (two) times a day. - Oral   Sent to pharmacy as: metFORMIN  mg tablet,extended release 24 hr (GLUCOPHAGE-XR)   E-Prescribing Status: Receipt confirmed by pharmacy (5/6/2021  1:38 PM CDT)       metFORMIN (GLUCOPHAGE-XR) 500 MG 24 hr tablet [[id number]]    Electronically signed by: Art Thakkar, PharmD on 05/06/21 1338 Status: Active   Ordering user: Art Thakkar, PharmD 05/06/21 1338 Ordering provider: Art Thakkar, PharmD   Authorized by: Jaky Gaspar MD   Frequency: BID 05/06/21 - Until Discontinued   Diagnoses  Type 2 diabetes mellitus with hyperglycemia, without long-term current use of insulin (H) [E11.65]     Routing refill request to provider for review/approval because:  Labs not current:  A1C  Patient needs to be seen because it has been more than 6 months since last office visit.    Last office visit provider:  3/23/21     Requested Prescriptions   Pending Prescriptions Disp Refills     metFORMIN (GLUCOPHAGE-XR) 500 MG 24 hr tablet [Pharmacy Med Name: METFORMIN HCL  MG TB2 500 Tablet] 180 tablet 3     Sig: TAKE 1 TABLET (500 MG TOTAL) BY MOUTH 2 (TWO) TIMES A DAY/ DAGOBERTO BREAUXUB NOJ 1 LUB 2 ZAUG PAB ZOO NTSHAV QAB ZIB       Biguanide Agents Failed - 11/12/2021  9:16 AM        Failed - Patient has documented A1c within the specified period of time.     If HgbA1C is 8 or greater, it needs to be on file within the past 3 months.  If less than 8, must be on file within the past 6 months.     Recent Labs   Lab Test 04/15/21  0939   A1C 7.0*             Passed - Patient is age 10 or older        Passed - Patient's CR is NOT>1.4 OR Patient's EGFR is NOT<45 within past 12 mos.     Recent Labs   Lab Test 04/15/21  0939   GFRESTIMATED 49*   GFRESTBLACK 60*       Recent Labs   Lab Test 04/15/21  0939   CR 1.16*             Passed - Patient does  "NOT have a diagnosis of CHF.        Passed - Medication is active on med list        Passed - Patient is not pregnant        Passed - Patient has not had a positive pregnancy test within the past 12 mos.         Passed - Recent (6 mo) or future (30 days) visit within the authorizing provider's specialty     Patient had office visit in the last 6 months or has a visit in the next 30 days with authorizing provider or within the authorizing provider's specialty.  See \"Patient Info\" tab in inbasket, or \"Choose Columns\" in Meds & Orders section of the refill encounter.                 Rojelio Garcia RN 11/14/21 12:43 PM  "

## 2021-11-15 ENCOUNTER — PATIENT OUTREACH (OUTPATIENT)
Dept: NURSING | Facility: CLINIC | Age: 51
End: 2021-11-15

## 2021-11-15 NOTE — PROGRESS NOTES
"Clinic Care Coordination Contact  Community Health Worker Follow Up    Care Gaps:   Health Maintenance Due   Topic Date Due     DIABETIC FOOT EXAM  Never done     ASTHMA ACTION PLAN  Never done     ADVANCE CARE PLANNING  Never done     DEPRESSION ACTION PLAN  Never done     EYE EXAM  Never done     COLORECTAL CANCER SCREENING  Never done     MEDICARE ANNUAL WELLNESS VISIT  Never done     MAMMO SCREENING  02/20/2018     ZOSTER IMMUNIZATION (1 of 2) Never done     DTAP/TDAP/TD IMMUNIZATION (3 - Td or Tdap) 01/18/2021     HPV TEST  02/08/2021     PAP  02/08/2021     ASTHMA CONTROL TEST  02/20/2021     COVID-19 Vaccine (2 - Booster for Kaylene series) 05/26/2021     PHQ-9  06/02/2021     A1C  07/15/2021     MICROALBUMIN  08/20/2021     LIPID  12/15/2021     Addressed with patient. Patient agreed to connect PCP office to schedule follow up appt with PCP. Pt is aware to seek care gaps details from PCP or CCC RN if need.    Discussion:   CCC CHW was able to connect with patient today. Explained reassessment. Patient agreed to reassessment appt. Pt reassessment appt scheduled 11- at 3:00PM with CCC RN via phone visit. Pt declined [employer] online registration basket at this time. CHW suggested pt to connect with CCC team for any additional resources need and PCP office to schedule follow up appt. Pt agreed.    Pt stated; doing fine, will be out of state joining children in [place], [place] for Thanksgiving holiday. No other questions or concerns at this time.     Per pt chart reviewed;   CCC RN notes encounter dated 11-; \"RN CC will outreach following PCP visit to support recommendations with plan of care and reassess goal progression.\"    CHW Next Follow-up: review reassessment appt. Goals follow up.     Outreach frequency: monthly     CHW Plan: 12-  "

## 2021-11-19 ENCOUNTER — PATIENT OUTREACH (OUTPATIENT)
Dept: NURSING | Facility: CLINIC | Age: 51
End: 2021-11-19

## 2021-11-19 ASSESSMENT — ACTIVITIES OF DAILY LIVING (ADL): DEPENDENT_IADLS:: INDEPENDENT;TRANSPORTATION

## 2021-11-19 NOTE — PROGRESS NOTES
Clinic Care Coordination Contact    Follow Up Progress Note      Assessment: RN CC outreach with support of interpretive services,  spoke with patient for status update    Patient states that she has several medical bills and unclear of coverage - RN CC offered to have SW review, pt declined      Care Gaps:    Health Maintenance Due   Topic Date Due     DIABETIC FOOT EXAM  Never done     ASTHMA ACTION PLAN  Never done     ADVANCE CARE PLANNING  Never done     DEPRESSION ACTION PLAN  Never done     EYE EXAM  Never done     COLORECTAL CANCER SCREENING  Never done     MEDICARE ANNUAL WELLNESS VISIT  Never done     MAMMO SCREENING  02/20/2018     ZOSTER IMMUNIZATION (1 of 2) Never done     DTAP/TDAP/TD IMMUNIZATION (3 - Td or Tdap) 01/18/2021     HPV TEST  02/08/2021     PAP  02/08/2021     ASTHMA CONTROL TEST  02/20/2021     COVID-19 Vaccine (2 - Booster for Kaylene series) 05/26/2021     PHQ-9  06/02/2021     A1C  07/15/2021     MICROALBUMIN  08/20/2021     LIPID  12/15/2021       Scheduled PCP visit for December      Goals addressed this encounter:   Goals Addressed                    This Visit's Progress       Patient Stated       Medical (pt-stated)                    Medical (pt-stated)         Goal Statement: I will complete my overdue health maintenance. ( mammogram)   Date Goal set: 11/19/21    Barriers: language  Strengths:     Date to Achieve By: January  Patient expressed understanding of goal: Yes  Action steps to achieve this goal:  1. If I do not receive a call from my clinic to schedule within 1 week, I will call to schedule my own appointment for PCP  2. I will notify my care team once health maintenance item(s) are completed.   3. I will contact my Care Coordination staff or care team with any questions or concerns I may have.             Medical (pt-stated)         Goal Statement: I will have a better understanding and plan of care for dental support within 3-4 months  Date Goal set:  11/19/21    Barriers: language, knowledge deficit   Strengths: family support  Date to Achieve By:  Patient expressed understanding of goal: yes  Action steps to achieve this goal:  1. I will schedule and attend visit with community dental program   2. I will update Care coordination team during next outreach  3. I will report to my Community Health Worker if any additional resources or support needed.             Outreach Frequency: monthly    Plan:   Patient will schedule and attend recommended follow up visits with speciality providers and primary care provider.    RN CC will outreach in 4-6 weeks to support ongoing recommendations and plan of care will be available sooner if needed.

## 2021-12-07 ENCOUNTER — ANCILLARY PROCEDURE (OUTPATIENT)
Dept: MAMMOGRAPHY | Facility: CLINIC | Age: 51
End: 2021-12-07
Attending: FAMILY MEDICINE

## 2021-12-07 ENCOUNTER — OFFICE VISIT (OUTPATIENT)
Dept: FAMILY MEDICINE | Facility: CLINIC | Age: 51
End: 2021-12-07

## 2021-12-07 VITALS
SYSTOLIC BLOOD PRESSURE: 114 MMHG | HEART RATE: 67 BPM | WEIGHT: 193 LBS | RESPIRATION RATE: 16 BRPM | DIASTOLIC BLOOD PRESSURE: 78 MMHG | OXYGEN SATURATION: 99 % | BODY MASS INDEX: 38.34 KG/M2

## 2021-12-07 DIAGNOSIS — Z12.11 COLON CANCER SCREENING: ICD-10-CM

## 2021-12-07 DIAGNOSIS — I25.10 CORONARY ARTERY DISEASE INVOLVING NATIVE CORONARY ARTERY OF NATIVE HEART WITHOUT ANGINA PECTORIS: ICD-10-CM

## 2021-12-07 DIAGNOSIS — Z12.31 ENCOUNTER FOR SCREENING MAMMOGRAM FOR MALIGNANT NEOPLASM OF BREAST: ICD-10-CM

## 2021-12-07 DIAGNOSIS — I42.8 NONISCHEMIC CARDIOMYOPATHY (H): ICD-10-CM

## 2021-12-07 DIAGNOSIS — E87.6 HYPOKALEMIA: ICD-10-CM

## 2021-12-07 DIAGNOSIS — D64.9 ANEMIA, UNSPECIFIED TYPE: ICD-10-CM

## 2021-12-07 DIAGNOSIS — F33.41 RECURRENT MAJOR DEPRESSIVE DISORDER, IN PARTIAL REMISSION (H): ICD-10-CM

## 2021-12-07 DIAGNOSIS — I10 ESSENTIAL HYPERTENSION, BENIGN: ICD-10-CM

## 2021-12-07 DIAGNOSIS — G44.209 TENSION HEADACHE: ICD-10-CM

## 2021-12-07 DIAGNOSIS — I10 ESSENTIAL HYPERTENSION: ICD-10-CM

## 2021-12-07 DIAGNOSIS — E11.22 TYPE 2 DIABETES MELLITUS WITH STAGE 3A CHRONIC KIDNEY DISEASE, WITHOUT LONG-TERM CURRENT USE OF INSULIN (H): Primary | ICD-10-CM

## 2021-12-07 DIAGNOSIS — N18.31 STAGE 3A CHRONIC KIDNEY DISEASE (H): ICD-10-CM

## 2021-12-07 DIAGNOSIS — N18.31 TYPE 2 DIABETES MELLITUS WITH STAGE 3A CHRONIC KIDNEY DISEASE, WITHOUT LONG-TERM CURRENT USE OF INSULIN (H): Primary | ICD-10-CM

## 2021-12-07 DIAGNOSIS — Z23 ENCOUNTER FOR IMMUNIZATION: ICD-10-CM

## 2021-12-07 DIAGNOSIS — G44.229 CHRONIC TENSION-TYPE HEADACHE, NOT INTRACTABLE: ICD-10-CM

## 2021-12-07 LAB
ALBUMIN SERPL-MCNC: 3.7 G/DL (ref 3.5–5)
ALP SERPL-CCNC: 89 U/L (ref 45–120)
ALT SERPL W P-5'-P-CCNC: 26 U/L (ref 0–45)
ANION GAP SERPL CALCULATED.3IONS-SCNC: 11 MMOL/L (ref 5–18)
AST SERPL W P-5'-P-CCNC: 22 U/L (ref 0–40)
BILIRUB SERPL-MCNC: 0.5 MG/DL (ref 0–1)
BUN SERPL-MCNC: 28 MG/DL (ref 8–22)
CALCIUM SERPL-MCNC: 9.2 MG/DL (ref 8.5–10.5)
CHLORIDE BLD-SCNC: 104 MMOL/L (ref 98–107)
CHOLEST SERPL-MCNC: 178 MG/DL
CO2 SERPL-SCNC: 26 MMOL/L (ref 22–31)
CREAT SERPL-MCNC: 1 MG/DL (ref 0.6–1.1)
CREAT UR-MCNC: 101 MG/DL
ERYTHROCYTE [DISTWIDTH] IN BLOOD BY AUTOMATED COUNT: 12.4 % (ref 10–15)
FASTING STATUS PATIENT QL REPORTED: YES
GFR SERPL CREATININE-BSD FRML MDRD: 65 ML/MIN/1.73M2
GLUCOSE BLD-MCNC: 130 MG/DL (ref 70–125)
HBA1C MFR BLD: 6.7 % (ref 0–5.6)
HCT VFR BLD AUTO: 35.4 % (ref 35–47)
HDLC SERPL-MCNC: 59 MG/DL
HGB BLD-MCNC: 11.5 G/DL (ref 11.7–15.7)
LDLC SERPL CALC-MCNC: 89 MG/DL
MCH RBC QN AUTO: 30.6 PG (ref 26.5–33)
MCHC RBC AUTO-ENTMCNC: 32.5 G/DL (ref 31.5–36.5)
MCV RBC AUTO: 94 FL (ref 78–100)
MICROALBUMIN UR-MCNC: <0.5 MG/DL (ref 0–1.99)
MICROALBUMIN/CREAT UR: NORMAL MG/G{CREAT}
PLATELET # BLD AUTO: 244 10E3/UL (ref 150–450)
POTASSIUM BLD-SCNC: 4.5 MMOL/L (ref 3.5–5)
PROT SERPL-MCNC: 6.5 G/DL (ref 6–8)
RBC # BLD AUTO: 3.76 10E6/UL (ref 3.8–5.2)
SODIUM SERPL-SCNC: 141 MMOL/L (ref 136–145)
TRIGL SERPL-MCNC: 149 MG/DL
VIT B12 SERPL-MCNC: 296 PG/ML (ref 213–816)
WBC # BLD AUTO: 5.8 10E3/UL (ref 4–11)

## 2021-12-07 PROCEDURE — 36415 COLL VENOUS BLD VENIPUNCTURE: CPT | Performed by: FAMILY MEDICINE

## 2021-12-07 PROCEDURE — 99214 OFFICE O/P EST MOD 30 MIN: CPT | Mod: 25 | Performed by: FAMILY MEDICINE

## 2021-12-07 PROCEDURE — 90715 TDAP VACCINE 7 YRS/> IM: CPT | Performed by: FAMILY MEDICINE

## 2021-12-07 PROCEDURE — 77067 SCR MAMMO BI INCL CAD: CPT | Mod: TC | Performed by: RADIOLOGY

## 2021-12-07 PROCEDURE — 82607 VITAMIN B-12: CPT | Performed by: FAMILY MEDICINE

## 2021-12-07 PROCEDURE — 83036 HEMOGLOBIN GLYCOSYLATED A1C: CPT | Performed by: FAMILY MEDICINE

## 2021-12-07 PROCEDURE — 85027 COMPLETE CBC AUTOMATED: CPT | Performed by: FAMILY MEDICINE

## 2021-12-07 PROCEDURE — 90471 IMMUNIZATION ADMIN: CPT | Performed by: FAMILY MEDICINE

## 2021-12-07 PROCEDURE — 82043 UR ALBUMIN QUANTITATIVE: CPT | Performed by: FAMILY MEDICINE

## 2021-12-07 PROCEDURE — 80061 LIPID PANEL: CPT | Performed by: FAMILY MEDICINE

## 2021-12-07 PROCEDURE — 80053 COMPREHEN METABOLIC PANEL: CPT | Performed by: FAMILY MEDICINE

## 2021-12-07 RX ORDER — VENLAFAXINE HYDROCHLORIDE 150 MG/1
150 CAPSULE, EXTENDED RELEASE ORAL DAILY
Qty: 90 CAPSULE | Refills: 4 | Status: SHIPPED | OUTPATIENT
Start: 2021-12-07 | End: 2022-10-06

## 2021-12-07 RX ORDER — LOSARTAN POTASSIUM 100 MG/1
TABLET ORAL
Qty: 90 TABLET | Refills: 4 | Status: SHIPPED | OUTPATIENT
Start: 2021-12-07 | End: 2023-01-05

## 2021-12-07 RX ORDER — METOPROLOL SUCCINATE 100 MG/1
100 TABLET, EXTENDED RELEASE ORAL DAILY
Qty: 90 TABLET | Refills: 0 | Status: SHIPPED | OUTPATIENT
Start: 2021-12-07 | End: 2022-06-02

## 2021-12-07 RX ORDER — ASPIRIN 81 MG/1
81 TABLET ORAL DAILY
Qty: 90 TABLET | Refills: 4 | Status: SHIPPED | OUTPATIENT
Start: 2021-12-07 | End: 2022-06-02

## 2021-12-07 RX ORDER — ATORVASTATIN CALCIUM 80 MG/1
TABLET, FILM COATED ORAL
Qty: 90 TABLET | Refills: 4 | Status: SHIPPED | OUTPATIENT
Start: 2021-12-07 | End: 2023-02-20

## 2021-12-07 RX ORDER — ACETAMINOPHEN 500 MG
1000 TABLET ORAL 3 TIMES DAILY PRN
Qty: 100 TABLET | Refills: 11 | Status: SHIPPED | OUTPATIENT
Start: 2021-12-07 | End: 2023-06-23

## 2021-12-07 RX ORDER — FUROSEMIDE 40 MG
TABLET ORAL
Qty: 180 TABLET | Refills: 4 | Status: SHIPPED | OUTPATIENT
Start: 2021-12-07 | End: 2022-06-02

## 2021-12-07 RX ORDER — NITROGLYCERIN 0.4 MG/1
0.4 TABLET SUBLINGUAL EVERY 5 MIN PRN
Qty: 20 TABLET | Refills: 4 | Status: SHIPPED | OUTPATIENT
Start: 2021-12-07 | End: 2023-04-15

## 2021-12-07 ASSESSMENT — ASTHMA QUESTIONNAIRES
QUESTION_4 LAST FOUR WEEKS HOW OFTEN HAVE YOU USED YOUR RESCUE INHALER OR NEBULIZER MEDICATION (SUCH AS ALBUTEROL): NOT AT ALL
QUESTION_5 LAST FOUR WEEKS HOW WOULD YOU RATE YOUR ASTHMA CONTROL: COMPLETELY CONTROLLED
QUESTION_3 LAST FOUR WEEKS HOW OFTEN DID YOUR ASTHMA SYMPTOMS (WHEEZING, COUGHING, SHORTNESS OF BREATH, CHEST TIGHTNESS OR PAIN) WAKE YOU UP AT NIGHT OR EARLIER THAN USUAL IN THE MORNING: NOT AT ALL
QUESTION_2 LAST FOUR WEEKS HOW OFTEN HAVE YOU HAD SHORTNESS OF BREATH: NOT AT ALL
QUESTION_1 LAST FOUR WEEKS HOW MUCH OF THE TIME DID YOUR ASTHMA KEEP YOU FROM GETTING AS MUCH DONE AT WORK, SCHOOL OR AT HOME: NONE OF THE TIME
ACT_TOTALSCORE: 25

## 2021-12-07 NOTE — PROGRESS NOTES
[facility] IN-OFFICE Visit  In Person : Kandy    Chief Complaint:  Chief Complaint   Patient presents with     med check       Assessment/Plan:  Type 2 diabetes mellitus with stage 3a chronic kidney disease, without long-term current use of insulin (H)   Controlled.  Addressed smoking status and aspirin therapy.  Recommended annual eye exam and dental cares. Reviewed foot cares and foot exam.  Blood pressure and lipid management reviewed today.  Vaccines reviewed and updated.  Plan for glucose management includes ongoing focus on healthy diabetic diet and increased activity, continue metformin XR 500mg bid.  Labs ordered as below including:     Hemoglobin A1C POCT   Date Value Ref Range Status   01/18/2011 5.7 4.2 - 6.1 % Final     Hemoglobin A1C   Date Value Ref Range Status   12/07/2021 6.7 (H) 0.0 - 5.6 % Final     Comment:     Normal <5.7%   Prediabetes 5.7-6.4%    Diabetes 6.5% or higher     Note: Adopted from ADA consensus guidelines.   04/15/2021 7.0 (H) <=5.6 % Final   12/15/2020 6.4 (H) <=5.6 % Final    ,   Lab Results   Component Value Date    LDL 89 12/07/2021   ,   Creatinine   Date Value Ref Range Status   12/07/2021 1.00 0.60 - 1.10 mg/dL Final   04/26/2013 0.82 0.60 - 1.10 mg/dL Final        - Adult Eye Referral  - Hemoglobin A1c  - Albumin Random Urine Quantitative with Creat Ratio  - Lipid Profile (Chol, Trig, HDL, LDL calc)  - Comprehensive metabolic panel (BMP + Alb, Alk Phos, ALT, AST, Total. Bili, TP)  - Vitamin B12  - Hemoglobin A1c  - Lipid Profile (Chol, Trig, HDL, LDL calc)  - Comprehensive metabolic panel (BMP + Alb, Alk Phos, ALT, AST, Total. Bili, TP)  - Vitamin B12  - Albumin Random Urine Quantitative with Creat Ratio  - aspirin 81 MG EC tablet  Dispense: 90 tablet; Refill: 4    Essential hypertension  BP Readings from Last 3 Encounters:   12/07/21 114/78   07/14/21 131/82   07/01/21 (!) 155/90        controlled today.  Plan for bloodpressure  management includes ongoing focus on healthy DASH type diet and increased activity, encouraged to avoid tobacco products and limit alcohol use, stress reduction, continue imdur 60mg daily, losartan 100mg daily, metoprolol xl 100mg daily.  Labwork and meds ordered and reviewed as below  Potassium   Date Value Ref Range Status   12/07/2021 4.5 3.5 - 5.0 mmol/L Final   04/26/2013 3.8 3.5 - 5.0 mmol/L Final      Creatinine   Date Value Ref Range Status   12/07/2021 1.00 0.60 - 1.10 mg/dL Final   04/26/2013 0.82 0.60 - 1.10 mg/dL Final          Recurrent major depressive disorder, in partial remission (H)  Well controlled on effexor-xr 150mg daily    Nonischemic cardiomyopathy (H)  Stable- due for follow-up with cardiology.  Labs as below.  Continue asa, statin, glucose control, ARB, b-blocker, imdur, mental health management, vitamin D supplementation.  Pt is on long term PPI- consider weaning in future but hasn't tolerated in past.    - Lipid Profile (Chol, Trig, HDL, LDL calc)  - Adult Cardiology Eval Referral  - Lipid Profile (Chol, Trig, HDL, LDL calc)  - atorvastatin (LIPITOR) 80 MG tablet  Dispense: 90 tablet; Refill: 4  - furosemide (LASIX) 40 MG tablet  Dispense: 180 tablet; Refill: 4  - metoprolol succinate ER (TOPROL-XL) 100 MG 24 hr tablet  Dispense: 90 tablet; Refill: 0  - nitroGLYcerin (NITROSTAT) 0.4 MG sublingual tablet  Dispense: 20 tablet; Refill: 4    Hypokalemia  On chronic lasix with Kcl replacement.    - Comprehensive metabolic panel (BMP + Alb, Alk Phos, ALT, AST, Total. Bili, TP)  - Comprehensive metabolic panel (BMP + Alb, Alk Phos, ALT, AST, Total. Bili, TP)    Stage 3a chronic kidney disease (H)  Continue focus on glucose, lipid and blood pressure control.  Continue to monitor for anemia and vitamin D.  Pt counselled on avoiding NSAIDS and other over-the-counter medications without talking with doctor first.  Discussed importance of hydration.  Continue ARB .    Creatinine   Date Value Ref  Range Status   12/07/2021 1.00 0.60 - 1.10 mg/dL Final   04/26/2013 0.82 0.60 - 1.10 mg/dL Final      GFR Estimate   Date Value Ref Range Status   12/07/2021 65 >60 mL/min/1.73m2 Final     Comment:     As of July 11, 2021, eGFR is calculated by the CKD-EPI creatinine equation, without race adjustment. eGFR can be influenced by muscle mass, exercise, and diet. The reported eGFR is an estimation only and is only applicable if the renal function is stable.   04/15/2021 49 (L) >60 mL/min/1.73m2 Final   04/26/2013 > 60 >60 ml/min/1.73m2 Final     Hemoglobin   Date Value Ref Range Status   12/07/2021 11.5 (L) 11.7 - 15.7 g/dL Final     No results found for: SWO237, PQJJ430, [id number], VITD3, D2VIT, D3VIT, DTOT, [id number], [id number], [id number], [id number], [id number], [id number]   Hemoglobin A1C POCT   Date Value Ref Range Status   01/18/2011 5.7 4.2 - 6.1 % Final     Hemoglobin A1C   Date Value Ref Range Status   12/07/2021 6.7 (H) 0.0 - 5.6 % Final     Comment:     Normal <5.7%   Prediabetes 5.7-6.4%    Diabetes 6.5% or higher     Note: Adopted from ADA consensus guidelines.     LDL Cholesterol Calculated   Date Value Ref Range Status   12/07/2021 89 <=129 mg/dL Final   04/26/2013 106 <130 mg/dL Final     LDL Cholesterol Direct   Date Value Ref Range Status   08/20/2020 116 <=129 mg/dl Final       - Comprehensive metabolic panel (BMP + Alb, Alk Phos, ALT, AST, Total. Bili, TP)  - CBC with platelets  - Comprehensive metabolic panel (BMP + Alb, Alk Phos, ALT, AST, Total. Bili, TP)  - CBC with platelets    Tension headache  Well controlled with current meds (imdur, bblocker and effexor) tylenol as needed     Encounter for immunization  - TDAP VACCINE (Adacel, Boostrix)  [[id number]]    Coronary artery disease involving native coronary artery of native heart  As above- statin, asa, bblocker, imdur, ARB, etc.  Follow-up with cardiology in next month.      Essential hypertension, benign  As above.    - losartan (COZAAR) 100  MG tablet  Dispense: 90 tablet; Refill: 4    Chronic tension-type headache, not intractable  As above  - acetaminophen (MAPAP) 500 MG tablet  Dispense: 100 tablet; Refill: 11  - venlafaxine (EFFEXOR-XR) 150 MG 24 hr capsule  Dispense: 90 capsule; Refill: 4    Colon cancer screening  - MAURILIO([employer])    Anemia, unspecified type  Add b12 supplement and increase meat in diet and recheck at next visit.        Return in about 3 months (around 3/7/2022) for Annual Wellness Exam.      Patient Education/AVS:  There are no Patient Instructions on file for this visit.    HPI:   May MARICHUY Sanchez is a 51 year old female and presents to clinic today for the following health issues due for med check and lab work.  Last seen by pcp 3/2021 but virtually.  Last seen by MTM 7/2021.  Last seen by cardiology 2/202.    Needs refill on meds, especially the one under the tongue. Sometimes uses 2-3x/month with SOB with exertion with one med.     Tylenol 1000mg as needed for headaches and helpful.     Diabetes- did get test strips from [facility] or [facility]. Fasting sugars- 103 (up to 140). Evening bedtime check 140-145.     Was going to do cologard-     Has covid booster shot later this week.     Answers for HPI/ROS submitted by the patient on 12/7/2021  How many servings of fruits and vegetables do you eat daily?: 4 or more  On average, how many sweetened beverages do you drink each day (Examples: soda, juice, sweet tea, etc.  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      History summarized from1-2:virtual mtm 7/16/21 was missing losartan.  Test strips filled at [facility] not [facility] due to insurance coverage.  bp high.   Diabetes well controlled but behind on labs.  Headaches better on venlafaxine but increase dose advised to 150mg daily.    Old Records-1: Outside allergies, meds, problems and immunizations were reconciled as needed from [facility]  Lab tests reviewed-1: 2021    Social History     Social History Narrative    Lives with Mother and  who can read and speak English and helps her with meds       Physical Exam:  /78 (BP Location: Left arm, Patient Position: Sitting, Cuff Size: Adult Large)   Pulse 67   Resp 16   Wt 87.5 kg (193 lb)   SpO2 99%   BMI 38.34 kg/m   Body mass index is 38.34 kg/m . No LMP recorded. Patient is postmenopausal.  Vital signs reviewed  Wt Readings from Last 3 Encounters:   12/07/21 87.5 kg (193 lb)   07/01/21 88.5 kg (195 lb)   04/15/21 90.7 kg (200 lb)       PHQ-2 Score:     PHQ-2 ( 1999 Pfizer) 12/7/2021   Q1: Little interest or pleasure in doing things 0   Q2: Feeling down, depressed or hopeless 0   PHQ-2 Score 0   Q1: Little interest or pleasure in doing things Not at all   Q2: Feeling down, depressed or hopeless Not at all   PHQ-2 Score 0         All normal as below except abnormalities include: stable exam- no changes or major abnormalities noted today.    General is a  51 year old female sitting comfortably in no apparent distress wearing a mask.  HEENT:  Eye exam normal   Neck: Supple without lymphadenopathy or thyromegaly  CV: Regular rate and rhythm S1S2 without rubs, murmurs or gallops,   Lungs: Clear to auscultation bilaterally  Extremities: Warm, No Edema, 2+ Pedal and radial pulses bilaterally  Skin: No lesions or rashes noted  Neuro/MSK: Able to ambulate around the exam room with equal movement, strength and normal coordination of the upper and lower extremeties symmetrically    Jaky Gaspar MD  [facility]   no

## 2021-12-08 ASSESSMENT — ASTHMA QUESTIONNAIRES: ACT_TOTALSCORE: 25

## 2021-12-08 ASSESSMENT — PATIENT HEALTH QUESTIONNAIRE - PHQ9: SUM OF ALL RESPONSES TO PHQ QUESTIONS 1-9: 0

## 2021-12-14 ENCOUNTER — PATIENT OUTREACH (OUTPATIENT)
Dept: NURSING | Facility: CLINIC | Age: 51
End: 2021-12-14

## 2021-12-14 SDOH — ECONOMIC STABILITY: TRANSPORTATION INSECURITY
IN THE PAST 12 MONTHS, HAS THE LACK OF TRANSPORTATION KEPT YOU FROM MEDICAL APPOINTMENTS OR FROM GETTING MEDICATIONS?: NO

## 2021-12-14 SDOH — ECONOMIC STABILITY: INCOME INSECURITY: IN THE LAST 12 MONTHS, WAS THERE A TIME WHEN YOU WERE NOT ABLE TO PAY THE MORTGAGE OR RENT ON TIME?: NO

## 2021-12-14 SDOH — ECONOMIC STABILITY: TRANSPORTATION INSECURITY
IN THE PAST 12 MONTHS, HAS LACK OF TRANSPORTATION KEPT YOU FROM MEETINGS, WORK, OR FROM GETTING THINGS NEEDED FOR DAILY LIVING?: NO

## 2021-12-14 ASSESSMENT — SOCIAL DETERMINANTS OF HEALTH (SDOH): HOW HARD IS IT FOR YOU TO PAY FOR THE VERY BASICS LIKE FOOD, HOUSING, MEDICAL CARE, AND HEATING?: SOMEWHAT HARD

## 2021-12-14 ASSESSMENT — ACTIVITIES OF DAILY LIVING (ADL): DEPENDENT_IADLS:: INDEPENDENT;TRANSPORTATION

## 2021-12-14 NOTE — PROGRESS NOTES
Clinic Care Coordination Contact    Follow Up Progress Note      Assessment: RN CC outreach with support of interpretive services,  spoke with patient for status update    Pt attended visit with PCP for medication review - pt notes that she was able to get all of her medications filled    Patient reports that she is able to test blood sugars - some times test more often that prescribed and needs to purchase strip OTC    Referral placed for Eye Provider -   RN CC assist with scheduling visit at [facility] location.   December 21st  - 1:50 with Dr Joyce   Confirmed pt has address and transportation to visit     Pt reports that she had participated in counseling services in past. RN CC offered to support reestablishing care.  Pt declined at this time - will reach out to team if support needed.     Patient notes that ongoing chronic Head aches following a procedure on my ear in 1996 - Providers have noted that there is not much that can do to help it keeps me up at night sometimes and loose sleep - OTC medication does help -ongoing slight pain - occasionally more severe and  usually last a few days before HA resolve. Unclear trigger.             Pt states that she has worked with CC in past to support review of medial bill assistance, she notes that she does not qualify and will reach out to team if additional support and review if anything changes              Goals addressed this encounter:   Goals Addressed                    This Visit's Progress       Patient Stated       Medical (pt-stated)         Goal Statement: I will complete my cologuard screening with 2 months  Date Goal set: 12/14/21  Barriers: language  Strengths: discussed with Provider  Date to Achieve By: March  Patient expressed understanding of goal: yes  Action steps to achieve this goal:  1. I will review and complete test kit and return to clinic  2. I will update Care coordination team during next outreach             COMPLETED: Medical (pt-stated)         Goal Statement: I will complete my overdue health maintenance. ( mammogram)   Date Goal set: 11/19/21    Barriers: language  Strengths:     Date to Achieve By: January  Patient expressed understanding of goal: Yes  Action steps to achieve this goal:  1. If I do not receive a call from my clinic to schedule within 1 week, I will call to schedule my own appointment for PCP  2. I will notify my care team once health maintenance item(s) are completed.   3. I will contact my Care Coordination staff or care team with any questions or concerns I may have.             COMPLETED: Medical (pt-stated)         Goal Statement: I will have a better understanding and plan of care for dental support within 3-4 months  Date Goal set: 11/19/21    Barriers: language, knowledge deficit   Strengths: family support  Date to Achieve By:  Patient expressed understanding of goal: yes  Action steps to achieve this goal:  1. I will schedule and attend visit with community dental program   2. I will update Care coordination team during next outreach  3. I will report to my Community Health Worker if any additional resources or support needed.    12/14/21  Pt notes that she knows how to follow up for dental cleaning - no additional support          Medical (pt-stated)         Goal Statement: I will have a better understanding and plan of care for Eye health  within 3-4 months  Date Goal set: 12/14/21  Barriers: language, knowledge deficit   Strengths: family support  Date to Achieve By:  Patient expressed understanding of goal: yes  Action steps to achieve this goal:  1. I will schedule and attend visit with [facility] eye clinic on December 21  2. I will update Care coordination team during next outreach  3. I will report to my Community Health Worker if any additional resources or support needed.                Outreach Frequency: monthly    Plan:    Community Health Worker to outreach per standard work and  updated on goal progression    Patient will schedule and attend recommended follow up visits with speciality providers and primary care provider.  Appointments in Next Year    Mar 01, 2022 10:20 AM  Return Visit with Buddy Roy MD  [facility] [place] ([facility] ) [phone]        RN CC will review in 4-6 weeks to support ongoing recommendations and plan of care will be available sooner if needed.

## 2021-12-16 NOTE — TELEPHONE ENCOUNTER
Call to patient chart was reviewed and patient called and cancelled appt detailed message left referral to GYN was placed previously and for the GYN concerns since there were other reasons as well she should scheduled appt with GYN   Called and spoke with pt , Message was given, pt understood, no further questions.

## 2021-12-21 ENCOUNTER — TRANSFERRED RECORDS (OUTPATIENT)
Dept: HEALTH INFORMATION MANAGEMENT | Facility: CLINIC | Age: 51
End: 2021-12-21

## 2021-12-21 PROBLEM — D64.9 ANEMIA, UNSPECIFIED TYPE: Status: ACTIVE | Noted: 2021-12-21

## 2021-12-21 LAB — RETINOPATHY: NEGATIVE

## 2021-12-21 RX ORDER — AVOBENZONE, HOMOSALATE, OCTISALATE, OCTOCRYLENE, OXYBENZONE 30; 130; 50; 70; 40 MG/ML; MG/ML; MG/ML; MG/ML; MG/ML
1 LOTION TOPICAL DAILY
Qty: 90 TABLET | Refills: 4 | Status: SHIPPED | OUTPATIENT
Start: 2021-12-21

## 2021-12-21 NOTE — RESULT ENCOUNTER NOTE
Team - please call patient with results.    Her cholesterol is healthy- keep taking her atorvastatin daily.      Her kidney tests are healthy.      Her diabetes is doing well.     Her b12 is a little low- she should try to get meat in her diet at least once a day.  I sent a prescription for vitamin B to her pharmacy- not sure if this is covered.  Can buy Over the counter if needed.  This will help her with more energy.

## 2021-12-22 ENCOUNTER — TELEPHONE (OUTPATIENT)
Dept: FAMILY MEDICINE | Facility: CLINIC | Age: 51
End: 2021-12-22

## 2021-12-22 NOTE — LETTER
December 28, 2021      May MARICHUY Sanchez  [address] [place] [address]        Dear ,    We are writing to inform you of your test results.      Your cholesterol is healthy- keep taking Your atorvastatin daily.       Your kidney tests are healthy.       Your diabetes is doing well.      Your b12 is a little low- you should try to get meat in your diet at least once a day.  I sent a prescription for vitamin B to Your pharmacy- not sure if this is covered.  Can buy Over the counter if needed.  This will help her with more energy.         Resulted Orders   Hemoglobin A1c   Result Value Ref Range    Hemoglobin A1C 6.7 (H) 0.0 - 5.6 %      Comment:      Normal <5.7%   Prediabetes 5.7-6.4%    Diabetes 6.5% or higher     Note: Adopted from ADA consensus guidelines.   Lipid Profile (Chol, Trig, HDL, LDL calc)   Result Value Ref Range    Cholesterol 178 <=199 mg/dL    Triglycerides 149 <=149 mg/dL    Direct Measure HDL 59 >=50 mg/dL      Comment:      HDL Cholesterol Reference Range:     0-2 years:   No reference ranges established for patients under 2 years old  at [facility] for lipid analytes.    2-8 years:  Greater than 45 mg/dL     18 years and older:   Female: Greater than or equal to 50 mg/dL   Male:   Greater than or equal to 40 mg/dL    LDL Cholesterol Calculated 89 <=129 mg/dL    Patient Fasting > 8hrs? Yes    Comprehensive metabolic panel (BMP + Alb, Alk Phos, ALT, AST, Total. Bili, TP)   Result Value Ref Range    Sodium 141 136 - 145 mmol/L    Potassium 4.5 3.5 - 5.0 mmol/L    Chloride 104 98 - 107 mmol/L    Carbon Dioxide (CO2) 26 22 - 31 mmol/L    Anion Gap 11 5 - 18 mmol/L    Urea Nitrogen 28 (H) 8 - 22 mg/dL    Creatinine 1.00 0.60 - 1.10 mg/dL    Calcium 9.2 8.5 - 10.5 mg/dL    Glucose 130 (H) 70 - 125 mg/dL    Alkaline Phosphatase 89 45 - 120 U/L    AST 22 0 - 40 U/L    ALT 26 0 - 45 U/L    Protein Total 6.5 6.0 - 8.0 g/dL    Albumin 3.7 3.5 - 5.0 g/dL    Bilirubin Total 0.5 0.0 - 1.0  mg/dL    GFR Estimate 65 >60 mL/min/1.73m2      Comment:      As of July 11, 2021, eGFR is calculated by the CKD-EPI creatinine equation, without race adjustment. eGFR can be influenced by muscle mass, exercise, and diet. The reported eGFR is an estimation only and is only applicable if the renal function is stable.   CBC with platelets   Result Value Ref Range    WBC Count 5.8 4.0 - 11.0 10e3/uL    RBC Count 3.76 (L) 3.80 - 5.20 10e6/uL    Hemoglobin 11.5 (L) 11.7 - 15.7 g/dL    Hematocrit 35.4 35.0 - 47.0 %    MCV 94 78 - 100 fL    MCH 30.6 26.5 - 33.0 pg    MCHC 32.5 31.5 - 36.5 g/dL    RDW 12.4 10.0 - 15.0 %    Platelet Count 244 150 - 450 10e3/uL   Vitamin B12   Result Value Ref Range    Vitamin B12 296 213 - 816 pg/mL   Albumin Random Urine Quantitative with Creat Ratio   Result Value Ref Range    Microalbumin Urine mg/dL <0.50 0.00 - 1.99 mg/dL    Creatinine Urine mg/dL 101 mg/dL    Microalbumin Urine mg/g Cr        Comment:      Unable to calculate:  Urine creatinine or microalbumin value below detectable level    Narrative    Microalbumin, Random Urine   <2.0 mg/dL . . . . . . . . Normal   3.0-30.0 mg/dL . . . . . . Microalbuminuria   >30.0 mg/dL . . . . . .  . Clinical Proteinuria     Microalbumin/Creatinine Ratio, Random Urine   <20 mg/g . . . . .. . . . Normal    mg/g . . . . . . . Microalbuminuria   >300 mg/g . . . . . . . . Clinical Proteinuria       If you have any questions or concerns, please call the clinic at the number listed above.       Sincerely,        Dr. Gaspar

## 2021-12-22 NOTE — TELEPHONE ENCOUNTER
Left VM for pt to call clinic for results.  Ok to relay should pt call. Thanks          ----- Message from Jaky Gaspar MD sent at 12/21/2021 11:36 AM CST -----  Team - please call patient with results.    Her cholesterol is healthy- keep taking her atorvastatin daily.      Her kidney tests are healthy.      Her diabetes is doing well.     Her b12 is a little low- she should try to get meat in her diet at least once a day.  I sent a prescription for vitamin B to her pharmacy- not sure if this is covered.  Can buy Over the counter if needed.  This will help her with more energy.

## 2022-01-04 ENCOUNTER — APPOINTMENT (OUTPATIENT)
Dept: INTERPRETER SERVICES | Facility: CLINIC | Age: 52
End: 2022-01-04
Payer: MEDICARE

## 2022-01-07 ENCOUNTER — PATIENT OUTREACH (OUTPATIENT)
Dept: CARE COORDINATION | Facility: CLINIC | Age: 52
End: 2022-01-07
Payer: MEDICARE

## 2022-01-07 NOTE — PROGRESS NOTES
Clinic Care Coordination Contact  [facility]/Voicemail    Reason: CCC CHW Follow Up Called     Clinical Data: Care Coordinator Outreach:  Outreach attempted x 1:  Left message on patient's voicemail with call back information and requested return call.  Plan: Care Coordinator will try to reach patient again in 10 business days.

## 2022-01-18 VITALS — WEIGHT: 193 LBS | BODY MASS INDEX: 38.98 KG/M2

## 2022-01-18 VITALS — HEART RATE: 61 BPM | SYSTOLIC BLOOD PRESSURE: 137 MMHG | DIASTOLIC BLOOD PRESSURE: 85 MMHG

## 2022-01-18 VITALS
HEART RATE: 60 BPM | WEIGHT: 197 LBS | BODY MASS INDEX: 39.72 KG/M2 | SYSTOLIC BLOOD PRESSURE: 126 MMHG | HEIGHT: 59 IN | DIASTOLIC BLOOD PRESSURE: 76 MMHG | RESPIRATION RATE: 12 BRPM

## 2022-01-18 VITALS
HEART RATE: 62 BPM | TEMPERATURE: 97 F | DIASTOLIC BLOOD PRESSURE: 67 MMHG | WEIGHT: 197.5 LBS | HEIGHT: 60 IN | BODY MASS INDEX: 38.77 KG/M2 | SYSTOLIC BLOOD PRESSURE: 103 MMHG

## 2022-01-18 VITALS
BODY MASS INDEX: 40.4 KG/M2 | DIASTOLIC BLOOD PRESSURE: 98 MMHG | HEART RATE: 90 BPM | WEIGHT: 200 LBS | SYSTOLIC BLOOD PRESSURE: 148 MMHG | TEMPERATURE: 97.9 F

## 2022-01-18 VITALS
WEIGHT: 201.8 LBS | DIASTOLIC BLOOD PRESSURE: 82 MMHG | SYSTOLIC BLOOD PRESSURE: 122 MMHG | BODY MASS INDEX: 40.68 KG/M2 | HEIGHT: 59 IN | RESPIRATION RATE: 16 BRPM | HEART RATE: 56 BPM

## 2022-01-18 VITALS
HEIGHT: 59 IN | RESPIRATION RATE: 16 BRPM | HEART RATE: 60 BPM | DIASTOLIC BLOOD PRESSURE: 88 MMHG | BODY MASS INDEX: 40.52 KG/M2 | WEIGHT: 201 LBS | SYSTOLIC BLOOD PRESSURE: 144 MMHG

## 2022-01-18 VITALS — DIASTOLIC BLOOD PRESSURE: 76 MMHG | HEART RATE: 54 BPM | SYSTOLIC BLOOD PRESSURE: 124 MMHG

## 2022-01-18 VITALS — SYSTOLIC BLOOD PRESSURE: 118 MMHG | DIASTOLIC BLOOD PRESSURE: 74 MMHG | HEART RATE: 60 BPM

## 2022-01-18 VITALS
SYSTOLIC BLOOD PRESSURE: 145 MMHG | WEIGHT: 200 LBS | DIASTOLIC BLOOD PRESSURE: 88 MMHG | HEIGHT: 59 IN | TEMPERATURE: 97.2 F | HEART RATE: 52 BPM | RESPIRATION RATE: 14 BRPM | BODY MASS INDEX: 40.32 KG/M2

## 2022-01-18 ASSESSMENT — PATIENT HEALTH QUESTIONNAIRE - PHQ9
SUM OF ALL RESPONSES TO PHQ QUESTIONS 1-9: 15
SUM OF ALL RESPONSES TO PHQ QUESTIONS 1-9: 0

## 2022-01-21 ENCOUNTER — PATIENT OUTREACH (OUTPATIENT)
Dept: CARE COORDINATION | Facility: CLINIC | Age: 52
End: 2022-01-21
Payer: MEDICARE

## 2022-01-21 NOTE — PROGRESS NOTES
"Clinic Care Coordination Contact  [facility]/Voicemail    Reason: [facility] CHW Follow Up Called    Clinical Data: Care Coordinator Outreach:  Outreach attempted x 2:  Left message on patient's voicemail with call back information and requested return call.    Note/comment: CHW received a voice message on 1- at 10:34AM from a patient's daughter stated \"returning CHW called for mother.\" VM is unclear due to the caller connection service and unable to catch the patient name and caller phone number.     Plan: Care Coordinator will try to reach patient again in 2-3 weeks. Send unable to reach letter to patient if unsuccessfully outreach.    "

## 2022-01-27 ENCOUNTER — PATIENT OUTREACH (OUTPATIENT)
Dept: CARE COORDINATION | Facility: CLINIC | Age: 52
End: 2022-01-27
Payer: MEDICARE

## 2022-01-27 NOTE — PROGRESS NOTES
Clinic Care Coordination Contact    Follow Up Progress Note      Assessment: RN CC outreach with support of interpretive services,  spoke with patient for status update    Reviewed goal progression with Patient and upcoming visit with Cardiology   Pt was unaware of visit and appreciated reminder     Patient states that she does not have the Cologuard kit - she will address at Annual wellness exam with patient - due in marchRN CC support schedule Annual wellness exam  -    Pt state that she will not be available for most of March - schedule for April  Patient verbalized understanding via interpretive services. Engaged in [employer] communication during visit    Will move to maintenance status and will be availablesooner if pt needed         Care Gaps:    Health Maintenance Due   Topic Date Due     PREVENTIVE CARE VISIT  Never done     DIABETIC FOOT EXAM  Never done     ASTHMA ACTION PLAN  Never done     ADVANCE CARE PLANNING  Never done     DEPRESSION ACTION PLAN  Never done     COLORECTAL CANCER SCREENING  Never done     ZOSTER IMMUNIZATION (1 of 2) Never done     HPV TEST  02/08/2021     PAP  02/08/2021       Scheduled Annual wellness exam for April       Goals addressed this encounter:   Goals Addressed                    This Visit's Progress       Patient Stated       COMPLETED: Medical (pt-stated)         Goal Statement: I will complete my cologuard screening with 2 months  Date Goal set: 12/14/21 01/27/22      Barriers: language  Strengths: discussed with Provider  Date to Achieve By: March  Patient expressed understanding of goal: yes  Action steps to achieve this goal:  1. I will review and complete test kit and return to clinic  2. I will update Care coordination team during next outreach     01/27/22  Pt states that she no longer has the kit will need new one         COMPLETED: Medical (pt-stated)         Goal Statement: I will have a better understanding and plan of care for Eye health  within 3-4  months  Date Goal set: 12/14/21  Barriers: language, knowledge deficit   Strengths: family support  Date to Achieve By:  Patient expressed understanding of goal: yes  Action steps to achieve this goal:  1. I will schedule and attend visit with [facility] on December 21  2. I will update Care coordination team during next outreach  3. I will report to my Community Health Worker if any additional resources or support needed.    01/27/22  Pt attended visit with [facility] stable          COMPLETED: Medical (pt-stated)         Goal Statement: I will have a better understanding and plan of care for Health maintenance and are gaps  within 3-4 months  Date Goal set: 01/27/22  Barriers: language, knowledge deficit   Strengths: family support  Date to Achieve By:  Patient expressed understanding of goal: yes  Action steps to achieve this goal:  1. I will schedule and attend visit with PCP in April   2. I will update Care coordination team during next outreach  3. I will report to my Community Health Worker if any additional resources or support needed.              Intervention/Education provided during outreach: schedule PCP visit reviewed upcoming visit           Plan:   Patient will schedule and attend recommended follow up visits with speciality providers and primary care provider.    Community Health Worker to outreach per standard work

## 2022-02-22 ENCOUNTER — PATIENT OUTREACH (OUTPATIENT)
Dept: CARE COORDINATION | Facility: CLINIC | Age: 52
End: 2022-02-22
Payer: MEDICARE

## 2022-02-22 NOTE — PROGRESS NOTES
Clinic Care Coordination Contact    Community Health Worker Follow Up    CHW spoke with patient    Patient stated no further questions after phone call with RN.     Care Gaps:     Health Maintenance Due   Topic Date Due     PREVENTIVE CARE VISIT  Never done     DIABETIC FOOT EXAM  Never done     ASTHMA ACTION PLAN  Never done     ADVANCE CARE PLANNING  Never done     DEPRESSION ACTION PLAN  Never done     COLORECTAL CANCER SCREENING  Never done     ZOSTER IMMUNIZATION (1 of 2) Never done     HPV TEST  02/08/2021     PAP  02/08/2021     A1C  03/07/2022     PHQ-9  03/07/2022       Care Gaps Last addressed on 2/22/2022   Patient stated they will address these further at provider visit.     CHW reminded patient of appointment on 3/1/2022 at [facility]. Patient acknowledged.       CHW Plan: maintenance       Bárbara Blair  Community Health Worker  [facility] Care Coordination  [email or web address]  Office: [phone]

## 2022-02-24 NOTE — PROGRESS NOTES
Clinic Care Coordination Contact    Patient is due for CHW follow up. Patient is moved to CCC maintenance per CCC RN notes reviewed encounter dated 1/27/2022. No CHW outreach at this time. CHW outreach moved to 2 months per pt chart reviewed.     Enrollment status: CCC maintenance    Outreach frequency: 2 months    CHW Plan: next CHW outreach 3-

## 2022-03-01 ENCOUNTER — OFFICE VISIT (OUTPATIENT)
Dept: CARDIOLOGY | Facility: CLINIC | Age: 52
End: 2022-03-01
Attending: FAMILY MEDICINE
Payer: MEDICARE

## 2022-03-01 VITALS
HEART RATE: 62 BPM | DIASTOLIC BLOOD PRESSURE: 78 MMHG | BODY MASS INDEX: 38.94 KG/M2 | SYSTOLIC BLOOD PRESSURE: 106 MMHG | WEIGHT: 196 LBS

## 2022-03-01 DIAGNOSIS — I25.10 CORONARY ARTERY DISEASE INVOLVING NATIVE CORONARY ARTERY OF NATIVE HEART WITHOUT ANGINA PECTORIS: Primary | ICD-10-CM

## 2022-03-01 DIAGNOSIS — I42.8 NONISCHEMIC CARDIOMYOPATHY (H): ICD-10-CM

## 2022-03-01 DIAGNOSIS — I10 ESSENTIAL HYPERTENSION: ICD-10-CM

## 2022-03-01 DIAGNOSIS — E78.5 HYPERLIPIDEMIA LDL GOAL <70: ICD-10-CM

## 2022-03-01 DIAGNOSIS — E66.01 CLASS 2 SEVERE OBESITY DUE TO EXCESS CALORIES WITH SERIOUS COMORBIDITY AND BODY MASS INDEX (BMI) OF 39.0 TO 39.9 IN ADULT (H): ICD-10-CM

## 2022-03-01 DIAGNOSIS — E66.812 CLASS 2 SEVERE OBESITY DUE TO EXCESS CALORIES WITH SERIOUS COMORBIDITY AND BODY MASS INDEX (BMI) OF 39.0 TO 39.9 IN ADULT (H): ICD-10-CM

## 2022-03-01 PROCEDURE — 99214 OFFICE O/P EST MOD 30 MIN: CPT | Performed by: INTERNAL MEDICINE

## 2022-03-01 NOTE — LETTER
3/1/2022    Jaky Gaspar MD  [address] [place] [id number]    RE: Leora Sanchez       Dear Colleague,     I had the pleasure of seeing Leora Sanchez in the [facility]       The patient does not speak English.  Her medical history is translated by a professional [facility] .    Assessment/Plan:   1. Nonischemic cardiomyopathy, chronic congestive heart failure with preserved ejection fraction, class II: The patient is compensated well.  No signs of fluid retention.  She is on low-salt diet.   Her previous echo was reported ejection fraction 45 to 50%.  However stress cardiac MRI was reported ejection fraction 65%, no previous myocardial infarction or myocardial scar, no obvious valvular disease.  Continue current Lasix 40 mg twice a day with potassium, losartan 100 mg daily, metoprolol  mg daily and Imdur 60 mg daily.     2.  Coronary artery disease, no obstructive lesion per coronary angiogram in May 2019: As mentioned, her stress cardiac MRI was negative for inducible myocardial ischemia, no previous myocardial infarction.  The patient has normal left ventricular ejection fraction.  Her chest pain most likely noncardiac.  Continue to observe it.    Continue Imdur, metoprolol, aspirin and Lipitor.  Her coronary angiogram showed LAD 50% stenosis.      3.  Essential hypertension: Her blood pressure is controlled with losartan 100 mg daily and metoprolol  mg daily.       4.  Dyslipidemia: On Lipitor 80 mg daily.        5.  Obesity: Discussed diet control, gradually increase exercise activities, weight loss today.    Thank you for the opportunity to be involved in the care of Leora Sanchez. If you have any questions, please feel free to contact me.  I will see the patient again in 12 months and as needed.    Much or all of the text in this note was generated through the use of Dragon Dictate voice-to-text software. Errors in spelling or words which seem out of context are unintentional. Sound  alike errors, in particular, may have escaped editing.       History of Present Illness:   It is my pleasure to see Leora Sanchez at the [facility] clinic for routine cardiology follow up.  Leora Sanchez is a 51 year old female with a medical history of coronary artery disease, nonobstructive, nonischemic cardiomyopathy, improved left ventricular ejection fraction from 45-50%, essential hypertension, hyperlipidemia, obesity and stage III CKD.    The patient states that she had no chest pain, shortness of breath, palpitations, dizziness, orthopnea, PND or leg edema.  She has occasional mild dyspnea on exertion which has been slightly better than before.  Her blood pressure and heart rate are controlled.  She has continue her current medications with no change.  Her recent laboratory tests are reviewed, stable.    Past Medical History:     Patient Active Problem List   Diagnosis     Health Care Home     Essential hypertension     Perforation of right tympanic membrane     Heart failure with reduced ejection fraction (H)     Type 2 diabetes mellitus with stage 3a chronic kidney disease, without long-term current use of insulin (H)     Right-sided sensorineural hearing loss     Recurrent major depressive disorder, in partial remission (H)     Pulmonary nodules     Nonocclusive coronary atherosclerosis of native coronary artery     Nonischemic cardiomyopathy (H)     Mixed incontinence     Hypokalemia     Hyperlipidemia LDL goal <70     Heartburn     Ganglion cyst of left foot     Class 2 severe obesity due to excess calories with serious comorbidity and body mass index (BMI) of 39.0 to 39.9 in adult (H)     CKD (chronic kidney disease) stage 3, GFR 30-59 ml/min (H)     Tension headache     Bilateral dry eyes     Anemia, unspecified type       Past Surgical History:     Past Surgical History:   Procedure Laterality Date     CV CORONARY ANGIOGRAM N/A 5/13/2019    Procedure: Coronary Angiogram;  Surgeon:  Car Box MD;  Location: [facility];  Service: Cardiology     CV LEFT HEART CATHETERIZATION WITHOUT LEFT VENTRICULOGRAM Left 10/31/2017    Procedure: Left Heart Catheterization Without Left Ventriculogram;  Surgeon: Jonathan Duque MD;  Location: [facility];  Service:      CV LEFT HEART CATHETERIZATION WITHOUT LEFT VENTRICULOGRAM Left 2019    Procedure: Left Heart Catheterization Without Left Ventriculogram;  Surgeon: Car Box MD;  Location: [facility];  Service: Cardiology     DILATION AND CURETTAGE       ENT SURGERY       INNER EAR SURGERY Right      MASTOIDECTOMY Right      ID CATH PLACEMENT & NJX CORONARY ART ANGIO IMG S&I N/A 10/31/2017    Procedure: Coronary Angiogram;  Surgeon: Jonathan Duque MD;  Location: [facility];  Service: Cardiology       Family History:     Family History   Problem Relation Age of Onset     Diabetes Mother      Hypertension Mother      Uterine Cancer Mother      Diverticulitis Mother      Other - See Comments Father          in [place] in [place]     No Known Problems Sister      No Known Problems Daughter      No Known Problems Son      No Known Problems Daughter      No Known Problems Daughter      No Known Problems Daughter      No Known Problems Son      No Known Problems Son      No Known Problems Son         Social History:    reports that she has never smoked. She has never used smokeless tobacco. She reports that she does not drink alcohol and does not use drugs.    Review of Systems:   12 systems are reviewed negative except for in HPI.    Meds:     Current Outpatient Medications:      acetaminophen (MAPAP) 500 MG tablet, Take 2 tablets (1,000 mg) by mouth 3 times daily as needed for mild pain, Disp: 100 tablet, Rfl: 11     aspirin 81 MG EC tablet, Take 1 tablet (81 mg) by mouth daily, Disp: 90 tablet, Rfl: 4     atorvastatin (LIPITOR) 80 MG tablet, TAKE 1 TABLET (80 MG TOTAL) BY MOUTH  DAILY/ [facility] [address] [facility] MUAJ ROJ, Disp: 90 tablet, Rfl: 4     B Complex-Biotin-FA (B COMPLEX 100 TR) TBCR, Take 1 tablet by mouth daily, Disp: 90 tablet, Rfl: 4     blood glucose (CONTOUR NEXT TEST) test strip, 1 strip by In Vitro route daily, Disp: 25 strip, Rfl: 12     furosemide (LASIX) 40 MG tablet, TAKE 1 TABLET (40 MG TOTAL) BY MOUTH 2 (TWO) TIMES A DAY AT 9AM AND 6PM./ [facility] 1 [facility] 2 [facility] THAUM 9AM THIAB 6PM PAB PHOB VOG, Disp: 180 tablet, Rfl: 4     isosorbide mononitrate (IMDUR) 60 MG 24 hr tablet, TAKE 1 TABLET (60 MG TOTAL) [facility] MOUTH DAILY./ [facility] PLAWV, Disp: 90 tablet, Rfl: 1     losartan (COZAAR) 100 MG tablet, TAKE 1 PILL BY MOUTH DAILY FOR BLOOD PRESSURE / [facility] SIAB, Disp: 90 tablet, Rfl: 4     metFORMIN (GLUCOPHAGE-XR) 500 MG 24 hr tablet, TAKE 1 TABLET (500 MG TOTAL) BY MOUTH 2 (TWO) TIMES A DAY/ [facility] 1 [facility] QAB ZIB, Disp: 180 tablet, Rfl: 3     metoprolol succinate ER (TOPROL-XL) 100 MG 24 hr tablet, Take 1 tablet (100 mg) by mouth daily, Disp: 90 tablet, Rfl: 0     nitroGLYcerin (NITROSTAT) 0.4 MG sublingual tablet, Place 1 tablet (0.4 mg) under the tongue every 5 minutes as needed for chest pain, Disp: 20 tablet, Rfl: 4     omeprazole (PRILOSEC) 40 MG DR capsule, TAKE 1 PILL BY MOUTH EVERY DAY/ [facility] 1 [facility] PLAB, Disp: 90 capsule, Rfl: 4     potassium chloride ER (K-TAB/KLOR-CON) 10 MEQ CR tablet, TAKE 1 PILL BY MOUTH EVERY DAY/[facility] 1 [facility], Disp: 90 tablet, Rfl: 3     venlafaxine (EFFEXOR-XR) 150 MG 24 hr capsule, Take 1 capsule (150 mg) by mouth daily, Disp: 90 capsule, Rfl: 4    Allergies:   Nkda [no known drug allergies]      Objective:      Physical Exam  88.9 kg (196 lb)     Body mass index is 38.94 kg/m .  /78 (BP Location: Left arm, Patient Position: Sitting, Cuff Size: Adult Large)   Pulse 62    Wt 88.9 kg (196 lb)   BMI 38.94 kg/m      General Appearance:   Awake, Alert, No acute distress.   HEENT:  Pupil equal and reactive to light. No scleral icterus; the mucous membranes were moist.   Neck: No cervical bruits. No JVD. No thyromegaly.     Chest: The spine was straight. The chest was symmetric.   Lungs:   Respirations unlabored; Lungs are clear to auscultation. No crackles. No wheezing.   Cardiovascular:   Regular rhythm and rate, normal first and second heart sounds with no murmurs. No rubs or gallops.    Abdomen:  Obese. Soft. No tenderness. Non-distended. Bowels sounds are present   Extremities: Equal tibial pulses. No leg edema.   Skin: No rashes or ulcers. Warm, Dry.   Musculoskeletal: No tenderness. No deformity.   Neurologic: Mood and affect are appropriate. No focal deficits.         EKG: Personally reviewed  Normal sinus rhythm   Nonspecific T wave abnormality   Abnormal ECG   When compared with ECG of 29-MAR-2018 16:41,   No significant change was found     Cardiac Imaging Studies  ECHO on 10-:    Left Ventricle: The calculated left ventricular ejection fraction is 37%. This represents a moderately decreased ejection fraction. Cavity is mildly increased. E/e' > 15, suggesting elevated LV filling pressures.    Right Ventricle: Normal size and systolic function. TAPSE is normal,    Estimated central venous pressure equal to 3 mmHg.    No tricuspid valve regurgitation. Pulmonary artery pressure could not be obtained.    No previous study for comparison.     ECHO on 1-:  1. The left ventricle is normal in size. Left ventricular systolic performance is at the lower limits of normal. The ejection fraction is estimated to be 50%.   2. No significant valvular heart disease is identified on this study.   3. Normal right ventricular size and systolic performance.   4. There is mild left atrial enlargement.    When compared to the prior real-time echocardiogram dated 30 October 2017, there  has been an interval improvement in left ventricular systolic performance.     ECHO on 5-:    Technically difficult study due to patient's body habitus    Left ventricle ejection fraction is moderately decreased. The estimated left ventricular ejection fraction is 45% with global hypokinesis.    Right ventricle poorly visualized. CT is normal which would suggest normal right ventricular systolic function.    No significant valvular heart disease identified.    When compared to the previous study dated 1/29/2018, overall left ventricular function appears slightly lower.     Coronary angiogram on 5-:  Angiography via left radial   LM normal  LAD 40-50% prox LAD disease with FFR 0.86  Circ OM 1 40-50% narrowing with FFR 0.96  RCA min dz     Coronary angiogram on 10-:  Findings:  LM:long, mildly irregular  LAD:50% mid-vessel narrowing immediately proximal to a moderate sized D branch. FFR 0.94 rest 0.83 w/ adenosine x3 mins  Lcx:large, mildly irregular  RCA:dominant, 10-30% diffuse mild irregularity     Nuclear stress test on 5-:    The patient's exercise capacity is mildly impaired.    The stress electrocardiogram is negative for inducible ischemic EKG changes.    The exercise nuclear stress test is negative for inducible myocardial ischemia or infarction. Breast tissue attenuation artifact is noted.    The left ventricular ejection fraction is 64%.    There is no prior study available.     Stress cardiac MRI on 2-:  1.  Lexiscan stress cardiac MRI is negative for inducible myocardial ischemia.   2.  Lexiscan stress ECG is negative for inducible myocardial ischemia.  3.  No previous myocardial infarction is identified.  4.  Normal left ventricular size, wall thickness and systolic function. The calculated left ventricular ejection fraction is 65%.  5.  Normal right ventricular size and systolic function.  6.  No obvious valvular disease.    Lab Review   Lab Results   Component Value  Date     12/07/2021     04/26/2013    CO2 26 12/07/2021    CO2 26.0 08/15/2011    BUN 28 12/07/2021    BUN 18 04/26/2013     Lab Results   Component Value Date    WBC 5.8 12/07/2021    HGB 11.5 12/07/2021    HCT 35.4 12/07/2021    MCV 94 12/07/2021     12/07/2021     Lab Results   Component Value Date    CHOL 178 12/07/2021    CHOL 177 12/15/2020    CHOL 195 10/31/2017     Lab Results   Component Value Date    HDL 59 12/07/2021    HDL 55 12/15/2020    HDL 56 10/31/2017     No components found for: LDLCALC  Lab Results   Component Value Date    TRIG 149 12/07/2021    TRIG 170 (H) 12/15/2020    TRIG 118 10/31/2017     No components found for: CHOLHDL  Lab Results   Component Value Date    TROPONINI <0.01 08/20/2020     Lab Results   Component Value Date    BNP 12 08/20/2020     Lab Results   Component Value Date    TSH 1.97 12/15/2020         Buddy Roy MD     [facility]  cc:   Jaky Gaspar MD  [address] [place] [address]

## 2022-03-01 NOTE — PROGRESS NOTES
The patient does not speak English.  Her medical history is translated by a professional [facility]    Assessment/Plan:   1. Nonischemic cardiomyopathy, chronic congestive heart failure with preserved ejection fraction, class II: The patient is compensated well.  No signs of fluid retention.  She is on low-salt diet.   Her previous echo was reported ejection fraction 45 to 50%.  However stress cardiac MRI was reported ejection fraction 65%, no previous myocardial infarction or myocardial scar, no obvious valvular disease.  Continue current Lasix 40 mg twice a day with potassium, losartan 100 mg daily, metoprolol  mg daily and Imdur 60 mg daily.     2.  Coronary artery disease, no obstructive lesion per coronary angiogram in May 2019: As mentioned, her stress cardiac MRI was negative for inducible myocardial ischemia, no previous myocardial infarction.  The patient has normal left ventricular ejection fraction.  Her chest pain most likely noncardiac.  Continue to observe it.    Continue Imdur, metoprolol, aspirin and Lipitor.  Her coronary angiogram showed LAD 50% stenosis.      3.  Essential hypertension: Her blood pressure is controlled with losartan 100 mg daily and metoprolol  mg daily.       4.  Dyslipidemia: On Lipitor 80 mg daily.        5.  Obesity: Discussed diet control, gradually increase exercise activities, weight loss today.    Thank you for the opportunity to be involved in the care of Leora Sanchez. If you have any questions, please feel free to contact me.  I will see the patient again in 12 months and as needed.    Much or all of the text in this note was generated through the use of Dragon Dictate voice-to-text software. Errors in spelling or words which seem out of context are unintentional. Sound alike errors, in particular, may have escaped editing.       History of Present Illness:   It is my pleasure to see Leora Sanchez at the [facility] for  routine cardiology follow up.  Leora Sanchez is a 51 year old female with a medical history of coronary artery disease, nonobstructive, nonischemic cardiomyopathy, improved left ventricular ejection fraction from 45-50%, essential hypertension, hyperlipidemia, obesity and stage III CKD.    The patient states that she had no chest pain, shortness of breath, palpitations, dizziness, orthopnea, PND or leg edema.  She has occasional mild dyspnea on exertion which has been slightly better than before.  Her blood pressure and heart rate are controlled.  She has continue her current medications with no change.  Her recent laboratory tests are reviewed, stable.    Past Medical History:     Patient Active Problem List   Diagnosis     Health Care Home     Essential hypertension     Perforation of right tympanic membrane     Heart failure with reduced ejection fraction (H)     Type 2 diabetes mellitus with stage 3a chronic kidney disease, without long-term current use of insulin (H)     Right-sided sensorineural hearing loss     Recurrent major depressive disorder, in partial remission (H)     Pulmonary nodules     Nonocclusive coronary atherosclerosis of native coronary artery     Nonischemic cardiomyopathy (H)     Mixed incontinence     Hypokalemia     Hyperlipidemia LDL goal <70     Heartburn     Ganglion cyst of left foot     Class 2 severe obesity due to excess calories with serious comorbidity and body mass index (BMI) of 39.0 to 39.9 in adult (H)     CKD (chronic kidney disease) stage 3, GFR 30-59 ml/min (H)     Tension headache     Bilateral dry eyes     Anemia, unspecified type       Past Surgical History:     Past Surgical History:   Procedure Laterality Date     CV CORONARY ANGIOGRAM N/A 5/13/2019    Procedure: Coronary Angiogram;  Surgeon: Car Box MD;  Location: [facility] Cath Lab;  Service: Cardiology     CV LEFT HEART CATHETERIZATION WITHOUT LEFT VENTRICULOGRAM Left 10/31/2017    Procedure: Left  Heart Catheterization Without Left Ventriculogram;  Surgeon: Jonathan Duque MD;  Location: [facility] Cath Lab;  Service:      CV LEFT HEART CATHETERIZATION WITHOUT LEFT VENTRICULOGRAM Left 2019    Procedure: Left Heart Catheterization Without Left Ventriculogram;  Surgeon: Car Box MD;  Location: [facility] Cath Lab;  Service: Cardiology     DILATION AND CURETTAGE       ENT SURGERY       INNER EAR SURGERY Right 1994     MASTOIDECTOMY Right 1996     RI CATH PLACEMENT & NJX CORONARY ART ANGIO IMG S&I N/A 10/31/2017    Procedure: Coronary Angiogram;  Surgeon: Jonathan Duque MD;  Location: [facility] Cath Lab;  Service: Cardiology       Family History:     Family History   Problem Relation Age of Onset     Diabetes Mother      Hypertension Mother      Uterine Cancer Mother      Diverticulitis Mother      Other - See Comments Father          in [place]     No Known Problems Sister      No Known Problems Daughter      No Known Problems Son      No Known Problems Daughter      No Known Problems Daughter      No Known Problems Daughter      No Known Problems Son      No Known Problems Son      No Known Problems Son         Social History:    reports that she has never smoked. She has never used smokeless tobacco. She reports that she does not drink alcohol and does not use drugs.    Review of Systems:   12 systems are reviewed negative except for in HPI.    Meds:     Current Outpatient Medications:      acetaminophen (MAPAP) 500 MG tablet, Take 2 tablets (1,000 mg) by mouth 3 times daily as needed for mild pain, Disp: 100 tablet, Rfl: 11     aspirin 81 MG EC tablet, Take 1 tablet (81 mg) by mouth daily, Disp: 90 tablet, Rfl: 4     atorvastatin (LIPITOR) 80 MG tablet, TAKE 1 TABLET (80 MG TOTAL) BY MOUTH DAILY/ TXHUA HNUB NOJ 1 LUB TSHUAJ PAB YANET NTSHAV MUAJ ROJ, Disp: 90 tablet, Rfl: 4     B Complex-Biotin-FA (B COMPLEX 100 TR) TBCR, Take 1 tablet by mouth daily, Disp: 90 tablet,  Rfl: 4     blood glucose (CONTOUR NEXT TEST) test strip, 1 strip by In Vitro route daily, Disp: 25 strip, Rfl: 12     furosemide (LASIX) 40 MG tablet, TAKE 1 TABLET (40 MG TOTAL) BY MOUTH 2 (TWO) TIMES A DAY AT 9AM AND 6PM./ [facility] 1 [facility] 2 MARLENY THAUM 9AM THIAB 6PM PAB PHOB VOG, Disp: 180 tablet, Rfl: 4     isosorbide mononitrate (IMDUR) 60 MG 24 hr tablet, TAKE 1 TABLET (60 MG TOTAL) BY MOUTH DAILY./ [facility] PLAWV, Disp: 90 tablet, Rfl: 1     losartan (COZAAR) 100 MG tablet, TAKE 1 PILL BY MOUTH DAILY FOR BLOOD PRESSURE / [facility] 1 [facility] SIAB, Disp: 90 tablet, Rfl: 4     metFORMIN (GLUCOPHAGE-XR) 500 MG 24 hr tablet, TAKE 1 TABLET (500 MG TOTAL) BY MOUTH 2 (TWO) TIMES A DAY/ [facility] 1 [facility] 2 [facility] QAB ZIB, Disp: 180 tablet, Rfl: 3     metoprolol succinate ER (TOPROL-XL) 100 MG 24 hr tablet, Take 1 tablet (100 mg) by mouth daily, Disp: 90 tablet, Rfl: 0     nitroGLYcerin (NITROSTAT) 0.4 MG sublingual tablet, Place 1 tablet (0.4 mg) under the tongue every 5 minutes as needed for chest pain, Disp: 20 tablet, Rfl: 4     omeprazole (PRILOSEC) 40 MG DR capsule, TAKE 1 PILL BY MOUTH EVERY DAY/ [facility] 1 [facility] LUB PLAB, Disp: 90 capsule, Rfl: 4     potassium chloride ER (K-TAB/KLOR-CON) 10 MEQ CR tablet, TAKE 1 PILL BY MOUTH EVERY DAY/[facility] 1 [facility], Disp: 90 tablet, Rfl: 3     venlafaxine (EFFEXOR-XR) 150 MG 24 hr capsule, Take 1 capsule (150 mg) by mouth daily, Disp: 90 capsule, Rfl: 4    Allergies:   Nkda [no known drug allergies]      Objective:      Physical Exam  88.9 kg (196 lb)     Body mass index is 38.94 kg/m .  /78 (BP Location: Left arm, Patient Position: Sitting, Cuff Size: Adult Large)   Pulse 62   Wt 88.9 kg (196 lb)   BMI 38.94 kg/m      General Appearance:   Awake, Alert, No acute distress.   HEENT:  Pupil equal and reactive to light. No scleral icterus; the mucous  membranes were moist.   Neck: No cervical bruits. No JVD. No thyromegaly.     Chest: The spine was straight. The chest was symmetric.   Lungs:   Respirations unlabored; Lungs are clear to auscultation. No crackles. No wheezing.   Cardiovascular:   Regular rhythm and rate, normal first and second heart sounds with no murmurs. No rubs or gallops.    Abdomen:  Obese. Soft. No tenderness. Non-distended. Bowels sounds are present   Extremities: Equal tibial pulses. No leg edema.   Skin: No rashes or ulcers. Warm, Dry.   Musculoskeletal: No tenderness. No deformity.   Neurologic: Mood and affect are appropriate. No focal deficits.         EKG: Personally reviewed  Normal sinus rhythm   Nonspecific T wave abnormality   Abnormal ECG   When compared with ECG of 29-MAR-2018 16:41,   No significant change was found     Cardiac Imaging Studies  ECHO on 10-:    Left Ventricle: The calculated left ventricular ejection fraction is 37%. This represents a moderately decreased ejection fraction. Cavity is mildly increased. E/e' > 15, suggesting elevated LV filling pressures.    Right Ventricle: Normal size and systolic function. TAPSE is normal,    Estimated central venous pressure equal to 3 mmHg.    No tricuspid valve regurgitation. Pulmonary artery pressure could not be obtained.    No previous study for comparison.     ECHO on 1-:  1. The left ventricle is normal in size. Left ventricular systolic performance is at the lower limits of normal. The ejection fraction is estimated to be 50%.   2. No significant valvular heart disease is identified on this study.   3. Normal right ventricular size and systolic performance.   4. There is mild left atrial enlargement.    When compared to the prior real-time echocardiogram dated 30 October 2017, there has been an interval improvement in left ventricular systolic performance.     ECHO on 5-:    Technically difficult study due to patient's body habitus    Left  ventricle ejection fraction is moderately decreased. The estimated left ventricular ejection fraction is 45% with global hypokinesis.    Right ventricle poorly visualized. CT is normal which would suggest normal right ventricular systolic function.    No significant valvular heart disease identified.    When compared to the previous study dated 1/29/2018, overall left ventricular function appears slightly lower.     Coronary angiogram on 5-:  Angiography via left radial   LM normal  LAD 40-50% prox LAD disease with FFR 0.86  Circ OM 1 40-50% narrowing with FFR 0.96  RCA min dz     Coronary angiogram on 10-:  Findings:  LM:long, mildly irregular  LAD:50% mid-vessel narrowing immediately proximal to a moderate sized D branch. FFR 0.94 rest 0.83 w/ adenosine x3 mins  Lcx:large, mildly irregular  RCA:dominant, 10-30% diffuse mild irregularity     Nuclear stress test on 5-:    The patient's exercise capacity is mildly impaired.    The stress electrocardiogram is negative for inducible ischemic EKG changes.    The exercise nuclear stress test is negative for inducible myocardial ischemia or infarction. Breast tissue attenuation artifact is noted.    The left ventricular ejection fraction is 64%.    There is no prior study available.     Stress cardiac MRI on 2-:  1.  Lexiscan stress cardiac MRI is negative for inducible myocardial ischemia.   2.  Lexiscan stress ECG is negative for inducible myocardial ischemia.  3.  No previous myocardial infarction is identified.  4.  Normal left ventricular size, wall thickness and systolic function. The calculated left ventricular ejection fraction is 65%.  5.  Normal right ventricular size and systolic function.  6.  No obvious valvular disease.    Lab Review   Lab Results   Component Value Date     12/07/2021     04/26/2013    CO2 26 12/07/2021    CO2 26.0 08/15/2011    BUN 28 12/07/2021    BUN 18 04/26/2013     Lab Results   Component Value  Date    WBC 5.8 12/07/2021    HGB 11.5 12/07/2021    HCT 35.4 12/07/2021    MCV 94 12/07/2021     12/07/2021     Lab Results   Component Value Date    CHOL 178 12/07/2021    CHOL 177 12/15/2020    CHOL 195 10/31/2017     Lab Results   Component Value Date    HDL 59 12/07/2021    HDL 55 12/15/2020    HDL 56 10/31/2017     No components found for: LDLCALC  Lab Results   Component Value Date    TRIG 149 12/07/2021    TRIG 170 (H) 12/15/2020    TRIG 118 10/31/2017     No components found for: CHOLHDL  Lab Results   Component Value Date    TROPONINI <0.01 08/20/2020     Lab Results   Component Value Date    BNP 12 08/20/2020     Lab Results   Component Value Date    TSH 1.97 12/15/2020

## 2022-03-24 ENCOUNTER — PATIENT OUTREACH (OUTPATIENT)
Dept: NURSING | Facility: CLINIC | Age: 52
End: 2022-03-24
Payer: MEDICARE

## 2022-03-24 NOTE — PROGRESS NOTES
"Clinic Care Coordination Contact  Community Health Worker Follow Up    Care Gaps:   Health Maintenance Due   Topic Date Due     DIABETIC FOOT EXAM  Never done     ASTHMA ACTION PLAN  Never done     ADVANCE CARE PLANNING  Never done     DEPRESSION ACTION PLAN  Never done     COLORECTAL CANCER SCREENING  Never done     MEDICARE ANNUAL WELLNESS VISIT  Never done     ZOSTER IMMUNIZATION (1 of 2) Never done     HPV TEST  02/08/2021     PAP  02/08/2021     A1C  03/07/2022     PHQ-9  03/07/2022     Patient agreed to discuss care gaps with Dr. Gaspar on 4- at follow up appt.    Goals:   Goals Addressed as of 3/24/2022 at 3:16 PM        Other (pt-stated)     Added 3/24/22 by Elisabet Malone MA      Goal Statement: I would like to address care gaps with my PCP/PCP provider team on 4-.     Date goal set: 3-  Measure of Success:   Barriers: language and stress about my mother's health status.  Strengths: self  Date to Achieve By: 4-  Patient expressed understanding of goal: yes    Action steps to achieve this goal:  1. I will attend follow up appt on 4- with Dr. Gaspar.   2. I will discuss care gaps with Dr. Gaspar on 4- and seeks further advise.  4. I will address any stress concerns with Dr. Gaspar on 4- and seeks advise if need.   5. I will updates status with [facility] team at next outreach follow up.     Note/comment:   -Patient reported on 3-; my mother is currently in the ICU due to cancer health problem. Very stress and have no thought of suicidal or hurting myself.           Discussion:   [facility] CHW was able to connect with patient today. Patient enrollment status is maintenance. Plan to discuss moving patient towards [facility] graduation. CHW verified new goal and most or urgent concerns. Patient reported info below and set a new goal. Pt confirmed that she have no thought of suicidal and hurting herself per CHW verified. Reassessment appt completed on \"12-\" per Episodes " "reviewed and also [facility] RN outreached to the patient on 12- per pt chart reviewed. Changed pt enrollment status from [facility] maintenance to active. Message route to updates [facility] Lead Care Coordinator/RN about this. CHW suggested pt to find time for herself, find ways to lowering stress with family and discuss stress concerns with Dr. Gaspar on 4-, reach out to PCP office and CCC office if need any additional resources support. Pt agreed and prefer to appt on 4- change into medicare annual wellness visit appt. Message route to PCP for further advise and [facility] scheduling team for further assistance with this. Pt confirmed.      Pt reported:   -My mother diagnosed with \"cancer. Now she is in a critical situation and currently the hospital ICU room.\" Stress about my mother's health. Have hard time watching her like this.   -Doing well. No other questions or concerns at this time.     CHW Next Follow-up: goal follow up     Outreach frequency: active    Outreach frequency: monthly     CHW Plan:   -Patient attend appt on 4- at 10:40AM (arrive time: 10:20AM) with Dr. Gaspar  -Message route to [facility] Registration scheduling team to assist with changing the patient appt on 4- with Dr. Gaspar at 10:40AM to an Medicare annual wellness visit per pt agreed.   -Next CHW outreach: 4-  -Message route to update [facility] Lead Care Coordinator and requesting advise regarding to check-in outreach with patient after her appt with Dr. Gaspar on 4-.      "

## 2022-03-28 ENCOUNTER — PATIENT OUTREACH (OUTPATIENT)
Dept: NURSING | Facility: CLINIC | Age: 52
End: 2022-03-28
Payer: MEDICARE

## 2022-03-28 NOTE — PROGRESS NOTES
Clinic Care Coordination Contact  [facility]/Voicemail    Reason: CCC CHW 2 months follow up     Clinical Data: Care Coordinator Outreach:  Outreach attempted x 1:  Left message on patient's voicemail with call back information and requested return call.    Outreach frequency: 2 months; CCC maintenance    Plan: Care Coordinator will try to reach patient again in the next 2-3 weeks.

## 2022-04-01 ENCOUNTER — TELEPHONE (OUTPATIENT)
Dept: FAMILY MEDICINE | Facility: CLINIC | Age: 52
End: 2022-04-01
Payer: MEDICARE

## 2022-04-01 NOTE — TELEPHONE ENCOUNTER
----- Message from Jaky Gaspar MD sent at 3/31/2022  5:15 PM CDT -----  Please call pt and give her our condolences with her mother's death.     I really want to see her next week for her 4/6/22 visit and remind her that she shouldn't cancel this even with her mom's death    Jaky Gaspar

## 2022-04-07 ENCOUNTER — PATIENT OUTREACH (OUTPATIENT)
Dept: CARE COORDINATION | Facility: CLINIC | Age: 52
End: 2022-04-07
Payer: MEDICARE

## 2022-04-07 NOTE — PROGRESS NOTES
Clinic Care Coordination Contact    Situation: Patient chart reviewed by care coordinator.    Background: RN CC review for status update    Assessment: Pt mother recently passed away. Pt was in maintenance status  Recommendation for follow up with PCP - visit was canceled     Plan/Recommendations: RN CC will outreach next week to support engagement

## 2022-04-13 ENCOUNTER — PATIENT OUTREACH (OUTPATIENT)
Dept: NURSING | Facility: CLINIC | Age: 52
End: 2022-04-13
Payer: MEDICARE

## 2022-04-13 NOTE — PROGRESS NOTES
Clinic Care Coordination Contact    Follow Up Progress Note      Assessment:  RN CC outreach with support of interpretive services,  spoke with patient for status update    Patient states she is very depressed following the death of her mother. She notes that she is responsible for making all of the arrangements for the  which is this weekend    RN CC expressed condolences and offered to assist with schedule visit with PCP to support care. Patient agree but would like in May  Set up visit with PCP for .     Call was disconnected following scheduling. RN CC will reach out to patient in 2-3 weeks to support engagement and check in if any concerns or support needs      Care Gaps:    Health Maintenance Due   Topic Date Due     DIABETIC FOOT EXAM  Never done     ADVANCE CARE PLANNING  Never done     DEPRESSION ACTION PLAN  Never done     COLORECTAL CANCER SCREENING  Never done     MEDICARE ANNUAL WELLNESS VISIT  Never done     ZOSTER IMMUNIZATION (1 of 2) Never done     HPV TEST  2021     PAP  2021     A1C  2022     PHQ-9  2022        Goals addressed this encounter:    Goals Addressed                    This Visit's Progress       Patient Stated       Other (pt-stated)   20%      Goal Statement: I would like to address care gaps with my PCP/PCP provider team   Date goal set: 3-  Measure of Success:   Barriers: language and stress about my mother's health status.  Strengths: self  Date to Achieve By: 2022  Patient expressed understanding of goal: yes    Action steps to achieve this goal:  1. I will attend follow up appt on 2022 with Dr. Gaspar.   2. I will discuss care gaps with Dr. Gaspar on 2022 and seeks further advise.  4. I will address any stress concerns with Dr. Gaspar on 2022 and seeks advise if need.   5. I will updates status with CCC team at next outreach follow up.     Note/comment:   -Patient reported on 3-; my mother is currently  in the ICU due to cancer health problem. Very stress and have no thought of suicidal or hurting myself.               Intervention/Education provided during outreach: schedule visit with PCP     Outreach Frequency: monthly     Plan:   Patient will schedule and attend recommended follow up visits with speciality providers and primary care provider.    RN CC will outreach in 3 weeks to support ongoing recommendations and plan of care will be available sooner if needed.

## 2022-04-14 ENCOUNTER — PATIENT OUTREACH (OUTPATIENT)
Dept: CARE COORDINATION | Facility: CLINIC | Age: 52
End: 2022-04-14
Payer: MEDICARE

## 2022-04-14 NOTE — PROGRESS NOTES
Clinic Care Coordination Contact:    Patient is due for CCC CHW follow up. Per pt chart reviewed; CCC RN/Lead Care Coordinator have contacted patient yesterday, 4/13/2022. No CHW outreach at this time.  Defer CHW outreach to 1-2 weeks.     CHW Plan: connect with patient in 1-2 weeks.

## 2022-04-25 ENCOUNTER — PATIENT OUTREACH (OUTPATIENT)
Dept: NURSING | Facility: CLINIC | Age: 52
End: 2022-04-25
Payer: MEDICARE

## 2022-04-25 NOTE — PROGRESS NOTES
Clinic Care Coordination Contact  [facility]/Voicemail    Reason: CCC CHW Follow Up Called     Clinical Data: Care Coordinator Outreach:  Outreach attempted x 1: CHW attempted to connect with patient today for follow up. Left message on patient's voicemail with call back information and requested return call.    Note/comment:   CCC RN was able to connected with patient on 4- per pt chart reviewed.   CHW outreach today count as attempted 1 again.    Plan: Care Coordinator will try to reach patient again in two weeks.

## 2022-05-04 ENCOUNTER — PATIENT OUTREACH (OUTPATIENT)
Dept: NURSING | Facility: CLINIC | Age: 52
End: 2022-05-04
Payer: MEDICARE

## 2022-05-04 NOTE — PROGRESS NOTES
Clinic Care Coordination Contact    Follow Up Progress Note      Assessment:  RN CC outreach with support of interpretive services,  spoke with patient for status update    Patient reports that she is doing ok having some ear drainage, would like support in setting up a visit with ENT provider. Assisted with scheduling visit with provider  at the [place] location.     Reminder about upcoming wellness exam with PCP in early June.     Care Gaps:    Health Maintenance Due   Topic Date Due     DIABETIC FOOT EXAM  Never done     ADVANCE CARE PLANNING  Never done     DEPRESSION ACTION PLAN  Never done     COLORECTAL CANCER SCREENING  Never done     MEDICARE ANNUAL WELLNESS VISIT  Never done     ZOSTER IMMUNIZATION (1 of 2) Never done     Pneumococcal Vaccine: Pediatrics (0 to 5 Years) and At-Risk Patients (6 to 64 Years) (2 - PCV) 10/25/2020     HPV TEST  02/08/2021     PAP  02/08/2021     DTAP/TDAP/TD IMMUNIZATION (1 - Tdap) 12/07/2021     A1C  03/07/2022     PHQ-9  03/07/2022     COVID-19 Vaccine (3 - Booster for Kaylene series) 04/09/2022       Scheduled PCP visit in June      Goals addressed this encounter:    Goals Addressed                    This Visit's Progress       Patient Stated       Medical (pt-stated)         Goal Statement: I will have a better understanding and plan of care for Ear drainage within 2-3 months  Date Goal set: 05/04/22  Barriers: language, knowledge deficit   Strengths: family support  Date to Achieve By: July  Patient expressed understanding of goal: yes  Action steps to achieve this goal:  1. I will schedule and attend visit with ENT provider  2. I will update Care coordination team during next outreach  3. I will report to my Community Health Worker if any additional resources or support needed.                Intervention/Education provided during outreach: schedule acute ENT visit        Plan: Patient will schedule and attend recommended follow up visits with speciality providers and  primary care provider.    RN CC will outreach in 4-6 weeks to support ongoing recommendations and plan of care will be available sooner if needed.

## 2022-05-18 ENCOUNTER — PATIENT OUTREACH (OUTPATIENT)
Dept: NURSING | Facility: CLINIC | Age: 52
End: 2022-05-18
Payer: MEDICARE

## 2022-05-18 NOTE — PROGRESS NOTES
Clinic Care Coordination Contact  Community Health Worker Follow Up    Care Gaps:   Health Maintenance Due   Topic Date Due     DIABETIC FOOT EXAM  Never done     ADVANCE CARE PLANNING  Never done     DEPRESSION ACTION PLAN  Never done     COLORECTAL CANCER SCREENING  Never done     MEDICARE ANNUAL WELLNESS VISIT  Never done     ZOSTER IMMUNIZATION (1 of 2) Never done     Pneumococcal Vaccine: Pediatrics (0 to 5 Years) and At-Risk Patients (6 to 64 Years) (2 - PCV) 10/25/2020     HPV TEST  02/08/2021     PAP  02/08/2021     A1C  03/07/2022     PHQ-9  03/07/2022     COVID-19 Vaccine (3 - Booster for Kaylene series) 04/09/2022     Patient agreed to address care gaps details at her annual physical appt on 6- with Dr. Gaspar in [facility].     Goals:    Goals Addressed as of 5/18/2022 at 6:57 PM                    Today    4/13/22       Medical (pt-stated)   30%      Added 5/4/22 by Nickie Cuevas, RN      Goal Statement: I will have a better understanding and plan of care for Ear drainage within 2-3 months.    Date Goal set: 05/04/22  Barriers: language, knowledge deficit   Strengths: family support  Date to Achieve By: July  Patient expressed understanding of goal: yes    Action steps to achieve this goal:  1. I will attend appt on 5- at 11:05AM (arrive time) with [facility] ENT provider (Jose Maria Bass MD).   2. I will update Care coordination team during next outreach.  3. I will report to my Community Health Worker if any additional resources or support needed.    (Updated: 5-)             Other (pt-stated)   40%  20%    Added 3/24/22 by Elisabet Malone MA      Goal Statement: I would like to address care gaps with my PCP/PCP provider team     Date goal set: 3-  Measure of Success:   Barriers: language and stress about my mother's health status.  Strengths: self  Date to Achieve By: 4-  Patient expressed understanding of goal: yes    Action steps to achieve this goal:  1. I will attend the  annual physical appt on 2022 with Dr. Gaspar in [facility].   2. I will discuss care gaps details and address any questions or concerns with Dr. Gaspar.  3. I will updates status with CCC team at next outreach follow up.     Note/comment:   -Patient reported on 3-; my mother is currently in the ICU due to cancer health problem. Very stress and have no thought of suicidal or hurting myself.     (Updated: 2022)            Discussion:   [facility] CHW was able to connect with patient today follow up current goals. Updated above goals. CHW reminded pt for upcoming appt as followin2022 with [facility], 2022 with Dr. Gaspar in [facility], and 2022 at 10:00AM with CCC RN via phone visit. CHW encouraged pt to attend all appt as recommendation and seeks advised to any questions or concerns. Pt confirmed.     Patient stated that she is doing well besides and no other concerns besides her right ear problem. Pt will connect PCP office, ENT clinic and CCC office with any additional questions per pt.     CHW Next Follow-up: goals follow up     Outreach frequency: monthly     CHW Plan: 2022

## 2022-05-23 ENCOUNTER — OFFICE VISIT (OUTPATIENT)
Dept: OTOLARYNGOLOGY | Facility: CLINIC | Age: 52
End: 2022-05-23
Payer: MEDICARE

## 2022-05-23 DIAGNOSIS — Z98.890 HISTORY OF EAR SURGERY: Primary | ICD-10-CM

## 2022-05-23 PROCEDURE — 99213 OFFICE O/P EST LOW 20 MIN: CPT | Mod: 25 | Performed by: OTOLARYNGOLOGY

## 2022-05-23 PROCEDURE — 92504 EAR MICROSCOPY EXAMINATION: CPT | Performed by: OTOLARYNGOLOGY

## 2022-05-23 NOTE — PROGRESS NOTES
[facility]:  [facility]cern      HISTORY OF PRESENT ILLNESS    May was seen at the behest of Jaky Gaspar MD for ear drainage.  Patient has a history of multiple ear surgeries in the right-hand side canal wall down.  No recent pain or drainage.  No change in hearing.  Is been sometime since she has been seen in clinic.  No new or other ear related ear nose and throat concerns    Service note from 2019:    HPI: She reports her ear feels improved since last we saw her. She notes her headache is better as well. She has had the same imbalance since her original surgery in 1996.    Past medical history, surgical history, social history, family history, medications, and allergies have been reviewed with the patient and are documented above.    Review of Systems: a 10-system review was performed. Pertinent positives are noted in the HPI and on a separate scanned document in the chart.    PHYSICAL EXAMINATION:  GEN: no acute distress, normocephalic  EYES: extraocular movements are intact, pupils are equal and round. Sclera clear.   EARS: Right mastoid bowl clean. There is a perforation to the tympanic membrane with a pedicled cartilage graft  NEURO: CN II-XII are intact bilaterally. alert and oriented. No nystagmus. Gait is normal.  PULM: breathing comfortably on room air, normal chest expansion with respiration  HEART: regular rate and rhythm, no peripheral edema    MEDICAL DECISION-MAKING: Will get her set up with OT to see if we can improve what is likely an uncompensated peripheral weakness from surgery. Follow up 3-6 for mastoid bowl cleaning.     Electronically signed by Service, Alex Thomas MD at 10/09/2019 9:46 AM CDT           REVIEW OF SYSTEMS    Review of Systems as per HPI and PMHx, otherwise 10 system review system are negative.     Nkda [no known drug allergies]     There were no vitals taken for this visit.    HEAD: Normal appearance and symmetry:  No cutaneous lesions.      NECK:  supple      EARS:    RIGHT:  CWD cavity with anterior peforation      LEFT: monomeric membrane present      RIGHT TM PERF        RIGHT CWD cavity (Supine)        LEFT TM (supine) w/ monomeric membrane    EYES:  EOMI    CN VII/XII:  intact     NOSE:     Dorsum:   straight  Septum:  midline  Mucosa:  moist        ORAL CAVITY/OROPHARYNX:     Lips:  Normal.  Tongue: normal, midline  Mucosa:   no lesions     NECK:  Trachea:  midline.              Thyroid:  normal              Adenopathy:  none        NEURO:   Alert and Oriented     GAIT AND STATION:  normal     RESPIRATORY:   Symmetry and Respiratory effort     PSYCH:  Normal mood and affect     SKIN:   warm and dry         IMPRESSION:    Encounter Diagnosis   Name Primary?     History of ear surgery Yes          RECOMMENDATIONS:      Orders Placed This Encounter   Procedures     BINOCULAR MICROSCOPY      Return visit 3 months with audiogram.      All questions were answered.   The patient is agreeable with this plan of care.

## 2022-05-23 NOTE — LETTER
5/23/2022         RE: Leora Sanchez  [address] [place] [address]        Dear Colleague,    Thank you for referring your patient, Leora Sanchez, to the [facility]. Please see a copy of my visit note below.    binCHIEF COMPLAINT:  Ear Concern      HISTORY OF PRESENT ILLNESS    May was seen at the behest of Jaky Gaspar MD for ear drainage.  Patient has a history of multiple ear surgeries in the right-hand side canal wall down.  No recent pain or drainage.  No change in hearing.  Is been sometime since she has been seen in clinic.  No new or other ear related ear nose and throat concerns    Service note from 2019:    HPI: She reports her ear feels improved since last we saw her. She notes her headache is better as well. She has had the same imbalance since her original surgery in 1996.    Past medical history, surgical history, social history, family history, medications, and allergies have been reviewed with the patient and are documented above.    Review of Systems: a 10-system review was performed. Pertinent positives are noted in the HPI and on a separate scanned document in the chart.    PHYSICAL EXAMINATION:  GEN: no acute distress, normocephalic  EYES: extraocular movements are intact, pupils are equal and round. Sclera clear.   EARS: Right mastoid bowl clean. There is a perforation to the tympanic membrane with a pedicled cartilage graft  NEURO: CN II-XII are intact bilaterally. alert and oriented. No nystagmus. Gait is normal.  PULM: breathing comfortably on room air, normal chest expansion with respiration  HEART: regular rate and rhythm, no peripheral edema    MEDICAL DECISION-MAKING: Will get her set up with OT to see if we can improve what is likely an uncompensated peripheral weakness from surgery. Follow up 3-6 for mastoid bowl cleaning.     Electronically signed by Service, Alex Thomas MD at 10/09/2019 9:46 AM CDT           REVIEW OF SYSTEMS    Review of Systems as per  HPI and PMHx, otherwise 10 system review system are negative.     Nkda [no known drug allergies]     There were no vitals taken for this visit.    HEAD: Normal appearance and symmetry:  No cutaneous lesions.      NECK:  supple     EARS:    RIGHT:  CWD cavity with anterior peforation      LEFT: monomeric membrane present      RIGHT TM PERF        RIGHT CWD cavity (Supine)        LEFT TM (supine) w/ monomeric membrane    EYES:  EOMI    CN VII/XII:  intact     NOSE:     Dorsum:   straight  Septum:  midline  Mucosa:  moist        ORAL CAVITY/OROPHARYNX:     Lips:  Normal.  Tongue: normal, midline  Mucosa:   no lesions     NECK:  Trachea:  midline.              Thyroid:  normal              Adenopathy:  none        NEURO:   Alert and Oriented     GAIT AND STATION:  normal     RESPIRATORY:   Symmetry and Respiratory effort     PSYCH:  Normal mood and affect     SKIN:   warm and dry         IMPRESSION:    Encounter Diagnosis   Name Primary?     History of ear surgery Yes          RECOMMENDATIONS:      Orders Placed This Encounter   Procedures     BINOCULAR MICROSCOPY      Return visit 3 months with audiogram.      All questions were answered.   The patient is agreeable with this plan of care.           Again, thank you for allowing me to participate in the care of your patient.        Sincerely,        Jose Maria Bass MD

## 2022-06-02 ENCOUNTER — OFFICE VISIT (OUTPATIENT)
Dept: FAMILY MEDICINE | Facility: CLINIC | Age: 52
End: 2022-06-02
Payer: MEDICARE

## 2022-06-02 VITALS
HEIGHT: 59 IN | TEMPERATURE: 97.9 F | DIASTOLIC BLOOD PRESSURE: 74 MMHG | SYSTOLIC BLOOD PRESSURE: 107 MMHG | WEIGHT: 192 LBS | HEART RATE: 80 BPM | RESPIRATION RATE: 18 BRPM | BODY MASS INDEX: 38.71 KG/M2

## 2022-06-02 DIAGNOSIS — Z12.11 COLON CANCER SCREENING: ICD-10-CM

## 2022-06-02 DIAGNOSIS — I10 ESSENTIAL HYPERTENSION: ICD-10-CM

## 2022-06-02 DIAGNOSIS — Z23 ENCOUNTER FOR IMMUNIZATION: ICD-10-CM

## 2022-06-02 DIAGNOSIS — Z12.4 CERVICAL CANCER SCREENING: ICD-10-CM

## 2022-06-02 DIAGNOSIS — I50.20 HEART FAILURE WITH REDUCED EJECTION FRACTION (H): ICD-10-CM

## 2022-06-02 DIAGNOSIS — E11.22 TYPE 2 DIABETES MELLITUS WITH STAGE 3A CHRONIC KIDNEY DISEASE, WITHOUT LONG-TERM CURRENT USE OF INSULIN (H): ICD-10-CM

## 2022-06-02 DIAGNOSIS — Z00.00 ENCOUNTER FOR MEDICARE ANNUAL WELLNESS EXAM: Primary | ICD-10-CM

## 2022-06-02 DIAGNOSIS — I25.119 CORONARY ARTERY DISEASE INVOLVING NATIVE CORONARY ARTERY OF NATIVE HEART WITH ANGINA PECTORIS (H): ICD-10-CM

## 2022-06-02 DIAGNOSIS — E11.65 TYPE 2 DIABETES MELLITUS WITH HYPERGLYCEMIA, WITHOUT LONG-TERM CURRENT USE OF INSULIN (H): ICD-10-CM

## 2022-06-02 DIAGNOSIS — N18.31 TYPE 2 DIABETES MELLITUS WITH STAGE 3A CHRONIC KIDNEY DISEASE, WITHOUT LONG-TERM CURRENT USE OF INSULIN (H): ICD-10-CM

## 2022-06-02 DIAGNOSIS — D64.9 ANEMIA, UNSPECIFIED TYPE: ICD-10-CM

## 2022-06-02 DIAGNOSIS — F33.1 MODERATE EPISODE OF RECURRENT MAJOR DEPRESSIVE DISORDER (H): ICD-10-CM

## 2022-06-02 DIAGNOSIS — I42.8 NONISCHEMIC CARDIOMYOPATHY (H): ICD-10-CM

## 2022-06-02 LAB
ANION GAP SERPL CALCULATED.3IONS-SCNC: 12 MMOL/L (ref 5–18)
BUN SERPL-MCNC: 26 MG/DL (ref 8–22)
CALCIUM SERPL-MCNC: 9.3 MG/DL (ref 8.5–10.5)
CHLORIDE BLD-SCNC: 105 MMOL/L (ref 98–107)
CO2 SERPL-SCNC: 24 MMOL/L (ref 22–31)
CREAT SERPL-MCNC: 1.09 MG/DL (ref 0.6–1.1)
ERYTHROCYTE [DISTWIDTH] IN BLOOD BY AUTOMATED COUNT: 12.2 % (ref 10–15)
GFR SERPL CREATININE-BSD FRML MDRD: 61 ML/MIN/1.73M2
GLUCOSE BLD-MCNC: 105 MG/DL (ref 70–125)
HBA1C MFR BLD: 6.8 % (ref 0–5.6)
HCT VFR BLD AUTO: 39.4 % (ref 35–47)
HGB BLD-MCNC: 12.6 G/DL (ref 11.7–15.7)
MCH RBC QN AUTO: 29.7 PG (ref 26.5–33)
MCHC RBC AUTO-ENTMCNC: 32 G/DL (ref 31.5–36.5)
MCV RBC AUTO: 93 FL (ref 78–100)
PLATELET # BLD AUTO: 245 10E3/UL (ref 150–450)
POTASSIUM BLD-SCNC: 4 MMOL/L (ref 3.5–5)
RBC # BLD AUTO: 4.24 10E6/UL (ref 3.8–5.2)
SODIUM SERPL-SCNC: 141 MMOL/L (ref 136–145)
WBC # BLD AUTO: 6.3 10E3/UL (ref 4–11)

## 2022-06-02 PROCEDURE — 36415 COLL VENOUS BLD VENIPUNCTURE: CPT | Performed by: FAMILY MEDICINE

## 2022-06-02 PROCEDURE — 0054A COVID-19,PF,PFIZER (12+ YRS): CPT | Performed by: FAMILY MEDICINE

## 2022-06-02 PROCEDURE — G0438 PPPS, INITIAL VISIT: HCPCS | Performed by: FAMILY MEDICINE

## 2022-06-02 PROCEDURE — 99214 OFFICE O/P EST MOD 30 MIN: CPT | Mod: 25 | Performed by: FAMILY MEDICINE

## 2022-06-02 PROCEDURE — 80048 BASIC METABOLIC PNL TOTAL CA: CPT | Performed by: FAMILY MEDICINE

## 2022-06-02 PROCEDURE — 85027 COMPLETE CBC AUTOMATED: CPT | Performed by: FAMILY MEDICINE

## 2022-06-02 PROCEDURE — 83036 HEMOGLOBIN GLYCOSYLATED A1C: CPT | Performed by: FAMILY MEDICINE

## 2022-06-02 PROCEDURE — 87624 HPV HI-RISK TYP POOLED RSLT: CPT | Performed by: FAMILY MEDICINE

## 2022-06-02 PROCEDURE — 91305 COVID-19,PF,PFIZER (12+ YRS): CPT | Performed by: FAMILY MEDICINE

## 2022-06-02 PROCEDURE — G0123 SCREEN CERV/VAG THIN LAYER: HCPCS | Performed by: FAMILY MEDICINE

## 2022-06-02 RX ORDER — ISOSORBIDE MONONITRATE 60 MG/1
60 TABLET, EXTENDED RELEASE ORAL DAILY
Qty: 90 TABLET | Refills: 3 | Status: SHIPPED | OUTPATIENT
Start: 2022-06-02 | End: 2023-06-23

## 2022-06-02 RX ORDER — METFORMIN HCL 500 MG
500 TABLET, EXTENDED RELEASE 24 HR ORAL 2 TIMES DAILY WITH MEALS
Qty: 180 TABLET | Refills: 4 | Status: SHIPPED | OUTPATIENT
Start: 2022-06-02 | End: 2023-06-23

## 2022-06-02 RX ORDER — METOPROLOL SUCCINATE 100 MG/1
100 TABLET, EXTENDED RELEASE ORAL DAILY
Qty: 90 TABLET | Refills: 4 | Status: SHIPPED | OUTPATIENT
Start: 2022-06-02 | End: 2023-04-21

## 2022-06-02 RX ORDER — ASPIRIN 81 MG/1
81 TABLET ORAL DAILY
Qty: 90 TABLET | Refills: 4 | Status: SHIPPED | OUTPATIENT
Start: 2022-06-02 | End: 2023-06-23

## 2022-06-02 RX ORDER — FUROSEMIDE 40 MG
40 TABLET ORAL DAILY
Qty: 90 TABLET | Refills: 4 | Status: SHIPPED | OUTPATIENT
Start: 2022-06-02 | End: 2023-06-23

## 2022-06-02 ASSESSMENT — ENCOUNTER SYMPTOMS
DYSURIA: 0
HEMATOCHEZIA: 0
SORE THROAT: 0
ABDOMINAL PAIN: 0
BREAST MASS: 0
MYALGIAS: 0
DIARRHEA: 0
DIZZINESS: 0
NAUSEA: 0
JOINT SWELLING: 0
WEAKNESS: 0
NERVOUS/ANXIOUS: 0
PARESTHESIAS: 0
FREQUENCY: 0
CHILLS: 0
HEARTBURN: 0
FEVER: 0
PALPITATIONS: 0
HEADACHES: 0
EYE PAIN: 0
ARTHRALGIAS: 0
COUGH: 0
CONSTIPATION: 0
SHORTNESS OF BREATH: 0
HEMATURIA: 0

## 2022-06-02 ASSESSMENT — PATIENT HEALTH QUESTIONNAIRE - PHQ9
10. IF YOU CHECKED OFF ANY PROBLEMS, HOW DIFFICULT HAVE THESE PROBLEMS MADE IT FOR YOU TO DO YOUR WORK, TAKE CARE OF THINGS AT HOME, OR GET ALONG WITH OTHER PEOPLE: VERY DIFFICULT
SUM OF ALL RESPONSES TO PHQ QUESTIONS 1-9: 19
SUM OF ALL RESPONSES TO PHQ QUESTIONS 1-9: 19

## 2022-06-02 ASSESSMENT — ACTIVITIES OF DAILY LIVING (ADL)
CURRENT_FUNCTION: HOUSEWORK REQUIRES ASSISTANCE
CURRENT_FUNCTION: PREPARING MEALS REQUIRES ASSISTANCE

## 2022-06-02 NOTE — PATIENT INSTRUCTIONS
Preventive Health Recommendations  Female Ages 50 - 64    Yearly exam: See your health care provider every year in order to  o Review health changes.   o Discuss preventive care.    o Review your medicines if your doctor has prescribed any.      Get a Pap test every three years (unless you have an abnormal result and your provider advises testing more often).    If you get Pap tests with HPV test, you only need to test every 5 years, unless you have an abnormal result.     You do not need a Pap test if your uterus was removed (hysterectomy) and you have not had cancer.    You should be tested each year for STDs (sexually transmitted diseases) if you're at risk.     Have a mammogram every 1 to 2 years.    Have a colonoscopy at age 50, or have a yearly FIT test (stool test). These exams screen for colon cancer.      Have a cholesterol test every 5 years, or more often if advised.    Have a diabetes test (fasting glucose) every three years. If you are at risk for diabetes, you should have this test more often.     If you are at risk for osteoporosis (brittle bone disease), think about having a bone density scan (DEXA).    Shots: Get a flu shot each year. Get a tetanus shot every 10 years.    Nutrition:     Eat at least 5 servings of fruits and vegetables each day.    Eat whole-grain bread, whole-wheat pasta and brown rice instead of white grains and rice.    Get adequate Calcium and Vitamin D.     Lifestyle    Exercise at least 150 minutes a week (30 minutes a day, 5 days a week). This will help you control your weight and prevent disease.    Limit alcohol to one drink per day.    No smoking.     Wear sunscreen to prevent skin cancer.     See your dentist every six months for an exam and cleaning.    See your eye doctor every 1 to 2 years.    Patient Education   Personalized Prevention Plan  You are due for the preventive services outlined below.  Your care team is available to assist you in scheduling these  "services.  If you have already completed any of these items, please share that information with your care team to update in your medical record.  Health Maintenance Due   Topic Date Due     Diabetic Foot Exam  Never done     ANNUAL REVIEW OF HM ORDERS  Never done     Depression Action Plan  Never done     Colorectal Cancer Screening  Never done     Zoster (Shingles) Vaccine (1 of 2) Never done     Pneumococcal Vaccine (2 - PCV) 10/25/2020     HPV Screening  02/08/2021     PAP Smear  02/08/2021     A1C Lab  03/07/2022     COVID-19 Vaccine (3 - Booster for Kaylene series) 04/09/2022       Depression and Suicide in Older Adults    Nearly 2 million older Americans have some type of depression. Some of them even take their own lives. Yet depression among older adults is often ignored. Learn the warning signs. You may help spare a loved one needless pain. You may also save a life.   What is depression?  Depression is a common and serious illness that affects the way you think and feel. It is not a normal part of aging, nor is it a sign of weakness, a character flaw, or something you can snap out of. Most people with depression need treatment to get better. The most common symptom is a feeling of deep sadness. People who are depressed also may seem tired and listless. And nothing seems to give them pleasure. It s normal to grieve or be sad sometimes. But sadness lessens or passes with time. Depression rarely goes away or improves on its own. A person with clinical depression can't \"snap out of it.\" Other symptoms of depression are:     Sleeping more or less than normal    Eating more or less than normal    Having headaches, stomachaches, or other pains that don t go away    Feeling nervous,  empty,  or worthless    Crying a great deal    Thinking or talking about suicide or death    Loss of interest in activities previously enjoyed    Social isolation    Feeling confused or forgetful  What causes it?  The causes of " depression aren t fully known. But it is thought to result from a complex blend of these factors:     Biochemistry. Certain chemicals in the brain play a role.    Genes. Depression does run in families.    Life stress. Life stresses can also trigger depression in some people. Older adults often face many stressors, such as death of friends or a spouse, health problems, and financial concerns.    Chronic conditions. This includes conditions such as diabetes, heart disease, or cancer. These can cause symptoms of depression. Medicine side effects can cause changes in thoughts and behaviors.  How you can help  Often, depressed people may not want to ask for help. When they do, they may be ignored. Or, they may receive the wrong treatment. You can help by showing parents and older friends love and support. If they seem depressed, don t lecture the person, ignore the symptoms, or discount the symptoms as a  normal  part of aging -which they are not. Get involved, listen, and show interest and support.   Help them understand that depression is a treatable illness. Tell them you can help them find the right treatment. Offer to go to their healthcare provider's appointment with them for support when the symptoms are discussed. With their approval, contact a local mental health center, social service agency, or hospital about services.   You can be an advocate for him or her at healthcare appointments. Many older adults have chronic illnesses that can cause symptoms of depression. Medicine side effects can change thoughts and behaviors. You can help make sure that the healthcare provider looks at all of these factors. He or she should refer your family member or friend to a mental healthcare provider when needed. in some cases, untreated depression can lead to a misdiagnosis. A person may be diagnosed with a brain disorder such as dementia. If the healthcare provider does not take the issue of depression seriously, help your  family member or friend to find another provider.   Don't be afraid to ask  If you think an older person you care about could be suicidal, ask,  Have you thought about suicide?  Most people will tell you the truth. If they say  yes,  they may already have a plan for how and when they will attempt it. Find out as much as you can. The more detailed the plan, and the easier it is to carry out, the more danger the person is in right now. Tell the person you are there for them and do not want them to harm him or herself. Don't wait to get help for the person. Call the person's healthcare provider, local hospital, or emergency services.   To learn more    National Suicide Prevention Lifeline (crisis hotline) 973-213-WBTJ (056-190-0511)    National [place] of Mental Stpfmt387-780-2643bja.[facility].nih.gov    National [place] on Mental Ioykqsi053-114-5123yee.tony.org    Mental Health Jdqzocw971-786-7654yzm.[facility].org    National Suicide Fcivmba824-LFMQWPQ (245-775-3302)    Call 911  Never leave the person alone. A person who is actively suicidal needs psychiatric care right away. They will need constant supervision. Never leave the person out of sight. Call 911 or the national 24-hour suicide crisis hotline at 585-458-VQHU (077-791-0303). You can also take the person to the closest emergency room.   [employer] last reviewed this educational content on 5/1/2020 2000-2021 [employer]. All rights reserved. This information is not intended as a substitute for professional medical care. Always follow your healthcare professional's instructions.

## 2022-06-02 NOTE — PROGRESS NOTES
SUBJECTIVE:   Leora Sanchez is a 51 year old female who presents for Preventive Visit.    [facility] Maintenance Exam  [facility]  Phone : Kandy    Assessment/Plan     1. Annual Wellness Visit as outlined below.   Health maintenance discussed as appropriate for age and risk factors including:  Nutrition, exercise, alcohol use, tobacco use, safe sexual practices, dental health.  Cancer screening assessed and ordered as indicated. Routine labs as outlined below based on age and risk factors reviewed today. Immunizations reviewed and updated.       Colon cancer screening  - COLOGUARD(EXACT SCIENCES)    Cervical cancer screening  - Pap Screen with HPV - recommended age 30 - 65 years  - HPV High Risk Types DNA Cervical    Type 2 diabetes mellitus with stage 3a chronic kidney disease, without long-term current use of insulin (H)   Controlled.  Addressed smoking status and aspirin therapy.  Recommended annual eye exam and dental cares. Reviewed foot cares and foot exam.  Blood pressure and lipid management reviewed today.  Vaccines reviewed and updated.  Plan for glucose management includes ongoing focus on healthy diabetic diet and increased activity, continue metformin XR 500mg bid.  Labs ordered as below including:     Hemoglobin A1C POCT   Date Value Ref Range Status   01/18/2011 5.7 4.2 - 6.1 % Final     Hemoglobin A1C   Date Value Ref Range Status   06/02/2022 6.8 (H) 0.0 - 5.6 % Final     Comment:     Normal <5.7%   Prediabetes 5.7-6.4%    Diabetes 6.5% or higher     Note: Adopted from ADA consensus guidelines.   12/07/2021 6.7 (H) 0.0 - 5.6 % Final     Comment:     Normal <5.7%   Prediabetes 5.7-6.4%    Diabetes 6.5% or higher     Note: Adopted from ADA consensus guidelines.   04/15/2021 7.0 (H) <=5.6 % Final    ,   Lab Results   Component Value Date    LDL 89 12/07/2021   ,   Creatinine   Date Value Ref Range Status   06/02/2022 1.09 0.60 - 1.10 mg/dL Final   04/26/2013 0.82 0.60 - 1.10  mg/dL Final        - HEMOGLOBIN A1C  - Basic metabolic panel  (Ca, Cl, CO2, Creat, Gluc, K, Na, BUN)  - aspirin 81 MG EC tablet  Dispense: 90 tablet; Refill: 4  - HEMOGLOBIN A1C  - Basic metabolic panel  (Ca, Cl, CO2, Creat, Gluc, K, Na, BUN)    Encounter for Medicare annual wellness exam  As below     Moderate episode of recurrent major depressive disorder (H)  Increased symptoms related to recent death of her mother.  Continue effexor XR 150mg daily.  Pt declines meds for sleep, increase in dose or cognitive therapy indicating that she will just need time and overall doing well.      Coronary artery disease involving native coronary artery of native heart with angina pectoris (H)  Stable- continue follow-up with cardiology as scheduled.  Cont asa, b-blocker, arb, isosorbide, lasix, statin,     Essential hypertension  BP Readings from Last 3 Encounters:   06/02/22 107/74   03/01/22 106/78   12/07/21 114/78        controlled today.  Plan for bloodpressure management includes ongoing focus on healthy DASH type diet and increased activity, encouraged to avoid tobacco products and limit alcohol use, stress reduction, no signs of orthostatic sx.  Continue losartan 100mg daily, isosorbide 60mg daily, metoprolol ER 100mg daily.  Labwork and meds ordered and reviewed as below  Potassium   Date Value Ref Range Status   06/02/2022 4.0 3.5 - 5.0 mmol/L Final   04/26/2013 3.8 3.5 - 5.0 mmol/L Final      Creatinine   Date Value Ref Range Status   06/02/2022 1.09 0.60 - 1.10 mg/dL Final   04/26/2013 0.82 0.60 - 1.10 mg/dL Final        - Basic metabolic panel  (Ca, Cl, CO2, Creat, Gluc, K, Na, BUN)  - Basic metabolic panel  (Ca, Cl, CO2, Creat, Gluc, K, Na, BUN)    Anemia, unspecified type  Hemoglobin   Date Value Ref Range Status   06/02/2022 12.6 11.7 - 15.7 g/dL Final   ]  Resolved.    - CBC with platelets  - CBC with platelets    Encounter for immunization  - COVID-19,PF,PFIZER (12+ YRS)    Nonischemic cardiomyopathy (H)  As  above  - furosemide (LASIX) 40 MG tablet  Dispense: 90 tablet; Refill: 4  - metoprolol succinate ER (TOPROL XL) 100 MG 24 hr tablet  Dispense: 90 tablet; Refill: 4    Heart failure with reduced ejection fraction (H)  As above  - isosorbide mononitrate (IMDUR) 60 MG 24 hr tablet  Dispense: 90 tablet; Refill: 3    Type 2 diabetes mellitus with hyperglycemia, without long-term current use of insulin (H)  As above   - metFORMIN (GLUCOPHAGE XR) 500 MG 24 hr tablet  Dispense: 180 tablet; Refill: 4       Return in about 4 months (around 10/2/2022) for Office Visit.    HPI:     Chief Complaint   Patient presents with     Annual Visit       May X Daniel is a 51 year old female and is here for a comprehensive health maintenance exam.     Other concerns today:   Her mom  this spring- they lived together their entire life.  Struggling to sleep and exhausted. Doesn't want to take anything to help her sleep.  Not much of an appetite.  Weight has been stable.  Fasting sugars around 100 but not checking every day or even weekly.       Needs refills on meds but not sure which ones.   and son are helping set up her pill box on weekly basis.  Taking 5 pills in am, 2 pills in afternoon  Depression pill in the morning  Potassium in morning  Stomach pill in morning  Water pill in evening only now and then going to bed around 1-2am and not having to pee after this.   Diabetes pill bid  bp med with L daily   bp med with M daily pm   Heart pill with I am   Cholesterol pill am   Asa not any more- hasnt been getting this one.        History summarized from-2:cardiology 3/2022 - continue lasix bid with potassium, losartan, metoprolol and imdur  Old Records-1: Outside allergies, meds, problems and immunizations were reconciled as needed from CareEverywhere  Lab tests reviewed-1:     Review of Systems   Complete ROS including General, HEENT, CV, Pulmonary, GI, , Genital, Neuro, Psych, MSK, Heme, Endocrine all within normal except  "as noted in the HPI.      Prevention of Osteoporosis:limited dairy, vitamin D supplement   Last Dexa:na    Cancer Screening:  Hemoccults/Colonoscopy: due-requests cologard  Mammogram:  Normal 12/2021- repeat in 1 year.    Pap Smear:  Due today  Lung Cancer: na      Wt Readings from Last 3 Encounters:   06/02/22 87.1 kg (192 lb)   03/01/22 88.9 kg (196 lb)   12/07/21 87.5 kg (193 lb)         Complete physical exam including:  General, HEENT, Neck, breast, back, CV, Pulmonary, Abdominal, extremities, skin, neuro, psych, lymph, genital exams all within normal.    Abnormalities include:  Grossly normal exam today     Patient has been advised of split billing requirements and indicates understanding: Yes  Are you in the first 12 months of your Medicare coverage?  No    Healthy Habits:     In general, how would you rate your overall health?  Poor    Frequency of exercise:  6-7 days/week    Duration of exercise:  15-30 minutes    Do you usually eat at least 4 servings of fruit and vegetables a day, include whole grains    & fiber and avoid regularly eating high fat or \"junk\" foods?  No    Taking medications regularly:  No    Barriers to taking medications:  Problems remembering to take them    Medication side effects:  None    Ability to successfully perform activities of daily living:  Preparing meals requires assistance and housework requires assistance    Home Safety:  No safety concerns identified    Hearing Impairment:  No hearing concerns    In the past 6 months, have you been bothered by leaking of urine?  No    In general, how would you rate your overall mental or emotional health?  Poor      PHQ-2 Total Score: 6    Additional concerns today:  No    Answers for HPI/ROS submitted by the patient on 6/2/2022  If you checked off any problems, how difficult have these problems made it for you to do your work, take care of things at home, or get along with other people?: Very difficult  PHQ9 TOTAL SCORE: 19        Do you " feel safe in your environment? Yes    Have you ever done Advance Care Planning? (For example, a Health Directive, POLST, or a discussion with a medical provider or your loved ones about your wishes): No, advance care planning information given to patient to review.  Patient declined advance care planning discussion at this time.      Fall risk     click delete button to remove this line now  Cognitive Screening   1) Repeat 3 items (Leader, Season, Table)    2) Clock draw: UNABLE  3) 3 item recall: unable  Results: UNABLE    Mini-CogTM Copyright KATERINA Robles. Licensed by the author for use in [facility]; reprinted with permission (mar@[facility]). All rights reserved.      Do you have sleep apnea, excessive snoring or daytime drowsiness?: no    Reviewed and updated as needed this visit by clinical staff   Tobacco  Allergies  Meds  Problems  Med Hx  Surg Hx  Fam Hx            Reviewed and updated as needed this visit by Provider   Tobacco  Allergies  Meds  Problems  Med Hx  Surg Hx  Fam Hx           Social History     Tobacco Use     Smoking status: Never Smoker     Smokeless tobacco: Never Used     Tobacco comment: no passive exposure   Substance Use Topics     Alcohol use: No     If you drink alcohol do you typically have >3 drinks per day or >7 drinks per week? No    Alcohol Use 6/2/2022   Prescreen: >3 drinks/day or >7 drinks/week? No   Prescreen: >3 drinks/day or >7 drinks/week? -   No flowsheet data found.    Current providers sharing in care for this patient include:   Patient Care Team:  Jaky Gaspar MD as PCP - General (Family Practice)  Wendy Lind MD as MD (Pulmonary Disease)  Art Thakkar, PharmD as Pharmacist (Pharmacist)  Elisabet Malone MA as Community Health Worker (Primary Care - CC)  Nickie Cuevas, RN as Lead Care Coordinator (Primary Care - CC)  Jaky Gaspar MD as Assigned PCP  Buddy Roy MD as Assigned Heart and Vascular Provider  Jose Maria Bass MD as  Assigned Surgical Provider    The following health maintenance items are reviewed in Epic and correct as of today:  Health Maintenance Due   Topic Date Due     DIABETIC FOOT EXAM  Never done     DEPRESSION ACTION PLAN  Never done     COLORECTAL CANCER SCREENING  Never done     ZOSTER IMMUNIZATION (1 of 2) Never done     Pneumococcal Vaccine: Pediatrics (0 to 5 Years) and At-Risk Patients (6 to 64 Years) (2 - PCV) 10/25/2020       FHS-7:   Breast CA Risk Assessment (FHS-7) 12/7/2021 6/2/2022   Did any of your first-degree relatives have breast or ovarian cancer? No Yes   Did any of your relatives have bilateral breast cancer? No No   Did any man in your family have breast cancer? No No   Did any woman in your family have breast and ovarian cancer? No No   Did any woman in your family have breast cancer before age 50 y? No No   Do you have 2 or more relatives with breast and/or ovarian cancer? No No   Do you have 2 or more relatives with breast and/or bowel cancer? No No     Mammogram Screening: Recommended annual mammography  Pertinent mammograms are reviewed under the imaging tab.    Review of Systems   Constitutional: Negative for chills and fever.   HENT: Negative for congestion, ear pain, hearing loss and sore throat.    Eyes: Negative for pain and visual disturbance.   Respiratory: Negative for cough and shortness of breath.    Cardiovascular: Negative for chest pain, palpitations and peripheral edema.   Gastrointestinal: Negative for abdominal pain, constipation, diarrhea, heartburn, hematochezia and nausea.   Breasts:  Negative for tenderness, breast mass and discharge.   Genitourinary: Negative for dysuria, frequency, genital sores, hematuria, pelvic pain, urgency, vaginal bleeding and vaginal discharge.   Musculoskeletal: Negative for arthralgias, joint swelling and myalgias.   Skin: Negative for rash.   Neurological: Negative for dizziness, weakness, headaches and paresthesias.   Psychiatric/Behavioral:  "Negative for mood changes. The patient is not nervous/anxious.        OBJECTIVE:   /74 (BP Location: Right arm, Patient Position: Sitting, Cuff Size: Adult Large)   Pulse 80   Temp 97.9  F (36.6  C) (Temporal)   Resp 18   Ht 1.51 m (4' 11.45\")   Wt 87.1 kg (192 lb)   BMI 38.20 kg/m   Estimated body mass index is 38.2 kg/m  as calculated from the following:    Height as of this encounter: 1.51 m (4' 11.45\").    Weight as of this encounter: 87.1 kg (192 lb).  Physical Exam      ASSESSMENT / PLAN:       COUNSELING:    Estimated body mass index is 38.2 kg/m  as calculated from the following:    Height as of this encounter: 1.51 m (4' 11.45\").    Weight as of this encounter: 87.1 kg (192 lb).    Weight management plan: Discussed healthy diet and exercise guidelines    She reports that she has never smoked. She has never used smokeless tobacco.      Appropriate preventive services were discussed with this patient, including applicable screening as appropriate for cardiovascular disease, diabetes, osteopenia/osteoporosis, and glaucoma.  As appropriate for age/gender, discussed screening for colorectal cancer, prostate cancer, breast cancer, and cervical cancer. Checklist reviewing preventive services available has been given to the patient.    Reviewed patients plan of care and provided an AVS. The Complex Care Plan (for patients with higher acuity and needing more deliberate coordination of services) for May meets the Care Plan requirement. This Care Plan has been established and reviewed with the Patient.    Counseling Resources:  ATP IV Guidelines  Pooled Cohorts Equation Calculator  Breast Cancer Risk Calculator  Breast Cancer: Medication to Reduce Risk  FRAX Risk Assessment  ICSI Preventive Guidelines  Dietary Guidelines for Americans, 2010  [employer]'s MyPlate  ASA Prophylaxis  Lung CA Screening    Jaky Gaspar MD  [facility]    Identified Health Risks:    The patient s PHQ-9 score " is consistent with moderate depression. She was provided with information regarding depression and was advised to schedule a follow up appointment in 12 weeks to further address this issue.

## 2022-06-02 NOTE — COMMUNITY RESOURCES LIST (ENGLISH)
06/02/2022   [facility] - Outpatient Clinics  Priyanka Thomas  For questions about this resource list or additional care needs, please contact your primary care clinic or care manager.  Phone: [phone]   Email: N/A   Address: [address] [place] [address]   Hours: N/A        Hotlines and Helplines       Hotline - Crisis help  1  [facility] - Adult Mental Health Urgent Care Distance: 2.26 miles      COVID-19 Status: Phone/Virtual   [address] [place] [address]  Language: English, Hmong, Sinhala  Hours: Mon - Sun Open 24 Hours   Phone: (967) 972-6537 Website: https://www.[facility]./[facility]/health-medical/clinics-services/mental-behavorial-health/adult-mental-health-chemical-health/urgent-care-adult-mental-health     2  [place] Department of Human Services - Crisis Text Line - Crisis Text Line Distance: 2.45 miles      COVID-19 Status: Phone/Virtual   [address] [place] [address]  Language: English  Hours: Mon - Sun Open 24 Hours   Website: https://[place].gov/dhs/partners-and-providers/policies-procedures/adult-mental-health/crisis-text-line/          Mental Health       Individual counseling  3  [facility] Distance: 1.53 miles      COVID-19 Status: Phone/Virtual   [address] [place] [address]  Language: English  Hours: Mon 10:30 AM - 5:00 PM , Tue - Fri 8:30 AM - 5:00 PM  Fees: Free   Phone: (163) 458-3460 Email: info@[employer].net Website: http://www.[employer].net     4  [facility] Distance: 1.95 miles      COVID-19 Status: Regular Operations, COVID-19 Status: Phone/Virtual   [address] [place] [address]  Language: English, Hmong, Sinhala  Hours: Mon 8:00 AM - 8:00 PM , Tue 8:00 AM - 5:00 PM , Wed - Thu 8:00 AM - 8:00 PM , Fri 8:00 AM - 5:00 PM , Sat 8:00 AM - 12:00 PM  Fees: Insurance, Self Pay, Sliding Fee   Phone: (138) 539-2716 Website: https://www.[place]care.org     Mental health crisis care  5  [facility] - Adult Program Distance: 2.26 miles      COVID-19 Status: Regular Operations   [address] [facility] [place] [address]  Language: English, Hmong, Sao Tomean  Hours: Mon - Fri 8:00 AM - 5:30 PM  Fees: Insurance, Self Pay, Sliding Fee   Phone: (569) [phone] Website: https://www.[facility]./[facility]/health-medical/clinics-services/mental-behavorial-health/adult-mental-health-chemical-health/urgent-care-adult-mental-health     6  [facility] Distance: 6.93 miles      COVID-19 Status: Regular Operations, COVID-19 Status: Phone/Virtual   [address] [facility] [address] [place] [address]  Language: English, Chinese  Hours: Mon - Fri 9:00 AM - 5:00 PM  Fees: Insurance, Self Pay   Phone: (615) 369-3001 Website: http://[employer]/index.php     Mental health support group  7  [facility] Distance: 3 miles      COVID-19 Status: Phone/Virtual   [address] [facility] [address] [place] [address]  Language: English  Hours: Mon - Fri 8:00 AM - 4:30 PM  Fees: Free   Phone: (121) 222-2869 Website: https://www.[place].gov/find-locations/facility/vc_0416V     8  [employer] [facility] Distance: 4.91 miles      COVID-19 Status: Regular Operations, COVID-19 Status: Phone/Virtual   [address] [place] [address]  Language: English  Hours: Mon - Thu 12:00 PM - 5:00 PM  Fees: Free   Phone: (960) 678-9476 Email: info@[employer].org Website: http://emotionsParadigmnymous.org/          Important Numbers & Websites       Emergency Services   911  City Services   311  Poison Control   (173) 653-6960  Suicide Prevention Lifeline   (389) 196-6858 (TALK)  Child Abuse Hotline   (234) 530-7792 (4-A-Child)  Sexual Assault Hotline   (866) 285-3721 (HOPE)  National Runaway Safeline   (363) 521-1157 (RUNAWAY)  All-Options Talkline   (242) 599-5218  Substance Abuse Referral   (293) 612-5693 (HELP)

## 2022-06-07 LAB
HUMAN PAPILLOMA VIRUS 16 DNA: NEGATIVE
HUMAN PAPILLOMA VIRUS 18 DNA: NEGATIVE
HUMAN PAPILLOMA VIRUS FINAL DIAGNOSIS: NORMAL
HUMAN PAPILLOMA VIRUS OTHER HR: NEGATIVE

## 2022-06-08 LAB
BKR LAB AP GYN ADEQUACY: NORMAL
BKR LAB AP GYN INTERPRETATION: NORMAL
BKR LAB AP HPV REFLEX: NORMAL
BKR LAB AP PREVIOUS ABNORMAL: NORMAL
PATH REPORT.COMMENTS IMP SPEC: NORMAL
PATH REPORT.COMMENTS IMP SPEC: NORMAL
PATH REPORT.RELEVANT HX SPEC: NORMAL

## 2022-06-16 ENCOUNTER — PATIENT OUTREACH (OUTPATIENT)
Dept: NURSING | Facility: CLINIC | Age: 52
End: 2022-06-16
Payer: MEDICARE

## 2022-06-16 ASSESSMENT — ACTIVITIES OF DAILY LIVING (ADL): DEPENDENT_IADLS:: INDEPENDENT;TRANSPORTATION

## 2022-06-16 NOTE — COMMUNITY RESOURCES LIST (ENGLISH)
06/16/2022   [facility]  Nickie Mavisgilda  For questions about this resource list or additional care needs, please contact your primary care clinic or care manager.  Phone: [phone]   Email: N/A   Address: [address] [place] [address]   Hours: N/A        Dental, Vision, and Hearing       Dental care  1  [facility] Program Distance: 1.02 miles      COVID-19 Status: Regular Operations   [address] [place] [address]  Language: English, Hmong, Tita, Guinean  Hours: Mon - Thu 8:15 AM - 5:00 PM , Fri 8:15 AM - 2:00 PM  Fees: Free, Insurance   Phone: (804) 492-4834 Email: infosp@[employer] Website: http://www.[employer]/     2  [facility] Distance: 1.95 miles      COVID-19 Status: Regular Operations   [address] [place] [address]  Language: English, Hmong, Guinean  Hours: Mon 8:00 AM - 8:00 PM , Tue 8:00 AM - 5:00 PM , Wed - Thu 8:00 AM - 8:00 PM , Fri 8:00 AM - 5:00 PM , Sat 8:00 AM - 12:00 PM  Fees: Insurance, Self Pay, Sliding Fee   Phone: (587) 702-8615 Website: https://www.[facility].org     Eye care  3  [facility] [place] Distance: 1.98 miles      COVID-19 Status: Regular Operations   [address] [place] [address]  Language: English  Hours: Mon 8:00 AM - 5:00 PM , Wed 8:00 AM - 5:00 PM  Fees: Insurance, Self Pay, Sliding Fee   Phone: (948) 530-1695 Email: info@[employer].org Website: http://[employer].org     4  [facility] Distance: 3.97 miles      COVID-19 Status: Regular Operations   [address] [place] [address]  Language: English  Hours: Mon - Fri 10:00 AM - 7:00 PM , Sat 10:00 AM - 6:30 PM , Sun 12:00 PM - 6:00 PM  Fees: Insurance, Self Pay   Phone: (451) 764-9874 Email: ean@[employer] Website: https://www.[employer]-us/stores/[place]/veto/7821          Important Numbers & Websites        Emergency Services   911  [facility]   [facility]  Poison Control   (786) 998-3236  Suicide Prevention Lifeline   (955) 636-8553 (TALK)  Child Abuse Hotline   (186) 423-6695 (4-A-Child)  Sexual Assault Hotline   (851) 649-6197 (HOPE)  National Runaway Safeline   (228) 287-6955 (RUNAWAY)  All-Options Talkline   (213) 655-6413  Substance Abuse Referral   (125) 446-5057 (HELP)

## 2022-06-16 NOTE — LETTER
[facility] C[facility] COORDINATION  [facility] [address] [place] [address]    June 16, 2022    May X Sanchez  [address]  [place] [address]      Dear May,    Nyob zoo ,    Kuv yog ib tugtxhim tsa jakob saib xyuas ntawm qhov chaw fern mob uas ua hauj lwm nrog  nicki .Kuv zamzam ntawv tuaj qhia koj paub txog peb txoj chitra num uas yog txhim tsa jakob saib xyuas jg hauv qhov chaw fern mob thiab peb tseem pab txhawb nqa koj tau kom ncav cuag koj rasheed mariam phiaj ntawm jakob fern mob.    Peb pab pawg txhim tsa jakob saib xyuas jg qhov chaw fern mob muaj neeg xws li ib tug nais maum txawj, ib tug kws pab pej xeem, thiab ib tug neeg fern mob hauv zej zog. Lub mariam phiaj jg peb pab pawg no yog pab koj noj qab nyob zoo thiab pab kom thiaj yooj stacey jg koj txais tau jakob fern mob. Peb pab pawg yuav pab koj ncav cuag koj rasheed mariam phiaj ntawm jakob fern mob dhau ntawm muab ntaub ntawv ntawm jakob noj qab nyob zoo jg koj, txhim tsa jaokb pab, ntxiv zog jg jakob tiv tauj ntawm koj thiab koj tus kws fern mob, thiab txhawb nqa koj yog koj yuav tsum muaj dab tsi.    Thov tiv tauj tuaj rautim  yog koj muaj dorothy nug los sis jakob txhawj xeeb dab tsi. Peb ua tib zoo siv zog muab jakob fern mob zoo tshaj plaws jg koj. Peb xav pab koj ncav cuag thiab ua neej nyob raws li koj rasheed mariam phiaj ntawm jakob fern mob.    Zamzam gupta,   Nickie Cuevas RN

## 2022-06-16 NOTE — LETTER
[facility]  Patient Centered Plan of Care  About Me:        Patient Name:  Leora Sanchez    YOB: 1970  Age:         51 year old   Alessandro MRN:    [id number] Telephone Information:  Home Phone [phone]   Mobile [phone]       Address:  [address]  [place] [address] Email address:  No e-mail address on record      Emergency Contact(s)    Name Relationship Lgl Grd Work Phone Home Phone Mobile Phone   EDILSON MACIEL Spouse   [phone]            Primary language:  Hmong     needed? Yes   [facility] [employer]:  [phone] op. 1  Other communication barriers:Language barrier    Preferred Method of Communication:  Mail  Current living arrangement: I live in a private home with family; I live in a private home with spouse (Living with son, mother, and )    Mobility Status/ Medical Equipment: Independent        Health Maintenance  Health Maintenance Reviewed: Due/Overdue     My Access Plan  Medical Emergency 911   Primary Clinic Line [facility] - [phone]   24 Hour Appointment Line [phone] or  [phone]) (toll-free)   24 Hour Nurse Line [phone] (toll-free)   Preferred Urgent Care [facility], [phone]     [facility]  [phone]     Preferred Pharmacy [facility] - [place], [place] - [address] [place] [address] Pky     Behavioral Health Crisis Line The National Suicide Prevention Lifeline at 1-904.227.2217 or 911             My Care Team Members  Patient Care Team       Relationship Specialty Notifications Start End    Jaky Gaspar MD PCP - General Family Practice  3/20/18     Phone: [phone] Fax: [phone]         [address] [place] [address]    Wnedy Lind MD MD Pulmonary Disease  3/20/18     Phone: [phone] Fax: [phone] [address] [facility] [address] [facility] [address]    Art Thakkar, PharmD Pharmacist Pharmacist   10/15/19     Phone: [phone]          [facility] [address] [place] [address]    Elisabet Malone MA Community Health Worker Primary Care - CC Admissions 12/10/20     Nickie Cuevas RN Lead Care Coordinator Primary Care - CC Admissions 1/11/21     Jaky Gaspar MD Assigned PCP   6/16/21     Phone: [phone] Fax: [phone]         [address] [facility] [address]    Buddy Roy MD Assigned Heart and Vascular Provider   7/16/21     Phone: [phone] Fax: [phone]         [address] [facility] [address] [facility] [address]    Jose Maria Bass MD Assigned Surgical Provider   5/28/22     Phone: [phone] Fax: [phone]         [address] [place] [place] [address]            My Care Plans  Self Management and Treatment Plan  Goals and (Comments)   Goals        General     Medical (pt-stated)      Notes - Note created  6/16/2022 10:09 AM by Nickie Cuevas RN     Goal Statement: I will have a better understanding and plan of care for Colon screening within 3-4 months  Date Goal set: 06/16/22  Barriers: language, knowledge deficit   Strengths: family support  Date to Achieve By:  Patient expressed understanding of goal: yes  Action steps to achieve this goal:  1. I will follow instructions and complete cologuard home test at home with support of sister  2. I will update Care coordination team during next outreach  3. I will report to my Community Health Worker if any additional resources or support needed.           Medical (pt-stated)      Notes - Note created  6/16/2022 10:10 AM by Nickie Cuevas RN       Goal Statement- I would like to schedule and attend an evaluation and eye exam within 4-6 months   DateGoal set: 06/16/22      Barriers: limitations due to Covid 19 pandemic   Strengths: Patient engagement, provider identified   Date to Achieve By:   Patient expressed understanding of goal: yes      Action stepsto achieve this goal  1.  I will review my insurance coverage for eye exam and  outreach and schedule a visit  2.  I will attend visit and follow up with recommendations   3.  I will report back to my CommunityHealth Worker if I need additional support or resources            Medical (pt-stated)      Notes - Note created  6/16/2022 10:11 AM by Nickie Cuevas RN     Goal Statement- I would like to schedule and attend an evaluation and dental exam within 4-6 months   DateGoal set: 06/16/22    Barriers: insurance coverage  Strengths: Patient engagement  Date to Achieve By: December  Patient expressed understanding of goal: yes    Actionsteps to achieve this goal  1.  I will review my insurance coverage for dental exam and outreach and schedule a visit  2.  I will attend visit and follow up with recommendations   3.  I will report back to myCommunity Health Worker if I need additional support or resources                    Action Plans on File:                       Advance Care Plans/Directives Type:   No data recorded    My Medical and Care Information  Problem List   Patient Active Problem List   Diagnosis     Health Care Home     Essential hypertension     Perforation of right tympanic membrane     Heart failure with reduced ejection fraction (H)     Type 2 diabetes mellitus with stage 3a chronic kidney disease, without long-term current use of insulin (H)     Right-sided sensorineural hearing loss     Recurrent major depressive disorder, in partial remission (H)     Pulmonary nodules     Coronary artery disease involving native coronary artery of native heart with angina pectoris (H)     Nonischemic cardiomyopathy (H)     Mixed incontinence     Hypokalemia     Hyperlipidemia LDL goal <70     Heartburn     Ganglion cyst of left foot     Class 2 severe obesity due to excess calories with serious comorbidity and body mass index (BMI) of 39.0 to 39.9 in adult (H)     CKD (chronic kidney disease) stage 3, GFR 30-59 ml/min (H)     Tension headache     Bilateral dry eyes     Anemia, unspecified  type      Current Medications and Allergies:    Current Outpatient Medications   Medication     acetaminophen (MAPAP) 500 MG tablet     aspirin 81 MG EC tablet     atorvastatin (LIPITOR) 80 MG tablet     B Complex-Biotin-FA (B COMPLEX 100 TR) TBCR     blood glucose (CONTOUR NEXT TEST) test strip     furosemide (LASIX) 40 MG tablet     isosorbide mononitrate (IMDUR) 60 MG 24 hr tablet     losartan (COZAAR) 100 MG tablet     metFORMIN (GLUCOPHAGE XR) 500 MG 24 hr tablet     metoprolol succinate ER (TOPROL XL) 100 MG 24 hr tablet     nitroGLYcerin (NITROSTAT) 0.4 MG sublingual tablet     omeprazole (PRILOSEC) 40 MG DR capsule     potassium chloride ER (K-TAB/KLOR-CON) 10 MEQ CR tablet     venlafaxine (EFFEXOR-XR) 150 MG 24 hr capsule     No current facility-administered medications for this visit.         Care Coordination Start Date: 12/10/2020   Frequency of Care Coordination: monthly     Form Last Updated: 06/16/2022

## 2022-06-16 NOTE — PROGRESS NOTES
Clinic Care Coordination Contact    Clinic Care Coordination Contact  OUTREACH    Referral Information:  Referral Source: PCP         Chief Complaint   Patient presents with     Clinic Care Coordination - Follow-up        [place] Utilization:   Clinic Utilization  Difficulty keeping appointments:: No  Compliance Concerns: Yes  No-Show Concerns: Yes  No PCP office visit in Past Year: No  Utilization    Hospital Admissions  0             ED Visits  0             No Show Count (past year)  7                Current as of: 6/16/2022  1:13 AM          Chart review notes patient attended AWE and review HM measurers  Orders placed for Cologuard - pt reports that sister is a nurse and she will be able to help her to follow instructions. She will update CHW at next outreach if any   Recommendations for Eye and Dental exam- resources will be send for review - consent   Patient permission for text message   Medication refill changes - family support medication  Management     RTC  - PCP about 4 months   Attended visit with ENT   RTC in 3 months with audiogram ( set for August 22)   Pt reports understanding       Clinical Concerns:  Current Medical Concerns:    Patient Active Problem List   Diagnosis     Health Care Home     Essential hypertension     Perforation of right tympanic membrane     Heart failure with reduced ejection fraction (H)     Type 2 diabetes mellitus with stage 3a chronic kidney disease, without long-term current use of insulin (H)     Right-sided sensorineural hearing loss     Recurrent major depressive disorder, in partial remission (H)     Pulmonary nodules     Coronary artery disease involving native coronary artery of native heart with angina pectoris (H)     Nonischemic cardiomyopathy (H)     Mixed incontinence     Hypokalemia     Hyperlipidemia LDL goal <70     Heartburn     Ganglion cyst of left foot     Class 2 severe obesity due to excess calories with serious comorbidity and body mass index (BMI)  of 39.0 to 39.9 in adult (H)     CKD (chronic kidney disease) stage 3, GFR 30-59 ml/min (H)     Tension headache     Bilateral dry eyes     Anemia, unspecified type   Pain (GOAL):: No  Health Maintenance Reviewed: Due/Overdue cologuard, eye and dental        Medication Management:  Medication review status: Patient has support of family for medication management   Functional Status:  Dependent ADLs:: Independent  Dependent IADLs:: Independent, Transportation  Bed or wheelchair confined:: No  Mobility Status: Independent  Fallen 2 or more times in the past year?: No  Any fall with injury in the past year?: No    Living Situation:  Current living arrangement:: I live in a private home with family, I live in a private home with spouse (Living with son, mother, and )  Type of residence:: Private home - [facility]    Lifestyle & Psychosocial Needs:    Social Determinants of Health     Tobacco Use: Low Risk      Smoking Tobacco Use: Never Smoker     Smokeless Tobacco Use: Never Used   Alcohol Use: Not on file   Financial Resource Strain: Medium Risk     Difficulty of Paying Living Expenses: Somewhat hard   Food Insecurity: Not on file   Transportation Needs: No Transportation Needs     Lack of Transportation (Medical): No     Lack of Transportation (Non-Medical): No   Physical Activity: Not on file   Stress: Stress Concern Present     Feeling of Stress : To some extent   Social Connections: Not on file   Intimate Partner Violence: Not on file   Depression: At risk     PHQ-2 Score: 5   Housing Stability: Unknown     Unable to Pay for Housing in the Last Year: No     Number of Places Lived in the Last Year: Not on file     Unstable Housing in the Last Year: Not on file     Diet:: Regular  Inadequate nutrition (GOAL):: No  Tube Feeding: No  Inadequate activity/exercise (GOAL):: No  Significant changes in sleep pattern (GOAL): No  Transportation means:: Family     Sikhism or spiritual beliefs that impact treatment::  No  Mental health DX:: No  Mental health management concern (GOAL):: No  Informal Support system:: Children, Family, Spouse             Resources and Interventions:  Current Resources:      Community Resources: [facility]  Supplies Currently Used at Home: None  Equipment Currently Used at Home: none  Employment Status: homemaker, disabled         Advance Care Plan/Directive  Advanced Care Plans/Directives on file:: No  Advanced Care Plan/Directive Status: Declined Further Information         The patient consented via Verbal consent to have contact information and resources sent via text in an unencrypted manner.     Goals:    Goals        General     Medical (pt-stated)      Notes - Note created  6/16/2022 10:09 AM by Nickie Cuevas RN     Goal Statement: I will have a better understanding and plan of care for Colon screening within 3-4 months  Date Goal set: 06/16/22  Barriers: language, knowledge deficit   Strengths: family support  Date to Achieve By:  Patient expressed understanding of goal: yes  Action steps to achieve this goal:  1. I will follow instructions and complete cologuard home test at home with support of sister  2. I will update Care coordination team during next outreach  3. I will report to my Community Health Worker if any additional resources or support needed.           Medical (pt-stated)      Notes - Note created  6/16/2022 10:10 AM by Nickie Cuevas RN       Goal Statement- I would like to schedule and attend an evaluation and eye exam within 4-6 months   DateGoal set: 06/16/22      Barriers: limitations due to Covid 19 pandemic   Strengths: Patient engagement, provider identified   Date to Achieve By:   Patient expressed understanding of goal: yes      Action stepsto achieve this goal  1.  I will review my insurance coverage for eye exam and outreach and schedule a visit  2.  I will attend visit and follow up with recommendations   3.  I will report back to my CommunityHealth  Worker if I need additional support or resources            Medical (pt-stated)      Notes - Note created  6/16/2022 10:11 AM by Nickie Cuevas RN     Goal Statement- I would like to schedule and attend an evaluation and dental exam within 4-6 months   DateGoal set: 06/16/22    Barriers: insurance coverage  Strengths: Patient engagement  Date to Achieve By: December  Patient expressed understanding of goal: yes    Actionsteps to achieve this goal  1.  I will review my insurance coverage for dental exam and outreach and schedule a visit  2.  I will attend visit and follow up with recommendations   3.  I will report back to myCommunity Health Worker if I need additional support or resources                   Patient/Caregiver understanding: Patient verbalized understanding via interpretive services. Engaged in [employer] communication during visit      Outreach Frequency: monthly  Future Appointments              In 2 months [facility]    In 2 months Jose Maria Bsas MD [facility]          Plan:   Patient will schedule and attend recommended follow up visits with speciality providers and primary care provider.  Community Health Worker to outreach per standard work and updated on goal progression      RN CC will review in 4-6 weeks to support ongoing recommendations and plan of care will be available sooner if needed.

## 2022-06-24 ENCOUNTER — PATIENT OUTREACH (OUTPATIENT)
Dept: NURSING | Facility: CLINIC | Age: 52
End: 2022-06-24

## 2022-06-24 NOTE — PROGRESS NOTES
Clinic Care Coordination Contact  Community Health Worker Follow Up    Care Gaps:   Health Maintenance Due   Topic Date Due     DIABETIC FOOT EXAM  Never done     DEPRESSION ACTION PLAN  Never done     COLORECTAL CANCER SCREENING  Never done     ZOSTER IMMUNIZATION (1 of 2) Never done     Pneumococcal Vaccine: Pediatrics (0 to 5 Years) and At-Risk Patients (6 to 64 Years) (2 - PCV) 10/25/2020     Unable to address care gaps with patient at this time due to the patient time. Defer to next outreach follow up.    Discussion:   CCC CHW was able to connect with patient today. Patient stated that she is doing well and currently shopping at the [employer]. Patient isn't available to go over goals and discuss care gaps at this time. Pt prefer to discuss this at next month. Pt will connect with CHW and CCC team if any additional resources need per pt.     CHW Next Follow-up: goals follow up. Address care gaps- CHW offer follow up appt for pt to discuss details with her PCP.      Outreach frequency: monthly     CHW Plan: 7-

## 2022-06-28 ENCOUNTER — TELEPHONE (OUTPATIENT)
Dept: FAMILY MEDICINE | Facility: CLINIC | Age: 52
End: 2022-06-28

## 2022-06-28 NOTE — RESULT ENCOUNTER NOTE
Team - please call patient with results and send result letter with this information if patient desires for their records.     Her kidney tests are healthy    Her diabetes is doing well.    Her blood counts are healthy.

## 2022-06-28 NOTE — TELEPHONE ENCOUNTER
----- Message from Jaky Gaspar MD sent at 6/28/2022 11:30 AM CDT -----  Team - please call patient with results and send result letter with this information if patient desires for their records.     Her kidney tests are healthy    Her diabetes is doing well.    Her blood counts are healthy.       
Provider response below relayed to patient. Message understood. Use  line to contact patient :Vishnu     
no

## 2022-08-04 ENCOUNTER — PATIENT OUTREACH (OUTPATIENT)
Dept: CARE COORDINATION | Facility: CLINIC | Age: 52
End: 2022-08-04

## 2022-08-17 ENCOUNTER — PATIENT OUTREACH (OUTPATIENT)
Dept: CARE COORDINATION | Facility: CLINIC | Age: 52
End: 2022-08-17

## 2022-08-17 NOTE — PROGRESS NOTES
Clinic Care Coordination - Chart Review Only    Situation/Background: Patient chart reviewed by care coordinator related to [facility] conversion.    Assessment: Patient continues to be followed by Clinic Care Coordination.    Plan: Patient's chart updated to align with [facility] [facility] program for ongoing patient management.

## 2022-08-20 DIAGNOSIS — I10 ESSENTIAL HYPERTENSION, BENIGN: ICD-10-CM

## 2022-08-20 RX ORDER — POTASSIUM CHLORIDE 750 MG/1
TABLET, EXTENDED RELEASE ORAL
Qty: 90 TABLET | Refills: 2 | Status: SHIPPED | OUTPATIENT
Start: 2022-08-20 | End: 2023-04-21

## 2022-08-21 NOTE — TELEPHONE ENCOUNTER
"Last Written Prescription Date:  8/17/21  Last Fill Quantity: 90,  # refills: 3   Last office visit provider:  6/2/22     Requested Prescriptions   Pending Prescriptions Disp Refills     potassium chloride ER (K-TAB/KLOR-CON) 10 MEQ CR tablet [Pharmacy Med Name: POTASSIUM CHLORIDE ER 10 ME 10 Tablet] 90 tablet 3     Sig: TAKE 1 PILL BY MOUTH EVERY DAY/TXJANEA HNUB NOJ 1 LUB TSHUAJ       Potassium Supplements Protocol Passed - 8/20/2022 10:31 AM        Passed - Recent (12 mo) or future (30 days) visit within the authorizing provider's department     Patient has had an office visit with the authorizing provider or a provider within the authorizing providers department within the previous 12 mos or has a future within next 30 days. See \"Patient Info\" tab in inbasket, or \"Choose Columns\" in Meds & Orders section of the refill encounter.              Passed - Medication is active on med list        Passed - Patient is age 18 or older        Passed - Normal serum potassium in past 12 months     Recent Labs   Lab Test 06/02/22  1541   POTASSIUM 4.0                         Ana Maria Chinchilla, RN 08/20/22 8:14 PM  "

## 2022-08-30 ENCOUNTER — PATIENT OUTREACH (OUTPATIENT)
Dept: CARE COORDINATION | Facility: CLINIC | Age: 52
End: 2022-08-30

## 2022-08-30 NOTE — PROGRESS NOTES
Today's date: 8/30/2022    Clinic Care Coordination - Chart Review Only    Situation/Background: Patient chart reviewed by care coordinator related to [facility] [facility] conversion.    Assessment: Patient continues to be followed by Clinic Care Coordination.    Plan: Patient's chart updated to align with [facility] [facility] program for ongoing patient management.    Elisabet Malone  Community Health Worker  [facility] Care Coordination  [email or web address]  Clinic phone: [phone]  Office: [phone]  Fax: [phone]

## 2022-08-31 ENCOUNTER — PATIENT OUTREACH (OUTPATIENT)
Dept: CARE COORDINATION | Facility: CLINIC | Age: 52
End: 2022-08-31

## 2022-08-31 NOTE — PROGRESS NOTES
Clinic Care Coordination Contact  Community Health Worker Follow Up    Care Gaps:   Health Maintenance Due   Topic Date Due     DIABETIC FOOT EXAM  Never done     DEPRESSION ACTION PLAN  Never done     COLORECTAL CANCER SCREENING  Never done     ZOSTER IMMUNIZATION (1 of 2) Never done     Pneumococcal Vaccine: Pediatrics (0 to 5 Years) and At-Risk Patients (6 to 64 Years) (2 - PCV) 10/25/2020     INFLUENZA VACCINE (1) 09/01/2022     A1C  09/02/2022     PHQ-9  09/02/2022     Patient need to leave and unable to informed pt about care gaps due at this time. Pt is aware to connect with PCP office with any health and scheduling appt questions. Pt confirmed.    Care Plan:   Care Plan: General     Problem: HP GENERAL PROBLEM     Goal: General Goal - I will have a better understanding and plan of care for Colon screening within 3-4 months     Start Date: 6/16/2022    Note:     Date Goal set: 06/16/22  Barriers: language, knowledge deficit   Strengths: family support  Date to Achieve By:  Patient expressed understanding of goal: yes  Action steps to achieve this goal:  1. I will follow instructions and complete cologuard home test at home with support of sister  2. I will update Care coordination team during next outreach  3. I will report to my Community Health Worker if any additional resources or support needed.                  Problem: HP GENERAL PROBLEM     Goal: General Goal - I would like to schedule and attend an evaluation and eye exam within 4-6 months.     Start Date: 6/16/2022    This Visit's Progress: 40%    Note:     DateGoal set: 06/16/22  Barriers: limitations due to Covid 19 pandemic   Strengths: Patient engagement, provider identified   Date to Achieve By:   Patient expressed understanding of goal: yes    Personal action steps:   1.  I will review my insurance coverage for eye exam and outreach and schedule a visit. (Completed)  2.  I  have completed the eye exam appt on December 2021 with [facility]  clinic next to [facility] in [place].  3.  I will wait to hear from CCC and PCP team if another eye exam is need.     (Updated: 8-)                  Problem: HP GENERAL PROBLEM     Goal: General Goal -I would like to schedule and attend an evaluation and dental exam within 4-6 months.     Start Date: 6/16/2022    This Visit's Progress: 20%    Note:     Date Goal set: 06/16/22  Barriers: insurance coverage  Strengths: Patient engagement  Date to Achieve By: December  Patient expressed understanding of goal: yes    Actionsteps to achieve this goal  1.  I will review my insurance coverage for dental exam and outreach and schedule a visit  2.  I will wait on the dental extraction at this time due to cost per tooth is between $160.00-$300.00. Completed dental exam and evaluation.   3.  I will let CCC team know in the next 1-2 month regarding to continue of pursue goal progression.   4.  I will connect with my CHW if need to discuss dental payment plan option.     (Updated: 8-)                      Patient Outreach Discussion:   CHW was able to connect with patient today. Updated above goals. Pt declined CHW to coordinates with pt prefer dental clinic regarding to tooth extraction payment plan at this time. Pt shared info below. CHW verified pt dental pain level; pt confirmed tolerable and over the counter medication helps reduced pain. CHW suggested pt reach out to her prefer dental clinic for any questions or dental emergency, PCP office for follow up appt, CCC and PCP office team with any additional or grief resources.    Patient shared:  -March 2022: mother passed away. Mother used to live with me. Miss my mother. Will connect PCP office or CCC team if need grief resource.   -Eye assessment appt: completed December 2021 with [facility] next to [facility] in [place].   -Dental appt: Have not yet schedule tooth extraction appt due to current health care insurance Medicare  "will not cover the fee. Cost per tooth is between $160.00-$300.00. Have total of 5 tooth. Pain come and goes and tolerable at this time. Current dental placed located [facility] and [facility] in [place].  -Interested to find out free or low tooth extraction fee dental clinic within the community.   -Doing well. No other questions or concerns.     Note/comment/FYI:   -CHW transferred patient three goals (from [employer] \"Plan\") to venessa fraser (Care Planning).    CHW Next Follow-up: goal follow up     Outreach frequency: monthly     CHW Plan: 9-    "

## 2022-09-07 NOTE — PROGRESS NOTES
Clinic Care Coordination Contact:    Today's date: 9-    CHW unable to check-in with [facility] RN Nickie on 9/6/22 at [facility] case review due to time.  Message route to [facility] RN for further advised regarding to eye exam goal.

## 2022-09-21 ENCOUNTER — PATIENT OUTREACH (OUTPATIENT)
Dept: CARE COORDINATION | Facility: CLINIC | Age: 52
End: 2022-09-21

## 2022-09-22 NOTE — PROGRESS NOTES
Care Coordination Clinician Chart Review     Situation: Patient chart reviewed by care coordinator.?     Background: Initial assessment and enrollment to Care Coordination ? Care plan(s) with patient-centered goal(s) were developed with participation from patient.? Lead CC handed patient off to CHW for continued outreach every 30 days.??     Assessment: Per chart review, patient outreach completed by CC CHW on 8/31/22.? Patient is actively working to accomplish goal(s).? Patient's goal(s) remain(s) appropriate at this time.?     Care Plan: General     Problem: HP GENERAL PROBLEM     Goal: General Goal - I will have a better understanding and plan of care for Colon screening within 3-4 months     Start Date: 6/16/2022    Note:     Date Goal set: 06/16/22  Barriers: language, knowledge deficit   Strengths: family support  Date to Achieve By:  Patient expressed understanding of goal: yes  Action steps to achieve this goal:  1. I will follow instructions and complete cologuard home test at home with support of sister  2. I will update Care coordination team during next outreach  3. I will report to my Community Health Worker if any additional resources or support needed.                  Problem: HP GENERAL PROBLEM     Goal: General Goal - I would like to schedule and attend an evaluation and eye exam within 4-6 months.     Start Date: 6/16/2022    This Visit's Progress: 40%    Note:     DateGoal set: 06/16/22  Barriers: limitations due to Covid 19 pandemic   Strengths: Patient engagement, provider identified   Date to Achieve By:   Patient expressed understanding of goal: yes    Personal action steps:   1.  I will review my insurance coverage for eye exam and outreach and schedule a visit. (Completed)  2.  I  have completed the eye exam appt on December 2021 with [facility] next to [facility] in [place].  3.  I will wait to hear from CCC and PCP team if another eye exam is need.     (Updated:  8-)                  Problem: HP GENERAL PROBLEM     Goal: General Goal -I would like to schedule and attend an evaluation and dental exam within 4-6 months.     Start Date: 6/16/2022    This Visit's Progress: 20%    Note:     Date Goal set: 06/16/22  Barriers: insurance coverage  Strengths: Patient engagement  Date to Achieve By: December  Patient expressed understanding of goal: yes    Actionsteps to achieve this goal  1.  I will review my insurance coverage for dental exam and outreach and schedule a visit  2.  I will wait on the dental extraction at this time due to cost per tooth is between $160.00-$300.00. Completed dental exam and evaluation.   3.  I will let CCC team know in the next 1-2 month regarding to continue of pursue goal progression.   4.  I will connect with my CHW if need to discuss dental payment plan option.     (Updated: 8-)                        Plan/Recommendations: The patient will continue working with Care Coordination to achieve above goal(s).? CHW will involve Lead CC as needed or if patient is ready to move to maintenance.? Lead CC will continue to monitor CHW s monthly outreaches and progress to goal(s) every 6 weeks.

## 2022-10-05 ENCOUNTER — PATIENT OUTREACH (OUTPATIENT)
Dept: CARE COORDINATION | Facility: CLINIC | Age: 52
End: 2022-10-05

## 2022-10-05 NOTE — PROGRESS NOTES
Clinic Care Coordination Contact:  Community Health Worker Follow Up    Today's date: 10-    Care Gaps:   Health Maintenance Due   Topic Date Due     DIABETIC FOOT EXAM  Never done     DEPRESSION ACTION PLAN  Never done     COLORECTAL CANCER SCREENING  Never done     ZOSTER IMMUNIZATION (1 of 2) Never done     Pneumococcal Vaccine: Pediatrics (0 to 5 Years) and At-Risk Patients (6 to 64 Years) (2 - PCV) 10/25/2020     COVID-19 Vaccine (4 - Booster for Kaylene series) 07/28/2022     INFLUENZA VACCINE (1) 09/01/2022     A1C  09/02/2022     PHQ-9  09/02/2022     Patient is aware of care gaps. Patient declined schedule follow up appt with PCP at this time. CHW suggested pt to connect PCP office with any questions. Pt confirmed.     Care Plan:   Care Plan: General     Problem: HP GENERAL PROBLEM     Goal: General Goal - I will have a better understanding and plan of care for Colon screening within 3-4 months     Start Date: 6/16/2022    This Visit's Progress: 10%    Note:     Date Goal set: 06/16/22  Barriers: language, knowledge deficit   Strengths: family support  Date to Achieve By:  Patient expressed understanding of goal: yes    Action steps to achieve this goal:  1. I will follow instructions and complete cologuard home test at home with support of sister.  2. I will connect PCP office with any questions.   3. I will report to my Community Health Worker if any additional resources or support needed.    (Updated: 10-)                  Problem: HP GENERAL PROBLEM     Goal: General Goal - I would like to schedule and attend an evaluation and eye exam within 4-6 months.     Start Date: 6/16/2022    This Visit's Progress: 20% Recent Progress: 40%    Note:     DateGoal set: 06/16/22  Barriers: limitations due to Covid 19 pandemic   Strengths: Patient engagement, provider identified   Date to Achieve By:   Patient expressed understanding of goal: yes    Personal action steps:   1. I will review my insurance  coverage for eye exam and outreach and schedule a visit. (Completed)  2. I  have completed the eye exam appt on December 2021 with [facility] next to [facility] in [place].  3. I will continue to wait to hear from CCC and PCP team if another eye exam is need.     (Updated: 8-)  (Updated: 10-)                  Problem: HP GENERAL PROBLEM     Goal: General Goal -I would like to schedule and attend an evaluation and dental exam within 4-6 months.  Completed 10/5/2022    Start Date: 6/16/2022    This Visit's Progress: 0% Recent Progress: 20%    Note:     Date Goal set: 06/16/22  Barriers: insurance coverage  Strengths: Patient engagement  Date to Achieve By: December  Patient expressed understanding of goal: yes    Actionsteps to achieve this goal  1.  I will review my insurance coverage for dental exam and outreach and schedule a visit  2.  I will wait on the dental extraction at this time due to cost per tooth is between $160.00-$300.00. Completed dental exam and evaluation.   3.  I will let CCC team know in the next 1-2 month regarding to continue of pursue goal progression.   4.  I will connect with my CHW if need to discuss dental payment plan option.     (Updated: 8-)    Goal deleted date: 10- per patient request. Patient stated; not able to afford dental payment and not eligible for resources due to spouse and my joint income. No longer interested work on this goal.                       Patient Outreach Discussion:   CHW was able to connect with patient today. Dental goal is deleted per pt request. Pt stated info below and request med refill. CHW offered follow up appt with PCP and pt declined due to worries about her daughter's health and already have upcoming appt on 10/12/2022 with the ENT clinic per pt. CHW encouraged patient to appt 10/12/2022 with the ENT clinic per appt desk reviewed, connect with PCP office for any questions and CCC service with any  "additional resources support. Pt confirmed.      Patient stated;   -Feeling dizziness for 3 days now due to right ear concerns. Had surgery on the right ear two times in the past.   -Daughter live in [place] just have a  baby this past Saturday (FYI: 10-) and have low blood level. Worries about my daughter health.   -Dental goal: not able to afford dental payment and not eligible for resources due to spouse and my joint income. No longer interested work on this goal.  -Completed eye exam appt before my mother past away early this year.   -Doing well. No other questions.     Patient request:  -\"Venlafaxine\" med refill need. This med help with headache per patient. Note: pt spelled name of medication.   Note/comment: CHW sent Dr. Gaspar and clinical team a staff message today, 2022 per pt request.     CHW Next Follow-up: Goals follow up. Verify eye exam record/name of agency. Educate pt how to request eye exam to fax record to PCP office.      Outreach frequency: monthly    CHW Plan:   -Next CHW outreach follow up plan: 2022.  -Message route to update CCC lead care coordinator and PCP that patient declined schedule follow up appt with PCP regarding to patient stated; Feeling dizziness for 3 days now due to right ear concerns. Had surgery on the right ear two times in the past.   "

## 2022-10-06 DIAGNOSIS — G44.209 TENSION HEADACHE: ICD-10-CM

## 2022-10-06 RX ORDER — VENLAFAXINE HYDROCHLORIDE 150 MG/1
150 CAPSULE, EXTENDED RELEASE ORAL DAILY
Qty: 90 CAPSULE | Refills: 4 | Status: SHIPPED | OUTPATIENT
Start: 2022-10-06 | End: 2023-06-23

## 2022-10-06 NOTE — TELEPHONE ENCOUNTER
"----- Message from Elisabet Malone MA sent at 10/5/2022  3:39 PM CDT -----  Regarding: Med refill request per patient request  Hi Dr. Gaspar,     I spoke with patient today regarding to CCC follow up.   Patient stated that she was not able to refill med below at prefer pharmacy due to no refill. Pt was told to connect PCP office per pt.      Patient request:  -\"Venlafaxine\" med refill need. This med help with headache per patient. Note: pt spelled name of medication.     Patient declined schedule follow up appt with you (Dr. Gaspar) to discuss med refill and care gaps.   Send this message to you per pt request. Please advised. Thank you.       PCP clinical team,   Please connect with patient directly for any questions. Thank you.         Thank,  PERLA Bhandari    "

## 2022-10-12 DIAGNOSIS — H72.91 PERFORATION OF RIGHT TYMPANIC MEMBRANE: Primary | ICD-10-CM

## 2022-10-20 ENCOUNTER — PATIENT OUTREACH (OUTPATIENT)
Dept: NURSING | Facility: CLINIC | Age: 52
End: 2022-10-20
Payer: MEDICARE

## 2022-10-20 NOTE — PROGRESS NOTES
Care Coordination Clinician Chart Review     Situation: Patient chart reviewed by care coordinator.?     Background: Initial assessment and enrollment to Care Coordination   Care plan(s) with patient-centered goal(s) were developed with participation from patient.? Lead CC handed patient off to CHW for continued outreach every 30 days.??     Assessment: Per chart review, patient outreach completed by CC CHW on 10.5.22.? Patient is actively working to accomplish goal(s).? Patient's goal(s) remain(s) appropriate at this time.? Patient is not due for updated Plan of Care.?      Care Plan: General     Problem: HP GENERAL PROBLEM     Goal: General Goal - I will have a better understanding and plan of care for Colon screening within 3-4 months     Start Date: 6/16/2022    This Visit's Progress: 10%    Note:     Date Goal set: 06/16/22  Barriers: language, knowledge deficit   Strengths: family support  Date to Achieve By:  Patient expressed understanding of goal: yes    Action steps to achieve this goal:  1. I will follow instructions and complete cologuard home test at home with support of sister.  2. I will connect PCP office with any questions.   3. I will report to my Community Health Worker if any additional resources or support needed.    (Updated: 10-)                  Problem: HP GENERAL PROBLEM     Goal: General Goal - I would like to schedule and attend an evaluation and eye exam within 4-6 months.     Start Date: 6/16/2022    This Visit's Progress: 20% Recent Progress: 40%    Note:     DateGoal set: 06/16/22  Barriers: limitations due to Covid 19 pandemic   Strengths: Patient engagement, provider identified   Date to Achieve By:   Patient expressed understanding of goal: yes    Personal action steps:   1. I will review my insurance coverage for eye exam and outreach and schedule a visit. (Completed)  2. I  have completed the eye exam app[facility] on December 2021 [facility].  3. I will continue to wait to hear from CCC and PCP team if another eye exam is need.     (Updated: 8-)  (Updated: 10-)                        Plan/Recommendations: The patient will continue working with Care Coordination to achieve above goal(s).? CHW will involve Lead CC as needed or if patient is ready to move to maintenance.? Lead CC will continue to monitor CHW s monthly outreaches and progress to goal(s) every 6 weeks.

## 2022-11-07 ENCOUNTER — PATIENT OUTREACH (OUTPATIENT)
Dept: CARE COORDINATION | Facility: CLINIC | Age: 52
End: 2022-11-07

## 2022-11-07 NOTE — PROGRESS NOTES
Clinic Care Coordination Contact  [facility]    Today's date: 11-    Reason: [facility] CHW Follow Up Called    Clinical Data: Care Coordinator Outreach  Outreach attempted x 1: CHW attempted to connect with patient today and no answer. Pt voicemail box has not yet been set up and unable to leave message for patient at this time.     Attempted #2:   2:19PM: Due to patient voicemail box has not been set up yet per pt phone system, CHW attempted to call the patient again and no answer.     Plan: Care Coordinator will try to reach patient again in 2-3 weeks.

## 2022-11-21 ENCOUNTER — PATIENT OUTREACH (OUTPATIENT)
Dept: CARE COORDINATION | Facility: CLINIC | Age: 52
End: 2022-11-21

## 2022-11-21 NOTE — PROGRESS NOTES
Clinic Care Coordination Contact:  Community Health Worker Follow Up    Today's date: 2022    Care Gaps:   Health Maintenance Due   Topic Date Due     DIABETIC FOOT EXAM  Never done     DEPRESSION ACTION PLAN  Never done     COLORECTAL CANCER SCREENING  Never done     ZOSTER IMMUNIZATION (1 of 2) Never done     Pneumococcal Vaccine: Pediatrics (0 to 5 Years) and At-Risk Patients (6 to 64 Years) (2 - PCV) 10/25/2020     COVID-19 Vaccine (4 - Booster for Kaylene series) 2022     INFLUENZA VACCINE (1) 2022     A1C  2022     PHQ-9  2022     LIPID  2022     MICROALBUMIN  2022     MAMMO SCREENING  2022     EYE EXAM  2022     Pt is aware of due care gaps. Pt prefer to discuss this with PCP after pt travel trip to [place] on 2022 per pt.    Care Plan:   Care Plan: General     Problem: HP GENERAL PROBLEM     Goal: General Goal - I will have a better understanding and plan of care for Colon screening within 3-4 months     Start Date: 2022    Recent Progress: 10%    Note:     Date Goal set: 22  Barriers: language, knowledge deficit   Strengths: family support  Date to Achieve By:  Patient expressed understanding of goal: yes    Action steps to achieve this goal:  1. I will follow instructions and complete cologuard home test at home with support of sister. (Not yet completed this per pt on 2022)  2. I will connect PCP office after my travel trip to [place] on 12/3/2022 attending my uncle  service.   3. I will be in [place] for 2 weeks.   4. I will report to my Community Health Worker if any additional resources or support needed.    (Updated: 2022)                  Problem: HP GENERAL PROBLEM     Goal: General Goal - I would like to schedule and attend an evaluation and eye exam within 4-6 months.     Start Date: 2022    This Visit's Progress: 20% Recent Progress: 40%    Note:     DateGoal set: 22  Barriers:  limitations due to Covid 19 pandemic   Strengths: Patient engagement, provider identified   Date to Achieve By:   Patient expressed understanding of goal: yes    Personal action steps:   1. I will review my insurance coverage for eye exam and outreach and schedule a visit. (Completed)  2. I  have completed the eye exam appt on December 2021 with [facility] next to [facility] in [place].  3. I will continue to wait to hear from CCC and PCP team if another eye exam is need.     (Updated: 8-)  (Updated: 10-)                  Problem: HP GENERAL PROBLEM     Goal: General Goal - I needs help with medication refill before my travel trip to [place] on 12/3/2022.     Start Date: 11/21/2022    This Visit's Progress: 10%    Note:     Measure of Success:   Barriers: Number's of refill left   Strengths: Working with prefer pharmacy. Pharmacy send refill request to PCP office if need.   Patient expressed understanding of goal: yes    Action steps to achieve this goal:  1. I will connect with my prefer pharmacy to request medication refill before my travel trip to [place] on 12/3/2022.   2. I will ask the pharmacy to send refill request to my PCP office if need.   3. I will connect PCP office with any questions.   4. I will connect with CCC team for any coordinate care need.                         Patient Outreach Discussion:   [facility] CHW was able to connect with patient today. Updated and added a new goal (above). Pt request PCP to refill her medication. CHW suggested pt to schedule an follow up with PCP/PCP provider team regarding to med refill and discuss care gaps. Pt agreed and prefer phone visit appt schedule before 12/3/2022. Pt shared info below. No phone visit appt available at this time per CHW verified. Pt prefer to follow up with PCP in clinic after returning home from [place]. Pt unable to identified name of med refill request per CHW verified. CHW educated pt how to connect with her  prefer pharmacy to place med refill, message PCP office with any med refill request, pt to connect PCP office with any questions and CCC office with any additional resources need (including CCC RN appt). Pt confirmed.    Pt shared:   -My uncle in [place] passed away. Plan attending his  service. Trip date 12/3/2022. Will be in [place] for 2 weeks.   -My medication have many refills per last office visit with my doctor. Will connect my prefer pharmacy with med refill.   -Doing well. No other questions or concerns at this time.     CHW Next Follow-up: Goals follow up. Offer follow up appt with PCP to discuss care gaps and seeking advise.     Outreach frequency: monthly     CHW Plan: 1 month (after pt returning from [place]).

## 2022-11-30 DIAGNOSIS — K21.9 GASTROESOPHAGEAL REFLUX DISEASE WITHOUT ESOPHAGITIS: ICD-10-CM

## 2022-12-01 ENCOUNTER — PATIENT OUTREACH (OUTPATIENT)
Dept: CARE COORDINATION | Facility: CLINIC | Age: 52
End: 2022-12-01

## 2022-12-01 RX ORDER — OMEPRAZOLE 40 MG/1
CAPSULE, DELAYED RELEASE ORAL
Qty: 90 CAPSULE | Refills: 1 | Status: SHIPPED | OUTPATIENT
Start: 2022-12-01 | End: 2023-06-23

## 2022-12-01 RX ORDER — OMEPRAZOLE 40 MG/1
CAPSULE, DELAYED RELEASE ORAL
Qty: 90 CAPSULE | Refills: 2 | Status: SHIPPED | OUTPATIENT
Start: 2022-12-01 | End: 2022-12-01

## 2022-12-01 NOTE — TELEPHONE ENCOUNTER
"Last Written Prescription Date:  97/21  Last Fill Quantity: 90,  # refills: 4   Last office visit provider:  6/2/22     Requested Prescriptions   Pending Prescriptions Disp Refills     omeprazole (PRILOSEC) 40 MG DR capsule [Pharmacy Med Name: OMEPRAZOLE 40 MG CPDR 40 Capsule] 90 capsule 4     Sig: TAKE 1 PILL BY MOUTH EVERY DAY/ TXJANEA HNUB NOJ 1 LUB TSHUAJ PAB ZOO LUB PLAB       PPI Protocol Passed - 12/1/2022  9:33 AM        Passed - Not on Clopidogrel (unless Pantoprazole ordered)        Passed - No diagnosis of osteoporosis on record        Passed - Recent (12 mo) or future (30 days) visit within the authorizing provider's specialty     Patient has had an office visit with the authorizing provider or a provider within the authorizing providers department within the previous 12 mos or has a future within next 30 days. See \"Patient Info\" tab in inbasket, or \"Choose Columns\" in Meds & Orders section of the refill encounter.              Passed - Medication is active on med list        Passed - Patient is age 18 or older        Passed - No active pregnacy on record        Passed - No positive pregnancy test in past 12 months             Rojelio Garcia RN 12/01/22 9:33 AM  "

## 2022-12-01 NOTE — PROGRESS NOTES
Clinic Care Coordination Contact    Care Coordination Clinician Chart Review    Situation: Patient chart reviewed by care coordinator.       Background: Care Coordination initial assessment and enrollment to Care Coordination was 12/10/20 .   Patient centered goals were developed with participation from patient.  RNCC handed patient off to CHW for continued outreach every 30 days.        Assessment: Per chart review, patient outreach completed by CC CHW on 22.  Patient is actively working to accomplish goals.  Patient's goals remain appropriate and relevant at this time. Patient is due for updated Plan of Care.  Annual assessment will be due 12/10/20, per CHW patient will be out of town and will defer until she returns from her Uncle's   in 2 weeks. (mid December)      Care Plan: General     Problem: HP GENERAL PROBLEM     Goal: General Goal - I will have a better understanding and plan of care for Colon screening within 3-4 months     Start Date: 2022    Recent Progress: 10%    Note:     Date Goal set: 22  Barriers: language, knowledge deficit   Strengths: family support  Date to Achieve By:  Patient expressed understanding of goal: yes    Action steps to achieve this goal:  1. I will follow instructions and complete cologuard home test at home with support of sister. (Not yet completed this per pt on 2022)  2. I will connect PCP office after my travel trip to [place] on 12/3/2022 attending my uncle  service.   3. I will be in [place] for 2 weeks.   4. I will report to my Community Health Worker if any additional resources or support needed.    (Updated: 2022)                  Problem: HP GENERAL PROBLEM     Goal: General Goal - I would like to schedule and attend an evaluation and eye exam within 4-6 months.     Start Date: 2022    This Visit's Progress: 20% Recent Progress: 40%    Note:     DateGoal set: 22  Barriers: limitations due to Covid 19 pandemic    Strengths: Patient engagement, provider identified   Date to Achieve By:   Patient expressed understanding of goal: yes    Personal action steps:   1. I will review my insurance coverage for eye exam and outreach and schedule a visit. (Completed)  2. I  have completed the eye exam appt on December 2021 with [facility] next to [facility] in [place].  3. I will continue to wait to hear from CCC and PCP team if another eye exam is need.     (Updated: 8-)  (Updated: 10-)                  Problem: HP GENERAL PROBLEM     Goal: General Goal - I needs help with medication refill before my travel trip to [place] on 12/3/2022.     Start Date: 11/21/2022    This Visit's Progress: 10%    Note:     Measure of Success:   Barriers: Number's of refill left   Strengths: Working with prefer pharmacy. Pharmacy send refill request to PCP office if need.   Patient expressed understanding of goal: yes    Action steps to achieve this goal:  1. I will connect with my prefer pharmacy to request medication refill before my travel trip to [place] on 12/3/2022.   2. I will ask the pharmacy to send refill request to my PCP office if need.   3. I will connect PCP office with any questions.   4. I will connect with CCC team for any coordinate care need.                               Plan/Recommendations: The patient will continue working with Care Coordination to achieve goals as above.  CHW will involve RNCC as needed or if patient is ready to move to maintenance.  RNCC will continue to monitor progress to goals and CHW outreaches every 6 weeks.   Plan of Care updated and mailed to patient: No    Valeria Olson RN, BSN, PHN  Casual RN Care Coordinator  [facility]  (Covering for Lead RN Care Coordinator)

## 2022-12-28 ENCOUNTER — PATIENT OUTREACH (OUTPATIENT)
Dept: CARE COORDINATION | Facility: CLINIC | Age: 52
End: 2022-12-28

## 2022-12-28 NOTE — PROGRESS NOTES
Clinic Care Coordination Contact  Community Health Worker Follow Up    Care Gaps:   Health Maintenance Due   Topic Date Due     DIABETIC FOOT EXAM  Never done     DEPRESSION ACTION PLAN  Never done     COLORECTAL CANCER SCREENING  Never done     ZOSTER IMMUNIZATION (1 of 2) Never done     Pneumococcal Vaccine: Pediatrics (0 to 5 Years) and At-Risk Patients (6 to 64 Years) (2 - PCV) 10/25/2020     COVID-19 Vaccine (4 - Booster for Kaylene series) 2022     INFLUENZA VACCINE (1) 2022     A1C  2022     PHQ-9  2022     LIPID  2022     MICROALBUMIN  2022     EYE EXAM  2022     MAMMO SCREENING  2022     Patient rushed conversation with CHW today. Unable to informed pt of due care gaps. Pt is aware to connect PCP office to schedule follow up appt. Pt confirmed.     Care Plan:   Care Plan: General     Problem: HP GENERAL PROBLEM     Goal: General Goal - I will have a better understanding and plan of care for Colon screening within 3-4 months     Start Date: 2022    Recent Progress: 10%    Note:     Date Goal set: 22  Barriers: language, knowledge deficit   Strengths: family support  Date to Achieve By:  Patient expressed understanding of goal: yes    Action steps to achieve this goal:  1. I will follow instructions and complete cologuard home test at home with support of sister. (Not yet completed this per pt on 2022)  2. I will connect PCP office after my travel trip to [place] on 12/3/2022 attending my uncle  service.   3. I will be in [place] for 2 weeks.   4. I will report to my Community Health Worker if any additional resources or support needed.    (Updated: 2022)                  Problem: HP GENERAL PROBLEM     Goal: General Goal - I would like to schedule and attend an evaluation and eye exam within 4-6 months.     Start Date: 2022    This Visit's Progress: 20% Recent Progress: 40%    Note:     DateGoal set: 22  Barriers:  "limitations due to Covid 19 pandemic   Strengths: Patient engagement, provider identified   Date to Achieve By:   Patient expressed understanding of goal: yes    Personal action steps:   1. I will review my insurance coverage for eye exam and outreach and schedule a visit. (Completed)  2. I  have completed the eye exam appt on December 2021 with [facility] next to [facility] in [place].  3. I will continue to wait to hear from CCC and PCP team if another eye exam is need.     (Updated: 8-)  (Updated: 10-)                  Problem: HP GENERAL PROBLEM     Goal: General Goal - I needs help with medication refill before my travel trip to [place] on 12/3/2022.     Start Date: 11/21/2022    This Visit's Progress: 10%    Note:     Measure of Success:   Barriers: Number's of refill left   Strengths: Working with prefer pharmacy. Pharmacy send refill request to PCP office if need.   Patient expressed understanding of goal: yes    Action steps to achieve this goal:  1. I will connect with my prefer pharmacy to request medication refill before my travel trip to [place] on 12/3/2022.   2. I will ask the pharmacy to send refill request to my PCP office if need.   3. I will connect PCP office with any questions.   4. I will connect with CCC team for any coordinate care need.                         Patient Outreach Discussion:   CCC CHW was able to connect with patient today. Verified if pt is back from [place] and time to talked. Pt confirmed. CHW reminded and encouraged pt to attend appt 1/23/2023 at 12:40PM with provider \"Kinza Stevens\" and at 1:05PM (arrive time) with provider \"Jose Maria Bass\" with [facility] per appt desk reviewed. Pt stated info below then rushed up the conversation with CHW. CHW suggested pt with daughter-in-law/family member assist to connect the [facility] office to reschedule her appt if unable to attend and connect CCC/CHW for any other questions or " "concerns. Pt confirmed. Pt end called. Unable verify best time to call her at next outreach follow up.      Patient stated:   -Am back home for 2 weeks now.   -Week of 1/23/2023, my daughter-in-law (\"Nyab\") booked an canoeing activity for me and family. Won't be able to attend my appt with the [facility] clinic.   -Doing well. No other questions.     [facility] info given to patient today:   [facility] office \"Questions or to Reschedule: Contact our scheduling number: [phone].\"   Note/comment: Pt confirmed that her daughter-in-law or family member will assist with reschedule her appt.     CHW Next Follow-up: Goals follow up. Offer follow up appt with PCP to discuss care gaps and follow up appt with CCC RN/lead care coordinator.      Outreach frequency: monthly     CHW Plan: 1 month    "

## 2023-01-05 DIAGNOSIS — I42.8 NONISCHEMIC CARDIOMYOPATHY (H): ICD-10-CM

## 2023-01-05 DIAGNOSIS — I10 ESSENTIAL HYPERTENSION: ICD-10-CM

## 2023-01-05 RX ORDER — FUROSEMIDE 40 MG
TABLET ORAL
Qty: 180 TABLET | Refills: 4 | OUTPATIENT
Start: 2023-01-05

## 2023-01-05 RX ORDER — LOSARTAN POTASSIUM 100 MG/1
TABLET ORAL
Qty: 90 TABLET | Refills: 1 | Status: SHIPPED | OUTPATIENT
Start: 2023-01-05 | End: 2023-06-21

## 2023-01-06 NOTE — TELEPHONE ENCOUNTER
"Lasix  Last Written Prescription Date:  6/2/22  Last Fill Quantity: 90,  # refills: 4     Losartan  Last Written Prescription Date:  12/7/21  Last Fill Quantity: 90,  # refills: 4   Last office visit provider: 6/2/22      Requested Prescriptions   Pending Prescriptions Disp Refills     furosemide (LASIX) 40 MG tablet [Pharmacy Med Name: FUROSEMIDE 40 MG TAB 40 Tablet] 180 tablet 4     Sig: TAKE 1 TABLET (40 MG TOTAL) BY MOUTH 2 (TWO) TIMES A DAY AT 9AM AND 6PM./ DAGOBERTO BLISS NOCHILO 1 LUB TSHUAJ 2 ZAUG THAUM 9AM THIAB 6PM PAB PHOB VOG       Diuretics (Including Combos) Protocol Passed - 1/5/2023  9:15 AM        Passed - Blood pressure under 140/90 in past 12 months     BP Readings from Last 3 Encounters:   06/02/22 107/74   03/01/22 106/78   12/07/21 114/78                 Passed - Recent (12 mo) or future (30 days) visit within the authorizing provider's specialty     Patient has had an office visit with the authorizing provider or a provider within the authorizing providers department within the previous 12 mos or has a future within next 30 days. See \"Patient Info\" tab in inbasket, or \"Choose Columns\" in Meds & Orders section of the refill encounter.              Passed - Medication is active on med list        Passed - Patient is age 18 or older        Passed - No active pregancy on record        Passed - Normal serum creatinine on file in past 12 months     Recent Labs   Lab Test 06/02/22  1541   CR 1.09              Passed - Normal serum potassium on file in past 12 months     Recent Labs   Lab Test 06/02/22  1541   POTASSIUM 4.0                    Passed - Normal serum sodium on file in past 12 months     Recent Labs   Lab Test 06/02/22  1541                 Passed - No positive pregnancy test in past 12 months           losartan (COZAAR) 100 MG tablet [Pharmacy Med Name: LOSARTAN POTASSIUM 100 MG T 100 Tablet] 90 tablet 4     Sig: TAKE 1 PILL BY MOUTH DAILY FOR BLOOD PRESSURE / DAGOBERTO BLISS NOCHILO 1 LUB TSHUAJ " "LAURA ERIC YANET NTSHAV SIAB       Angiotensin-II Receptors Passed - 1/5/2023  9:15 AM        Passed - Last blood pressure under 140/90 in past 12 months     BP Readings from Last 3 Encounters:   06/02/22 107/74   03/01/22 106/78   12/07/21 114/78                 Passed - Recent (12 mo) or future (30 days) visit within the authorizing provider's specialty     Patient has had an office visit with the authorizing provider or a provider within the authorizing providers department within the previous 12 mos or has a future within next 30 days. See \"Patient Info\" tab in inbasket, or \"Choose Columns\" in Meds & Orders section of the refill encounter.              Passed - Medication is active on med list        Passed - Patient is age 18 or older        Passed - No active pregnancy on record        Passed - Normal serum creatinine on file in past 12 months     Recent Labs   Lab Test 06/02/22  1541   CR 1.09       Ok to refill medication if creatinine is low          Passed - Normal serum potassium on file in past 12 months     Recent Labs   Lab Test 06/02/22  1541   POTASSIUM 4.0                    Passed - No positive pregnancy test in past 12 months             Debo Aviles RN 01/05/23 7:01 PM  "

## 2023-01-30 ENCOUNTER — PATIENT OUTREACH (OUTPATIENT)
Dept: NURSING | Facility: CLINIC | Age: 53
End: 2023-01-30
Payer: MEDICARE

## 2023-01-30 NOTE — PROGRESS NOTES
Clinic Care Coordination Contact  Care Coordination Clinician Chart Review    Situation: Patient chart reviewed by Care Coordinator.       Background: Care Coordination Program started: 12/10/2020. Initial assessment completed and patient-centered care plan(s) were developed with participation from patient. Lead CC handed patient off to CHW for continued outreaches.       Assessment: Per chart review, patient outreach completed by CC CHW on 23.  Patient is actively working to accomplish goal(s). Patient's goal(s) appropriate and relevant at this time. Patient is not due for updated Plan of Care.  Assessments will be completed annually or as needed/with change of patient status.      Care Plan: General     Problem: HP GENERAL PROBLEM     Goal: General Goal - I will have a better understanding and plan of care for Colon screening within 3-4 months     Start Date: 2022    Recent Progress: 10%    Note:     Date Goal set: 22  Barriers: language, knowledge deficit   Strengths: family support  Date to Achieve By:  Patient expressed understanding of goal: yes    Action steps to achieve this goal:  1. I will follow instructions and complete cologuard home test at home with support of sister. (Not yet completed this per pt on 2022)  2. I will connect PCP office after my travel trip to [place] on 12/3/2022 attending my uncle  service.   3. I will be in [place] for 2 weeks.   4. I will report to my Community Health Worker if any additional resources or support needed.    (Updated: 2022)                  Problem: HP GENERAL PROBLEM     Goal: General Goal - I would like to schedule and attend an evaluation and eye exam within 4-6 months.     Start Date: 2022    This Visit's Progress: 20% Recent Progress: 40%    Note:     DateGoal set: 22  Barriers: limitations due to Covid 19 pandemic   Strengths: Patient engagement, provider identified   Date to Achieve By:   Patient expressed  understanding of goal: yes    Personal action steps:   1. I will review my insurance coverage for eye exam and outreach and schedule a visit. (Completed)  2. I  have completed the eye exam appt on December 2021 with [facility] next to [facility] in [place].  3. I will continue to wait to hear from CCC and PCP team if another eye exam is need.     (Updated: 8-)  (Updated: 10-)                  Problem: HP GENERAL PROBLEM     Goal: General Goal - I needs help with medication refill before my travel trip to [place] on 12/3/2022.     Start Date: 11/21/2022    This Visit's Progress: 10%    Note:     Measure of Success:   Barriers: Number's of refill left   Strengths: Working with prefer pharmacy. Pharmacy send refill request to PCP office if need.   Patient expressed understanding of goal: yes    Action steps to achieve this goal:  1. I will connect with my prefer pharmacy to request medication refill before my travel trip to [place] on 12/3/2022.   2. I will ask the pharmacy to send refill request to my PCP office if need.   3. I will connect PCP office with any questions.   4. I will connect with CCC team for any coordinate care need.                            Chart review notes pt canceled schedule visit with ENT in January , visit reschedule for May          May 26, 2023  9:20 AM  ENT Audio with Juwan Smith  [facility] [place] ([facility] ) [phone]   May 26, 2023 10:20 AM  (Arrive by 10:05 AM)  RETURN EAR with Jose Maria Bass MD  [facility] ([facility] ) [phone]          CHW last outreach  12/28/22  CHW schedule follow up with RN CC if needed to review goal     Plan/Recommendations: The patient will continue working with Care Coordination to achieve goal(s) as above. CHW will continue outreaches at minimum every 30 days and will involve Lead CC as needed or if  patient is ready to move to Maintenance. Lead CC will continue to monitor CHW outreaches and patient's progress to goal(s) every 6 weeks.

## 2023-02-15 ENCOUNTER — PATIENT OUTREACH (OUTPATIENT)
Dept: CARE COORDINATION | Facility: CLINIC | Age: 53
End: 2023-02-15
Payer: MEDICARE

## 2023-02-15 ASSESSMENT — ACTIVITIES OF DAILY LIVING (ADL): DEPENDENT_IADLS:: INDEPENDENT;TRANSPORTATION

## 2023-02-15 NOTE — PROGRESS NOTES
Clinic Care Coordination Contact    Community Health Worker Follow Up    Care Gaps:     Health Maintenance Due   Topic Date Due     DIABETIC FOOT EXAM  Never done     DEPRESSION ACTION PLAN  Never done     COLORECTAL CANCER SCREENING  Never done     ZOSTER IMMUNIZATION (1 of 2) Never done     Pneumococcal Vaccine: Pediatrics (0 to 5 Years) and At-Risk Patients (6 to 64 Years) (2 - PCV) 10/25/2020     COVID-19 Vaccine (4 - Booster for Kaylene series) 2022     INFLUENZA VACCINE (1) 2022     A1C  2022     PHQ-9  2022     LIPID  2022     MICROALBUMIN  2022     MAMMO SCREENING  2022     EYE EXAM  2022     Postponed to next outreach.     Care Plan:   Care Plan: General     Problem: HP GENERAL PROBLEM     Goal: General Goal - I will have a better understanding and plan of care for Colon screening within 3-4 months     Start Date: 2022    This Visit's Progress: 10% Recent Progress: 10%    Note:     Date Goal set: 22  Barriers: language, knowledge deficit   Strengths: family support  Date to Achieve By:  Patient expressed understanding of goal: yes    Action steps to achieve this goal:  1. I will follow instructions and complete cologuard home test at home with support of sister. (Not yet completed this per pt on 2022)  2. I will connect PCP office after my travel trip to [place] on 12/3/2022 attending my uncle  service.   3. I will be in [place] for 2 weeks.   4. I will report to my Community Health Worker if any additional resources or support needed.    (Updated: 2022)                  Problem: HP GENERAL PROBLEM     Goal: General Goal - I would like to schedule and attend an evaluation and eye exam within 4-6 months.     Start Date: 2022    This Visit's Progress: 20% Recent Progress: 20%    Note:     DateGoal set: 22  Barriers: limitations due to Covid 19 pandemic   Strengths: Patient engagement, provider identified   Date to  Achieve By:   Patient expressed understanding of goal: yes    Personal action steps:   1. I will review my insurance coverage for eye exam and outreach and schedule a visit. (Completed)  2. I  have completed the eye exam appt on December 2021 with [facility] next to [facility] in [place].  3. I will continue to wait to hear from CCC and PCP team if another eye exam is need.     (Updated: 8-)  (Updated: 10-)                  Problem: HP GENERAL PROBLEM     Goal: General Goal - I needs help with medication refill before my travel trip to [place] on 12/3/2022.     Start Date: 11/21/2022    This Visit's Progress: 10% Recent Progress: 10%    Note:     Measure of Success:   Barriers: Number's of refill left   Strengths: Working with prefer pharmacy. Pharmacy send refill request to PCP office if need.   Patient expressed understanding of goal: yes    Action steps to achieve this goal:  1. I will connect with my prefer pharmacy to request medication refill before my travel trip to [place] on 12/3/2022.   2. I will ask the pharmacy to send refill request to my PCP office if need.   3. I will connect PCP office with any questions.   4. I will connect with CCC team for any coordinate care need.                       Intervention and Education during outreach:     CHW introduced self as covering CHW and inquired about scheduling colonoscopy. Patient states, she prefers to put this on hold for now and will notify PCP or CHW when she's ready to schedule appointment.       CHW inquired about eye exam. Patient declined, states she already knows what's wrong with her eye. There's a bump that fills up with puss and she just squeezes it.       Patient shares that she called and rescheduled appointment for ears on 5/26 9:20AM with Audiology, 10:05AM Dr. Bass.     CHW Plan: Routing to lead clinician BRE and CHW for review.     Shayy [facility] Care Coordination  [facility]  Clinic    Phone: [phone]

## 2023-02-18 DIAGNOSIS — I42.8 NONISCHEMIC CARDIOMYOPATHY (H): ICD-10-CM

## 2023-02-19 NOTE — TELEPHONE ENCOUNTER
"Routing refill request to provider for review/approval because:  Labs not current:  ldl    Last Written Prescription Date:  12/7/21  Last Fill Quantity: 90,  # refills: 4   Last office visit provider:  6/2/22     Requested Prescriptions   Pending Prescriptions Disp Refills     atorvastatin (LIPITOR) 80 MG tablet [Pharmacy Med Name: ATORVASTATIN CALCIUM 80 MG 80 Tablet] 90 tablet 4     Sig: TAKE 1 TABLET (80 MG TOTAL) BY MOUTH DAILY/ TXHUA HNUB NOJ 1 LUB TSHUAJ PAB YANET NTSHAV MUAJ ROJ       Statins Protocol Failed - 2/18/2023 10:12 AM        Failed - LDL on file in past 12 months     Recent Labs   Lab Test 12/07/21  1113   LDL 89             Passed - No abnormal creatine kinase in past 12 months     No lab results found.             Passed - Recent (12 mo) or future (30 days) visit within the authorizing provider's specialty     Patient has had an office visit with the authorizing provider or a provider within the authorizing providers department within the previous 12 mos or has a future within next 30 days. See \"Patient Info\" tab in inbasket, or \"Choose Columns\" in Meds & Orders section of the refill encounter.              Passed - Medication is active on med list        Passed - Patient is age 18 or older        Passed - No active pregnancy on record        Passed - No positive pregnancy test in past 12 months             Ana Maria Chinchilla RN 02/19/23 12:07 PM  "

## 2023-02-20 RX ORDER — ATORVASTATIN CALCIUM 80 MG/1
TABLET, FILM COATED ORAL
Qty: 90 TABLET | Refills: 4 | Status: SHIPPED | OUTPATIENT
Start: 2023-02-20 | End: 2024-03-08

## 2023-03-17 ENCOUNTER — PATIENT OUTREACH (OUTPATIENT)
Dept: CARE COORDINATION | Facility: CLINIC | Age: 53
End: 2023-03-17
Payer: MEDICARE

## 2023-03-17 NOTE — PROGRESS NOTES
Clinic Care Coordination Contact:  Community Health Worker Follow Up    Today's date: 3/17/2023    Reason: [facility] CHW Follow Up Called    Care Gaps:   Health Maintenance Due   Topic Date Due     DIABETIC FOOT EXAM  Never done     DEPRESSION ACTION PLAN  Never done     COLORECTAL CANCER SCREENING  Never done     ZOSTER IMMUNIZATION (1 of 2) Never done     Pneumococcal Vaccine: Pediatrics (0 to 5 Years) and At-Risk Patients (6 to 64 Years) (2 - PCV) 10/25/2020     COVID-19 Vaccine (4 - Booster for Kaylene series) 2022     INFLUENZA VACCINE (1) 2022     A1C  2022     PHQ-9  2022     LIPID  2022     MICROALBUMIN  2022     MAMMO SCREENING  2022     EYE EXAM  2022     HEMOGLOBIN  2023     Patient is aware to connect PCP office to schedule follow up appt to discuss care gaps details.     Care Plan:   Care Plan: General     Problem: HP GENERAL PROBLEM     Goal: General Goal - I will have a better understanding and plan of care for Colon screening within 3-4 months     Start Date: 2022    This Visit's Progress: 10% Recent Progress: 10%    Note:     Date Goal set: 22  Barriers: language, knowledge deficit   Strengths: family support  Date to Achieve By:  Patient expressed understanding of goal: yes    Action steps to achieve this goal:  1. I will follow instructions and complete cologuard home test at home with support of sister. (Not yet completed this per pt on 2022)  2. I will connect PCP office after my travel trip to [place] on 12/3/2022 attending my uncle  service.   3. I will be in [place] for 2 weeks.   4. I will report to my Community Health Worker if any additional resources or support needed.    (Updated: 2022)                  Problem: HP GENERAL PROBLEM     Goal: General Goal - I would like to schedule and attend an evaluation and eye exam within 4-6 months.     Start Date: 2022    This Visit's Progress: On Hold Recent  Progress: 20%    Note:     DateGoal set: 06/16/22  Barriers: limitations due to Covid 19 pandemic   Strengths: Patient engagement, provider identified   Date to Achieve By:   Patient expressed understanding of goal: yes    Personal action steps:   1. I will review my insurance coverage for eye exam and outreach and schedule a visit. (Completed)  2. I  have completed the eye exam appt on December 2021 with [facility] next to [facility] in [place].  3. I will continue to wait to hear from CCC and PCP team if another eye exam is need.     (Updated: 10-)    Note/comment:   3/17/2023: Pt shared; completed eye exam 1 year ago and December 2022. Recommended: prescription eye glass cost $300.00. Unable to afford this at this time. Pends goal at this time per pt request.                    Problem: HP GENERAL PROBLEM               Goal(s) accomplished today, 3/17/2023:   -General Goal - I needs help with medication refill before my travel trip to [place] on 12/3/2022.  Date goal set: 11/21/2022    Patient Outreach Discussion:   CCC CHW was able to connect with patient today follow up current goal(s). Pends goal above. Completed one goal. Was not able to follow up rest of the goal due to pt time. Patient shared info below.    Patient shared:  -Eye exam completed 1 year ago and December 2022. Recommend prescription eye glass cost $300.00. Don't have the money to pay for my eye glass. Prefer to wait at this time until I have the money.     CHW suggested patient for the following:  -Pt connect CCC/CHW with any additional resources need and PCP office for scheduling appt.  -Patient to work with eye clinic to do monthly payment for prescription eye glass. NOTE/FYI: Pt isn't interested at this time.    CHW Next Follow-up: Goals follow up.    Outreach frequency: monthly      CHW Plan:   -Next CHW outreach plan: 1 month

## 2023-03-28 ENCOUNTER — PATIENT OUTREACH (OUTPATIENT)
Dept: NURSING | Facility: CLINIC | Age: 53
End: 2023-03-28
Payer: MEDICARE

## 2023-03-28 NOTE — PROGRESS NOTES
Clinic Care Coordination Contact    Follow Up Progress Note      Assessment:  RN CC outreach with support of interpretive services,  spoke with patient for status update    Patient notes concern about insurance coverage for OV and medications and medical bills    Patient states that she has outstanding balance and she is unclear about why it is not covered by insurance    RN CC will have patient bring in to clinic and have meeting with SW CC to review and get a better understanding   Patient will drop off the bills and have  make a copy and have SW review and set up call to discuss     Reviewed upcoming visit   Patient notes that she having acute ear pain, schedule acute visit with provider to review     RN CC will follow up within one month to support goal progression and address HM care gaps   Patient verbalized understanding via interpretive services. Engaged in AIDET communication during visit    Care Gaps:    Health Maintenance Due   Topic Date Due     DIABETIC FOOT EXAM  Never done     DEPRESSION ACTION PLAN  Never done     COLORECTAL CANCER SCREENING  Never done     ZOSTER IMMUNIZATION (1 of 2) Never done     Pneumococcal Vaccine: Pediatrics (0 to 5 Years) and At-Risk Patients (6 to 64 Years) (2 - PCV) 10/25/2020     COVID-19 Vaccine (4 - Booster for Kaylene series) 07/28/2022     INFLUENZA VACCINE (1) 09/01/2022     A1C  09/02/2022     PHQ-9  09/02/2022     LIPID  12/07/2022     MICROALBUMIN  12/07/2022     MAMMO SCREENING  12/07/2022     EYE EXAM  12/21/2022     HEMOGLOBIN  06/02/2023       Postponed to address coverage     Care Plans  Care Plan: Financial Wellbeing     Problem: Patient expresses financial resource strain     Goal: I would like to review medical bills and get understanding of coverage     Start Date: 3/28/2023    Note:     Barriers: coverage  Strengths: engagement  Patient expressed understanding of goal: yes  Action steps to achieve this goal:  1. I will bring in a copy of  medical bills for [facility] team to review and get better understanding of Patient responsibility and coverage  2. I will speak with GEO CC to review and get better understanding   3. I will update Care coordination team during next outreach                        Care Plan: Medical     Problem: HP GENERAL PROBLEM     Goal: I would like to have  a plan of care for ear/hearing within 3-4  months     Start Date: 3/28/2023    This Visit's Progress: 30%    Note:     Barriers: language, access   Strengths: family support  Patient expressed understanding of goal: yes  Action steps to achieve this goal:  1. I will schedule and attend visit with ENT and audiology  2. I will update Care coordination team during next outreach  3. I will report to my Community Health Worker if any additional resources or support needed.                Goal: I would like to have a plan of care for Cardiology health within 3-4 months     Start Date: 3/28/2023    This Visit's Progress: 10%    Note:     Barriers: language  Strengths: family   Patient expressed understanding of goal: yes  Action steps to achieve this goal:  1. I will schedule and attend visit with cardiology provider - referral on file  2. I will update Care coordination team during next outreach  3. I will report to my Community Health Worker if any additional resources or support needed.                            Intervention/Education provided during outreach: schedule acute visit and phone visit with GEO CC to review medical bills            Plan:   Patient will schedule and attend recommended follow up visits with speciality providers and primary care provider.  Community Health Worker to outreach per standard work and updated on goal progression    RN CC will outreach in 4-6 weeks to support ongoing recommendations and plan of care will be available sooner if needed.

## 2023-03-31 ENCOUNTER — TELEPHONE (OUTPATIENT)
Dept: FAMILY MEDICINE | Facility: CLINIC | Age: 53
End: 2023-03-31

## 2023-03-31 ENCOUNTER — HOSPITAL ENCOUNTER (OUTPATIENT)
Dept: CT IMAGING | Facility: HOSPITAL | Age: 53
Discharge: HOME | End: 2023-03-31
Attending: FAMILY MEDICINE | Admitting: FAMILY MEDICINE
Payer: MEDICARE

## 2023-03-31 ENCOUNTER — OFFICE VISIT (OUTPATIENT)
Dept: FAMILY MEDICINE | Facility: CLINIC | Age: 53
End: 2023-03-31
Payer: MEDICARE

## 2023-03-31 VITALS
RESPIRATION RATE: 16 BRPM | HEART RATE: 67 BPM | DIASTOLIC BLOOD PRESSURE: 76 MMHG | WEIGHT: 208.5 LBS | HEIGHT: 59 IN | BODY MASS INDEX: 42.03 KG/M2 | OXYGEN SATURATION: 99 % | SYSTOLIC BLOOD PRESSURE: 108 MMHG

## 2023-03-31 DIAGNOSIS — H72.91 PERFORATION OF RIGHT TYMPANIC MEMBRANE: Primary | ICD-10-CM

## 2023-03-31 DIAGNOSIS — H72.91 PERFORATION OF RIGHT TYMPANIC MEMBRANE: ICD-10-CM

## 2023-03-31 DIAGNOSIS — H92.01 ACUTE EAR PAIN, RIGHT: ICD-10-CM

## 2023-03-31 DIAGNOSIS — H60.62 CHRONIC OTITIS EXTERNA OF LEFT EAR, UNSPECIFIED TYPE: ICD-10-CM

## 2023-03-31 LAB
CREAT BLD-MCNC: 0.8 MG/DL (ref 0.6–1.1)
GFR SERPL CREATININE-BSD FRML MDRD: >60 ML/MIN/1.73M2

## 2023-03-31 PROCEDURE — G1010 CDSM STANSON: HCPCS

## 2023-03-31 PROCEDURE — 250N000011 HC RX IP 250 OP 636: Performed by: FAMILY MEDICINE

## 2023-03-31 PROCEDURE — 82565 ASSAY OF CREATININE: CPT

## 2023-03-31 PROCEDURE — 99214 OFFICE O/P EST MOD 30 MIN: CPT | Performed by: FAMILY MEDICINE

## 2023-03-31 RX ORDER — DEXAMETHASONE SODIUM PHOSPHATE 1 MG/ML
1-2 SOLUTION/ DROPS OPHTHALMIC EVERY 12 HOURS
Qty: 2 ML | Refills: 0 | Status: SHIPPED | OUTPATIENT
Start: 2023-03-31 | End: 2023-04-07

## 2023-03-31 RX ORDER — IOPAMIDOL 755 MG/ML
75 INJECTION, SOLUTION INTRAVASCULAR ONCE
Status: COMPLETED | OUTPATIENT
Start: 2023-03-31 | End: 2023-03-31

## 2023-03-31 RX ORDER — CIPROFLOXACIN 0.5 MG/.25ML
0.25 SOLUTION/ DROPS AURICULAR (OTIC) 2 TIMES DAILY
Qty: 3.5 ML | Refills: 0 | Status: SHIPPED | OUTPATIENT
Start: 2023-03-31 | End: 2023-04-07

## 2023-03-31 RX ADMIN — IOPAMIDOL 75 ML: 755 INJECTION, SOLUTION INTRAVENOUS at 13:34

## 2023-03-31 NOTE — PROGRESS NOTES
"  Assessment & Plan     Perforation of right tympanic membrane  Acute on chronic, patient with symptoms consistent with otitis externa. Medication as below.   - ciprofloxacin (CETRAXAL) 0.2 % otic solution  Dispense: 3.5 mL; Refill: 0  - dexamethasone (DECADRON) 0.1 % ophthalmic solution  Dispense: 2 mL; Refill: 0  - PRIMARY CARE FOLLOW-UP SCHEDULING    Acute ear pain, right  Recommendations as above.   - PRIMARY CARE FOLLOW-UP SCHEDULING    Chronic otitis externa of left ear, unspecified type  Chronic, not stable. Patient with consistent symptoms of chronic irritation.   - PRIMARY CARE FOLLOW-UP SCHEDULING      Ordering of each unique test  Prescription drug management       BMI:   Estimated body mass index is 42.11 kg/m  as calculated from the following:    Height as of this encounter: 1.499 m (4' 11\").    Weight as of this encounter: 94.6 kg (208 lb 8 oz).   Weight management plan: Discussed healthy diet and exercise guidelines    See Patient Instructions    Lindsay NICK Hollingsworth DO  [facility]   May is a 52 year old, presenting for the following health issues:  Ear Problem (Has been ear pain for the last month in her right ear)    Additional Questions 6/2/2022   Roomed by Priyanka   Accompanied by self     HPI     Pain History:  When did you first notice your pain? 1 month ago    Have you seen anyone else for your pain? No  How has your pain affected your ability to work? Not applicable  Where in your body do you have pain? right ear    Right Ear Pain  -Patient's has had surgery twice on her right ear and she is having pain inside the ear  -She feels drainage and pus coming out  -Due to this she has headaches and dizziness  -First surgery 1996, second surgery as 2019  -Patient's surgery was for burst ear drum initially, and then the second one was also for a hole with drainage  -Patient is endorsing jaw pain as well.   -She also reports eye pain in the same side. She reports clogged " "lacrimal ducts.   -No fever/chills/myalgias. Denies cough or sore throat, difficulty breathing.       Review of Systems   Constitutional, HEENT, cardiovascular, pulmonary, gi and gu systems are negative, except as otherwise noted.      Objective    /76 (BP Location: Right arm, Patient Position: Sitting, Cuff Size: Adult Large)   Pulse 67   Resp 16   Ht 1.499 m (4' 11\")   Wt 94.6 kg (208 lb 8 oz)   SpO2 99%   BMI 42.11 kg/m    Body mass index is 42.11 kg/m .  Physical Exam  Constitutional:       Appearance: Normal appearance.   HENT:      Head: Normocephalic and atraumatic.      Right Ear: External ear normal. Drainage present. Tympanic membrane is perforated. Tympanic membrane has normal mobility.      Left Ear: Ear canal normal. There is no impacted cerumen.   Neurological:      Mental Status: She is alert.        CT Temporal Bones w Contrast    Result Date: 3/31/2023  EXAM: CT TEMPORAL W CONTRAST LOCATION: [facility] DATE/TIME: 3/31/2023 1:54 PM INDICATION: history of mastoidectomy, right ear pain with discharge, fluctuance along the jaw COMPARISON: CT temporal bone 07/16/2019 CONTRAST: 75 ml Isovue 370 TECHNIQUE:  Routine with IV contrast including thin section multiplanar reformatted images of each temporal bone. Dose reduction techniques were used. FINDINGS: RIGHT TEMPORAL BONE: Postoperative changes of canal down mastoidectomy and subtotal ossicular resection, similar to prior. Moderate soft tissue density in the middle ear cavity unchanged from prior. The tympanic membrane is chronically perforated inferiorly, similar to prior. The tegmen is intact. No mastoid effusion. No evidence for otosclerosis. 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 Otherwise normal cochlea, semicircular canals, vestibule, and vestibular aqueduct. Intact bony carotid canal and facial nerve canal. LEFT TEMPORAL BONE: Normal external auditory canal and tympanic membrane. " Normal middle ear cavity and ossicles. No mastoid effusion. No evidence for otosclerosis. 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 Otherwise normal cochlea, semicircular canals, vestibule, and vestibular aqueduct. Intact bony carotid canal and facial nerve canal. OTHER: No visualized paranasal sinus mucosal disease. 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 Otherwise visualized intracranial compartment is unremarkable.     IMPRESSION: 1.  No change from prior CT of 07/16/2019. 2.  Postoperative changes of canal down mastoidectomy with moderate residual soft tissue density in middle ear cavity, similar to prior. 3.  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 4.  There is a questionable aneurysm arising from the left internal carotid artery projecting medially in the region of the pituitary fossa measuring 4.5 mm. Consider more definitive study such as MR angiogram or CT angiogram confirm or exclude exclude an aneurysm.

## 2023-04-03 ENCOUNTER — PATIENT OUTREACH (OUTPATIENT)
Dept: NURSING | Facility: CLINIC | Age: 53
End: 2023-04-03
Payer: MEDICARE

## 2023-04-03 NOTE — PROGRESS NOTES
Clinic Care Coordination Contact    Follow Up Progress Note      Assessment: CC SW check on medical bills that pt turned into [facility]     Care Gaps:    Health Maintenance Due   Topic Date Due     DIABETIC FOOT EXAM  Never done     DEPRESSION ACTION PLAN  Never done     COLORECTAL CANCER SCREENING  Never done     ZOSTER IMMUNIZATION (1 of 2) Never done     Pneumococcal Vaccine: Pediatrics (0 to 5 Years) and At-Risk Patients (6 to 64 Years) (2 - PCV) 10/25/2020     COVID-19 Vaccine (4 - Booster for Kaylene series) 07/28/2022     INFLUENZA VACCINE (1) 09/01/2022     A1C  09/02/2022     PHQ-9  09/02/2022     LIPID  12/07/2022     MICROALBUMIN  12/07/2022     MAMMO SCREENING  12/07/2022     EYE EXAM  12/21/2022     HEMOGLOBIN  06/02/2023       Postponed to a later date as pt was uninterested in discussing until bills are addressed     Care Plans  Care Plan: Financial Wellbeing     Problem: Patient expresses financial resource strain     Goal: I would like to review medical bills and get understanding of coverage     Start Date: 3/28/2023    Note:     Barriers: coverage  Strengths: engagement  Patient expressed understanding of goal: yes  Action steps to achieve this goal:  1. I will bring in a copy of medical bills for CCC team to review and get better understanding of Patient responsibility and coverage  2. I will speak with SW CC to review and get better understanding   3. I will update Care coordination team during next outreach                        Care Plan: Medical     Problem: HP GENERAL PROBLEM     Goal: I would like to have  a plan of care for ear/hearing within 3-4  months     Start Date: 3/28/2023    This Visit's Progress: 30%    Note:     Barriers: language, access   Strengths: family support  Patient expressed understanding of goal: yes  Action steps to achieve this goal:  1. I will schedule and attend visit with ENT and audiology  2. I will update Care coordination team during next outreach  3. I will report  to my Community Health Worker if any additional resources or support needed.                Goal: I would like to have a plan of care for Cardiology health within 3-4 months     Start Date: 3/28/2023    This Visit's Progress: 10%    Note:     Barriers: language  Strengths: family   Patient expressed understanding of goal: yes  Action steps to achieve this goal:  1. I will schedule and attend visit with cardiology provider - referral on file  2. I will update Care coordination team during next outreach  3. I will report to my Community Health Worker if any additional resources or support needed.                          CC SW called and spoke to pt this afternoon via  line. SW completed the FRW screening referral for assistance with any outstanding medical bills. SW noted that the medical bills were for labs that pt had done. Pt was confused why her insurance didn't cover her labs. SW offered to connect pt with [facility] Linkage Line to get some information regarding her medicare coverage. SLL rep Ryann explained that pt was on original medicare and that some procedures and visits aren't complete covered, so she would need to pay the remaining 20% of the costs that medicare doesn't cover for some of her services. Ryann found advantage plans that would cover pts prescriptions and could help with covering the 20%. Pt and SW to look over that information together for pt to decide whether or not she wants to enroll in an advantage plan    Intervention/Education provided during outreach: [facility] Link[facility] Line, [facility]     Outreach Frequency: monthly      Plan: CC GEO to submit FRW referral for freya care for assistance with any outstanding medical bills. SLL worker to send CC GEO advantage plan information. SW to connect with pt in a few weeks to go over the plans for her to select one if she wants to add one to her current coverage    Care Coordinator will follow up in 4 weeks per standard outreach     Tiffany  KEN Bass   Social Work Care Coordinator   [facility]    [phone]

## 2023-04-05 ENCOUNTER — PATIENT OUTREACH (OUTPATIENT)
Dept: CARE COORDINATION | Facility: CLINIC | Age: 53
End: 2023-04-05
Payer: MEDICARE

## 2023-04-05 ENCOUNTER — APPOINTMENT (OUTPATIENT)
Dept: INTERPRETER SERVICES | Facility: CLINIC | Age: 53
End: 2023-04-05
Payer: MEDICARE

## 2023-04-05 NOTE — PROGRESS NOTES
Clinic Care Coordination Contact  Program: [facility]  County:  [facility] Case #:  [facility] Worker:   Simran #:   Subscriber #:   Renewal:  Date Applied:     FRW Outreach:   4/5/23: Outreach attempted x 1. Left message on voicemail with call back information and requested return call.  Plan: FRW will call again within one week.     Health Insurance:      Referral/Screening:

## 2023-04-11 ENCOUNTER — APPOINTMENT (OUTPATIENT)
Dept: INTERPRETER SERVICES | Facility: CLINIC | Age: 53
End: 2023-04-11
Payer: MEDICARE

## 2023-04-11 ENCOUNTER — PATIENT OUTREACH (OUTPATIENT)
Dept: CARE COORDINATION | Facility: CLINIC | Age: 53
End: 2023-04-11
Payer: MEDICARE

## 2023-04-11 NOTE — PROGRESS NOTES
Clinic Care Coordination Contact  Program: [facility]  County:  [facility] Case #:  [facility] Worker:   Simran #:   Subscriber #:   Renewal:  Date Applied:     FRW Outreach:   4/11/23: FRW called pt on referral. Appointment made for 4/26 to apply for [facility]   4/5/23: Outreach attempted x 1. Left message on voicemail with call back information and requested return call.  Plan: FRW will call again within one week.     Health Insurance:      Referral/Screening:

## 2023-04-14 ENCOUNTER — PATIENT OUTREACH (OUTPATIENT)
Dept: NURSING | Facility: CLINIC | Age: 53
End: 2023-04-14
Payer: MEDICARE

## 2023-04-14 DIAGNOSIS — I42.8 NONISCHEMIC CARDIOMYOPATHY (H): ICD-10-CM

## 2023-04-14 NOTE — PROGRESS NOTES
Clinic Care Coordination Contact    Follow Up Progress Note      Assessment: RN CC outreach with support of interpretive services,  spoke with patient for status update    Swelling in ear seems to be doing better - has follow up with ENT scheduled    Reviewed details of scheduled visit    Schedule AWE for end of June ( due 6/2/23)  Support refill of medications - sent request to PCP via pharmacy to review     Care Gaps:    Health Maintenance Due   Topic Date Due     DIABETIC FOOT EXAM  Never done     DEPRESSION ACTION PLAN  Never done     COLORECTAL CANCER SCREENING  Never done     ZOSTER IMMUNIZATION (1 of 2) Never done     Pneumococcal Vaccine: Pediatrics (0 to 5 Years) and At-Risk Patients (6 to 64 Years) (2 - PCV) 10/25/2020     COVID-19 Vaccine (4 - Booster for Kaylene series) 07/28/2022     INFLUENZA VACCINE (1) 09/01/2022     A1C  09/02/2022     PHQ-9  09/02/2022     LIPID  12/07/2022     MICROALBUMIN  12/07/2022     MAMMO SCREENING  12/07/2022     EYE EXAM  12/21/2022     HEMOGLOBIN  06/02/2023     Future Appointments   Date Time Provider Department Center   4/21/2023  1:30 PM Sendy Banda, APRN CNP HRSJN [facility]   4/26/2023  1:00 PM Cristy Cisneros F[facility]ARE [facility]   5/26/2023  9:20 AM [facility] Snider [facility]   6/9/2023  1:20 PM Jose Maria Bass MD MDENT [facility]   6/23/2023  1:40 PM Jaky Gaspar MD ICFMOB [facility]   6/30/2023  1:00 PM SPRS CCC RN ICCSUP [facility]       Care Plans  Care Plan: Financial Wellbeing     Problem: Patient expresses financial resource strain     Goal: I would like to review medical bills and get understanding of coverage     Start Date: 3/28/2023    Note:     Barriers: coverage  Strengths: engagement  Patient expressed understanding of goal: yes  Action steps to achieve this goal:  1. I will bring in a copy of medical bills for CCC team to review and get better understanding of Patient responsibility and coverage  2. I will speak with SW CC to review  and get better understanding   3. I will update Care coordination team during next outreach                        Care Plan: Medical     Problem: HP GENERAL PROBLEM     Goal: I would like to have  a plan of care for ear/hearing within 3-4  months     Start Date: 3/28/2023    This Visit's Progress: 50% Recent Progress: 30%    Note:     Barriers: language, access   Strengths: family support  Patient expressed understanding of goal: yes  Action steps to achieve this goal:  1. I will schedule and attend visit with ENT and audiology  2. I will update Care coordination team during next outreach  3. I will report to my Community Health Worker if any additional resources or support needed.                Goal: I would like to have a plan of care for Cardiology health within 3-4 months     Start Date: 3/28/2023    This Visit's Progress: 30% Recent Progress: 10%    Note:     Barriers: language  Strengths: family   Patient expressed understanding of goal: yes  Action steps to achieve this goal:  1. I will schedule and attend visit with cardiology provider - referral on file  2. I will update Care coordination team during next outreach  3. I will report to my Community Health Worker if any additional resources or support needed.                Goal: I would like to attend Annual wellness exam     Note:       Patient expressed understanding of goal: yes  Action steps to achieve this goal:  1. I will schedule my annual wellness visit; [phone] ([phone])  2. I will attend my annual wellness visit.  3. I will contact my Care Management or clinic team if I have barriers to attending my annual wellness visit.               Goal: I will fill all medications and follow recommendations for theapy and dosing plan.                       Plan: Patient will schedule and attend recommended follow up visits with speciality providers and primary care provider.  Community Health Worker to outreach per standard work and updated on goal  progression      RN CC will review in 4-6 weeks to support ongoing recommendations and plan of care will be available sooner if needed.

## 2023-04-15 RX ORDER — NITROGLYCERIN 0.4 MG/1
TABLET SUBLINGUAL
Qty: 25 TABLET | Refills: 4 | Status: SHIPPED | OUTPATIENT
Start: 2023-04-15 | End: 2023-11-29

## 2023-04-15 NOTE — TELEPHONE ENCOUNTER
"Routing refill request to provider for review/approval because:  Needs review    Last Written Prescription Date:  12/7/21  Last Fill Quantity: 20,  # refills: 4   Last office visit provider:  6/2/22     Requested Prescriptions   Pending Prescriptions Disp Refills     nitroGLYcerin (NITROSTAT) 0.4 MG sublingual tablet [Pharmacy Med Name: NITROGLYCERIN 0.4 MG TAB SL 0.4 Tablet] 25 tablet 4     Sig: DISSOLVE 1 PILL UNDER TONGUE EVERY 5 MINUTES AS NEEDED FOR CHEST PAIN/YOG MOB HAUV SIAB MUAB IB LUB TSHUAJ TRE HAUV QAB NPLAIG TXHUA 5 SWATI THIS       Nitrates Failed - 4/14/2023  2:39 PM        Failed - Sublingual nitro order needs review     If refill exceeds 1 bottle per month, please forward request to provider.           Passed - Blood pressure under 140/90 in past 12 months     BP Readings from Last 3 Encounters:   03/31/23 108/76   06/02/22 107/74   03/01/22 106/78                 Passed - Pt is not on erectile dysfunction medications        Passed - Recent (12 mo) or future (30 days) visit within the authorizing provider's specialty     Patient has had an office visit with the authorizing provider or a provider within the authorizing providers department within the previous 12 mos or has a future within next 30 days. See \"Patient Info\" tab in inbasket, or \"Choose Columns\" in Meds & Orders section of the refill encounter.              Passed - Medication is active on med list        Passed - Patient is age 18 or older             Ana Maria Chinchilla RN 04/15/23 4:06 PM  "

## 2023-04-19 ENCOUNTER — PATIENT OUTREACH (OUTPATIENT)
Dept: CARE COORDINATION | Facility: CLINIC | Age: 53
End: 2023-04-19
Payer: MEDICARE

## 2023-04-19 NOTE — PROGRESS NOTES
Clinic Care Coordination Contact  Community Health Worker Follow Up    Today's date: 4/19/2023    Reason: [facility] CHW Follow Up Called (follow up current goals)    Care Gaps:   Health Maintenance Due   Topic Date Due     DIABETIC FOOT EXAM  Never done     DEPRESSION ACTION PLAN  Never done     COLORECTAL CANCER SCREENING  Never done     ZOSTER IMMUNIZATION (1 of 2) Never done     Pneumococcal Vaccine: Pediatrics (0 to 5 Years) and At-Risk Patients (6 to 64 Years) (2 - PCV) 10/25/2020     COVID-19 Vaccine (4 - Booster for Kaylene series) 07/28/2022     INFLUENZA VACCINE (1) 09/01/2022     A1C  09/02/2022     PHQ-9  09/02/2022     LIPID  12/07/2022     MICROALBUMIN  12/07/2022     MAMMO SCREENING  12/07/2022     EYE EXAM  12/21/2022     HEMOGLOBIN  06/02/2023     Defer to next CCC outreach due to patient isn't with the pt's spouse Juan Sanchez at this time.    Care Plan:   Care Plan: Financial Wellbeing     Problem: Patient expresses financial resource strain     Goal: I would like to review medical bills and get understanding of coverage     Start Date: 3/28/2023    Note:     Barriers: coverage  Strengths: engagement  Patient expressed understanding of goal: yes  Action steps to achieve this goal:  1. I will bring in a copy of medical bills for CCC team to review and get better understanding of Patient responsibility and coverage  2. I will speak with  CC to review and get better understanding   3. I will update Care coordination team during next outreach                        Care Plan: Medical     Problem: HP GENERAL PROBLEM     Goal: I would like to have  a plan of care for ear/hearing within 3-4  months     Start Date: 3/28/2023    This Visit's Progress: 50% Recent Progress: 30%    Note:     Barriers: language, access   Strengths: family support  Patient expressed understanding of goal: yes  Action steps to achieve this goal:  1. I will schedule and attend visit with ENT and audiology  2. I will update Care  "coordination team during next outreach  3. I will report to my Community Health Worker if any additional resources or support needed.                Goal: I would like to have a plan of care for Cardiology health within 3-4 months     Start Date: 3/28/2023    This Visit's Progress: 30% Recent Progress: 10%    Note:     Barriers: language  Strengths: family   Patient expressed understanding of goal: yes  Action steps to achieve this goal:  1. I will schedule and attend visit with cardiology provider - referral on file  2. I will update Care coordination team during next outreach  3. I will report to my Community Health Worker if any additional resources or support needed.                Goal: I would like to attend Annual wellness exam     Note:       Patient expressed understanding of goal: yes  Action steps to achieve this goal:  1. I will schedule my annual wellness visit; [phone] ([phone])  2. I will attend my annual wellness visit.  3. I will contact my Care Management or clinic team if I have barriers to attending my annual wellness visit.               Goal: I will fill all medications and follow recommendations for theapy and dosing plan.                     Patient Outreach Discussion:   Attempted #1: CHW attempted to connect with patient through the mobile phone number and no answer nor voicemail box available at this time. Unable to reach patient.     Attempted #2:   CHW attempted to connect with patient through the \"home\" phone number \"[phone]\" listed in chart and patient's spouse Juan Sanchez answer the called. Asked to speak with patient. Pt's spouse stated that he is at work and patient is at home. Consent to communication is file. No patient medical info discuss. Check-in how patient is doing.     Pt's spouse confirmed that pt is doing well and no other questions. CHW suggested pt to connect with CHW/CCC for any additional resources need. Pt's spouse confirmed.     CHW Next Follow-up: " Goals follow up. Informed patient of due care gaps.     Outreach frequency: monthly      CHW Plan:   -Patient connect with CCC/CHW regards to goals updates status.  -Next CHW outreach plan: 1 month

## 2023-04-21 ENCOUNTER — OFFICE VISIT (OUTPATIENT)
Dept: CARDIOLOGY | Facility: CLINIC | Age: 53
End: 2023-04-21
Payer: MEDICARE

## 2023-04-21 VITALS
DIASTOLIC BLOOD PRESSURE: 80 MMHG | SYSTOLIC BLOOD PRESSURE: 122 MMHG | OXYGEN SATURATION: 95 % | RESPIRATION RATE: 18 BRPM | HEART RATE: 75 BPM | BODY MASS INDEX: 42.01 KG/M2 | WEIGHT: 208 LBS

## 2023-04-21 DIAGNOSIS — I10 ESSENTIAL HYPERTENSION: ICD-10-CM

## 2023-04-21 DIAGNOSIS — I10 ESSENTIAL HYPERTENSION, BENIGN: ICD-10-CM

## 2023-04-21 DIAGNOSIS — I50.20 HEART FAILURE WITH REDUCED EJECTION FRACTION (H): Primary | ICD-10-CM

## 2023-04-21 DIAGNOSIS — I42.8 NONISCHEMIC CARDIOMYOPATHY (H): ICD-10-CM

## 2023-04-21 LAB
ANION GAP SERPL CALCULATED.3IONS-SCNC: 9 MMOL/L (ref 7–15)
BUN SERPL-MCNC: 17.1 MG/DL (ref 6–20)
CALCIUM SERPL-MCNC: 9.6 MG/DL (ref 8.6–10)
CHLORIDE SERPL-SCNC: 104 MMOL/L (ref 98–107)
CREAT SERPL-MCNC: 0.76 MG/DL (ref 0.51–0.95)
DEPRECATED HCO3 PLAS-SCNC: 27 MMOL/L (ref 22–29)
GFR SERPL CREATININE-BSD FRML MDRD: >90 ML/MIN/1.73M2
GLUCOSE SERPL-MCNC: 163 MG/DL (ref 70–99)
NT-PROBNP SERPL-MCNC: 98 PG/ML (ref 0–900)
POTASSIUM SERPL-SCNC: 4.4 MMOL/L (ref 3.4–5.3)
SODIUM SERPL-SCNC: 140 MMOL/L (ref 136–145)

## 2023-04-21 PROCEDURE — 80048 BASIC METABOLIC PNL TOTAL CA: CPT | Performed by: NURSE PRACTITIONER

## 2023-04-21 PROCEDURE — 36415 COLL VENOUS BLD VENIPUNCTURE: CPT | Performed by: NURSE PRACTITIONER

## 2023-04-21 PROCEDURE — 99214 OFFICE O/P EST MOD 30 MIN: CPT | Performed by: NURSE PRACTITIONER

## 2023-04-21 PROCEDURE — 83880 ASSAY OF NATRIURETIC PEPTIDE: CPT | Performed by: NURSE PRACTITIONER

## 2023-04-21 RX ORDER — POTASSIUM CHLORIDE 750 MG/1
10 TABLET, EXTENDED RELEASE ORAL DAILY
Qty: 90 TABLET | Refills: 3 | Status: SHIPPED | OUTPATIENT
Start: 2023-04-21 | End: 2023-11-30

## 2023-04-21 RX ORDER — METOPROLOL SUCCINATE 100 MG/1
100 TABLET, EXTENDED RELEASE ORAL DAILY
Qty: 90 TABLET | Refills: 3 | Status: SHIPPED | OUTPATIENT
Start: 2023-04-21 | End: 2023-07-26

## 2023-04-21 NOTE — LETTER
4/21/2023    Jaky Gaspar MD  [address] [place] [id number]    RE: Leora Sanchez       Dear Colleague,     I had the pleasure of seeing Leora Sanchez in the [facility].        Assessment/Recommendations   Assessment:    1. Nonischemic cardiomyopathy, heart failure with improved ejection fraction, ejection fraction 65%, NYHA class III: Compensated.  Euvolemic on exam.  She does have fatigue and dyspnea on exertion.  Her clinic weight has increased.  She states she has been depressed and grieving due to her mother passing a year ago.  2. Hypertension: Controlled.  Blood pressure 122/80    Plan:  1.  BMP and NT proBNP pending  2.  Continue current medications  3.  Monitor daily weights and follow a low-salt diet    Leora Sanchez will follow up with Dr Roy in July and in the heart failure clinic in 6 months.     History of Present Illness/Subjective    Ms. Leora Sanchez is a 52 year old female seen at [facility] heart failure clinic today for continued follow-up.  There is an  on the phone during the visit.  She follows up for heart failure with improved ejection fraction, nonischemic cardiomyopathy.  She had a stress cardiac MRI February 2021 which showed an improved ejection fraction of 65%.  Coronary angiogram in May 2019 showed nonobstructive CAD.  She has a past medical history significant for hypertension, nonobstructive CAD, dyslipidemia.    Today, she has fatigue and dyspnea on exertion.  She denies lightheadedness, orthopnea, PND, palpitations, chest pain, abdominal fullness/bloating and lower extremity edema.      She does not always monitor her weight at home.  Her clinic weight has increased approximately 10 pounds in a year.     Physical Examination Review of Systems   There were no vitals taken for this visit.  There is no height or weight on file to calculate BMI.  Wt Readings from Last 3 Encounters:   03/31/23 94.6 kg (208 lb 8 oz)   06/02/22 87.1 kg (192 lb)   03/01/22 88.9 kg  (196 lb)       General Appearance:   no acute distress   ENT/Mouth: No abnormalities   EYES:  no scleral icterus, normal conjunctivae   Neck: no thyromegaly   Chest/Lungs:   lungs are clear to auscultation, no rales or wheezing, equal chest wall expansion    Cardiovascular:   Regular. Normal first and second heart sounds with no murmurs, rubs, or gallops, no edema bilaterally    Abdomen:  bowel sounds are present   Extremities: no cyanosis or clubbing   Skin: warm   Neurologic: no tremors     Psychiatric: alert and oriented x3                                              Medical History  Surgical History Family History Social History   Past Medical History:   Diagnosis Date    Anxiety     Chronic cough 2018    Depression     Dyslipidemia, goal LDL below 70 10/31/2017    Essential hypertension     GERD (gastroesophageal reflux disease)     Hypertension     Nonischemic cardiomyopathy (H)     variable EF depending on the technique, date and reader; BNP normal in     Nonocclusive coronary atherosclerosis of native coronary artery 10/31/2017    Pregnancy         Pyelonephritis 2018    Renal abscess     Spontaneous      TM (tympanic membrane disorder)     Past Surgical History:   Procedure Laterality Date    CV CORONARY ANGIOGRAM N/A 2019    Procedure: Coronary Angiogram;  Surgeon: Car Box MD;  Location: [facility] Cath Lab;  Service: Cardiology    CV LEFT HEART CATHETERIZATION WITHOUT LEFT VENTRICULOGRAM Left 10/31/2017    Procedure: Left Heart Catheterization Without Left Ventriculogram;  Surgeon: Jonathan Duqeu MD;  Location: [facility] Cath Lab;  Service:     CV LEFT HEART CATHETERIZATION WITHOUT LEFT VENTRICULOGRAM Left 2019    Procedure: Left Heart Catheterization Without Left Ventriculogram;  Surgeon: Car Box MD;  Location: [facility] Cath Lab;  Service: Cardiology    DILATION AND CURETTAGE      ENT SURGERY      INNER EAR SURGERY  Right 1994    MASTOIDECTOMY Right 1996    [place] CATH PLACEMENT & NJX CORONARY ART ANGIO IMG S&I N/A 10/31/2017    Procedure: Coronary Angiogram;  Surgeon: Jonathan Duque MD;  Location: [facility] Cath Lab;  Service: Cardiology    Family History   Problem Relation Age of Onset    Diabetes Mother     Hypertension Mother     Uterine Cancer Mother     Diverticulitis Mother     Other - See Comments Father          in war [place]    No Known Problems Sister     No Known Problems Daughter     No Known Problems Daughter     No Known Problems Daughter     No Known Problems Daughter     No Known Problems Son     No Known Problems Son     No Known Problems Son     No Known Problems Son     Social History     Socioeconomic History    Marital status:      Spouse name: Not on file    Number of children: 8    Years of education: Not on file    Highest education level: Not on file   Occupational History    Not on file   Tobacco Use    Smoking status: Never    Smokeless tobacco: Never    Tobacco comments:     no passive exposure   Vaping Use    Vaping status: Not on file   Substance and Sexual Activity    Alcohol use: No    Drug use: No    Sexual activity: Yes     Partners: Male     Birth control/protection: None   Other Topics Concern    Not on file   Social History Narrative    Lives with  who can read and speak English and helps her with meds     Social Determinants of Health     Financial Resource Strain: Medium Risk (2021)    Overall Financial Resource Strain (CARDIA)     Difficulty of Paying Living Expenses: Somewhat hard   Food Insecurity: Not on file   Transportation Needs: No Transportation Needs (2021)    PRAPARE - Transportation     Lack of Transportation (Medical): No     Lack of Transportation (Non-Medical): No   Physical Activity: Not on file   Stress: Stress Concern Present (2021)    [employer] [place] [employer] - Occupational Stress Questionnaire     Feeling of  Stress : To some extent   Social Connections: Not on file   Intimate Partner Violence: Not on file   Housing Stability: Unknown (12/14/2021)    Housing Stability Vital Sign     Unable to Pay for Housing in the Last Year: No     Number of Places Lived in the Last Year: Not on file     Unstable Housing in the Last Year: Not on file          Medications  Allergies   Current Outpatient Medications   Medication Sig Dispense Refill    nitroGLYcerin (NITROSTAT) 0.4 MG sublingual tablet DISSOLVE 1 PILL UNDER TONGUE EVERY 5 MINUTES AS NEEDED FOR CHEST PAIN/YOG MOB HAUV SIAB MUAB IB LUB [facility] TRE HAUV QAB NPLAIG TXHUA 5 SWATI THIS 25 tablet 4    acetaminophen (MAPAP) 500 MG tablet Take 2 tablets (1,000 mg) by mouth 3 times daily as needed for mild pain 100 tablet 11    aspirin 81 MG EC tablet Take 1 tablet (81 mg) by mouth daily 90 tablet 4    atorvastatin (LIPITOR) 80 MG tablet TAKE 1 TABLET (80 MG TOTAL) BY MOUTH DAILY/ TXHUA HNUB NOJ 1 [facility] NTSV MUAJ ROJ 90 tablet 4    B Complex-Biotin-FA (B COMPLEX 100 TR) TBCR Take 1 tablet by mouth daily 90 tablet 4    blood glucose (CONTOUR NEXT TEST) test strip 1 strip by In Vitro route daily 25 strip 12    furosemide (LASIX) 40 MG tablet Take 1 tablet (40 mg) by mouth daily 90 tablet 4    isosorbide mononitrate (IMDUR) 60 MG 24 hr tablet Take 1 tablet (60 mg) by mouth daily 90 tablet 3    losartan (COZAAR) 100 MG tablet TAKE 1 PILL BY MOUTH DAILY FOR BLOOD PRESSURE / TXHUA HNUB NOJ 1 [facility] YANET NTSHAV SIAB 90 tablet 1    metFORMIN (GLUCOPHAGE XR) 500 MG 24 hr tablet Take 1 tablet (500 mg) by mouth 2 times daily (with meals) 180 tablet 4    metoprolol succinate ER (TOPROL XL) 100 MG 24 hr tablet Take 1 tablet (100 mg) by mouth daily 90 tablet 4    omeprazole (PRILOSEC) 40 MG DR capsule TAKE 1 PILL BY MOUTH EVERY DAY/ TXHUA HNUB NOJ 1 [facility] LUB PLAB 90 capsule 1    potassium chloride ER (K-TAB/KLOR-CON) 10 MEQ CR tablet TAKE 1 PILL BY MOUTH EVERY  DAY/TXHUA HNUB NOJ 1 LUB TSHUAJ 90 tablet 2    venlafaxine (EFFEXOR XR) 150 MG 24 hr capsule Take 1 capsule (150 mg) by mouth daily 90 capsule 4    Allergies   Allergen Reactions    Nkda [No Known Drug Allergies]          Lab Results    Chemistry/lipid CBC Cardiac Enzymes/BNP/TSH/INR   Lab Results   Component Value Date    CHOL 178 12/07/2021    HDL 59 12/07/2021    TRIG 149 12/07/2021    BUN 26 (H) 06/02/2022     06/02/2022    CO2 24 06/02/2022    Lab Results   Component Value Date    WBC 6.3 06/02/2022    HGB 12.6 06/02/2022    HCT 39.4 06/02/2022    MCV 93 06/02/2022     06/02/2022    Lab Results   Component Value Date    TROPONINI <0.01 08/20/2020    BNP 12 08/20/2020    TSH 1.97 12/15/2020    INR 0.90 03/29/2020             This note has been dictated using voice recognition software. Any grammatical, typographical, or context distortions are unintentional and inherent to the software    30 minutes spent on the date of encounter doing chart review, review of outside records, review of test results, interpretation with above tests, patient visit and documentation.                  Thank you for allowing me to participate in the care of your patient.      Sincerely,     PABLO Tobar [facility]  cc:   No referring provider defined for this encounter.

## 2023-04-21 NOTE — PROGRESS NOTES
Assessment/Recommendations   Assessment:    1. Nonischemic cardiomyopathy, heart failure with improved ejection fraction, ejection fraction 65%, NYHA class III: Compensated.  Euvolemic on exam.  She does have fatigue and dyspnea on exertion.  Her clinic weight has increased.  She states she has been depressed and grieving due to her mother passing a year ago.  2. Hypertension: Controlled.  Blood pressure 122/80    Plan:  1.  BMP and NT proBNP pending  2.  Continue current medications  3.  Monitor daily weights and follow a low-salt diet    Leora Sanchez will follow up with Dr Roy in July and in the heart failure clinic in 6 months.     History of Present Illness/Subjective    Ms. Leora Sanchez is a 52 year old female seen at [facility] heart failure clinic today for continued follow-up.  There is an  on the phone during the visit.  She follows up for heart failure with improved ejection fraction, nonischemic cardiomyopathy.  She had a stress cardiac MRI February 2021 which showed an improved ejection fraction of 65%.  Coronary angiogram in May 2019 showed nonobstructive CAD.  She has a past medical history significant for hypertension, nonobstructive CAD, dyslipidemia.    Today, she has fatigue and dyspnea on exertion.  She denies lightheadedness, orthopnea, PND, palpitations, chest pain, abdominal fullness/bloating and lower extremity edema.      She does not always monitor her weight at home.  Her clinic weight has increased approximately 10 pounds in a year.     Physical Examination Review of Systems   There were no vitals taken for this visit.  There is no height or weight on file to calculate BMI.  Wt Readings from Last 3 Encounters:   03/31/23 94.6 kg (208 lb 8 oz)   06/02/22 87.1 kg (192 lb)   03/01/22 88.9 kg (196 lb)       General Appearance:   no acute distress   ENT/Mouth: No abnormalities   EYES:  no scleral icterus, normal conjunctivae   Neck: no thyromegaly   Chest/Lungs:   lungs are  clear to auscultation, no rales or wheezing, equal chest wall expansion    Cardiovascular:   Regular. Normal first and second heart sounds with no murmurs, rubs, or gallops, no edema bilaterally    Abdomen:  bowel sounds are present   Extremities: no cyanosis or clubbing   Skin: warm   Neurologic: no tremors     Psychiatric: alert and oriented x3                                              Medical History  Surgical History Family History Social History   Past Medical History:   Diagnosis Date     Anxiety      Chronic cough 2018     Depression      Dyslipidemia, goal LDL below 70 10/31/2017     Essential hypertension      GERD (gastroesophageal reflux disease)      Hypertension      Nonischemic cardiomyopathy (H)     variable EF depending on the technique, date and reader; BNP normal in 2018     Nonocclusive coronary atherosclerosis of native coronary artery 10/31/2017     Pregnancy          Pyelonephritis 2018     Renal abscess      Spontaneous       TM (tympanic membrane disorder)     Past Surgical History:   Procedure Laterality Date     CV CORONARY ANGIOGRAM N/A 2019    Procedure: Coronary Angiogram;  Surgeon: Car Box MD;  Location: [facility] Cath Lab;  Service: Cardiology     CV LEFT HEART CATHETERIZATION WITHOUT LEFT VENTRICULOGRAM Left 10/31/2017    Procedure: Left Heart Catheterization Without Left Ventriculogram;  Surgeon: Jonathan Duque MD;  Location: [facility] Cath Lab;  Service:      CV LEFT HEART CATHETERIZATION WITHOUT LEFT VENTRICULOGRAM Left 2019    Procedure: Left Heart Catheterization Without Left Ventriculogram;  Surgeon: Car Box MD;  Location: [facility] Cath Lab;  Service: Cardiology     DILATION AND CURETTAGE       ENT SURGERY       INNER EAR SURGERY Right      MASTOIDECTOMY Right      [place] CATH PLACEMENT & NJX CORONARY ART ANGIO IMG S&I N/A 10/31/2017    Procedure: Coronary Angiogram;  Surgeon: Jonathan  MD Dunia;  Location: [facility];  Service: Cardiology    Family History   Problem Relation Age of Onset     Diabetes Mother      Hypertension Mother      Uterine Cancer Mother      Diverticulitis Mother      Other - See Comments Father          in war in [place]     No Known Problems Sister      No Known Problems Daughter      No Known Problems Daughter      No Known Problems Daughter      No Known Problems Daughter      No Known Problems Son      No Known Problems Son      No Known Problems Son      No Known Problems Son     Social History     Socioeconomic History     Marital status:      Spouse name: Not on file     Number of children: 8     Years of education: Not on file     Highest education level: Not on file   Occupational History     Not on file   Tobacco Use     Smoking status: Never     Smokeless tobacco: Never     Tobacco comments:     no passive exposure   Vaping Use     Vaping status: Not on file   Substance and Sexual Activity     Alcohol use: No     Drug use: No     Sexual activity: Yes     Partners: Male     Birth control/protection: None   Other Topics Concern     Not on file   Social History Narrative    Lives with  who can read and speak English and helps her with meds     Social Determinants of Health     Financial Resource Strain: Medium Risk (2021)    Overall Financial Resource Strain (CARDIA)      Difficulty of Paying Living Expenses: Somewhat hard   Food Insecurity: Not on file   Transportation Needs: No Transportation Needs (2021)    PRAPARE - Transportation      Lack of Transportation (Medical): No      Lack of Transportation (Non-Medical): No   Physical Activity: Not on file   Stress: Stress Concern Present (2021)    [employer] [place] [employer] - Occupational Stress Questionnaire      Feeling of Stress : To some extent   Social Connections: Not on file   Intimate Partner Violence: Not on file   Housing Stability: Unknown  (12/14/2021)    Housing Stability Vital Sign      Unable to Pay for Housing in the Last Year: No      Number of Places Lived in the Last Year: Not on file      Unstable Housing in the Last Year: Not on file          Medications  Allergies   Current Outpatient Medications   Medication Sig Dispense Refill     nitroGLYcerin (NITROSTAT) 0.4 MG sublingual tablet DISSOLVE 1 PILL UNDER TONGUE EVERY 5 MINUTES AS NEEDED FOR CHEST PAIN/YOG MOB HAUV SIAB MUAB IB LUB [facility]  HAUV QAB NPLAIG TXHUA 5 SWATI THIS 25 tablet 4     acetaminophen (MAPAP) 500 MG tablet Take 2 tablets (1,000 mg) by mouth 3 times daily as needed for mild pain 100 tablet 11     aspirin 81 MG EC tablet Take 1 tablet (81 mg) by mouth daily 90 tablet 4     atorvastatin (LIPITOR) 80 MG tablet TAKE 1 TABLET (80 MG TOTAL) BY MOUTH DAILY/ TXHUA HNUB NOJ 1 [facility] YANET NTSHAV MUAJ ROJ 90 tablet 4     B Complex-Biotin-FA (B COMPLEX 100 TR) TBCR Take 1 tablet by mouth daily 90 tablet 4     blood glucose (CONTOUR NEXT TEST) test strip 1 strip by In Vitro route daily 25 strip 12     furosemide (LASIX) 40 MG tablet Take 1 tablet (40 mg) by mouth daily 90 tablet 4     isosorbide mononitrate (IMDUR) 60 MG 24 hr tablet Take 1 tablet (60 mg) by mouth daily 90 tablet 3     losartan (COZAAR) 100 MG tablet TAKE 1 PILL BY MOUTH DAILY FOR BLOOD PRESSURE / TXHUA HNUB NOJ 1 [facility] ZOO YANET NTSHAV SIAB 90 tablet 1     metFORMIN (GLUCOPHAGE XR) 500 MG 24 hr tablet Take 1 tablet (500 mg) by mouth 2 times daily (with meals) 180 tablet 4     metoprolol succinate ER (TOPROL XL) 100 MG 24 hr tablet Take 1 tablet (100 mg) by mouth daily 90 tablet 4     omeprazole (PRILOSEC) 40 MG DR capsule TAKE 1 PILL BY MOUTH EVERY DAY/ TXHUA HNUB NOJ 1 [facility] ZOO LUB PLAB 90 capsule 1     potassium chloride ER (K-TAB/KLOR-CON) 10 MEQ CR tablet TAKE 1 PILL BY MOUTH EVERY DAY/TXHUA HNUB NOJ 1 [facility] 90 tablet 2     venlafaxine (EFFEXOR XR) 150 MG 24 hr capsule Take 1 capsule  (150 mg) by mouth daily 90 capsule 4    Allergies   Allergen Reactions     Nkda [No Known Drug Allergies]          Lab Results    Chemistry/lipid CBC Cardiac Enzymes/BNP/TSH/INR   Lab Results   Component Value Date    CHOL 178 12/07/2021    HDL 59 12/07/2021    TRIG 149 12/07/2021    BUN 26 (H) 06/02/2022     06/02/2022    CO2 24 06/02/2022    Lab Results   Component Value Date    WBC 6.3 06/02/2022    HGB 12.6 06/02/2022    HCT 39.4 06/02/2022    MCV 93 06/02/2022     06/02/2022    Lab Results   Component Value Date    TROPONINI <0.01 08/20/2020    BNP 12 08/20/2020    TSH 1.97 12/15/2020    INR 0.90 03/29/2020             This note has been dictated using voice recognition software. Any grammatical, typographical, or context distortions are unintentional and inherent to the software    30 minutes spent on the date of encounter doing chart review, review of outside records, review of test results, interpretation with above tests, patient visit and documentation.

## 2023-04-21 NOTE — PATIENT INSTRUCTIONS
Leora Sanchez,    It was a pleasure to see you today at [facility].     My recommendations after this visit include:  - Please follow up with Dr Roy in July   - Please follow up with Sendy Banda in 6 months  - I have checked labs  - Continue current medications    Sendy Banda, CNP

## 2023-04-24 ENCOUNTER — PATIENT OUTREACH (OUTPATIENT)
Dept: CARE COORDINATION | Facility: CLINIC | Age: 53
End: 2023-04-24
Payer: MEDICARE

## 2023-04-24 ENCOUNTER — APPOINTMENT (OUTPATIENT)
Dept: INTERPRETER SERVICES | Facility: CLINIC | Age: 53
End: 2023-04-24
Payer: MEDICARE

## 2023-04-24 NOTE — PROGRESS NOTES
Clinic Care Coordination Contact  Program: [facility]  County:  [facility] Case #:  [facility] Worker:   Simran #:   Subscriber #:   Renewal:  Date Applied:     FRW Outreach:   4/24/23: FRW called pt and completed [facility] application. FRW will mail application to patient for signature. Patient will sign and include needed documents and send back to billing department.  4/11/23: FRW called pt on referral. Appointment made for 4/26 to apply for katie care   4/5/23: Outreach attempted x 1. Left message on voicemail with call back information and requested return call.  Plan: FRW will call again within one week.     Health Insurance:      Referral/Screening:

## 2023-04-25 ENCOUNTER — PATIENT OUTREACH (OUTPATIENT)
Dept: CARE COORDINATION | Facility: CLINIC | Age: 53
End: 2023-04-25
Payer: MEDICARE

## 2023-04-25 NOTE — PROGRESS NOTES
"Clinic Care Coordination Contact:    Today's date: 4/25/2023     Reason: Received fax via e-mail from cover CHW Aislinn     Coordinate Care:   CHW/writer received fax via e-mail communication from cover CHW Aislinn Vargas on 4/20/2023 at 5:16PM. FYI: faxed cc'd to CHW Elisabet and CCC RN Nickie; cover CHW sent faxed to CCC GEO Allen per e-mail reviewed.   Note/comment: Faxed forward to [facility] FRW Cristy on 4/25/2023.      Per faxed reviewed;   -Total pages includes cover page: 3  -Sender: [employer] [address] [place] [address]. Phone number: [phone] or Toll Free: [phone].  -To: May PHOENIX Sanchez; Address: [address] [place] [address].   -Reference #: [id number]; balance due: $1,129.93.  -Date: February 15, 2023  -Creditor: [email or web address]; creditor account#'s: [id number], [id number], [id number], & [id number]; Date(s) of service: 12/07/21, 03/01/22, 05/23/22 & 06/02/22.      Patient chart reviewed;   -Per CCC FRW notes reviewed encounter dated 4/24/2023; \"Program: [facility]  FRW Outreach:   4/24/23: FRW called pt and completed [facility] application.\"  -CHW last outreach completed 4/19/2023.     CHW Next Follow-up: Goals follow up. Informed patient of due care gaps.      Outreach frequency: monthly      CHW Plan:   -Patient connect with CCC/CHW regards to goals updates status.  -Next CHW outreach plan: 1 month.  -Pt is currently working with [facility] FRW regards to fre[facility] care program. Message routed to [facility] FRW for further advised based on patient chart reviewed above and cc'd CCC RN and CCC GEO.                  "

## 2023-05-08 ENCOUNTER — PATIENT OUTREACH (OUTPATIENT)
Dept: CARE COORDINATION | Facility: CLINIC | Age: 53
End: 2023-05-08
Payer: MEDICARE

## 2023-05-08 NOTE — PROGRESS NOTES
Clinic Care Coordination Contact  Program: [facility]  County:  [facility] Case #:  [facility] Worker:   Simran #:   Subscriber #:   Renewal:  Date Applied:     FRW Outreach:   5/8/23: FRW called pt but unable to leave VM. FRW will make another outreach in 1-2 weeks.   4/24/23: FRW called pt and completed [facility] application. FRW will mail application to patient for signature. Patient will sign and include needed documents and send back to billing department.  4/11/23: FRW called pt on referral. Appointment made for 4/26 to apply for katie care   4/5/23: Outreach attempted x 1. Left message on voicemail with call back information and requested return call.  Plan: FRW will call again within one week.     Health Insurance:      Referral/Screening:

## 2023-05-18 ENCOUNTER — APPOINTMENT (OUTPATIENT)
Dept: INTERPRETER SERVICES | Facility: CLINIC | Age: 53
End: 2023-05-18
Payer: MEDICARE

## 2023-05-18 ENCOUNTER — PATIENT OUTREACH (OUTPATIENT)
Dept: CARE COORDINATION | Facility: CLINIC | Age: 53
End: 2023-05-18
Payer: MEDICARE

## 2023-05-18 NOTE — PROGRESS NOTES
Clinic Care Coordination Contact  Program: [facility]  County:  [facility] Case #:  [facility] Worker:   Simran #:   Subscriber #:   Renewal:  Date Applied:     FRW Outreach:   5/18/23: FRW called pt. Pt hasn't sent in the CC application yet. Pt will after her son gets  this weekend. Pt asked FRW to call back in 3 weeks as she is out of town.   5/8/23: FRW called pt but unable to leave VM. FRW will make another outreach in 1-2 weeks.   4/24/23: FRW called pt and completed [facility] application. FRW will mail application to patient for signature. Patient will sign and include needed documents and send back to billing department.  4/11/23: FRW called pt on referral. Appointment made for 4/26 to apply for katie care   4/5/23: Outreach attempted x 1. Left message on voicemail with call back information and requested return call.  Plan: FRW will call again within one week.     Health Insurance:      Referral/Screening:

## 2023-05-19 ENCOUNTER — PATIENT OUTREACH (OUTPATIENT)
Dept: CARE COORDINATION | Facility: CLINIC | Age: 53
End: 2023-05-19
Payer: MEDICARE

## 2023-05-19 NOTE — PROGRESS NOTES
Clinic Care Coordination Contact  Community Health Worker Follow Up    Reason: CCC CHW Follow Up Called (follow up current goals)    Care Gaps:   Health Maintenance Due   Topic Date Due     DIABETIC FOOT EXAM  Never done     DEPRESSION ACTION PLAN  Never done     COLORECTAL CANCER SCREENING  Never done     ZOSTER IMMUNIZATION (1 of 2) Never done     Pneumococcal Vaccine: Pediatrics (0 to 5 Years) and At-Risk Patients (6 to 64 Years) (2 - PCV) 10/25/2020     COVID-19 Vaccine (4 - Booster for Kaylene series) 07/28/2022     INFLUENZA VACCINE (1) 09/01/2022     A1C  09/02/2022     PHQ-9  09/02/2022     LIPID  12/07/2022     MICROALBUMIN  12/07/2022     MAMMO SCREENING  12/07/2022     EYE EXAM  12/21/2022     MEDICARE ANNUAL WELLNESS VISIT  06/02/2023     HEMOGLOBIN  06/02/2023     Patient is busy with prepping his son's wedding. Patient confirmed that she will connect with PCP and CCC office after the wedding. Not able to informed pt of due care gaps at this time.     Care Plan:   Care Plan: Financial Wellbeing     Problem: Patient expresses financial resource strain     Goal: I would like to review medical bills and get understanding of coverage     Start Date: 3/28/2023    This Visit's Progress: 30%    Note:     Barriers: coverage  Strengths: engagement  Patient expressed understanding of goal: yes  Action steps to achieve this goal:  1. I will bring in a copy of medical bills for CCC team to review and get better understanding of Patient responsibility and coverage  2. I will speak with  CC to review and get better understanding   3. I will update Care coordination team during next outreach  4. I will updates status with CHW/CCC team after my son wedding. (Updated: 5/19/2023)                      Care Plan: Medical     Problem: HP GENERAL PROBLEM     Goal: I would like to have  a plan of care for ear/hearing within 3-4  months     Start Date: 3/28/2023    This Visit's Progress: 50% Recent Progress: 50%    Note:      "Barriers: language, access   Strengths: family support  Patient expressed understanding of goal: yes  Action steps to achieve this goal:  1. I will schedule and attend visit with ENT and audiology  2. I will update Care coordination team during next outreach  3. I will report to my Community Health Worker if any additional resources or support needed.  4. I will updates status with CCC team after my son wedding. (Updated: 5/19/2023)              Goal: I would like to have a plan of care for Cardiology health within 3-4 months     Start Date: 3/28/2023    This Visit's Progress: 30% Recent Progress: 30%    Note:     Barriers: language  Strengths: family   Patient expressed understanding of goal: yes  Action steps to achieve this goal:  1. I will schedule and attend visit with cardiology provider - referral on file  2. I will update Care coordination team during next outreach  3. I will report to my Community Health Worker if any additional resources or support needed.  4. I will updates status with CHW/CCC team after my son wedding. (Updated: 5/19/2023)                Goal: I would like to attend Annual wellness exam     This Visit's Progress: 10%    Note:       Patient expressed understanding of goal: yes  Action steps to achieve this goal:  1. I will schedule my annual wellness visit; [id number] ([phone])  2. I will attend my annual wellness visit.  3. I will contact my Care Management or clinic team if I have barriers to attending my annual wellness visit.   4. I will updates status with CCC team after my son wedding. (Updated: 5/19/2023)              Goal: I will fill all medications and follow recommendations for theapy and dosing plan.     Note:     1. I will updates status with CCC team after my son wedding. (Updated: 5/19/2023)                      Patient chart reviewed:  Per [facility] FRW notes reviewed encounter dated 5/18/2023;  \"Program: [facility] Outreach:   5/18/23: FRW called pt. Pt hasn't sent " "in the CC application yet. Pt will after her son gets  this weekend. Pt asked FRW to call back in 3 weeks as she is out of town.\"    Patient Outreach Discussion:   CCC CHW was able to connect with patient today today regards to reason above. Patient shared info below. Congratulations to her son and the family regards to son's wedding this weekend.    Patient shared:  -Spoke with FRW yesterday. Review form with son; not yet started. Will fill out the form and return to FRW to review before submitted. Prefer FRW to review form before submit. Will connect with CHW/CCC after the wedding for any questions and goals updates. Son is getting  this weekend.   -Doing well. No other questions.     CHW suggested patient for the following:  -Pt connect CCC/CHW with any additional resources need and PCP office for scheduling appt.    CHW Next Follow-up: Goals follow up. Informed pt of due care gaps.    Outreach frequency: monthly      CHW Plan:   -Next CHW outreach plan: 1 month    "

## 2023-05-24 ENCOUNTER — PATIENT OUTREACH (OUTPATIENT)
Dept: CARE COORDINATION | Facility: CLINIC | Age: 53
End: 2023-05-24
Payer: MEDICARE

## 2023-05-24 NOTE — PROGRESS NOTES
Clinic Care Coordination Contact  Care Coordination Clinician Chart Review    Situation: Patient chart reviewed by Care Coordinator.       Background: Care Coordination Program started: 12/10/2020. Initial assessment completed and patient-centered care plan(s) were developed with participation from patient. Lead CC handed patient off to CHW for continued outreaches.       Assessment: Per chart review, patient outreach completed by CC CHW on 5/19/23.  Patient is not actively working to accomplish goal(s). Patient's goal(s) appropriate and relevant at this time. Patient is due for updated Plan of Care.  Assessments will be completed annually or as needed/with change of patient status.      Care Plan: Financial Wellbeing     Problem: Patient expresses financial resource strain     Goal: I would like to review medical bills and get understanding of coverage     Start Date: 3/28/2023    This Visit's Progress: 30%    Note:     Barriers: coverage  Strengths: engagement  Patient expressed understanding of goal: yes  Action steps to achieve this goal:  1. I will bring in a copy of medical bills for CCC team to review and get better understanding of Patient responsibility and coverage  2. I will speak with  CC to review and get better understanding   3. I will update Care coordination team during next outreach  4. I will updates status with CHW/CCC team after my son wedding. (Updated: 5/19/2023)                      Care Plan: Medical     Problem: HP GENERAL PROBLEM     Goal: I would like to have  a plan of care for ear/hearing within 3-4  months     Start Date: 3/28/2023    This Visit's Progress: 50% Recent Progress: 50%    Note:     Barriers: language, access   Strengths: family support  Patient expressed understanding of goal: yes  Action steps to achieve this goal:  1. I will schedule and attend visit with ENT and audiology  2. I will update Care coordination team during next outreach  3. I will report to my Community  Health Worker if any additional resources or support needed.  4. I will updates status with CCC team after my son wedding. (Updated: 5/19/2023)              Goal: I would like to have a plan of care for Cardiology health within 3-4 months     Start Date: 3/28/2023    Recent Progress: 30%    Note:     Barriers: language  Strengths: family   Patient expressed understanding of goal: yes  Action steps to achieve this goal:  1. I will schedule and attend visit with cardiology provider - number to call [phone]  2. I will update Care coordination team during next outreach  3. I will report to my Community Health Worker if any additional resources or support needed.  4. I will updates status with CHW/CCC team after my son wedding. (Updated: 5/19/2023)                Goal: I would like to attend Annual wellness exam     This Visit's Progress: 10%    Note:       Patient expressed understanding of goal: yes  Action steps to achieve this goal:  1. I will schedule my annual wellness visit; [phone] ([phone])  2. I will attend my annual wellness visit.  3. I will contact my Care Management or clinic team if I have barriers to attending my annual wellness visit.   4. I will updates status with CCC team after my son wedding. (Updated: 5/19/2023)              Goal: I will fill all medications and follow recommendations for theapy and dosing plan.     Note:     1. I will updates status with CCC team after my son wedding. (Updated: 5/19/2023)                           Plan/Recommendations: The patient will continue working with Care Coordination to achieve goal(s) as above. CHW will continue outreaches at minimum every 30 days and will involve Lead CC as needed or if patient is ready to move to Maintenance. Lead CC will continue to monitor CHW outreaches and patient's progress to goal(s) every 6 weeks.     Plan of Care updated and sent to patient: Yes, via mail

## 2023-05-25 DIAGNOSIS — H72.91 PERFORATION OF RIGHT TYMPANIC MEMBRANE: Primary | ICD-10-CM

## 2023-05-26 ENCOUNTER — OFFICE VISIT (OUTPATIENT)
Dept: AUDIOLOGY | Facility: CLINIC | Age: 53
End: 2023-05-26
Attending: OTOLARYNGOLOGY
Payer: MEDICARE

## 2023-05-26 DIAGNOSIS — H72.91 PERFORATION OF RIGHT TYMPANIC MEMBRANE: ICD-10-CM

## 2023-05-26 DIAGNOSIS — H90.A31 MIXED CONDUCTIVE AND SENSORINEURAL HEARING LOSS OF RIGHT EAR WITH RESTRICTED HEARING OF LEFT EAR: Primary | ICD-10-CM

## 2023-05-26 PROCEDURE — 92557 COMPREHENSIVE HEARING TEST: CPT | Performed by: AUDIOLOGIST

## 2023-05-26 PROCEDURE — 92567 TYMPANOMETRY: CPT | Performed by: AUDIOLOGIST

## 2023-05-26 NOTE — PROGRESS NOTES
AUDIOLOGY REPORT     SUMMARY: Audiology visit completed. See audiogram for results.       RECOMMENDATIONS: Follow-up with ENT.    Rosalie Smith, CCC-A  [place] Licensed Audiologist #[id number]

## 2023-05-30 ENCOUNTER — PATIENT OUTREACH (OUTPATIENT)
Dept: CARE COORDINATION | Facility: CLINIC | Age: 53
End: 2023-05-30
Payer: MEDICARE

## 2023-05-30 ENCOUNTER — APPOINTMENT (OUTPATIENT)
Dept: INTERPRETER SERVICES | Facility: CLINIC | Age: 53
End: 2023-05-30
Payer: MEDICARE

## 2023-05-30 NOTE — PROGRESS NOTES
Clinic Care Coordination Contact    Follow Up Progress Note      Assessment: Spoke with spouse Juan for follow up visit.  Able to confirm patient has Established Care with ENT and has a follow up appointment.  Patient also has scheduled her AWV appointment 6/23.  Per chart review; patient is to schedule a Cardiology appointment for July and Heart Failure appointment in Oct.  Spouse Juan did not take these phone numbers and stated that patient makes these appointments.    Care Gaps:    Health Maintenance Due   Topic Date Due     DIABETIC FOOT EXAM  Never done     DEPRESSION ACTION PLAN  Never done     COLORECTAL CANCER SCREENING  Never done     ZOSTER IMMUNIZATION (1 of 2) Never done     Pneumococcal Vaccine: Pediatrics (0 to 5 Years) and At-Risk Patients (6 to 64 Years) (2 - PCV) 10/25/2020     COVID-19 Vaccine (4 - Booster for Kaylene series) 07/28/2022     INFLUENZA VACCINE (1) 09/01/2022     A1C  09/02/2022     PHQ-9  09/02/2022     LIPID  12/07/2022     MICROALBUMIN  12/07/2022     MAMMO SCREENING  12/07/2022     EYE EXAM  12/21/2022     MEDICARE ANNUAL WELLNESS VISIT  06/02/2023     HEMOGLOBIN  06/02/2023       Care Gaps Last addressed on 5/30 AWV    Care Plans  Care Plan: Financial Wellbeing     Problem: Patient expresses financial resource strain     Goal: I would like to review medical bills and get understanding of coverage     Start Date: 3/28/2023    This Visit's Progress: 30%    Note:     Barriers: coverage  Strengths: engagement  Patient expressed understanding of goal: yes  Action steps to achieve this goal:  1. I will bring in a copy of medical bills for CCC team to review and get better understanding of Patient responsibility and coverage  2. I will speak with  CC to review and get better understanding   3. I will update Care coordination team during next outreach  4. I will updates status with CHW/CCC team after my son wedding. (Updated: 5/19/2023)                      Care Plan: Medical      Problem: HP GENERAL PROBLEM     Goal: I would like to have a plan of care for Cardiology health within 3-4 months     Start Date: 3/28/2023    Recent Progress: 30%    Note:     Barriers: language  Strengths: family   Patient expressed understanding of goal: yes  Action steps to achieve this goal:  1. I will schedule and attend visit with cardiology provider - number to call [phone].  Needs to schedule with Dr. Roy Cardiology for July and Heart Failure Clinic in Oct.  2. I will update Care coordination team during next outreach  3. I will report to my Community Health Worker if any additional resources or support needed.  4. I will updates status with CHW/CCC team after my son wedding. (Updated: 5/19/2023)                Goal: I would like to attend Annual wellness exam     Expected End Date: 6/23/2023    Recent Progress: 10%    Note:       Patient expressed understanding of goal: yes  Action steps to achieve this goal:  1. I will schedule my annual wellness visit; [id number] ([phone]) scheduled for 6/23  2. I will attend my annual wellness visit.  3. I will contact my Care Management or clinic team if I have barriers to attending my annual wellness visit.   4. I will updates status with CCC team after my son wedding. (Updated: 5/19/2023)              Goal: I will fill all medications and follow recommendations for theapy and dosing plan.     Note:     1. I will updates status with CCC team after my son wedding. (Updated: 5/19/2023)                        Intervention/Education provided during outreach: N/A     Outreach Frequency: monthly        Plan:   Patient to continue to follow up with CHW and/or RN regarding current Care Plan.  Patient to schedule with Cardiology and Heart Failure Clinic.  Care Coordinator will follow up in 30 days.

## 2023-05-30 NOTE — LETTER
[facility] CARE COORDINATION  [address] [address]    June 6, 2023        Leora Sanchez  [address]  [place] [address]          Dear May,     Attached is an updated Patient Centered Plan of Care for your continued enrollment in Care Coordination. Please let us know if you have additional questions, concerns, or goals that we can assist with.    Sincerely,    Devora Mitchell RN  Clinic Care Coordination            [facility]  Patient Centered Plan of Care  About Me:        Patient Name:  Leora Sanchez    YOB: 1970  Age:         52 year old   Alessandro MRN:    [id number] Telephone Information:  Home Phone [phone]   Mobile [phone]       Address:  [address]  [place] [address] Email address:  No e-mail address on record      Emergency Contact(s)    Name Relationship Lgl Grd Work Phone Home Phone Mobile Phone   EDILSON MACIEL Spouse   [phone]            Primary language:  Ajitong     needed? Yes   [employer] [place] [employer]:  [phone] op. 1  Other communication barriers:Language barrier    Preferred Method of Communication:  Mail  Current living arrangement: I live in a private home with family; I live in a private home with spouse (Living with son, mother, and )    Mobility Status/ Medical Equipment: Independent        Health Maintenance  Health Maintenance Reviewed: Due/Overdue       My Access Plan  Medical Emergency 911   Primary Clinic Line [facility] - [phone]   24 Hour Appointment Line [phone] or  [address] [place] [address] [phone]) (toll-free)   24 Hour Nurse Line [phone] (toll-free)   Preferred Urgent Care [facility], [phone]       Preferred Hospital [facility]  [phone]       Preferred Pharmacy [facility] - [place], [place] - [address]     Behavioral Health Crisis Line The National Suicide Prevention Lifeline at  7-238-725-2824 or Text/Call 588             My Care Team Members  Patient Care Team         Relationship Specialty Notifications Start End    Jaky Gaspar MD PCP - General Family Practice  3/20/18     Phone: [phone] Fax: [phone]         [address] [place] [address]    Wendy Lind MD MD Pulmonary Disease  3/20/18     Phone: [phone] Fax: [phone]         [address] [facility] [address] [place] [address]    Art Thakkar, PharmD Pharmacist Pharmacist  10/15/19     Phone: [phone]          [facility] [address] [place] [address]    Elisabet Malone, MA Community Health Worker Primary Care - CC Admissions 12/10/20     Nickie Cuevas RN Lead Care Coordinator Primary Care - CC Admissions 12/10/20     Jaky Gaspar MD Assigned PCP   6/16/21     Phone: [phone] Fax: [phone]         [address] [place] [address]    Jose Maria Bass MD Assigned Surgical Provider   5/28/22     Phone: [phone] Fax: [phone]         [facility] [facility] [address]    Cristy Cisneros Financial Resource Worker   4/3/23     Phone: [phone]         Sendy Banda APRN CNP Nurse Practitioner Cardiovascular Disease  4/13/23     Phone: [phone] Fax: [phone]         [address] [facility] [address], [address] [facility] [address]    Sendy Banda APRN CNP Assigned Heart and Vascular Provider   5/6/23     Phone: [phone] Fax: [phone]         [address] [facility] [facility] [address] [facility] [address]    Devora Mitchell RN Lead Care Coordinator Primary Care - CC Admissions 5/11/23     Phone: [phone]                   My Care Plans  Self Management and Treatment Plan  Care Plan  Care Plan: Financial Wellbeing       Problem: Patient expresses financial resource strain       Goal: I would like to review medical bills and get understanding of coverage       Start Date: 3/28/2023    This Visit's Progress: 30%    Note:     Barriers: coverage  Strengths:  engagement  Patient expressed understanding of goal: yes  Action steps to achieve this goal:  1. I will bring in a copy of medical bills for CCC team to review and get better understanding of Patient responsibility and coverage  2. I will speak with  CC to review and get better understanding   3. I will update Care coordination team during next outreach  4. I will updates status with CHW/CCC team after my son wedding. (Updated: 5/19/2023)                              Care Plan: Medical       Problem: HP GENERAL PROBLEM       Goal: I would like to have a plan of care for Cardiology health within 3-4 months       Start Date: 3/28/2023    Recent Progress: 30%    Note:     Barriers: language  Strengths: family   Patient expressed understanding of goal: yes  Action steps to achieve this goal:  1. I will schedule and attend visit with cardiology provider - number to call [phone].  Needs to schedule with Dr. Roy Cardiology for July and Heart Failure Clinic in Oct.  2. I will update Care coordination team during next outreach  3. I will report to my Community Health Worker if any additional resources or support needed.  4. I will updates status with CHW/CCC team after my son wedding. (Updated: 5/19/2023)                    Goal: I would like to attend Annual wellness exam       Expected End Date: 6/23/2023    Recent Progress: 10%    Note:       Patient expressed understanding of goal: yes  Action steps to achieve this goal:  1. I will schedule my annual wellness visit; [phone] ([phone]) scheduled for 6/23  2. I will attend my annual wellness visit.  3. I will contact my Care Management or clinic team if I have barriers to attending my annual wellness visit.   4. I will updates status with CCC team after my son wedding. (Updated: 5/19/2023)                  Goal: I will fill all medications and follow recommendations for theapy and dosing plan.       Note:     1. I will updates status with CCC team after my son  wedding. (Updated: 5/19/2023)                               Action Plans on File:                       Advance Care Plans/Directives Type:   No data recorded    My Medical and Care Information  Problem List   Patient Active Problem List   Diagnosis     Health Care Home     Essential hypertension     Perforation of right tympanic membrane     Heart failure with reduced ejection fraction (H)     Type 2 diabetes mellitus with stage 3a chronic kidney disease, without long-term current use of insulin (H)     Right-sided sensorineural hearing loss     Recurrent major depressive disorder, in partial remission (H)     Pulmonary nodules     Coronary artery disease involving native coronary artery of native heart with angina pectoris (H)     Nonischemic cardiomyopathy (H)     Mixed incontinence     Hypokalemia     Hyperlipidemia LDL goal <70     Heartburn     Ganglion cyst of left foot     Class 2 severe obesity due to excess calories with serious comorbidity and body mass index (BMI) of 39.0 to 39.9 in adult (H)     CKD (chronic kidney disease) stage 3, GFR 30-59 ml/min (H)     Tension headache     Bilateral dry eyes     Anemia, unspecified type      Current Medications and Allergies:  See printed Medication Report.    Care Coordination Start Date: 12/10/2020   Frequency of Care Coordination: monthly       Form Last Updated: 06/06/2023

## 2023-06-06 ENCOUNTER — PATIENT OUTREACH (OUTPATIENT)
Dept: CARE COORDINATION | Facility: CLINIC | Age: 53
End: 2023-06-06
Payer: MEDICARE

## 2023-06-06 ENCOUNTER — APPOINTMENT (OUTPATIENT)
Dept: INTERPRETER SERVICES | Facility: CLINIC | Age: 53
End: 2023-06-06
Payer: MEDICARE

## 2023-06-06 NOTE — PROGRESS NOTES
Clinic Care Coordination Contact  Program: [facility]  County:  [facility] Case #:  [facility] Worker:   Simran #:   Subscriber #:   Renewal:  Date Applied:     FRW Outreach:   6/6/23: FRW called pt. Pt dropped off the CC application to [facility] last week. FRW will follow up with [facility]  staff.   5/18/23: FRW called pt. Pt hasn't sent in the CC application yet. Pt will after her son gets  this weekend. Pt asked FRW to call back in 3 weeks as she is out of town.   5/8/23: FRW called pt but unable to leave VM. FRW will make another outreach in 1-2 weeks.   4/24/23: FRW called pt and completed [facility] application. FRW will mail application to patient for signature. Patient will sign and include needed documents and send back to billing department.  4/11/23: FRW called pt on referral. Appointment made for 4/26 to apply for [facility]   4/5/23: Outreach attempted x 1. Left message on voicemail with call back information and requested return call.  Plan: FRW will call again within one week.     Health Insurance:      Referral/Screening:

## 2023-06-09 ENCOUNTER — OFFICE VISIT (OUTPATIENT)
Dept: OTOLARYNGOLOGY | Facility: CLINIC | Age: 53
End: 2023-06-09
Attending: OTOLARYNGOLOGY
Payer: MEDICARE

## 2023-06-09 DIAGNOSIS — H90.8 MIXED HEARING LOSS, UNILATERAL: ICD-10-CM

## 2023-06-09 DIAGNOSIS — Z98.890 HISTORY OF EAR SURGERY: Primary | ICD-10-CM

## 2023-06-09 DIAGNOSIS — H90.A22 SENSORINEURAL HEARING LOSS (SNHL) OF LEFT EAR WITH RESTRICTED HEARING OF RIGHT EAR: ICD-10-CM

## 2023-06-09 PROCEDURE — 99213 OFFICE O/P EST LOW 20 MIN: CPT | Performed by: OTOLARYNGOLOGY

## 2023-06-09 RX ORDER — CIPROFLOXACIN HYDROCHLORIDE 3.5 MG/ML
SOLUTION/ DROPS TOPICAL
COMMUNITY
Start: 2023-03-31 | End: 2023-06-09

## 2023-06-09 NOTE — LETTER
"    6/9/2023         RE: Leora Sanchez  [address] [place] [address]        Dear Colleague,    Thank you for referring your patient, Leora Sanchez, to the [facility]. Please see a copy of my visit note below.    CHIEF COMPLAINT:  Patient presents with:  Ear Problem: Pt reports that she has ear surgery on her Rt ear twice the 1st time was 1996 pt does not remember the 2nd time. Pt complains that hearing is down in rt ear and has had clear sticky ear drainage and ear pain that is pretty bad per pt. Yesterday pt was the last time pt has had drainage.          HISTORY OF PRESENT ILLNESS    May was seen in follow up after previous 5/23/2022 visit for audiogram review.  She gets occasional drainage from the right side.  Cannot hear on the right side.   No new ENT concerns.       AUDIOLOGY NOTE:    HISTORY: Accompanied by . Patient has history of right mastoidectomy in 1996. Her last audiogram was on 9/20/2019. She  feels her hearing has been getting worse in the right ear for the past 4-5 years. She reports a clear drainage from the right ear 2-3 times  per week for the past year. She also has some pain in her head and right temple. She states if she keeps her right ear dry, there is less  pain and drainage. She has occasional tinnitus in the right ear that sounds like an \"air pressure leak\". She has aural fullness right ear. She  has bouts of vertigo that will last 3-7 days and occur every 1-2 months. She denies family hx of hearing loss, noise exposure, and prior use  of amplification.  RESULTS: Otoscopy: Right: surgical ear; Left: clear. Tymps: Right: DNT due to surgical ear; Left: Type A. Reflexes: CNT due to  equipment limitations/surgical ear. Audio: Right: severe to profound mixed hearing loss; Left: normal hearing 250-1500 sloping to mild  mostly sensorineural hearing loss (masking dilemma at 4000 Hz. Word Rec: excellent via MLV by . Thresholds have decreased  in " both ears re: audiogram in 2019.      REVIEW OF SYSTEMS    Review of Systems: a 10-system review is reviewed at this encounter.  See scanned document.       Allergies   Allergen Reactions     Nkda [No Known Drug Allergy]            PHYSICAL EXAM:        HEAD: Normal appearance and symmetry:  No cutaneous lesions.      EARS:        RIGHT: CWD cavity clean, dry, with TM peforation   LEFT:   TM with monomeric membrane        Right MT with (cartilage) graft and superior and inferior perforations  NOSE:    Dorsum:   straight       ORAL CAVITY/OROPHARYNX:    Lips:  Normal.     NECK:  Trachea:  midline     NEURO:   Alert and Oriented    GAIT AND STATION:  normal     RESPIRATORY:   Symmetry and Respiratory effort    PSYCH:   normal mood and affect    SKIN:  warm and dry         IMPRESSION:   Encounter Diagnoses   Name Primary?     History of ear surgery Yes     Mixed hearing loss, unilateral      Sensorineural hearing loss (SNHL) of left ear with restricted hearing of right ear             RECOMMENDATIONS:    This patient is medically cleared for hearing aids  Return annually for hearing test  Return immediately for any sudden change in hearing  Water precautions right ear.  Does not need drops at this time.       Again, thank you for allowing me to participate in the care of your patient.        Sincerely,        Jose Maria Bass MD

## 2023-06-09 NOTE — PROGRESS NOTES
"CHIEF COMPLAINT:  Patient presents with:  Ear Problem: Pt reports that she has ear surgery on her Rt ear twice the 1st time was 1996 pt does not remember the 2nd time. Pt complains that hearing is down in rt ear and has had clear sticky ear drainage and ear pain that is pretty bad per pt. Yesterday pt was the last time pt has had drainage.          HISTORY OF PRESENT ILLNESS    May was seen in follow up after previous 5/23/2022 visit for audiogram review.  She gets occasional drainage from the right side.  Cannot hear on the right side.   No new ENT concerns.       AUDIOLOGY NOTE:    HISTORY: Accompanied by . Patient has history of right mastoidectomy in 1996. Her last audiogram was on 9/20/2019. She  feels her hearing has been getting worse in the right ear for the past 4-5 years. She reports a clear drainage from the right ear 2-3 times  per week for the past year. She also has some pain in her head and right temple. She states if she keeps her right ear dry, there is less  pain and drainage. She has occasional tinnitus in the right ear that sounds like an \"air pressure leak\". She has aural fullness right ear. She  has bouts of vertigo that will last 3-7 days and occur every 1-2 months. She denies family hx of hearing loss, noise exposure, and prior use  of amplification.  RESULTS: Otoscopy: Right: surgical ear; Left: clear. Tymps: Right: DNT due to surgical ear; Left: Type A. Reflexes: CNT due to  equipment limitations/surgical ear. Audio: Right: severe to profound mixed hearing loss; Left: normal hearing 250-1500 sloping to mild  mostly sensorineural hearing loss (masking dilemma at 4000 Hz. Word Rec: excellent via MLV by . Thresholds have decreased  in both ears re: audiogram in 2019.      REVIEW OF SYSTEMS    Review of Systems: a 10-system review is reviewed at this encounter.  See scanned document.       Allergies   Allergen Reactions     Nkda [No Known Drug Allergy]            PHYSICAL " EXAM:        HEAD: Normal appearance and symmetry:  No cutaneous lesions.      EARS:        RIGHT: CWD cavity clean, dry, with TM peforation   LEFT:   TM with monomeric membrane        Right MT with (cartilage) graft and superior and inferior perforations  NOSE:    Dorsum:   straight       ORAL CAVITY/OROPHARYNX:    Lips:  Normal.     NECK:  Trachea:  midline     NEURO:   Alert and Oriented    GAIT AND STATION:  normal     RESPIRATORY:   Symmetry and Respiratory effort    PSYCH:   normal mood and affect    SKIN:  warm and dry         IMPRESSION:   Encounter Diagnoses   Name Primary?     History of ear surgery Yes     Mixed hearing loss, unilateral      Sensorineural hearing loss (SNHL) of left ear with restricted hearing of right ear             RECOMMENDATIONS:    This patient is medically cleared for hearing aids  Return annually for hearing test  Return immediately for any sudden change in hearing  Water precautions right ear.  Does not need drops at this time.

## 2023-06-15 ENCOUNTER — PATIENT OUTREACH (OUTPATIENT)
Dept: CARE COORDINATION | Facility: CLINIC | Age: 53
End: 2023-06-15
Payer: MEDICARE

## 2023-06-15 NOTE — PROGRESS NOTES
Clinic Care Coordination Contact  Program: [facility]  County:  [facility] Case #:  [facility] Worker:   Simran #:   Subscriber #:   Renewal:  Date Applied:     FRW Outreach:   6/15/23: FRW emailed CC to billing department. FRW will check billing notes in 30 days.   6/6/23: FRW called pt. Pt dropped off the CC application to [facility] last week. FRW will follow up with [facility]  staff.   5/18/23: FRW called pt. Pt hasn't sent in the CC application yet. Pt will after her son gets  this weekend. Pt asked FRW to call back in 3 weeks as she is out of town.   5/8/23: FRW called pt but unable to leave VM. FRW will make another outreach in 1-2 weeks.   4/24/23: FRW called pt and completed [facility] application. FRW will mail application to patient for signature. Patient will sign and include needed documents and send back to billing department.  4/11/23: FRW called pt on referral. Appointment made for 4/26 to apply for [facility]   4/5/23: Outreach attempted x 1. Left message on voicemail with call back information and requested return call.  Plan: FRW will call again within one week.     Health Insurance:      Referral/Screening:

## 2023-06-20 DIAGNOSIS — I10 ESSENTIAL HYPERTENSION: ICD-10-CM

## 2023-06-21 RX ORDER — LOSARTAN POTASSIUM 100 MG/1
TABLET ORAL
Qty: 90 TABLET | Refills: 3 | Status: SHIPPED | OUTPATIENT
Start: 2023-06-21 | End: 2024-06-10

## 2023-06-21 NOTE — TELEPHONE ENCOUNTER
"Last Written Prescription Date:  1/5/2023  Last Fill Quantity: 90,  # refills: 1   Last office visit provider:  3/31/2023     Requested Prescriptions   Pending Prescriptions Disp Refills     losartan (COZAAR) 100 MG tablet [Pharmacy Med Name: LOSARTAN POTASSIUM 100 MG T 100 Tablet] 90 tablet 1     Sig: TAKE 1 PILL BY MOUTH DAILY FOR BLOOD PRESSURE / TXHUA HNUB NOJ 1 LUB TSHUAJ PAB ZOO YANET NTSHAV SIAB       Angiotensin-II Receptors Passed - 6/21/2023 12:31 PM        Passed - Last blood pressure under 140/90 in past 12 months     BP Readings from Last 3 Encounters:   04/21/23 122/80   03/31/23 108/76   06/02/22 107/74                 Passed - Recent (12 mo) or future (30 days) visit within the authorizing provider's specialty     Patient has had an office visit with the authorizing provider or a provider within the authorizing providers department within the previous 12 mos or has a future within next 30 days. See \"Patient Info\" tab in inbasket, or \"Choose Columns\" in Meds & Orders section of the refill encounter.              Passed - Medication is active on med list        Passed - Patient is age 18 or older        Passed - No active pregnancy on record        Passed - Normal serum creatinine on file in past 12 months     Recent Labs   Lab Test 04/21/23  1401   CR 0.76       Ok to refill medication if creatinine is low          Passed - Normal serum potassium on file in past 12 months     Recent Labs   Lab Test 04/21/23  1401   POTASSIUM 4.4                    Passed - No positive pregnancy test in past 12 months             Alexandra Gonzalez RN 06/21/23 12:31 PM  "

## 2023-06-23 ENCOUNTER — ANCILLARY PROCEDURE (OUTPATIENT)
Dept: GENERAL RADIOLOGY | Facility: CLINIC | Age: 53
End: 2023-06-23
Attending: FAMILY MEDICINE
Payer: MEDICARE

## 2023-06-23 ENCOUNTER — LAB (OUTPATIENT)
Dept: FAMILY MEDICINE | Facility: CLINIC | Age: 53
End: 2023-06-23

## 2023-06-23 ENCOUNTER — OFFICE VISIT (OUTPATIENT)
Dept: FAMILY MEDICINE | Facility: CLINIC | Age: 53
End: 2023-06-23
Payer: MEDICARE

## 2023-06-23 VITALS
BODY MASS INDEX: 41.73 KG/M2 | DIASTOLIC BLOOD PRESSURE: 87 MMHG | SYSTOLIC BLOOD PRESSURE: 138 MMHG | RESPIRATION RATE: 24 BRPM | HEIGHT: 59 IN | OXYGEN SATURATION: 98 % | HEART RATE: 61 BPM | WEIGHT: 207 LBS

## 2023-06-23 DIAGNOSIS — E66.813 CLASS 3 SEVERE OBESITY DUE TO EXCESS CALORIES WITH SERIOUS COMORBIDITY AND BODY MASS INDEX (BMI) OF 40.0 TO 44.9 IN ADULT (H): ICD-10-CM

## 2023-06-23 DIAGNOSIS — D64.9 ANEMIA, UNSPECIFIED TYPE: ICD-10-CM

## 2023-06-23 DIAGNOSIS — K76.0 FATTY LIVER: ICD-10-CM

## 2023-06-23 DIAGNOSIS — N18.31 TYPE 2 DIABETES MELLITUS WITH STAGE 3A CHRONIC KIDNEY DISEASE, WITHOUT LONG-TERM CURRENT USE OF INSULIN (H): ICD-10-CM

## 2023-06-23 DIAGNOSIS — E11.22 TYPE 2 DIABETES MELLITUS WITH STAGE 3A CHRONIC KIDNEY DISEASE, WITHOUT LONG-TERM CURRENT USE OF INSULIN (H): ICD-10-CM

## 2023-06-23 DIAGNOSIS — M1A.0710 CHRONIC GOUT OF RIGHT FOOT, UNSPECIFIED CAUSE: ICD-10-CM

## 2023-06-23 DIAGNOSIS — K21.9 GASTROESOPHAGEAL REFLUX DISEASE WITHOUT ESOPHAGITIS: ICD-10-CM

## 2023-06-23 DIAGNOSIS — Z12.11 SCREEN FOR COLON CANCER: ICD-10-CM

## 2023-06-23 DIAGNOSIS — I10 ESSENTIAL HYPERTENSION: ICD-10-CM

## 2023-06-23 DIAGNOSIS — Z12.31 VISIT FOR SCREENING MAMMOGRAM: ICD-10-CM

## 2023-06-23 DIAGNOSIS — M79.674 GREAT TOE PAIN, RIGHT: ICD-10-CM

## 2023-06-23 DIAGNOSIS — G44.209 TENSION HEADACHE: ICD-10-CM

## 2023-06-23 DIAGNOSIS — I50.20 HEART FAILURE WITH REDUCED EJECTION FRACTION (H): ICD-10-CM

## 2023-06-23 DIAGNOSIS — I42.8 NONISCHEMIC CARDIOMYOPATHY (H): ICD-10-CM

## 2023-06-23 DIAGNOSIS — R91.8 PULMONARY NODULES: ICD-10-CM

## 2023-06-23 DIAGNOSIS — E78.5 HYPERLIPIDEMIA LDL GOAL <70: ICD-10-CM

## 2023-06-23 DIAGNOSIS — E66.01 CLASS 3 SEVERE OBESITY DUE TO EXCESS CALORIES WITH SERIOUS COMORBIDITY AND BODY MASS INDEX (BMI) OF 40.0 TO 44.9 IN ADULT (H): ICD-10-CM

## 2023-06-23 DIAGNOSIS — I25.119 CORONARY ARTERY DISEASE INVOLVING NATIVE CORONARY ARTERY OF NATIVE HEART WITH ANGINA PECTORIS (H): ICD-10-CM

## 2023-06-23 DIAGNOSIS — Z00.00 ENCOUNTER FOR MEDICARE ANNUAL WELLNESS EXAM: Primary | ICD-10-CM

## 2023-06-23 DIAGNOSIS — Z23 NEED FOR SHINGLES VACCINE: ICD-10-CM

## 2023-06-23 DIAGNOSIS — F33.1 MODERATE EPISODE OF RECURRENT MAJOR DEPRESSIVE DISORDER (H): ICD-10-CM

## 2023-06-23 DIAGNOSIS — Z23 ENCOUNTER FOR IMMUNIZATION: ICD-10-CM

## 2023-06-23 PROBLEM — E11.65 TYPE 2 DIABETES MELLITUS WITH HYPERGLYCEMIA, WITHOUT LONG-TERM CURRENT USE OF INSULIN (H): Status: ACTIVE | Noted: 2020-08-20

## 2023-06-23 LAB
ALBUMIN SERPL BCG-MCNC: 4 G/DL (ref 3.5–5.2)
ALP SERPL-CCNC: 106 U/L (ref 35–104)
ALT SERPL W P-5'-P-CCNC: 92 U/L (ref 0–50)
ANION GAP SERPL CALCULATED.3IONS-SCNC: 13 MMOL/L (ref 7–15)
AST SERPL W P-5'-P-CCNC: 85 U/L (ref 0–45)
BILIRUB SERPL-MCNC: 0.7 MG/DL
BUN SERPL-MCNC: 22.4 MG/DL (ref 6–20)
CALCIUM SERPL-MCNC: 9.5 MG/DL (ref 8.6–10)
CHLORIDE SERPL-SCNC: 106 MMOL/L (ref 98–107)
CHOLEST SERPL-MCNC: 183 MG/DL
CREAT SERPL-MCNC: 0.88 MG/DL (ref 0.51–0.95)
CREAT UR-MCNC: 114 MG/DL
DEPRECATED HCO3 PLAS-SCNC: 25 MMOL/L (ref 22–29)
ERYTHROCYTE [DISTWIDTH] IN BLOOD BY AUTOMATED COUNT: 12.2 % (ref 10–15)
FERRITIN SERPL-MCNC: 510 NG/ML (ref 11–328)
GFR SERPL CREATININE-BSD FRML MDRD: 78 ML/MIN/1.73M2
GLUCOSE SERPL-MCNC: 115 MG/DL (ref 70–99)
HBA1C MFR BLD: 8.8 % (ref 0–5.6)
HCT VFR BLD AUTO: 39.7 % (ref 35–47)
HDLC SERPL-MCNC: 68 MG/DL
HGB BLD-MCNC: 12.7 G/DL (ref 11.7–15.7)
IRON BINDING CAPACITY (ROCHE): 360 UG/DL (ref 240–430)
IRON SATN MFR SERPL: 51 % (ref 15–46)
IRON SERPL-MCNC: 185 UG/DL (ref 37–145)
LDLC SERPL CALC-MCNC: 89 MG/DL
MCH RBC QN AUTO: 30.2 PG (ref 26.5–33)
MCHC RBC AUTO-ENTMCNC: 32 G/DL (ref 31.5–36.5)
MCV RBC AUTO: 94 FL (ref 78–100)
MICROALBUMIN UR-MCNC: <12 MG/L
MICROALBUMIN/CREAT UR: NORMAL MG/G{CREAT}
NONHDLC SERPL-MCNC: 115 MG/DL
PLATELET # BLD AUTO: 219 10E3/UL (ref 150–450)
POTASSIUM SERPL-SCNC: 4.8 MMOL/L (ref 3.4–5.3)
PROT SERPL-MCNC: 7.1 G/DL (ref 6.4–8.3)
RBC # BLD AUTO: 4.21 10E6/UL (ref 3.8–5.2)
SODIUM SERPL-SCNC: 144 MMOL/L (ref 136–145)
TRIGL SERPL-MCNC: 132 MG/DL
URATE SERPL-MCNC: 7.4 MG/DL (ref 2.4–5.7)
VIT B12 SERPL-MCNC: 437 PG/ML (ref 232–1245)
WBC # BLD AUTO: 5.5 10E3/UL (ref 4–11)

## 2023-06-23 PROCEDURE — G0009 ADMIN PNEUMOCOCCAL VACCINE: HCPCS | Performed by: FAMILY MEDICINE

## 2023-06-23 PROCEDURE — 91312 COVID-19 BIVALENT 12+ (PFIZER): CPT | Performed by: FAMILY MEDICINE

## 2023-06-23 PROCEDURE — 36415 COLL VENOUS BLD VENIPUNCTURE: CPT | Performed by: FAMILY MEDICINE

## 2023-06-23 PROCEDURE — 80061 LIPID PANEL: CPT | Performed by: FAMILY MEDICINE

## 2023-06-23 PROCEDURE — 82043 UR ALBUMIN QUANTITATIVE: CPT | Performed by: FAMILY MEDICINE

## 2023-06-23 PROCEDURE — 80053 COMPREHEN METABOLIC PANEL: CPT | Performed by: FAMILY MEDICINE

## 2023-06-23 PROCEDURE — 99214 OFFICE O/P EST MOD 30 MIN: CPT | Mod: 25 | Performed by: FAMILY MEDICINE

## 2023-06-23 PROCEDURE — 83036 HEMOGLOBIN GLYCOSYLATED A1C: CPT | Performed by: FAMILY MEDICINE

## 2023-06-23 PROCEDURE — G0439 PPPS, SUBSEQ VISIT: HCPCS | Performed by: FAMILY MEDICINE

## 2023-06-23 PROCEDURE — 82728 ASSAY OF FERRITIN: CPT | Performed by: FAMILY MEDICINE

## 2023-06-23 PROCEDURE — 82607 VITAMIN B-12: CPT | Performed by: FAMILY MEDICINE

## 2023-06-23 PROCEDURE — 84550 ASSAY OF BLOOD/URIC ACID: CPT | Performed by: FAMILY MEDICINE

## 2023-06-23 PROCEDURE — 83550 IRON BINDING TEST: CPT | Performed by: FAMILY MEDICINE

## 2023-06-23 PROCEDURE — 90677 PCV20 VACCINE IM: CPT | Performed by: FAMILY MEDICINE

## 2023-06-23 PROCEDURE — 83540 ASSAY OF IRON: CPT | Performed by: FAMILY MEDICINE

## 2023-06-23 PROCEDURE — 85027 COMPLETE CBC AUTOMATED: CPT | Performed by: FAMILY MEDICINE

## 2023-06-23 PROCEDURE — 0121A COVID-19 BIVALENT 12+ (PFIZER): CPT | Performed by: FAMILY MEDICINE

## 2023-06-23 PROCEDURE — 82570 ASSAY OF URINE CREATININE: CPT | Performed by: FAMILY MEDICINE

## 2023-06-23 PROCEDURE — 73630 X-RAY EXAM OF FOOT: CPT | Mod: TC | Performed by: RADIOLOGY

## 2023-06-23 RX ORDER — ASPIRIN 81 MG/1
81 TABLET ORAL DAILY
Qty: 90 TABLET | Refills: 4 | Status: SHIPPED | OUTPATIENT
Start: 2023-06-23 | End: 2023-07-26

## 2023-06-23 RX ORDER — METFORMIN HCL 500 MG
500 TABLET, EXTENDED RELEASE 24 HR ORAL 2 TIMES DAILY WITH MEALS
Qty: 180 TABLET | Refills: 4 | Status: SHIPPED | OUTPATIENT
Start: 2023-06-23 | End: 2023-06-23

## 2023-06-23 RX ORDER — METFORMIN HCL 500 MG
1000 TABLET, EXTENDED RELEASE 24 HR ORAL 2 TIMES DAILY WITH MEALS
Qty: 360 TABLET | Refills: 4 | Status: SHIPPED | OUTPATIENT
Start: 2023-06-23 | End: 2023-07-26

## 2023-06-23 RX ORDER — VENLAFAXINE HYDROCHLORIDE 150 MG/1
150 CAPSULE, EXTENDED RELEASE ORAL DAILY
Qty: 90 CAPSULE | Refills: 4 | Status: SHIPPED | OUTPATIENT
Start: 2023-06-23 | End: 2023-07-26

## 2023-06-23 RX ORDER — ACETAMINOPHEN 500 MG
1000 TABLET ORAL 3 TIMES DAILY PRN
Qty: 100 TABLET | Refills: 11 | Status: SHIPPED | OUTPATIENT
Start: 2023-06-23

## 2023-06-23 RX ORDER — ISOSORBIDE MONONITRATE 60 MG/1
60 TABLET, EXTENDED RELEASE ORAL DAILY
Qty: 90 TABLET | Refills: 3 | Status: SHIPPED | OUTPATIENT
Start: 2023-06-23 | End: 2024-08-14

## 2023-06-23 RX ORDER — OMEPRAZOLE 40 MG/1
CAPSULE, DELAYED RELEASE ORAL
Qty: 90 CAPSULE | Refills: 1 | Status: SHIPPED | OUTPATIENT
Start: 2023-06-23 | End: 2024-02-20

## 2023-06-23 RX ORDER — FUROSEMIDE 40 MG
40 TABLET ORAL DAILY PRN
Qty: 90 TABLET | Refills: 4
Start: 2023-06-23 | End: 2023-11-30

## 2023-06-23 ASSESSMENT — ENCOUNTER SYMPTOMS
COUGH: 0
PARESTHESIAS: 0
JOINT SWELLING: 0
SHORTNESS OF BREATH: 0
ABDOMINAL PAIN: 0
SORE THROAT: 0
ARTHRALGIAS: 1
MYALGIAS: 0
PALPITATIONS: 0
HEADACHES: 1
WEAKNESS: 0
FREQUENCY: 1
HEMATOCHEZIA: 0
DIZZINESS: 0
BREAST MASS: 0
CONSTIPATION: 0
EYE PAIN: 1
FEVER: 0
NERVOUS/ANXIOUS: 0
HEARTBURN: 0
HEMATURIA: 0
DYSURIA: 0
NAUSEA: 0
DIARRHEA: 0
CHILLS: 0

## 2023-06-23 ASSESSMENT — PATIENT HEALTH QUESTIONNAIRE - PHQ9
SUM OF ALL RESPONSES TO PHQ QUESTIONS 1-9: 11
10. IF YOU CHECKED OFF ANY PROBLEMS, HOW DIFFICULT HAVE THESE PROBLEMS MADE IT FOR YOU TO DO YOUR WORK, TAKE CARE OF THINGS AT HOME, OR GET ALONG WITH OTHER PEOPLE: NOT DIFFICULT AT ALL
SUM OF ALL RESPONSES TO PHQ QUESTIONS 1-9: 11

## 2023-06-23 ASSESSMENT — ACTIVITIES OF DAILY LIVING (ADL): CURRENT_FUNCTION: NO ASSISTANCE NEEDED

## 2023-06-23 NOTE — PATIENT INSTRUCTIONS
Patient Education   Personalized Prevention Plan  You are due for the preventive services outlined below.  Your care team is available to assist you in scheduling these services.  If you have already completed any of these items, please share that information with your care team to update in your medical record.  Health Maintenance Due   Topic Date Due     Diabetic Foot Exam  Never done     Colorectal Cancer Screening  Never done     Zoster (Shingles) Vaccine (1 of 2) Never done     Pneumococcal Vaccine (2 - PCV) 10/25/2020     COVID-19 Vaccine (4 - Booster for Kaylene series) 07/28/2022     A1C Lab  09/02/2022     Cholesterol Lab  12/07/2022     Kidney Microalbumin Urine Test  12/07/2022     Mammogram  12/07/2022     Eye Exam  12/21/2022     Hemoglobin  06/02/2023     Your Health Risk Assessment indicates you feel you are not in good health    A healthy lifestyle helps keep the body fit and the mind alert. It helps protect you from disease, helps you fight disease, and helps prevent chronic disease (disease that doesn't go away) from getting worse. This is important as you get older and begin to notice twinges in muscles and joints and a decline in the strength and stamina you once took for granted. A healthy lifestyle includes good healthcare, good nutrition, weight control, recreation, and regular exercise. Avoid harmful substances and do what you can to keep safe. Another part of a healthy lifestyle is stay mentally active and socially involved.    Good healthcare     Have a wellness visit every year.     If you have new symptoms, let us know right away. Don't wait until the next checkup.     Take medicines exactly as prescribed and keep your medicines in a safe place. Tell us if your medicine causes problems.   Healthy diet and weight control     Eat 3 or 4 small, nutritious, low-fat, high-fiber meals a day. Include a variety of fruits, vegetables, and whole-grain foods.     Make sure you get enough calcium  in your diet. Calcium, vitamin D, and exercise help prevent osteoporosis (bone thinning).     If you live alone, try eating with others when you can. That way you get a good meal and have company while you eat it.     Try to keep a healthy weight. If you eat more calories than your body uses for energy, it will be stored as fat and you will gain weight.     Recreation   Recreation is not limited to sports and team events. It includes any activity that provides relaxation, interest, enjoyment, and exercise. Recreation provides an outlet for physical, mental, and social energy. It can give a sense of worth and achievement. It can help you stay healthy.    Mental Exercise and Social Involvement  Mental and emotional health is as important as physical health. Keep in touch with friends and family. Stay as active as possible. Continue to learn and challenge yourself.   Things you can do to stay mentally active are:    Learn something new, like a foreign language or musical instrument.     Play SCRABBLE or do crossword puzzles. If you cannot find people to play these games with you at home, you can play them with others on your computer through the Internet.     Join a games club--anything from card games to chess or checkers or lawn bowling.     Start a new hobby.     Go back to school.     Volunteer.     Read.   Keep up with world events.    Understanding USDA MyPlate  The USDA has guidelines to help you make healthy food choices. These are called MyPlate. MyPlate shows the food groups that make up healthy meals using the image of a place setting. Before you eat, think about the healthiest choices for what to put on your plate or in your cup or bowl. To learn more about building a healthy plate, visit www.choosemyplate.gov.     The food groups    Fruits. Any fruit or 100% fruit juice counts as part of the Fruit Group. Fruits may be fresh, canned, frozen, or dried, and may be whole, cut-up, or pureed. Make 1/2 of your  plate fruits and vegetables.    Vegetables. Any vegetable or 100% vegetable juice counts as a member of the Vegetable Group. Vegetables may be fresh, frozen, canned, or dried. They can be served raw or cooked and may be whole, cut-up, or mashed. Make 1/2 of your plate fruits and vegetables.    Grains. All foods made from grains are part of the Grains Group. These include wheat, rice, oats, cornmeal, and barley. Grains are often used to make foods such as bread, pasta, oatmeal, cereal, tortillas, and grits. Grains should be no more than 1/4 of your plate. At least half of your grains should be whole grains.    Protein. This group includes meat, poultry, seafood, beans and peas, eggs, processed soy products (such as tofu), nuts (including nut butters), and seeds. Make protein choices no more than 1/4 of your plate. Meat and poultry choices should be lean or low fat.    Dairy. The Dairy Group includes all fluid milk products and foods made from milk that contain calcium, such as yogurt and cheese. (Foods that have little calcium, such as cream, butter, and cream cheese, are not part of this group.) Most dairy choices should be low-fat or fat-free.    Oils. Oils aren't a food group, but they do contain essential nutrients. However it's important to watch your intake of oils. These are fats that are liquid at room temperature. They include canola, corn, olive, soybean, vegetable, and sunflower oil. Foods that are mainly oil include mayonnaise, certain salad dressings, and soft margarines. You likely already get your daily oil allowance from the foods you eat.  Things to limit  Eating healthy also means limiting these things in your diet:    Salt (sodium). Many processed foods have a lot of sodium. To keep sodium intake down, eat fresh vegetables, meats, poultry, and seafood when possible. Purchase low-sodium, reduced-sodium, or no-salt-added food products at the store. And don't add salt to your meals at home. Instead,  season them with herbs and spices such as dill, oregano, cumin, and paprika. Or try adding flavor with lemon or lime zest and juice.    Saturated fat. Saturated fats are most often found in animal products such as beef, pork, and chicken. They are often solid at room temperature, such as butter. To reduce your saturated fat intake, choose leaner cuts of meat and poultry. And try healthier cooking methods such as grilling, broiling, roasting, or baking. For a simple lower-fat swap, use plain nonfat yogurt instead of mayonnaise when making potato salad or macaroni salad.    Added sugars. These are sugars added to foods. They are in foods such as ice cream, candy, soda, fruit drinks, sports drinks, energy drinks, cookies, pastries, jams, and syrups. Cut down on added sugars by sharing sweet treats with a family member or friend. You can also choose fruit for dessert, and drink water or other unsweetened beverages.  [employer] last reviewed this educational content on 6/1/2020 2000-2022 [employer]. All rights reserved. This information is not intended as a substitute for professional medical care. Always follow your healthcare professional's instructions.          Signs of Hearing Loss  Hearing loss is a problem shared by many people. In fact, it's one of the most common health problems, particularly as people age. Most people aged 65 and older have some hearing loss. By age 80, almost everyone does. Hearing loss often occurs slowly over the years. So, you may not realize your hearing has gotten worse.   When sudden hearing loss occurs, it's important to contact your healthcare provider right away. Your provider will do a medical exam and a hearing exam as soon as possible. This is to help find the cause and type of your sudden hearing loss. Based on your diagnosis, your healthcare provider will discuss possible treatments.      Hearing much better with one ear can be a sign of hearing loss.     Have  "your hearing checked  Call your healthcare provider if you:     Have to strain to hear normal conversation    Have to watch other people s faces very carefully to follow what they re saying    Need to ask people to repeat what they ve said    Often misunderstand what people are saying    Turn the volume of the television or radio up so high that others complain    Feel that people are mumbling when they re talking to you    Find that the effort to hear leaves you feeling tired and irritated    Notice, when using the phone, that you hear better with one ear than the other  [employer] last reviewed this educational content on 6/1/2022 2000-2022 [employer]. All rights reserved. This information is not intended as a substitute for professional medical care. Always follow your healthcare professional's instructions.          Depression and Suicide in Older Adults  Nearly 2 million older adults in the [place] have some type of depression. Some of them even take their own lives. Yet depression among older adults is often ignored. Learning about the warning signs of depression may help spare a loved one needless pain. You may also save a life.   What is depression?  Depression is a common and serious illness. It affects the way you think and feel. It is not a normal part of aging. It is not a sign of weakness, a character flaw, or something you can \"snap out of.\" Most people with depression need treatment to get better. The most common symptom is a feeling of deep sadness. People who are depressed also may seem tired and listless. And nothing seems to give them pleasure. It s normal to grieve or be sad sometimes. But sadness lessens or passes with time. Depression rarely goes away or improves on its own. Other symptoms of depression are:     Sleeping more or less than normal    Eating more or less than normal    Having headaches, stomachaches, or other pains that don t go away    Feeling nervous,  empty,  or " worthless    Crying a lot    Thinking or talking about suicide or death    Loss of interest in activities previously enjoyed    Social isolation    Feeling confused or forgetful  What causes it?  The causes of depression aren t fully known. But it is thought to result from a complex blend of these factors:     Biochemistry. Certain chemicals in the brain play a role.    Genes. Depression does run in families.    Life stress. Life stresses can also trigger depression in some people. Older adults often face many stressors. These may include isolation, the death of friends or a spouse, health problems, and financial concerns.    Chronic health conditions. This includes diabetes, heart disease, or cancer. These can cause symptoms of depression. Medicine side effects can cause changes in thoughts and behaviors.  Giving support    Depressed people often may not want to ask for help. When they do, they may be ignored. Or they may get the wrong treatment. You can help by showing parents and older friends love and support. If they seem depressed, don t lecture the person or ignore the symptoms. Don't discount the symptoms as a  normal  part of aging. They are not. Get involved, listen, and show interest and support.   Help them understand that depression is a treatable illness. Tell them you can help them find the right treatment. Offer to go to their healthcare provider's appointment with them for support when the symptoms are discussed. With their approval, contact a local mental health center, social service agency, or hospital about services.   Helping at healthcare visits  You can be an advocate for them at healthcare appointments. Many older adults have chronic illnesses. Many of these can cause symptoms of depression. Medicine side effects can change thoughts and behaviors.   You can help make sure that the healthcare provider looks at all of these factors. They should refer your family member or friend to a mental  healthcare provider when needed. In some cases, untreated depression can lead to a misdiagnosis. A person may be diagnosed with a brain disorder such as dementia. If the healthcare provider does not take the issue of depression seriously, help your family member or friend find another provider.   Asking about self-harm thoughts  If you think an older person you care about could be suicidal, ask,  Have you thought about suicide?  Most people will tell you the truth. If they say yes, they may already have a plan for how and when they will attempt it. Find out as much as you can. The more detailed the plan, and the easier it is to carry out, the more danger the person is in right now. Tell the person you are there for them and you do not want them to get harmed. Don't wait to get help for the person. Call the person's healthcare provider, local hospital, or emergency services.   Call 988 in a crisis   Never leave the person alone. A person who is actively suicidal needs crisis care right away. They need constant supervision. Never leave the person out of sight. Call 988 Tell the crisis counselor you need help for a person who is thinking about suicide. The counselor will help you get the right level of crisis help. You may be advised to take the person to the nearest emergency room.   The National Suicide Prevention Lifeline is available at 988, or 145-147-JHLQ (104-632-5821), or www.suicidepreventionlifeline.org. When you call or text 988, you will be connected to trained counselors who are part of the National Suicide Prevention Lifeline network. An online chat option is also available. Lifeline is free and available 24/7.   To learn more    National Suicide Prevention Lifeline at www.suicidepreventionlifeline.org  or 942-523-CBRO (286-853-1444)    National [place] on Mental Illness at www.tony.org  or 252-188-WDBR (718-645-3913)    M[facility] at www.nmha.org  or 985-141-2297    National [place] of  Mental Health at www.[facility].nih.gov  or 354-551-0814    Katheryn last reviewed this educational content on 7/1/2022 2000-2022 [employer]. All rights reserved. This information is not intended as a substitute for professional medical care. Always follow your healthcare professional's instructions.          Preventing Falls at Home  A person can fall for many reasons. Older adults may fall because reaction time slows down as we age. Your muscles and joints may get stiff, weak, or less flexible because of illness, medicines, or a physical condition.   Other health problems that make falls more likely include:     Arthritis    Dizziness or lightheadedness when you stand up (orthostatic hypotension)    History of a stroke    Dizziness    Anemia    Certain medicines taken for mental illness or to control blood pressure.    Problems with balance or gait    Bladder or urinary problems    History of falling    Changes in vision (vision impairment)    Changes in thinking skills and memory (cognitive impairment)  Injuries from a fall can include serious injuries such as broken bones, dislocated joints, internal bleeding and cuts. Injuries like these can limit your independence.   Prevention tips  To help prevent falls and fall-related injuries, follow the tips below.    Floors  To make floors safer:     Put nonskid pads under area rugs.    Remove small rugs.    Replace worn floor coverings.    Tack carpets firmly to each step on carpeted stairs. Put nonskid strips on the edges of uncarpeted stairs.    Keep floors and stairs free of clutter and cords.    Arrange furniture so there are clear pathways.    Clean up any spills right away.  Bathrooms    To make bathrooms safer:     Install grab bars in the tub or shower.    Apply nonskid strips or put a nonskid rubber mat in the tub or shower.    Sit on a bath chair to bathe.    Use bathmats with nonskid backing.  Lighting  To improve visibility in your home:       Keep a flashlight in each room. Or put a lamp next to the bed within easy reach.    Put nightlights in the bedrooms, hallways, kitchen, and bathrooms.    Make sure all stairways have good lighting.    Take your time when going up and down stairs.    Put handrails on both sides of stairs and in walkways for more support. To prevent injury to your wrist or arm, don t use handrails to pull yourself up.    Install grab bars to pull yourself up.    Move or rearrange items that you use often. This will make them easier to find or reach.    Look at your home to find any safety hazards. Especially look at doorways, walkways, and the driveway. Remove or repair any safety problems that you find.  Other changes to make    Look around to find any safety hazards. Look closely at doorways, walkways, and the driveway. Remove or repair any safety problems that you find.    Wear shoes that fit well.    Take your time when going up and down stairs.    Put handrails on both sides of stairs and in walkways for more support. To prevent injury to your wrist or arm, don t use handrails to pull yourself up.    Install grab bars wherever needed to pull yourself up.    Arrange items that you use often. This will make them easier to find or reach.    Katheryn last reviewed this educational content on 3/1/2020    [phone] [employer]. All rights reserved. This information is not intended as a substitute for professional medical care. Always follow your healthcare professional's instructions.

## 2023-06-23 NOTE — PROGRESS NOTES
The patient was provided with suggestions to help her develop a healthy physical lifestyle.  The patient was counseled and encouraged to consider modifying their diet and eating habits. She was provided with information on recommended healthy diet options.  The patient was provided with written information regarding signs of hearing loss.  The patient s PHQ-9 score is consistent with moderate depression. She was provided with information regarding depression and was advised to schedule a follow up appointment in *** weeks to further address this issue.  She is at risk for falling and has been provided with information to reduce the risk of falling at home.

## 2023-06-23 NOTE — PROGRESS NOTES
SUBJECTIVE:   May is a 52 year old who presents for Preventive Visit.  [facility] Maintenance Exam  [facility]  Phone : Kandy     Assessment/Plan     1. Annual Wellness Visit as outlined below.   Health maintenance discussed as appropriate for age and risk factors including:  Nutrition, exercise, alcohol use, tobacco use, safe sexual practices, dental health.  Cancer screening assessed and ordered as indicated. Routine labs as outlined below based on age and risk factors reviewed today. Immunizations reviewed and updated.       Screen for colon cancer  - MAURILIO(EX[employer] SCIENCES)    Type 2 diabetes mellitus with stage 3a chronic kidney disease, without long-term current use of insulin (H)  Un Controlled.  Addressed smoking status and aspirin therapy.  Recommended annual eye exam and dental cares. Reviewed foot cares and foot exam.  Blood pressure and lipid management reviewed today.  Vaccines reviewed and updated.  Plan for glucose management includes ongoing focus on healthy diabetic diet and increased activity, increase metformin XR to 1000mg bid and follow-up with mtm and diabetes education.  Labs ordered as below including:     Hemoglobin A1C   Date Value Ref Range Status   06/23/2023 8.8 (H) 0.0 - 5.6 % Final     Comment:     Normal <5.7%   Prediabetes 5.7-6.4%    Diabetes 6.5% or higher     Note: Adopted from ADA consensus guidelines.   06/02/2022 6.8 (H) 0.0 - 5.6 % Final     Comment:     Normal <5.7%   Prediabetes 5.7-6.4%    Diabetes 6.5% or higher     Note: Adopted from ADA consensus guidelines.   12/07/2021 6.7 (H) 0.0 - 5.6 % Final     Comment:     Normal <5.7%   Prediabetes 5.7-6.4%    Diabetes 6.5% or higher     Note: Adopted from ADA consensus guidelines.   01/18/2011 5.7 4.2 - 6.1 % Final    ,   Lab Results   Component Value Date    LDL 89 06/23/2023   ,   Creatinine   Date Value Ref Range Status   06/23/2023 0.88 0.51 - 0.95 mg/dL Final   04/26/2013 0.82 0.60 - 1.10  mg/dL Final        - HEMOGLOBIN A1C  - Albumin Random Urine Quantitative with Creat Ratio  - Comprehensive metabolic panel (BMP + Alb, Alk Phos, ALT, AST, Total. Bili, TP)  - Vitamin B12  - Adult Eye  Referral  - aspirin 81 MG EC tablet  Dispense: 90 tablet; Refill: 4  - blood glucose (NO BRAND SPECIFIED) lancets standard  Dispense: 100 Lancet; Refill: 3  - HEMOGLOBIN A1C  - Comprehensive metabolic panel (BMP + Alb, Alk Phos, ALT, AST, Total. Bili, TP)  - Vitamin B12  - Albumin Random Urine Quantitative with Creat Ratio  - Med Therapy Management Referral  - [facility] Adult Diabetes Educator Referral  - metFORMIN (GLUCOPHAGE XR) 500 MG 24 hr tablet  Dispense: 360 tablet; Refill: 4    Coronary artery disease involving native coronary artery of native heart with angina pectoris (H)  Follow-up with Cardiology annually. Continue b-blocker, asa, statin, arb, mental health management, LSM with formal education, isosorbide, prn lasix,   - Lipid panel reflex to direct LDL Non-fasting  - CBC with platelets  - Lipid panel reflex to direct LDL Non-fasting  - CBC with platelets    Visit for screening mammogram  - MA SCREENING DIGITAL BILAT - Future  (s+30)    Need for shingles vaccine  - zoster vaccine recombinant adjuvanted (SHINGRIX) injection  Dispense: 0.5 mL; Refill: 0    Encounter for immunization  - Pneumococcal 20 Valent Conjugate (Prevnar 20)  - COVID-19 BIVALENT 12+ (PFIZER)    Moderate episode of recurrent major depressive disorder (H)  PHQ-9 score:      6/23/2023     1:01 PM   PHQ   PHQ-9 Total Score 11   Q9: Thoughts of better off dead/self-harm past 2 weeks Not at all     Stable and okay per pt.  She declines change in meds or cognitive therapy. Notes she has good support from her  and kids.  Still grieving loss of her mom over a year ago but doing okay.        Class 3 severe obesity due to excess calories with serious comorbidity and body mass index (BMI) of 40.0 to 44.9 in adult (H)  LSM including  diabetes education.  Consider GLP-1?     Anemia, unspecified type  Resolved now with high iron/ferritin.  Does not seem to be on iron supplement but appreciate MTM to see if she is on a multivitamin with iron?  She is now postmenopausal.    - Ferritin  - Iron and iron binding capacity  - Ferritin  - Iron and iron binding capacity    Essential hypertension  BP Readings from Last 3 Encounters:   06/23/23 138/87   04/21/23 122/80   03/31/23 108/76        controlled today.  Plan for bloodpressure management includes ongoing focus on healthy DASH type diet and increased activity, encouraged to avoid tobacco products and limit alcohol use, stress reduction, continue metoprolol XL 100mg daily, losartan 100mg daily, isosorbide 60mg daily, .  Labwork and meds ordered and reviewed as below  Potassium   Date Value Ref Range Status   06/23/2023 4.8 3.4 - 5.3 mmol/L Final   06/02/2022 4.0 3.5 - 5.0 mmol/L Final   04/26/2013 3.8 3.5 - 5.0 mmol/L Final      Creatinine   Date Value Ref Range Status   06/23/2023 0.88 0.51 - 0.95 mg/dL Final   04/26/2013 0.82 0.60 - 1.10 mg/dL Final          Hyperlipidemia LDL goal <70  Continue atorvastatin 80mg daily    Pulmonary nodules  Due for repeat Chest CT and if normal repeat in 1-2 years. Low risk- not a smoker.    - CT Chest w/o Contrast    Encounter for Medicare annual wellness exam  - PRIMARY CARE FOLLOW-UP SCHEDULING    Great toe pain, right  High risk for gout- treat if allopurionl high.  Appears to have some bunion deformity- appreciate podiatry care for this high risk diabetic.    - Orthopedic  Referral  - Uric acid  - XR Foot Right G/E 3 Views  - Uric acid    Tension headache  Well controlled.  Continue meds.    - acetaminophen (MAPAP) 500 MG tablet  Dispense: 100 tablet; Refill: 11  - venlafaxine (EFFEXOR XR) 150 MG 24 hr capsule  Dispense: 90 capsule; Refill: 4    Heart failure with reduced ejection fraction (H)  Continue cardiac plan as outlined above.    - isosorbide  mononitrate (IMDUR) 60 MG 24 hr tablet  Dispense: 90 tablet; Refill: 3    Gastroesophageal reflux disease without esophagitis  Stable on PPI daily-has tried decreased dose and tapering with increased sx.    - omeprazole (PRILOSEC) 40 MG DR capsule  Dispense: 90 capsule; Refill: 1    Nonischemic cardiomyopathy (H)  As above.    - furosemide (LASIX) 40 MG tablet  Dispense: 90 tablet; Refill: 4    Fatty liver  Increased LFTs- recommend follow-up with diabetes education.  Increase metformin.  Last us 2019- recommend follow-up with GI.  Increased ferritin and iron noted.    - Adult GI  Referral - Consult Only    Chronic gout of right foot, unspecified cause  - allopurinol (ZYLOPRIM) 100 MG tablet  Dispense: 90 tablet; Refill: 1        Follow-up Visit   Expected date:  Oct 23, 2023 (Approximate)      Follow Up Appointment Details:     Follow-up with whom?: Me    Follow-Up for what?: Chronic Disease f/u    Chronic Disease f/u:  Diabetes  Hypertension  Hyperlipidemia  Depression       How?: In Person             Follow-up Visit   Expected date:  2024 (Approximate)      Follow Up Appointment Details:     Follow-up with whom?: PCP    Follow-Up for what?: Medicare Wellness    Welcome or Annual?: Annual Wellness    How?: In Person                   HPI:     Chief Complaint   Patient presents with     Wellness Visit       Leora Sanchez is a 52 year old female and is here for a comprehensive health maintenance exam.     Other concerns today:   Right foot pain- swelling in the toe and really can't wear closed toe shoes.  Oldest son has gout.  Base of right great toe- red and swollen and sticks out.      Diabetes- not really checking sugars  Ran out of lancets -not covered by insurance/medicare and [facility] [address] and [facility] so not sure where to get these now.   Metformin 500mg bid     Vaccine update     Mental health is manageble- harder after mom  last 1-2 years ago.  Kids are very helpful.      Frequent  "urination every 30-40min   No longer taking lasix- only uses with swelling     History summarized from1-2:ENT- h/o ear surgery with hearing loss- okay for hearing aids.    Cardiology 4/2023- stable heart and bp.  No changes to meds.  Labs and monitor weight.   Old Records-1: Outside allergies, meds, problems and immunizations were reconciled as needed from CareEverywhere  Lab tests reviewed-1: [id number]    Review of Systems   Complete ROS including General, HEENT, CV, Pulmonary, GI, , Genital, Neuro, Psych, MSK, Heme, Endocrine all within normal except as noted in the HPI or below as documented by patient.       Prevention of Osteoporosis:vitamin D  Last Dexa:na    Cancer Screening:  Hemoccults/Colonoscopy: cologard reordered.   Mammogram:  Normal 2021- repeat now   Pap Smear:  Normal 2022- repeat 2027  Lung Cancer: due      Wt Readings from Last 3 Encounters:   06/23/23 93.9 kg (207 lb)   04/21/23 94.3 kg (208 lb)   03/31/23 94.6 kg (208 lb 8 oz)         Complete physical exam including:  General, HEENT, Neck, back, CV, Pulmonary, Abdominal, extremities, skin, neuro, psych, lymph,  Exam all in normal.    Abnormalities include:  Grossly normal exam  Breast deferred - pt will get mammogram  Gyn deferred as pt is asymptomatic and not due for screening.          6/23/2023     1:37 PM   Additional Questions   Roomed by M   Accompanied by self     Are you in the first 12 months of your Medicare coverage?  No    Healthy Habits:     In general, how would you rate your overall health?  Fair    Frequency of exercise:  2-3 days/week    Duration of exercise:  N/A    Do you usually eat at least 4 servings of fruit and vegetables a day, include whole grains    & fiber and avoid regularly eating high fat or \"junk\" foods?  No    Taking medications regularly:  Yes    Medication side effects:  None    Ability to successfully perform activities of daily living:  No assistance needed    Home Safety:  No safety concerns identified    " Hearing Impairment:  Difficulty following a conversation in a noisy restaurant or crowded room, feel that people are mumbling or not speaking clearly, difficulty following dialogue in the theater and difficult to understand a speaker at a public meeting or Jewish service    In the past 6 months, have you been bothered by leaking of urine?  No    In general, how would you rate your overall mental or emotional health?  Good      PHQ-2 Total Score: 4    Additional concerns today:  No    PHQ-9 score:        6/23/2023     1:01 PM   PHQ   PHQ-9 Total Score 11   Q9: Thoughts of better off dead/self-harm past 2 weeks Not at all           Have you ever done Advance Care Planning? (For example, a Health Directive, POLST, or a discussion with a medical provider or your loved ones about your wishes): No, advance care planning information given to patient to review.  Patient plans to discuss their wishes with loved ones or provider.        Fall risk  Fallen 2 or more times in the past year?: Yes  Any fall with injury in the past year?: Yes  Cognitive Screening   1) Repeat 3 items (Leader, Season, Table)    2) Clock draw: ABNORMAL .  3) 3 item recall: Recalls NO objects   Results: 0 items recalled: PROBABLE COGNITIVE IMPAIRMENT, **INFORM PROVIDER**    Mini-CogTM Copyright KATERINA Robles. Licensed by the author for use in [facility]; reprinted with permission (mar@.[facility]). All rights reserved.      Do you have sleep apnea, excessive snoring or daytime drowsiness?: no    Reviewed and updated as needed this visit by clinical staff   Tobacco  Allergies  Meds  Problems  Med Hx  Surg Hx  Fam Hx          Reviewed and updated as needed this visit by Provider   Tobacco  Allergies  Meds  Problems  Med Hx  Surg Hx  Fam Hx         Social History     Tobacco Use     Smoking status: Never     Smokeless tobacco: Never     Tobacco comments:     no passive exposure   Substance Use Topics     Alcohol use: No              6/23/2023     1:08 PM   Alcohol Use   Prescreen: >3 drinks/day or >7 drinks/week? No     Do you have a current opioid prescription? No  Do you use any other controlled substances or medications that are not prescribed by a provider? None          Current providers sharing in care for this patient include:   Patient Care Team:  Jaky Gaspar MD as PCP - General (Family Practice)  Wendy Lind MD as MD (Pulmonary Disease)  Art Thakkar, PharmD as Pharmacist (Pharmacist)  Elisabet Malone MA as Community Health Worker (Primary Care - CC)  Nickie Cuevas RN as Lead Care Coordinator (Primary Care - CC)  Jaky Gaspar MD as Assigned PCP  Jose Maria Bass MD as Assigned Surgical Provider  Cristy Cisneros as Financial Resource Worker  Sendy Banda APRN CNP as Nurse Practitioner (Cardiovascular Disease)  Sendy Banda APRN CNP as Assigned Heart and Vascular Provider  Devora Mitchell RN as Lead Care Coordinator (Primary Care - CC)    The following health maintenance items are reviewed in Epic and correct as of today:  Health Maintenance   Topic Date Due     DIABETIC FOOT EXAM  Never done     COLORECTAL CANCER SCREENING  Never done     ZOSTER IMMUNIZATION (1 of 2) Never done     MAMMO SCREENING  12/07/2022     EYE EXAM  12/21/2022     INFLUENZA VACCINE (Season Ended) 09/01/2023     A1C  09/23/2023     PHQ-9  09/23/2023     MEDICARE ANNUAL WELLNESS VISIT  06/23/2024     BMP  06/23/2024     LIPID  06/23/2024     MICROALBUMIN  06/23/2024     ANNUAL REVIEW OF HM ORDERS  06/23/2024     HEMOGLOBIN  06/23/2024     HPV TEST  06/02/2027     PAP  06/02/2027     ADVANCE CARE PLANNING  06/11/2027     DTAP/TDAP/TD IMMUNIZATION (4 - Td or Tdap) 12/07/2031     HEPATITIS C SCREENING  Completed     HIV SCREENING  Completed     DEPRESSION ACTION PLAN  Completed     Pneumococcal Vaccine: Pediatrics (0 to 5 Years) and At-Risk Patients (6 to 64 Years)  Completed     URINALYSIS  Completed     COVID-19  "Vaccine  Completed     IPV IMMUNIZATION  Aged Out     MENINGITIS IMMUNIZATION  Aged Out     HEPATITIS B IMMUNIZATION  Discontinued           6/2/2022     2:43 PM 6/23/2023     1:08 PM   Breast CA Risk Assessment (FHS-7)   Do you have a family history of breast, colon, or ovarian cancer? Yes No / Unknown         Mammogram Screening: Recommended annual mammography  Pertinent mammograms are reviewed under the imaging tab.    Review of Systems   Constitutional: Negative for chills and fever.   HENT: Positive for ear pain and hearing loss. Negative for congestion and sore throat.    Eyes: Positive for pain. Negative for visual disturbance.   Respiratory: Negative for cough and shortness of breath.    Cardiovascular: Negative for chest pain, palpitations and peripheral edema.   Gastrointestinal: Negative for abdominal pain, constipation, diarrhea, heartburn, hematochezia and nausea.   Breasts:  Negative for tenderness, breast mass and discharge.   Genitourinary: Positive for frequency. Negative for dysuria, genital sores, hematuria, pelvic pain, urgency, vaginal bleeding and vaginal discharge.   Musculoskeletal: Positive for arthralgias. Negative for joint swelling and myalgias.   Skin: Negative for rash.   Neurological: Positive for headaches. Negative for dizziness, weakness and paresthesias.   Psychiatric/Behavioral: Negative for mood changes. The patient is not nervous/anxious.        OBJECTIVE:   /87 (BP Location: Right arm, Patient Position: Sitting, Cuff Size: Adult Large)   Pulse 61   Resp 24   Ht 1.5 m (4' 11.06\")   Wt 93.9 kg (207 lb)   SpO2 98%   BMI 41.73 kg/m   Estimated body mass index is 41.73 kg/m  as calculated from the following:    Height as of this encounter: 1.5 m (4' 11.06\").    Weight as of this encounter: 93.9 kg (207 lb).  Physical Exam      ASSESSMENT / PLAN:         COUNSELING:          She reports that she has never smoked. She has never used smokeless tobacco.      Appropriate " preventive services were discussed with this patient, including applicable screening as appropriate for cardiovascular disease, diabetes, osteopenia/osteoporosis, and glaucoma.  As appropriate for age/gender, discussed screening for colorectal cancer, prostate cancer, breast cancer, and cervical cancer. Checklist reviewing preventive services available has been given to the patient.    Reviewed patients plan of care and provided an AVS. The Complex Care Plan (for patients with higher acuity and needing more deliberate coordination of services) for May meets the Care Plan requirement. This Care Plan has been established and reviewed with the Patient and spouse.          Jaky Gaspar MD  [facility]    Identified Health Risks:    I have reviewed Opioid Use Disorder and Substance Use Disorder risk factors and made any needed referrals.        Prior to immunization administration, verified patients identity using patient s name and date of birth. Please see Immunization Activity for additional information.     Screening Questionnaire for Adult Immunization    Are you sick today?   No   Do you have allergies to medications, food, a vaccine component or latex?   No   Have you ever had a serious reaction after receiving a vaccination?   No   Do you have a long-term health problem with heart, lung, kidney, or metabolic disease (e.g., diabetes), asthma, a blood disorder, no spleen, complement component deficiency, a cochlear implant, or a spinal fluid leak?  Are you on long-term aspirin therapy?   No   Do you have cancer, leukemia, HIV/AIDS, or any other immune system problem?   No   Do you have a parent, brother, or sister with an immune system problem?   No   In the past 3 months, have you taken medications that affect  your immune system, such as prednisone, other steroids, or anticancer drugs; drugs for the treatment of rheumatoid arthritis, Crohn s disease, or psoriasis; or have you had  radiation treatments?   No   Have you had a seizure, or a brain or other nervous system problem?   No   During the past year, have you received a transfusion of blood or blood    products, or been given immune (gamma) globulin or antiviral drug?   No   For women: Are you pregnant or is there a chance you could become       pregnant during the next month?   No   Have you received any vaccinations in the past 4 weeks?   No     Immunization questionnaire answers were all negative.      Patient instructed to remain in clinic for 15 minutes afterwards, and to report any adverse reactions.     Screening performed by ANN Jc MA on 6/23/2023 at 1:45 PM.    Answers for HPI/ROS submitted by the patient on 6/23/2023  If you checked off any problems, how difficult have these problems made it for you to do your work, take care of things at home, or get along with other people?: Not difficult at all  PHQ9 TOTAL SCORE: 11        The patient was provided with suggestions to help her develop a healthy physical lifestyle.  The patient was counseled and encouraged to consider modifying their diet and eating habits. She was provided with information on recommended healthy diet options.  The patient was provided with written information regarding signs of hearing loss.  The patient s PHQ-9 score is consistent with moderate depression. She was provided with information regarding depression and was advised to schedule a follow up appointment in 4 months to further address this issue.  She is at risk for falling and has been provided with information to reduce the risk of falling at home.

## 2023-06-23 NOTE — LETTER
My Depression Action Plan  Name: Leora Sanchez   Date of Birth 1970  Date: 6/23/2023    My doctor: Jaky Gaspar   My clinic: [facility] [address] [place] [id number] [phone]            GREEN    ZONE   Good Control    What it looks like:   Things are going generally well. You have normal ups and downs. You may even feel depressed from time to time, but bad moods usually last less than a day.   What you need to do:  Continue to care for yourself (see self care plan)  Check your depression survival kit and update it as needed  Follow your physician s recommendations including any medication.  Do not stop taking medication unless you consult with your physician first.             YELLOW         ZONE Getting Worse    What it looks like:   Depression is starting to interfere with your life.   It may be hard to get out of bed; you may be starting to isolate yourself from others.  Symptoms of depression are starting to last most all day and this has happened for several days.   You may have suicidal thoughts but they are not constant.   What you need to do:     Call your care team. Your response to treatment will improve if you keep your care team informed of your progress. Yellow periods are signs an adjustment may need to be made.     Continue your self-care.  Just get dressed and ready for the day.  Don't give yourself time to talk yourself out of it.    Talk to someone in your support network.    Open up your Depression Self-Care Plan/Wellness Kit.             RED    ZONE Medical Alert - Get Help    What it looks like:   Depression is seriously interfering with your life.   You may experience these or other symptoms: You can t get out of bed most days, can t work or engage in other necessary activities, you have trouble taking care of basic hygiene, or basic responsibilities, thoughts of suicide or death that will not go away, self-injurious behavior.     What  you need to do:  Call your care team and request a same-day appointment. If they are not available (weekends or after hours) call your local crisis line, emergency room or 911.          Depression Self-Care Plan / Wellness Kit    Many people find that medication and therapy are helpful treatments for managing depression. In addition, making small changes to your everyday life can help to boost your mood and improve your wellbeing. Below are some tips for you to consider. Be sure to talk with your medical provider and/or behavioral health consultant if your symptoms are worsening or not improving.     Sleep   Sleep hygiene  means all of the habits that support good, restful sleep. It includes maintaining a consistent bedtime and wake time, using your bedroom only for sleeping or sex, and keeping the bedroom dark and free of distractions like a computer, smartphone, or television.     Develop a Healthy Routine  Maintain good hygiene. Get out of bed in the morning, make your bed, brush your teeth, take a shower, and get dressed. Don t spend too much time viewing media that makes you feel stressed. Find time to relax each day.    Exercise  Get some form of exercise every day. This will help reduce pain and release endorphins, the  feel good  chemicals in your brain. It can be as simple as just going for a walk or doing some gardening, anything that will get you moving.      Diet  Strive to eat healthy foods, including fruits and vegetables. Drink plenty of water. Avoid excessive sugar, caffeine, alcohol, and other mood-altering substances.     Stay Connected with Others  Stay in touch with friends and family members.    Manage Your Mood  Try deep breathing, massage therapy, biofeedback, or meditation. Take part in fun activities when you can. Try to find something to smile about each day.     Psychotherapy  Be open to working with a therapist if your provider recommends it.     Medication  Be sure to take your  medication as prescribed. Most anti-depressants need to be taken every day. It usually takes several weeks for medications to work. Not all medicines work for all people. It is important to follow-up with your provider to make sure you have a treatment plan that is working for you. Do not stop your medication abruptly without first discussing it with your provider.    Crisis Resources   These hotlines are for both adults and children. They and are open 24 hours a day, 7 days a week unless noted otherwise.    National Suicide Prevention Lifeline   988 or 9-986-214-FLPG (9227)    Crisis Text Line    www.crisistextline.org  Text HOME to 512716 from anywhere in the [place], anytime, about any type of crisis. A live, trained crisis counselor will receive the text and respond quickly.    [facility] Lifeline for LGBTQ Youth  A national crisis intervention and suicide lifeline for LGBTQ youth under 25. Provides a safe place to talk without judgement. Call 1-524.227.2777; text START to 913444 or visit www.theERLinkvorproject.org to talk to a trained counselor.    For [facility] crisis numbers, visit the [facility] website at:  https://[place].gov/dhs/people-we-serve/adults/health-care/mental-health/resources/crisis-contacts.jsp

## 2023-06-23 NOTE — Clinical Note
Pt notes that lancets are not covered by medicare.  Also notes she needs to get lancets at [facility] only and the one on [address] closed?  Thoughts?

## 2023-06-25 PROBLEM — K76.0 FATTY LIVER: Status: ACTIVE | Noted: 2023-06-25

## 2023-06-25 PROBLEM — M1A.0710 CHRONIC GOUT OF RIGHT FOOT: Status: ACTIVE | Noted: 2023-06-25

## 2023-06-25 RX ORDER — ALLOPURINOL 100 MG/1
100 TABLET ORAL DAILY
Qty: 90 TABLET | Refills: 1 | Status: SHIPPED | OUTPATIENT
Start: 2023-06-25 | End: 2023-12-15

## 2023-06-25 NOTE — RESULT ENCOUNTER NOTE
Team - please call patient with results and send result letter with this information if patient desires for their records.     Her liver tests are a little inflammed- this is probably from her fatty liver.   I'd like her to meet with our diabetes educator to find some better foods to eat to keep her liver healthy.      Her gout level is high   Her xray of her foot shows that she has a bunion as well in addition to possible gout causing her toe pain.   I recommend that she start a gout pill every day and also see a podiatrist     Her diabetes is worse- I'd like her to take 2 diabetes pills in the morning and 2 in the evening. I'd like her to see our diabetes educator and our pharmacist to review her meds and find some different foods to eat keep her body healthy.      Her kidney tests are healthy.   Her cholesterol is healthy.  Her b12 is better - keep taking her b complex   Her blood counts are healthy- no anemia     Her iron is high- is she taking an iron pill or a vitamin with iron in it?

## 2023-06-26 ENCOUNTER — TELEPHONE (OUTPATIENT)
Dept: FAMILY MEDICINE | Facility: CLINIC | Age: 53
End: 2023-06-26
Payer: MEDICARE

## 2023-06-26 ENCOUNTER — APPOINTMENT (OUTPATIENT)
Dept: INTERPRETER SERVICES | Facility: CLINIC | Age: 53
End: 2023-06-26
Payer: MEDICARE

## 2023-06-26 NOTE — TELEPHONE ENCOUNTER
----- Message from Jaky Gaspar MD sent at 6/25/2023 10:43 AM CDT -----  Team - please call patient with results and send result letter with this information if patient desires for their records.     Her liver tests are a little inflammed- this is probably from her fatty liver.   I'd like her to meet with our diabetes educator to find some better foods to eat to keep her liver healthy.      Her gout level is high   Her xray of her foot shows that she has a bunion as well in addition to possible gout causing her toe pain.   I recommend that she start a gout pill every day and also see a podiatrist     Her diabetes is worse- I'd like her to take 2 diabetes pills in the morning and 2 in the evening. I'd like her to see our diabetes educator and our pharmacist to review her meds and find some different foods to eat keep her body healthy.      Her kidney tests are healthy.   Her cholesterol is healthy.  Her b12 is better - keep taking her b complex   Her blood counts are healthy- no anemia     Her iron is high- is she taking an iron pill or a vitamin with iron in it?

## 2023-06-27 ENCOUNTER — APPOINTMENT (OUTPATIENT)
Dept: INTERPRETER SERVICES | Facility: CLINIC | Age: 53
End: 2023-06-27
Payer: MEDICARE

## 2023-06-27 NOTE — TELEPHONE ENCOUNTER
Contacted patient using a [employer]  and relyayed message below.  Patient has appointments scheduled on 6/28/23 with DM educator and 7/26/23 with amor Salcedo RN  [facility]

## 2023-06-28 ENCOUNTER — PATIENT OUTREACH (OUTPATIENT)
Dept: CARE COORDINATION | Facility: CLINIC | Age: 53
End: 2023-06-28

## 2023-06-28 ENCOUNTER — VIRTUAL VISIT (OUTPATIENT)
Dept: EDUCATION SERVICES | Facility: CLINIC | Age: 53
End: 2023-06-28
Attending: FAMILY MEDICINE
Payer: MEDICARE

## 2023-06-28 DIAGNOSIS — E11.22 TYPE 2 DIABETES MELLITUS WITH STAGE 3A CHRONIC KIDNEY DISEASE, WITHOUT LONG-TERM CURRENT USE OF INSULIN (H): ICD-10-CM

## 2023-06-28 DIAGNOSIS — N18.31 TYPE 2 DIABETES MELLITUS WITH STAGE 3A CHRONIC KIDNEY DISEASE, WITHOUT LONG-TERM CURRENT USE OF INSULIN (H): ICD-10-CM

## 2023-06-28 PROCEDURE — G0108 DIAB MANAGE TRN  PER INDIV: HCPCS | Mod: 95 | Performed by: DIETITIAN, REGISTERED

## 2023-06-28 NOTE — PROGRESS NOTES
Clinic Care Coordination Contact  Community Health Worker Follow Up    Reason: CCC CHW Follow Up Called (follow up current goals)    Care Gaps:   Health Maintenance Due   Topic Date Due     DIABETIC FOOT EXAM  Never done     COLORECTAL CANCER SCREENING  Never done     ZOSTER IMMUNIZATION (1 of 2) Never done     MAMMO SCREENING  12/07/2022     EYE EXAM  12/21/2022     Defer to next outreach follow up due to patient time.    Care Plan:   Care Plan: Financial Wellbeing     Problem: Patient expresses financial resource strain     Goal: I would like to review medical bills and get understanding of coverage     Start Date: 3/28/2023    This Visit's Progress: 30% Recent Progress: 30%    Note:     Barriers: coverage  Strengths: engagement  Patient expressed understanding of goal: yes  Action steps to achieve this goal:  1. I will bring in a copy of medical bills for CCC team to review and get better understanding of Patient responsibility and coverage  2. I will speak with  CC to review and get better understanding   3. I will update Care coordination team during next outreach  4. I will updates status with CHW/CCC team after my son wedding. (Updated: 5/19/2023)    (Updated: 6/28/2023)- no time to discuss goal at this time due to pt appt time with DM Ed today. (CHW MX)                    Care Plan: Medical     Problem: HP GENERAL PROBLEM     Goal: I would like to have a plan of care for Cardiology health within 3-4 months     Start Date: 3/28/2023    This Visit's Progress: 30% Recent Progress: 30%    Note:     Barriers: language  Strengths: family   Patient expressed understanding of goal: yes  Action steps to achieve this goal:  1. I will schedule and attend visit with cardiology provider - number to call [phone].  Needs to schedule with Dr. [facility] for July and [facility] in Oct.  2. I will update Care coordination team during next outreach  3. I will report to my Community Health Worker if any  additional resources or support needed.  4. I will updates status with CHW/CCC team after my son wedding. (Updated: 5/19/2023)    (Updated: 6/28/2023)- no time to discuss goal at this time due to pt appt time with DM Ed today. (CHW MX)            Goal: I would like to attend Annual wellness exam     Expected End Date: 6/23/2023    This Visit's Progress: 10% Recent Progress: 10%    Note:       Patient expressed understanding of goal: yes  Action steps to achieve this goal:  1. I will schedule my annual wellness visit; [id number] ([phone]) scheduled for 6/23  2. I will attend my annual wellness visit.  3. I will contact my Care Management or clinic team if I have barriers to attending my annual wellness visit.   4. I will updates status with CCC team after my son wedding. (Updated: 5/19/2023)    (Updated: 6/28/2023)- no time to discuss goal at this time due to pt appt time with DM Ed today. (CHW MX)            Goal: I will fill all medications and follow recommendations for theapy and dosing plan.     This Visit's Progress: 50%    Note:     1. I will updates status with CCC team after my son wedding. (Updated: 5/19/2023)  2. I will attend appt today, 6/28/2023 at 2:00PM with DM Ed Alexandra via video. (Updated: 6/28/2023)-ANJALIW MX                      Patient Outreach Discussion:   [facility] CHW was able to connect with patient today. Reminded and encouraged pt to attend today's appt with DM Ed Alexandra via video. Pt confirmed and plan to attend appt. CHW FYI DM Ed Alexandra about this. CHW updated goal above. Patient shared info below.    Patient shared:  -Doing well.   -Will ask DM Ed about medication refill or concerns.   -Will connect with CHW/CCC for any resources need.   -No other questions or concerns at this time.    CHW suggested patient for the following:  -Pt connect CCC/CHW with any additional resources need and PCP office for scheduling appt.  -Pt check in with DM Ed today, 6/28/2023 regards to medication refill and med  questions. Encouraged pt to follow DM Ed food intake and medication instructions/recommendation.     CHW Next Follow-up: Goals follow up. Informed patient of due care gaps (if any).     Outreach frequency: monthly      CHW Plan:   -Pt connect with CHW/CCC to go over goals updates and any additional resources need.  -Patient attend appt: 6/28/2023 at 2:00PM with DM Ed Alexandra via video.  -Next CHW outreach plan: 1 month

## 2023-06-28 NOTE — PROGRESS NOTES
Diabetes Self-Management Education & Support/ 35 minutes through professional  Raimundo white    Presents for: Individual review    Type of Service: Telephone Visit    Originating Location (Patient Location): Home  Distant Location (Provider Location): [facility]  Mode of Communication:  Telephone    Telephone Visit Start Time: 2p  Telephone Visit End Time (telephone visit stop time): 2:35        Assessment Type:   ASSESSMENT:  May reported s/s of hyperglycemia, which we discussed was normal to expect with her increased A1C. She is checking her blood sugar at home in the morning before eating, today's [id number]=[id number]. No other BG readings were available to share.  She is taking Metformin 500 mg, two tablets/day.  I reviewed with her the recommendation to increase to four tablets/day as prescribed by her PCP after her last visit on 6/23/23. May is walking 10 minutes/daily. She is consuming two meals/day: traditional Hmong diet with smaller portions of rice and noodles. She is not drinking sugary beverages.  She was not quite sure when she had her last eye exam, so she will follow up on this appointment.     Patient's most recent   Lab Results   Component Value Date    A1C 8.8 06/23/2023    A1C 5.7 01/18/2011     is not meeting goal of <8.0    Diabetes knowledge and skills assessment:   Patient is knowledgeable in diabetes management concepts related to: Healthy Eating, Being Active and Monitoring    Continue education with the following diabetes management concepts: Taking Medication and Reducing Risks    Based on learning assessment above, most appropriate setting for further diabetes education would be: Individual setting.      PLAN  1. Test blood sugar once in the morning before eating.  2. Limit rice and noodles to one small bowl per meal.  3. Limit higher purine foods in relation to gout as recommended by PCP.  4. Keep fruit between meals as a snack.  5. Continue to walk daily for 10  "minutes, add in a second walking session when able.   6. Follow up on scheduling a yearly diabetic eye exam.     Topics to cover at upcoming visits: Monitoring, Taking Medication and Reducing Risks    Follow-up: MTM on 7/26/23    See Care Plan for co-developed, patient-state behavior change goals.  AVS provided for patient today.    Education Materials Provided:  Living Healthy with Diabetes and Gout information      SUBJECTIVE/OBJECTIVE:  Presents for: Individual review  Accompanied by: Self,   Diabetes education in the past 24mo: No  Focus of Visit: Monitoring, Taking Medication, Healthy Eating, Being Active  Diabetes type: Type 2  Disease course: Worsening  Other concerns:: Language barrier  Cultural Influences/Ethnic Background:  Not  or       Diabetes Symptoms & Complications:  Fatigue: Yes  Neuropathy: Yes (hands are tingling, feet are cold)  Polydipsia: Yes  Polyuria: Yes  Visual change: Yes  Slow healing wounds: No       Patient Problem List and Family Medical History reviewed for relevant medical history, current medical status, and diabetes risk factors.    Vitals:  There were no vitals taken for this visit.  Estimated body mass index is 41.73 kg/m  as calculated from the following:    Height as of 6/23/23: 1.5 m (4' 11.06\").    Weight as of 6/23/23: 93.9 kg (207 lb).   Last 3 BP:   BP Readings from Last 3 Encounters:   06/23/23 138/87   04/21/23 122/80   03/31/23 108/76       History   Smoking Status     Never   Smokeless Tobacco     Never       Labs:  Lab Results   Component Value Date    A1C 8.8 06/23/2023    A1C 5.7 01/18/2011     Lab Results   Component Value Date     06/23/2023     06/02/2022     04/26/2013     Lab Results   Component Value Date    LDL 89 06/23/2023     08/20/2020     04/26/2013     HDL Cholesterol   Date Value Ref Range Status   04/26/2013 65 >39 mg/dL Final     Direct Measure HDL   Date Value Ref Range Status   06/23/2023 " 68 >=50 mg/dL Final   ]  GFR Estimate   Date Value Ref Range Status   06/23/2023 78 >60 mL/min/1.73m2 Final   04/15/2021 49 (L) >60 mL/min/1.73m2 Final   04/26/2013 > 60 >60 ml/min/1.73m2 Final     GFR, ESTIMATED POCT   Date Value Ref Range Status   03/31/2023 >60 >60 mL/min/1.73m2 Final     GFR Estimate If Black   Date Value Ref Range Status   04/15/2021 60 (L) >60 mL/min/1.73m2 Final   04/26/2013 > 60 >60 ml/min/1.73m2 Final     Lab Results   Component Value Date    CR 0.88 06/23/2023    CR 0.82 04/26/2013     No results found for: MICROALBUMIN    Healthy Eating:  Healthy Eating Assessed Today: Yes  Meals include: Lunch, Dinner  Breakfast: coffee with honey  Lunch: traditional Hmong diet: rice( one fist size), leafy greens or other vegetables with meat  Dinner: similiar to noon meal  Snacks: not to much, some fruit on occasion  Other: Bernard meal is usually when she eats out, one kids size portion, maybe 2x/month.  Beverages: Water    Being Active:  Being Active Assessed Today: Yes (walking daily around the block 10 minutes)  Exercise:: Yes  Days per week of moderate to strenuous exercise (like a brisk walk): 6  On average, minutes per day of exercise at this level: 10  Exercise Minutes per Week: 60    Monitoring:  Monitoring Assessed Today: Yes  Did patient bring glucose meter to appointment? : No  Times checking blood sugar at home (number): 1  Times checking blood sugar at home (per): Day        Taking Medications:  Diabetes Medication(s)     Biguanides       metFORMIN (GLUCOPHAGE XR) 500 MG 24 hr tablet    Take 2 tablets (1,000 mg) by mouth 2 times daily (with meals)          Taking Medication Assessed Today: Yes  Current Treatments: Diet, Oral Medication (taken by mouth)  Problems taking diabetes medications regularly?: No    Problem Solving:  Is the patient at risk for hypoglycemia?: No              Reducing Risks:  Diabetes Risks: Age over 45 years, Ethnicity  Has dilated eye exam at least once a year?:   (patient is unsure when she had her last eye exam)    Healthy Coping:  Emotional response to diabetes: Ready to learn, Concern for health and well-being  Informal Support system:: Children, Family  Stage of change: PREPARATION (Decided to change - considering how)  Patient Activation Measure Survey Score:       No data to display                  Care Plan and Education Provided:  Care Plan: Diabetes   Updates made by Alexandra Rivera RD since 6/28/2023 12:00 AM      Problem: HbA1C Not In Goal       Goal: Establish Regular Follow-Ups with PCP       Task: Discuss with PCP the recommended timing for patient's next follow up visit(s)    Responsible User: Alexandra Rivera RD      Task: Discuss schedule for PCP visits with patient    Responsible User: Alexandra Rivera RD      Goal: Get HbA1C Level in Goal       Task: Educate patient on diabetes education self-management topics    Responsible User: Alexandra Rivera RD      Task: Educate patient on benefits of regular glucose monitoring    Responsible User: Alexandra Rivera RD      Task: Refer patient to appropriate extended care team member, as needed (Medication Therapy Management, Behavioral Health, Physical Therapy, etc.)    Responsible User: Alexandra Rivera RD      Task: Discuss diabetes treatment plan with patient    Responsible User: Alexandra Rivera RD      Problem: Diabetes Self-Management Education Needed to Optimize Self-Care Behaviors       Goal: Understand diabetes pathophysiology and disease progression       Task: Provide education on diabetes pathophysiology and disease progression specfic to patient's diabetes type    Responsible User: Alexandra Rivera RD      Goal: Healthy Eating - follow a healthy eating pattern for diabetes       Task: Provide education on portion control and consistency in amount, composition and timing of food intake    Responsible User: Alexandra Rivera RD      Task: Provide education on managing carbohydrate intake (carbohydrate counting,  plate planning method, etc.)    Responsible User: Alexandra Rivera RD      Task: Provide education on weight management    Responsible User: Alexandra Rivera RD      Task: Provide education on heart healthy eating    Responsible User: Alexandar Rivera RD      Task: Provide education on eating out    Responsible User: Alexandra Rivera RD      Task: Develop individualized healthy eating plan with patient    Responsible User: Alexandra Rivera RD      Goal: Being Active - get regular physical activity, working up to at least 150 minutes per week       Task: Provide education on relationship of activity to glucose and precautions to take if at risk for low glucose    Responsible User: Alexandra Rivera RD      Task: Discuss barriers to physical activity with patient    Responsible User: Alexandra Rivera RD      Task: Develop physical activity plan with patient    Responsible User: Alexandra Rivera RD      Task: Explore community resources including walking groups, assistance programs, and home videos    Responsible User: Alexandra Rivera RD      Goal: Monitoring - monitor glucose and ketones as directed    This Visit's Progress: 0%   Note:    Check blood sugar once daily.      Task: Provide education on blood glucose monitoring (purpose, proper technique, frequency, glucose targets, interpreting results, when to use glucose control solution, sharps disposal)    Responsible User: Alexandra Rivera RD      Task: Provide education on continuous glucose monitoring (sensor placement, use of rekha or /reader, understanding glucose trends, alerts and alarms, differences between sensor glucose and blood glucose)    Responsible User: Alexandra Rivera RD      Task: Provide education on ketone monitoring (when to monitor, frequency, etc.)    Responsible User: Alexandra Rivera RD      Goal: Taking Medication - patient is consistently taking medications as directed    This Visit's Progress: 0%   Note:    Increase Metformin to prescribed dose  in one week.      Task: Provide education on action of prescribed medication, including when to take and possible side effects    Responsible User: Alexandra Rivera RD      Task: Provide education on insulin and injectable diabetes medications, including administration, storage, site selection and rotation for injection sites    Responsible User: Alexandra Rivera RD      Task: Discuss barriers to medication adherence with patient and provide management technique ideas as appropriate    Responsible User: Alexandra Rivera RD      Task: Provide education on frequency and refill details of medications    Responsible User: Alexandra Rivera RD      Goal: Problem Solving - know how to prevent and manage short-term diabetes complications       Task: Provide education on high blood glucose - causes, signs/symptoms, prevention and treatment    Responsible User: Alexandra Rivera RD      Task: Provide education on low blood glucose - causes, signs/symptoms, prevention, treatment, carrying a carbohydrate source at all times, and medical identification    Responsible User: Alexandra Rivera RD      Task: Provide education on safe travel with diabetes    Responsible User: Alexandra Rivera RD      Task: Provide education on how to care for diabetes on sick days    Responsible User: Alexandra Rivera RD      Task: Provide education on when to call a health care provider    Responsible User: Alexandra Rivera RD      Goal: Reducing Risks - know how to prevent and treat long-term diabetes complications       Task: Provide education on major complications of diabetes, prevention, early diagnostic measures and treatment of complications    Responsible User: Alexandra Rivera RD      Task: Provide education on recommended care for dental, eye and foot health    Responsible User: Alexandra Rivera RD      Task: Provide education on Hemoglobin A1c - goals and relationship to blood glucose levels    Responsible User: Alexandra Rivera RD      Task: Provide  education on recommendations for heart health - lipid levels and goals, blood pressure and goals, and aspirin therapy, if indicated    Responsible User: Alexandra Rivera RD      Task: Provide education on tobacco cessation    Responsible User: Alexandra Rivera RD      Goal: Healthy Coping - use available resources to cope with the challenges of managing diabetes       Task: Discuss recognizing feelings about having diabetes    Responsible User: Alexandra Rivera RD      Task: Provide education on the benefits of making appropriate lifestyle changes    Responsible User: Alexandra Rivera RD      Task: Provide education on benefits of utilizing support systems    Responsible User: Alexandra Rivera RD      Task: Discuss methods for coping with stress    Responsible User: Alexandra Rivera RD      Task: Provide education on when to seek professional counseling    Responsible User: Alexandra Rivera RD              Time Spent: 35 minutes  Encounter Type: Individual    Any diabetes medication dose changes were made via the CDE Protocol per the patient's primary care provider. A copy of this encounter was shared with the provider.

## 2023-06-28 NOTE — LETTER
6/28/2023         RE: Leora Sanchez  [address] [place] [address]        Dear Colleague,    Thank you for referring your patient, Leora Sanchez, to the [facility]. Please see a copy of my visit note below.    Diabetes Self-Management Education & Support/ 35 minutes through professional  Raimundo white    Presents for: Individual review    Type of Service: Telephone Visit    Originating Location (Patient Location): Home  Distant Location (Provider Location): [facility]  Mode of Communication:  Telephone    Telephone Visit Start Time: 2p  Telephone Visit End Time (telephone visit stop time): 2:35        Assessment Type:   ASSESSMENT:  May reported s/s of hyperglycemia, which we discussed was normal to expect with her increased A1C. She is checking her blood sugar at home in the morning before eating, today's OH=213. No other BG readings were available to share.  She is taking Metformin 500 mg, two tablets/day.  I reviewed with her the recommendation to increase to four tablets/day as prescribed by her PCP after her last visit on 6/23/23. May is walking 10 minutes/daily. She is consuming two meals/day: traditional Hmong diet with smaller portions of rice and noodles. She is not drinking sugary beverages.  She was not quite sure when she had her last eye exam, so she will follow up on this appointment.     Patient's most recent   Lab Results   Component Value Date    A1C 8.8 06/23/2023    A1C 5.7 01/18/2011     is not meeting goal of <8.0    Diabetes knowledge and skills assessment:   Patient is knowledgeable in diabetes management concepts related to: Healthy Eating, Being Active and Monitoring    Continue education with the following diabetes management concepts: Taking Medication and Reducing Risks    Based on learning assessment above, most appropriate setting for further diabetes education would be: Individual setting.      PLAN  1. Test blood sugar  "once in the morning before eating.  2. Limit rice and noodles to one small bowl per meal.  3. Limit higher purine foods in relation to gout as recommended by PCP.  4. Keep fruit between meals as a snack.  5. Continue to walk daily for 10 minutes, add in a second walking session when able.   6. Follow up on scheduling a yearly diabetic eye exam.     Topics to cover at upcoming visits: Monitoring, Taking Medication and Reducing Risks    Follow-up: MTM on 7/26/23    See Care Plan for co-developed, patient-state behavior change goals.  AVS provided for patient today.    Education Materials Provided:  Living Healthy with Diabetes and Gout information      SUBJECTIVE/OBJECTIVE:  Presents for: Individual review  Accompanied by: Self,   Diabetes education in the past 24mo: No  Focus of Visit: Monitoring, Taking Medication, Healthy Eating, Being Active  Diabetes type: Type 2  Disease course: Worsening  Other concerns:: Language barrier  Cultural Influences/Ethnic Background:  Not  or       Diabetes Symptoms & Complications:  Fatigue: Yes  Neuropathy: Yes (hands are tingling, feet are cold)  Polydipsia: Yes  Polyuria: Yes  Visual change: Yes  Slow healing wounds: No       Patient Problem List and Family Medical History reviewed for relevant medical history, current medical status, and diabetes risk factors.    Vitals:  There were no vitals taken for this visit.  Estimated body mass index is 41.73 kg/m  as calculated from the following:    Height as of 6/23/23: 1.5 m (4' 11.06\").    Weight as of 6/23/23: 93.9 kg (207 lb).   Last 3 BP:   BP Readings from Last 3 Encounters:   06/23/23 138/87   04/21/23 122/80   03/31/23 108/76       History   Smoking Status     Never   Smokeless Tobacco     Never       Labs:  Lab Results   Component Value Date    A1C 8.8 06/23/2023    A1C 5.7 01/18/2011     Lab Results   Component Value Date     06/23/2023     06/02/2022     04/26/2013     Lab " Results   Component Value Date    LDL 89 06/23/2023     08/20/2020     04/26/2013     HDL Cholesterol   Date Value Ref Range Status   04/26/2013 65 >39 mg/dL Final     Direct Measure HDL   Date Value Ref Range Status   06/23/2023 68 >=50 mg/dL Final   ]  GFR Estimate   Date Value Ref Range Status   06/23/2023 78 >60 mL/min/1.73m2 Final   04/15/2021 49 (L) >60 mL/min/1.73m2 Final   04/26/2013 > 60 >60 ml/min/1.73m2 Final     GFR, ESTIMATED POCT   Date Value Ref Range Status   03/31/2023 >60 >60 mL/min/1.73m2 Final     GFR Estimate If Black   Date Value Ref Range Status   04/15/2021 60 (L) >60 mL/min/1.73m2 Final   04/26/2013 > 60 >60 ml/min/1.73m2 Final     Lab Results   Component Value Date    CR 0.88 06/23/2023    CR 0.82 04/26/2013     No results found for: MICROALBUMIN    Healthy Eating:  Healthy Eating Assessed Today: Yes  Meals include: Lunch, Dinner  Breakfast: coffee with honey  Lunch: traditional Hmong diet: rice( one fist size), leafy greens or other vegetables with meat  Dinner: similiar to noon meal  Snacks: not to much, some fruit on occasion  Other: Bernard meal is usually when she eats out, one kids size portion, maybe 2x/month.  Beverages: Water    Being Active:  Being Active Assessed Today: Yes (walking daily around the block 10 minutes)  Exercise:: Yes  Days per week of moderate to strenuous exercise (like a brisk walk): 6  On average, minutes per day of exercise at this level: 10  Exercise Minutes per Week: 60    Monitoring:  Monitoring Assessed Today: Yes  Did patient bring glucose meter to appointment? : No  Times checking blood sugar at home (number): 1  Times checking blood sugar at home (per): Day        Taking Medications:  Diabetes Medication(s)     Biguanides       metFORMIN (GLUCOPHAGE XR) 500 MG 24 hr tablet    Take 2 tablets (1,000 mg) by mouth 2 times daily (with meals)          Taking Medication Assessed Today: Yes  Current Treatments: Diet, Oral Medication (taken by  mouth)  Problems taking diabetes medications regularly?: No    Problem Solving:  Is the patient at risk for hypoglycemia?: No              Reducing Risks:  Diabetes Risks: Age over 45 years, Ethnicity  Has dilated eye exam at least once a year?:  (patient is unsure when she had her last eye exam)    Healthy Coping:  Emotional response to diabetes: Ready to learn, Concern for health and well-being  Informal Support system:: Children, Family  Stage of change: PREPARATION (Decided to change - considering how)  Patient Activation Measure Survey Score:       No data to display                  Care Plan and Education Provided:  Care Plan: Diabetes   Updates made by Alexandra Rivera RD since 6/28/2023 12:00 AM      Problem: HbA1C Not In Goal       Goal: Establish Regular Follow-Ups with PCP       Task: Discuss with PCP the recommended timing for patient's next follow up visit(s)    Responsible User: Alexandra Rivera RD      Task: Discuss schedule for PCP visits with patient    Responsible User: Alexandra Rivera RD      Goal: Get HbA1C Level in Goal       Task: Educate patient on diabetes education self-management topics    Responsible User: Alexandra Rivera RD      Task: Educate patient on benefits of regular glucose monitoring    Responsible User: Alexandra Rivera RD      Task: Refer patient to appropriate extended care team member, as needed (Medication Therapy Management, Behavioral Health, Physical Therapy, etc.)    Responsible User: Alexandra Rivera RD      Task: Discuss diabetes treatment plan with patient    Responsible User: Alexandra Rivera RD      Problem: Diabetes Self-Management Education Needed to Optimize Self-Care Behaviors       Goal: Understand diabetes pathophysiology and disease progression       Task: Provide education on diabetes pathophysiology and disease progression specfic to patient's diabetes type    Responsible User: Alexandra Rivera RD      Goal: Healthy Eating - follow a healthy eating pattern for  diabetes       Task: Provide education on portion control and consistency in amount, composition and timing of food intake    Responsible User: Alexandra Rivera RD      Task: Provide education on managing carbohydrate intake (carbohydrate counting, plate planning method, etc.)    Responsible User: Alexandra Rivera RD      Task: Provide education on weight management    Responsible User: Alexandra Rivera RD      Task: Provide education on heart healthy eating    Responsible User: Alexandra Rivera RD      Task: Provide education on eating out    Responsible User: Alexandra Rivera RD      Task: Develop individualized healthy eating plan with patient    Responsible User: Alexandra Rivera RD      Goal: Being Active - get regular physical activity, working up to at least 150 minutes per week       Task: Provide education on relationship of activity to glucose and precautions to take if at risk for low glucose    Responsible User: Alexandra Rivera RD      Task: Discuss barriers to physical activity with patient    Responsible User: Alexandra Rivera RD      Task: Develop physical activity plan with patient    Responsible User: Alexandra Rivera RD      Task: Explore community resources including walking groups, assistance programs, and home videos    Responsible User: Alexandra Rivera RD      Goal: Monitoring - monitor glucose and ketones as directed    This Visit's Progress: 0%   Note:    Check blood sugar once daily.      Task: Provide education on blood glucose monitoring (purpose, proper technique, frequency, glucose targets, interpreting results, when to use glucose control solution, sharps disposal)    Responsible User: Alexandra Rivera RD      Task: Provide education on continuous glucose monitoring (sensor placement, use of rekha or /reader, understanding glucose trends, alerts and alarms, differences between sensor glucose and blood glucose)    Responsible User: Alexandra Rivera RD      Task: Provide education on ketone  monitoring (when to monitor, frequency, etc.)    Responsible User: Alexandra Rivera RD      Goal: Taking Medication - patient is consistently taking medications as directed    This Visit's Progress: 0%   Note:    Increase Metformin to prescribed dose in one week.      Task: Provide education on action of prescribed medication, including when to take and possible side effects    Responsible User: Alexandra Rivera RD      Task: Provide education on insulin and injectable diabetes medications, including administration, storage, site selection and rotation for injection sites    Responsible User: Alexandra Rivera RD      Task: Discuss barriers to medication adherence with patient and provide management technique ideas as appropriate    Responsible User: Alexandra Rivera RD      Task: Provide education on frequency and refill details of medications    Responsible User: Alexandra Rivera RD      Goal: Problem Solving - know how to prevent and manage short-term diabetes complications       Task: Provide education on high blood glucose - causes, signs/symptoms, prevention and treatment    Responsible User: Alexandra Rivera RD      Task: Provide education on low blood glucose - causes, signs/symptoms, prevention, treatment, carrying a carbohydrate source at all times, and medical identification    Responsible User: Alxeandra Rivera RD      Task: Provide education on safe travel with diabetes    Responsible User: Alexandra Rivera RD      Task: Provide education on how to care for diabetes on sick days    Responsible User: Alexandra Rivera RD      Task: Provide education on when to call a health care provider    Responsible User: Alexandra Rivera RD      Goal: Reducing Risks - know how to prevent and treat long-term diabetes complications       Task: Provide education on major complications of diabetes, prevention, early diagnostic measures and treatment of complications    Responsible User: Alexandra Rivera RD      Task: Provide  education on recommended care for dental, eye and foot health    Responsible User: Alexandra Rivera RD      Task: Provide education on Hemoglobin A1c - goals and relationship to blood glucose levels    Responsible User: Alexandra Rivera RD      Task: Provide education on recommendations for heart health - lipid levels and goals, blood pressure and goals, and aspirin therapy, if indicated    Responsible User: Alexandra Rivera RD      Task: Provide education on tobacco cessation    Responsible User: Alexandra Rivera RD      Goal: Healthy Coping - use available resources to cope with the challenges of managing diabetes       Task: Discuss recognizing feelings about having diabetes    Responsible User: Alexandra Rivera RD      Task: Provide education on the benefits of making appropriate lifestyle changes    Responsible User: Alexandra Rivera RD      Task: Provide education on benefits of utilizing support systems    Responsible User: Alexandra Rivera RD      Task: Discuss methods for coping with stress    Responsible User: Alexandra Rivera RD      Task: Provide education on when to seek professional counseling    Responsible User: Alexandra Rivera RD              Time Spent: 35 minutes  Encounter Type: Individual    Any diabetes medication dose changes were made via the CDE Protocol per the patient's primary care provider. A copy of this encounter was shared with the provider.

## 2023-06-28 NOTE — PATIENT INSTRUCTIONS
Test blood sugar once in the morning before eating.  Limit rice and noodles to one small bowl per meal.  Limit higher purine foods in relation to gout as recommended by PCP.  Keep fruit between meals as a snack.  Continue to walk daily for 10 minutes, add in a second walking session when able.   Follow up on scheduling a yearly diabetic eye exam.

## 2023-06-30 ENCOUNTER — HOSPITAL ENCOUNTER (OUTPATIENT)
Dept: CT IMAGING | Facility: HOSPITAL | Age: 53
Discharge: HOME | End: 2023-06-30
Attending: FAMILY MEDICINE
Payer: MEDICARE

## 2023-06-30 ENCOUNTER — ANCILLARY PROCEDURE (OUTPATIENT)
Dept: MAMMOGRAPHY | Facility: HOSPITAL | Age: 53
End: 2023-06-30
Attending: FAMILY MEDICINE
Payer: MEDICARE

## 2023-06-30 DIAGNOSIS — R91.8 PULMONARY NODULES: ICD-10-CM

## 2023-06-30 DIAGNOSIS — Z12.31 VISIT FOR SCREENING MAMMOGRAM: ICD-10-CM

## 2023-06-30 DIAGNOSIS — R91.8 PULMONARY NODULES: Primary | ICD-10-CM

## 2023-06-30 PROCEDURE — 77067 SCR MAMMO BI INCL CAD: CPT

## 2023-06-30 PROCEDURE — G1010 CDSM STANSON: HCPCS

## 2023-06-30 PROCEDURE — 71250 CT THORAX DX C-: CPT | Mod: ME

## 2023-06-30 NOTE — RESULT ENCOUNTER NOTE
Team - please call patient with results and send result letter with this information if patient desires for their records.     The left side of her lung has a lot of scaring in it- not sure why this is  I would like her to see a lung doctor pretty soon so we can learn more about this  Could be something serious and make her sick.      I put a referral in and they will call her to get this scheduled

## 2023-07-03 ENCOUNTER — PATIENT OUTREACH (OUTPATIENT)
Dept: ONCOLOGY | Facility: CLINIC | Age: 53
End: 2023-07-03
Payer: MEDICARE

## 2023-07-03 ENCOUNTER — PATIENT OUTREACH (OUTPATIENT)
Dept: CARE COORDINATION | Facility: CLINIC | Age: 53
End: 2023-07-03
Payer: MEDICARE

## 2023-07-03 DIAGNOSIS — R91.8 PULMONARY NODULES: Primary | ICD-10-CM

## 2023-07-03 NOTE — PROGRESS NOTES
Clinic Care Coordination Contact  Care Coordination Clinician Chart Review    Situation: Patient chart reviewed by Care Coordinator.       Background: Care Coordination Program started: 12/10/2020. Initial assessment completed and patient-centered care plan(s) were developed with participation from patient. Lead CC handed patient off to CHW for continued outreaches.       Assessment: Per chart review, patient outreach completed by CC CHW on 6/28/23.  Patient is actively working to accomplish goal(s). Patient's goal(s) appropriate and relevant at this time. Patient is not due for updated Plan of Care.  Assessments will be completed annually or as needed/with change of patient status.      Care Plan: Financial Wellbeing     Problem: Patient expresses financial resource strain     Goal: I would like to review medical bills and get understanding of coverage     Start Date: 3/28/2023    This Visit's Progress: 30% Recent Progress: 30%    Note:     Barriers: coverage  Strengths: engagement  Patient expressed understanding of goal: yes  Action steps to achieve this goal:  1. I will bring in a copy of medical bills for CCC team to review and get better understanding of Patient responsibility and coverage  2. I will speak with  CC to review and get better understanding   3. I will update Care coordination team during next outreach  4. I will updates status with CHW/CCC team after my son wedding. (Updated: 5/19/2023)    (Updated: 6/28/2023)- no time to discuss goal at this time due to pt appt time with DM Ed today. (CHW MX)                    Care Plan: Medical     Problem: HP GENERAL PROBLEM     Goal: I would like to have a plan of care for Cardiology health within 3-4 months     Start Date: 3/28/2023    This Visit's Progress: 30% Recent Progress: 30%    Note:     Barriers: language  Strengths: family   Patient expressed understanding of goal: yes  Action steps to achieve this goal:  1. I will schedule and attend visit with  cardiology provider - number to call [phone].  Needs to schedule with Dr. Roy [facility] for July and [facility] in Oct.  2. I will update Care coordination team during next outreach  3. I will report to my Community Health Worker if any additional resources or support needed.  4. I will updates status with CHW/CCC team after my son wedding. (Updated: 5/19/2023)    (Updated: 6/28/2023)- no time to discuss goal at this time due to pt appt time with DM Ed today. (CHW MX)            Goal: I would like to attend Annual wellness exam     Expected End Date: 6/23/2023    This Visit's Progress: 10% Recent Progress: 10%    Note:       Patient expressed understanding of goal: yes  Action steps to achieve this goal:  1. I will schedule my annual wellness visit; [id number] ([phone]) scheduled for 6/23  2. I will attend my annual wellness visit.  3. I will contact my Care Management or clinic team if I have barriers to attending my annual wellness visit.   4. I will updates status with CCC team after my son wedding. (Updated: 5/19/2023)    (Updated: 6/28/2023)- no time to discuss goal at this time due to pt appt time with DM Ed today. (CHW MX)            Goal: I will fill all medications and follow recommendations for theapy and dosing plan.     This Visit's Progress: 50%    Note:     1. I will updates status with CCC team after my son wedding. (Updated: 5/19/2023)  2. I will attend appt today, 6/28/2023 at 2:00PM with DM Ed Alexandra via video. (Updated: 6/28/2023)-CHW MX                           Plan/Recommendations: The patient will continue working with Care Coordination to achieve goal(s) as above. CHW will continue outreaches at minimum every 30 days and will involve Lead CC as needed or if patient is ready to move to Maintenance. Lead CC will continue to monitor CHW outreaches and patient's progress to goal(s) every 6 weeks.     Plan of Care updated and sent to patient: No

## 2023-07-03 NOTE — PROGRESS NOTES
New Patient: Interventional Pulmonary (Lung nodule) Nurse Navigator Note    Referring provider: Jaky Gaspar [facility]     Referred to (specialty): Interventional Pulmonary (Lung nodule)    Requested provider (if applicable): n/a    Date Referral Received: 6/30/2023    Evaluation for :  Lung nodule    Clinical History (per Nurse review of records provided):    **BOOK MARKED**    6/30/23:  CT Chest w/o Contrast   FINDINGS:   LUNGS AND PLEURA: Interval significant enlargement lobulated semisolid predominantly ground-glass left upper lobe nodule now 15 x 11 x 21 mm, previously 10 x 9 x 10 mm. Small sub-5 mm area of solid soft tissue component but majority of lesion is   ground-glass.  IMPRESSION:   1.  Lobulated semisolid predominantly ground-glass left upper lobe nodule has significantly enlarged since 2020 raising possibility of low-grade/indolent adenocarcinoma. Infectious/inflammatory etiology consider less likely. Recommend pulmonary  consultation for further management and consideration tissue diagnosis. Percutaneous biopsy would be challenging given little soft tissue component.  2.  Lungs otherwise clear. No other new/enlarging nodules.  3.  No lymphadenopathy.      Records Location (Care Everywhere, Media, etc.): [facility]     Records Needed: none    Additional testing needed prior to consult: NONE

## 2023-07-03 NOTE — TELEPHONE ENCOUNTER
Pulmonary Nodule Conference - July 7th, 2023      Patient Name: May MARICHUY Sanchez    Reason for conference discussion (brief overview): 52 year old Female. Never smoker. PFTs from March 2018 show normal spirometry - no updated ones. Referred from PCP because June 30th showed 'Interval significant enlargement lobulated semisolid predominantly ground-glass left upper lobe nodule now 15 x 11 x 21 mm, previously 10 x 9 x 10 mm.' This has been enlarging since 2020.     Specific Question:  Should we try to biopsy (bronchoscopic) or consult w thoracic surgery?     Pertinent Histology:  n/a    Referring Physician: Dr. Jaky Gaspar (pt PCP) / Dr. Iris Salas    The patient's case was presented at the multidisciplinary conference for the above noted reason.  There was a consensus recommendation for the following actions:     Team consensus is for pt to see thoracic surgery for wedge resection and updated PFTs (can see in [place]).  Thoracic referral placed.   PFTs ordered.    Case Lead:  Lela Bartholomew IP RNCC    Interventional Radiology Staff Present: Dr. Chen

## 2023-07-03 NOTE — PROGRESS NOTES
I sent an IB message to the on-call IP members today.  Dr. Salas replied:  We should discuss this one on Friday  meeting.     Lela, can you help?     MONIQUE semisolid lobulated nodule, enlarging since 2019 and highly suspicious for malignancy. Non smoker.     Question, should we try to biopsy (bronchoscopic) or consult w thoracic surgery.     Thanks,   (Dr. Salas)

## 2023-07-05 ENCOUNTER — TELEPHONE (OUTPATIENT)
Dept: FAMILY MEDICINE | Facility: CLINIC | Age: 53
End: 2023-07-05
Payer: MEDICARE

## 2023-07-05 NOTE — TELEPHONE ENCOUNTER
Called and spoke to patient relay provider message below. Stated still not heard from the  to call to schedule an appointment.     I spoke to Silvio and she will call the patient to help her schedule.

## 2023-07-05 NOTE — TELEPHONE ENCOUNTER
----- Message from Jaky Gaspar MD sent at 6/30/2023  4:49 PM CDT -----  Team - please call patient with results and send result letter with this information if patient desires for their records.     The left side of her lung has a lot of scaring in it- not sure why this is  I would like her to see a lung doctor pretty soon so we can learn more about this  Could be something serious and make her sick.      I put a referral in and they will call her to get this scheduled

## 2023-07-07 ENCOUNTER — PRE VISIT (OUTPATIENT)
Dept: OTHER | Age: 53
End: 2023-07-07

## 2023-07-07 ENCOUNTER — TEAM CONFERENCE (OUTPATIENT)
Dept: PULMONOLOGY | Facility: CLINIC | Age: 53
End: 2023-07-07
Payer: MEDICARE

## 2023-07-07 ENCOUNTER — PATIENT OUTREACH (OUTPATIENT)
Dept: CARE COORDINATION | Facility: CLINIC | Age: 53
End: 2023-07-07
Payer: MEDICARE

## 2023-07-07 DIAGNOSIS — R91.8 PULMONARY NODULES: Primary | ICD-10-CM

## 2023-07-07 NOTE — PROGRESS NOTES
"Clinic Care Coordination Contact:    Per [facility] CCC mailbox check today, 7/07/2023;   Pt dropped off \"[facility] Care Application\" to the clinic with dated \"5-\" and signature.  Total pages received: 3 (1/3 page is the katie care check list).    DUGLAS Muniz is working with patient per pt chart reviewed.   Please see FRW encounter dated 6/15/2023 for details if need.   Total of 3 pages sent to DUGLAS Muniz via e-mail communication with today's date 7/7/2023.     Plan:   -FRW connect with patient regards to katie care paperwork.  -Pt connect with CHW/CCC to go over goals updates and any additional resources need.  -Next CHW outreach plan: 1 month (month of August).  "

## 2023-07-07 NOTE — TELEPHONE ENCOUNTER
RECORDS STATUS - ALL OTHER DIAGNOSIS      Action July 7, 2023 11:49 AM    Action Taken Per IB:  This is a new referral to Thoracic Surgery. Was discussed this morning at Nodule Conference.   Dr Anderson is requesting to see this patient at [facility]. Needs PFT's prior to appointment.     Referral and PFT orders are in. Please make sure we have all her records prior to appointment.     12:04 PM:   Per Mecca Hurley RN note: pt is not available to schedule an appt until Mid August due to other priorities.        RECORDS RECEIVED FROM: Epic   DATE RECEIVED:    NOTES STATUS DETAILS   OFFICE NOTE from referring provider Epic 7/7/23: Dr. Marcello Salas   OFFICE NOTE from other specialist Epic 6/23/23: Dr. Jaky Gaspar   OPERATIVE REPORT Epic PFT: 9/18/18, 3/22/18   MEDICATION LIST [facility]    LABS     PATHOLOGY REPORTS     ANYTHING RELATED TO DIAGNOSIS Epic Most recent from 6/23/23   IMAGING (NEED IMAGES & REPORT)     CT SCANS  6/30/23: CT Chest  3/29/20-9/10/18: CT Chest Pulmonary Embolism   MRI     XRAYS  8/20/20-10/30/17: XR Chest   ULTRASOUND     PET

## 2023-07-07 NOTE — PROGRESS NOTES
Per Team conference discussion on 7/7/23, it was decided that patient should see thoracic surgery for consult rather than IP.     Writer called May with the assistance of the [facility]  and discussed the referral. Writer discussed the referral and explained the doctor's recommendations were for her to see Thoracic Surgery. May reports she was not aware of the growing lung nodule so we discussed that and why a referral was placed by Dr. Gaspar.    May reports she is leaving today to go to [place] until July 24th and when she gets back she will be babysitting her granddaughter until August 7th so she is not available until after that. She also requested to see Thoracic on a Friday because her  does not work on Fridays and that would be the only day he could drive her.     Writer reinforced how important it is that she follow through with appts for this lung nodule and explained we do not have any thoracic surgeons seeing patients in clinic on Fridays. She ultimately agreed to be scheduled with Dr. Anderson in [place] and understands she will need PFTs prior.     Scheduling instructions sent to NPS to schedule.  gave May the NPS phone number if she has any additional questions or concerns.     TATIANA HerreraN, RN, OCN  [facility] Oncology Nurse Navigator  [phone] / [phone]

## 2023-07-25 NOTE — PROGRESS NOTES
Medication Therapy Management (MTM) Encounter    ASSESSMENT:                            Medication Adherence/Access: Fill dates and quantity on hand indicate some adherence concerns. Patient likely at least 45 days behind based on fill dates. Patient unsure how she's so far behind on her meds. May be missing more doses than she actually realizes. Discussed that she can continue to take all meds once daily (including 4 metformin tabs). Discussed setting alarms on phone to remember to take medicines.       Type 2 Diabetes:  Last A1C above goal <8%. Home blood glucose near goal of , fasting. Based on 5/15 fill date- patient still likely has enough medicine to get her through mid-end of August, despite doubling of dose, likely does not need additional therapy, but more consistent with med use. Patient does ask about alternatives to tablets such as insulin. Daughter-in-law is using a once weekly medicine and has had some weight loss with this and patient was curious about it. Discussed that is likely a weekly GLP1 - reasonable to trial with a lower dose of metformin.         Nonischemic cardiomyopathy: Last LDL at goal <70. Missing aspirin - didn't understand indication for it. Education and refill provided today.       HFrEF/CHF: blood pressure today at goal <130/80. Pulse just below goal . Has previously been at low end of goal. Doesn't have a scale to weigh herself at home. Clinic weights have been stable. With adherence concerns, may not need as high a dose of beta-blocker. As patient is having dizziness, lightheadedness today, will reduce dose. Continue to focus on adherence as adequate use of beta blocker and ARB can manage swelling, then may not need furosemide or potassium.        Chronic gout: Last uric acid above goal <6. Pain has improved. Will recheck levels today and adjust dose as needed.        Tension headache/Depression: Having more frequent headaches without Venlafaxine. Can restart today,  but as patient has been off for at least 1 month, will restart with lower dose. This may also help to reduce hot flashes/sweating.     Will also check Vitamin D levels and replete to help with headache pain and depression.       GERD: Well managed per patient report.       PLAN:                            Restart Aspirin 81 mg daily. This medicine helps to prevent clots and lowers your risk of heart attack and stroke.   Reduce Metoprolol Succinate 100 mg to 0.5 tab daily.   Restart Venlafaxine at 75 mg ER daily.   Repeat Uric acid, Vitamin D level.   We set alarms on your phone for 8 am to help you remember to take medicines. Okay to take all of your medicines once daily.   Reduce Metformin 500 mg er to 2 tab twice daily.   Start Ozempic 0.25 mg weekly for blood glucose. Device education provided.     Follow-up: Return in about 4 weeks (around 8/23/2023) for Medication Management Pharmacist, in person.    SUBJECTIVE/OBJECTIVE:                          Leora Sanchez is a 53 year old female coming in for an initial visit. She was referred to me from Jaky Gaspar MD. [employer]  by phone (ID# [id number]).      Last [facility] visit in June 2021.     Reason for visit: Medication Management.- Referred for diabetes follow-up.     Allergies/ADRs: Reviewed in chart  Past Medical History: Reviewed in chart  Tobacco: She reports that she has never smoked. She has never used smokeless tobacco.  Alcohol:  reports no history of alcohol use.       Medication Adherence/Access:     Son and  help with managing meds at home. They set up a pill box for 1 month at a time in a 4 x 7 box. Usually takes medicines once a day, but last visit PCP instructed patient to take diabetes medicine twice daily. Just filled the box this morning so has 1 month set at home.     Misses a dose every couple of weeks.     Brings meds to the visit today:   Metformin 5/15 #180 ~20 remain   Atorvastatin5/15 #90 ~about half remain   Metoprolol ER 5/1  #90 ~half remain   Allopurinol  #90 - ~half full   Losartan  #90 ~half full   Omeprazole  #90 - half full   Furosemide 10/1/22 #180 - empty - states she has the rest of the tabs at home.   K+ -  #90 - half full   OTC b12   Imdur 5/15 #90 ~15 remain     Missing Aspirin, Venlafaxine.       Type 2 Diabetes: Prescribed metformin 500 mg ER 2 tabs twice daily (working through old supply with instructions of 1 tab twice daily)        Blood sugar monitorin time(s) daily; Ranges: (patient reported)     Fasting- 124 this morning. Over the last two weeks, ranges from 118 to 135  Did have a reading of 180 after a meal.      Hemoglobin A1C   Date Value Ref Range Status   2023 8.8 (H) 0.0 - 5.6 % Final     Comment:     Normal <5.7%   Prediabetes 5.7-6.4%    Diabetes 6.5% or higher     Note: Adopted from ADA consensus guidelines.   2022 6.8 (H) 0.0 - 5.6 % Final     Comment:     Normal <5.7%   Prediabetes 5.7-6.4%    Diabetes 6.5% or higher     Note: Adopted from ADA consensus guidelines.   2021 6.7 (H) 0.0 - 5.6 % Final     Comment:     Normal <5.7%   Prediabetes 5.7-6.4%    Diabetes 6.5% or higher     Note: Adopted from ADA consensus guidelines.   2011 5.7 4.2 - 6.1 % Final      Microalbumin Urine mg/dL   Date Value Ref Range Status   2021 <0.50 0.00 - 1.99 mg/dL Final      Creatinine Urine mg/dL   Date Value Ref Range Status   2023 114.0 mg/dL Final     Comment:     The reference ranges have not been established in urine creatinine. The results should be integrated into the clinical context for interpretation.   2021 101 mg/dL Final       Creatinine   Date Value Ref Range Status   2023 0.88 0.51 - 0.95 mg/dL Final   2013 0.82 0.60 - 1.10 mg/dL Final            Nonischemic cardiomyopathy: Prescribed aspirin 81 mg daily, atorvastatin 80 mg daily, isosorbide mononitrate 60 mg ER daily, nitroglycerin 0.4 mg as needed    No longer taking aspirin - thought it was  for headache.     Recent Labs   Lab Test 06/23/23  1452 12/07/21  1113   CHOL 183 178   HDL 68 59   LDL 89 89   TRIG 132 149       HFrEF: Prescribed furosemide 40 mg daily as needed, losartan 100 mg daily, isosorbide mononitrate 60 mg ER daily, metoprolol succinate 100 mg daily, potassium chloride 10 mEq ER daily     Does have some issues with fatigue, shortness of breath, and dizziness.     Only using Furosemide 2-3 times per week. Doesn't like using the medicine because it makes her urinate a lot and very often after taking.     BP Readings from Last 3 Encounters:   07/26/23 116/75   06/23/23 138/87   04/21/23 122/80      Pulse Readings from Last 3 Encounters:   07/26/23 52   06/23/23 61   04/21/23 75     Wt Readings from Last 3 Encounters:   06/23/23 207 lb (93.9 kg)   04/21/23 208 lb (94.3 kg)   03/31/23 208 lb 8 oz (94.6 kg)        Last Comprehensive Metabolic Panel:  Lab Results   Component Value Date     06/23/2023    POTASSIUM 4.8 06/23/2023    CHLORIDE 106 06/23/2023    CO2 25 06/23/2023    ANIONGAP 13 06/23/2023     (H) 06/23/2023    BUN 22.4 (H) 06/23/2023    CR 0.88 06/23/2023    GFRESTIMATED 78 06/23/2023    LYNN 9.5 06/23/2023          Chronic gout: Prescribed allopurinol 100 mg daily     No concerns as long as she's wearing open toed shoes. 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       Uric Acid   Date Value Ref Range Status   06/23/2023 7.4 (H) 2.4 - 5.7 mg/dL Final       Depression:   Tension headache: Prescribed acetaminophen 500 mg 2 tabs 3 times daily as needed, venlafaxine 150 mg ER daily    Requests a refill of her headache medicine. Has been out of Venlafaxine for about a month. No bottle at visit, but outside history indicates last filled 5/9 #90.     Also having issues with sweating/hotflashes. No menstrual cycle for the last year. Occurs randomly throughout the day.     When she was on the Effexor, was having headaches 1-2 times per month.  Without, having 1-2 times per week.     Notes that since her mom passed away, has been stressed and depressed and it's impacting her health. Kids do take her out to distract her and this does help with mood.       GERD: Prescribed Omeprazole 40 mg daily     Well managed with having regular meals and avoiding trigger foods.       Today's Vitals: /75   Pulse 52   ----------------      I spent 60 minutes with this patient today. All changes were made via collaborative practice agreement with Jaky Gaspar MD. A copy of the visit note was provided to the patient's provider(s).    A summary of these recommendations was given to the patient.    Art Thakkar, QuintonD  Medication Therapy Management (MTM) Pharmacist  [facility]          Medication Therapy Recommendations  Heart failure with reduced ejection fraction (H)    Current Medication: metoprolol succinate ER (TOPROL XL) 100 MG 24 hr tablet (Discontinued)   Rationale: Dose too high - Dosage too high - Safety   Recommendation: Decrease Dose   Status: Accepted per CPA   Note: half tab daily         Nonischemic cardiomyopathy (H)    Current Medication: aspirin 81 MG EC tablet (Discontinued)   Rationale: Medication product not available - Adherence - Adherence   Recommendation: Provide Adherence Intervention   Status: Accepted per CPA         Tension headache    Current Medication: venlafaxine (EFFEXOR XR) 150 MG 24 hr capsule (Discontinued)   Rationale: Medication product not available - Adherence - Adherence   Recommendation: Provide Adherence Intervention   Status: Accepted per CPA         Type 2 diabetes mellitus with hyperglycemia, without long-term current use of insulin (H)    Current Medication: metFORMIN (GLUCOPHAGE XR) 500 MG 24 hr tablet   Rationale: Synergistic therapy - Needs additional medication therapy - Indication   Recommendation: Start Medication - Ozempic (0.25 or 0.5 MG/DOSE) 2 MG/1.5ML Sopn   Status: Accepted per CPA

## 2023-07-26 ENCOUNTER — ALLIED HEALTH/NURSE VISIT (OUTPATIENT)
Dept: PHARMACY | Facility: CLINIC | Age: 53
End: 2023-07-26
Attending: FAMILY MEDICINE
Payer: MEDICARE

## 2023-07-26 ENCOUNTER — LAB (OUTPATIENT)
Dept: LAB | Facility: CLINIC | Age: 53
End: 2023-07-26
Payer: MEDICARE

## 2023-07-26 VITALS
HEART RATE: 52 BPM | BODY MASS INDEX: 42.13 KG/M2 | WEIGHT: 209 LBS | SYSTOLIC BLOOD PRESSURE: 116 MMHG | DIASTOLIC BLOOD PRESSURE: 75 MMHG

## 2023-07-26 DIAGNOSIS — N18.31 STAGE 3A CHRONIC KIDNEY DISEASE (H): ICD-10-CM

## 2023-07-26 DIAGNOSIS — E11.65 TYPE 2 DIABETES MELLITUS WITH HYPERGLYCEMIA, WITHOUT LONG-TERM CURRENT USE OF INSULIN (H): Primary | ICD-10-CM

## 2023-07-26 DIAGNOSIS — N18.31 TYPE 2 DIABETES MELLITUS WITH STAGE 3A CHRONIC KIDNEY DISEASE, WITHOUT LONG-TERM CURRENT USE OF INSULIN (H): ICD-10-CM

## 2023-07-26 DIAGNOSIS — I50.20 HEART FAILURE WITH REDUCED EJECTION FRACTION (H): ICD-10-CM

## 2023-07-26 DIAGNOSIS — E11.22 TYPE 2 DIABETES MELLITUS WITH STAGE 3A CHRONIC KIDNEY DISEASE, WITHOUT LONG-TERM CURRENT USE OF INSULIN (H): ICD-10-CM

## 2023-07-26 DIAGNOSIS — G44.209 TENSION HEADACHE: ICD-10-CM

## 2023-07-26 DIAGNOSIS — R12 HEARTBURN: ICD-10-CM

## 2023-07-26 DIAGNOSIS — M1A.0710 CHRONIC GOUT OF RIGHT FOOT, UNSPECIFIED CAUSE: ICD-10-CM

## 2023-07-26 DIAGNOSIS — E55.9 VITAMIN D DEFICIENCY: ICD-10-CM

## 2023-07-26 DIAGNOSIS — I42.8 NONISCHEMIC CARDIOMYOPATHY (H): ICD-10-CM

## 2023-07-26 PROCEDURE — 82306 VITAMIN D 25 HYDROXY: CPT

## 2023-07-26 PROCEDURE — 99207 PR NO CHARGE LOS: CPT | Performed by: PHARMACIST

## 2023-07-26 PROCEDURE — 36415 COLL VENOUS BLD VENIPUNCTURE: CPT

## 2023-07-26 PROCEDURE — 84550 ASSAY OF BLOOD/URIC ACID: CPT

## 2023-07-26 RX ORDER — ASPIRIN 81 MG/1
81 TABLET ORAL DAILY
Qty: 90 TABLET | Refills: 4 | Status: SHIPPED | OUTPATIENT
Start: 2023-07-26

## 2023-07-26 RX ORDER — METOPROLOL SUCCINATE 100 MG/1
50 TABLET, EXTENDED RELEASE ORAL DAILY
Qty: 90 TABLET | Refills: 3 | Status: SHIPPED | OUTPATIENT
Start: 2023-07-26 | End: 2023-10-27 | Stop reason: DRUGHIGH

## 2023-07-26 RX ORDER — METFORMIN HCL 500 MG
500 TABLET, EXTENDED RELEASE 24 HR ORAL 2 TIMES DAILY WITH MEALS
Qty: 360 TABLET | Refills: 4 | Status: SHIPPED | OUTPATIENT
Start: 2023-07-26 | End: 2024-09-06

## 2023-07-26 RX ORDER — SEMAGLUTIDE 0.68 MG/ML
0.25 INJECTION, SOLUTION SUBCUTANEOUS WEEKLY
Qty: 3 ML | Refills: 3 | Status: SHIPPED | OUTPATIENT
Start: 2023-07-26 | End: 2023-09-05

## 2023-07-26 RX ORDER — VENLAFAXINE HYDROCHLORIDE 75 MG/1
75 CAPSULE, EXTENDED RELEASE ORAL DAILY
Qty: 30 CAPSULE | Refills: 1 | Status: SHIPPED | OUTPATIENT
Start: 2023-07-26 | End: 2023-09-25

## 2023-07-26 NOTE — PATIENT INSTRUCTIONS
"Recommendations from today's MTM visit:                                                         Restart Aspirin 81 mg daily. This medicine helps to prevent clots and lowers your risk of heart attack and stroke.   Reduce Metoprolol Succinate 100 mg to 0.5 tab daily.   Restart Venlafaxine at 75 mg ER daily.   Repeat Uric acid, Vitamin D level.   We set alarms on your phone for 8 am to help you remember to take medicines. Okay to take all of your medicines once daily.   Reduce Metformin 500 mg er to 2 tab twice daily.   Start Ozempic 0.25 mg weekly for blood glucose     Follow-up: Return in about 4 weeks (around 8/23/2023) for Medication Management Pharmacist, in person.    It was great speaking with you today.  I value your experience and would be very thankful for your time in providing feedback in our clinic survey. In the next few days, you may receive an email or text message from [employer] with a link to a survey related to your  clinical pharmacist.\"     To schedule another MTM appointment, please call the clinic directly or you may call the MTM scheduling line at [phone] or toll-free at [phone].     My Clinical Pharmacist's contact information:                                                      Please feel free to contact me with any questions or concerns you have.      Art Thakkar, PharmD  Medication Therapy Management (MTM) Pharmacist  [facility]      "

## 2023-07-27 ENCOUNTER — TELEPHONE (OUTPATIENT)
Dept: FAMILY MEDICINE | Facility: CLINIC | Age: 53
End: 2023-07-27
Payer: MEDICARE

## 2023-07-27 LAB
DEPRECATED CALCIDIOL+CALCIFEROL SERPL-MC: 23 UG/L (ref 20–75)
URATE SERPL-MCNC: 5.3 MG/DL (ref 2.4–5.7)

## 2023-07-27 NOTE — TELEPHONE ENCOUNTER
Provider response below relayed to patient. Message understood. Use  line to contact patient :Adam ID:[id number]

## 2023-07-27 NOTE — TELEPHONE ENCOUNTER
----- Message from Art Thakkar PharmD sent at 7/27/2023  1:07 PM CDT -----  Please call and inform:     Vitamin D levels low. Starting supplement. Vitamin D3 5000 units daily.

## 2023-07-27 NOTE — TELEPHONE ENCOUNTER
----- Message from Art Thakkar PharmD sent at 7/27/2023 11:15 AM CDT -----  Please call patient and inform:     Uric acid (that contributes to gout) is now in normal range. Continue allopurinol.

## 2023-07-28 ENCOUNTER — ALLIED HEALTH/NURSE VISIT (OUTPATIENT)
Dept: PULMONOLOGY | Facility: CLINIC | Age: 53
End: 2023-07-28
Attending: CLINICAL NURSE SPECIALIST
Payer: MEDICARE

## 2023-07-28 DIAGNOSIS — R91.8 PULMONARY NODULES: ICD-10-CM

## 2023-07-28 LAB
DLCOCOR-%PRED-PRE: 87 %
DLCOCOR-PRE: 15.73 ML/MIN/MMHG
DLCOUNC-%PRED-PRE: 83 %
DLCOUNC-PRE: 15.01 ML/MIN/MMHG
DLCOUNC-PRED: 18.01 ML/MIN/MMHG
ERV-%PRED-PRE: 6 %
ERV-PRE: 0.05 L
ERV-PRED: 0.77 L
EXPTIME-PRE: 6.03 SEC
FEF2575-%PRED-PRE: 113 %
FEF2575-PRE: 2.47 L/SEC
FEF2575-PRED: 2.18 L/SEC
FEFMAX-%PRED-PRE: 97 %
FEFMAX-PRE: 5.74 L/SEC
FEFMAX-PRED: 5.92 L/SEC
FEV1-%PRED-PRE: 87 %
FEV1-PRE: 1.85 L
FEV1FEV6-PRE: 88 %
FEV1FEV6-PRED: 82 %
FEV1FVC-PRE: 87 %
FEV1FVC-PRED: 82 %
FEV1SVC-PRE: 86 %
FEV1SVC-PRED: 69 %
FIFMAX-PRE: 4.21 L/SEC
FRCPLETH-%PRED-PRE: 71 %
FRCPLETH-PRE: 1.58 L
FRCPLETH-PRED: 2.22 L
FVC-%PRED-PRE: 81 %
FVC-PRE: 2.13 L
FVC-PRED: 2.61 L
HGB BLD-MCNC: 12 G/DL
IC-%PRED-PRE: 95 %
IC-PRE: 2.1 L
IC-PRED: 2.19 L
RVPLETH-%PRED-PRE: 112 %
RVPLETH-PRE: 1.53 L
RVPLETH-PRED: 1.36 L
TLCPLETH-%PRED-PRE: 83 %
TLCPLETH-PRE: 3.68 L
TLCPLETH-PRED: 4.4 L
VA-%PRED-PRE: 73 %
VA-PRE: 2.99 L
VC-%PRED-PRE: 69 %
VC-PRE: 2.15 L
VC-PRED: 3.09 L

## 2023-07-28 PROCEDURE — 94729 DIFFUSING CAPACITY: CPT | Performed by: INTERNAL MEDICINE

## 2023-07-28 PROCEDURE — 85018 HEMOGLOBIN: CPT | Performed by: INTERNAL MEDICINE

## 2023-07-28 PROCEDURE — 94726 PLETHYSMOGRAPHY LUNG VOLUMES: CPT | Performed by: INTERNAL MEDICINE

## 2023-07-28 PROCEDURE — 94375 RESPIRATORY FLOW VOLUME LOOP: CPT | Performed by: INTERNAL MEDICINE

## 2023-08-03 NOTE — PROGRESS NOTES
Clinic Care Coordination Contact:    Communication:   Pt outreach follow up 8/01/2023 moved to month of 8/07/2023 due to CHW time off schedule.    CHW Plan: CHW team plan to connect with pt on week of 8/07/2023 or sooner.

## 2023-08-07 ENCOUNTER — PATIENT OUTREACH (OUTPATIENT)
Dept: CARE COORDINATION | Facility: CLINIC | Age: 53
End: 2023-08-07
Payer: MEDICARE

## 2023-08-07 NOTE — PROGRESS NOTES
"Clinic Care Coordination Contact  Community Health Worker Follow Up    Reason: CCC CHW Follow Up Called (follow up current goal's)    Care Gaps:   Health Maintenance Due   Topic Date Due     DIABETIC FOOT EXAM  Never done     COLORECTAL CANCER SCREENING  Never done     ZOSTER IMMUNIZATION (1 of 2) Never done     EYE EXAM  12/21/2022     Pt agreed to discuss due care gaps details/completion with Dr. Gaspar on 10/27/2023 per CHW verified.    Care Plan:   Care Plan: Financial Wellbeing     Problem: Patient expresses financial resource strain     Goal: I would like to review medical bills and get understanding of coverage     Start Date: 3/28/2023    This Visit's Progress: 50% Recent Progress: 30%    Note:     Barriers: coverage  Strengths: engagement  Patient expressed understanding of goal: yes  Action steps to achieve this goal:  1. I will bring in a copy of most recent medical bills for CCC team to review and get better understanding of Patient responsibility and coverage if received. (Updated: 8/7/2023)  2. I will speak with  CC to review and get better understanding.  3. I will continues to updates status with Care coordination team during next outreach. (Updated: 8/7/2023)  4. I will updates status with CHW/CCC team after my son wedding. (Completed; 8/7/2023)  5. I will wait to hear from [facility] dept regards to \"[facility] Care Application\" dropped of to FRW. (FYI: CHW encounter dated 7/07/2023). (Updated: 8/7/2023)                    Care Plan: Medical     Problem: HP GENERAL PROBLEM     Goal: I would like to have a plan of care for Cardiology health within 3-4 months     Start Date: 3/28/2023    This Visit's Progress: 30% Recent Progress: 30%    Note:     Barriers: language  Strengths: family   Patient expressed understanding of goal: yes  Action steps to achieve this goal:  1. I will schedule and attend visit with cardiology provider - number to call [phone].  Needs to schedule with " "Dr. Roy [facility] for July and Heart Failure Clinic in Oct.  2. I will update Care coordination team during next outreach  3. I will report to my Community Health Worker if any additional resources or support needed.  4. I will updates status with CHW/CCC team after my son wedding. (Updated: 5/19/2023)    (Updated: 8/07/2023)- no time to discuss goal at this time due to pt time. (CHW MX)            Goal: I would like to attend Annual wellness exam     Expected End Date: 6/23/2023    This Visit's Progress: 10% Recent Progress: 10%    Note:       Patient expressed understanding of goal: yes  Action steps to achieve this goal:  1. I will schedule my annual wellness visit; [phone] ([phone]) scheduled for 6/23  2. I will attend my annual wellness visit.  3. I will contact my Care Management or clinic team if I have barriers to attending my annual wellness visit.   4. I will updates status with CCC team after my son wedding. (Updated: 5/19/2023)    (Updated: 8/07/2023)- no time to discuss goal at this time due to pt time. (CHW MX)            Goal: I will fill all medications and follow recommendations for theapy and dosing plan.     This Visit's Progress: 50% Recent Progress: 50%    Note:     1. I will updates status with CCC team after my son wedding. (Updated: 5/19/2023)  2. I will attend appt today, 6/28/2023 at 2:00PM with DM Felipe Morris via video. (Updated: 6/28/2023)-CHW MX    (Updated: 8/07/2023)- no time to discuss goal at this time due to pt time. (CHW MX)                      Patient Outreach Discussion:   [facility] CHW was able to connect with patient today regards to reason above.     Reminded pt to appt 8/25/2023 with PharmD. Patient request CHW assist with appt cancellation and reschedule appt. Patient reason per pt; my daughter  for \"18 years\" and pregnant with first baby. It's her baby show event. I plan to leave on 8/25 to join my daughter event.     CHW was able to assisted with pt appt 8/25 and " reschedule appt to 9/05/2023 at 1:30PM with Solo in [place] per pt request. Updated above goal due to pt time.     Patient shared:  -Doing well.   -Freya care application: hasn't check mail regards to updates status. Received no medical bill so far.   -No other questions or concerns at this time.     Appt reminder:   Pt is aware of appt date's info below. Encouraged pt to attend all appt as schedule. Pt confirmed.   Name: Leora Sanchez MRN: [id number]     Date: 10/27/2023 Status: Scheduled     Time: 1:00 PM Length: 20     Visit Type: OFFICE VISIT [6611] Copay: $0.00     Provider: Jaky Gaspar MD Department: [facility] FAMILY MEDICINE/OB       Notes: DM f/u     Appt schedule (reschedule) today per pt request:   Name: Leora Sanchez MRN: [id number]     Date: 9/5/2023 Status: Scheduled     Time: 1:30 PM Length: 60     Visit Type: RETURN - MTM [1301] Copay: $0.00     Provider: Art Thakkar PharmD Department: [facility]       Notes: Diabetes follow-up     CHW suggested patient for the following:  -Pt connect CCC/CHW with any additional resources need and PCP office for scheduling appt.    CHW Next Follow-up: Goal's follow up. Informed patient of due care gaps (if any).     Outreach frequency: monthly      CHW Plan:   -Patient attend appt: 9/05/2023 with Solo, and 10/27/2023 with Dr. Gaspar.    -Next CHW outreach plan: 1 month.

## 2023-08-08 NOTE — PROGRESS NOTES
Clinic Care Coordination Contact:    Today's date: 8/8/2023    Coordinate Care:   CHW routed this encounter to [facility] FRW to connect with patient regards to pt katie care application below per pt request on 8/7/2023.     Plan:   [facility] FRW connect with patient for further advised.

## 2023-08-22 ENCOUNTER — PATIENT OUTREACH (OUTPATIENT)
Dept: CARE COORDINATION | Facility: CLINIC | Age: 53
End: 2023-08-22
Payer: MEDICARE

## 2023-08-22 NOTE — PROGRESS NOTES
Clinic Care Coordination Contact  Program: Katie Care  County:  [facility] Case #:  [facility] Worker:   Simran #:   Subscriber #:   Renewal:  Date Applied:     FRW Outreach:   8/22/23:FRW checked billing notes, no CC scanned still. FRW re-emailed application to Billing and attached Ana Gardner.   6/15/23: FRW emailed CC to billing department. FRW will check billing notes in 30 days.   6/6/23: FRW called pt. Pt dropped off the CC application to [employer] last week. FRW will follow up with [facility]  staff.   5/18/23: FRW called pt. Pt hasn't sent in the CC application yet. Pt will after her son gets  this weekend. Pt asked FRW to call back in 3 weeks as she is out of town.   5/8/23: FRW called pt but unable to leave VM. FRW will make another outreach in 1-2 weeks.   4/24/23: FRW called pt and completed Katie Care application. FRW will mail application to patient for signature. Patient will sign and include needed documents and send back to billing department.  4/11/23: FRW called pt on referral. Appointment made for 4/26 to apply for katie care   4/5/23: Outreach attempted x 1. Left message on voicemail with call back information and requested return call.  Plan: FRW will call again within one week.     Health Insurance:      Referral/Screening:

## 2023-08-24 ENCOUNTER — OFFICE VISIT (OUTPATIENT)
Dept: PULMONOLOGY | Facility: CLINIC | Age: 53
End: 2023-08-24
Attending: INTERNAL MEDICINE
Payer: MEDICARE

## 2023-08-24 VITALS
WEIGHT: 210 LBS | OXYGEN SATURATION: 98 % | HEART RATE: 62 BPM | BODY MASS INDEX: 42.33 KG/M2 | SYSTOLIC BLOOD PRESSURE: 132 MMHG | DIASTOLIC BLOOD PRESSURE: 78 MMHG | HEIGHT: 59 IN

## 2023-08-24 DIAGNOSIS — R91.1 LUNG NODULE: Primary | ICD-10-CM

## 2023-08-24 DIAGNOSIS — R91.8 PULMONARY NODULES: ICD-10-CM

## 2023-08-24 PROCEDURE — 99204 OFFICE O/P NEW MOD 45 MIN: CPT | Performed by: THORACIC SURGERY (CARDIOTHORACIC VASCULAR SURGERY)

## 2023-08-24 NOTE — LETTER
2023      RE: Leora Sanchez  [address] [place] [address]       THORACIC SURGERY - NEW PATIENT OFFICE VISIT      Dear Dr. Rutherford,    I saw Leora Sanchez at Dr. Salas's request in consultation for the evaluation and treatment of a subsolid nodule of the LEFT upper lobe.    HPI  Ms. Leora Sanchez is a 53 year old female patient who presents with a growing asymptomatic MONIQUE subsolid nodule..            ECOG performance status  1- Mild physical restriction, sedentary                 Previsit Tests   PFT (2023): FEV1 87% predicted, 2.13 L, DLCO 87%  CT scan (2023): LEFT upper lobe 1.5 cm pulmonary Nodule with a <5 mm solid component, without mediastinal adenopathy, with no pleural effusion. The nodule has doubled in size since .       Cardiac stress MRI with contrast (2021): Normal, no inducible ischemia, no previous infarcts, EF 65% (per Dr. Roy's note from 3/1/2022)  HgbA1c 8.8 (2023) (generally between 6.5 and 8)  Covid vaccination status: Vaccinated    PMH  Reviewed, as below    Past Medical History:   Diagnosis Date     Anxiety      Chronic cough 2018     Congestive heart failure (H)      Depression      Dyslipidemia, goal LDL below 70 10/31/2017     Essential hypertension      GERD (gastroesophageal reflux disease)      Heart failure with reduced ejection fraction (H) 10/30/2017     Hypertension      Nonischemic cardiomyopathy (H)     variable EF depending on the technique, date and reader; BNP normal in 2018     Nonocclusive coronary atherosclerosis of native coronary artery 10/31/2017     Pregnancy          Pyelonephritis 2018     Renal abscess      Spontaneous  2010     TM (tympanic membrane disorder)    Diabetes mellitus (metformin)  Morbid obesity  Fall risk (fell 3 times last year)     PSH  Reviewed, as below    Past Surgical History:   Procedure Laterality Date     CV CORONARY ANGIOGRAM N/A 2019    Procedure: Coronary Angiogram;  Surgeon: Isauro  Car Salazar MD;  Location: [facility] Lab;  Service: Cardiology     CV LEFT HEART CATHETERIZATION WITHOUT LEFT VENTRICULOGRAM Left 10/31/2017    Procedure: Left Heart Catheterization Without Left Ventriculogram;  Surgeon: Jonathan Duque MD;  Location: [facility];  Service:      CV LEFT HEART CATHETERIZATION WITHOUT LEFT VENTRICULOGRAM Left 5/13/2019    Procedure: Left Heart Catheterization Without Left Ventriculogram;  Surgeon: Car Box MD;  Location: [facility];  Service: Cardiology     DILATION AND CURETTAGE  2010     ENT SURGERY       INNER EAR SURGERY Right 1994     MASTOIDECTOMY Right 1996     MI CATH PLACEMENT & NJX CORONARY ART ANGIO IMG S&I N/A 10/31/2017    Procedure: Coronary Angiogram;  Surgeon: Jonathan Duque MD;  Location: [facility] Lab;  Service: Cardiology      ROS  Chronic RIGHT lumbar pain of unclear etiology  She can walk for 7-8 minutes before getting short of breath    No Known Allergies    Current Outpatient Medications   Medication     acetaminophen (MAPAP) 500 MG tablet     allopurinol (ZYLOPRIM) 100 MG tablet     aspirin 81 MG EC tablet     atorvastatin (LIPITOR) 80 MG tablet     B Complex-Biotin-FA (B COMPLEX 100 TR) TBCR     blood glucose (CONTOUR NEXT TEST) test strip     blood glucose (NO BRAND SPECIFIED) lancets standard     furosemide (LASIX) 40 MG tablet     isosorbide mononitrate (IMDUR) 60 MG 24 hr tablet     losartan (COZAAR) 100 MG tablet     metFORMIN (GLUCOPHAGE XR) 500 MG 24 hr tablet     metoprolol succinate ER (TOPROL XL) 100 MG 24 hr tablet     nitroGLYcerin (NITROSTAT) 0.4 MG sublingual tablet     omeprazole (PRILOSEC) 40 MG DR capsule     potassium chloride ER (K-TAB/KLOR-CON) 10 MEQ CR tablet     semaglutide (OZEMPIC, 0.25 OR 0.5 MG/DOSE,) 2 MG/3ML pen     venlafaxine (EFFEXOR XR) 75 MG 24 hr capsule     vitamin D3 (CHOLECALCIFEROL) 125 MCG (5000 UT) tablet     No current facility-administered medications for this  "visit.       ETOH: No  TOBACCO: Never  OTHER DRUGS: No    Physical examination  /78 (BP Location: Right arm, Patient Position: Sitting, Cuff Size: Adult Regular)   Pulse 62   Ht 1.499 m (4' 11\")   Wt 95.3 kg (210 lb)   SpO2 98%   BMI 42.41 kg/m      Her exam is non-contributory    From a personal perspective, she is here with her , Juan. Mrs. Sanchez speaks Macedonian quite well, but she hasn't used it in many years. Dog in [facility] is \"de\" or \"ou\". They have 8 children (4/4) and 8 grandchildren (10 mo to 13 y), the 9th grandchild is expected in December. They have a baby shower tomorrow. Mrs. Sanchez is traveling to [facility] in October for the first time since she left for [facility] in 1979.     IMPRESSION   53 year old female patient with LEFT upper lobe subsolid nodule.    Stage: Indeterminate pulmonary lesion, IF this were a cancer, clinical stage I    PLAN  I spent 45 min on the date of the encounter in chart review, patient visit, review of tests, documentation and/or discussion with other providers about the issues documented above. I reviewed the plan as follows:  I communicated with Hubert regarding DM management and Manuela regarding preop cardiac clearance.  PAC  Repeat chest CT.  She will discuss this with her kids tomorrow and we'll call her (I asked Manju Armando to call her).  I think it will be safe for her to travel in October and to have surgery afterwards.    I appreciate the opportunity to participate in the care of your patient and will keep you updated.  Sincerely,    MD Philip Bhatia MD      "

## 2023-08-24 NOTE — PROGRESS NOTES
THORACIC SURGERY - NEW PATIENT OFFICE VISIT      Dear Dr. Rutherford,    I saw Leora Sanchez at Dr. Salas's request in consultation for the evaluation and treatment of a subsolid nodule of the LEFT upper lobe.    HPI  Ms. Leora Sanchez is a 53 year old female patient who presents with a growing asymptomatic MONIQUE subsolid nodule..            ECOG performance status  1- Mild physical restriction, sedentary                 Previsit Tests   PFT (2023): FEV1 87% predicted, 2.13 L, DLCO 87%  CT scan (2023): LEFT upper lobe 1.5 cm pulmonary Nodule with a <5 mm solid component, without mediastinal adenopathy, with no pleural effusion. The nodule has doubled in size since .       Cardiac stress MRI with contrast (2021): Normal, no inducible ischemia, no previous infarcts, EF 65% (per Dr. Roy's note from 3/1/2022)  HgbA1c 8.8 (2023) (generally between 6.5 and 8)  Covid vaccination status: Vaccinated    PMH  Reviewed, as below    Past Medical History:   Diagnosis Date    Anxiety     Chronic cough 2018    Congestive heart failure (H)     Depression     Dyslipidemia, goal LDL below 70 10/31/2017    Essential hypertension     GERD (gastroesophageal reflux disease)     Heart failure with reduced ejection fraction (H) 10/30/2017    Hypertension     Nonischemic cardiomyopathy (H)     variable EF depending on the technique, date and reader; BNP normal in 2018    Nonocclusive coronary atherosclerosis of native coronary artery 10/31/2017    Pregnancy         Pyelonephritis 2018    Renal abscess     Spontaneous  2010    TM (tympanic membrane disorder)    Diabetes mellitus (metformin)  Morbid obesity  Fall risk (fell 3 times last year)     PSH  Reviewed, as below    Past Surgical History:   Procedure Laterality Date    CV CORONARY ANGIOGRAM N/A 2019    Procedure: Coronary Angiogram;  Surgeon: Car Box MD;  Location: [facility] Lab;  Service: Cardiology    CV LEFT HEART  CATHETERIZATION WITHOUT LEFT VENTRICULOGRAM Left 10/31/2017    Procedure: Left Heart Catheterization Without Left Ventriculogram;  Surgeon: Jonathan Duque MD;  Location: [facility];  Service:     CV LEFT HEART CATHETERIZATION WITHOUT LEFT VENTRICULOGRAM Left 5/13/2019    Procedure: Left Heart Catheterization Without Left Ventriculogram;  Surgeon: Car Box MD;  Location: [facility];  Service: Cardiology    DILATION AND CURETTAGE  2010    ENT SURGERY      INNER EAR SURGERY Right 1994    MASTOIDECTOMY Right 1996    SD CATH PLACEMENT & NJX CORONARY ART ANGIO IMG S&I N/A 10/31/2017    Procedure: Coronary Angiogram;  Surgeon: Jonathan Duque MD;  Location: [facility];  Service: Cardiology      ROS  Chronic RIGHT lumbar pain of unclear etiology  She can walk for 7-8 minutes before getting short of breath    No Known Allergies    Current Outpatient Medications   Medication    acetaminophen (MAPAP) 500 MG tablet    allopurinol (ZYLOPRIM) 100 MG tablet    aspirin 81 MG EC tablet    atorvastatin (LIPITOR) 80 MG tablet    B Complex-Biotin-FA (B COMPLEX 100 TR) TBCR    blood glucose (CONTOUR NEXT TEST) test strip    blood glucose (NO BRAND SPECIFIED) lancets standard    furosemide (LASIX) 40 MG tablet    isosorbide mononitrate (IMDUR) 60 MG 24 hr tablet    losartan (COZAAR) 100 MG tablet    metFORMIN (GLUCOPHAGE XR) 500 MG 24 hr tablet    metoprolol succinate ER (TOPROL XL) 100 MG 24 hr tablet    nitroGLYcerin (NITROSTAT) 0.4 MG sublingual tablet    omeprazole (PRILOSEC) 40 MG DR capsule    potassium chloride ER (K-TAB/KLOR-CON) 10 MEQ CR tablet    semaglutide (OZEMPIC, 0.25 OR 0.5 MG/DOSE,) 2 MG/3ML pen    venlafaxine (EFFEXOR XR) 75 MG 24 hr capsule    vitamin D3 (CHOLECALCIFEROL) 125 MCG (5000 UT) tablet     No current facility-administered medications for this visit.       ETOH: No  TOBACCO: Never  OTHER DRUGS: No    Physical examination  /78 (BP Location: Right arm,  "Patient Position: Sitting, Cuff Size: Adult Regular)   Pulse 62   Ht 1.499 m (4' 11\")   Wt 95.3 kg (210 lb)   SpO2 98%   BMI 42.41 kg/m      Her exam is non-contributory    From a personal perspective, she is here with her , Juan. Mrs. Sanchez speaks Uzbek quite well, but she hasn't used it in many years. Dog in [facility] is \"de\" or \"ou\". They have 8 children (4/4) and 8 grandchildren (10 mo to 13 y), the 9th grandchild is expected in December. They have a baby shower tomorrow. Mrs. Sanchez is traveling to [facility] in October for the first time since she left for [facility] in 1979.     IMPRESSION   53 year old female patient with LEFT upper lobe subsolid nodule.    Stage: Indeterminate pulmonary lesion, IF this were a cancer, clinical stage I    PLAN  I spent 45 min on the date of the encounter in chart review, patient visit, review of tests, documentation and/or discussion with other providers about the issues documented above. I reviewed the plan as follows:  I communicated with Hubert regarding DM management and Manuela regarding preop cardiac clearance.  PAC  Repeat chest CT.  She will discuss this with her kids tomorrow and we'll call her (I asked Manju Armando to call her).  I think it will be safe for her to travel in October and to have surgery afterwards.    I appreciate the opportunity to participate in the care of your patient and will keep you updated.  Sincerely,    Philip Anderson MD        "

## 2023-08-28 ENCOUNTER — TELEPHONE (OUTPATIENT)
Dept: CARDIOLOGY | Facility: CLINIC | Age: 53
End: 2023-08-28
Payer: MEDICARE

## 2023-08-28 NOTE — TELEPHONE ENCOUNTER
Phone call to patient with language line assistance. Pt in agreement to schedule an appointment. Call transferred to  and appt secured for 9/25 with Dr Roy.  -sea      ----- Message -----  From: Buddy Roy MD  Sent: 8/28/2023   8:07 AM CDT  To: Jaky Moran, RN    Good morning, Jaky,    Please schedule this patient to be seen RAC or me (if available) for surgical clearance.  Thanks  Kedar        ----- Message -----  From: Philip Anderson MD  Sent: 8/27/2023   3:30 PM CDT  To: Jaky Gaspar MD; Manju Armando RN; Buddy Roy MD    Hello,    Dr. Gaspar: Could you please help to optimize her DM management? If she decides for surgery it would be in late October.    Dr. Roy: Could you please clear her for surgery? It would be a thoracoscopic partial LEFT upper lobectomy.    Thank you  Philip

## 2023-08-30 ENCOUNTER — TELEPHONE (OUTPATIENT)
Dept: PULMONOLOGY | Facility: CLINIC | Age: 53
End: 2023-08-30
Payer: MEDICARE

## 2023-08-30 NOTE — TELEPHONE ENCOUNTER
Philip Anderson MD Hoops, Dawn, RN Hi, Dawn,    Can you please call her with a [employer]  on Monday or Tuesday and find out if she decided to have surgery?    Thank you  Marcel          Called and spoke with patient with help of [employer]  (Elisabet:  ID [id number]).   Patient would like to proceed with surgery, but is not sure of exact dates for travel plans in October.  November would be good for surgery date.    
17

## 2023-09-05 ENCOUNTER — OFFICE VISIT (OUTPATIENT)
Dept: PHARMACY | Facility: CLINIC | Age: 53
End: 2023-09-05

## 2023-09-05 ENCOUNTER — APPOINTMENT (OUTPATIENT)
Dept: INTERPRETER SERVICES | Facility: CLINIC | Age: 53
End: 2023-09-05
Payer: MEDICARE

## 2023-09-05 VITALS — DIASTOLIC BLOOD PRESSURE: 80 MMHG | HEART RATE: 73 BPM | SYSTOLIC BLOOD PRESSURE: 120 MMHG

## 2023-09-05 DIAGNOSIS — I42.8 NONISCHEMIC CARDIOMYOPATHY (H): ICD-10-CM

## 2023-09-05 DIAGNOSIS — N18.31 STAGE 3A CHRONIC KIDNEY DISEASE (H): ICD-10-CM

## 2023-09-05 DIAGNOSIS — M1A.0710 CHRONIC GOUT OF RIGHT FOOT, UNSPECIFIED CAUSE: ICD-10-CM

## 2023-09-05 DIAGNOSIS — R12 HEARTBURN: ICD-10-CM

## 2023-09-05 DIAGNOSIS — G44.209 TENSION HEADACHE: ICD-10-CM

## 2023-09-05 DIAGNOSIS — E11.65 TYPE 2 DIABETES MELLITUS WITH HYPERGLYCEMIA, WITHOUT LONG-TERM CURRENT USE OF INSULIN (H): Primary | ICD-10-CM

## 2023-09-05 DIAGNOSIS — I50.20 HEART FAILURE WITH REDUCED EJECTION FRACTION (H): ICD-10-CM

## 2023-09-05 PROCEDURE — 99207 PR NO CHARGE LOS: CPT | Performed by: PHARMACIST

## 2023-09-05 RX ORDER — COLCHICINE 0.6 MG/1
TABLET ORAL
Qty: 31 TABLET | Refills: 3 | Status: SHIPPED | OUTPATIENT
Start: 2023-09-05 | End: 2023-10-06

## 2023-09-05 RX ORDER — SEMAGLUTIDE 0.68 MG/ML
0.5 INJECTION, SOLUTION SUBCUTANEOUS WEEKLY
Qty: 3 ML | Refills: 3 | Status: SHIPPED | OUTPATIENT
Start: 2023-09-05 | End: 2023-10-03

## 2023-09-05 NOTE — PATIENT INSTRUCTIONS
"Recommendations from today's MTM visit:                                                         Restart Aspirin 81 mg daily. This medicine helps to prevent clots and lowers your risk of heart attack and stroke.   Continue Ozempic 0.25 mg this Thursday (9/7) then next week, increase Ozempic 0.5 mg weekly for blood glucose.  Start Colchicine 0.6 mg. Take 2 tabs for your first dose, then 1 tab daily.     Follow-up: Return in about 4 weeks (around 10/3/2023) for Medication Management Pharmacist, in person.    It was great speaking with you today.  I value your experience and would be very thankful for your time in providing feedback in our clinic survey. In the next few days, you may receive an email or text message from [employer] with a link to a survey related to your  clinical pharmacist.\"     To schedule another MTM appointment, please call the clinic directly or you may call the MTM scheduling line at [phone] or toll-free at [phone].     My Clinical Pharmacist's contact information:                                                      Please feel free to contact me with any questions or concerns you have.      Art Thakkar, PharmD  Medication Therapy Management (MTM) Pharmacist  [facility]      "

## 2023-09-05 NOTE — PROGRESS NOTES
Medication Therapy Management (MTM) Encounter    ASSESSMENT:                            Medication Adherence/Access: Unable to fully assess without med bottles present, but patient denies adherence concerns.       Type 2 Diabetes:  Last A1C above goal <8%. Home blood glucose near goal of , fasting. Due to difficulty with administration did not get the first three ozempic doses. She is tolerating without concern so far. Will have patient increase dose after 2 injections. Continue to monitor blood glucose and work to bring them down more aggressively to qualify for surgery.          Nonischemic cardiomyopathy: Last LDL at goal <70. Denies chest pains. Missing aspirin - reviewed that she will need to purchase this OTC.       HFrEF/CHF: blood pressure at goal <130/80. Pulse now  at goal . No adjustments today.        Chronic gout: Last uric acid at  goal <6. 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 May benefit from addition of colchicine to reduce tophus. There is increased risk of myopathy with combination of colchicine + statin - will monitor closely.       Tension headache/Depression: Headache intensity decreased since restarting Venlafaxine. Does still struggle with depression and anxiety. Recommended increasing dose to previously prescribed, but patient declined at this time.       Low Vitamin D: Has started supplement. Plan for repeat levels after 12 weeks of therapy (early November) Target levels 45-60 for mood and headache support.     GERD: Reported symptoms seem more related to hunger pain vs indigestion. Will continue to monitor as patient increases GLP1 doses.        PLAN:                            Restart Aspirin 81 mg daily. This medicine helps to prevent clots and lowers your risk of heart attack and stroke.   Continue Ozempic 0.25 mg this Thursday (9/7) then next week, increase Ozempic 0.5 mg weekly for blood glucose.  Start Colchicine 0.6 mg. Take 2 tabs for your first dose, then 1  tab daily.        Follow-up: Return in about 4 weeks (around 10/3/2023) for Medication Management Pharmacist, in person.    SUBJECTIVE/OBJECTIVE:                          Leora Sanchez is a 53 year old female coming in for a follow-up visit. She was referred to me from Jaky Gaspar MD. [facility]  by phone (ID# [id number]).  Today's visit is a follow-up MT visit from 2023.          Reason for visit: Medication Management.-  diabetes follow-up.     Allergies/ADRs: Reviewed in chart  Past Medical History: Reviewed in chart  Tobacco: She reports that she has never smoked. She has never used smokeless tobacco.  Alcohol:  reports no history of alcohol use.       Medication Adherence/Access:     Son and  help with managing meds at home. They set up a pill box for 1 month at a time in a 4 x 7 box. Usually takes medicines once a day, but last visit PCP instructed patient to take diabetes medicine twice daily. Just filled the box this morning so has 1 month set at home.     Forgot meds at home today.          Type 2 Diabetes: At last MTM visit, patient requested trial of GLP1. Started Ozempic 0.25 mg weekly and reduced metformin 500 mg ER to 1 tab twice daily.     When she was injecting, was not removing needle cover, so she didn't actually get the dose until her last injection on Thursday. So far no side effects.     Will be having thoracoscopic partial LEFT upper lobectomy in October.     Blood sugar monitorin time(s) daily; Ranges: (patient reported)     Fasting- ranging 114-139.    Not checking after meals.        Hemoglobin A1C   Date Value Ref Range Status   2023 8.8 (H) 0.0 - 5.6 % Final     Comment:     Normal <5.7%   Prediabetes 5.7-6.4%    Diabetes 6.5% or higher     Note: Adopted from ADA consensus guidelines.   2022 6.8 (H) 0.0 - 5.6 % Final     Comment:     Normal <5.7%   Prediabetes 5.7-6.4%    Diabetes 6.5% or higher     Note: Adopted from ADA consensus guidelines.    12/07/2021 6.7 (H) 0.0 - 5.6 % Final     Comment:     Normal <5.7%   Prediabetes 5.7-6.4%    Diabetes 6.5% or higher     Note: Adopted from ADA consensus guidelines.   01/18/2011 5.7 4.2 - 6.1 % Final      Microalbumin Urine mg/dL   Date Value Ref Range Status   12/07/2021 <0.50 0.00 - 1.99 mg/dL Final      Creatinine Urine mg/dL   Date Value Ref Range Status   06/23/2023 114.0 mg/dL Final     Comment:     The reference ranges have not been established in urine creatinine. The results should be integrated into the clinical context for interpretation.   12/07/2021 101 mg/dL Final       Creatinine   Date Value Ref Range Status   06/23/2023 0.88 0.51 - 0.95 mg/dL Final   04/26/2013 0.82 0.60 - 1.10 mg/dL Final            Nonischemic cardiomyopathy: Prescribed aspirin 81 mg daily, atorvastatin 80 mg daily, isosorbide mononitrate 60 mg ER daily, nitroglycerin 0.4 mg as needed    Pharmacy didn't give her the aspirin.     Recent Labs   Lab Test 06/23/23  1452 12/07/21  1113   CHOL 183 178   HDL 68 59   LDL 89 89   TRIG 132 149          HFrEF: Prescribed furosemide 40 mg daily as needed, losartan 100 mg daily, isosorbide mononitrate 60 mg ER daily, metoprolol succinate 100 mg 0.5 tab daily (reduced at last MTM visit), potassium chloride 10 mEq ER daily     Previously had trouble with shortness of breath, fatigue and dizziness. Still having some fatigue during the day.     Denies edema concerns.     Only using Furosemide 2-3 times per week. Doesn't like using the medicine because it makes her urinate a lot and very often after taking.     BP Readings from Last 3 Encounters:   09/05/23 120/80   08/24/23 132/78   07/26/23 116/75      Pulse Readings from Last 3 Encounters:   09/05/23 73   08/24/23 62   07/26/23 52     Wt Readings from Last 3 Encounters:   08/24/23 210 lb (95.3 kg)   07/26/23 209 lb (94.8 kg)   06/23/23 207 lb (93.9 kg)        Last Comprehensive Metabolic Panel:  Lab Results   Component Value Date      06/23/2023    POTASSIUM 4.8 06/23/2023    CHLORIDE 106 06/23/2023    CO2 25 06/23/2023    ANIONGAP 13 06/23/2023     (H) 06/23/2023    BUN 22.4 (H) 06/23/2023    CR 0.88 06/23/2023    GFRESTIMATED 78 06/23/2023    LYNN 9.5 06/23/2023          Chronic gout: Prescribed allopurinol 100 mg daily     No concerns as long as she's wearing open toed shoes. 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 This has been bothersome for quite some time now.       Uric Acid   Date Value Ref Range Status   07/26/2023 5.3 2.4 - 5.7 mg/dL Final       Depression:   Tension headache: Prescribed acetaminophen 500 mg 2 tabs 3 times daily as needed. Had been out of Venlafaxine for several months. Restarted at 75 mg ER once daily. Was previously on 150 mg ER daily.     Since restarting Venlafaxine, only 1 severe headache. Does still have a mild headache     No menstrual cycle for the last year. Was having sweating/hotflashes. These have also improved.     Continues to struggle with depression and anxiety. It's more quiet at home since Mom passed. Has episodes of difficulty with memory, sometimes goes to the store and forgets what she was there to get.       Low Vitamin D: Prescribed Vitamin D3 5000 units daily .     Did purchase this OTC.     Vitamin D Deficiency Screening Results:  Lab Results   Component Value Date    VITDT 23 07/26/2023        GERD: Prescribed Omeprazole 40 mg daily     Having symptoms when she's hungry, so having to eat and symptoms get better.   If she's at home, gets meals in consistently. When going out and about, not eating regularly so starts to get pain in the stomach.       Today's Vitals: /80   Pulse 73   ----------------      I spent 35 minutes with this patient today. All changes were made via collaborative practice agreement with Jaky Gaspar MD. A copy of the visit note was provided to the patient's provider(s).    A summary of these recommendations was given to  the patient.    Art Thakkar, PharmD  Medication Therapy Management (MTM) Pharmacist  [facility]          Medication Therapy Recommendations  Chronic gout of right foot    Current Medication: allopurinol (ZYLOPRIM) 100 MG tablet   Rationale: Synergistic therapy - Needs additional medication therapy - Indication   Recommendation: Start Medication - colchicine 0.6 MG tablet   Status: Accepted per CPA         Type 2 diabetes mellitus with hyperglycemia, without long-term current use of insulin (H)    Current Medication: semaglutide (OZEMPIC, 0.25 OR 0.5 MG/DOSE,) 2 MG/3ML pen   Rationale: Dose too low - Dosage too low - Effectiveness   Recommendation: Increase Dose - Ozempic (0.25 or 0.5 MG/DOSE) 2 MG/1.5ML Sopn   Status: Accepted per CPA

## 2023-09-07 ENCOUNTER — PATIENT OUTREACH (OUTPATIENT)
Dept: CARE COORDINATION | Facility: CLINIC | Age: 53
End: 2023-09-07
Payer: MEDICARE

## 2023-09-07 NOTE — LETTER
[facility]  Patient Centered Plan of Care  About Me:        Patient Name:  Leora Sanchez    YOB: 1970  Age:         53 year old   Alessandro MRN:    [id number] Telephone Information:  Home Phone [phone]   Mobile [phone]       Address:  [address]  [place] [address] Email address:  [email or web address]      Emergency Contact(s)    Name Relationship Lgl Grd Work Phone Home Phone Mobile Phone   EDILSON MACIEL Spouse   [phone]            Primary language:  Hmong     needed? Yes   [place] [facility]:  [phone] op. 1  Other communication barriers:Language barrier    Preferred Method of Communication:  Mail  Current living arrangement: I live in a private home with family; I live in a private home with spouse (Living with son, mother, and )    Mobility Status/ Medical Equipment: Independent        Health Maintenance  Health Maintenance Reviewed: Due/Overdue       My Access Plan  Medical Emergency 911   Primary Clinic Line [facility] - [phone]   24 Hour Appointment Line [phone] or  [phone]) (toll-free)   24 Hour Nurse Line [phone] (toll-free)   Preferred Urgent Care [facility], [phone]     [facility]  [phone]     Preferred Pharmacy [facility] - [place], [place] - [address] [place] [address] Pky     Behavioral Health Crisis Line The National Suicide Prevention Lifeline at 1-239.597.4124 or Text/Call 548             My Care Team Members  Patient Care Team         Relationship Specialty Notifications Start End    Jaky Gaspar MD PCP - General Family Practice  3/20/18     Phone: [phone] Fax: [phone] [address] [facility] [address]    Wendy Lind MD MD Pulmonary Disease  3/20/18     Phone: [phone] Fax: [phone] [address] [facility] [address] [facility] [address]    Art Thakkar,  PharmD Pharmacist Pharmacist  10/15/19     Phone: [phone]          [facility] [address] [place] [address]    Elisabet Malone, MA Community Health Worker Primary Care - CC Admissions 12/10/20     Nickie Cuevas, RN Lead Care Coordinator Primary Care - CC Admissions 12/10/20     Jaky Gaspar MD Assigned PCP   6/16/21     Phone: [phone] Fax: [phone]         [address] [facility] [address]    Jose Maria Bass MD Assigned Surgical Provider   5/28/22     Phone: [phone] Fax: [phone]         [address] [place] [address]  [facility] [address]    Cristy Cisneros Financial Resource Worker   4/3/23     Phone: [phone]         Sendy Banda APRN CNP Nurse Practitioner Cardiovascular Disease  4/13/23     Phone: [phone] Fax: [phone]         [address] [facility], [address] [facility] [address]    Sendy Banda APRN CNP Assigned Heart and Vascular Provider   5/6/23     Phone: [phone] Fax: [phone]         [address] [facility], [address] [facility] [address]    Philip Anderson MD MD Cardiovascular & Thoracic Surgery  7/11/23     Phone: [phone] Fax: [phone]         [address] [facility] [address] [facility] [address]              My Care Plans  Self Management and Treatment Plan  Care Plan  Care Plan: Financial Wellbeing       Problem: Patient expresses financial resource strain       Goal: I would like to review medical bills and get understanding of coverage       Start Date: 3/28/2023    This Visit's Progress: 50% Recent Progress: 30%    Note:     Barriers: coverage  Strengths: engagement  Patient expressed understanding of goal: yes  Action steps to achieve this goal:  1. I will bring in a copy of most recent medical bills for CCC team to review and get better understanding of Patient responsibility and coverage if received. (Updated: 8/7/2023)  2. I will speak with  CC to review and get better understanding.  3. I will continues to updates  "status with Care coordination team during next outreach. (Updated: 8/7/2023)  4. I will updates status with CHW/CCC team after my son wedding. (Completed; 8/7/2023)  5. I will wait to hear from [facility] dept regards to \"[facility] Care Application\" dropped of to FRW. (FYI: CHW encounter dated 7/07/2023). (Updated: 8/7/2023)                            Care Plan: Medical       Problem: HP GENERAL PROBLEM       Goal: I would like to have a plan of care for Cardiology health within 3-4 months       Start Date: 3/28/2023    This Visit's Progress: 30% Recent Progress: 30%    Note:     Barriers: language  Strengths: family   Patient expressed understanding of goal: yes  Action steps to achieve this goal:  1. I will schedule and attend visit with cardiology provider - number to call [phone].  Needs to schedule with Dr. Roy Cardiology for July and [facility] in Oct.  2. I will update Care coordination team during next outreach  3. I will report to my Community Health Worker if any additional resources or support needed.  4. I will updates status with CHW/CCC team after my son wedding. (Updated: 5/19/2023)    (Updated: 8/07/2023)- no time to discuss goal at this time due to pt time. (CHW MX)                Goal: I would like to attend Annual wellness exam       Expected End Date: 6/23/2023    This Visit's Progress: 10% Recent Progress: 10%    Note:       Patient expressed understanding of goal: yes  Action steps to achieve this goal:  1. I will schedule my annual wellness visit; [id number] ([phone]) scheduled for 6/23  2. I will attend my annual wellness visit.  3. I will contact my Care Management or clinic team if I have barriers to attending my annual wellness visit.   4. I will updates status with CCC team after my son wedding. (Updated: 5/19/2023)    (Updated: 8/07/2023)- no time to discuss goal at this time due to pt time. (CHW MX)                Goal: I will fill all medications " and follow recommendations for theapy and dosing plan.       This Visit's Progress: 50% Recent Progress: 50%    Note:     I will updates status with CCC team after my son wedding. (Updated: 5/19/2023)  I will attend appt today, 6/28/2023 at 2:00PM with DM Ed Alexandra via video. (Updated: 6/28/2023)-PERLA PACHECO    (Updated: 8/07/2023)- no time to discuss goal at this time due to pt time. (PERLA MX)                               Action Plans on File:            Depression          Advance Care Plans/Directives Type:   No data recorded    My Medical and Care Information  Problem List   Patient Active Problem List   Diagnosis    Health Care Home    Essential hypertension    Perforation of right tympanic membrane    Heart failure with reduced ejection fraction (H)    Type 2 diabetes mellitus with hyperglycemia, without long-term current use of insulin (H)    Right-sided sensorineural hearing loss    Recurrent major depressive disorder, in partial remission (H)    Pulmonary nodules    Coronary artery disease involving native coronary artery of native heart with angina pectoris (H)    Nonischemic cardiomyopathy (H)    Mixed incontinence    Hypokalemia    Hyperlipidemia LDL goal <70    Heartburn    Ganglion cyst of left foot    Class 3 severe obesity due to excess calories with body mass index (BMI) of 40.0 to 44.9 in adult (H)    CKD (chronic kidney disease) stage 3, GFR 30-59 ml/min (H)    Tension headache    Bilateral dry eyes    Anemia, unspecified type    Great toe pain, right    Fatty liver    Chronic gout of right foot      Current Medications and Allergies:    Current Outpatient Medications   Medication    acetaminophen (MAPAP) 500 MG tablet    allopurinol (ZYLOPRIM) 100 MG tablet    aspirin 81 MG EC tablet    atorvastatin (LIPITOR) 80 MG tablet    B Complex-Biotin-FA (B COMPLEX 100 TR) TBCR    blood glucose (CONTOUR NEXT TEST) test strip    blood glucose (NO BRAND SPECIFIED) lancets standard    colchicine (COLCRYS) 0.6 MG  tablet    furosemide (LASIX) 40 MG tablet    isosorbide mononitrate (IMDUR) 60 MG 24 hr tablet    losartan (COZAAR) 100 MG tablet    metFORMIN (GLUCOPHAGE XR) 500 MG 24 hr tablet    metoprolol succinate ER (TOPROL XL) 100 MG 24 hr tablet    nitroGLYcerin (NITROSTAT) 0.4 MG sublingual tablet    omeprazole (PRILOSEC) 40 MG DR capsule    potassium chloride ER (K-TAB/KLOR-CON) 10 MEQ CR tablet    semaglutide (OZEMPIC, 0.25 OR 0.5 MG/DOSE,) 2 MG/3ML pen    venlafaxine (EFFEXOR XR) 75 MG 24 hr capsule    vitamin D3 (CHOLECALCIFEROL) 125 MCG (5000 UT) tablet     No current facility-administered medications for this visit.         Care Coordination Start Date: 12/10/2020   Frequency of Care Coordination: monthly     Form Last Updated: 09/07/2023

## 2023-09-07 NOTE — PROGRESS NOTES
"Clinic Care Coordination Contact  Care Coordination Clinician Chart Review    Situation: Patient chart reviewed by Care Coordinator.       Background: Care Coordination Program started: 12/10/2020. Initial assessment completed and patient-centered care plan(s) were developed with participation from patient. Lead CC handed patient off to CHW for continued outreaches.       Assessment: Per chart review, patient outreach completed by CC CHW on 8/8/23.  Patient is actively working to accomplish goal(s). Patient's goal(s) appropriate and relevant at this time. Patient is due for updated Plan of Care.  Assessments will be completed annually or as needed/with change of patient status.      Care Plan: Financial Wellbeing       Problem: Patient expresses financial resource strain       Goal: I would like to review medical bills and get understanding of coverage       Start Date: 3/28/2023    This Visit's Progress: 50% Recent Progress: 30%    Note:     Barriers: coverage  Strengths: engagement  Patient expressed understanding of goal: yes  Action steps to achieve this goal:  1. I will bring in a copy of most recent medical bills for CCC team to review and get better understanding of Patient responsibility and coverage if received. (Updated: 8/7/2023)  2. I will speak with  CC to review and get better understanding.  3. I will continues to updates status with Care coordination team during next outreach. (Updated: 8/7/2023)  4. I will updates status with CHW/CCC team after my son wedding. (Completed; 8/7/2023)  5. I will wait to hear from [facility] dept regards to \"[facility] Care Application\" dropped of to W. (FYI: CHW encounter dated 7/07/2023). (Updated: 8/7/2023)                            Care Plan: Medical       Problem: HP GENERAL PROBLEM       Goal: I would like to have a plan of care for Cardiology health within 3-4 months       Start Date: 3/28/2023    This Visit's Progress: 30% Recent Progress: " 30%    Note:     Barriers: language  Strengths: family   Patient expressed understanding of goal: yes  Action steps to achieve this goal:  1. I will schedule and attend visit with cardiology provider - number to call [phone].  Needs to schedule with Dr. Roy Cardiology for July and Heart Failure Clinic in Oct.  2. I will update Care coordination team during next outreach  3. I will report to my Community Health Worker if any additional resources or support needed.  4. I will updates status with CHW/CCC team after my son wedding. (Updated: 5/19/2023)    (Updated: 8/07/2023)- no time to discuss goal at this time due to pt time. (CHW MX)                Goal: I would like to attend Annual wellness exam       Expected End Date: 6/23/2023    This Visit's Progress: 10% Recent Progress: 10%    Note:       Patient expressed understanding of goal: yes  Action steps to achieve this goal:  1. I will schedule my annual wellness visit; [id number] ([phone]) scheduled for 6/23  2. I will attend my annual wellness visit.  3. I will contact my Care Management or clinic team if I have barriers to attending my annual wellness visit.   4. I will updates status with CCC team after my son wedding. (Updated: 5/19/2023)    (Updated: 8/07/2023)- no time to discuss goal at this time due to pt time. (CHW MX)                Goal: I will fill all medications and follow recommendations for theapy and dosing plan.       This Visit's Progress: 50% Recent Progress: 50%    Note:     I will updates status with CCC team after my son wedding. (Updated: 5/19/2023)  I will attend appt today, 6/28/2023 at 2:00PM with DM Felipe Alexandra via video. (Updated: 6/28/2023)-CHW MX    (Updated: 8/07/2023)- no time to discuss goal at this time due to pt time. (CHW MX)                                 Plan/Recommendations: The patient will continue working with Care Coordination to achieve goal(s) as above. CHW will continue outreaches at minimum every 30 days and  will involve Lead CC as needed or if patient is ready to move to Maintenance. Lead CC will continue to monitor CHW outreaches and patient's progress to goal(s) every 6 weeks.     Plan of Care updated and sent to patient: Yes, via mail

## 2023-09-08 ENCOUNTER — PATIENT OUTREACH (OUTPATIENT)
Dept: CARE COORDINATION | Facility: CLINIC | Age: 53
End: 2023-09-08
Payer: MEDICARE

## 2023-09-11 ENCOUNTER — APPOINTMENT (OUTPATIENT)
Dept: INTERPRETER SERVICES | Facility: CLINIC | Age: 53
End: 2023-09-11
Payer: MEDICARE

## 2023-09-11 ENCOUNTER — PATIENT OUTREACH (OUTPATIENT)
Dept: CARE COORDINATION | Facility: CLINIC | Age: 53
End: 2023-09-11
Payer: MEDICARE

## 2023-09-11 NOTE — PROGRESS NOTES
Clinic Care Coordination Contact  Program: [facility]  County:  [facility] Case #:  [facility] Worker:   Simran #:   Subscriber #:   Renewal:  Date Applied:     FRW Outreach:   9/11/23: FRW checked billing notes. CC was denied due to not enough verification was submitted. FRW called pt but mailbox is not set up. FRW will call pt again within 30 days.   Cristy Cisneros   Financial Resource Worker  [facility] Care Coordination  [phone]   8/22/23:FRW checked billing notes, no CC scanned still. FRW re-emailed application to Billing and attached Ana Gardner.   6/15/23: FRW emailed CC to billing department. FRW will check billing notes in 30 days.   6/6/23: FRW called pt. Pt dropped off the CC application to [facility] last week. FRW will follow up with [facility]  staff.   5/18/23: FRW called pt. Pt hasn't sent in the CC application yet. Pt will after her son gets  this weekend. Pt asked FRW to call back in 3 weeks as she is out of town.   5/8/23: FRW called pt but unable to leave VM. FRW will make another outreach in 1-2 weeks.   4/24/23: FRW called pt and completed [facility] application. FRW will mail application to patient for signature. Patient will sign and include needed documents and send back to billing department.  4/11/23: FRW called pt on referral. Appointment made for 4/26 to apply for [facility] care   4/5/23: Outreach attempted x 1. Left message on voicemail with call back information and requested return call.  Plan: FRW will call again within one week.     Health Insurance:      Referral/Screening:

## 2023-09-13 ENCOUNTER — PATIENT OUTREACH (OUTPATIENT)
Dept: NURSING | Facility: CLINIC | Age: 53
End: 2023-09-13
Payer: MEDICARE

## 2023-09-13 NOTE — PROGRESS NOTES
Clinic Care Coordination Contact  Follow Up Progress Note      Assessment:   RN CC spoke with pt with support of interpretive services and review upcoming visit information   Future Appointments   Date Time Provider Department Center   9/25/2023 10:20 AM Buddy Roy MD HRSBESSIE [facility]   10/3/2023  9:00 AM Art Thakkar, PharmD ICMTM [facility]   10/27/2023  1:00 PM Jaky Gaspar MD ICFMOB [facility]   11/1/2023 11:00 [facility]   6/7/2024 12:40 PM Karon España, Juwan MDAUDI [facility]   6/7/2024  1:40 PM Jose Maria Bass MD [facility]     Pt reports that she is aware of locations and  will be driving her to visit  RN CC will reach out following PCP visit to reassess and support goal progression     Pt notes that she has not received a refill of her medication, outreach and left a message for [facility] for them to reach out to patient directly to address refill concern       Care Gaps:    Health Maintenance Due   Topic Date Due    HF ACTION PLAN  Never done    DIABETIC FOOT EXAM  Never done    COLORECTAL CANCER SCREENING  Never done    ZOSTER IMMUNIZATION (1 of 2) Never done    EYE EXAM  12/21/2022    INFLUENZA VACCINE (1) 09/01/2023    A1C  09/23/2023    PHQ-9  09/23/2023     Care Plans  Care Plan: Financial Wellbeing       Problem: Patient expresses financial resource strain       Goal: I would like to review medical bills and get understanding of coverage       Start Date: 3/28/2023    This Visit's Progress: 50% Recent Progress: 50%    Note:     Barriers: coverage  Strengths: engagement  Patient expressed understanding of goal: yes  Action steps to achieve this goal:  1. I will bring in a copy of most recent medical bills for CCC team to review and get better understanding of Patient responsibility and coverage if received. (Completed)  2. I will speak with SW CC to review and get better understanding. (Updated: 9/08/2023)-continues  3. I will continues to updates status with  "Care coordination team during next outreach. (Updated: 9/08/2023)-continues  4. I will updates status with CHW/CCC team after my son wedding. (Completed; 8/7/2023)  5. I will wait to hear from [facility] dept regards to \"[facility] Care Application\" dropped of to FRW. (FYI: CHW encounter dated 7/07/2023; FRW encounter dated 8/22/2023). (Updated: 9/08/2023)-continues                            Care Plan: Medical       Problem: HP GENERAL PROBLEM       Goal: I would like to have a plan of care for Cardiology health within 3-4 months       Start Date: 3/28/2023    This Visit's Progress: 50% Recent Progress: 30%    Note:     Barriers: language  Strengths: family   Patient expressed understanding of goal: yes    Action steps to achieve this goal:  1. I will schedule and attend visit with cardiology provider - number to call [phone].    2. I will update Care coordination team during next outreach.  3. I will report to my Community Health Worker if any additional resources or support needed.  6. I will attend appt on 10/27/2023 with Dr. Gaspar in [facility] to seek further advised.                 Goal: I would like to attend Annual wellness exam       Expected End Date: 6/23/2023    This Visit's Progress: 50% Recent Progress: 30%    Note:       Patient expressed understanding of goal: yes    Action steps to achieve this goal:  1. I will schedule my annual wellness visit; [id number] ([phone]) scheduled for 6/23  2. I will attend my annual wellness visit.  3. I will contact my Care Management or clinic team if I have barriers to attending my annual wellness visit.   4. I will updates status with CCC team after my son wedding. (Updated: 9/08/2023)  5. I will attend appt on 10/27/2023 with Dr. Gaspar in [facility] to seek further support towards goal completion. (Updated: 9/08/2023)                Goal: I will fill all medications and follow recommendations for theapy and dosing plan.       This Visit's Progress: " 50% Recent Progress: 50%    Note:       I will attend appt schedule on 9- at 11:00AM with CCC RN via phone visit to seek further advised in regards to goal progression.   I will attend appt on 10/03/2023 with [facility] in [facility] to fill all my medication and seek further support towards goal completion.  I will attend appt on 10/27/2023 with Dr. Gaspar in [facility] to seek further support towards goal completion.                               Intervention/Education provided during outreach: left message with pharmacy to review pt refill request status               Plan:   Patient will schedule and attend recommended follow up visits with speciality providers and primary care provider.  Community Health Worker to outreach per standard work and updated on goal progression    RN CC will outreach following visit  to support ongoing recommendations and plan of care will be available sooner if needed.

## 2023-09-25 ENCOUNTER — OFFICE VISIT (OUTPATIENT)
Dept: CARDIOLOGY | Facility: CLINIC | Age: 53
End: 2023-09-25
Payer: MEDICARE

## 2023-09-25 VITALS
OXYGEN SATURATION: 94 % | HEART RATE: 72 BPM | HEIGHT: 59 IN | WEIGHT: 211 LBS | RESPIRATION RATE: 20 BRPM | DIASTOLIC BLOOD PRESSURE: 88 MMHG | SYSTOLIC BLOOD PRESSURE: 138 MMHG | BODY MASS INDEX: 42.54 KG/M2

## 2023-09-25 DIAGNOSIS — R91.8 PULMONARY NODULES: ICD-10-CM

## 2023-09-25 DIAGNOSIS — I42.8 NONISCHEMIC CARDIOMYOPATHY (H): ICD-10-CM

## 2023-09-25 DIAGNOSIS — Z01.810 PRE-OPERATIVE CARDIOVASCULAR EXAMINATION: Primary | ICD-10-CM

## 2023-09-25 DIAGNOSIS — E78.5 HYPERLIPIDEMIA LDL GOAL <70: ICD-10-CM

## 2023-09-25 DIAGNOSIS — G44.209 TENSION HEADACHE: ICD-10-CM

## 2023-09-25 DIAGNOSIS — I10 ESSENTIAL HYPERTENSION: ICD-10-CM

## 2023-09-25 DIAGNOSIS — I25.119 CORONARY ARTERY DISEASE INVOLVING NATIVE CORONARY ARTERY OF NATIVE HEART WITH ANGINA PECTORIS (H): ICD-10-CM

## 2023-09-25 PROCEDURE — 99214 OFFICE O/P EST MOD 30 MIN: CPT | Performed by: INTERNAL MEDICINE

## 2023-09-25 RX ORDER — VENLAFAXINE HYDROCHLORIDE 75 MG/1
CAPSULE, EXTENDED RELEASE ORAL
Qty: 30 CAPSULE | Refills: 3 | Status: SHIPPED | OUTPATIENT
Start: 2023-09-25 | End: 2023-10-03

## 2023-09-25 NOTE — LETTER
9/25/2023    Jaky Gaspar MD  [address] [place] [id number]    RE: Leora Sanchez       Dear Colleague,     I had the pleasure of seeing Leora Sanchez in the [facility].         The patient does not speak English.  Her medical history is translated by a professional [facility] [place], [place] .    Assessment/Plan:   1.  Preop cardiovascular clearance: The patient developed intermittent chest pain, not typically exertional, lasted usually 10 minutes each episode, lasted episode 2 days ago lasted 30 minutes, relieved by nitroglycerin.  She has coronary artery disease, per coronary angiogram mild to moderate disease in LAD, mild disease seen LCx.  We discussed there for the evaluation and management.  After discussion, pharmacological rec adenosine nuclear stress test is requested for preop evaluation.  If her nuclear stress test is negative for inducible myocardial ischemia, stable LV systolic function, the patient is cleared for left upper lung surgery/procedure with a low risk of from cardiology standpoint.  Otherwise we will discuss the options.    2.  Nonischemic cardiomyopathy, chronic congestive heart failure with preserved ejection fraction, class II: The patient is compensated well.  No signs of fluid retention.  She is on low-salt diet.   Her previous stress cardiac MRI in February 2021 was reported ejection fraction 65%, no previous myocardial infarction or myocardial scar, no obvious valvular disease.  Continue losartan 100 mg daily, metoprolol  mg daily and Imdur 60 mg daily.  Lasix as needed.     3.  Coronary artery disease, no obstructive lesion per coronary angiogram in May 2019, chest pain: As discussed, Lexiscan nuclear stress test for ischemic evaluation.  Continue Imdur, metoprolol, aspirin and Lipitor.       4.  Essential hypertension: Her blood pressure is controlled with losartan 100 mg daily and metoprolol  mg daily.       5.  Dyslipidemia: On Lipitor 80 mg daily.  Continue  lifestyle modification.     6.  Obesity, left upper lung nodule suspicious of low-grade adenocarcinoma: Follow-up with pulmonary clinic..    Thank you for the opportunity to be involved in the care of Leora Sanchez. If you have any questions, please feel free to contact me.  I will see the patient again in 6 months and as needed.    Much or all of the text in this note was generated through the use of Dragon Dictate voice-to-text software. Errors in spelling or words which seem out of context are unintentional. Sound alike errors, in particular, may have escaped editing.       History of Present Illness:   It is my pleasure to see Leora Sanchez at the [facility] for preop cardiovascular clearance.  Leora Sanchez is a 53 year old female with a medical history of coronary artery disease, nonischemic cardiomyopathy, improved left ventricular ejection fraction to normal range, essential hypertension, hyperlipidemia, obesity and stage III CKD.    The patient was found to have enlarged low-grade but adenocarcinoma in left upper lobe of lung.  She presents to cardiology clinic for preop cardiovascular clearance.      She states that she developed intermittent chest pain over last several months.  She described her chest pain as located anterior chest, sharp pain, moderate in severity, lasted usually 10 minutes each episode, lasted Saturday that lasted for 30 minutes, relieved by nitro.  No radiation, not typically associated with exertion.  She also complains of shortness of breath on exertion, occasional palpitations.  She has no dizziness, orthopnea, PND or leg edema.  She gained more than 10 pounds over last year with.  Her blood pressure and heart rate are controlled.              Past Medical History:     Patient Active Problem List   Diagnosis    Health Care Home    Essential hypertension    Perforation of right tympanic membrane    Heart failure with reduced ejection fraction (H)    Type 2 diabetes  mellitus with hyperglycemia, without long-term current use of insulin (H)    Right-sided sensorineural hearing loss    Recurrent major depressive disorder, in partial remission (H)    Pulmonary nodules    Coronary artery disease involving native coronary artery of native heart with angina pectoris (H)    Nonischemic cardiomyopathy (H)    Mixed incontinence    Hypokalemia    Hyperlipidemia LDL goal <70    Heartburn    Ganglion cyst of left foot    Class 3 severe obesity due to excess calories with body mass index (BMI) of 40.0 to 44.9 in adult (H)    CKD (chronic kidney disease) stage 3, GFR 30-59 ml/min (H)    Tension headache    Bilateral dry eyes    Anemia, unspecified type    Great toe pain, right    Fatty liver    Chronic gout of right foot       Past Surgical History:     Past Surgical History:   Procedure Laterality Date    CV CORONARY ANGIOGRAM N/A 5/13/2019    Procedure: Coronary Angiogram;  Surgeon: Car Box MD;  Location: [facility];  Service: Cardiology    CV LEFT HEART CATHETERIZATION WITHOUT LEFT VENTRICULOGRAM Left 10/31/2017    Procedure: Left Heart Catheterization Without Left Ventriculogram;  Surgeon: Jonathan Duque MD;  Location: [facility];  Service:     CV LEFT HEART CATHETERIZATION WITHOUT LEFT VENTRICULOGRAM Left 5/13/2019    Procedure: Left Heart Catheterization Without Left Ventriculogram;  Surgeon: Car Box MD;  Location:  [facility];  Service: Cardiology    DILATION AND CURETTAGE  2010    ENT SURGERY      INNER EAR SURGERY Right 1994    MASTOIDECTOMY Right 1996    NH CATH PLACEMENT & NJX CORONARY ART ANGIO IMG S&I N/A 10/31/2017    Procedure: Coronary Angiogram;  Surgeon: Jonathan Duque MD;  Location: [facility];  Service: Cardiology       Family History:     Family History   Problem Relation Age of Onset    Diabetes Mother     Hypertension Mother     Uterine Cancer Mother     Diverticulitis Mother     Other - See  Comments Father          in [place]    No Known Problems Sister     No Known Problems Daughter     No Known Problems Daughter     No Known Problems Daughter     No Known Problems Daughter     No Known Problems Son     No Known Problems Son     No Known Problems Son     No Known Problems Son         Social History:    reports that she has never smoked. She has never used smokeless tobacco. She reports that she does not drink alcohol and does not use drugs.    Review of Systems:   12 systems are reviewed negative except for in HPI.    Meds:     Current Outpatient Medications:     acetaminophen (MAPAP) 500 MG tablet, Take 2 tablets (1,000 mg) by mouth 3 times daily as needed for mild pain, Disp: 100 tablet, Rfl: 11    allopurinol (ZYLOPRIM) 100 MG tablet, Take 1 tablet (100 mg) by mouth daily, Disp: 90 tablet, Rfl: 1    aspirin 81 MG EC tablet, Take 1 tablet (81 mg) by mouth daily, Disp: 90 tablet, Rfl: 4    atorvastatin (LIPITOR) 80 MG tablet, TAKE 1 TABLET (80 MG TOTAL) BY MOUTH DAILY/ TXHUA HNUB NOJ 1 LUB [facility], Disp: 90 tablet, Rfl: 4    B Complex-Biotin-FA (B COMPLEX 100 TR) TBCR, Take 1 tablet by mouth daily, Disp: 90 tablet, Rfl: 4    blood glucose (CONTOUR NEXT TEST) test strip, 1 strip by In Vitro route daily, Disp: 25 strip, Rfl: 12    blood glucose (NO BRAND SPECIFIED) lancets standard, Use to test blood sugar 1 times daily or as directed., Disp: 100 Lancet, Rfl: 3    colchicine (COLCRYS) 0.6 MG tablet, Take 2 tablets (1.2 mg) by mouth daily for 1 day, THEN 1 tablet (0.6 mg) daily for 30 days., Disp: 31 tablet, Rfl: 3    furosemide (LASIX) 40 MG tablet, Take 1 tablet (40 mg) by mouth daily as needed (swelling), Disp: 90 tablet, Rfl: 4    isosorbide mononitrate (IMDUR) 60 MG 24 hr tablet, Take 1 tablet (60 mg) by mouth daily, Disp: 90 tablet, Rfl: 3    losartan (COZAAR) 100 MG tablet, TAKE 1 PILL BY MOUTH DAILY FOR BLOOD PRESSURE / TXHUA HNUB NOJ 1 LUB [facility]  "NT[facility]AV SIAB, Disp: 90 tablet, Rfl: 3    metFORMIN (GLUCOPHAGE XR) 500 MG 24 hr tablet, Take 1 tablet (500 mg) by mouth 2 times daily (with meals), Disp: 360 tablet, Rfl: 4    metoprolol succinate ER (TOPROL XL) 100 MG 24 hr tablet, Take 0.5 tablets (50 mg) by mouth daily, Disp: 90 tablet, Rfl: 3    nitroGLYcerin (NITROSTAT) 0.4 MG sublingual tablet, DISSOLVE 1 PILL UNDER TONGUE EVERY 5 MINUTES AS NEEDED FOR CHEST PAIN/YOG MOB HAUV SIAB MUAB IB LUB [facility]  HAUV QAB NPLAIG TXHUA 5 SWATI THIS, Disp: 25 tablet, Rfl: 4    omeprazole (PRILOSEC) 40 MG DR capsule, TAKE 1 PILL BY MOUTH EVERY DAY/ [facility] NOJ 1 [facility] PAB ZOO LUB PLAB, Disp: 90 capsule, Rfl: 1    potassium chloride ER (K-TAB/KLOR-CON) 10 MEQ CR tablet, Take 1 tablet (10 mEq) by mouth daily, Disp: 90 tablet, Rfl: 3    semaglutide (OZEMPIC, 0.25 OR 0.5 MG/DOSE,) 2 MG/3ML pen, Inject 0.5 mg Subcutaneous once a week, Disp: 3 mL, Rfl: 3    vitamin D3 (CHOLECALCIFEROL) 125 MCG (5000 UT) tablet, Take 1 tablet (125 mcg) by mouth daily, Disp: 90 tablet, Rfl: 1    venlafaxine (EFFEXOR XR) 75 MG 24 hr capsule, TAKE 1 CAPSULE (75 MG) BY MOUTH DAILY/ NO 1 [facility] SIAB, Disp: 30 capsule, Rfl: 3    Allergies:   Patient has no known allergies.      Objective:      Physical Exam  95.7 kg (211 lb)  1.499 m (4' 11\")  Body mass index is 42.62 kg/m .  /88 (BP Location: Right arm, Patient Position: Sitting, Cuff Size: Adult Large)   Pulse 72   Resp 20   Ht 1.499 m (4' 11\")   Wt 95.7 kg (211 lb)   SpO2 94%   BMI 42.62 kg/m      General Appearance:   Awake, Alert, No acute distress.   HEENT:  Pupil equal and reactive to light. No scleral icterus; the mucous membranes were moist.   Neck: No cervical bruits. No JVD. No thyromegaly.     Chest: The spine was straight. The chest was symmetric.   Lungs:   Respirations unlabored; Lungs are clear to auscultation. No crackles. No wheezing.   Cardiovascular:   Regular rhythm and rate, normal first and " second heart sounds with no murmurs. No rubs or gallops.    Abdomen:  Obese. Soft. No tenderness. Non-distended. Bowels sounds are present   Extremities: Equal tibial pulses. No leg edema.   Skin: No rashes or ulcers. Warm, Dry.   Musculoskeletal: No tenderness. No deformity.   Neurologic: Mood and affect are appropriate. No focal deficits.         EKG: Personally reviewed  Normal sinus rhythm   Nonspecific T wave abnormality   Abnormal ECG   When compared with ECG of 29-MAR-2018 16:41,   No significant change was found     Cardiac Imaging Studies  ECHO on 5-:  Technically difficult study due to patient's body habitus  Left ventricle ejection fraction is moderately decreased. The estimated left ventricular ejection fraction is 45% with global hypokinesis.  Right ventricle poorly visualized. CT is normal which would suggest normal right ventricular systolic function.  No significant valvular heart disease identified.  When compared to the previous study dated 1/29/2018, overall left ventricular function appears slightly lower.     Coronary angiogram on 5-:  Angiography via left radial   LM normal  LAD 40-50% prox LAD disease with FFR 0.86  Circ OM 1 40-50% narrowing with FFR 0.96  RCA min dz     Stress cardiac MRI on 2-:  1.  Lexiscan stress cardiac MRI is negative for inducible myocardial ischemia.   2.  Lexiscan stress ECG is negative for inducible myocardial ischemia.  3.  No previous myocardial infarction is identified.  4.  Normal left ventricular size, wall thickness and systolic function. The calculated left ventricular ejection fraction is 65%.  5.  Normal right ventricular size and systolic function.  6.  No obvious valvular disease.    Lab Review   Lab Results   Component Value Date     12/07/2021     04/26/2013    CO2 26 12/07/2021    CO2 26.0 08/15/2011    BUN 28 12/07/2021    BUN 18 04/26/2013     Lab Results   Component Value Date    WBC 5.8 12/07/2021    HGB 11.5  12/07/2021    HCT 35.4 12/07/2021    MCV 94 12/07/2021     12/07/2021     Lab Results   Component Value Date    CHOL 183 06/23/2023    CHOL 178 12/07/2021    CHOL 177 12/15/2020     Lab Results   Component Value Date    HDL 68 06/23/2023    HDL 59 12/07/2021    HDL 55 12/15/2020     No components found for: LDLCALC  Lab Results   Component Value Date    TRIG 132 06/23/2023    TRIG 149 12/07/2021    TRIG 170 (H) 12/15/2020     No results found for: CHOLHDL  Lab Results   Component Value Date    TROPONINI <0.01 08/20/2020     Lab Results   Component Value Date    BNP 12 08/20/2020     Lab Results   Component Value Date    TSH 1.97 12/15/2020                 Thank you for allowing me to participate in the care of your patient.      Sincerely,     Buddy Roy MD     [facility]  cc:   No referring provider defined for this encounter.

## 2023-09-25 NOTE — PROGRESS NOTES
The patient does not speak English.  Her medical history is translated by a professional [facility] .    Assessment/Plan:   1.  Preop cardiovascular clearance: The patient developed intermittent chest pain, not typically exertional, lasted usually 10 minutes each episode, lasted episode 2 days ago lasted 30 minutes, relieved by nitroglycerin.  She has coronary artery disease, per coronary angiogram mild to moderate disease in LAD, mild disease seen LCx.  We discussed there for the evaluation and management.  After discussion, pharmacological rec adenosine nuclear stress test is requested for preop evaluation.  If her nuclear stress test is negative for inducible myocardial ischemia, stable LV systolic function, the patient is cleared for left upper lung surgery/procedure with a low risk of from cardiology standpoint.  Otherwise we will discuss the options.    2.  Nonischemic cardiomyopathy, chronic congestive heart failure with preserved ejection fraction, class II: The patient is compensated well.  No signs of fluid retention.  She is on low-salt diet.   Her previous stress cardiac MRI in February 2021 was reported ejection fraction 65%, no previous myocardial infarction or myocardial scar, no obvious valvular disease.  Continue losartan 100 mg daily, metoprolol  mg daily and Imdur 60 mg daily.  Lasix as needed.     3.  Coronary artery disease, no obstructive lesion per coronary angiogram in May 2019, chest pain: As discussed, Lexiscan nuclear stress test for ischemic evaluation.  Continue Imdur, metoprolol, aspirin and Lipitor.       4.  Essential hypertension: Her blood pressure is controlled with losartan 100 mg daily and metoprolol  mg daily.       5.  Dyslipidemia: On Lipitor 80 mg daily.  Continue lifestyle modification.     6.  Obesity, left upper lung nodule suspicious of low-grade adenocarcinoma: Follow-up with pulmonary clinic..    Thank you for the opportunity to be involved in  the care of Leora Sanchez. If you have any questions, please feel free to contact me.  I will see the patient again in 6 months and as needed.    Much or all of the text in this note was generated through the use of Dragon Dictate voice-to-text software. Errors in spelling or words which seem out of context are unintentional. Sound alike errors, in particular, may have escaped editing.       History of Present Illness:   It is my pleasure to see Leora Sanchez at the [facility] clinic for preop cardiovascular clearance.  Leora Sanchez is a 53 year old female with a medical history of coronary artery disease, nonischemic cardiomyopathy, improved left ventricular ejection fraction to normal range, essential hypertension, hyperlipidemia, obesity and stage III CKD.    The patient was found to have enlarged low-grade but adenocarcinoma in left upper lobe of lung.  She presents to cardiology clinic for preop cardiovascular clearance.      She states that she developed intermittent chest pain over last several months.  She described her chest pain as located anterior chest, sharp pain, moderate in severity, lasted usually 10 minutes each episode, lasted Saturday that lasted for 30 minutes, relieved by nitro.  No radiation, not typically associated with exertion.  She also complains of shortness of breath on exertion, occasional palpitations.  She has no dizziness, orthopnea, PND or leg edema.  She gained more than 10 pounds over last year with.  Her blood pressure and heart rate are controlled.              Past Medical History:     Patient Active Problem List   Diagnosis    Health Care Home    Essential hypertension    Perforation of right tympanic membrane    Heart failure with reduced ejection fraction (H)    Type 2 diabetes mellitus with hyperglycemia, without long-term current use of insulin (H)    Right-sided sensorineural hearing loss    Recurrent major depressive disorder, in partial remission (H)     Pulmonary nodules    Coronary artery disease involving native coronary artery of native heart with angina pectoris (H)    Nonischemic cardiomyopathy (H)    Mixed incontinence    Hypokalemia    Hyperlipidemia LDL goal <70    Heartburn    Ganglion cyst of left foot    Class 3 severe obesity due to excess calories with body mass index (BMI) of 40.0 to 44.9 in adult (H)    CKD (chronic kidney disease) stage 3, GFR 30-59 ml/min (H)    Tension headache    Bilateral dry eyes    Anemia, unspecified type    Great toe pain, right    Fatty liver    Chronic gout of right foot       Past Surgical History:     Past Surgical History:   Procedure Laterality Date    CV CORONARY ANGIOGRAM N/A 2019    Procedure: Coronary Angiogram;  Surgeon: Car Box MD;  Location: [facility] Cath Lab;  Service: Cardiology    CV LEFT HEART CATHETERIZATION WITHOUT LEFT VENTRICULOGRAM Left 10/31/2017    Procedure: Left Heart Catheterization Without Left Ventriculogram;  Surgeon: Jonathan Duque MD;  Location: [facility] Cath Lab;  Service:     CV LEFT HEART CATHETERIZATION WITHOUT LEFT VENTRICULOGRAM Left 2019    Procedure: Left Heart Catheterization Without Left Ventriculogram;  Surgeon: Car Box MD;  Location:  [facility];  Service: Cardiology    DILATION AND CURETTAGE      ENT SURGERY      INNER EAR SURGERY Right     MASTOIDECTOMY Right     MS CATH PLACEMENT & NJX CORONARY ART ANGIO IMG S&I N/A 10/31/2017    Procedure: Coronary Angiogram;  Surgeon: Jonathan Duque MD;  Location: [facility] Cath Lab;  Service: Cardiology       Family History:     Family History   Problem Relation Age of Onset    Diabetes Mother     Hypertension Mother     Uterine Cancer Mother     Diverticulitis Mother     Other - See Comments Father          in [place] in [place]    No Known Problems Sister     No Known Problems Daughter     No Known Problems Daughter     No Known Problems Daughter     No Known  Problems Daughter     No Known Problems Son     No Known Problems Son     No Known Problems Son     No Known Problems Son         Social History:    reports that she has never smoked. She has never used smokeless tobacco. She reports that she does not drink alcohol and does not use drugs.    Review of Systems:   12 systems are reviewed negative except for in HPI.    Meds:     Current Outpatient Medications:     acetaminophen (MAPAP) 500 MG tablet, Take 2 tablets (1,000 mg) by mouth 3 times daily as needed for mild pain, Disp: 100 tablet, Rfl: 11    allopurinol (ZYLOPRIM) 100 MG tablet, Take 1 tablet (100 mg) by mouth daily, Disp: 90 tablet, Rfl: 1    aspirin 81 MG EC tablet, Take 1 tablet (81 mg) by mouth daily, Disp: 90 tablet, Rfl: 4    atorvastatin (LIPITOR) 80 MG tablet, TAKE 1 TABLET (80 MG TOTAL) BY MOUTH DAILY/ TXHUA HNUB NOJ 1 LUB [facility] MUAJ ROJ, Disp: 90 tablet, Rfl: 4    B Complex-Biotin-FA (B COMPLEX 100 TR) TBCR, Take 1 tablet by mouth daily, Disp: 90 tablet, Rfl: 4    blood glucose (CONTOUR NEXT TEST) test strip, 1 strip by In Vitro route daily, Disp: 25 strip, Rfl: 12    blood glucose (NO BRAND SPECIFIED) lancets standard, Use to test blood sugar 1 times daily or as directed., Disp: 100 Lancet, Rfl: 3    colchicine (COLCRYS) 0.6 MG tablet, Take 2 tablets (1.2 mg) by mouth daily for 1 day, THEN 1 tablet (0.6 mg) daily for 30 days., Disp: 31 tablet, Rfl: 3    furosemide (LASIX) 40 MG tablet, Take 1 tablet (40 mg) by mouth daily as needed (swelling), Disp: 90 tablet, Rfl: 4    isosorbide mononitrate (IMDUR) 60 MG 24 hr tablet, Take 1 tablet (60 mg) by mouth daily, Disp: 90 tablet, Rfl: 3    losartan (COZAAR) 100 MG tablet, TAKE 1 PILL BY MOUTH DAILY FOR BLOOD PRESSURE / TXHUA HNUB NOJ 1 LUB [facility] NTSHAV SIAB, Disp: 90 tablet, Rfl: 3    metFORMIN (GLUCOPHAGE XR) 500 MG 24 hr tablet, Take 1 tablet (500 mg) by mouth 2 times daily (with meals), Disp: 360 tablet, Rfl: 4     "metoprolol succinate ER (TOPROL XL) 100 MG 24 hr tablet, Take 0.5 tablets (50 mg) by mouth daily, Disp: 90 tablet, Rfl: 3    nitroGLYcerin (NITROSTAT) 0.4 MG sublingual tablet, DISSOLVE 1 PILL UNDER TONGUE EVERY 5 MINUTES AS NEEDED FOR CHEST PAIN/YOG MOB HAUV SIAB MUAB IB LUB [facility]  HAUV QAB NPLAIG TXHUA 5 SWATI THIS, Disp: 25 tablet, Rfl: 4    omeprazole (PRILOSEC) 40 MG DR capsule, TAKE 1 PILL BY MOUTH EVERY DAY/ TXA HNUB NOJ 1 LUB [facility] PAB ZOO LUB PLAB, Disp: 90 capsule, Rfl: 1    potassium chloride ER (K-TAB/KLOR-CON) 10 MEQ CR tablet, Take 1 tablet (10 mEq) by mouth daily, Disp: 90 tablet, Rfl: 3    semaglutide (OZEMPIC, 0.25 OR 0.5 MG/DOSE,) 2 MG/3ML pen, Inject 0.5 mg Subcutaneous once a week, Disp: 3 mL, Rfl: 3    vitamin D3 (CHOLECALCIFEROL) 125 MCG (5000 UT) tablet, Take 1 tablet (125 mcg) by mouth daily, Disp: 90 tablet, Rfl: 1    venlafaxine (EFFEXOR XR) 75 MG 24 hr capsule, TAKE 1 CAPSULE (75 MG) BY MOUTH DAILY/ NO 1 [facility] SIAB, Disp: 30 capsule, Rfl: 3    Allergies:   Patient has no known allergies.      Objective:      Physical Exam  95.7 kg (211 lb)  1.499 m (4' 11\")  Body mass index is 42.62 kg/m .  /88 (BP Location: Right arm, Patient Position: Sitting, Cuff Size: Adult Large)   Pulse 72   Resp 20   Ht 1.499 m (4' 11\")   Wt 95.7 kg (211 lb)   SpO2 94%   BMI 42.62 kg/m      General Appearance:   Awake, Alert, No acute distress.   HEENT:  Pupil equal and reactive to light. No scleral icterus; the mucous membranes were moist.   Neck: No cervical bruits. No JVD. No thyromegaly.     Chest: The spine was straight. The chest was symmetric.   Lungs:   Respirations unlabored; Lungs are clear to auscultation. No crackles. No wheezing.   Cardiovascular:   Regular rhythm and rate, normal first and second heart sounds with no murmurs. No rubs or gallops.    Abdomen:  Obese. Soft. No tenderness. Non-distended. Bowels sounds are present   Extremities: Equal tibial pulses. No " leg edema.   Skin: No rashes or ulcers. Warm, Dry.   Musculoskeletal: No tenderness. No deformity.   Neurologic: Mood and affect are appropriate. No focal deficits.         EKG: Personally reviewed  Normal sinus rhythm   Nonspecific T wave abnormality   Abnormal ECG   When compared with ECG of 29-MAR-2018 16:41,   No significant change was found     Cardiac Imaging Studies  ECHO on 5-:  Technically difficult study due to patient's body habitus  Left ventricle ejection fraction is moderately decreased. The estimated left ventricular ejection fraction is 45% with global hypokinesis.  Right ventricle poorly visualized. CT is normal which would suggest normal right ventricular systolic function.  No significant valvular heart disease identified.  When compared to the previous study dated 1/29/2018, overall left ventricular function appears slightly lower.     Coronary angiogram on 5-:  Angiography via left radial   LM normal  LAD 40-50% prox LAD disease with FFR 0.86  Circ OM 1 40-50% narrowing with FFR 0.96  RCA min dz     Stress cardiac MRI on 2-:  1.  Lexiscan stress cardiac MRI is negative for inducible myocardial ischemia.   2.  Lexiscan stress ECG is negative for inducible myocardial ischemia.  3.  No previous myocardial infarction is identified.  4.  Normal left ventricular size, wall thickness and systolic function. The calculated left ventricular ejection fraction is 65%.  5.  Normal right ventricular size and systolic function.  6.  No obvious valvular disease.    Lab Review   Lab Results   Component Value Date     12/07/2021     04/26/2013    CO2 26 12/07/2021    CO2 26.0 08/15/2011    BUN 28 12/07/2021    BUN 18 04/26/2013     Lab Results   Component Value Date    WBC 5.8 12/07/2021    HGB 11.5 12/07/2021    HCT 35.4 12/07/2021    MCV 94 12/07/2021     12/07/2021     Lab Results   Component Value Date    CHOL 183 06/23/2023    CHOL 178 12/07/2021    CHOL 177 12/15/2020      Lab Results   Component Value Date    HDL 68 06/23/2023    HDL 59 12/07/2021    HDL 55 12/15/2020     No components found for: LDLCALC  Lab Results   Component Value Date    TRIG 132 06/23/2023    TRIG 149 12/07/2021    TRIG 170 (H) 12/15/2020     No results found for: CHOLHDL  Lab Results   Component Value Date    TROPONINI <0.01 08/20/2020     Lab Results   Component Value Date    BNP 12 08/20/2020     Lab Results   Component Value Date    TSH 1.97 12/15/2020

## 2023-09-26 ENCOUNTER — PATIENT OUTREACH (OUTPATIENT)
Dept: CARE COORDINATION | Facility: CLINIC | Age: 53
End: 2023-09-26
Payer: MEDICARE

## 2023-09-26 NOTE — PROGRESS NOTES
Clinic Care Coordination Contact  Program: [facility]  County:  [facility] Case #:  [facility] Worker:   Simran #:   Subscriber #:   Renewal:  Date Applied:     FRW Outreach:  9/26/23: FRW called pt. Pt would like another CC application mailed to her. FRW mailed application and will follow up with pt within a few weeks.   Cristy Cisneros   Financial Resource Worker  [facility] Care Coordination  [phone]   9/11/23: FRW checked billing notes. CC was denied due to not enough verification was submitted. FRW called pt but mailbox is not set up. FRW will call pt again within 30 days.   Cristy Cisneros   Financial Resource Worker  [facility] Care Coordination  [phone]   8/22/23:FRW checked billing notes, no CC scanned still. FRW re-emailed application to Billing and attached Ana Gardner.   6/15/23: FRW emailed CC to billing department. FRW will check billing notes in 30 days.   6/6/23: FRW called pt. Pt dropped off the CC application to [facility] last week. FRW will follow up with [facility]  staff.   5/18/23: FRW called pt. Pt hasn't sent in the CC application yet. Pt will after her son gets  this weekend. Pt asked FRW to call back in 3 weeks as she is out of town.   5/8/23: FRW called pt but unable to leave VM. FRW will make another outreach in 1-2 weeks.   4/24/23: FRW called pt and completed [facility] application. FRW will mail application to patient for signature. Patient will sign and include needed documents and send back to billing department.  4/11/23: FRW called pt on referral. Appointment made for 4/26 to apply for [facility] care   4/5/23: Outreach attempted x 1. Left message on voicemail with call back information and requested return call.  Plan: FRW will call again within one week.     Health Insurance:      Referral/Screening:

## 2023-09-27 ENCOUNTER — HOSPITAL ENCOUNTER (OUTPATIENT)
Dept: CARDIOLOGY | Facility: HOSPITAL | Age: 53
Discharge: HOME | End: 2023-09-27
Attending: INTERNAL MEDICINE
Payer: MEDICARE

## 2023-09-27 ENCOUNTER — HOSPITAL ENCOUNTER (OUTPATIENT)
Dept: NUCLEAR MEDICINE | Facility: HOSPITAL | Age: 53
Discharge: HOME | End: 2023-09-27
Attending: INTERNAL MEDICINE
Payer: MEDICARE

## 2023-09-27 LAB
CV STRESS CURRENT BP HE: NORMAL
CV STRESS CURRENT HR HE: 68
CV STRESS CURRENT HR HE: 69
CV STRESS CURRENT HR HE: 69
CV STRESS CURRENT HR HE: 87
CV STRESS CURRENT HR HE: 87
CV STRESS CURRENT HR HE: 88
CV STRESS CURRENT HR HE: 89
CV STRESS CURRENT HR HE: 90
CV STRESS CURRENT HR HE: 90
CV STRESS CURRENT HR HE: 91
CV STRESS CURRENT HR HE: 92
CV STRESS CURRENT HR HE: 93
CV STRESS CURRENT HR HE: 93
CV STRESS CURRENT HR HE: 96
CV STRESS CURRENT HR HE: 97
CV STRESS CURRENT HR HE: NORMAL
CV STRESS CURRENT HR HE: NORMAL
CV STRESS DEVIATION TIME HE: NORMAL
CV STRESS ECHO PERCENT HR HE: NORMAL
CV STRESS EXERCISE STAGE HE: NORMAL
CV STRESS FINAL RESTING BP HE: NORMAL
CV STRESS MAX HR HE: 96
CV STRESS MAX TREADMILL GRADE HE: 0
CV STRESS MAX TREADMILL SPEED HE: 0
CV STRESS PEAK DIA BP HE: NORMAL
CV STRESS PEAK SYS BP HE: NORMAL
CV STRESS PHASE HE: NORMAL
CV STRESS PROTOCOL HE: NORMAL
CV STRESS RESTING PT POSITION HE: NORMAL
CV STRESS ST DEVIATION AMOUNT HE: NORMAL
CV STRESS ST DEVIATION ELEVATION HE: NORMAL
CV STRESS ST EVELATION AMOUNT HE: NORMAL
CV STRESS TEST TYPE HE: NORMAL
CV STRESS TOTAL STAGE TIME MIN 1 HE: NORMAL
NUC STRESS EJECTION FRACTION: 70 %
RATE PRESSURE PRODUCT: NORMAL
STRESS ECHO BASELINE DIASTOLIC HE: 89
STRESS ECHO BASELINE HR: 66
STRESS ECHO BASELINE SYSTOLIC BP: 138
STRESS ECHO CALCULATED PERCENT HR: 58 %
STRESS ECHO LAST STRESS DIASTOLIC BP: 82
STRESS ECHO LAST STRESS HR: 93
STRESS ECHO LAST STRESS SYSTOLIC BP: 124
STRESS ECHO TARGET HR: 166

## 2023-09-27 PROCEDURE — 343N000001 HC RX 343: Performed by: INTERNAL MEDICINE

## 2023-09-27 PROCEDURE — A9500 TC99M SESTAMIBI: HCPCS | Performed by: INTERNAL MEDICINE

## 2023-09-27 PROCEDURE — G1010 CDSM STANSON: HCPCS | Performed by: INTERNAL MEDICINE

## 2023-09-27 PROCEDURE — 250N000011 HC RX IP 250 OP 636: Performed by: INTERNAL MEDICINE

## 2023-09-27 PROCEDURE — 93018 CV STRESS TEST I&R ONLY: CPT | Performed by: INTERNAL MEDICINE

## 2023-09-27 PROCEDURE — 93016 CV STRESS TEST SUPVJ ONLY: CPT | Performed by: INTERNAL MEDICINE

## 2023-09-27 PROCEDURE — 78452 HT MUSCLE IMAGE SPECT MULT: CPT | Mod: 26 | Performed by: INTERNAL MEDICINE

## 2023-09-27 PROCEDURE — 93017 CV STRESS TEST TRACING ONLY: CPT

## 2023-09-27 PROCEDURE — 78452 HT MUSCLE IMAGE SPECT MULT: CPT | Mod: ME

## 2023-09-27 RX ORDER — REGADENOSON 0.08 MG/ML
0.4 INJECTION, SOLUTION INTRAVENOUS ONCE
Status: COMPLETED | OUTPATIENT
Start: 2023-09-27 | End: 2023-09-27

## 2023-09-27 RX ORDER — AMINOPHYLLINE 25 MG/ML
50 INJECTION, SOLUTION INTRAVENOUS
Status: DISCONTINUED | OUTPATIENT
Start: 2023-09-27 | End: 2023-09-27 | Stop reason: HOSPADM

## 2023-09-27 RX ADMIN — REGADENOSON 0.4 MG: 0.08 INJECTION, SOLUTION INTRAVENOUS at 14:12

## 2023-09-27 RX ADMIN — Medication 31.2 MILLICURIE: at 14:15

## 2023-09-27 RX ADMIN — Medication 8.4 MILLICURIE: at 12:56

## 2023-09-29 ENCOUNTER — APPOINTMENT (OUTPATIENT)
Dept: INTERPRETER SERVICES | Facility: CLINIC | Age: 53
End: 2023-09-29
Payer: MEDICARE

## 2023-10-02 NOTE — PROGRESS NOTES
Medication Therapy Management (MTM) Encounter    ASSESSMENT:                            Medication Adherence/Access: Unable to fully assess without med bottles present, but patient denies adherence concerns.       Type 2 Diabetes:  A1C now at goal <8%. Can aim for a goal <7%. Reported home blood glucose at goal of , fasting. No weight loss since starting Ozempic - hasn't noticed any changes in appetite. May benefit form continued titration to help with additional blood glucose reduction and weight loss. If no significant weight loss with the higher dose of Ozempic, consider switch to Mounjaro 5 mg week.         Nonischemic cardiomyopathy/Angina/CAD:: Last LDL just above goal <70. Reports adherence to statin. Repeat lipids today.       HFrEF: blood pressure at goal <130/80. Pulse now  at goal . No adjustments today.        Chronic gout: Last uric acid at  goal <6. Minimal improvement since starting colchicine. Reviewed that it make take several months to improve.        Tension headache/Depression: Headache intensity decreased since restarting Venlafaxine, but still having daily headaches. Does still struggle with depression and anxiety, and hot flashes. Declined adjustment last visit,but agreeable today.       Low Vitamin D: Has started supplement. Plan for repeat levels after 12 weeks of therapy (early November) Target levels 45-60 for mood and headache support.     GERD: Reported symptoms seem more related to hunger pain vs indigestion. No changes with increase in Ozempic dose.       PLAN:                            Increase Venlafaxine to 150 mg ER daily.   Increase Ozempic to 1 mg weekly      Future considerations (at PCP visit)   Repeat Vit D level  Consider transition to Mounjaro 5 mg weekly.        Follow-up: Return in about 2 months (around 12/3/2023) for Medication Management Pharmacist, in person.  10/27 with PCP     SUBJECTIVE/OBJECTIVE:                          Leora Sanchez is a 53 year old  female coming in for a follow-up visit. She was referred to me from Jaky Gaspar MD. [employer]  in person (ID# [id number]).  Today's visit is a follow-up MTM visit from 2023.         Reason for visit: Medication Management.-  diabetes follow-up.     Allergies/ADRs: Reviewed in chart  Past Medical History: Reviewed in chart  Tobacco: She reports that she has never smoked. She has never used smokeless tobacco.  Alcohol:  reports no history of alcohol use.       Medication Adherence/Access:     Son and  help with managing meds at home. They set up a pill box for 1 month at a time in a 4 x 7 box. Usually takes medicines once a day, but last visit PCP instructed patient to take diabetes medicine twice daily. Just filled the box this morning so has 1 month set at home.     Forgot meds at home today.          Type 2 Diabetes: Prescribed Started Ozempic 0.5 mg weekly (increased at last MTM visit), metformin 500 mg ER 1 tab twice daily.     Will be having thoracoscopic partial LEFT upper lobectomy. Needs to get blood glucose improved.     Blood sugar monitorin time(s) daily; Ranges: (patient reported)     Fasting- 106 this morning.  Not checking after meals. Did check a couple of times - one was 160 and other 176.        Hemoglobin A1C   Date Value Ref Range Status   10/03/2023 7.4 (H) 0.0 - 5.6 % Final     Comment:     Normal <5.7%   Prediabetes 5.7-6.4%    Diabetes 6.5% or higher     Note: Adopted from ADA consensus guidelines.   2023 8.8 (H) 0.0 - 5.6 % Final     Comment:     Normal <5.7%   Prediabetes 5.7-6.4%    Diabetes 6.5% or higher     Note: Adopted from ADA consensus guidelines.   2022 6.8 (H) 0.0 - 5.6 % Final     Comment:     Normal <5.7%   Prediabetes 5.7-6.4%    Diabetes 6.5% or higher     Note: Adopted from ADA consensus guidelines.   2011 5.7 4.2 - 6.1 % Final      Microalbumin Urine mg/dL   Date Value Ref Range Status   2021 <0.50 0.00 - 1.99 mg/dL  Final      Creatinine Urine mg/dL   Date Value Ref Range Status   06/23/2023 114.0 mg/dL Final     Comment:     The reference ranges have not been established in urine creatinine. The results should be integrated into the clinical context for interpretation.   12/07/2021 101 mg/dL Final       Creatinine   Date Value Ref Range Status   06/23/2023 0.88 0.51 - 0.95 mg/dL Final   04/26/2013 0.82 0.60 - 1.10 mg/dL Final            Nonischemic cardiomyopathy/Angina/CAD: Prescribed aspirin 81 mg daily, atorvastatin 80 mg daily, isosorbide mononitrate 60 mg ER daily, nitroglycerin 0.4 mg as needed    Did get aspirin since last MTM visit.      Did have some chest pains. Saw cardiology. Had stress test. No concerns noted and patient was cleared for lobectomy.     No chest pains since.       Recent Labs   Lab Test 06/23/23  1452 12/07/21  1113   CHOL 183 178   HDL 68 59   LDL 89 89   TRIG 132 149          HFrEF: Prescribed furosemide 40 mg daily as needed, losartan 100 mg daily, isosorbide mononitrate 60 mg ER daily, metoprolol succinate 100 mg 0.5 tab daily, potassium chloride 10 mEq ER daily     Denies edema concerns.     Only using Furosemide 2-3 times per week. Doesn't like using the medicine because it makes her urinate a lot and very often after taking.     BP Readings from Last 3 Encounters:   10/03/23 121/80   09/25/23 138/88   09/05/23 120/80      Pulse Readings from Last 3 Encounters:   10/03/23 69   09/25/23 72   09/05/23 73     Wt Readings from Last 3 Encounters:   10/03/23 211 lb (95.7 kg)   09/25/23 211 lb (95.7 kg)   08/24/23 210 lb (95.3 kg)        Last Comprehensive Metabolic Panel:  Lab Results   Component Value Date     06/23/2023    POTASSIUM 4.8 06/23/2023    CHLORIDE 106 06/23/2023    CO2 25 06/23/2023    ANIONGAP 13 06/23/2023     (H) 06/23/2023    BUN 22.4 (H) 06/23/2023    CR 0.88 06/23/2023    GFRESTIMATED 78 06/23/2023    LYNN 9.5 06/23/2023          Chronic gout: Prescribed allopurinol  100 mg daily. At last visit, started Colchicine 0.6 mg daily.     No concerns as long as she's wearing open toed shoes. 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 Only slight improvement since starting Colchicine.       Uric Acid   Date Value Ref Range Status   07/26/2023 5.3 2.4 - 5.7 mg/dL Final       Depression:   Tension headache: Prescribed acetaminophen 500 mg 2 tabs 3 times daily as needed, Venlafaxine 75 mg ER once daily. Was previously on 150 mg ER daily.     Since restarting Venlafaxine, severe headaches 1-2 times per month. Some minor headaches always there.     Was having sweating/hotflashes. These have also improved, but not resolved. Almost daily, cold sweats at night.     Continues to struggle with depression and anxiety. It's more quiet at home since Mom passed. Has episodes of difficulty with memory, sometimes goes to the store and forgets why she's there.       Low Vitamin D: Prescribed Vitamin D3 5000 units daily .   Purchasing OTC.     Vitamin D Deficiency Screening Results:  Lab Results   Component Value Date    VITDT 23 07/26/2023        GERD: Prescribed Omeprazole 40 mg daily     Symptoms about once a week. Tends to be when she's hungry, better after eating. No worse since last increase Ozempic.          Today's Vitals: /80   Pulse 69   Wt 211 lb (95.7 kg)   BMI 42.62 kg/m    ----------------      I spent 35 minutes with this patient today. All changes were made via collaborative practice agreement with Jaky Gaspar MD. A copy of the visit note was provided to the patient's provider(s).    A summary of these recommendations was given to the patient.    Art Thakkar, Solo  Medication Therapy Management (MTM) Pharmacist  [facility]          Medication Therapy Recommendations  Recurrent major depressive disorder, in partial remission (H24)    Current Medication: venlafaxine (EFFEXOR XR) 75 MG 24 hr capsule (Discontinued)    Rationale: Dose too low - Dosage too low - Effectiveness   Recommendation: Increase Dose - venlafaxine 150 MG 24 hr capsule   Status: Accepted per CPA         Type 2 diabetes mellitus with hyperglycemia, without long-term current use of insulin (H)    Current Medication: semaglutide (OZEMPIC, 0.25 OR 0.5 MG/DOSE,) 2 MG/3ML pen (Discontinued)   Rationale: Dose too low - Dosage too low - Effectiveness   Recommendation: Increase Dose - Ozempic (1 MG/DOSE) 2 MG/1.5ML Sopn   Status: Accepted per CPA

## 2023-10-03 ENCOUNTER — OFFICE VISIT (OUTPATIENT)
Dept: PHARMACY | Facility: CLINIC | Age: 53
End: 2023-10-03

## 2023-10-03 ENCOUNTER — LAB (OUTPATIENT)
Dept: LAB | Facility: CLINIC | Age: 53
End: 2023-10-03
Payer: MEDICARE

## 2023-10-03 VITALS
BODY MASS INDEX: 42.62 KG/M2 | WEIGHT: 211 LBS | HEART RATE: 69 BPM | SYSTOLIC BLOOD PRESSURE: 121 MMHG | DIASTOLIC BLOOD PRESSURE: 80 MMHG

## 2023-10-03 DIAGNOSIS — I42.8 NONISCHEMIC CARDIOMYOPATHY (H): ICD-10-CM

## 2023-10-03 DIAGNOSIS — E11.65 TYPE 2 DIABETES MELLITUS WITH HYPERGLYCEMIA, WITHOUT LONG-TERM CURRENT USE OF INSULIN (H): ICD-10-CM

## 2023-10-03 DIAGNOSIS — I25.119 CORONARY ARTERY DISEASE INVOLVING NATIVE CORONARY ARTERY OF NATIVE HEART WITH ANGINA PECTORIS (H): ICD-10-CM

## 2023-10-03 DIAGNOSIS — G44.209 TENSION HEADACHE: ICD-10-CM

## 2023-10-03 DIAGNOSIS — R12 HEARTBURN: ICD-10-CM

## 2023-10-03 DIAGNOSIS — E55.9 VITAMIN D DEFICIENCY: ICD-10-CM

## 2023-10-03 DIAGNOSIS — E11.65 TYPE 2 DIABETES MELLITUS WITH HYPERGLYCEMIA, WITHOUT LONG-TERM CURRENT USE OF INSULIN (H): Primary | ICD-10-CM

## 2023-10-03 DIAGNOSIS — M1A.0710 CHRONIC GOUT OF RIGHT FOOT, UNSPECIFIED CAUSE: ICD-10-CM

## 2023-10-03 DIAGNOSIS — N18.31 STAGE 3A CHRONIC KIDNEY DISEASE (H): ICD-10-CM

## 2023-10-03 DIAGNOSIS — I50.20 HEART FAILURE WITH REDUCED EJECTION FRACTION (H): ICD-10-CM

## 2023-10-03 LAB — HBA1C MFR BLD: 7.4 % (ref 0–5.6)

## 2023-10-03 PROCEDURE — 36415 COLL VENOUS BLD VENIPUNCTURE: CPT

## 2023-10-03 PROCEDURE — 99207 PR NO CHARGE LOS: CPT | Performed by: PHARMACIST

## 2023-10-03 PROCEDURE — 80061 LIPID PANEL: CPT

## 2023-10-03 PROCEDURE — 83036 HEMOGLOBIN GLYCOSYLATED A1C: CPT

## 2023-10-03 RX ORDER — VENLAFAXINE HYDROCHLORIDE 150 MG/1
150 CAPSULE, EXTENDED RELEASE ORAL DAILY
Qty: 90 CAPSULE | Refills: 1 | Status: SHIPPED | OUTPATIENT
Start: 2023-10-03 | End: 2024-05-17 | Stop reason: ALTCHOICE

## 2023-10-03 NOTE — Clinical Note
A1C down to 7.4.  Still increased Ozempic to 1 mg today to help with additional reduction and weight loss. If no weight loss at your visit, consider transition to Mounjaro. I'm seeing her back at end of November.

## 2023-10-03 NOTE — PATIENT INSTRUCTIONS
"Recommendations from today's MTM visit:                                                         Increase Venlafaxine to 150 mg ER daily.   Increase Ozempic to 1 mg weekly      Follow-up: Return in about 2 months (around 12/3/2023) for Medication Management Pharmacist, in person.    It was great speaking with you today.  I value your experience and would be very thankful for your time in providing feedback in our clinic survey. In the next few days, you may receive an email or text message from [employer] with a link to a survey related to your  clinical pharmacist.\"     To schedule another MTM appointment, please call the clinic directly or you may call the MTM scheduling line at [phone] or toll-free at [phone].     My Clinical Pharmacist's contact information:                                                      Please feel free to contact me with any questions or concerns you have.      Art Thakkar, PharmD  Medication Therapy Management (MTM) Pharmacist  [facility]      "

## 2023-10-04 ENCOUNTER — TELEPHONE (OUTPATIENT)
Dept: FAMILY MEDICINE | Facility: CLINIC | Age: 53
End: 2023-10-04
Payer: MEDICARE

## 2023-10-04 LAB
CHOLEST SERPL-MCNC: 181 MG/DL
HDLC SERPL-MCNC: 70 MG/DL
LDLC SERPL CALC-MCNC: 88 MG/DL
NONHDLC SERPL-MCNC: 111 MG/DL
TRIGL SERPL-MCNC: 114 MG/DL

## 2023-10-04 RX ORDER — EZETIMIBE 10 MG/1
10 TABLET ORAL DAILY
Qty: 90 TABLET | Refills: 1 | Status: SHIPPED | OUTPATIENT
Start: 2023-10-04 | End: 2024-04-01

## 2023-10-04 NOTE — TELEPHONE ENCOUNTER
"----- Message from Art Thakkar, Solo sent at 10/4/2023 10:49 AM CDT -----  Please call patient and inform:     LDL \"bad cholesterol\" is still above goal less than 70. I would like patient to start an additional medicine for cholesterol called Ezetimibe 10 mg - take 1 tab daily.     I'll send the prescription today. We can recheck cholesterol levels again in 1-2 months (come fasting to next MTM visit).   "

## 2023-10-09 ENCOUNTER — APPOINTMENT (OUTPATIENT)
Dept: INTERPRETER SERVICES | Facility: CLINIC | Age: 53
End: 2023-10-09
Payer: MEDICARE

## 2023-10-09 NOTE — TELEPHONE ENCOUNTER
Called pt with help from a ong  and relayed message. Pt verbalized understanding and will go get rx.      Mendez Cooney, BSN RN  [facility]

## 2023-10-10 ENCOUNTER — PATIENT OUTREACH (OUTPATIENT)
Dept: CARE COORDINATION | Facility: CLINIC | Age: 53
End: 2023-10-10
Payer: MEDICARE

## 2023-10-10 NOTE — PROGRESS NOTES
"Clinic Care Coordination Contact  [facility]/Voicemail    Reason: CCC CHW Follow Up Called (follow up current goal's)    Patient chart reviewed;   -Patient is currently working with [facility] FRW team. Per FRW encounter dated 9/26/2023 notes reviewed;   \"Program: [facility] Care  FRW Outreach:  9/26/23: FRW called pt. Pt would like another CC application mailed to her. FRW mailed application and will follow up with pt within a few weeks.   9/11/23: FRW checked billing notes. CC was denied due to not enough verification was submitted. FRW called pt but mailbox is not set up. FRW will call pt again within 30 days.   8/22/23:FRW checked billing notes, no CC scanned still. FRW re-emailed application to Billing and attached Ana Gardner.\"    Clinical Data: Care Coordinator Outreach:  Outreach attempted x 1: CHW attempted to connect with patient at phone number \"[phone]\" listed in chart and no answer. Voicemail box has not been set up and unable to leave a voice message for patient at this time.     CHW attempted to connect with patient at the alternative phone number at \"[phone]\" and no answer nor voicemail box available at this time per pt phone system.     Appt reminder:   Patient have future appt below with PCP and CCC RN per appt desk reviewed. Unable to reach patient at this time.  Name: Leora Sanchez MRN: [id number]     Date: 10/27/2023 Status: Scheduled     Time: 1:00 PM Length: 20     Visit Type: OFFICE VISIT [6921] Copay: $0.00     Provider: Jaky Gaspar MD [facility]       Notes: DM f/u; discuss due care gaps completion per pt agreed 8/7/23.     Name: Leora Sanchez MRN: [id number]     Date: 11/1/2023 Status: Scheduled     Time: 11:00 AM Length: 60     Visit Type: CCC PHONE VISIT [0138] Copay: $0.00     Provider: [facility] CCC RN Department: [facility] NURSE       Notes: outreach following PCP visit to reassess for support in plan of care goals     Plan: Attempt #2- Care Coordinator will try to reach patient " again in 2-3 weeks.

## 2023-10-10 NOTE — PROGRESS NOTES
Problem: Adult Inpatient Plan of Care  Goal: Plan of Care Review  Outcome: Ongoing, Progressing  Flowsheets (Taken 10/10/2023 1440)  Progress: improving  Plan of Care Reviewed With: patient  Outcome Evaluation: VSS. No c/o pain. Up with assist. Will monitor.   Goal Outcome Evaluation:  Plan of Care Reviewed With: patient        Progress: improving  Outcome Evaluation: VSS. No c/o pain. Up with assist. Will monitor.          "Leora Sanchez is a 50 y.o. female who is being evaluated via a billable telephone visit.      The patient has been notified of following:     \"This telephone visit will be conducted via a call between you and your physician/provider. We have found that certain health care needs can be provided without the need for a physical exam.  This service lets us provide the care you need with a short phone conversation.  If a prescription is necessary we can send it directly to your pharmacy.  If lab work is needed we can place an order for that and you can then stop by our lab to have the test done at a later time.    Telephone visits are billed at different rates depending on your insurance coverage. During this emergency period, for some insurers they may be billed the same as an in-person visit.  Please reach out to your insurance provider with any questions.    If during the course of the call the physician/provider feels a telephone visit is not appropriate, you will not be charged for this service.\"    Patient has given verbal consent to a Telephone visit? Yes    What phone number would you like to be contacted at? [phone]    Patient would like to receive their AVS by AVS Preference: Mail a copy.    Infogram [facility] PHONE Visit    Assessment/Plan:  1. Type 2 diabetes mellitus with hyperglycemia, without long-term current use of insulin (H)  un Controlled last check..  Addressed smoking status and aspirin therapy.  Recommended annual eye exam and dental cares. Reviewed foot cares and foot exam.  Blood pressure and lipid management reviewed today.  Vaccines reviewed and updated.  Plan for glucose management includes ongoing focus on healthy diabetic diet and increased activity, currently on metformin xr 500mg bid.  Labs ordered as below including and will adjust meds as needed:     Lab Results   Component Value Date    HGBA1C 8.5 (H) 08/20/2020   , No results found for: LDL,   Lab Results   Component " "Value Date    CREATININE 1.25 (H) 09/18/2020       - Glycosylated Hemoglobin A1c; Future  - Comprehensive Metabolic Panel; Future  - Lipid [place]; Future  - Ambulatory referral to Ophthalmology; Future  - Thyroid [place]; Future  - Ambulatory referral to Care Management (Primary Care); Future    2. Essential hypertension  Pt will present for labs and vitals.  Currently on metoprolol xl 100mg daily, losartan 100mg daily with kcl 10meq daily.  Lasix pt has titrated up to 40mg two times a day- will update rx and check weight and labs.    - Comprehensive Metabolic Panel; Future    3. Hyperlipidemia LDL goal <70  Currently on atorvastatin 80mg daily.  Check fasting lipids.  Dietary changes needed.    - Lipid [place]; Future    4. Pulmonary nodules  10/2019.  Stable 3/2020  Repeat imaging at 2 and 4 years and then stop    5. Stage 3a chronic kidney disease  Check labs.  Continue asa, statin, and ARB.  Vitamin D supplement .      6. Recurrent major depressive disorder, in partial remission (H)  Pt has stopped her cymbalta due to side effects and notes she is doing 'okay\"    7. Lower extremity edema  Pt has titrated up on lasix to 40mg two times a day and notes improved breathing and comfort.  rx updated.  Need to check labs and weight.  Pt continues on kcl supplement.    - furosemide (LASIX) 40 MG tablet; Take 1 tablet (40 mg total) by mouth 2 (two) times a day at 9am and 6pm.  Dispense: 60 tablet; Refill: 3    8. Chronic systolic heart failure (H)  As above.  Continue statin, asa, arb, isosorbide, b-blocker.  Check weight and labs.  Currently asymptomatic.    - furosemide (LASIX) 40 MG tablet; Take 1 tablet (40 mg total) by mouth 2 (two) times a day at 9am and 6pm.  Dispense: 60 tablet; Refill: 3    Return in about 3 months (around 2/27/2021).    Patient Education/AVS:  There are no Patient Instructions on file for this visit.    Chief Complaint:  Chief Complaint   Patient presents with     Follow-up     Diabetes     " Medication Refill     would like to stop depression medication       HPI:   Leora Sanchez is a 50 y.o. female c/o f/u on diabetes, cholesterol, htn and depression.      Currently in the process of moving to a new home.  Has cardiology f/u 12/8/20 and lab only 12/15/20.      Diabetes- did get a meter from [facility] about a month ago.  Checking sugars fasting (110-130)  , after breakfast (160-170) or evening post meal 160-170.  Taking metformin xr 500mg two times a day.     htn- not doing bp monitoring at home.      Swelling- needs a refill on her water pill.  1/2 pill daily and really not helping.  Pt increased to to 1 pill daily for past 1-2 months and that also isn't helping.  Did increase to 1 am and 1pm and this seems to be helping the most.  Able to sleep well-no nocturia.    Did stop the amlodipine but pharmacy continues to fill.  Notes even in the morning she wakes up with facial swelling and her eyes look like she has been crying all night long unless she takes full tab in evening.  Taking potassium pill daily.      Pt stopped her antidepression pill because it made her feel too tired.        History summarized from1-2:diabetes education 9/14/20 reviewed- advised to start checking bs, portion sizes, watch carbs, increase exercise.    Cardioloty 9/18/20 reviewed- continue same meds with focus on bp control.  Bmp checked.    Mgm 9/11/20- MISSING doses frequently based on refill dates and quantities seen in clinic.  Should use a med box but not set up.  inreased metformin er to 1000mg daily.  Needs asa tx.  Needs improved ldl control- statin not sufficient.  Needs pcsk-9 in future.  Lasix 40mg 1/2 tab daily, stop amlodipine due to swelling.  Losartan, metoprolol for bp control.  Depression and headache not controlled- start duloxetine 30mg daily.  carafate and ppi.  Advised f/u in 2 weeks but never done.    Old Records-1: Outside allergies, meds, problems and immunizations were reconciled as needed  Radiology  tests reviewed-1: chest ct from 2019 and 2020 reviewed  Lab tests reviewed-1: cr 1.25    Social History     Tobacco Use   Smoking Status Never Smoker   Smokeless Tobacco Never Used   Tobacco Comment    no passive exposure     Social History     Substance and Sexual Activity   Sexual Activity Yes     Partners: Male     Birth control/protection: None     Social History     Social History Narrative    Lives with  who can read and speak English and helps her with meds       Physical Exam:  Vitals from last visit reviewed.   Per pt report at home:      Patient's last menstrual period was 03/14/2019 (approximate).  Wt Readings from Last 3 Encounters:   09/18/20 197 lb (89.4 kg)   08/20/20 200 lb (90.7 kg)   06/04/20 193 lb (87.5 kg)       No data recorded  PHQ-9 Total Score: 15 (8/20/2020  2:07 PM)    PHQ-2 Total Score: 0 (11/27/2020 12:00 PM)    ACT Total Score: 25 (8/20/2020  2:10 PM)      GENERAL: Patient sounds well and able to participate in history and planning without difficulty.     Phone call duration:  24 minutes    Jaky Gaspar MD

## 2023-10-20 ENCOUNTER — PATIENT OUTREACH (OUTPATIENT)
Dept: CARE COORDINATION | Facility: CLINIC | Age: 53
End: 2023-10-20
Payer: MEDICARE

## 2023-10-20 NOTE — PROGRESS NOTES
Clinic Care Coordination Contact  Program: [facility]  County:  [facility] Case #:  [facility] Worker:   Simran #:   Subscriber #:   Renewal:  Date Applied:     FRW Outreach:  10/20/23: FRW called pt and unable to leave VM as mailbox is not set up. FRW will make another outreach within 30 days.  Cristy Cisneros   Financial Resource Worker  [facility] Care Coordination  [phone]   9/26/23: FRW called pt. Pt would like another CC application mailed to her. FRW mailed application and will follow up with pt within a few weeks.   Cristy Cisneros   Financial Resource Worker  [facility] Care Coordination  [phone]   9/11/23: FRW checked billing notes. CC was denied due to not enough verification was submitted. FRW called pt but mailbox is not set up. FRW will call pt again within 30 days.   Cristy Cisneros   Financial Resource Worker  [facility] Care Coordination  [phone]   8/22/23:FRW checked billing notes, no CC scanned still. FRW re-emailed application to Billing and attached Ana Gardner.   6/15/23: FRW emailed CC to billing department. FRW will check billing notes in 30 days.   6/6/23: FRW called pt. Pt dropped off the CC application to [facility] last week. FRW will follow up with [facility]  staff.   5/18/23: FRW called pt. Pt hasn't sent in the CC application yet. Pt will after her son gets  this weekend. Pt asked FRW to call back in 3 weeks as she is out of town.   5/8/23: FRW called pt but unable to leave VM. FRW will make another outreach in 1-2 weeks.   4/24/23: FRW called pt and completed [facility] application. FRW will mail application to patient for signature. Patient will sign and include needed documents and send back to billing department.  4/11/23: FRW called pt on referral. Appointment made for 4/26 to apply for [facility]   4/5/23: Outreach attempted x 1. Left message on voicemail with call back information  and requested return call.  Plan: FRW will call again within one week.     Health Insurance:      Referral/Screening:

## 2023-10-25 ENCOUNTER — PATIENT OUTREACH (OUTPATIENT)
Dept: CARE COORDINATION | Facility: CLINIC | Age: 53
End: 2023-10-25
Payer: MEDICARE

## 2023-10-25 NOTE — PROGRESS NOTES
"Clinic Care Coordination Contact  Care Coordination Clinician Chart Review    Situation: Patient chart reviewed by Care Coordinator.       Background: Care Coordination Program started: 12/10/2020. Initial assessment completed and patient-centered care plan(s) were developed with participation from patient. Lead CC handed patient off to CHW for continued outreaches.       Assessment: Per chart review, patient outreach completed by CC CHW on 10.10.23.  Patient is actively working to accomplish goal(s). Patient's goal(s) appropriate and relevant at this time. Patient is not due for updated Plan of Care.  Assessments will be completed annually or as needed/with change of patient status.      Care Plan: Financial Wellbeing       Problem: Patient expresses financial resource strain       Goal: I would like to review medical bills and get understanding of coverage       Start Date: 3/28/2023    This Visit's Progress: 50% Recent Progress: 50%    Note:     Barriers: coverage  Strengths: engagement  Patient expressed understanding of goal: yes  Action steps to achieve this goal:  1. I will bring in a copy of most recent medical bills for CCC team to review and get better understanding of Patient responsibility and coverage if received. (Completed)  2. I will speak with  CC to review and get better understanding. (Updated: 9/08/2023)-continues  3. I will continues to updates status with Care coordination team during next outreach. (Updated: 9/08/2023)-continues  4. I will updates status with CHW/CCC team after my son wedding. (Completed; 8/7/2023)  5. I will wait to hear from [facility] dept regards to \"[facility] Application\" dropped of to W. (FYI: CHW encounter dated 7/07/2023; FRW encounter dated 8/22/2023). (Updated: 9/08/2023)-continues                            Care Plan: Medical       Problem: HP GENERAL PROBLEM       Goal: I would like to have a plan of care for Cardiology health within 3-4 " months       Start Date: 3/28/2023    This Visit's Progress: 50% Recent Progress: 30%    Note:     Barriers: language  Strengths: family   Patient expressed understanding of goal: yes    Action steps to achieve this goal:  1. I will schedule and attend visit with cardiology provider - number to call [phone].    2. I will update Care coordination team during next outreach.  3. I will report to my Community Health Worker if any additional resources or support needed.  6. I will attend appt on 10/27/2023 with Dr. Gaspar in [facility] to seek further advised.                 Goal: I would like to attend Annual wellness exam       Expected End Date: 6/23/2023    This Visit's Progress: 50% Recent Progress: 30%    Note:       Patient expressed understanding of goal: yes    Action steps to achieve this goal:  1. I will schedule my annual wellness visit; [phone] ([phone]) scheduled for 6/23  2. I will attend my annual wellness visit.  3. I will contact my Care Management or clinic team if I have barriers to attending my annual wellness visit.   4. I will updates status with [facility] team after my son wedding. (Updated: 9/08/2023)  5. I will attend appt on 10/27/2023 with Dr. Gaspar in [facility] to seek further support towards goal completion. (Updated: 9/08/2023)                Goal: I will fill all medications and follow recommendations for theapy and dosing plan.       This Visit's Progress: 50% Recent Progress: 50%    Note:       I will attend appt schedule on 9- at 11:00AM with [facility] RN via phone visit to seek further advised in regards to goal progression.   I will attend appt on 10/03/2023 with Solo in [facility] to fill all my medication and seek further support towards goal completion.  I will attend appt on 10/27/2023 with Dr. Gaspar in [facility] to seek further support towards goal completion.                                  Plan/Recommendations: The patient will continue working with Care Coordination to achieve goal(s) as  above. CHW will continue outreaches at minimum every 30 days and will involve Lead CC as needed or if patient is ready to move to Maintenance. Lead CC will continue to monitor CHW outreaches and patient's progress to goal(s) every 6 weeks.     Plan of Care updated and sent to patient: No

## 2023-10-26 ENCOUNTER — PATIENT OUTREACH (OUTPATIENT)
Dept: CARE COORDINATION | Facility: CLINIC | Age: 53
End: 2023-10-26
Payer: MEDICARE

## 2023-10-26 NOTE — PROGRESS NOTES
"Clinic Care Coordination Contact  Community Health Worker Follow Up    Reason: CCC CHW Follow Up Called (follow up current goal's)    Care Gaps:   Health Maintenance Due   Topic Date Due    HF ACTION PLAN  Never done    DIABETIC FOOT EXAM  Never done    COLORECTAL CANCER SCREENING  Never done    ZOSTER IMMUNIZATION (1 of 2) Never done    EYE EXAM  12/21/2022    INFLUENZA VACCINE (1) 09/01/2023    COVID-19 Vaccine (5 - 2023-24 season) 09/01/2023    PHQ-9  09/23/2023     Patient is informed of due care gaps. Encouraged pt to discuss this with Dr. Gaspar on 10/27/2023 during appt time. Pt confirmed.    Care Plan:   Care Plan: Financial Wellbeing       Problem: Patient expresses financial resource strain       Goal: I would like to review medical bills and get understanding of coverage       Start Date: 3/28/2023    This Visit's Progress: 50% Recent Progress: 50%    Note:     Barriers: coverage  Strengths: engagement  Patient expressed understanding of goal: yes  Action steps to achieve this goal:  1. I will bring in a copy of most recent medical bills for CCC team to review and get better understanding of Patient responsibility and coverage if received. (Completed)  2. I will speak with  CC to review and get better understanding. (Updated: 9/08/2023)-continues  3. I will continues to updates status with Care coordination team during next outreach. (Updated: 9/08/2023)-continues  4. I will updates status with CHW/CCC team after my son wedding. (Completed; 8/7/2023)  5. I will wait to hear from [facility] dept regards to \"[facility] Application\" dropped of to FRW. (FYI: CHW encounter dated 7/07/2023; FRW encounter dated 8/22/2023). (Completed)  6. I will have my children assisted with returning FRW called. FRW phone number provides. (Updated: 10/26/2023)                            Care Plan: Medical       Problem: HP GENERAL PROBLEM       Goal: I would like to have  a plan of care for ear/hearing " within 3-4  months  Completed 5/30/2023      Start Date: 3/28/2023 Expected End Date: 6/9/2023    Recent Progress: 50%    Note:     Barriers: language, access   Strengths: family support  Patient expressed understanding of goal: yes  Action steps to achieve this goal:  1. I will schedule and attend visit with ENT and audiology-done 6/9 @ 1:05  2. I will update Care coordination team during next outreach  3. I will report to my Community Health Worker if any additional resources or support needed.  4. I will updates status with [facility] team after my son wedding. (Updated: 5/19/2023)                  Goal: I would like to have a plan of care for Cardiology health within 3-4 months       Start Date: 3/28/2023    This Visit's Progress: 50% Recent Progress: 50%    Note:     Barriers: language  Strengths: family   Patient expressed understanding of goal: yes    Action steps to achieve this goal:  1. I will schedule and attend visit with cardiology provider - number to call [phone].    2. I will update Care coordination team during next outreach.  3. I will report to my Community Health Worker if any additional resources or support needed.  6. I will attend appt on 10/27/2023 with Dr. Gaspar in [facility] to seek further advised. (Updated: 10/26/2023)                Goal: I would like to attend Annual wellness exam       Expected End Date: 6/23/2023    This Visit's Progress: 50% Recent Progress: 50%    Note:       Patient expressed understanding of goal: yes    Action steps to achieve this goal:  1. I will schedule my annual wellness visit; [id number] ([phone]) scheduled for 6/23  2. I will attend my annual wellness visit.   3. I will contact my Care Management or clinic team if I have barriers to attending my annual wellness visit.   4. I will updates status with [facility] team after my son wedding. (Completed)  5. I will attend appt on 10/27/2023 with Dr. Gaspar in [facility] to seek further support towards goal completion. (Updated:  "10/26/2023)  6. I will review due care gaps details including annual wellness visit appt scheduling with Dr. Gaspar on 10/27/2023 in clinic. (Updated: 10/26/2023)                Goal: I will fill all medications and follow recommendations for theapy and dosing plan.       This Visit's Progress: 50% Recent Progress: 50%    Note:     Personal action steps:  I will attend appt schedule on 9- at 11:00AM with [facility] RN via phone visit to seek further advised in regards to goal progression. (Completed)  I will attend appt on 10/03/2023 with PharmD in [facility] to fill all my medication and seek further support towards goal completion. (Completed)  I will attend appt on 10/27/2023 with Dr. Gaspar in [facility] to seek further support towards goal completion. (Updated: 10/26/2023)                            Patient chart reviewed;   -FRW attempted to connect with patient on 10/20/2023 per pt chart reviewed.   Per FRW notes reviewed encounter dated 10/20/2023;   \"Program: [facility]  FRW Outreach:  10/20/23: FRW called pt and unable to leave VM as mailbox is not set up. FRW will make another outreach within 30 days.  Cristy Cisneros   Financial Resource Worker  [facility]  Clinic Care Coordination  [phone].\"    Patient Outreach Discussion:   [facility] CHW was able to connect with patient two times today regard to reason above. Pt is aware FRW attempted to connect with her on 10/20/2023 and encouraged pt to set up her voicemail and return FRW called. Pt confirmed. FRW phone number given to patient to connect with FRW. CHW educated pt how to leave a voice message for FRW per pt request. Updated current goals above.     Patient shared:  -Voicemail has not set up. Spouse is current out of town to [place] at this time. Will have spouse help with this. No concerns.    -Children work during the day time and will be home in the evening. Available to assist with connect to FRW.   -Doing well. No other questions or concerns at " this time.     CHW suggested patient for the following:  -Pt connect CCC/CHW with any additional resources need and PCP office for scheduling appt.  -Pt returning FRW called. Continue to work with FRW team towards katie care program goal completion and follow instructions.  -Patient with family member assist to set up her phone voicemail.     Appt reminder:   Pt is reminded to appt date below. Pt confirmed.   Name: Leora Sanchez MRN: [id number]     Date: 10/27/2023 Status: Scheduled     Time: 1:00 PM  1:00 PM Length: 20  20     Visit Type: OFFICE VISIT [4761] Copay: $0.00     Provider: Jaky Gaspar MD  VIDEO,  Department: [facility] FAMILY MEDICINE/OB  UR  SERVICE       Notes: DM f/u; discuss due care gaps completion per pt agreed 8/7/23.  To get  for [facility] visits, use drop down list.     Note/comment:   Unable to remind pt to appt date below due to pt end called on CHW.   Name: Leora Sanchez MRN: [id number]     Date: 11/1/2023 Status: Scheduled     Time: 11:00 AM Length: 60     Visit Type: CCC PHONE VISIT [5452] Copay: $0.00     Provider: SPRS CCC RN Department: [facility] NURSE       Notes: outreach following PCP visit to reassess for support in plan of care goals     CHW Next Follow-up: Goals follow up. Informed patient of due care gaps (if any).     Outreach frequency: monthly      CHW Plan:   -Patient return FRW, Cristy called.   -Pt attend appt on 10/27/2023 with Dr. Gaspar in [facility], and 11/01/2023 with CCC RN via phone visit.   -Next CHW outreach plan: 1 month.

## 2023-10-27 ENCOUNTER — OFFICE VISIT (OUTPATIENT)
Dept: FAMILY MEDICINE | Facility: CLINIC | Age: 53
End: 2023-10-27
Payer: MEDICARE

## 2023-10-27 ENCOUNTER — ANCILLARY PROCEDURE (OUTPATIENT)
Dept: GENERAL RADIOLOGY | Facility: CLINIC | Age: 53
End: 2023-10-27
Attending: FAMILY MEDICINE
Payer: MEDICARE

## 2023-10-27 VITALS
WEIGHT: 212 LBS | BODY MASS INDEX: 42.74 KG/M2 | RESPIRATION RATE: 16 BRPM | HEART RATE: 88 BPM | HEIGHT: 59 IN | SYSTOLIC BLOOD PRESSURE: 120 MMHG | DIASTOLIC BLOOD PRESSURE: 80 MMHG | OXYGEN SATURATION: 97 % | TEMPERATURE: 96.9 F

## 2023-10-27 DIAGNOSIS — E55.9 VITAMIN D DEFICIENCY: ICD-10-CM

## 2023-10-27 DIAGNOSIS — E11.65 TYPE 2 DIABETES MELLITUS WITH HYPERGLYCEMIA, WITHOUT LONG-TERM CURRENT USE OF INSULIN (H): Primary | ICD-10-CM

## 2023-10-27 DIAGNOSIS — Z23 NEED FOR SHINGLES VACCINE: ICD-10-CM

## 2023-10-27 DIAGNOSIS — M25.561 ACUTE PAIN OF RIGHT KNEE: ICD-10-CM

## 2023-10-27 DIAGNOSIS — E11.42 DIABETIC POLYNEUROPATHY ASSOCIATED WITH TYPE 2 DIABETES MELLITUS (H): ICD-10-CM

## 2023-10-27 PROCEDURE — 99214 OFFICE O/P EST MOD 30 MIN: CPT | Performed by: FAMILY MEDICINE

## 2023-10-27 PROCEDURE — 73560 X-RAY EXAM OF KNEE 1 OR 2: CPT | Mod: TC | Performed by: RADIOLOGY

## 2023-10-27 RX ORDER — METOPROLOL SUCCINATE 50 MG/1
50 TABLET, EXTENDED RELEASE ORAL DAILY
COMMUNITY
Start: 2023-10-16 | End: 2024-04-05

## 2023-10-27 RX ORDER — COLCHICINE 0.6 MG/1
CAPSULE ORAL
COMMUNITY
Start: 2023-09-05 | End: 2023-10-27

## 2023-10-27 ASSESSMENT — PATIENT HEALTH QUESTIONNAIRE - PHQ9
SUM OF ALL RESPONSES TO PHQ QUESTIONS 1-9: 7
SUM OF ALL RESPONSES TO PHQ QUESTIONS 1-9: 7
10. IF YOU CHECKED OFF ANY PROBLEMS, HOW DIFFICULT HAVE THESE PROBLEMS MADE IT FOR YOU TO DO YOUR WORK, TAKE CARE OF THINGS AT HOME, OR GET ALONG WITH OTHER PEOPLE: NOT DIFFICULT AT ALL

## 2023-10-27 ASSESSMENT — PAIN SCALES - GENERAL: PAINLEVEL: NO PAIN (0)

## 2023-10-27 NOTE — PROGRESS NOTES
Assessment & Plan     Type 2 diabetes mellitus with hyperglycemia, without long-term current use of insulin (H)   Controlled.  Addressed smoking status and aspirin therapy.  Recommended annual eye exam and dental cares. Reviewed foot cares and foot exam.  Blood pressure and lipid management reviewed today.  Vaccines reviewed and updated.  Plan for glucose management includes ongoing focus on healthy diabetic diet and increased activity, continue ozempic 2mg weekly, metformin XR 500mg bid.  Labs ordered as below including:     Hemoglobin A1C   Date Value Ref Range Status   10/03/2023 7.4 (H) 0.0 - 5.6 % Final     Comment:     Normal <5.7%   Prediabetes 5.7-6.4%    Diabetes 6.5% or higher     Note: Adopted from ADA consensus guidelines.   06/23/2023 8.8 (H) 0.0 - 5.6 % Final     Comment:     Normal <5.7%   Prediabetes 5.7-6.4%    Diabetes 6.5% or higher     Note: Adopted from ADA consensus guidelines.   06/02/2022 6.8 (H) 0.0 - 5.6 % Final     Comment:     Normal <5.7%   Prediabetes 5.7-6.4%    Diabetes 6.5% or higher     Note: Adopted from ADA consensus guidelines.   01/18/2011 5.7 4.2 - 6.1 % Final    ,   Lab Results   Component Value Date    LDL 88 10/03/2023   ,   Creatinine   Date Value Ref Range Status   06/23/2023 0.88 0.51 - 0.95 mg/dL Final   04/26/2013 0.82 0.60 - 1.10 mg/dL Final        - Adult Eye  Referral  - PRIMARY CARE FOLLOW-UP SCHEDULING    Vitamin D deficiency  Continue 5000u daily     Need for shingles vaccine  - zoster vaccine recombinant adjuvanted (SHINGRIX) injection  Dispense: 0.5 mL; Refill: 0    Acute pain of right knee  Mild arthritis on xray today.  Will start with PT and tylenol as needed for pain.  Work on LSM/weight loss as able.  Ozempic as above.    - XR Knee Standing Right 2 Views  - Physical Therapy Referral    Diabetic polyneuropathy associated with type 2 diabetes mellitus (H)  Pt seems to have significant callous formation from rubbing in shoes.  Recommend diabetic  "shoes next and consider podiatry referral in future.    - Orthotics and Prosthetics DME Other (Comments) (1 pair of diabetic shoes with inserts)  Diabetic foot exam: DP reduced left, PT reduced bilateral, dry cracking heels, and thick callous formation at base of both great toes           BMI:   Estimated body mass index is 42.46 kg/m  as calculated from the following:    Height as of this encounter: 1.505 m (4' 11.25\").    Weight as of this encounter: 96.2 kg (212 lb).   Weight management plan: Discussed healthy diet and exercise guidelines      Jaky Gaspar MD  [facility]    Subjective   May is a 53 year old, presenting for the following health issues:  Diabetes        10/27/2023    12:47 PM   Additional Questions   Roomed by christine Zimmer increased to 1mg weekly- no changes better or worse.  No weight loss noted.  Fasting sugars .      Effexor XR increased to 150mg.  Headaches better.  No side effects.      Added zetia to atorvastatin to help get LDL below 70.  No side effects.      Cologard- did get the kit but not sure how to do it.     Knee pain - pain in the right knee.  Worse if she has been sitting for a while and gets up to move.  Better with movement and then worse again if she stops moving.  No obvious injury.  H/o gout.  No redness or swelling.  Bothering her for past month now- stable.  Did a course of colchicine in September.  No pain at night.  Tylenol as needed with minimal improvement.   No xray of the knee.      Refills on meds as needed.      History of Present Illness       Diabetes:   She presents for follow up of diabetes.  She is checking home blood glucose one time daily.   She checks blood glucose before meals.  Blood glucose is never over 200 and never under 70. She is aware of hypoglycemia symptoms including none.   She is concerned about other.   She is having numbness in feet.  The patient has not had a diabetic eye exam in the last 12 months.  " "        She eats 2-3 servings of fruits and vegetables daily.She consumes 0 sweetened beverage(s) daily.She exercises with enough effort to increase her heart rate 9 or less minutes per day.  She exercises with enough effort to increase her heart rate 7 days per week.   She is taking medications regularly.           Review of Systems       Objective    /80 (BP Location: Left arm, Patient Position: Sitting, Cuff Size: Adult Large)   Pulse 88   Temp 96.9  F (36.1  C) (Temporal)   Resp 16   Ht 1.505 m (4' 11.25\")   Wt 96.2 kg (212 lb)   SpO2 97%   BMI 42.46 kg/m    Body mass index is 42.46 kg/m .  Physical Exam   Complete 10 point ROS completed today as part of the exam and patient denies any symptoms as reviewed in HPI     Wt Readings from Last 3 Encounters:   10/27/23 96.2 kg (212 lb)   10/03/23 95.7 kg (211 lb)   09/25/23 95.7 kg (211 lb)       No LMP recorded. Patient is postmenopausal.    All normal as below except abnormalities include: as outlined.   General is a  53 year old sitting comfortably in no apparent distress   HEENT:  TM are clear bilaterally.  Eye exams within normal   Neck: Supple without lymphadenopathy or thyromegally  CV: Regular rate and rhythm S1S2 without rubs, murmurs or gallops,   Lungs: Clear to auscultation bilaterally  Abd:  +BS, soft NT/ND,  No masses or organomegally,   Extremities: Warm, No Edema, 2+ Pedal and radial pulses bilaterally  Skin: No lesions or rashes noted  Neuro: Able to ambulate around the exam room with equal movement, strength and normal coordination of the upper and lower extremeties symmetrically    Diabetic Foot exam.  Normal inspection.  Callous formation is present bilaterally- thick at base of great toes.  2+ pedal pulses bilaterally.  Sensation is intact to 10g monofilament but greatly decreased.   No skin breakdown or ulceration noted.      History summarized from1-2:mt 10/2023 reviewed- dm controlled.  Titrate up on ozempic to help with weight " loss.  Switch to mounjaro next as needed.  Ldl above 70.  Bp at goal.  Uric acid at goal.  Did course of colchicine. Venlafaxine helping with headaches- increased dose.  Repeat d level in 3 months.    Old Records-1: Outside allergies, meds, problems and immunizations were reconciled as needed from CareEverywhere  Radiology tests reviewed-1: normal mammogram   Lab tests reviewed-1: 2023  Medicine tests reviewed-1: nuclear stress test stable 2023.  Neg for ischemia or infarct.       Follow-up Visit   Expected date:  Feb 27, 2024 (Approximate)      Follow Up Appointment Details:     Follow-up with whom?: PCP    Follow-Up for what?: Chronic Disease f/u    Chronic Disease f/u:  Diabetes  Hyperlipidemia  Hypertension  Depression       How?: In Person                     Jaky Gaspar MD        prior to immunization administration, verified patients identity using patient s name and date of birth. Please see Immunization Activity for additional information.     Screening Questionnaire for Adult Immunization    Are you sick today?   No   Do you have allergies to medications, food, a vaccine component or latex?   No   Have you ever had a serious reaction after receiving a vaccination?   No   Do you have a long-term health problem with heart, lung, kidney, or metabolic disease (e.g., diabetes), asthma, a blood disorder, no spleen, complement component deficiency, a cochlear implant, or a spinal fluid leak?  Are you on long-term aspirin therapy?   Yes   Do you have cancer, leukemia, HIV/AIDS, or any other immune system problem?   No   Do you have a parent, brother, or sister with an immune system problem?   No   In the past 3 months, have you taken medications that affect  your immune system, such as prednisone, other steroids, or anticancer drugs; drugs for the treatment of rheumatoid arthritis, Crohn s disease, or psoriasis; or have you had radiation treatments?   No   Have you had a seizure, or a brain or other nervous  system problem?   No   During the past year, have you received a transfusion of blood or blood    products, or been given immune (gamma) globulin or antiviral drug?   No   For women: Are you pregnant or is there a chance you could become       pregnant during the next month?   No   Have you received any vaccinations in the past 4 weeks?   yes     Immunization questionnaire was positive for at least one answer.  Notified .          Screening performed by Carly Guerrero MA on 10/27/2023 at 12:51 PM.

## 2023-10-27 NOTE — RESULT ENCOUNTER NOTE
Team - please call patient with results and send result letter with this information if patient desires for their records.     Her right knee has some arthritis changes but not too bad.   I would start with physical therapy first- Order placed for this  If still not better at upcoming visit we can do a knee injection

## 2023-10-27 NOTE — COMMUNITY RESOURCES LIST (ENGLISH)
10/27/2023   [facility]  N/A  For questions about this resource list or additional care needs, please contact your primary care clinic or care manager.  Phone: [phone]   Email: N/A   Address: [address] [place] [id number]   Hours: N/A        Food and Nutrition       Food pantry  1  [facility] Distance: 0.94 miles      Pickup   [address] [place] [address]  Language: English, Wallisian  Hours: Sat 9:30 AM - 11:30 AM  Fees: Free   Phone: (204) 376-8835 Website: http://www.[facility]     2  [facility] Distance: 1.27 miles      [place] [id number]  Language: English  Hours: Mon - Tue 9:00 AM - 3:00 PM  Fees: Free, Self Pay   Phone: (419) 344-1272 Email: patria@[facility] Hospital[facility] Website: http://[facility].org/[facility]/ri-tzei-ftvcb-ave/     SNAP application assistance  3  [facility] Distance: 1.27 miles      Phone/Virtual   [address] [place] [address]  Language: English  Hours: Mon - Thu 9:00 AM - 4:00 PM , Fri 9:00 AM - 2:00 PM , Sun 10:00 AM - 12:00 PM  Fees: Free   Phone: (771) 595-2046 Email: patria@[facility] Hospital[facility] Website: http://[facility] Hospital[facility]olfsmith.org/[facility]/     4  [employer] [place] Distance: 2.52 miles      Phone/Virtual   [address] [place] [address]  Language: English, Hmong, Central African, Namibian, Wallisian  Hours: Mon - Fri 8:30 AM - 4:30 PM  Fees: Free   Phone: (821) 817-9911 Email: helpline@[employer].org Website: https://www.[employer]ers[employer].org/programs/[place]-food-helpline/     Soup kitchen or free meals  5  [facility] Distance: 1.27 miles      [address] [address]  Language: English  Hours: Mon - Fri 11:45 AM - 12:45 PM  Fees: Free   Phone: (862) 407-5972 Email:  patria@[facility].[employer]vation[employer].org Website: http://[facility].org/[facility]/kq-aqxc-qmnyy-[facility]/     6  [facility] Distance: 1.76 miles      Pickup   [address] [place] [address]  Language: English  Hours: Wed 5:30 PM - 6:30 PM  Fees: Free   Phone: (424) 108-1938 Email: office@ormn.[facility] Website: https://www.[facility]          Transportation       Free or low-cost transportation  7  [employer] - Free or low-cost transportation Distance: 4.88 miles      In-Person   [address] [place] [address]  Language: English  Hours: Mon - Fri 9:00 AM - 5:00 PM  Fees: Free   Phone: (122) 128-8023 Email: info@[employer] Website: https://www.[employer]/     8  [facility] Distance: 6.07 miles      In-Person   [address] [place] [address]  Language: English, Serbian  Hours: Mon 9:00 AM - 5:00 PM , Tue 9:30 AM - 7:00 PM , Wed 9:00 AM - 5:00 PM , Thu 9:30 AM - 7:00 PM , Fri 9:00 AM - 5:00 PM  Fees: Free   Phone: (538) 176-7905 Email: contactus@[employer].com Website: http://www.[employer].com     Transportation to medical appointments  9  [employer] Distance: 2.04 miles      In-Person   [address] [address]  Language: English  Hours: Mon - Thu 6:00 AM - 6:00 PM , Fri 6:00 AM - 5:00 PM  Fees: Insurance, Self Pay   Phone: (180) 384-8064 Email: office@[employer] Website: https://www.[employer]/     10  [facility] - Non-Emergency Medical Transportation Distance: 3.63 miles      In-Person   [address] [place] [address]  Language: English  Hours: Mon - Fri 8:00 AM - 4:00 PM Appt. Only  Fees: Self Pay   Phone: (987) 625-7722 Website: http://www.[facility].org/Medical-Services/Emergency-medical-services/Non-emergency-transportation/          Important Numbers & Websites       Emergency Services   911  [facility]   311  Poison Control   (396)  516-2347  Suicide Prevention Lifeline   (102) 587-2457 (TALK)  Child Abuse Hotline   (718) 351-4676 (4-A-Child)  Sexual Assault Hotline   (340) 290-4957 (HOPE)  National Runaway Safeline   (998) 126-5198 (RUNAWAY)  All-Options Talkline   (877) 668-3377  Substance Abuse Referral   (677) 730-7234 (HELP)

## 2023-10-27 NOTE — COMMUNITY RESOURCES LIST (ENGLISH)
10/27/2023   [facility]  N/A  For questions about this resource list or additional care needs, please contact your primary care clinic or care manager.  Phone: [phone]   Email: N/A   Address: [address] [place] [address]   Hours: N/A        Food and Nutrition       Food pantry  1  [facility] Distance: 0.94 miles      Pickup   [address] [place] [address]  Language: English, Tongan  Hours: Sat 9:30 AM - 11:30 AM  Fees: Free   Phone: (981) 467-6541 Website: http://www.[facility]     2  [facility] Distance: 1.27 miles      [place] [id number]  Language: English  Hours: Mon - Tue 9:00 AM - 3:00 PM  Fees: Free, Self Pay   Phone: (427) 164-3795 Email: patria@[facility] CenterIntegrata Security.[facility] Website: http://[facility] Affairs Medical CenterHands-On Mobile.org/[facility]/fe-naiw-euhce-ave/     SNAP application assistance  3  [facility] Distance: 1.27 miles      Phone/Virtual   [address] [place] [address]  Language: English  Hours: Mon - Thu 9:00 AM - 4:00 PM , Fri 9:00 AM - 2:00 PM , Sun 10:00 AM - 12:00 PM  Fees: Free   Phone: (884) 558-5967 Email: patria@[facility] Affairs Medical CenterCovarity.[facility] Website: http://[facility] Veterans Affairs Medical CenterHands-On Mobile.org/[facility]/     4  [employer] [place] Distance: 2.52 miles      Phone/Virtual   [address] [place] [address]  Language: English, Hmong, Azerbaijani, Canadian, Tongan  Hours: Mon - Fri 8:30 AM - 4:30 PM  Fees: Free   Phone: (839) 501-9278 Email: helpline@hungersolutions.org Website: https://www.hungersolutions.org/programs/[place]-food-helpline/     Soup kitchen or free meals  5  [facility] Service [place] Distance: 1.27 miles      [address] [address]  Language: English  Hours: Mon - Fri 11:45 AM - 12:45 PM  Fees: Free   Phone: (239) 940-6015 Email:  patria@[facility].salvationarmy.org Website: http://[facility].org/[facility]/uj-hyye-ceubn-[facility]/     6  [facility] - [place]ishes Distance: 1.76 miles      Pickup   [address] [place] [address]  Language: English  Hours: Wed 5:30 PM - 6:30 PM  Fees: Free   Phone: (187) 281-3025 Email: office@ormn.Azubu Website: https://www.[facility].Azubu          Transportation       Free or low-cost transportation  7  [employer] [address] - Free or low-cost transportation Distance: 4.88 miles      In-Person   [address] [facility] [address] [place] [address]  Language: English  Hours: Mon - Fri 9:00 AM - 5:00 PM  Fees: Free   Phone: (283) 938-1110 Email: info@TheGrid Website: https://www.TheGrid/     8  [employer] Distance: 6.07 miles      In-Person   [address] [place] [address]  Language: English, Frisian  Hours: Mon 9:00 AM - 5:00 PM , Tue 9:30 AM - 7:00 PM , Wed 9:00 AM - 5:00 PM , Thu 9:30 AM - 7:00 PM , Fri 9:00 AM - 5:00 PM  Fees: Free   Phone: (901) 610-8027 Email: contactus@[employer] Website: http://www.[employer]gin     Transportation to medical appointments  9  Discover Ride Distance: 2.04 miles      In-Person   [address] [address]  Language: English  Hours: Mon - Thu 6:00 AM - 6:00 PM , Fri 6:00 AM - 5:00 PM  Fees: Insurance, Self Pay   Phone: (747) 833-6302 Email: office@[employer] Website: https://www.Big Fish/     10  [facility] - Non-Emergency Medical Transportation Distance: 3.63 miles      In-Person   [address] [place] [address]  Language: English  Hours: Mon - Fri 8:00 AM - 4:00 PM Appt. Only  Fees: Self Pay   Phone: (992) 353-3662 Website: http://www.[facility].org/Medical-Services/Emergency-medical-services/Non-emergency-transportation/          Important Numbers & Websites       Emergency Services   911  [facility]   311  Poison Control   (735)  583-6578  Suicide Prevention Lifeline   (938) 682-3762 (TALK)  Child Abuse Hotline   (796) 458-9768 (4-A-Child)  Sexual Assault Hotline   (777) 561-6474 (HOPE)  National Runaway Safeline   (556) 737-8876 (RUNAWAY)  All-Options Talkline   (900) 457-3193  Substance Abuse Referral   (117) 596-4415 (HELP)

## 2023-10-27 NOTE — COMMUNITY RESOURCES LIST (ENGLISH)
10/27/2023   [facility]  N/A  For questions about this resource list or additional care needs, please contact your primary care clinic or care manager.  Phone: [phone]   Email: N/A   Address: [address] [place] [address]   Hours: N/A        Food and Nutrition       Food pantry  1  [facility] [employer] Distance: 0.94 miles      Pickup   [address] [place] [address]  Language: English, Burkinan  Hours: Sat 9:30 AM - 11:30 AM  Fees: Free   Phone: (871) 109-5842 Website: http://www.[facility]     2  [facility] Distance: 1.27 miles      [place] [id number]  Language: English  Hours: Mon - Tue 9:00 AM - 3:00 PM  Fees: Free, Self Pay   Phone: (861) 572-1065 Email: patria@[facility] BudaQBE.[facility] Website: http://Paul A. Dever State SchoolStandard Renewable Energy.org/[facility]/ud-xhkr-yytow-ave/     SNAP application assistance  3  [facility] Distance: 1.27 miles      Phone/Virtual   [address] [place] [address]  Language: English  Hours: Mon - Thu 9:00 AM - 4:00 PM , Fri 9:00 AM - 2:00 PM , Sun 10:00 AM - 12:00 PM  Fees: Free   Phone: (818) 918-7609 Email: patria@[facility].Paul A. Dever State SchoolAllylix.[facility] Website: http://Paul A. Dever State SchoolStandard Renewable Energy.org/[facility]/     4  [employer] [place] Distance: 2.52 miles      Phone/Virtual   [address] [place] [address]  Language: English, Hmong, Croatian, Kyrgyz, Burkinan  Hours: Mon - Fri 8:30 AM - 4:30 PM  Fees: Free   Phone: (283) 427-4056 Email: helpline@hungersolutions.org Website: https://www.hungersolutions.org/programs/mn-food-helpline/     Soup kitchen or free meals  5  [facility] Service [place] Distance: 1.27 miles      [address] [address]  Language: English  Hours: Mon - Fri 11:45 AM - 12:45 PM  Fees: Free   Phone: (923) 924-8352 Email:  patria@[facility].salvationarmy.org Website: http://[facility].org/Community Health/xq-evss-aanlv-[facility]/     6  [facility] - Lo[facility] Fishes - [place]aves and [place]ishes Distance: 1.76 miles      Pickup   [address] [place] [place] [address]  Language: English  Hours: Wed 5:30 PM - 6:30 PM  Fees: Free   Phone: (478) 117-6716 Email: office@ormn.[employer] Website: https://www.[facility].[employer]          Transportation       Free or low-cost transportation  7  [employer] Bus Loop - Free or low-cost transportation Distance: 4.88 miles      In-Person   [address] [facility] [address] [place] [address]  Language: English  Hours: Mon - Fri 9:00 AM - 5:00 PM  Fees: Free   Phone: (933) 845-1410 Email: info@Jamii Website: https://www.Jamii/     8  [employer] Distance: 6.07 miles      In-Person   [address] [place] [address]  Language: English, Azeri  Hours: Mon 9:00 AM - 5:00 PM , Tue 9:30 AM - 7:00 PM , Wed 9:00 AM - 5:00 PM , Thu 9:30 AM - 7:00 PM , Fri 9:00 AM - 5:00 PM  Fees: Free   Phone: (941) 907-8841 Email: contactus@Kydaemos Website: http://www.Kydaemos     Transportation to medical appointments  9  Discover Ride Distance: 2.04 miles      In-Person   [address] [address]  Language: English  Hours: Mon - Thu 6:00 AM - 6:00 PM , Fri 6:00 AM - 5:00 PM  Fees: Insurance, Self Pay   Phone: (685) 834-7682 Email: office@Daqi Website: https://www.Daqi/     10  [facility] - Non-Emergency Medical Transportation Distance: 3.63 miles      In-Person   [address] [place] [address]  Language: English  Hours: Mon - Fri 8:00 AM - 4:00 PM Appt. Only  Fees: Self Pay   Phone: (504) 417-5655 Website: http://www.[facility].org/Medical-Services/Emergency-medical-services/Non-emergency-transportation/          Important Numbers & Websites       Emergency Services   911  [facility]   311  Poison Control   (306)  642-1276  Suicide Prevention Lifeline   (370) 286-1501 (TALK)  Child Abuse Hotline   (390) 477-8084 (4-A-Child)  Sexual Assault Hotline   (255) 518-6058 (HOPE)  National Runaway Safeline   (531) 404-8342 (RUNAWAY)  All-Options Talkline   (191) 636-2348  Substance Abuse Referral   (217) 544-8322 (HELP)

## 2023-10-27 NOTE — COMMUNITY RESOURCES LIST (ENGLISH)
10/27/2023   [facility]  N/A  For questions about this resource list or additional care needs, please contact your primary care clinic or care manager.  Phone: [phone]   Email: N/A   Address: [address] [place] [address]   Hours: N/A        Food and Nutrition       Food pantry  1  [facility] Distance: 0.94 miles      Pickup   [address] [place] [address]  Language: English, Equatorial Guinean  Hours: Sat 9:30 AM - 11:30 AM  Fees: Free   Phone: (900) 316-4173 Website: http://www.[facility]     2  [facility] Distance: 1.27 miles      [place] [id number]  Language: English  Hours: Mon - Tue 9:00 AM - 3:00 PM  Fees: Free, Self Pay   Phone: (449) 886-4982 Email: patria@[facility] HospitalAvegant.[facility] Website: http://[facility] Memorial HospitalPEAK-IT.org/[facility]/ek-rzbi-jfsam-ave/     SNAP application assistance  3  [facility] Distance: 1.27 miles      Phone/Virtual   [address] [place] [address]  Language: English  Hours: Mon - Thu 9:00 AM - 4:00 PM , Fri 9:00 AM - 2:00 PM , Sun 10:00 AM - 12:00 PM  Fees: Free   Phone: (696) 118-5357 Email: patria@[facility] Memorial HospitalLIKECHARITY.[facility] Website: http://[facility] Memorial HospitalPEAK-IT.org/[facility]/     4  [employer] [place] Distance: 2.52 miles      Phone/Virtual   [address] [place] [address]  Language: English, Hmong, Niuean, Tajik, Equatorial Guinean  Hours: Mon - Fri 8:30 AM - 4:30 PM  Fees: Free   Phone: (407) 916-1679 Email: helpline@hungersolutions.org Website: https://www.hungersolutions.org/programs/mn-food-helpline/     Soup kitchen or free meals  5  [facility] Service [place] Distance: 1.27 miles      [address] [address]  Language: English  Hours: Mon - Fri 11:45 AM - 12:45 PM  Fees: Free   Phone: (236) 363-7200 Email:  patria@[facility].salvationarmy.org Website: http://[place] [facility].org/[facility]/tz-lzfa-arzeh-[facility]/     6  [facility] - Loaves and Fishes - Loaves and Fishes Distance: 1.76 miles      Pickup   [address] [place] [place] [address]  Language: English  Hours: Wed 5:30 PM - 6:30 PM  Fees: Free   Phone: (285) 529-6265 Email: office@ormn.Mofibo Website: https://www.[facility] Hospital.Mofibo          Transportation       Free or low-cost transportation  7  [employer] Bus Loop - Free or low-cost transportation Distance: 4.88 miles      In-Person   [address] [facility] [address] [place] [address]  Language: English  Hours: Mon - Fri 9:00 AM - 5:00 PM  Fees: Free   Phone: (447) 268-9790 Email: info@[employer] Website: https://www.[employer]/     8  [employer] Distance: 6.07 miles      In-Person   [address] [place] [address]  Language: English, Vietnamese  Hours: Mon 9:00 AM - 5:00 PM , Tue 9:30 AM - 7:00 PM , Wed 9:00 AM - 5:00 PM , Thu 9:30 AM - 7:00 PM , Fri 9:00 AM - 5:00 PM  Fees: Free   Phone: (536) 987-2039 Email: contactus@[employer] Website: http://www.[employer]     Transportation to medical appointments  9  Discover Ride Distance: 2.04 miles      In-Person   [address] [address]  Language: English  Hours: Mon - Thu 6:00 AM - 6:00 PM , Fri 6:00 AM - 5:00 PM  Fees: Insurance, Self Pay   Phone: (503) 227-4918 Email: office@T[employer] Bertha Website: https://www.[employer]/     10  [facility] - Non-Emergency Medical Transportation Distance: 3.63 miles      In-Person   [address] [place] [address]  Language: English  Hours: Mon - Fri 8:00 AM - 4:00 PM Appt. Only  Fees: Self Pay   Phone: (763) 258-3307 Website: http://www.[facility].org/Medical-Services/Emergency-medical-services/Non-emergency-transportation/          Important Numbers & Websites       Emergency Services   911  [facility]   311  Poison Control   (828)  705-0378  Suicide Prevention Lifeline   (512) 878-7844 (TALK)  Child Abuse Hotline   (834) 687-9890 (4-A-Child)  Sexual Assault Hotline   (246) 131-6561 (HOPE)  National Runaway Safeline   (521) 129-8808 (RUNAWAY)  All-Options Talkline   (811) 370-5246  Substance Abuse Referral   (752) 497-3268 (HELP)

## 2023-10-30 ENCOUNTER — TELEPHONE (OUTPATIENT)
Dept: FAMILY MEDICINE | Facility: CLINIC | Age: 53
End: 2023-10-30
Payer: MEDICARE

## 2023-10-30 ENCOUNTER — DOCUMENTATION ONLY (OUTPATIENT)
Dept: FAMILY MEDICINE | Facility: CLINIC | Age: 53
End: 2023-10-30
Payer: MEDICARE

## 2023-10-30 ENCOUNTER — APPOINTMENT (OUTPATIENT)
Dept: INTERPRETER SERVICES | Facility: CLINIC | Age: 53
End: 2023-10-30
Payer: MEDICARE

## 2023-10-30 DIAGNOSIS — E11.42 DIABETIC POLYNEUROPATHY ASSOCIATED WITH TYPE 2 DIABETES MELLITUS (H): Primary | ICD-10-CM

## 2023-10-30 NOTE — TELEPHONE ENCOUNTER
----- Message from Jaky Gaspar MD sent at 10/27/2023  5:26 PM CDT -----  Team - please call patient with results and send result letter with this information if patient desires for their records.     Her right knee has some arthritis changes but not too bad.   I would start with physical therapy first- Order placed for this  If still not better at upcoming visit we can do a knee injection

## 2023-10-30 NOTE — TELEPHONE ENCOUNTER
"Attempted to call. Unable to leave VM due to mailbox has not been set up yet.    \"Okay to relay message\"  "

## 2023-10-31 NOTE — TELEPHONE ENCOUNTER
Contacted patient with [facility]  via language line. Relayed provider message. Patient verbalizes understanding and agreement with plan.

## 2023-11-01 ENCOUNTER — PATIENT OUTREACH (OUTPATIENT)
Dept: NURSING | Facility: CLINIC | Age: 53
End: 2023-11-01
Payer: MEDICARE

## 2023-11-01 NOTE — PROGRESS NOTES
Clinic Care Coordination Contact  Follow Up Progress Note      Assessment: Pt was seen for PCP visit on 10/27  Recommend diabetic eye exam  Physical Therapy for knee recommended    Voice mail was full- advised pt to have family support checking phone and clearing messages    Pt notes that she was not aware that she has a voicemail feature on her phone and she will talk with family about fixing it    Pt notes that she discussed some orthopedic shoes at visit and she has an appointment scheduled to get fitted -[place] where in [place] - 11/16 at 9:00   Pt did not know name of place  Pt has a chest CT is also schedule for 11/16 -     RN CC shared recommendation around physical therapy - pt will wait for  as she feels he may have scheduled visit     Referral placed for eye exam - goal set - RN CC reached out to [facility] to see if possible  to support set up - pt has a balance on the account so she is not able to schedule at [facility], pt will work with their business office to see if she can set up a payment plan    Care Gaps:    Health Maintenance Due   Topic Date Due    HF ACTION PLAN  Never done    COLORECTAL CANCER SCREENING  Never done    ZOSTER IMMUNIZATION (1 of 2) Never done    EYE EXAM  12/21/2022       Care Gap Goal set: Yes    Care Plans  Care Plan: Financial Wellbeing       Problem: Patient expresses financial resource strain       Goal: I would like to review medical bills and get understanding of coverage       Start Date: 3/28/2023    This Visit's Progress: 50% Recent Progress: 50%    Note:     Barriers: coverage  Strengths: engagement  Patient expressed understanding of goal: yes  Action steps to achieve this goal:  1. I will bring in a copy of most recent medical bills for CCC team to review and get better understanding of Patient responsibility and coverage if received. (Completed)  2. I will speak with SW CC to review and get better understanding. (Updated:  "9/08/2023)-continues  3. I will continues to updates status with Care coordination team during next outreach. (Updated: 9/08/2023)-continues  4. I will updates status with CHW/CCC team after my son wedding. (Completed; 8/7/2023)  5. I will wait to hear from [facility] dept regards to \"[facility] Care Application\" dropped of to FRW. (FYI: CHW encounter dated 7/07/2023; FRW encounter dated 8/22/2023). (Completed)  6. I will have my children assisted with returning FRW called. FRW phone number provides. (Updated: 10/26/2023)                            Care Plan: Medical       Problem: HP GENERAL PROBLEM       Goal: I would like to have a plan of care for Cardiology health within 3-4 months       Start Date: 3/28/2023    This Visit's Progress: 50% Recent Progress: 50%    Note:     Barriers: language  Strengths: family   Patient expressed understanding of goal: yes    Action steps to achieve this goal:  1. I will schedule and attend visit with cardiology provider - number to call [phone].    2. I will update Care coordination team during next outreach.  3. I will report to my Community Health Worker if any additional resources or support needed.  6. I will attend appt on 10/27/2023 with Dr. Gaspar in [facility] to seek further advised. (Updated: 10/26/2023)                Goal: I will get orthopedic shoes with in one month       Start Date: 11/1/2023    Note:     Pt reports  has scheduled an appointment for 11/16 -                 Goal: I will develop a plan of care for knee pain       Note:     I will schedule a PT evaluation and attend visit                 Goal: I would like to schedule and attend an evaluation diabetic eye exam within 4-6 months       Start Date: 11/1/2023    Note:     Patient expressed understanding of goal: yes      Action stepsto achieve this goal  1.  I will review my insurance coverage for eye exam and outreach and schedule a visit  2.  I will attend visit and follow up " with recommendations   3.  I will report back to my CommunityHealth Worker if I need additional support or resources                                 Intervention/Education provided during outreach: Patient verbalized understanding via interpretive services. Engaged in AIDET communication during visit       Plan: Patient will schedule and attend recommended follow up visits with speciality providers and primary care provider.  Future Appointments   Date Time Provider Department Center   11/16/2023  1:40 PM MICCT1 JNCTIC [facility] IMG   11/16/2023  2:30 PM [facility]   11/29/2023 11:00 AM Art Thakkar, PharmD [facility] SPRS   6/7/2024 12:40 PM Karon España AuD [facility]   6/7/2024  1:40 PM Jose Maria Bass MD [facility] [place] [facility]   Community Health Worker to outreach per standard work and updated on goal progression  RN CC will review in 4-6 weeks to support ongoing recommendations and plan of care will be available sooner if needed.

## 2023-11-06 NOTE — TELEPHONE ENCOUNTER
Spoke with pt. Pt states she and  share a car, so  transportation to clinic for cologard instructions is difficult. Pt prefer a Hmong speaking nurse or in person  so a phone  does not work for her. Pt then decided she will take the kit to her sister who works in the health field to show her collect the sample. RN advise to call clinic if she still have questions after.     Tania MARTINO RN  [facility]          Jaky Gaspar MD  [facility]  Pt needs help with cologard instructions

## 2023-11-10 ENCOUNTER — APPOINTMENT (OUTPATIENT)
Dept: INTERPRETER SERVICES | Facility: CLINIC | Age: 53
End: 2023-11-10
Payer: MEDICARE

## 2023-11-10 DIAGNOSIS — E11.65 TYPE 2 DIABETES MELLITUS WITH HYPERGLYCEMIA, WITHOUT LONG-TERM CURRENT USE OF INSULIN (H): ICD-10-CM

## 2023-11-15 ENCOUNTER — APPOINTMENT (OUTPATIENT)
Dept: INTERPRETER SERVICES | Facility: CLINIC | Age: 53
End: 2023-11-15
Payer: MEDICARE

## 2023-11-16 ENCOUNTER — OFFICE VISIT (OUTPATIENT)
Dept: PULMONOLOGY | Facility: CLINIC | Age: 53
End: 2023-11-16
Payer: MEDICARE

## 2023-11-16 ENCOUNTER — DOCUMENTATION ONLY (OUTPATIENT)
Dept: FAMILY MEDICINE | Facility: CLINIC | Age: 53
End: 2023-11-16

## 2023-11-16 ENCOUNTER — HOSPITAL ENCOUNTER (OUTPATIENT)
Dept: CT IMAGING | Facility: HOSPITAL | Age: 53
Discharge: HOME | End: 2023-11-16
Attending: THORACIC SURGERY (CARDIOTHORACIC VASCULAR SURGERY) | Admitting: THORACIC SURGERY (CARDIOTHORACIC VASCULAR SURGERY)
Payer: MEDICARE

## 2023-11-16 VITALS
WEIGHT: 214 LBS | SYSTOLIC BLOOD PRESSURE: 138 MMHG | BODY MASS INDEX: 42.86 KG/M2 | OXYGEN SATURATION: 97 % | HEART RATE: 80 BPM | DIASTOLIC BLOOD PRESSURE: 86 MMHG

## 2023-11-16 DIAGNOSIS — R91.1 LUNG NODULE: Primary | ICD-10-CM

## 2023-11-16 DIAGNOSIS — E11.42 DIABETIC POLYNEUROPATHY ASSOCIATED WITH TYPE 2 DIABETES MELLITUS (H): Primary | ICD-10-CM

## 2023-11-16 DIAGNOSIS — R91.1 LUNG NODULE: ICD-10-CM

## 2023-11-16 PROCEDURE — 71250 CT THORAX DX C-: CPT | Mod: ME

## 2023-11-16 PROCEDURE — 99213 OFFICE O/P EST LOW 20 MIN: CPT | Performed by: CLINICAL NURSE SPECIALIST

## 2023-11-16 PROCEDURE — G1010 CDSM STANSON: HCPCS

## 2023-11-16 NOTE — PROGRESS NOTES
THORACIC SURGERY FOLLOW UP VISIT      With the assistance of a [employer] , I saw Mrs. Leora Sanchez in follow-up today. The clinical summary follows:     CHIEF COMPLAINT  Left upper lobe nodule  INTERVAL STUDIES  CT chest: final report pending at time of clinic visit. To my review, the left upper lobe nodule remains stable compared to the 2023 CT.    Past Medical History:   Diagnosis Date    Anxiety     Chronic cough 2018    Congestive heart failure (H)     Depression     Dyslipidemia, goal LDL below 70 10/31/2017    Essential hypertension     GERD (gastroesophageal reflux disease)     Heart failure with reduced ejection fraction (H) 10/30/2017    Hypertension     Nonischemic cardiomyopathy (H)     variable EF depending on the technique, date and reader; BNP normal in 2018    Nonocclusive coronary atherosclerosis of native coronary artery 10/31/2017    Pregnancy         Pyelonephritis 2018    Renal abscess     Spontaneous  2010    TM (tympanic membrane disorder)       Past Surgical History:   Procedure Laterality Date    CV CORONARY ANGIOGRAM N/A 2019    Procedure: Coronary Angiogram;  Surgeon: Car Box MD;  Location: [facility];  Service: Cardiology    CV LEFT HEART CATHETERIZATION WITHOUT LEFT VENTRICULOGRAM Left 10/31/2017    Procedure: Left Heart Catheterization Without Left Ventriculogram;  Surgeon: Jonathan Duque MD;  Location: [facility];  Service:     CV LEFT HEART CATHETERIZATION WITHOUT LEFT VENTRICULOGRAM Left 2019    Procedure: Left Heart Catheterization Without Left Ventriculogram;  Surgeon: Car Box MD;  Location: [facility];  Service: Cardiology    DILATION AND CURETTAGE      ENT SURGERY      INNER EAR SURGERY Right     MASTOIDECTOMY Right     NJ CATH PLACEMENT & NJX CORONARY ART ANGIO IMG S&I N/A 10/31/2017    Procedure: Coronary Angiogram;  Surgeon: Jonathan Duque MD;   Location: [facility];  Service: Cardiology      Social History     Socioeconomic History    Marital status:      Spouse name: Not on file    Number of children: 8    Years of education: Not on file    Highest education level: Not on file   Occupational History    Not on file   Tobacco Use    Smoking status: Never     Passive exposure: Never    Smokeless tobacco: Never    Tobacco comments:     no passive exposure   Substance and Sexual Activity    Alcohol use: No    Drug use: No    Sexual activity: Yes     Partners: Male     Birth control/protection: None   Other Topics Concern    Not on file   Social History Narrative    Lives with  who can read and speak English and helps her with meds     Social Determinants of Health     Financial Resource Strain: Low Risk  (10/27/2023)    Financial Resource Strain     Within the past 12 months, have you or your family members you live with been unable to get utilities (heat, electricity) when it was really needed?: No   Food Insecurity: High Risk (10/27/2023)    Food Insecurity     Within the past 12 months, did you worry that your food would run out before you got money to buy more?: Yes     Within the past 12 months, did the food you bought just not last and you didn t have money to get more?: Yes   Transportation Needs: High Risk (10/27/2023)    Transportation Needs     Within the past 12 months, has lack of transportation kept you from medical appointments, getting your medicines, non-medical meetings or appointments, work, or from getting things that you need?: Yes   Physical Activity: Not on file   Stress: Stress Concern Present (12/14/2021)    [employer] [place] [employer] - Occupational Stress Questionnaire     Feeling of Stress : To some extent   Social Connections: Not on file   Interpersonal Safety: Low Risk  (10/27/2023)    Interpersonal Safety     Do you feel physically and emotionally safe where you currently live?: Yes     Within  the past 12 months, have you been hit, slapped, kicked or otherwise physically hurt by someone?: No     Within the past 12 months, have you been humiliated or emotionally abused in other ways by your partner or ex-partner?: No   Housing Stability: Low Risk  (10/27/2023)    Housing Stability     Do you have housing? : Yes     Are you worried about losing your housing?: No      SUBJECTIVE  May is doing well. She denies any new symptoms. She is seeing her diabetes doctor soon to discuss a different medication to help control her diabetes better. She sees her heart doctor before Christmas.     OBJECTIVE  /86 (BP Location: Right arm, Patient Position: Chair, Cuff Size: Adult Large)   Pulse 80   Wt 97.1 kg (214 lb)   SpO2 97%   BMI 42.86 kg/m       From a personal perspective, she opted not to go to [facility] last month. Her 9th grandchild is due this December.    IMPRESSION   53 year-old female with a left upper lobe nodule. The nodule appears stable compared to the June 2023 CT however, it has doubled in size compared to 2020.    PLAN  I spent 15 min on the date of the encounter in chart review, patient visit, review of tests, documentation and/or discussion with other providers about the issues documented above. I reviewed the plan as follows:  Will determine surgical plan once she sees Dr. Montgomery for her diabetes and Dr. Banda for cardiology clearance. I will call her after she sees both of these providers to let her know the plan.  All questions were answered and the patient and present family were in agreement with the plan.  I appreciate the opportunity to participate in the care of your patient and will keep you updated.  Sincerely,

## 2023-11-20 ENCOUNTER — APPOINTMENT (OUTPATIENT)
Dept: INTERPRETER SERVICES | Facility: CLINIC | Age: 53
End: 2023-11-20
Payer: MEDICARE

## 2023-11-20 ENCOUNTER — PATIENT OUTREACH (OUTPATIENT)
Dept: CARE COORDINATION | Facility: CLINIC | Age: 53
End: 2023-11-20
Payer: MEDICARE

## 2023-11-20 NOTE — PROGRESS NOTES
Clinic Care Coordination Contact  Program: [facility] [place]  County:  [facility] Case #:  [facility] Worker:   Simran #:   Subscriber #:   Renewal:  Date Applied:     FRW Outreach:  11/20/23: FRW called pt. Pt clarified her gross income for family of 2 (her and spouse). Their gross income is $65,880 per year. Patient would like to make payment plan with billing instead of applying for CC. Pts spouse will be calling billing department to make payment plan.  Cristy Cisneros   Financial Resource Worker  [facility] Care Coordination  [phone]   10/20/23: FRW called pt and unable to leave VM as mailbox is not set up. FRW will make another outreach within 30 days.  Cristy Cisneros   Financial Resource Worker  [facility] Care Coordination  [phone]   9/26/23: FRW called pt. Pt would like another CC application mailed to her. FRW mailed application and will follow up with pt within a few weeks.   Cristy Cisneros   Financial Resource Worker  [facility] Care Coordination  [phone]   9/11/23: FRW checked billing notes. CC was denied due to not enough verification was submitted. FRW called pt but mailbox is not set up. FRW will call pt again within 30 days.   Cristy Cisneros   Financial Resource Worker  [facility] Care Coordination  [phone]   8/22/23:FRW checked billing notes, no CC scanned still. FRW re-emailed application to Billing and attached Ana Gardner.   6/15/23: FRW emailed CC to billing department. FRW will check billing notes in 30 days.   6/6/23: FRW called pt. Pt dropped off the CC application to [employer] last week. FRW will follow up with [email or web address]  staff.   5/18/23: FRW called pt. Pt hasn't sent in the CC application yet. Pt will after her son gets  this weekend. Pt asked FRW to call back in 3 weeks as she is out of town.   5/8/23: FRW called pt but unable to leave VM. FRW will make another outreach  in 1-2 weeks.   4/24/23: FRW called pt and completed [employer] application. FRW will mail application to patient for signature. Patient will sign and include needed documents and send back to billing department.  4/11/23: FRW called pt on referral. Appointment made for 4/26 to apply for [facility] [facility]   4/5/23: Outreach attempted x 1. Left message on voicemail with call back information and requested return call.  Plan: FRW will call again within one week.     Health Insurance:      Referral/Screening:

## 2023-11-28 NOTE — PROGRESS NOTES
Medication Therapy Management (MTM) Encounter    ASSESSMENT:                            Medication Adherence/Access: Unable to fully assess without med bottles present, but patient denies adherence concerns.       Type 2 Diabetes: Repeat A1C today now at goal <7%. This is not a full 3 month A1C, so expect a repeat in the future would be even slightly lower. Fasting blood glucose at goal of . No post-prandial readings. Will have patient try checking post-prandials. If running low after meals, consider a CGM pro (through [facility]) to evaluate for overnight lows that may lead to higher fasting sugars. Ozempic was started to help with blood glucose and weight loss. Blood glucose are well controlled, but didn't achieve weight loss. Could transition to Mounjaro, but given that surgical plan is dependent on blood glucose control, will hold off on transition right now. Revisit in the future. Patient would like to maybe try this without the metformin.         Nonischemic cardiomyopathy/Angina/CAD:: Last LDL just above goal <70. Has appropriately started Ezetimibe. Repeat lipids today. Did discuss if not at goal, would replace ezetimibe with a PCSK9 inhibitor.       HFrEF: blood pressure at goal <130/80. Pulse now  at goal . No edema concerns. Hasn't been using Furosemide or K+. Likely okay to continue without furosemide until next cardiology visit. Repeat BMP assess need for K+        Chronic gout: Last uric acid at  goal <6. No gout pains. Continued pain from callous. Waiting on her diabetic shoes to arrive.       Tension headache/Depression: Headache intensity decreased even more with last increase in Venlafaxine dose. Mood overall better, some continued depression concerns. Ongoing difficulty with sleep - offered increase venlafaxine dose. Patient declined at this time. Reassess in the future.       Low Vitamin D: Has started supplement. Plan for repeat levels after 12 weeks of therapy (early November) Target  levels 45-60 for mood and headache support.     GERD: Reported symptoms seem more related to hunger pain vs indigestion. No changes with increase in Ozempic dose.       PLAN:                            No medication changes at this time. Continue current therapy.     Follow-up: No follow-ups on file.   with Cardiology     SUBJECTIVE/OBJECTIVE:                          Leora Sanchez is a 53 year old female coming in for a follow-up visit. She was referred to me from Jaky Gaspar MD. [employer]  by phone (ID# [id number]).  Today's visit is a follow-up MT visit from 10/03/2023.       Reason for visit: Medication Management. The diabetes medicine doesn't seem to be helping. Numbers are still high.     Allergies/ADRs: Reviewed in chart  Past Medical History: Reviewed in chart  Tobacco: She reports that she has never smoked. She has never been exposed to tobacco smoke. She has never used smokeless tobacco.  Alcohol:  reports no history of alcohol use.       Medication Adherence/Access:     Son and  help with managing meds at home. They set up a pill box for 1 month at a time in a 4 x 7 box. Usually takes medicines once a day, but last visit PCP instructed patient to take diabetes medicine twice daily. Just filled the box this morning so has 1 month set at home.          Type 2 Diabetes: Prescribed Started Ozempic 2 mg weekly (increased at last MTM visit), metformin 500 mg ER 1 tab twice daily.     Blood sugar monitorin time(s) daily; Ranges: (patient reported)     14 day average: 111     AM: 121, 102, 111, 120, 106, 96, 99, 106, 123, 105, 119, 118  PM, but also before eating  111, 117, 116, 108, 114,     Nothing above 180 going as far back as .   Did have a reading of 224 on  after eating something sweet     Does have episodes of low blood sugars where she feels unwell. Doesn't check blood glucose, just drinks something sweet and feels better. Happening just once a month.      Hemoglobin  A1C   Date Value Ref Range Status   11/29/2023 6.9 (H) 0.0 - 5.6 % Final     Comment:     Normal <5.7%   Prediabetes 5.7-6.4%    Diabetes 6.5% or higher     Note: Adopted from ADA consensus guidelines.   10/03/2023 7.4 (H) 0.0 - 5.6 % Final     Comment:     Normal <5.7%   Prediabetes 5.7-6.4%    Diabetes 6.5% or higher     Note: Adopted from ADA consensus guidelines.   06/23/2023 8.8 (H) 0.0 - 5.6 % Final     Comment:     Normal <5.7%   Prediabetes 5.7-6.4%    Diabetes 6.5% or higher     Note: Adopted from ADA consensus guidelines.   01/18/2011 5.7 4.2 - 6.1 % Final      Microalbumin Urine mg/dL   Date Value Ref Range Status   12/07/2021 <0.50 0.00 - 1.99 mg/dL Final      Creatinine Urine mg/dL   Date Value Ref Range Status   06/23/2023 114.0 mg/dL Final     Comment:     The reference ranges have not been established in urine creatinine. The results should be integrated into the clinical context for interpretation.   12/07/2021 101 mg/dL Final       Creatinine   Date Value Ref Range Status   06/23/2023 0.88 0.51 - 0.95 mg/dL Final   04/26/2013 0.82 0.60 - 1.10 mg/dL Final        Wt Readings from Last 3 Encounters:   11/16/23 214 lb (97.1 kg)   10/27/23 212 lb (96.2 kg)   10/03/23 211 lb (95.7 kg)            Nonischemic cardiomyopathy/Angina/CAD: Prescribed aspirin 81 mg daily, atorvastatin 80 mg daily, isosorbide mononitrate 60 mg ER daily, nitroglycerin 0.4 mg as needed. At last MT visit, started Ezetimibe 10 mg daily    Chest pains 1-2 times per month.     Recent Labs   Lab Test 06/23/23  1452 12/07/21  1113   CHOL 183 178   HDL 68 59   LDL 89 89   TRIG 132 149          HFrEF: Prescribed furosemide 40 mg daily as needed, losartan 100 mg daily, isosorbide mononitrate 60 mg ER daily, metoprolol succinate 100 mg 0.5 tab daily, potassium chloride 10 mEq ER daily     Denies edema concerns. Hasn't been using Furosemide or potassium.       BP Readings from Last 3 Encounters:   11/29/23 110/75   11/16/23 138/86    10/27/23 120/80      Pulse Readings from Last 3 Encounters:   11/29/23 85   11/16/23 80   10/27/23 88     Wt Readings from Last 3 Encounters:   11/16/23 214 lb (97.1 kg)   10/27/23 212 lb (96.2 kg)   10/03/23 211 lb (95.7 kg)        Last Comprehensive Metabolic Panel:  Lab Results   Component Value Date     06/23/2023    POTASSIUM 4.8 06/23/2023    CHLORIDE 106 06/23/2023    CO2 25 06/23/2023    ANIONGAP 13 06/23/2023     (H) 06/23/2023    BUN 22.4 (H) 06/23/2023    CR 0.88 06/23/2023    GFRESTIMATED 78 06/23/2023    LYNN 9.5 06/23/2023          Chronic gout: Prescribed allopurinol 100 mg daily.      No gout pain, but still has a callous on her foot. Was referred for diabetes shoes. Went to have measurements and waiting to receive the shoes now.     Uric Acid   Date Value Ref Range Status   07/26/2023 5.3 2.4 - 5.7 mg/dL Final       Depression:   Tension headache: Prescribed acetaminophen 500 mg 2 tabs 3 times daily as needed, Venlafaxine 150 mg ER once daily (increased at last MTM visit).     Since increasing dose, having less frequent headaches. Was previously 1-2 times per week. Now 1 headache per month. The pinching pain in the top/back of the head is still there, but not as intense.     Mood right now is okay. Grandkids are older and she talks to them and they help her feel better. Does still have some moments of feeling down when by herself.     Does have some difficulty with sleep due to the pinching head pain.         Low Vitamin D: Prescribed Vitamin D3 5000 units daily .   Purchasing OTC.     Vitamin D Deficiency Screening Results:  Lab Results   Component Value Date    VITDT 23 07/26/2023        GERD: Prescribed Omeprazole 40 mg daily     Symptoms about once a week. Tends to be when she's hungry, better after eating. No worse since last increase Ozempic.          Today's Vitals: /75   Pulse 85   ----------------      I spent 50 minutes with this patient today. All changes were made  via collaborative practice agreement with Jaky Gaspar MD. A copy of the visit note was provided to the patient's provider(s).    A summary of these recommendations was given to the patient.    Art Thakkar PharmD  Medication Therapy Management (MTM) Pharmacist  [facility]          Medication Therapy Recommendations  No medication therapy recommendations to display

## 2023-11-29 ENCOUNTER — LAB (OUTPATIENT)
Dept: LAB | Facility: CLINIC | Age: 53
End: 2023-11-29
Payer: MEDICARE

## 2023-11-29 ENCOUNTER — OFFICE VISIT (OUTPATIENT)
Dept: PHARMACY | Facility: CLINIC | Age: 53
End: 2023-11-29

## 2023-11-29 VITALS — HEART RATE: 85 BPM | DIASTOLIC BLOOD PRESSURE: 75 MMHG | SYSTOLIC BLOOD PRESSURE: 110 MMHG

## 2023-11-29 DIAGNOSIS — I50.20 HEART FAILURE WITH REDUCED EJECTION FRACTION (H): ICD-10-CM

## 2023-11-29 DIAGNOSIS — E55.9 VITAMIN D DEFICIENCY: ICD-10-CM

## 2023-11-29 DIAGNOSIS — I42.8 NONISCHEMIC CARDIOMYOPATHY (H): ICD-10-CM

## 2023-11-29 DIAGNOSIS — E11.65 TYPE 2 DIABETES MELLITUS WITH HYPERGLYCEMIA, WITHOUT LONG-TERM CURRENT USE OF INSULIN (H): Primary | ICD-10-CM

## 2023-11-29 DIAGNOSIS — N18.31 STAGE 3A CHRONIC KIDNEY DISEASE (H): ICD-10-CM

## 2023-11-29 DIAGNOSIS — I25.119 CORONARY ARTERY DISEASE INVOLVING NATIVE CORONARY ARTERY OF NATIVE HEART WITH ANGINA PECTORIS (H): ICD-10-CM

## 2023-11-29 DIAGNOSIS — R12 HEARTBURN: ICD-10-CM

## 2023-11-29 DIAGNOSIS — G44.209 TENSION HEADACHE: ICD-10-CM

## 2023-11-29 DIAGNOSIS — E11.65 TYPE 2 DIABETES MELLITUS WITH HYPERGLYCEMIA, WITHOUT LONG-TERM CURRENT USE OF INSULIN (H): ICD-10-CM

## 2023-11-29 LAB
ANION GAP SERPL CALCULATED.3IONS-SCNC: 10 MMOL/L (ref 7–15)
BUN SERPL-MCNC: 27 MG/DL (ref 6–20)
CALCIUM SERPL-MCNC: 9.7 MG/DL (ref 8.6–10)
CHLORIDE SERPL-SCNC: 104 MMOL/L (ref 98–107)
CHOLEST SERPL-MCNC: 176 MG/DL
CREAT SERPL-MCNC: 1.08 MG/DL (ref 0.51–0.95)
DEPRECATED HCO3 PLAS-SCNC: 30 MMOL/L (ref 22–29)
EGFRCR SERPLBLD CKD-EPI 2021: 61 ML/MIN/1.73M2
GLUCOSE SERPL-MCNC: 112 MG/DL (ref 70–99)
HBA1C MFR BLD: 6.9 % (ref 0–5.6)
HDLC SERPL-MCNC: 83 MG/DL
LDLC SERPL CALC-MCNC: 68 MG/DL
NONHDLC SERPL-MCNC: 93 MG/DL
POTASSIUM SERPL-SCNC: 5.3 MMOL/L (ref 3.4–5.3)
SODIUM SERPL-SCNC: 144 MMOL/L (ref 135–145)
TRIGL SERPL-MCNC: 123 MG/DL

## 2023-11-29 PROCEDURE — 36415 COLL VENOUS BLD VENIPUNCTURE: CPT

## 2023-11-29 PROCEDURE — 80061 LIPID PANEL: CPT

## 2023-11-29 PROCEDURE — 80048 BASIC METABOLIC PNL TOTAL CA: CPT

## 2023-11-29 PROCEDURE — 83036 HEMOGLOBIN GLYCOSYLATED A1C: CPT

## 2023-11-29 PROCEDURE — 99207 PR NO CHARGE LOS: CPT | Performed by: PHARMACIST

## 2023-11-29 RX ORDER — NITROGLYCERIN 0.4 MG/1
TABLET SUBLINGUAL
Qty: 25 TABLET | Refills: 4 | Status: SHIPPED | OUTPATIENT
Start: 2023-11-29 | End: 2024-05-17

## 2023-11-29 NOTE — PATIENT INSTRUCTIONS
"Recommendations from today's MTM visit:                                                         No medicine changes today.     Follow-up: No follow-ups on file. 12/19 with cardiology.      It was great speaking with you today.  I value your experience and would be very thankful for your time in providing feedback in our clinic survey. In the next few days, you may receive an email or text message from [employer] with a link to a survey related to your  clinical pharmacist.\"     To schedule another MTM appointment, please call the clinic directly or you may call the MTM scheduling line at [phone] or toll-free at [phone].     My Clinical Pharmacist's contact information:                                                      Please feel free to contact me with any questions or concerns you have.      Art Thakkar, PharmD  Medication Therapy Management (MTM) Pharmacist  [facility]      "
No

## 2023-11-29 NOTE — Clinical Note
FYI: A1C today down to 6.9%. Home readings consistently at goal. She did have an increase in Ozempic last month, so suspect a true 3 month A1C would be even lower.

## 2023-11-30 ENCOUNTER — TELEPHONE (OUTPATIENT)
Dept: FAMILY MEDICINE | Facility: CLINIC | Age: 53
End: 2023-11-30
Payer: MEDICARE

## 2023-11-30 NOTE — TELEPHONE ENCOUNTER
"Called and informed patient with        ----- Message from Art Thakkar PharmD sent at 11/30/2023  9:28 AM CST -----  Please call patient and inform of results.     1. Kidney function and potassium are stable. Okay to continue without the furosemide and potassium supplements. It does look like she might be a little dehydrated so continue to push fluids to keep kidneys happy and healthy.   2. LDL or \"bad cholesterol\" is now at goal! Continue current medicines.      I will reach out after her procedure to schedule a follow-up so we can discuss the alternative medicine for weight loss.     "

## 2023-12-15 ENCOUNTER — PATIENT OUTREACH (OUTPATIENT)
Dept: CARE COORDINATION | Facility: CLINIC | Age: 53
End: 2023-12-15
Payer: MEDICARE

## 2023-12-15 DIAGNOSIS — M1A.0710 CHRONIC GOUT OF RIGHT FOOT, UNSPECIFIED CAUSE: ICD-10-CM

## 2023-12-15 RX ORDER — ALLOPURINOL 100 MG/1
100 TABLET ORAL DAILY
Qty: 90 TABLET | Refills: 4 | Status: SHIPPED | OUTPATIENT
Start: 2023-12-15 | End: 2024-05-30 | Stop reason: DRUGHIGH

## 2023-12-15 NOTE — PROGRESS NOTES
Clinic Care Coordination Contact    Situation: Patient chart reviewed by care coordinator.    Background: RN CC review for status update     Assessment: Family spoke with FRW on 11.20.23 - note family was going to outreach to billing department on their own  Plan/Recommendations: Community Health Worker to outreach per standard work and updated on goal progression  RN CC will review in 4-6 weeks to support ongoing recommendations and plan of care will be available sooner if needed.

## 2023-12-19 ENCOUNTER — OFFICE VISIT (OUTPATIENT)
Dept: CARDIOLOGY | Facility: CLINIC | Age: 53
End: 2023-12-19
Attending: NURSE PRACTITIONER
Payer: MEDICARE

## 2023-12-19 VITALS
SYSTOLIC BLOOD PRESSURE: 126 MMHG | HEART RATE: 98 BPM | DIASTOLIC BLOOD PRESSURE: 88 MMHG | RESPIRATION RATE: 16 BRPM | BODY MASS INDEX: 43.66 KG/M2 | OXYGEN SATURATION: 98 % | WEIGHT: 218 LBS

## 2023-12-19 DIAGNOSIS — I50.20 HEART FAILURE WITH REDUCED EJECTION FRACTION (H): Primary | ICD-10-CM

## 2023-12-19 DIAGNOSIS — I42.8 NONISCHEMIC CARDIOMYOPATHY (H): ICD-10-CM

## 2023-12-19 DIAGNOSIS — I10 ESSENTIAL HYPERTENSION: ICD-10-CM

## 2023-12-19 DIAGNOSIS — E66.813 CLASS 3 SEVERE OBESITY DUE TO EXCESS CALORIES WITH SERIOUS COMORBIDITY AND BODY MASS INDEX (BMI) OF 40.0 TO 44.9 IN ADULT (H): ICD-10-CM

## 2023-12-19 DIAGNOSIS — E66.01 CLASS 3 SEVERE OBESITY DUE TO EXCESS CALORIES WITH SERIOUS COMORBIDITY AND BODY MASS INDEX (BMI) OF 40.0 TO 44.9 IN ADULT (H): ICD-10-CM

## 2023-12-19 PROCEDURE — 99214 OFFICE O/P EST MOD 30 MIN: CPT | Performed by: NURSE PRACTITIONER

## 2023-12-19 NOTE — PATIENT INSTRUCTIONS
Leora Sanchez,    It was a pleasure to see you today at [facility].     My recommendations after this visit include:  - Please follow up with Dr Roy in March   - Please follow up with Sendy Banda in 9 months  - continue current medications    Sendy Banda, CNP

## 2023-12-19 NOTE — LETTER
12/19/2023    Jaky Gaspar MD  [address] [place] [id number]    RE: Leora Sanchez       Dear Colleague,     I had the pleasure of seeing Leora Sanchez in the [facility].        Assessment/Recommendations   Assessment:    1. Nonischemic cardiomyopathy, heart failure with improved ejection fraction, ejection fraction 70%, NYHA class III: Compensated.  She has fatigue and dyspnea on exertion.  She denies orthopnea, PND or edema.  Her weight has increased but she is very and active.  2.  Hypertension: Controlled.  Blood pressure 126/88  3.  Lung nodule: She is planning on having surgery in the near future  4.  Obesity: BMI 43.66    Plan:  1.  Continue current medications  2.  Work on weight loss  3.  Encouraged following a low-sodium diet and monitoring weights    Leora Sanchez will follow up Dr. Roy in March and in the heart failure clinic in September.     History of Present Illness/Subjective    Ms. Leora Sanchez is a 53 year old female seen at [facility] heart failure clinic today for continued follow-up.  There is an  during the visit.  She follows up for heart failure with improved ejection fraction, nonischemic cardiomyopathy.  She had a stress cardiac MRI February 2021 which showed an improved ejection fraction of 65%.  Coronary angiogram in May 2019 showed nonobstructive CAD.  She had a Lexiscan September 2023 which was negative for inducible myocardial ischemia or infarction with an ejection fraction of 70%.  She saw Dr. Roy at that time for cardiac clearance to have surgery for a lung nodule.  He is okay with her proceeding and gave her cardiac clearance.  She has a past medical history significant for hypertension, nonobstructive CAD, dyslipidemia, obesity, diabetes mellitus type 2, CKD stage III.     Today, she has fatigue and dyspnea on exertion.  She denies lightheadedness, orthopnea, PND, palpitations, chest pain, abdominal fullness/bloating, and lower extremity edema.      She  does not always monitor her weight at home.  Her clinic weight has increased.         Lexiscan: 9/27/2023-reviewed  Result Text       A prior study was conducted on 5/10/2019.  This study has no change when compared with the prior study.    Pharmacological regadenoson stress ECG is negative for inducible myocardial ischemia.    Pharmacological Regadenoson nuclear stress test is negative for inducible myocardial ischemia or infarction.    Normal left ventricular size, wall motion and systolic function.  The calculated left ventricular ejection fraction is 70%.    The patient is at a low risk of future cardiac ischemic events.     Physical Examination Review of Systems   /88 (BP Location: Left arm, Patient Position: Sitting, Cuff Size: Adult Regular)   Pulse 98   Resp 16   Wt 98.9 kg (218 lb)   SpO2 98%   BMI 43.66 kg/m    Body mass index is 43.66 kg/m .  Wt Readings from Last 3 Encounters:   12/19/23 98.9 kg (218 lb)   11/16/23 97.1 kg (214 lb)   10/27/23 96.2 kg (212 lb)       General Appearance:   no acute distress   ENT/Mouth: No abnormalities   EYES:  no scleral icterus, normal conjunctivae   Neck: no thyromegaly   Chest/Lungs:   lungs are clear to auscultation, no rales or wheezing, equal chest wall expansion    Cardiovascular:   Regular. Normal first and second heart sounds with no murmurs, rubs, or gallops, no edema bilaterally    Abdomen:  Obese, bowel sounds are present   Extremities: no cyanosis or clubbing   Skin: warm   Neurologic: no tremors     Psychiatric: alert and oriented x3                                              Medical History  Surgical History Family History Social History   Past Medical History:   Diagnosis Date    Anxiety     Chronic cough 02/12/2018    Congestive heart failure (H)     Depression     Dyslipidemia, goal LDL below 70 10/31/2017    Essential hypertension     GERD (gastroesophageal reflux disease)     Heart failure with reduced ejection fraction (H) 10/30/2017     Hypertension     Nonischemic cardiomyopathy (H)     variable EF depending on the technique, date and reader; BNP normal in 2018    Nonocclusive coronary atherosclerosis of native coronary artery 10/31/2017    Pregnancy         Pyelonephritis 2018    Renal abscess     Spontaneous  2010    TM (tympanic membrane disorder)     Past Surgical History:   Procedure Laterality Date    CV CORONARY ANGIOGRAM N/A 2019    Procedure: Coronary Angiogram;  Surgeon: Car Box MD;  Location: [facility];  Service: Cardiology    CV LEFT HEART CATHETERIZATION WITHOUT LEFT VENTRICULOGRAM Left 10/31/2017    Procedure: Left Heart Catheterization Without Left Ventriculogram;  Surgeon: Jonathan Duque MD;  Location: [facility];  Service:     CV LEFT HEART CATHETERIZATION WITHOUT LEFT VENTRICULOGRAM Left 2019    Procedure: Left Heart Catheterization Without Left Ventriculogram;  Surgeon: Car Box MD;  Location: [facility];  Service: Cardiology    DILATION AND CURETTAGE      ENT SURGERY      INNER EAR SURGERY Right     MASTOIDECTOMY Right     [place] CATH PLACEMENT & NJX CORONARY ART ANGIO IMG S&I N/A 10/31/2017    Procedure: Coronary Angiogram;  Surgeon: Jonathan uDque MD;  Location: [facility] Lab;  Service: Cardiology    Family History   Problem Relation Age of Onset    Diabetes Mother     Hypertension Mother     Uterine Cancer Mother     Diverticulitis Mother     Other - See Comments Father          in war in [place]    No Known Problems Sister     No Known Problems Daughter     No Known Problems Daughter     No Known Problems Daughter     No Known Problems Daughter     No Known Problems Son     No Known Problems Son     No Known Problems Son     No Known Problems Son     Social History     Socioeconomic History    Marital status:      Spouse name: Not on file    Number of children: 8    Years of education: Not on file     Highest education level: Not on file   Occupational History    Not on file   Tobacco Use    Smoking status: Never     Passive exposure: Never    Smokeless tobacco: Never    Tobacco comments:     no passive exposure   Substance and Sexual Activity    Alcohol use: No    Drug use: No    Sexual activity: Yes     Partners: Male     Birth control/protection: None   Other Topics Concern    Not on file   Social History Narrative    Lives with  who can read and speak English and helps her with meds     Social Determinants of Health     Financial Resource Strain: Low Risk  (10/27/2023)    Financial Resource Strain     Within the past 12 months, have you or your family members you live with been unable to get utilities (heat, electricity) when it was really needed?: No   Food Insecurity: High Risk (10/27/2023)    Food Insecurity     Within the past 12 months, did you worry that your food would run out before you got money to buy more?: Yes     Within the past 12 months, did the food you bought just not last and you didn t have money to get more?: Yes   Transportation Needs: High Risk (10/27/2023)    Transportation Needs     Within the past 12 months, has lack of transportation kept you from medical appointments, getting your medicines, non-medical meetings or appointments, work, or from getting things that you need?: Yes   Physical Activity: Not on file   Stress: Stress Concern Present (12/14/2021)    [employer] [place] [employer] - Occupational Stress Questionnaire     Feeling of Stress : To some extent   Social Connections: Not on file   Interpersonal Safety: Low Risk  (10/27/2023)    Interpersonal Safety     Do you feel physically and emotionally safe where you currently live?: Yes     Within the past 12 months, have you been hit, slapped, kicked or otherwise physically hurt by someone?: No     Within the past 12 months, have you been humiliated or emotionally abused in other ways by your partner or  ex-partner?: No   Housing Stability: Low Risk  (10/27/2023)    Housing Stability     Do you have housing? : Yes     Are you worried about losing your housing?: No          Medications  Allergies   Current Outpatient Medications   Medication Sig Dispense Refill    acetaminophen (MAPAP) 500 MG tablet Take 2 tablets (1,000 mg) by mouth 3 times daily as needed for mild pain 100 tablet 11    aspirin 81 MG EC tablet Take 1 tablet (81 mg) by mouth daily 90 tablet 4    atorvastatin (LIPITOR) 80 MG tablet TAKE 1 TABLET (80 MG TOTAL) BY MOUTH DAILY/ TXHUA HNUB NOJ 1 [facility] MUAJ ROJ 90 tablet 4    B Complex-Biotin-FA (B COMPLEX 100 TR) TBCR Take 1 tablet by mouth daily 90 tablet 4    blood glucose (CONTOUR NEXT TEST) test strip 1 strip by In Vitro route daily 25 strip 12    blood glucose (NO BRAND SPECIFIED) lancets standard Use to test blood sugar 1 times daily or as directed. 100 Lancet 3    ezetimibe (ZETIA) 10 MG tablet Take 1 tablet (10 mg) by mouth daily 90 tablet 1    isosorbide mononitrate (IMDUR) 60 MG 24 hr tablet Take 1 tablet (60 mg) by mouth daily 90 tablet 3    losartan (COZAAR) 100 MG tablet TAKE 1 PILL BY MOUTH DAILY FOR BLOOD PRESSURE / TX[facility]B [facility]J 1 [facility] 90 tablet 3    metFORMIN (GLUCOPHAGE XR) 500 MG 24 hr tablet Take 1 tablet (500 mg) by mouth 2 times daily (with meals) 360 tablet 4    metoprolol succinate ER (TOPROL XL) 50 MG 24 hr tablet Take 50 mg by mouth daily      nitroGLYcerin (NITROSTAT) 0.4 MG sublingual tablet For chest pain place 1 tablet under the tongue every 5 minutes for 3 doses. If symptoms persist 5 minutes after 1st dose call 911. 25 tablet 4    omeprazole (PRILOSEC) 40 MG DR capsule TAKE 1 PILL BY MOUTH EVERY DAY/ TXHUA HNUB NOJ 1 [facility] LUB PLAB 90 capsule 1    Semaglutide, 2 MG/DOSE, (OZEMPIC) 8 MG/3ML pen Inject 2 mg Subcutaneous every 7 days 3 mL 3    venlafaxine (EFFEXOR XR) 150 MG 24 hr capsule Take 1 capsule (150 mg)  by mouth daily 90 capsule 1    vitamin D3 (CHOLECALCIFEROL) 125 MCG (5000 UT) tablet Take 1 tablet (125 mcg) by mouth daily 90 tablet 1    allopurinol (ZYLOPRIM) 100 MG tablet TAKE 1 TABLET (100 MG) BY MOUTH DAILY (Patient not taking: Reported on 12/19/2023) 90 tablet 4    No Known Allergies      Lab Results    Chemistry/lipid CBC Cardiac Enzymes/BNP/TSH/INR   Lab Results   Component Value Date    CHOL 176 11/29/2023    HDL 83 11/29/2023    TRIG 123 11/29/2023    BUN 27.0 (H) 11/29/2023     11/29/2023    CO2 30 (H) 11/29/2023    Lab Results   Component Value Date    WBC 5.5 06/23/2023    HGB 12.0 07/28/2023    HCT 39.7 06/23/2023    MCV 94 06/23/2023     06/23/2023    Lab Results   Component Value Date    TROPONINI <0.01 08/20/2020    BNP 12 08/20/2020    TSH 1.97 12/15/2020    INR 0.90 03/29/2020             This note has been dictated using voice recognition software. Any grammatical, typographical, or context distortions are unintentional and inherent to the software    30 minutes spent on the date of encounter doing chart review, review of outside records, review of test results, interpretation with above tests, patient visit, and documentation.                Thank you for allowing me to participate in the care of your patient.      Sincerely,     PABLO Tobar CNP     [facility]  cc:   PABLO oTbar CNP  [address], [address]  [place] [address]

## 2023-12-19 NOTE — PROGRESS NOTES
Assessment/Recommendations   Assessment:    1. Nonischemic cardiomyopathy, heart failure with improved ejection fraction, ejection fraction 70%, NYHA class III: Compensated.  She has fatigue and dyspnea on exertion.  She denies orthopnea, PND or edema.  Her weight has increased but she is very and active.  2.  Hypertension: Controlled.  Blood pressure 126/88  3.  Lung nodule: She is planning on having surgery in the near future  4.  Obesity: BMI 43.66    Plan:  1.  Continue current medications  2.  Work on weight loss  3.  Encouraged following a low-sodium diet and monitoring weights    Leora Sanchez will follow up Dr. Roy in March and in the heart failure clinic in September.     History of Present Illness/Subjective    Ms. Leora Sanchez is a 53 year old female seen at [facility] heart failure clinic today for continued follow-up.  There is an  during the visit.  She follows up for heart failure with improved ejection fraction, nonischemic cardiomyopathy.  She had a stress cardiac MRI February 2021 which showed an improved ejection fraction of 65%.  Coronary angiogram in May 2019 showed nonobstructive CAD.  She had a Lexiscan September 2023 which was negative for inducible myocardial ischemia or infarction with an ejection fraction of 70%.  She saw Dr. Roy at that time for cardiac clearance to have surgery for a lung nodule.  He is okay with her proceeding and gave her cardiac clearance.  She has a past medical history significant for hypertension, nonobstructive CAD, dyslipidemia, obesity, diabetes mellitus type 2, CKD stage III.     Today, she has fatigue and dyspnea on exertion.  She denies lightheadedness, orthopnea, PND, palpitations, chest pain, abdominal fullness/bloating, and lower extremity edema.      She does not always monitor her weight at home.  Her clinic weight has increased.         Lexiscan: 9/27/2023-reviewed  Result Text       A prior study was conducted on 5/10/2019.  This study  has no change when compared with the prior study.    Pharmacological regadenoson stress ECG is negative for inducible myocardial ischemia.    Pharmacological Regadenoson nuclear stress test is negative for inducible myocardial ischemia or infarction.    Normal left ventricular size, wall motion and systolic function.  The calculated left ventricular ejection fraction is 70%.    The patient is at a low risk of future cardiac ischemic events.     Physical Examination Review of Systems   /88 (BP Location: Left arm, Patient Position: Sitting, Cuff Size: Adult Regular)   Pulse 98   Resp 16   Wt 98.9 kg (218 lb)   SpO2 98%   BMI 43.66 kg/m    Body mass index is 43.66 kg/m .  Wt Readings from Last 3 Encounters:   12/19/23 98.9 kg (218 lb)   11/16/23 97.1 kg (214 lb)   10/27/23 96.2 kg (212 lb)       General Appearance:   no acute distress   ENT/Mouth: No abnormalities   EYES:  no scleral icterus, normal conjunctivae   Neck: no thyromegaly   Chest/Lungs:   lungs are clear to auscultation, no rales or wheezing, equal chest wall expansion    Cardiovascular:   Regular. Normal first and second heart sounds with no murmurs, rubs, or gallops, no edema bilaterally    Abdomen:  Obese, bowel sounds are present   Extremities: no cyanosis or clubbing   Skin: warm   Neurologic: no tremors     Psychiatric: alert and oriented x3                                              Medical History  Surgical History Family History Social History   Past Medical History:   Diagnosis Date    Anxiety     Chronic cough 02/12/2018    Congestive heart failure (H)     Depression     Dyslipidemia, goal LDL below 70 10/31/2017    Essential hypertension     GERD (gastroesophageal reflux disease)     Heart failure with reduced ejection fraction (H) 10/30/2017    Hypertension     Nonischemic cardiomyopathy (H)     variable EF depending on the technique, date and reader; BNP normal in 2018    Nonocclusive coronary atherosclerosis of native coronary  artery 10/31/2017    Pregnancy         Pyelonephritis 2018    Renal abscess     Spontaneous  2010    TM (tympanic membrane disorder)     Past Surgical History:   Procedure Laterality Date    CV CORONARY ANGIOGRAM N/A 2019    Procedure: Coronary Angiogram;  Surgeon: Car Box MD;  Location: [facility] Cath Lab;  Service: Cardiology    CV LEFT HEART CATHETERIZATION WITHOUT LEFT VENTRICULOGRAM Left 10/31/2017    Procedure: Left Heart Catheterization Without Left Ventriculogram;  Surgeon: Jonathan Duque MD;  Location: [facility] Lab;  Service:     CV LEFT HEART CATHETERIZATION WITHOUT LEFT VENTRICULOGRAM Left 2019    Procedure: Left Heart Catheterization Without Left Ventriculogram;  Surgeon: Car Box MD;  Location: [facility];  Service: Cardiology    DILATION AND CURETTAGE      ENT SURGERY      INNER EAR SURGERY Right     MASTOIDECTOMY Right     [place] CATH PLACEMENT & NJX CORONARY ART ANGIO IMG S&I N/A 10/31/2017    Procedure: Coronary Angiogram;  Surgeon: Jonathan Duque MD;  Location: [facility] Cath Lab;  Service: Cardiology    Family History   Problem Relation Age of Onset    Diabetes Mother     Hypertension Mother     Uterine Cancer Mother     Diverticulitis Mother     Other - See Comments Father          in war in [place]    No Known Problems Sister     No Known Problems Daughter     No Known Problems Daughter     No Known Problems Daughter     No Known Problems Daughter     No Known Problems Son     No Known Problems Son     No Known Problems Son     No Known Problems Son     Social History     Socioeconomic History    Marital status:      Spouse name: Not on file    Number of children: 8    Years of education: Not on file    Highest education level: Not on file   Occupational History    Not on file   Tobacco Use    Smoking status: Never     Passive exposure: Never    Smokeless tobacco: Never    Tobacco  comments:     no passive exposure   Substance and Sexual Activity    Alcohol use: No    Drug use: No    Sexual activity: Yes     Partners: Male     Birth control/protection: None   Other Topics Concern    Not on file   Social History Narrative    Lives with  who can read and speak English and helps her with meds     Social Determinants of Health     Financial Resource Strain: Low Risk  (10/27/2023)    Financial Resource Strain     Within the past 12 months, have you or your family members you live with been unable to get utilities (heat, electricity) when it was really needed?: No   Food Insecurity: High Risk (10/27/2023)    Food Insecurity     Within the past 12 months, did you worry that your food would run out before you got money to buy more?: Yes     Within the past 12 months, did the food you bought just not last and you didn t have money to get more?: Yes   Transportation Needs: High Risk (10/27/2023)    Transportation Needs     Within the past 12 months, has lack of transportation kept you from medical appointments, getting your medicines, non-medical meetings or appointments, work, or from getting things that you need?: Yes   Physical Activity: Not on file   Stress: Stress Concern Present (12/14/2021)    [employer] [place] [employer] - Occupational Stress Questionnaire     Feeling of Stress : To some extent   Social Connections: Not on file   Interpersonal Safety: Low Risk  (10/27/2023)    Interpersonal Safety     Do you feel physically and emotionally safe where you currently live?: Yes     Within the past 12 months, have you been hit, slapped, kicked or otherwise physically hurt by someone?: No     Within the past 12 months, have you been humiliated or emotionally abused in other ways by your partner or ex-partner?: No   Housing Stability: Low Risk  (10/27/2023)    Housing Stability     Do you have housing? : Yes     Are you worried about losing your housing?: No          Medications   Allergies   Current Outpatient Medications   Medication Sig Dispense Refill    acetaminophen (MAPAP) 500 MG tablet Take 2 tablets (1,000 mg) by mouth 3 times daily as needed for mild pain 100 tablet 11    aspirin 81 MG EC tablet Take 1 tablet (81 mg) by mouth daily 90 tablet 4    atorvastatin (LIPITOR) 80 MG tablet TAKE 1 TABLET (80 MG TOTAL) BY MOUTH DAILY/ TXHUA HNUB NOJ 1 LUB [facility] MUAJ ROJ 90 tablet 4    B Complex-Biotin-FA (B COMPLEX 100 TR) TBCR Take 1 tablet by mouth daily 90 tablet 4    blood glucose (CONTOUR NEXT TEST) test strip 1 strip by In Vitro route daily 25 strip 12    blood glucose (NO BRAND SPECIFIED) lancets standard Use to test blood sugar 1 times daily or as directed. 100 Lancet 3    ezetimibe (ZETIA) 10 MG tablet Take 1 tablet (10 mg) by mouth daily 90 tablet 1    isosorbide mononitrate (IMDUR) 60 MG 24 hr tablet Take 1 tablet (60 mg) by mouth daily 90 tablet 3    losartan (COZAAR) 100 MG tablet TAKE 1 PILL BY MOUTH DAILY FOR BLOOD PRESSURE / TXHUA HNUB NOJ 1 [facility] SIAB 90 tablet 3    metFORMIN (GLUCOPHAGE XR) 500 MG 24 hr tablet Take 1 tablet (500 mg) by mouth 2 times daily (with meals) 360 tablet 4    metoprolol succinate ER (TOPROL XL) 50 MG 24 hr tablet Take 50 mg by mouth daily      nitroGLYcerin (NITROSTAT) 0.4 MG sublingual tablet For chest pain place 1 tablet under the tongue every 5 minutes for 3 doses. If symptoms persist 5 minutes after 1st dose call 911. 25 tablet 4    omeprazole (PRILOSEC) 40 MG DR capsule TAKE 1 PILL BY MOUTH EVERY DAY/ TXHUA HNUB NOJ 1 LUB [facility] LUB PLAB 90 capsule 1    Semaglutide, 2 MG/DOSE, (OZEMPIC) 8 MG/3ML pen Inject 2 mg Subcutaneous every 7 days 3 mL 3    venlafaxine (EFFEXOR XR) 150 MG 24 hr capsule Take 1 capsule (150 mg) by mouth daily 90 capsule 1    vitamin D3 (CHOLECALCIFEROL) 125 MCG (5000 UT) tablet Take 1 tablet (125 mcg) by mouth daily 90 tablet 1    allopurinol (ZYLOPRIM) 100 MG tablet TAKE 1  TABLET (100 MG) BY MOUTH DAILY (Patient not taking: Reported on 12/19/2023) 90 tablet 4    No Known Allergies      Lab Results    Chemistry/lipid CBC Cardiac Enzymes/BNP/TSH/INR   Lab Results   Component Value Date    CHOL 176 11/29/2023    HDL 83 11/29/2023    TRIG 123 11/29/2023    BUN 27.0 (H) 11/29/2023     11/29/2023    CO2 30 (H) 11/29/2023    Lab Results   Component Value Date    WBC 5.5 06/23/2023    HGB 12.0 07/28/2023    HCT 39.7 06/23/2023    MCV 94 06/23/2023     06/23/2023    Lab Results   Component Value Date    TROPONINI <0.01 08/20/2020    BNP 12 08/20/2020    TSH 1.97 12/15/2020    INR 0.90 03/29/2020             This note has been dictated using voice recognition software. Any grammatical, typographical, or context distortions are unintentional and inherent to the software    30 minutes spent on the date of encounter doing chart review, review of outside records, review of test results, interpretation with above tests, patient visit, and documentation.

## 2023-12-21 ENCOUNTER — PATIENT OUTREACH (OUTPATIENT)
Dept: CARE COORDINATION | Facility: CLINIC | Age: 53
End: 2023-12-21
Payer: MEDICARE

## 2023-12-21 NOTE — PROGRESS NOTES
"Clinic Care Coordination Contact:  [facility]/Voicemail    Reason(s):   -[facility] CHW Follow Up Called (follow up current goal's)  -Offer follow up appt with CCC RN per message received     Clinical Data: Care Coordinator Outreach:    Outreach Documentation Number of Outreach Attempt   12/21/2023   1:43 PM 1     Attempted #1: Patient is due for CHW follow up. CHW attempted to connect with patient and no answer. Unable to leave a voice message for patient to return call due to patient voicemail box has not been set up.     Coordinate Care:   Message received from CCC RN encounter dated 12/15/2023:   \"Upon your next outreach - if needed can you set up a follow up call with me so we can update goals if needed.\"    Plan: Care Coordinator will try to reach patient again in 2-3 weeks.      "

## 2024-01-04 ENCOUNTER — APPOINTMENT (OUTPATIENT)
Dept: INTERPRETER SERVICES | Facility: CLINIC | Age: 54
End: 2024-01-04
Payer: MEDICARE

## 2024-01-08 ENCOUNTER — TELEPHONE (OUTPATIENT)
Dept: FAMILY MEDICINE | Facility: CLINIC | Age: 54
End: 2024-01-08
Payer: MEDICARE

## 2024-01-08 NOTE — TELEPHONE ENCOUNTER
Prior Authorization Retail Medication Request    Medication/Dose: Ozempic 2mg/dose  Diagnosis and ICD code (if different than what is on RX):  NA  New/renewal/insurance change PA/secondary ins. PA:  Previously Tried and Failed:  NA  Rationale:  NA    Insurance   Primary: [employer] PART D  Insurance ID:  [id number]    Secondary (if applicable):PILAR  Insurance ID:  PILAR    Pharmacy Information (if different than what is on RX)  Name:  [facility]  Phone:  [phone]  Fax:[phone]

## 2024-01-11 NOTE — TELEPHONE ENCOUNTER
Central Prior Authorization Team   Phone: [phone]    PA Initiation    Medication: Ozempic 2mg/dose  Insurance Company: [employer] Part D - Phone [phone] Fax [phone]  Pharmacy Filling the Rx: [place], [place] - [address]  Filling Pharmacy Phone: [phone]  Filling Pharmacy Fax:    Start Date: 1/11/2024

## 2024-01-12 NOTE — TELEPHONE ENCOUNTER
Prior Authorization Approval    Authorization Effective Date: 1/1/2024  Authorization Expiration Date: 1/10/2025  Medication: Ozempic 2mg/dose  Reference #:     Insurance Company: [employer] Part D - Phone [phone] Fax [phone]  Which Pharmacy is filling the prescription (Not needed for infusion/clinic administered): [facility] [place], [place] - [address]  Pharmacy Notified: Yes  Patient Notified: Instructed pharmacy to notify patient when script is ready to /ship.

## 2024-01-24 ENCOUNTER — PATIENT OUTREACH (OUTPATIENT)
Dept: CARE COORDINATION | Facility: CLINIC | Age: 54
End: 2024-01-24
Payer: MEDICARE

## 2024-01-24 NOTE — PROGRESS NOTES
"Clinic Care Coordination Contact  Community Health Worker Follow Up    Reason: CCC CHW Follow Up Called (follow up current goals)    Care Gaps:   Health Maintenance Due   Topic Date Due    HF ACTION PLAN  Never done    COLORECTAL CANCER SCREENING  Never done    EYE EXAM  12/21/2022    ZOSTER IMMUNIZATION (2 of 2) 12/23/2023    PHQ-9  01/27/2024     Pt is aware of due care gaps. Pt prefer to discuss this with pcp at next office visit. Pt will connect pcp office to schedule follow up appt per pt.    Care Plan:   Care Plan: Financial Wellbeing-[facility] Care program Completed 1/25/2024      Problem: Patient expresses financial resource strain  Resolved 1/25/2024      Goal: I would like to review medical bills and get understanding of coverage  Completed 1/24/2024      Start Date: 3/28/2023    This Visit's Progress: 100% Recent Progress: 50%    Note:     Barriers: coverage  Strengths: engagement  Patient expressed understanding of goal: yes  Action steps to achieve this goal:  1. I will bring in a copy of most recent medical bills for CCC team to review and get better understanding of Patient responsibility and coverage if received. (Completed)  2. I will speak with  CC to review and get better understanding. (Updated: 9/08/2023)-continues  3. I will continues to updates status with Care coordination team during next outreach. (Updated: 9/08/2023)-continues  4. I will updates status with CHW/[facility] team after my son wedding. (Completed; 8/7/2023)  5. I will wait to hear from [facility] dept regards to \"[facility] Application\" dropped of to FRW. (FYI: CHW encounter dated 7/07/2023; FRW encounter dated 8/22/2023). (Completed)  6. I will have my children assisted with returning FRW called. FRW phone number provides. (Completed0    Personal action steps:   I have spoke with someone through the [facility] office. Completed intake. Spouse and I are legally  and both of our gross income is over the " guideline. Not eligible at this time. Understand eligibility guideline. Please completed goal.   I will connect with pcp office to re-refer to [facility] program again in the future if income decrease. Will need to make small payment at each time to cover medical bill. No other questions or concerns at this time.                               Care Plan: Medical       Problem: HP GENERAL PROBLEM       Goal: I would like to have  a plan of care for ear/hearing within 3-4  months  Completed 5/30/2023      Start Date: 3/28/2023 Expected End Date: 6/9/2023    Recent Progress: 50%    Note:     Barriers: language, access   Strengths: family support  Patient expressed understanding of goal: yes  Action steps to achieve this goal:  1. I will schedule and attend visit with ENT and audiology-done 6/9 @ 1:05  2. I will update Care coordination team during next outreach  3. I will report to my Community Health Worker if any additional resources or support needed.  4. I will updates status with CCC team after my son wedding. (Updated: 5/19/2023)                  Goal: I would like to have a plan of care for Cardiology health within 3-4 months       Start Date: 3/28/2023    This Visit's Progress: 50% Recent Progress: 50%    Note:     Barriers: language  Strengths: family   Patient expressed understanding of goal: yes    Action steps to achieve this goal:  1. I will schedule and attend visit with cardiology provider - number to call [phone].    2. I will update Care coordination team during next outreach.  3. I will report to my Community Health Worker if any additional resources or support needed.  6. I will attend appt on 10/27/2023 with Dr. Gaspar in [facility] to seek further advised. (Updated: 10/26/2023)    (Updated: 1/24/2024)- unable to follow up goal due to pt time and have multiple goals.                Goal: I would like to attend Annual wellness exam  Completed 11/1/2023      Expected End Date: 6/23/2023    Recent  Progress: 50%    Note:       Patient expressed understanding of goal: yes    Action steps to achieve this goal:  1. I will schedule my annual wellness visit; [phone] ([phone]) scheduled for 6/23  2. I will attend my annual wellness visit.   3. I will contact my Care Management or clinic team if I have barriers to attending my annual wellness visit.   4. I will updates status with [facility] team after my son wedding. (Completed)  5. I will attend appt on 10/27/2023 with Dr. Gaspar in [facility] to seek further support towards goal completion. (Updated: 10/26/2023)  6. I will review due care gaps details including annual wellness visit appt scheduling with Dr. Gaspar on 10/27/2023 in clinic. (Updated: 10/26/2023)                Goal: I will fill all medications and follow recommendations for theapy and dosing plan.  Completed 11/1/2023      Recent Progress: 50%              Goal: I will get orthopedic shoes with in one month       Start Date: 11/1/2023    Note:     Pt reports  has scheduled an appointment for 11/16 -     (Updated: 1/24/2024)- unable to follow up goal due to pt time and have multiple goals.                  Goal: I will develop a plan of care for knee pain       This Visit's Progress: 30%    Note:     I will schedule a PT evaluation and attend visit.  I will connect with provider(s) care team office with any questions. Been taking prescribed med and feel better. (Updated: 1/25/2024)                            Goal deleted today per patient request:  Goal name: I would like to schedule and attend an evaluation diabetic eye exam within 4-6 months   Start Date: 11/1/2023  Action stepsto achieve this goal:  1.  I will review my insurance coverage for eye exam and outreach and schedule a visit. (Completed)  2.  I will attend visit and follow up with recommendations. (Completed)  3.  I will report back to my CommunityHealth Worker if I need additional support or resources. (Completed)  Personal action  steps:   Current health care insurance isn't cover or fully cover. Not interested to get eye exam at this time. No longer interested work toward goal. Delete goal.   I will connect with current health insurance in the future to reassess coverage eligibility. No other questions or concerns at this time.     Patient Outreach Discussion:   CCC CHW was able to connect with patient today regard to reason above. Check in how patient is doing and any questions or concerns at this time. Pt shared info below. Completed, updated, and deleted goal's above with pt. Encouraged pt to attend future appt with care team as scheduled. Pt confirmed.    Patient shared:  -Freya care goal: Spoke with someone through [facility] office. Completed intake. Spouse and I are legally  and both of our gross income is over the guideline. Not eligible. Understand eligibility at this time. Please completed goal.   -Eye exam goal: current health care insurance isn't cover or fully cover. No longer interested work toward goal. Delete goal.   -Knee goal: been taking prescribed med. Feel better. Will discuss any concerns with provider(s) care team at next office visit.    -Doing well. No other questions or concerns at this time.    CHW suggested patient for the following:  -Pt connect CCC/CHW with any additional resources need and PCP office for scheduling appt.    CHW Next Follow-up: Goals follow up. Informed patient of due care gaps (if any).     Outreach frequency: monthly      CHW Plan:   -Next CHW outreach plan: 1 month

## 2024-01-26 ENCOUNTER — PATIENT OUTREACH (OUTPATIENT)
Dept: CARE COORDINATION | Facility: CLINIC | Age: 54
End: 2024-01-26
Payer: MEDICARE

## 2024-01-26 NOTE — PROGRESS NOTES
Clinic Care Coordination Contact    Situation: Patient chart reviewed by care coordinator.    Background: RN CC review for status update    Assessment: CHW spoke with pt on 1/24/24 updated goals, no new concern noted     Future Appointments   Date Time Provider Department Center   2/1/2024  1:00 PM  NEEDED [facility]INTER [place]   3/27/2024 11:20 AM [facility]   6/7/2024 12:40 PM [facility]   6/7/2024  1:40 PM [facility]     Will transition pt to maintenance and adjust outreach per standard work, available sooner if needed     Plan/Recommendations: Patient will schedule and attend recommended follow up visits with speciality providers and primary care provider.    Community Health Worker to outreach per standard work   RN CC will review in 2 month to support ongoing recommendations and plan of care will be available sooner if needed.

## 2024-02-20 DIAGNOSIS — K21.9 GASTROESOPHAGEAL REFLUX DISEASE WITHOUT ESOPHAGITIS: ICD-10-CM

## 2024-02-20 RX ORDER — OMEPRAZOLE 40 MG/1
CAPSULE, DELAYED RELEASE ORAL
Qty: 90 CAPSULE | Refills: 1 | Status: SHIPPED | OUTPATIENT
Start: 2024-02-20 | End: 2024-08-13

## 2024-02-27 DIAGNOSIS — R91.8 PULMONARY NODULES: Primary | ICD-10-CM

## 2024-03-04 DIAGNOSIS — E11.65 TYPE 2 DIABETES MELLITUS WITH HYPERGLYCEMIA, WITHOUT LONG-TERM CURRENT USE OF INSULIN (H): ICD-10-CM

## 2024-03-05 RX ORDER — SEMAGLUTIDE 2.68 MG/ML
2 INJECTION, SOLUTION SUBCUTANEOUS
Qty: 3 ML | Refills: 2 | Status: SHIPPED | OUTPATIENT
Start: 2024-03-05 | End: 2024-06-10

## 2024-03-08 DIAGNOSIS — I42.8 NONISCHEMIC CARDIOMYOPATHY (H): ICD-10-CM

## 2024-03-08 RX ORDER — ATORVASTATIN CALCIUM 80 MG/1
TABLET, FILM COATED ORAL
Qty: 90 TABLET | Refills: 3 | Status: SHIPPED | OUTPATIENT
Start: 2024-03-08

## 2024-03-15 ENCOUNTER — APPOINTMENT (OUTPATIENT)
Dept: INTERPRETER SERVICES | Facility: CLINIC | Age: 54
End: 2024-03-15
Payer: MEDICARE

## 2024-03-19 ENCOUNTER — PATIENT OUTREACH (OUTPATIENT)
Dept: CARE COORDINATION | Facility: CLINIC | Age: 54
End: 2024-03-19
Payer: MEDICARE

## 2024-03-19 NOTE — PROGRESS NOTES
Clinic Care Coordination Contact  [facility]/Voicemail       Clinical Data: CHW Outreach    Outreach attempted x 1.  CHW was unable to leave a message on patient's voicemail with call back information and requested return call. Due to VM has not been set up yet.     Plan: CHW will make another attempt to reach the patient via phone or MyChart.    CHW outreach in the next 2 weeks.      PERLA Loaiza  Clinic Care Coordination   [facility]   Phone: [phone]  [email or web address]

## 2024-03-28 ENCOUNTER — APPOINTMENT (OUTPATIENT)
Dept: INTERPRETER SERVICES | Facility: CLINIC | Age: 54
End: 2024-03-28
Payer: MEDICARE

## 2024-03-30 DIAGNOSIS — I25.119 CORONARY ARTERY DISEASE INVOLVING NATIVE CORONARY ARTERY OF NATIVE HEART WITH ANGINA PECTORIS (H): ICD-10-CM

## 2024-04-01 RX ORDER — EZETIMIBE 10 MG/1
TABLET ORAL
Qty: 90 TABLET | Refills: 4 | Status: SHIPPED | OUTPATIENT
Start: 2024-04-01

## 2024-04-02 ENCOUNTER — PATIENT OUTREACH (OUTPATIENT)
Dept: CARE COORDINATION | Facility: CLINIC | Age: 54
End: 2024-04-02

## 2024-04-02 NOTE — PROGRESS NOTES
Clinic Care Coordination Contact  [facility]/Voicemail       Clinical Data: CHW Outreach    Outreach attempted x 2. CHW was unable to leave a message on patient's voicemail with call back information and requested return call. Due to Patient's VM is not set up yet.     Plan: CHW will send message to Lead CC ([facility]) to determine whether a disenrollment letter should be sent to Patient or if additional attempt should be made to Patient. No further outreach attempts will be made. Should they return my call, I will discuss clinic care coordination per standard work.    PERLA Loaiza  Clinic Care Coordination   [facility]   Phone: [phone]  [email or web address]

## 2024-04-03 ENCOUNTER — PATIENT OUTREACH (OUTPATIENT)
Dept: CARE COORDINATION | Facility: CLINIC | Age: 54
End: 2024-04-03
Payer: MEDICARE

## 2024-04-03 NOTE — LETTER
[facility] CARE COORDINATION  [address] [place] [address]    April 3, 2024    May X Sanchez  [address] [place] [address]    Dear May,  Your Care Team congratulates you on your journey to maintain wellness. This document will help guide you on your journey to maintain a healthy lifestyle.  You can use this to help you overcome any barriers you may encounter.  If you should have any questions or concerns, you can contact the members of your Care Team or contact your Primary Care Clinic for assistance.     Health Maintenance  Health Maintenance Reviewed:      My Access Plan  Medical Emergency 911   Primary Clinic Line [facility] - [phone]   24 Hour Appointment Line [phone] or  [phone]) (toll-free)   24 Hour Nurse Line [phone] (toll-free)   Preferred Urgent Care     Preferred Hospital     Preferred Pharmacy [facility] - [place], [place] - [address] [place] [address] Pky     Behavioral Health Crisis Line The National Suicide Prevention Lifeline at 1-805.650.7139 or 911     My Care Team Members  Patient Care Team         Relationship Specialty Notifications Start End    Jaky Gaspar MD PCP - General Family Practice  3/20/18     Phone: [phone] Fax: [phone]         [address] [place] [address]    Wendy Lind MD MD Pulmonary Disease  3/20/18     Phone: [phone] Fax: [phone]          [facility] [address] [address]    Art Thakkar, PharmD Pharmacist Pharmacist  10/15/19     Phone: [phone]          [facility] [address] [place] [address]    Elisabet Malone, CHW Community Health Worker Primary Care - CC Admissions 12/10/20     Phone: [phone]         Nickie Cuevas, RN Lead Care Coordinator Primary Care - CC Admissions 12/10/20     Jaky Gaspar MD Assigned PCP   6/16/21     Phone: [phone] Fax: [phone]         [address] [place] [address]    Jose Maria Bass MD Assigned  Surgical Provider   5/28/22     Phone: [phone] Fax: [phone]         [address] [place] [address]    Sendy Banda APRN CNP Nurse Practitioner Cardiovascular Disease  4/13/23     Phone: [phone] Fax: [phone] [address] [facility] [address]    Philip Anderson MD MD Cardiovascular & Thoracic Surgery  7/11/23     Phone: [phone] Fax: [phone]         [address] [facility] [address] [facility] [address]    Art Thakkar, PharmD Assigned MTM Pharmacist   9/9/23     Phone: [phone]          [facility] [address] [place] [address]    Sendy Banda APRN CNP Assigned Heart and Vascular Provider   12/23/23     Phone: [phone] Fax: [phone]         [address] [facility], [address] [facility] [address]    Barbara Kelly MSW Lead Care Coordinator  Admissions 3/13/24 4/3/24                 Goals          Patient Stated      COMPLETED: Medical (pt-stated)       Goal Statement: I will complete my cologuard screening with 2 months  Date Goal set: 12/14/21 01/27/22      Barriers: language  Strengths: discussed with Provider  Date to Achieve By: March  Patient expressed understanding of goal: yes  Action steps to achieve this goal:  1. I will review and complete test kit and return to clinic  2. I will update Care coordination team during next outreach     01/27/22  Pt states that she no longer has the kit will need new one        COMPLETED: Medical (pt-stated)       Goal Statement: I will complete my overdue health maintenance. ( mammogram)   Date Goal set: 11/19/21    Barriers: language  Strengths:     Date to Achieve By: January  Patient expressed understanding of goal: Yes  Action steps to achieve this goal:  1. If I do not receive a call from my clinic to schedule within 1 week, I will call to schedule my own appointment for PCP  2. I will notify my care team once health maintenance item(s) are completed.    3. I will contact my Care Coordination staff or care team with any questions or concerns I may have.            COMPLETED: Medical (pt-stated)       Goal Statement: I will have a better understanding and plan of care for dental support within 3-4 months  Date Goal set: 11/19/21    Barriers: language, knowledge deficit   Strengths: family support  Date to Achieve By:  Patient expressed understanding of goal: yes  Action steps to achieve this goal:  1. I will schedule and attend visit with community dental program   2. I will update Care coordination team during next outreach  3. I will report to my Community Health Worker if any additional resources or support needed.    12/14/21  Pt notes that she knows how to follow up for dental cleaning - no additional support         COMPLETED: Medical (pt-stated)       Goal Statement: I will have a better understanding and plan of care for Eye health  within 3-4 months  Date Goal set: 12/14/21  Barriers: language, knowledge deficit   Strengths: family support  Date to Achieve By:  Patient expressed understanding of goal: yes  Action steps to achieve this goal:  1. I will schedule and attend visit with [facility] eye clinic on December 21  2. I will update Care coordination team during next outreach  3. I will report to my Community Health Worker if any additional resources or support needed.    01/27/22  Pt attended visit with [facility] eye [facility]         COMPLETED: Medical (pt-stated)       Goal Statement: I will have a better understanding and plan of care for Health maintenance and are gaps  within 3-4 months  Date Goal set: 01/27/22  Barriers: language, knowledge deficit   Strengths: family support  Date to Achieve By:  Patient expressed understanding of goal: yes  Action steps to achieve this goal:  1. I will schedule and attend visit with PCP in April   2. I will update Care coordination team during next outreach  3. I will report to my Community Health Worker  if any additional resources or support needed.          COMPLETED: Medical (pt-stated)       Goal Statement: I will have a better understanding and plan of care for Ear drainage within 2-3 months.    Date Goal set: 05/04/22  Barriers: language, knowledge deficit   Strengths: family support  Date to Achieve By: July  Patient expressed understanding of goal: yes    Action steps to achieve this goal:  [facility]. [facility]Jose [facility] Bass MD[facility]   2. I will update Care coordination team during next outreach.  3. I will report to my Community Health Worker if any additional resources or support needed.               COMPLETED: Other (pt-stated)       Goal Statement: I will attend visit with PCP within 1-2 weeks.     Date Goal set: 02/22/21     Barriers: access  Strengths: pt engagement  Date to Achieve By: March   Patient expressed understanding of goal: yes  Personal Action Steps:  1. I have completed appt visit with Dr. Gaspar on 3/02/2021, and 3/23/2021.  2. I completed appt visitp office in scheduled 7/16/2021 with PharmD.  3. I will follow PCP and PharmD instructions.  4. I will connect with PCP and PharmD office at [phone] in the future if any additional resources or support needed.     (Date goal completed: 8-)        COMPLETED: Other (pt-stated)       Goal Statement: I would like to address care gaps with my PCP/PCP provider team     Date goal set: 3-  Measure of Success:   Barriers: language and stress about my mother's health status.  Strengths: self  Date to Achieve By: 4-  Patient expressed understanding of goal: yes    Action steps to achieve this goal:  1. I will attend the annual physical appt on 6- with Dr. Gaspar in [facility].   2. I will discuss care gaps details and address any questions or concerns with Dr. Gaspar.  3. I will updates status with [facility] team at next outreach follow up.                        Advance Care Plans/Directives Type:          It has  been your Clinic Care Team's pleasure to work with you on your goals.    Regards,  Your Clinic Care Team

## 2024-04-03 NOTE — PROGRESS NOTES
Clinic Care Coordination Contact    Assessment: Care Coordinator contacted patient for 2 month follow up.  Patient has continued to follow the plan of care and assessment is negative for any new needs or concerns.    Enrollment status: Graduated.      Plan: No further outreaches at this time.  Patient will continue to follow the plan of care.  If new needs arise a new Care Coordination referral may be placed.  FYI to PCP    Edward Kelly [facility]  Social Work Care [facility]   Phone: [phone]

## 2024-04-05 ENCOUNTER — OFFICE VISIT (OUTPATIENT)
Dept: CARDIOLOGY | Facility: CLINIC | Age: 54
End: 2024-04-05
Payer: MEDICARE

## 2024-04-05 VITALS
SYSTOLIC BLOOD PRESSURE: 150 MMHG | WEIGHT: 218 LBS | HEART RATE: 87 BPM | HEIGHT: 59 IN | DIASTOLIC BLOOD PRESSURE: 92 MMHG | RESPIRATION RATE: 16 BRPM | BODY MASS INDEX: 43.95 KG/M2

## 2024-04-05 DIAGNOSIS — E66.01 CLASS 3 SEVERE OBESITY DUE TO EXCESS CALORIES WITH SERIOUS COMORBIDITY AND BODY MASS INDEX (BMI) OF 40.0 TO 44.9 IN ADULT (H): ICD-10-CM

## 2024-04-05 DIAGNOSIS — E66.813 CLASS 3 SEVERE OBESITY DUE TO EXCESS CALORIES WITH SERIOUS COMORBIDITY AND BODY MASS INDEX (BMI) OF 40.0 TO 44.9 IN ADULT (H): ICD-10-CM

## 2024-04-05 DIAGNOSIS — I10 ESSENTIAL HYPERTENSION: ICD-10-CM

## 2024-04-05 DIAGNOSIS — I50.20 HEART FAILURE WITH REDUCED EJECTION FRACTION (H): Primary | ICD-10-CM

## 2024-04-05 DIAGNOSIS — N18.30 STAGE 3 CHRONIC KIDNEY DISEASE, UNSPECIFIED WHETHER STAGE 3A OR 3B CKD (H): ICD-10-CM

## 2024-04-05 DIAGNOSIS — I42.8 NONISCHEMIC CARDIOMYOPATHY (H): ICD-10-CM

## 2024-04-05 LAB
ANION GAP SERPL CALCULATED.3IONS-SCNC: 12 MMOL/L (ref 7–15)
BUN SERPL-MCNC: 32.2 MG/DL (ref 6–20)
CALCIUM SERPL-MCNC: 9 MG/DL (ref 8.6–10)
CHLORIDE SERPL-SCNC: 108 MMOL/L (ref 98–107)
CREAT SERPL-MCNC: 1.06 MG/DL (ref 0.51–0.95)
DEPRECATED HCO3 PLAS-SCNC: 24 MMOL/L (ref 22–29)
EGFRCR SERPLBLD CKD-EPI 2021: 62 ML/MIN/1.73M2
GLUCOSE SERPL-MCNC: 125 MG/DL (ref 70–99)
NT-PROBNP SERPL-MCNC: 202 PG/ML (ref 0–900)
POTASSIUM SERPL-SCNC: 4.3 MMOL/L (ref 3.4–5.3)
SODIUM SERPL-SCNC: 144 MMOL/L (ref 135–145)

## 2024-04-05 PROCEDURE — 36415 COLL VENOUS BLD VENIPUNCTURE: CPT | Performed by: NURSE PRACTITIONER

## 2024-04-05 PROCEDURE — 99214 OFFICE O/P EST MOD 30 MIN: CPT | Performed by: NURSE PRACTITIONER

## 2024-04-05 PROCEDURE — 80048 BASIC METABOLIC PNL TOTAL CA: CPT | Performed by: NURSE PRACTITIONER

## 2024-04-05 PROCEDURE — 83880 ASSAY OF NATRIURETIC PEPTIDE: CPT | Performed by: NURSE PRACTITIONER

## 2024-04-05 RX ORDER — METOPROLOL SUCCINATE 50 MG/1
100 TABLET, EXTENDED RELEASE ORAL DAILY
Qty: 180 TABLET | Refills: 1 | Status: SHIPPED | OUTPATIENT
Start: 2024-04-05 | End: 2024-09-10

## 2024-04-05 NOTE — PROGRESS NOTES
Assessment/Recommendations   Assessment:    1. Nonischemic cardiomyopathy, heart failure with improved ejection fraction, ejection fraction 70%, NYHA class III: Decompensated.  She has increased dyspnea on exertion and lower extremity edema the last few months she states.  She denies orthopnea or PND.  Her clinic weight is stable.  She tries to follow a low-sodium diet.  She states she is taking a diuretic but there is no diuretic noted on her medication list.  We will call her once we get her labs and go over her medication list.  2.  Hypertension: Elevated today.  Blood pressure 144/86 with a recheck of 150/98  3.  Obesity: BMI 43.66  4.  CKD stage IIIa: BMP pending    Plan:  1.  BMP and NT proBNP pending  2.  Increase metoprolol to 100 mg daily  3.  Monitor daily weights and stressed importance of low-sodium diet    Leora Sanchez will follow up with Dr. Roy in August and in the heart failure clinic in 8 months or sooner if needed.     History of Present Illness/Subjective    Ms. Leora Sanchez is a 53 year old female seen at [facility] heart failure clinic today for continued follow-up.   There is an  during the visit.  She follows up for heart failure with improved ejection fraction, nonischemic cardiomyopathy.  She had a stress cardiac MRI February 2021 which showed an improved ejection fraction of 65%.  Coronary angiogram in May 2019 showed nonobstructive CAD.  She had a Lexiscan September 2023 which was negative for inducible myocardial ischemia or infarction with an ejection fraction of 70%. She has a past medical history significant for hypertension, nonobstructive CAD, dyslipidemia, obesity, diabetes mellitus type 2, CKD stage III.     Today, she complains of lower extremity edema and dyspnea on exertion.  She denies lightheadedness, shortness of breath, orthopnea, PND, palpitations, chest pain, and abdominal fullness/bloating.      She is monitoring home weights which are stable.    "    Physical Examination Review of Systems   BP (!) 150/92   Pulse 87   Resp 16   Ht 1.505 m (4' \")   Wt 98.9 kg (218 lb)   BMI 43.66 kg/m    Body mass index is 43.66 kg/m .  Wt Readings from Last 3 Encounters:   24 98.9 kg (218 lb)   23 98.9 kg (218 lb)   23 97.1 kg (214 lb)       General Appearance:   no acute distress   ENT/Mouth: No abnormalities   EYES:  no scleral icterus, normal conjunctivae   Neck: no thyromegaly   Chest/Lungs:   lungs are clear to auscultation, no rales or wheezing, equal chest wall expansion    Cardiovascular:   Regular. Normal first and second heart sounds with no murmurs, rubs, or gallops, mild edema bilaterally    Abdomen:  Obese, bowel sounds are present   Extremities: no cyanosis or clubbing   Skin: warm   Neurologic: no tremors     Psychiatric: alert and oriented x3    Enc Vitals  BP: (!) 150/92  Pulse: 87  Resp: 16  Weight: 98.9 kg (218 lb)  Height: 150.5 cm (4' 1125\")                                         Medical History  Surgical History Family History Social History   Past Medical History:   Diagnosis Date    Anxiety     Chronic cough 2018    Chronic kidney disease     Congestive heart failure (H)     Depression     Dyslipidemia, goal LDL below 70 10/31/2017    Essential hypertension     GERD (gastroesophageal reflux disease)     Heart failure with reduced ejection fraction (H) 10/30/2017    Hypertension     Nonischemic cardiomyopathy (H)     variable EF depending on the technique, date and reader; BNP normal in 2018    Nonocclusive coronary atherosclerosis of native coronary artery 10/31/2017    Obese     Pregnancy         Pyelonephritis 2018    Renal abscess     Spontaneous  2010    TM (tympanic membrane disorder)     Past Surgical History:   Procedure Laterality Date    CV CORONARY ANGIOGRAM N/A 2019    Procedure: Coronary Angiogram;  Surgeon: Car Box MD;  Location: [facility] Cath Lab;  " Service: Cardiology    CV LEFT HEART CATHETERIZATION WITHOUT LEFT VENTRICULOGRAM Left 10/31/2017    Procedure: Left Heart Catheterization Without Left Ventriculogram;  Surgeon: Jonathan Duque MD;  Location: [facility] Cath Lab;  Service:     CV LEFT HEART CATHETERIZATION WITHOUT LEFT VENTRICULOGRAM Left 2019    Procedure: Left Heart Catheterization Without Left Ventriculogram;  Surgeon: Car Box MD;  Location: [facility] Cath Lab;  Service: Cardiology    DILATION AND CURETTAGE      ENT SURGERY      INNER EAR SURGERY Right     MASTOIDECTOMY Right     ID CATH PLACEMENT & NJX CORONARY ART ANGIO IMG S&I N/A 10/31/2017    Procedure: Coronary Angiogram;  Surgeon: Jonathan Duque MD;  Location: [facility] Cath Lab;  Service: Cardiology    Family History   Problem Relation Age of Onset    Diabetes Mother     Hypertension Mother     Uterine Cancer Mother     Diverticulitis Mother     Other - See Comments Father          in [place]    No Known Problems Sister     No Known Problems Daughter     No Known Problems Daughter     No Known Problems Daughter     No Known Problems Daughter     No Known Problems Son     No Known Problems Son     No Known Problems Son     No Known Problems Son     Social History     Socioeconomic History    Marital status:      Spouse name: Not on file    Number of children: 8    Years of education: Not on file    Highest education level: Not on file   Occupational History    Not on file   Tobacco Use    Smoking status: Never     Passive exposure: Never    Smokeless tobacco: Never    Tobacco comments:     no passive exposure   Substance and Sexual Activity    Alcohol use: No    Drug use: No    Sexual activity: Yes     Partners: Male     Birth control/protection: None   Other Topics Concern    Not on file   Social History Narrative    Lives with  who can read and speak English and helps her with meds     Social Determinants of Health      Financial Resource Strain: Low Risk  (10/27/2023)    Financial Resource Strain     Within the past 12 months, have you or your family members you live with been unable to get utilities (heat, electricity) when it was really needed?: No   Food Insecurity: High Risk (10/27/2023)    Food Insecurity     Within the past 12 months, did you worry that your food would run out before you got money to buy more?: Yes     Within the past 12 months, did the food you bought just not last and you didn t have money to get more?: Yes   Transportation Needs: High Risk (10/27/2023)    Transportation Needs     Within the past 12 months, has lack of transportation kept you from medical appointments, getting your medicines, non-medical meetings or appointments, work, or from getting things that you need?: Yes   Physical Activity: Not on file   Stress: Stress Concern Present (12/14/2021)    [employer] [place] [employer] - Occupational Stress Questionnaire     Feeling of Stress : To some extent   Social Connections: Not on file   Interpersonal Safety: Low Risk  (10/27/2023)    Interpersonal Safety     Do you feel physically and emotionally safe where you currently live?: Yes     Within the past 12 months, have you been hit, slapped, kicked or otherwise physically hurt by someone?: No     Within the past 12 months, have you been humiliated or emotionally abused in other ways by your partner or ex-partner?: No   Housing Stability: Low Risk  (10/27/2023)    Housing Stability     Do you have housing? : Yes     Are you worried about losing your housing?: No          Medications  Allergies   Current Outpatient Medications   Medication Sig Dispense Refill    acetaminophen (MAPAP) 500 MG tablet Take 2 tablets (1,000 mg) by mouth 3 times daily as needed for mild pain 100 tablet 11    aspirin 81 MG EC tablet Take 1 tablet (81 mg) by mouth daily 90 tablet 4    atorvastatin (LIPITOR) 80 MG tablet TAKE 1 TABLET (80 MG TOTAL) BY MOUTH  DAILY/ TXHUA HNUB NOJ 1 LUB [facility] ROJ 90 tablet 3    B Complex-Biotin-FA (B COMPLEX 100 TR) TBCR Take 1 tablet by mouth daily 90 tablet 4    blood glucose (CONTOUR NEXT TEST) test strip 1 strip by In Vitro route daily 25 strip 12    blood glucose (NO BRAND SPECIFIED) lancets standard Use to test blood sugar 1 times daily or as directed. 100 Lancet 3    ezetimibe (ZETIA) 10 MG tablet TAKE 1 PILL BY MOUTH EVERY DAY/ NOJ IB LUB IB [facility] ROJ 90 tablet 4    isosorbide mononitrate (IMDUR) 60 MG 24 hr tablet Take 1 tablet (60 mg) by mouth daily 90 tablet 3    losartan (COZAAR) 100 MG tablet TAKE 1 PILL BY MOUTH DAILY FOR BLOOD PRESSURE / TXA UB NO 1 [facility] SIAB 90 tablet 3    metFORMIN (GLUCOPHAGE XR) 500 MG 24 hr tablet Take 1 tablet (500 mg) by mouth 2 times daily (with meals) 360 tablet 4    metoprolol succinate ER (TOPROL XL) 50 MG 24 hr tablet Take 2 tablets (100 mg) by mouth daily 180 tablet 1    nitroGLYcerin (NITROSTAT) 0.4 MG sublingual tablet For chest pain place 1 tablet under the tongue every 5 minutes for 3 doses. If symptoms persist 5 minutes after 1st dose call 911. 25 tablet 4    omeprazole (PRILOSEC) 40 MG DR capsule TAKE 1 PILL BY MOUTH EVERY DAY/ TXHUA HNUB NOJ 1 LUB [facility] LUB PLAB 90 capsule 1    Semaglutide, 2 MG/DOSE, (OZEMPIC, 2 MG/DOSE,) 8 MG/3ML pen INJECT 2 MG SUBCUTANEOUS EVERY 7 DAYS 3 mL 2    venlafaxine (EFFEXOR XR) 150 MG 24 hr capsule Take 1 capsule (150 mg) by mouth daily 90 capsule 1    vitamin D3 (CHOLECALCIFEROL) 125 MCG (5000 UT) tablet Take 1 tablet (125 mcg) by mouth daily 90 tablet 1    allopurinol (ZYLOPRIM) 100 MG tablet TAKE 1 TABLET (100 MG) BY MOUTH DAILY (Patient not taking: Reported on 12/19/2023) 90 tablet 4    No Known Allergies      Lab Results    Chemistry/lipid CBC Cardiac Enzymes/BNP/TSH/INR   Lab Results   Component Value Date    CHOL 176 11/29/2023    HDL 83 11/29/2023    TRIG 123 11/29/2023     BUN 27.0 (H) 11/29/2023     11/29/2023    CO2 30 (H) 11/29/2023    Lab Results   Component Value Date    WBC 5.5 06/23/2023    HGB 12.0 07/28/2023    HCT 39.7 06/23/2023    MCV 94 06/23/2023     06/23/2023    Lab Results   Component Value Date    TROPONINI <0.01 08/20/2020    BNP 12 08/20/2020    TSH 1.97 12/15/2020    INR 0.90 03/29/2020             This note has been dictated using voice recognition software. Any grammatical, typographical, or context distortions are unintentional and inherent to the software    30 minutes spent on the date of encounter doing chart review, review of outside records, review of test results, interpretation with above tests, patient visit, and documentation.

## 2024-04-05 NOTE — PATIENT INSTRUCTIONS
Leora aSnchez,    It was a pleasure to see you today at [facility] HEART [facility].     My recommendations after this visit include:  - Please follow up with Dr Roy in 4 months   - Please follow up with Sendy Banda in 8 months  - I have checked labs  - Increase metoprolol to 100 mg daily (2 tablets)    Sendy Banda, CNP

## 2024-04-05 NOTE — LETTER
4/5/2024    Jaky Gaspar MD  [address] [place] [id number]    RE: Leora Sanchez       Dear Colleague,     I had the pleasure of seeing Leora Sanchez in the [facility].        Assessment/Recommendations   Assessment:    1. Nonischemic cardiomyopathy, heart failure with improved ejection fraction, ejection fraction 70%, NYHA class III: Decompensated.  She has increased dyspnea on exertion and lower extremity edema the last few months she states.  She denies orthopnea or PND.  Her clinic weight is stable.  She tries to follow a low-sodium diet.  She states she is taking a diuretic but there is no diuretic noted on her medication list.  We will call her once we get her labs and go over her medication list.  2.  Hypertension: Elevated today.  Blood pressure 144/86 with a recheck of 150/98  3.  Obesity: BMI 43.66  4.  CKD stage IIIa: BMP pending    Plan:  1.  BMP and NT proBNP pending  2.  Increase metoprolol to 100 mg daily  3.  Monitor daily weights and stressed importance of low-sodium diet    Leora Sanchez will follow up with Dr. Roy in August and in the heart failure clinic in 8 months or sooner if needed.     History of Present Illness/Subjective    Ms. Leora Sanchez is a 53 year old female seen at [facility] heart failure clinic today for continued follow-up.   There is an  during the visit.  She follows up for heart failure with improved ejection fraction, nonischemic cardiomyopathy.  She had a stress cardiac MRI February 2021 which showed an improved ejection fraction of 65%.  Coronary angiogram in May 2019 showed nonobstructive CAD.  She had a Lexiscan September 2023 which was negative for inducible myocardial ischemia or infarction with an ejection fraction of 70%. She has a past medical history significant for hypertension, nonobstructive CAD, dyslipidemia, obesity, diabetes mellitus type 2, CKD stage III.     Today, she complains of lower extremity edema and dyspnea on exertion.  She  "denies lightheadedness, shortness of breath, orthopnea, PND, palpitations, chest pain, and abdominal fullness/bloating.      She is monitoring home weights which are stable.       Physical Examination Review of Systems   BP (!) 150/92   Pulse 87   Resp 16   Ht 1.505 m (4' 11.25\")   Wt 98.9 kg (218 lb)   BMI 43.66 kg/m    Body mass index is 43.66 kg/m .  Wt Readings from Last 3 Encounters:   24 98.9 kg (218 lb)   23 98.9 kg (218 lb)   23 97.1 kg (214 lb)       General Appearance:   no acute distress   ENT/Mouth: No abnormalities   EYES:  no scleral icterus, normal conjunctivae   Neck: no thyromegaly   Chest/Lungs:   lungs are clear to auscultation, no rales or wheezing, equal chest wall expansion    Cardiovascular:   Regular. Normal first and second heart sounds with no murmurs, rubs, or gallops, mild edema bilaterally    Abdomen:  Obese, bowel sounds are present   Extremities: no cyanosis or clubbing   Skin: warm   Neurologic: no tremors     Psychiatric: alert and oriented x3    Enc Vitals  BP: (!) 150/92  Pulse: 87  Resp: 16  Weight: 98.9 kg (218 lb)  Height: 150.5 cm (4' 11.25\")                                         Medical History  Surgical History Family History Social History   Past Medical History:   Diagnosis Date    Anxiety     Chronic cough 2018    Chronic kidney disease     Congestive heart failure (H)     Depression     Dyslipidemia, goal LDL below 70 10/31/2017    Essential hypertension     GERD (gastroesophageal reflux disease)     Heart failure with reduced ejection fraction (H) 10/30/2017    Hypertension     Nonischemic cardiomyopathy (H)     variable EF depending on the technique, date and reader; BNP normal in 2018    Nonocclusive coronary atherosclerosis of native coronary artery 10/31/2017    Obese     Pregnancy         Pyelonephritis 2018    Renal abscess     Spontaneous  2010    TM (tympanic membrane disorder)     Past Surgical History: "   Procedure Laterality Date    CV CORONARY ANGIOGRAM N/A 2019    Procedure: Coronary Angiogram;  Surgeon: Car Box MD;  Location: [facility] Cath Lab;  Service: Cardiology    CV LEFT HEART CATHETERIZATION WITHOUT LEFT VENTRICULOGRAM Left 10/31/2017    Procedure: Left Heart Catheterization Without Left Ventriculogram;  Surgeon: Jonathan Duque MD;  Location: [facility] Cath Lab;  Service:     CV LEFT HEART CATHETERIZATION WITHOUT LEFT VENTRICULOGRAM Left 2019    Procedure: Left Heart Catheterization Without Left Ventriculogram;  Surgeon: Car Box MD;  Location: [facility] Cath Lab;  Service: Cardiology    DILATION AND CURETTAGE      ENT SURGERY      INNER EAR SURGERY Right     MASTOIDECTOMY Right     HI CATH PLACEMENT & NJX CORONARY ART ANGIO IMG S&I N/A 10/31/2017    Procedure: Coronary Angiogram;  Surgeon: Jonathan Duque MD;  Location: [facility] Cath Lab;  Service: Cardiology    Family History   Problem Relation Age of Onset    Diabetes Mother     Hypertension Mother     Uterine Cancer Mother     Diverticulitis Mother     Other - See Comments Father          in war in [place]    No Known Problems Sister     No Known Problems Daughter     No Known Problems Daughter     No Known Problems Daughter     No Known Problems Daughter     No Known Problems Son     No Known Problems Son     No Known Problems Son     No Known Problems Son     Social History     Socioeconomic History    Marital status:      Spouse name: Not on file    Number of children: 8    Years of education: Not on file    Highest education level: Not on file   Occupational History    Not on file   Tobacco Use    Smoking status: Never     Passive exposure: Never    Smokeless tobacco: Never    Tobacco comments:     no passive exposure   Substance and Sexual Activity    Alcohol use: No    Drug use: No    Sexual activity: Yes     Partners: Male     Birth control/protection: None   Other  Topics Concern    Not on file   Social History Narrative    Lives with  who can read and speak English and helps her with meds     Social Determinants of Health     Financial Resource Strain: Low Risk  (10/27/2023)    Financial Resource Strain     Within the past 12 months, have you or your family members you live with been unable to get utilities (heat, electricity) when it was really needed?: No   Food Insecurity: High Risk (10/27/2023)    Food Insecurity     Within the past 12 months, did you worry that your food would run out before you got money to buy more?: Yes     Within the past 12 months, did the food you bought just not last and you didn t have money to get more?: Yes   Transportation Needs: High Risk (10/27/2023)    Transportation Needs     Within the past 12 months, has lack of transportation kept you from medical appointments, getting your medicines, non-medical meetings or appointments, work, or from getting things that you need?: Yes   Physical Activity: Not on file   Stress: Stress Concern Present (12/14/2021)    [employer] [place] - Occupational Stress Questionnaire     Feeling of Stress : To some extent   Social Connections: Not on file   Interpersonal Safety: Low Risk  (10/27/2023)    Interpersonal Safety     Do you feel physically and emotionally safe where you currently live?: Yes     Within the past 12 months, have you been hit, slapped, kicked or otherwise physically hurt by someone?: No     Within the past 12 months, have you been humiliated or emotionally abused in other ways by your partner or ex-partner?: No   Housing Stability: Low Risk  (10/27/2023)    Housing Stability     Do you have housing? : Yes     Are you worried about losing your housing?: No          Medications  Allergies   Current Outpatient Medications   Medication Sig Dispense Refill    acetaminophen (MAPAP) 500 MG tablet Take 2 tablets (1,000 mg) by mouth 3 times daily as needed for mild pain  100 tablet 11    aspirin 81 MG EC tablet Take 1 tablet (81 mg) by mouth daily 90 tablet 4    atorvastatin (LIPITOR) 80 MG tablet TAKE 1 TABLET (80 MG TOTAL) BY MOUTH DAILY/ TXA UB NO 1 LUB [facility] ROJ 90 tablet 3    B Complex-Biotin-FA (B COMPLEX 100 TR) TBCR Take 1 tablet by mouth daily 90 tablet 4    blood glucose (CONTOUR NEXT TEST) test strip 1 strip by In Vitro route daily 25 strip 12    blood glucose (NO BRAND SPECIFIED) lancets standard Use to test blood sugar 1 times daily or as directed. 100 Lancet 3    ezetimibe (ZETIA) 10 MG tablet TAKE 1 PILL BY MOUTH EVERY DAY/ NO IB LUB IB [facility] ROJ 90 tablet 4    isosorbide mononitrate (IMDUR) 60 MG 24 hr tablet Take 1 tablet (60 mg) by mouth daily 90 tablet 3    losartan (COZAAR) 100 MG tablet TAKE 1 PILL BY MOUTH DAILY FOR BLOOD PRESSURE / [facility] SIAB 90 tablet 3    metFORMIN (GLUCOPHAGE XR) 500 MG 24 hr tablet Take 1 tablet (500 mg) by mouth 2 times daily (with meals) 360 tablet 4    metoprolol succinate ER (TOPROL XL) 50 MG 24 hr tablet Take 2 tablets (100 mg) by mouth daily 180 tablet 1    nitroGLYcerin (NITROSTAT) 0.4 MG sublingual tablet For chest pain place 1 tablet under the tongue every 5 minutes for 3 doses. If symptoms persist 5 minutes after 1st dose call 911. 25 tablet 4    omeprazole (PRILOSEC) 40 MG DR capsule TAKE 1 PILL BY MOUTH EVERY DAY/ TXA HNUB NOJ 1 LUB [facility] LUB PLAB 90 capsule 1    Semaglutide, 2 MG/DOSE, (OZEMPIC, 2 MG/DOSE,) 8 MG/3ML pen INJECT 2 MG SUBCUTANEOUS EVERY 7 DAYS 3 mL 2    venlafaxine (EFFEXOR XR) 150 MG 24 hr capsule Take 1 capsule (150 mg) by mouth daily 90 capsule 1    vitamin D3 (CHOLECALCIFEROL) 125 MCG (5000 UT) tablet Take 1 tablet (125 mcg) by mouth daily 90 tablet 1    allopurinol (ZYLOPRIM) 100 MG tablet TAKE 1 TABLET (100 MG) BY MOUTH DAILY (Patient not taking: Reported on 12/19/2023) 90 tablet 4    No Known Allergies       Lab Results    Chemistry/lipid CBC Cardiac Enzymes/BNP/TSH/INR   Lab Results   Component Value Date    CHOL 176 11/29/2023    HDL 83 11/29/2023    TRIG 123 11/29/2023    BUN 27.0 (H) 11/29/2023     11/29/2023    CO2 30 (H) 11/29/2023    Lab Results   Component Value Date    WBC 5.5 06/23/2023    HGB 12.0 07/28/2023    HCT 39.7 06/23/2023    MCV 94 06/23/2023     06/23/2023    Lab Results   Component Value Date    TROPONINI <0.01 08/20/2020    BNP 12 08/20/2020    TSH 1.97 12/15/2020    INR 0.90 03/29/2020             This note has been dictated using voice recognition software. Any grammatical, typographical, or context distortions are unintentional and inherent to the software    30 minutes spent on the date of encounter doing chart review, review of outside records, review of test results, interpretation with above tests, patient visit, and documentation.                    Thank you for allowing me to participate in the care of your patient.      Sincerely,     PABLO Tobar [facility]  cc:   Jaky Gaspar MD  [address] [place] [address]

## 2024-04-08 ENCOUNTER — TELEPHONE (OUTPATIENT)
Dept: CARDIOLOGY | Facility: CLINIC | Age: 54
End: 2024-04-08
Payer: MEDICARE

## 2024-04-08 DIAGNOSIS — G44.209 TENSION HEADACHE: ICD-10-CM

## 2024-04-08 NOTE — TELEPHONE ENCOUNTER
Left message with Pharmacy to validate diuretic pt is taking. Spoke with pt's  earlier and he said she was taking a diuretic and will need a refill through [facility]. Awaiting a call back from pharmacy.     Manuel Mata RN [facility]

## 2024-04-09 ENCOUNTER — TELEPHONE (OUTPATIENT)
Dept: CARDIOLOGY | Facility: CLINIC | Age: 54
End: 2024-04-09
Payer: MEDICARE

## 2024-04-09 DIAGNOSIS — I50.20 HEART FAILURE WITH REDUCED EJECTION FRACTION (H): Primary | ICD-10-CM

## 2024-04-09 RX ORDER — FUROSEMIDE 20 MG
20 TABLET ORAL DAILY
Qty: 90 TABLET | Refills: 4 | Status: SHIPPED | OUTPATIENT
Start: 2024-04-09

## 2024-04-09 NOTE — TELEPHONE ENCOUNTER
Rx sent to pharmacy,  for  to begin once daily Furosemide 20mg.   Devora ARNOLD RN BSN, CHFN, PCCN-K

## 2024-04-09 NOTE — TELEPHONE ENCOUNTER
[facility] is contacted and they do not have a current Furosemide order, (or other diuretic either) she had a Furosemide 40mg once daily from Oct of 2022.  FYI to Sendy Banda,HAILY ARNOLD RN BSN, CHFN, PCCN-K

## 2024-05-17 ENCOUNTER — OFFICE VISIT (OUTPATIENT)
Dept: FAMILY MEDICINE | Facility: CLINIC | Age: 54
End: 2024-05-17
Payer: MEDICARE

## 2024-05-17 VITALS
DIASTOLIC BLOOD PRESSURE: 71 MMHG | HEART RATE: 76 BPM | HEIGHT: 59 IN | BODY MASS INDEX: 43.14 KG/M2 | TEMPERATURE: 97.5 F | WEIGHT: 214 LBS | SYSTOLIC BLOOD PRESSURE: 108 MMHG

## 2024-05-17 DIAGNOSIS — I25.119 CORONARY ARTERY DISEASE INVOLVING NATIVE CORONARY ARTERY OF NATIVE HEART WITH ANGINA PECTORIS (H): ICD-10-CM

## 2024-05-17 DIAGNOSIS — Z23 NEED FOR SHINGLES VACCINE: ICD-10-CM

## 2024-05-17 DIAGNOSIS — E11.42 DIABETIC POLYNEUROPATHY ASSOCIATED WITH TYPE 2 DIABETES MELLITUS (H): Primary | ICD-10-CM

## 2024-05-17 DIAGNOSIS — N18.31 STAGE 3A CHRONIC KIDNEY DISEASE (H): ICD-10-CM

## 2024-05-17 DIAGNOSIS — M79.672 BILATERAL FOOT PAIN: ICD-10-CM

## 2024-05-17 DIAGNOSIS — I10 ESSENTIAL HYPERTENSION: ICD-10-CM

## 2024-05-17 DIAGNOSIS — E11.42 TYPE 2 DIABETES MELLITUS WITH DIABETIC POLYNEUROPATHY, WITHOUT LONG-TERM CURRENT USE OF INSULIN (H): ICD-10-CM

## 2024-05-17 DIAGNOSIS — F33.1 MODERATE EPISODE OF RECURRENT MAJOR DEPRESSIVE DISORDER (H): ICD-10-CM

## 2024-05-17 DIAGNOSIS — G89.29 CHRONIC MIDLINE LOW BACK PAIN WITH BILATERAL SCIATICA: ICD-10-CM

## 2024-05-17 DIAGNOSIS — I42.8 NONISCHEMIC CARDIOMYOPATHY (H): ICD-10-CM

## 2024-05-17 DIAGNOSIS — E66.813 CLASS 3 SEVERE OBESITY DUE TO EXCESS CALORIES WITH SERIOUS COMORBIDITY AND BODY MASS INDEX (BMI) OF 40.0 TO 44.9 IN ADULT (H): ICD-10-CM

## 2024-05-17 DIAGNOSIS — M1A.0710 CHRONIC GOUT OF RIGHT FOOT, UNSPECIFIED CAUSE: ICD-10-CM

## 2024-05-17 DIAGNOSIS — M54.42 CHRONIC MIDLINE LOW BACK PAIN WITH BILATERAL SCIATICA: ICD-10-CM

## 2024-05-17 DIAGNOSIS — D64.9 ANEMIA, UNSPECIFIED TYPE: ICD-10-CM

## 2024-05-17 DIAGNOSIS — M79.671 BILATERAL FOOT PAIN: ICD-10-CM

## 2024-05-17 DIAGNOSIS — M54.41 CHRONIC MIDLINE LOW BACK PAIN WITH BILATERAL SCIATICA: ICD-10-CM

## 2024-05-17 DIAGNOSIS — E66.01 CLASS 3 SEVERE OBESITY DUE TO EXCESS CALORIES WITH SERIOUS COMORBIDITY AND BODY MASS INDEX (BMI) OF 40.0 TO 44.9 IN ADULT (H): ICD-10-CM

## 2024-05-17 PROBLEM — F33.41 RECURRENT MAJOR DEPRESSIVE DISORDER, IN PARTIAL REMISSION (H): Status: RESOLVED | Noted: 2019-03-04 | Resolved: 2024-05-17

## 2024-05-17 LAB
ERYTHROCYTE [DISTWIDTH] IN BLOOD BY AUTOMATED COUNT: 12.9 % (ref 10–15)
HBA1C MFR BLD: 9.7 % (ref 0–5.6)
HCT VFR BLD AUTO: 36 % (ref 35–47)
HGB BLD-MCNC: 11.4 G/DL (ref 11.7–15.7)
MCH RBC QN AUTO: 31.3 PG (ref 26.5–33)
MCHC RBC AUTO-ENTMCNC: 31.7 G/DL (ref 31.5–36.5)
MCV RBC AUTO: 99 FL (ref 78–100)
PLATELET # BLD AUTO: 210 10E3/UL (ref 150–450)
RBC # BLD AUTO: 3.64 10E6/UL (ref 3.8–5.2)
WBC # BLD AUTO: 6.3 10E3/UL (ref 4–11)

## 2024-05-17 PROCEDURE — 99214 OFFICE O/P EST MOD 30 MIN: CPT | Performed by: FAMILY MEDICINE

## 2024-05-17 PROCEDURE — 36415 COLL VENOUS BLD VENIPUNCTURE: CPT | Performed by: FAMILY MEDICINE

## 2024-05-17 PROCEDURE — 85027 COMPLETE CBC AUTOMATED: CPT | Performed by: FAMILY MEDICINE

## 2024-05-17 PROCEDURE — 83036 HEMOGLOBIN GLYCOSYLATED A1C: CPT | Performed by: FAMILY MEDICINE

## 2024-05-17 PROCEDURE — 82607 VITAMIN B-12: CPT | Performed by: FAMILY MEDICINE

## 2024-05-17 PROCEDURE — 84550 ASSAY OF BLOOD/URIC ACID: CPT | Performed by: FAMILY MEDICINE

## 2024-05-17 PROCEDURE — 84443 ASSAY THYROID STIM HORMONE: CPT | Performed by: FAMILY MEDICINE

## 2024-05-17 PROCEDURE — 80053 COMPREHEN METABOLIC PANEL: CPT | Performed by: FAMILY MEDICINE

## 2024-05-17 PROCEDURE — G2211 COMPLEX E/M VISIT ADD ON: HCPCS | Performed by: FAMILY MEDICINE

## 2024-05-17 RX ORDER — NITROGLYCERIN 0.4 MG/1
TABLET SUBLINGUAL
Qty: 25 TABLET | Refills: 4 | Status: SHIPPED | OUTPATIENT
Start: 2024-05-17

## 2024-05-17 RX ORDER — DULOXETIN HYDROCHLORIDE 30 MG/1
30 CAPSULE, DELAYED RELEASE ORAL DAILY
Qty: 30 CAPSULE | Refills: 2 | Status: SHIPPED | OUTPATIENT
Start: 2024-05-17 | End: 2024-06-28

## 2024-05-17 ASSESSMENT — PATIENT HEALTH QUESTIONNAIRE - PHQ9
SUM OF ALL RESPONSES TO PHQ QUESTIONS 1-9: 10
SUM OF ALL RESPONSES TO PHQ QUESTIONS 1-9: 10
10. IF YOU CHECKED OFF ANY PROBLEMS, HOW DIFFICULT HAVE THESE PROBLEMS MADE IT FOR YOU TO DO YOUR WORK, TAKE CARE OF THINGS AT HOME, OR GET ALONG WITH OTHER PEOPLE: SOMEWHAT DIFFICULT

## 2024-05-17 NOTE — PROGRESS NOTES
Assessment & Plan     Need for shingles vaccine  Get at pharmacy  - zoster vaccine recombinant adjuvanted (SHINGRIX) injection  Dispense: 0.5 mL; Refill: 0    Diabetic polyneuropathy associated with type 2 diabetes mellitus (H)  Follow-up with podiatry.  Check for thyroid dysfunction.    - Orthopedic  Referral  - TSH with free T4 reflex  - TSH with free T4 reflex    Stage 3a chronic kidney disease (H)  Continue focus on glucose, lipid and blood pressure control.  Continue to monitor for anemia and vitamin D.  Pt counselled on avoiding NSAIDS and other over-the-counter medications without talking with doctor first.  Discussed importance of hydration.  Continue ARB, start SGLT2 today.  Push fluids- some dehydration- on lasix daily.  Recheck labs next month with mtm.    Creatinine   Date Value Ref Range Status   05/17/2024 1.31 (H) 0.51 - 0.95 mg/dL Final   04/26/2013 0.82 0.60 - 1.10 mg/dL Final      GFR Estimate   Date Value Ref Range Status   05/17/2024 48 (L) >60 mL/min/1.73m2 Final   04/15/2021 49 (L) >60 mL/min/1.73m2 Final   04/26/2013 > 60 >60 ml/min/1.73m2 Final     GFR, ESTIMATED POCT   Date Value Ref Range Status   03/31/2023 >60 >60 mL/min/1.73m2 Final     Hemoglobin   Date Value Ref Range Status   05/17/2024 11.4 (L) 11.7 - 15.7 g/dL Final     Vitamin D Deficiency Screening Results:  Lab Results   Component Value Date    VITDT 23 07/26/2023     Hemoglobin A1C   Date Value Ref Range Status   05/17/2024 9.7 (H) 0.0 - 5.6 % Final   01/18/2011 5.7 4.2 - 6.1 % Final     LDL Cholesterol Calculated   Date Value Ref Range Status   11/29/2023 68 <=100 mg/dL Final   04/26/2013 106 <130 mg/dL Final     LDL Cholesterol Direct   Date Value Ref Range Status   08/20/2020 116 <=129 mg/dl Final       - Comprehensive metabolic panel (BMP + Alb, Alk Phos, ALT, AST, Total. Bili, TP)  - Comprehensive metabolic panel (BMP + Alb, Alk Phos, ALT, AST, Total. Bili, TP)    Type 2 diabetes with diabetic polyneuropathy  without long term us of insulin   UnControlled.  Addressed smoking status and aspirin therapy.  Recommended annual eye exam and dental cares. Reviewed foot cares and foot exam.  Blood pressure and lipid management reviewed today.  Vaccines reviewed and updated.  Plan for glucose management includes ongoing focus on healthy diabetic diet and increased activity, continue ozempic 2mg weekly, metformin xr 500mg bid- pt doesn't think she can increase this.  Start jardiance 10mg daily and increase dose as tolerated at mtm.  Labs ordered as below including:     Hemoglobin A1C   Date Value Ref Range Status   05/17/2024 9.7 (H) 0.0 - 5.6 % Final   11/29/2023 6.9 (H) 0.0 - 5.6 % Final     Comment:     Normal <5.7%   Prediabetes 5.7-6.4%    Diabetes 6.5% or higher     Note: Adopted from ADA consensus guidelines.   10/03/2023 7.4 (H) 0.0 - 5.6 % Final     Comment:     Normal <5.7%   Prediabetes 5.7-6.4%    Diabetes 6.5% or higher     Note: Adopted from ADA consensus guidelines.   01/18/2011 5.7 4.2 - 6.1 % Final    ,   Lab Results   Component Value Date    LDL 68 11/29/2023   ,   Creatinine   Date Value Ref Range Status   05/17/2024 1.31 (H) 0.51 - 0.95 mg/dL Final   04/26/2013 0.82 0.60 - 1.10 mg/dL Final        - Adult Eye  Referral  - HEMOGLOBIN A1C  - Comprehensive metabolic panel (BMP + Alb, Alk Phos, ALT, AST, Total. Bili, TP)  - Vitamin B12  - empagliflozin (JARDIANCE) 10 MG TABS tablet  Dispense: 90 tablet; Refill: 1  - Med Therapy Management Referral  - HEMOGLOBIN A1C  - Comprehensive metabolic panel (BMP + Alb, Alk Phos, ALT, AST, Total. Bili, TP)  - Vitamin B12    Class 3 severe obesity due to excess calories with serious comorbidity and body mass index (BMI) of 40.0 to 44.9 in adult (H)  Reviewed LSM.  Continue glp-1  - CBC with platelets  - CBC with platelets    Essential hypertension  BP Readings from Last 3 Encounters:   05/17/24 108/71   04/05/24 (!) 150/92   12/19/23 126/88        controlled today.   "Plan for bloodpressure management includes ongoing focus on healthy DASH type diet and increased activity, encouraged to avoid tobacco products and limit alcohol use, stress reduction, continue metoprolol XL 50mg daily, losartan 100mg daily, imdur 60mg daily, .  Labwork and meds ordered and reviewed as below  Potassium   Date Value Ref Range Status   05/17/2024 4.9 3.4 - 5.3 mmol/L Final   06/02/2022 4.0 3.5 - 5.0 mmol/L Final   04/26/2013 3.8 3.5 - 5.0 mmol/L Final      Creatinine   Date Value Ref Range Status   05/17/2024 1.31 (H) 0.51 - 0.95 mg/dL Final   04/26/2013 0.82 0.60 - 1.10 mg/dL Final            Anemia, unspecified type  Stable- continue to monitor, mild   - CBC with platelets  - CBC with platelets    Coronary artery disease involving native coronary artery of native heart with angina pectoris (H24)  Follow-up with cardiology later this summer.  Continue asa, b-blocker, statin, arb, lasix, imdur  Starting sglt2 today,     Moderate episode of recurrent major depressive disorder (H)  Doing well- continue meds.   - DULoxetine (CYMBALTA) 30 MG capsule  Dispense: 30 capsule; Refill: 2    Chronic gout of right foot, unspecified cause  Uric acid high today- on allopurinol 100mg daily.  Follow-up with podiatry.  If adherent to meds increase dose to 300mg   - Uric acid  - Orthopedic  Referral  - Uric acid    Chronic midline low back pain with bilateral sciatica  - Spine  Referral  - DULoxetine (CYMBALTA) 30 MG capsule  Dispense: 30 capsule; Refill: 2    Nonischemic cardiomyopathy (H)  Refill as requested.    - nitroGLYcerin (NITROSTAT) 0.4 MG sublingual tablet  Dispense: 25 tablet; Refill: 4    Bilateral foot pain  - Orthopedic  Referral            BMI  Estimated body mass index is 43.14 kg/m  as calculated from the following:    Height as of this encounter: 1.5 m (4' 11.06\").    Weight as of this encounter: 97.1 kg (214 lb).   Weight management plan: Discussed healthy diet and " exercise guidelines      The longitudinal plan of care for the diagnosis(es)/condition(s) as documented were addressed during this visit. Due to the added complexity in care, I will continue to support May in the subsequent management and with ongoing continuity of care.    Shahab Corey is a 53 year old, presenting for the following health issues:  RECHECK        5/17/2024     3:55 PM   Additional Questions   Roomed by M   Accompanied by self     History of Present Illness     Asthma:  She presents for follow up of asthma.  She has some cough, some wheezing, and some shortness of breath. She is not using a relief medication.  She does not have a controller medication. Patient is aware of the following triggers: unaware of any triggers and pollens. The patient has not had a visit to the Emergency Room, Urgent Care or Hospital due to asthma since the last clinic visit.     Back Pain:  She presents for follow up of back pain. Patient's back pain is a recurring problem.  Location of back pain:  Right lower back, left lower back, right hip and left hip  Description of back pain: burning and sharp  Back pain spreads: nowhere    Since patient first noticed back pain, pain is: always present, but gets better and worse  Does back pain interfere with her job:  Yes       Diabetes:   She presents for follow up of diabetes.  She is checking home blood glucose one time daily.   She checks blood glucose before meals.  Blood glucose is sometimes over 200 and never under 70. She is aware of hypoglycemia symptoms including dizziness, weakness, blurred vision and confusion.   She is concerned about blood sugar frequently over 200.   She is having numbness in feet, burning in feet, excessive thirst, blurry vision and weight gain.  The patient has not had a diabetic eye exam in the last 12 months.          Hyperlipidemia:  She presents for follow up of hyperlipidemia.   She is not taking medication to lower cholesterol. She is not  "having myalgia or other side effects to statin medications.    Hypertension: She presents for follow up of hypertension.  She does check blood pressure  regularly outside of the clinic. Outside blood pressures have been over 140/90. She follows a low salt diet.     Headaches:   Since the patient's last clinic visit, headaches are: worsened  The patient is getting headaches:  One to two a week  She is not able to do normal daily activities when she has a migraine.  The patient is taking the following rescue/relief medications:  No rescue/relief medications   Patient states \"I get only a small amount of relief\" from the rescue/relief medications.   The patient is taking the following medications to prevent migraines:  Other  In the past 4 weeks, the patient has gone to an Urgent Care or Emergency Room 0 times times due to headaches.    Vascular Disease:  She presents for follow up of vascular disease.      She is not taking daily aspirin.    She eats 0-1 servings of fruits and vegetables daily.She consumes 1 sweetened beverage(s) daily.She exercises with enough effort to increase her heart rate 9 or less minutes per day.  She exercises with enough effort to increase her heart rate 3 or less days per week.   She is taking medications regularly.     She and her  travelled to [place] earlier this month and both had bad asthma related to pollution/allergies there, possible URI?      PHQ-9 score:        5/17/2024     3:33 PM   PHQ   PHQ-9 Total Score 10   Q9: Thoughts of better off dead/self-harm past 2 weeks Not at all     Has been on effexor 150mg daily but last filled 90 day supply on 1/2/24?   Stopped getting refills.      Working with cardiology on her heart failure   Needs refill on her ntg- using 1-2 as needed 1-2x/month.      Pus and drainage from right ear and headache  Has follow-up with ent and audiology next month   Chronically wet on right side     Cardiology follow-up in 8 2024     Low back pain in mid " "lower back and butt down to left anterior ankle area if she stands too long - has to sit and rest when she gets this pain.      Right ankle pain- feels like something is poking.    Hard to walk and move without pain.    Foot swollen for past several months left more than right.      Has a cane as needed     Diabetes 100-220.    Ozempic 2mg weekly   Metformin bid     Poor vision- worse on the right side related to her right ear issues. Watering and painful.          Objective    /71 (BP Location: Left arm, Patient Position: Sitting, Cuff Size: Adult Regular)   Pulse 76   Temp 97.5  F (36.4  C) (Temporal)   Ht 1.5 m (4' 11.06\")   Wt 97.1 kg (214 lb)   BMI 43.14 kg/m    Body mass index is 43.14 kg/m .  Physical Exam   Complete 10 point ROS completed today as part of the exam and patient denies any symptoms as reviewed in HPI     Wt Readings from Last 3 Encounters:   05/17/24 97.1 kg (214 lb)   04/05/24 98.9 kg (218 lb)   12/19/23 98.9 kg (218 lb)       No LMP recorded. Patient is postmenopausal.    All normal as below except abnormalities include: stable exam   General is a  53 year old sitting comfortably in no apparent distress   HEENT:    Eye exams within normal   Neck: Supple without lymphadenopathy or thyromegally  CV: Regular rate and rhythm S1S2 without rubs, murmurs or gallops,   Lungs: Clear to auscultation bilaterally  Abd:  +BS, soft NT/ND,  No masses or organomegally,   Extremities: Warm, No Edema, 2+ Pedal and radial pulses bilaterally  Skin: No lesions or rashes noted  Neuro: Able to ambulate around the exam room with equal movement, strength and normal coordination of the upper and lower extremeties symmetrically    History summarized from1-2:cardiology 4/2024 reviewed   Old Records-1: Outside allergies, meds, problems and immunizations were reconciled as needed from [facility]  Lab tests reviewed-1: [id number]      Jaky Gaspar MD             Signed Electronically by: Jaky Gaspar, " MD      Prior to immunization administration, verified patients identity using patient s name and date of birth. Please see Immunization Activity for additional information.     Screening Questionnaire for Adult Immunization    Are you sick today?   No   Do you have allergies to medications, food, a vaccine component or latex?   No   Have you ever had a serious reaction after receiving a vaccination?   Yes   Do you have a long-term health problem with heart, lung, kidney, or metabolic disease (e.g., diabetes), asthma, a blood disorder, no spleen, complement component deficiency, a cochlear implant, or a spinal fluid leak?  Are you on long-term aspirin therapy?   No   Do you have cancer, leukemia, HIV/AIDS, or any other immune system problem?   No   Do you have a parent, brother, or sister with an immune system problem?   No   In the past 3 months, have you taken medications that affect  your immune system, such as prednisone, other steroids, or anticancer drugs; drugs for the treatment of rheumatoid arthritis, Crohn s disease, or psoriasis; or have you had radiation treatments?   No   Have you had a seizure, or a brain or other nervous system problem?   No   During the past year, have you received a transfusion of blood or blood    products, or been given immune (gamma) globulin or antiviral drug?   No   For women: Are you pregnant or is there a chance you could become       pregnant during the next month?   No   Have you received any vaccinations in the past 4 weeks?   No     Immunization questionnaire was positive for at least one answer.  Notified .      Patient instructed to remain in clinic for 15 minutes afterwards, and to report any adverse reactions.     Screening performed by ANN Jc MA on 5/17/2024 at 3:57 PM.

## 2024-05-18 LAB
ALBUMIN SERPL BCG-MCNC: 3.9 G/DL (ref 3.5–5.2)
ALP SERPL-CCNC: 82 U/L (ref 40–150)
ALT SERPL W P-5'-P-CCNC: 91 U/L (ref 0–50)
ANION GAP SERPL CALCULATED.3IONS-SCNC: 10 MMOL/L (ref 7–15)
AST SERPL W P-5'-P-CCNC: 90 U/L (ref 0–45)
BILIRUB SERPL-MCNC: 0.4 MG/DL
BUN SERPL-MCNC: 38.7 MG/DL (ref 6–20)
CALCIUM SERPL-MCNC: 9.7 MG/DL (ref 8.6–10)
CHLORIDE SERPL-SCNC: 105 MMOL/L (ref 98–107)
CREAT SERPL-MCNC: 1.31 MG/DL (ref 0.51–0.95)
DEPRECATED HCO3 PLAS-SCNC: 28 MMOL/L (ref 22–29)
EGFRCR SERPLBLD CKD-EPI 2021: 48 ML/MIN/1.73M2
GLUCOSE SERPL-MCNC: 162 MG/DL (ref 70–99)
POTASSIUM SERPL-SCNC: 4.9 MMOL/L (ref 3.4–5.3)
PROT SERPL-MCNC: 6.5 G/DL (ref 6.4–8.3)
SODIUM SERPL-SCNC: 143 MMOL/L (ref 135–145)
TSH SERPL DL<=0.005 MIU/L-ACNC: 3.05 UIU/ML (ref 0.3–4.2)
URATE SERPL-MCNC: 9.4 MG/DL (ref 2.4–5.7)
VIT B12 SERPL-MCNC: 1288 PG/ML (ref 232–1245)

## 2024-05-20 ENCOUNTER — APPOINTMENT (OUTPATIENT)
Dept: INTERPRETER SERVICES | Facility: CLINIC | Age: 54
End: 2024-05-20
Payer: MEDICARE

## 2024-05-24 ENCOUNTER — TELEPHONE (OUTPATIENT)
Dept: FAMILY MEDICINE | Facility: CLINIC | Age: 54
End: 2024-05-24
Payer: MEDICARE

## 2024-05-24 DIAGNOSIS — M1A.0710 CHRONIC GOUT OF RIGHT FOOT, UNSPECIFIED CAUSE: Primary | ICD-10-CM

## 2024-05-24 NOTE — TELEPHONE ENCOUNTER
Attempt #1. No answer. Voicemail is not set up.     Claudia Rojas RN  [facility]

## 2024-05-24 NOTE — TELEPHONE ENCOUNTER
----- Message from Jaky Gaspar MD sent at 5/24/2024 12:44 PM CDT -----  Team - please call patient with results and send result letter with this information if patient desires for their records. [facility].      Her kidney tests are a little worse this check- she seems to be a little dehydrated.  She should push her fluids     Her liver tests are stable- a little high     Her gout level is high - has she been taking her allopurinol?  If so then I will increase the dose.      Her b12 is better     Her diabetes is high - did she get the new diabetes pill, jardiance 10mg daily?     Mild anemia but okay     Normal thyroid level

## 2024-05-24 NOTE — RESULT ENCOUNTER NOTE
Team - please call patient with results and send result letter with this information if patient desires for their records. Refer to [facility] result note as needed.      Her kidney tests are a little worse this check- she seems to be a little dehydrated.  She should push her fluids     Her liver tests are stable- a little high     Her gout level is high - has she been taking her allopurinol?  If so then I will increase the dose.      Her b12 is better     Her diabetes is high - did she get the new diabetes pill, jardiance 10mg daily?     Mild anemia but okay     Normal thyroid level

## 2024-05-29 NOTE — TELEPHONE ENCOUNTER
RN made 2nd attempt to contact patient using an , but no answer. Unable to leave a voicemail.Will try patient later.    Estefanía Salcedo RN  [facility]    ----- Message from Jaky Gaspar MD sent at 5/24/2024 12:44 PM CDT -----  Team - please call patient with results and send result letter with this information if patient desires for their records. Refer to [facility] result note as needed.      Her kidney tests are a little worse this check- she seems to be a little dehydrated.  She should push her fluids     Her liver tests are stable- a little high     Her gout level is high - has she been taking her allopurinol?  If so then I will increase the dose.      Her b12 is better     Her diabetes is high - did she get the new diabetes pill, jardiance 10mg daily?     Mild anemia but okay     Normal thyroid level

## 2024-05-30 ENCOUNTER — APPOINTMENT (OUTPATIENT)
Dept: INTERPRETER SERVICES | Facility: CLINIC | Age: 54
End: 2024-05-30
Payer: MEDICARE

## 2024-05-30 RX ORDER — ALLOPURINOL 300 MG/1
300 TABLET ORAL DAILY
Qty: 90 TABLET | Refills: 4 | Status: SHIPPED | OUTPATIENT
Start: 2024-05-30 | End: 2024-08-05

## 2024-05-30 NOTE — TELEPHONE ENCOUNTER
Contacted patient using an  and relayed message below.  Patient taking allopurinol as directed.   Patient started taking Jardiance 10mg daily on 5/19/24.    Message sent to Dr Gaspar for increased dose. Pharmacy verified.    Estefanía Salcedo RN  [facility]    ----- Message from Jaky Gaspar MD sent at 5/24/2024 12:44 PM CDT -----  Team - please call patient with results and send result letter with this information if patient desires for their records. Refer to [facility] result note as needed.      Her kidney tests are a little worse this check- she seems to be a little dehydrated.  She should push her fluids     Her liver tests are stable- a little high     Her gout level is high - has she been taking her allopurinol?  If so then I will increase the dose.      Her b12 is better     Her diabetes is high - did she get the new diabetes pill, jardiance 10mg daily?     Mild anemia but okay     Normal thyroid level

## 2024-06-07 ENCOUNTER — OFFICE VISIT (OUTPATIENT)
Dept: OTOLARYNGOLOGY | Facility: CLINIC | Age: 54
End: 2024-06-07
Payer: MEDICARE

## 2024-06-07 ENCOUNTER — OFFICE VISIT (OUTPATIENT)
Dept: AUDIOLOGY | Facility: CLINIC | Age: 54
End: 2024-06-07
Payer: MEDICARE

## 2024-06-07 DIAGNOSIS — H92.11 EAR DRAINAGE RIGHT: ICD-10-CM

## 2024-06-07 DIAGNOSIS — H74.41 POLYP, EAR MIDDLE, RIGHT: ICD-10-CM

## 2024-06-07 DIAGNOSIS — H90.6 MIXED CONDUCTIVE AND SENSORINEURAL HEARING LOSS OF BOTH EARS: Primary | ICD-10-CM

## 2024-06-07 DIAGNOSIS — H90.A22 SENSORINEURAL HEARING LOSS (SNHL) OF LEFT EAR WITH RESTRICTED HEARING OF RIGHT EAR: ICD-10-CM

## 2024-06-07 DIAGNOSIS — H90.6 MIXED CONDUCTIVE AND SENSORINEURAL HEARING LOSS OF BOTH EARS: ICD-10-CM

## 2024-06-07 DIAGNOSIS — H90.A31 MIXED CONDUCTIVE AND SENSORINEURAL HEARING LOSS OF RIGHT EAR WITH RESTRICTED HEARING OF LEFT EAR: Primary | ICD-10-CM

## 2024-06-07 PROCEDURE — 87070 CULTURE OTHR SPECIMN AEROBIC: CPT | Performed by: OTOLARYNGOLOGY

## 2024-06-07 PROCEDURE — 99213 OFFICE O/P EST LOW 20 MIN: CPT | Performed by: OTOLARYNGOLOGY

## 2024-06-07 PROCEDURE — 87186 SC STD MICRODIL/AGAR DIL: CPT | Performed by: OTOLARYNGOLOGY

## 2024-06-07 PROCEDURE — 87077 CULTURE AEROBIC IDENTIFY: CPT | Performed by: OTOLARYNGOLOGY

## 2024-06-07 PROCEDURE — 92557 COMPREHENSIVE HEARING TEST: CPT | Performed by: AUDIOLOGIST

## 2024-06-07 PROCEDURE — 92567 TYMPANOMETRY: CPT | Mod: 52 | Performed by: AUDIOLOGIST

## 2024-06-07 RX ORDER — CIPROFLOXACIN AND DEXAMETHASONE 3; 1 MG/ML; MG/ML
SUSPENSION/ DROPS AURICULAR (OTIC)
Qty: 10 ML | Refills: 0 | Status: SHIPPED | OUTPATIENT
Start: 2024-06-07 | End: 2024-09-06

## 2024-06-07 NOTE — LETTER
6/7/2024      Leora Sanchez  [address] [place] [address]      Dear Colleague,    Thank you for referring your patient, Leora Sanchez, to the [facility]. Please see a copy of my visit note below.    FOLLOW UP VISIT NOTE      HISTORY OF PRESENT ILLNESS    May was seen in follow up after previous 6/9/2023 visit for audiogram review.    AUIDOLOGY NOTE:    HISTORY: Hx of R mastoidectomy in 1996 as well as a 2nd surgery in the R ear but unsure what was done. Last audio done 5/26/23  shows normal hearing from 250-1500 Hz sloping to mild SNHL on L and severe to profound mixed HL on R. May reports she still  experiences otorrhea when laying on R side. Reports otalgia and aural fullness in R ear. Reports her vision is always shaky w/her eyes  open and she experiences spinning sensation when her eyes are closed. Reports some tinnitus bilat. Denies fam hx of hearing loss and  noise exposure. RESULTS: Otoscopy: Right: surgical ear, Left: clear canal. Tymps: Right: DNT due to surgical ear, Left: normal eardrum  mobility. Reflexes: DNT due to R surgical ear. Audio: Right: severe to profound mixed hearing loss, Left: normal hearing 250-1500 Hz  sloping to moderate rising to mild sensorineural hearing loss. Stable hearing and word rec bilat compared to 5/26/23 audio. REC: F/U with  ENT. Retest hearing as required for medical management. Consider L FINK or possible R BAHA pending medical clearance.    REVIEW OF SYSTEMS    Review of Systems: a 10-system review is reviewed at this encounter.  See scanned document.       No Known Allergies        PHYSICAL EXAM:        HEAD: Normal appearance and symmetry:  No cutaneous lesions.      EARS:        RIGHT: CWD cavity with fleshy polyp over middle ear space (culture obtained    Ciprodex and gelfoam placed         LEFT:   TM intact               NOSE:    Dorsum:   straight       ORAL CAVITY/OROPHARYNX:    Lips:  Normal.     NECK:  Trachea:  midline     NEURO:   Alert  and Oriented    GAIT AND STATION:  normal     RESPIRATORY:   Symmetry and Respiratory effort    PSYCH:   normal mood and affect    SKIN:  warm and dry         IMPRESSION:   Encounter Diagnoses   Name Primary?     Mixed conductive and sensorineural hearing loss of both ears Yes     Ear drainage right        Patient would be a candidate for a bone-anchored device for the right ear.  I gave her information on this and she will discuss options with her family.  In the meantime I would like to place her 10 days worth of Ciprodex drops to see if we can get resolution of the fleshy polyp that is ostensibly in her middle ear cavity.     RECOMMENDATIONS:    Orders Placed This Encounter   Procedures     Adult Audiology  Referral     Respiratory Aerobic Bacterial Culture      Orders Placed This Encounter   Medications     ciprofloxacin-dexAMETHasone (CIPRODEX) 0.3-0.1 % otic suspension     Sig: Place 4 drops to right ear 2x daily for 10 days     Dispense:  10 mL     Refill:  0     May substitute 3 drops of 0.3% ciprofloxacin ophthalmic suspension and 3 drops of 0.1% dexamethasone ophthalmic suspension with same instructions.     Return visit 2 to 3 months for repeat nasal lila endoscopy      Again, thank you for allowing me to participate in the care of your patient.        Sincerely,        Jose Maria Bass MD

## 2024-06-07 NOTE — PATIENT INSTRUCTIONS
You were seen in the ENT Clinic today by Dr. Bass. If you have any questions or concerns after your appointment, please contact us (see below)  You can remove the cotton ball in your ear once you get home. You may hear crackling in your ear from the medicated foam dissolving in your ear. This should resolve.  Please return to clinic in 3-4 months  Ciprodex: 4 drops to right ear twice daily for 10 days. This was sent to your pharmacy          How to Contact Us:  Send a [employer] message to your provider. Our team will respond to you via [employer]. Occasionally, we will need to call you to get further information.  For urgent matters (Monday-Friday), call the ENT Clinic: [phone] and speak with a call center team member - they will route your call appropriately.   If you'd like to speak directly with a nurse, please find our contact information below. We do our best to check voicemail frequently throughout the day, and will work to call you back within 1-2 days. For urgent matters, please use the general clinic phone numbers listed above.      Liz Trevizo RN  ENT RN   [phone]

## 2024-06-07 NOTE — PROGRESS NOTES
FOLLOW UP VISIT NOTE      HISTORY OF PRESENT ILLNESS    May was seen in follow up after previous 6/9/2023 visit for audiogram review.    AUIDOLOGY NOTE:    HISTORY: Hx of R mastoidectomy in 1996 as well as a 2nd surgery in the R ear but unsure what was done. Last audio done 5/26/23  shows normal hearing from 250-1500 Hz sloping to mild SNHL on L and severe to profound mixed HL on R. May reports she still  experiences otorrhea when laying on R side. Reports otalgia and aural fullness in R ear. Reports her vision is always shaky w/her eyes  open and she experiences spinning sensation when her eyes are closed. Reports some tinnitus bilat. Denies fam hx of hearing loss and  noise exposure. RESULTS: Otoscopy: Right: surgical ear, Left: clear canal. Tymps: Right: DNT due to surgical ear, Left: normal eardrum  mobility. Reflexes: DNT due to R surgical ear. Audio: Right: severe to profound mixed hearing loss, Left: normal hearing 250-1500 Hz  sloping to moderate rising to mild sensorineural hearing loss. Stable hearing and word rec bilat compared to 5/26/23 audio. REC: F/U with  ENT. Retest hearing as required for medical management. Consider L FINK or possible R BAHA pending medical clearance.    REVIEW OF SYSTEMS    Review of Systems: a 10-system review is reviewed at this encounter.  See scanned document.       No Known Allergies        PHYSICAL EXAM:        HEAD: Normal appearance and symmetry:  No cutaneous lesions.      EARS:        RIGHT: CWD cavity with fleshy polyp over middle ear space (culture obtained    Ciprodex and gelfoam placed         LEFT:   TM intact               NOSE:    Dorsum:   straight       ORAL CAVITY/OROPHARYNX:    Lips:  Normal.     NECK:  Trachea:  midline     NEURO:   Alert and Oriented    GAIT AND STATION:  normal     RESPIRATORY:   Symmetry and Respiratory effort    PSYCH:   normal mood and affect    SKIN:  warm and dry         IMPRESSION:   Encounter Diagnoses   Name Primary?    Mixed  conductive and sensorineural hearing loss of both ears Yes    Ear drainage right        Patient would be a candidate for a bone-anchored device for the right ear.  I gave her information on this and she will discuss options with her family.  In the meantime I would like to place her 10 days worth of Ciprodex drops to see if we can get resolution of the fleshy polyp that is ostensibly in her middle ear cavity.     RECOMMENDATIONS:    Orders Placed This Encounter   Procedures    Adult Audiology  Referral    Respiratory Aerobic Bacterial Culture      Orders Placed This Encounter   Medications    ciprofloxacin-dexAMETHasone (CIPRODEX) 0.3-0.1 % otic suspension     Sig: Place 4 drops to right ear 2x daily for 10 days     Dispense:  10 mL     Refill:  0     May substitute 3 drops of 0.3% ciprofloxacin ophthalmic suspension and 3 drops of 0.1% dexamethasone ophthalmic suspension with same instructions.     Return visit 2 to 3 months for repeat nasal lila endoscopy

## 2024-06-07 NOTE — PROGRESS NOTES
AUDIOLOGY REPORT    SUMMARY: Audiology visit completed. May is accompanied by an  at the visit. See audiogram for results.     RECOMMENDATIONS: Follow-up with ENT.    Rosalie Barrett, [facility]  [place] Licensed Audiologist #[id number]

## 2024-06-08 DIAGNOSIS — E11.65 TYPE 2 DIABETES MELLITUS WITH HYPERGLYCEMIA, WITHOUT LONG-TERM CURRENT USE OF INSULIN (H): ICD-10-CM

## 2024-06-09 DIAGNOSIS — I10 ESSENTIAL HYPERTENSION: ICD-10-CM

## 2024-06-10 LAB
BACTERIA SPEC CULT: ABNORMAL
BACTERIA SPEC CULT: ABNORMAL

## 2024-06-10 RX ORDER — LOSARTAN POTASSIUM 100 MG/1
TABLET ORAL
Qty: 90 TABLET | Refills: 3 | Status: SHIPPED | OUTPATIENT
Start: 2024-06-10 | End: 2024-09-24

## 2024-06-10 RX ORDER — SEMAGLUTIDE 2.68 MG/ML
INJECTION, SOLUTION SUBCUTANEOUS
Qty: 3 ML | Refills: 11 | Status: SHIPPED | OUTPATIENT
Start: 2024-06-10 | End: 2024-08-02

## 2024-06-13 ENCOUNTER — TELEPHONE (OUTPATIENT)
Dept: OTOLARYNGOLOGY | Facility: CLINIC | Age: 54
End: 2024-06-13
Payer: MEDICARE

## 2024-06-13 DIAGNOSIS — H92.11 EAR DRAINAGE RIGHT: ICD-10-CM

## 2024-06-13 DIAGNOSIS — H92.11 EAR DRAINAGE RIGHT: Primary | ICD-10-CM

## 2024-06-13 RX ORDER — SULFACETAMIDE SODIUM AND PREDNISOLONE SODIUM PHOSPHATE 100; 2.3 MG/ML; MG/ML
SOLUTION/ DROPS OPHTHALMIC
Qty: 5 ML | Refills: 0 | Status: SHIPPED | OUTPATIENT
Start: 2024-06-13 | End: 2024-06-13

## 2024-06-13 RX ORDER — SULFACETAMIDE SODIUM AND PREDNISOLONE SODIUM PHOSPHATE 100; 2.3 MG/ML; MG/ML
SOLUTION/ DROPS OPHTHALMIC
Qty: 5 ML | Refills: 0 | Status: SHIPPED | OUTPATIENT
Start: 2024-06-13 | End: 2024-09-06

## 2024-06-13 NOTE — TELEPHONE ENCOUNTER
Attempted to reach pt/ via [employer]  but unable to leave voicemail. Pt's culture swab was +staph. Dr. Bass has sent in a new ear drop for pt to start- Vasocidin: Place 4 drops to right ear 2x daily for 10 days   Liz Aparicio RN on 6/13/2024 at 1:26 PM

## 2024-06-13 NOTE — TELEPHONE ENCOUNTER
----- Message from Jose Maria Bass MD sent at 6/11/2024  8:46 AM CDT -----  I would like to change her to Vasocidin drops (same sig) based on her culture results

## 2024-06-14 NOTE — TELEPHONE ENCOUNTER
Attempted to reach pt/ via [employer]  but unable to leave voicemail. Pt's culture swab was +staph. Dr. Bass has sent in a new ear drop for pt to start- Vasocidin for 10 days.  Liz Aparicio RN on 6/14/2024 at 2:01 PM

## 2024-06-18 ENCOUNTER — TELEPHONE (OUTPATIENT)
Dept: OTOLARYNGOLOGY | Facility: CLINIC | Age: 54
End: 2024-06-18
Payer: MEDICARE

## 2024-06-27 NOTE — PROGRESS NOTES
Medication Therapy Management (MTM) Encounter    ASSESSMENT:                            Medication Adherence/Access: Unable to reconcile meds without bottles present. Historically has had some difficulty with med adherence. With blood glucose, uric acid, mood worsening, question if adherence is again a primary factor. See below.       Type 2 Diabetes: Last A1C worsened and now above goal <7%. Patient reports confusion and reduced metformin to 1 tab daily. Will have patient increase back to previous dose. Did start SGLT2. Will continue for now.          Nonischemic cardiomyopathy/Angina/CAD:: Last LDL at goal <70.        HFrEF: blood pressure at goal <130/80. Pulse now just above goal .         Chronic gout: Last uric acid above goal <6. Question adherence to allopurinol. As patient is having gout symptoms today. Avoid NSAIDs due to heart history. Avoid steroids with blood glucose. Will start Colchicine. Will need to monitor closely due to interaction with statin increasing risk for myopathy.       Tension headache/Depression: Feels she's having worsened headaches with Duloxetine. Was previously on Venlafaxine and stable with this. Will plan to switch back.       Low Vitamin D: Last Vit D levels within normal limits.      GERD: NO concerns with symptoms.        PLAN:                            Increase Metformin 500 mg ER twice daily, as previously prescribed.   Stop Duloxetine. Start Venlafaxine 75 mg ER daily.   Start Colchicine 0.6 - 2 tabs today. Wait 1 hour and take 3rd tablet. Then 1 tablet daily starting tomorrow.   BMP and uric acid.     Follow-up: Return in about 5 weeks (around 8/2/2024) for Medication Management Pharmacist, in person.       SUBJECTIVE/OBJECTIVE:                          Leora Sanchez is a 53 year old female coming in for a follow-up visit. She was referred to me from Jaky Gaspar MD. [employer]  by phone (ID# [id number]).  Today's visit is a follow-up MTM visit from  "2023.       Reason for visit: Medication Management. The diabetes medicine doesn't seem to be helping. Numbers are still high.     Allergies/ADRs: Reviewed in chart  Past Medical History: Reviewed in chart  Tobacco: She reports that she has never smoked. She has never been exposed to tobacco smoke. She has never used smokeless tobacco.  Alcohol:  reports no history of alcohol use.       Medication Adherence/Access:     Son and  help with managing meds at home.  Did not bring meds or glucometer to the visit.          Type 2 Diabetes:   Ozempic 2 mg weekly   Metformin 500 mg ER 1 tab twice daily.   At last PCP visit, started Jardiance 10 mg daily    \"The doctor had me stop a medicine and now I'm just using one\" Refers to metformin. States PharmD told her to reduce.       Blood sugar monitorin time(s) daily; Ranges: (patient reported)        Lowest is 147, but generally over 200.      Hemoglobin A1C   Date Value Ref Range Status   2024 9.7 (H) 0.0 - 5.6 % Final   2023 6.9 (H) 0.0 - 5.6 % Final     Comment:     Normal <5.7%   Prediabetes 5.7-6.4%    Diabetes 6.5% or higher     Note: Adopted from ADA consensus guidelines.   10/03/2023 7.4 (H) 0.0 - 5.6 % Final     Comment:     Normal <5.7%   Prediabetes 5.7-6.4%    Diabetes 6.5% or higher     Note: Adopted from ADA consensus guidelines.   2011 5.7 4.2 - 6.1 % Final      Microalbumin Urine mg/dL   Date Value Ref Range Status   2021 <0.50 0.00 - 1.99 mg/dL Final      Creatinine Urine mg/dL   Date Value Ref Range Status   2023 114.0 mg/dL Final     Comment:     The reference ranges have not been established in urine creatinine. The results should be integrated into the clinical context for interpretation.   2021 101 mg/dL Final       Creatinine   Date Value Ref Range Status   2024 1.31 (H) 0.51 - 0.95 mg/dL Final   2013 0.82 0.60 - 1.10 mg/dL Final        Wt Readings from Last 3 Encounters:   24 214 " lb (97.1 kg)   04/05/24 218 lb (98.9 kg)   12/19/23 218 lb (98.9 kg)            Nonischemic cardiomyopathy/Angina/CAD:   Aspirin 81 mg daily,  Atorvastatin 80 mg daily  Isosorbide mononitrate 60 mg ER daily  Nitroglycerin 0.4 mg as needed.  Ezetimibe 10 mg daily    Chest pains 1-2 times per month.     Recent Labs   Lab Test 11/29/23  1141 10/03/23  0942   CHOL 176 181   HDL 83 70   LDL 68 88   TRIG 123 114          HFrEF:  Furosemide 20 mg daily   Losartan 100 mg daily  Isosorbide mononitrate 60 mg ER daily  Metoprolol succinate 100 mg 0.5 tab daily       BP Readings from Last 3 Encounters:   06/28/24 118/76   05/17/24 108/71   04/05/24 (!) 150/92      Pulse Readings from Last 3 Encounters:   06/28/24 101   05/17/24 76   04/05/24 87     Wt Readings from Last 3 Encounters:   05/17/24 214 lb (97.1 kg)   04/05/24 218 lb (98.9 kg)   12/19/23 218 lb (98.9 kg)             Chronic gout:   Allopurinol 300 mg daily. Increased at last PCP visit.      Having gout on the left foot.     Uric Acid   Date Value Ref Range Status   05/17/2024 9.4 (H) 2.4 - 5.7 mg/dL Final       Depression:   Tension headache:  Acetaminophen 500 mg 2 tabs 3 times daily as needed  Recently started on Duloxetine 30 mg daily- does not like this medicine.     Was previously on Venlafaxine up to doses and was doing well with this.   Feels the new medicine is causing headaches.            Low Vitamin D: Prescribed Vitamin D3 5000 units daily .   Purchasing OTC.     Vitamin D Deficiency Screening Results:  Lab Results   Component Value Date    VITDT 23 07/26/2023        GERD: Prescribed Omeprazole 40 mg daily     Denies heartburn concerns.       Today's Vitals: /76   Pulse 101   ----------------      I spent 35 minutes with this patient today. All changes were made via collaborative practice agreement with Jaky Gaspar MD. A copy of the visit note was provided to the patient's provider(s).    A summary of these recommendations was given to the  patient.    Art Thakkar, PharmD  Medication Therapy Management (MTM) Pharmacist  [facility]          Medication Therapy Recommendations  Chronic gout of right foot    Current Medication: allopurinol (ZYLOPRIM) 300 MG tablet   Rationale: Synergistic therapy - Needs additional medication therapy - Indication   Recommendation: Start Medication - colchicine 0.6 MG tablet   Status: Accepted per CPA         Tension headache    Current Medication: DULoxetine (CYMBALTA) 30 MG capsule (Discontinued)   Rationale: Undesirable effect - Adverse medication event - Safety   Recommendation: Change Medication - venlafaxine 75 MG 24 hr capsule   Status: Accepted per CPA         Type 2 diabetes mellitus with diabetic polyneuropathy, without long-term current use of insulin (H)    Current Medication: metFORMIN (GLUCOPHAGE XR) 500 MG 24 hr tablet   Rationale: Does not understand instructions - Adherence - Adherence   Recommendation: Provide Education   Status: Patient Agreed - Adherence/Education

## 2024-06-28 ENCOUNTER — OFFICE VISIT (OUTPATIENT)
Dept: PHARMACY | Facility: CLINIC | Age: 54
End: 2024-06-28
Attending: FAMILY MEDICINE

## 2024-06-28 ENCOUNTER — LAB (OUTPATIENT)
Dept: LAB | Facility: CLINIC | Age: 54
End: 2024-06-28
Payer: MEDICARE

## 2024-06-28 VITALS — SYSTOLIC BLOOD PRESSURE: 118 MMHG | HEART RATE: 101 BPM | DIASTOLIC BLOOD PRESSURE: 76 MMHG

## 2024-06-28 DIAGNOSIS — N18.31 STAGE 3A CHRONIC KIDNEY DISEASE (H): ICD-10-CM

## 2024-06-28 DIAGNOSIS — E11.65 TYPE 2 DIABETES MELLITUS WITH HYPERGLYCEMIA, WITHOUT LONG-TERM CURRENT USE OF INSULIN (H): Primary | ICD-10-CM

## 2024-06-28 DIAGNOSIS — R12 HEARTBURN: ICD-10-CM

## 2024-06-28 DIAGNOSIS — G44.209 TENSION HEADACHE: ICD-10-CM

## 2024-06-28 DIAGNOSIS — I42.8 NONISCHEMIC CARDIOMYOPATHY (H): ICD-10-CM

## 2024-06-28 DIAGNOSIS — E55.9 VITAMIN D DEFICIENCY: ICD-10-CM

## 2024-06-28 DIAGNOSIS — I50.20 HEART FAILURE WITH REDUCED EJECTION FRACTION (H): ICD-10-CM

## 2024-06-28 DIAGNOSIS — M1A.0710 CHRONIC GOUT OF RIGHT FOOT, UNSPECIFIED CAUSE: ICD-10-CM

## 2024-06-28 DIAGNOSIS — I25.119 CORONARY ARTERY DISEASE INVOLVING NATIVE CORONARY ARTERY OF NATIVE HEART WITH ANGINA PECTORIS (H): ICD-10-CM

## 2024-06-28 LAB
ANION GAP SERPL CALCULATED.3IONS-SCNC: 12 MMOL/L (ref 7–15)
BUN SERPL-MCNC: 26.6 MG/DL (ref 6–20)
CALCIUM SERPL-MCNC: 9.9 MG/DL (ref 8.6–10)
CHLORIDE SERPL-SCNC: 104 MMOL/L (ref 98–107)
CREAT SERPL-MCNC: 0.94 MG/DL (ref 0.51–0.95)
DEPRECATED HCO3 PLAS-SCNC: 27 MMOL/L (ref 22–29)
EGFRCR SERPLBLD CKD-EPI 2021: 72 ML/MIN/1.73M2
GLUCOSE SERPL-MCNC: 186 MG/DL (ref 70–99)
POTASSIUM SERPL-SCNC: 4.8 MMOL/L (ref 3.4–5.3)
SODIUM SERPL-SCNC: 143 MMOL/L (ref 135–145)
URATE SERPL-MCNC: 4.9 MG/DL (ref 2.4–5.7)

## 2024-06-28 PROCEDURE — 99207 PR NO CHARGE LOS: CPT | Performed by: PHARMACIST

## 2024-06-28 PROCEDURE — 36415 COLL VENOUS BLD VENIPUNCTURE: CPT

## 2024-06-28 PROCEDURE — 80048 BASIC METABOLIC PNL TOTAL CA: CPT

## 2024-06-28 PROCEDURE — 84550 ASSAY OF BLOOD/URIC ACID: CPT

## 2024-06-28 RX ORDER — COLCHICINE 0.6 MG/1
0.6 TABLET ORAL DAILY
Qty: 32 TABLET | Refills: 0 | Status: SHIPPED | OUTPATIENT
Start: 2024-06-28 | End: 2024-08-02

## 2024-06-28 RX ORDER — VENLAFAXINE HYDROCHLORIDE 75 MG/1
75 CAPSULE, EXTENDED RELEASE ORAL DAILY
Qty: 90 CAPSULE | Refills: 0 | Status: SHIPPED | OUTPATIENT
Start: 2024-06-28 | End: 2024-09-25

## 2024-06-28 NOTE — PATIENT INSTRUCTIONS
"Recommendations from today's MTM visit:                                                         Increase Metformin 500 mg ER twice daily, as previously prescribed.   Stop Duloxetine. Start Venlafaxine 75 mg ER daily.   Start Colchicine 0.6 - 2 tabs today. Wait 1 hour and take 3rd tablet. Then 1 tablet daily starting tomorrow.   BMP and uric acid.     Follow-up: Return in about 5 weeks (around 8/2/2024) for Medication Management Pharmacist, in person.    It was great speaking with you today.  I value your experience and would be very thankful for your time in providing feedback in our clinic survey. In the next few days, you may receive an email or text message from [employer] with a link to a survey related to your  clinical pharmacist.\"     To schedule another MTM appointment, please call the clinic directly or you may call the MTM scheduling line at [phone].    My Clinical Pharmacist's contact information:                                                      Please feel free to contact me with any questions or concerns you have.      Art Thakkar, PharmD  Medication Therapy Management (MTM) Pharmacist  [facility]      "

## 2024-07-01 ENCOUNTER — PATIENT OUTREACH (OUTPATIENT)
Dept: CARE COORDINATION | Facility: CLINIC | Age: 54
End: 2024-07-01
Payer: MEDICARE

## 2024-07-01 ENCOUNTER — TELEPHONE (OUTPATIENT)
Dept: FAMILY MEDICINE | Facility: CLINIC | Age: 54
End: 2024-07-01
Payer: MEDICARE

## 2024-07-01 NOTE — TELEPHONE ENCOUNTER
----- Message from Jaky Julio Cmayela sent at 7/1/2024  7:54 AM CDT -----  Team - please call patient with results and send result letter with this information if patient desires for their records. Refer to [facility] result note as needed.      Her gout level is down- stay on the allopurinol    Her kidney tests are healthy - better than before.  Be sure she is drinking plenty of water every day

## 2024-08-01 NOTE — PROGRESS NOTES
Medication Therapy Management (MTM) Encounter    ASSESSMENT:                            Medication Adherence/Access: No concerns noted with current fill dates and supplies on hand. Larger supply of Metoprolol likely related to decreased doses (current rx bottles has directions of 2 tabs daily, but actual dose is 0.5 tab daily). Missing  Vitamin D .       Type 2 Diabetes: Last A1C worsened and above goal <7%. Fasting sugars sometimes at goal , sometimes above. Not checking later in the day. With medicare and no insulin, does not qualify for CGM. Has been paying out of pocket for test strips. Without post-prandial readings, difficult to determine if fasting sugars are elevated because high post-prandial or because of overnight lows. Patient reports diet is consistent from day to day. Reviewed that Victoza likely not as effective as Ozempic. Discussed options of increasing Jardiance or switching from Ozempic to Mounjaro. Patient would like to try switch to Mounjaro for more effective weight loss.     Nonischemic cardiomyopathy/Angina/CAD:: Last LDL at goal <70.      HFrEF: blood pressure at goal <130/80. Pulse at goal .      Chronic gout: Last uric acid at goal <6.  As pain is intermittent and worsened with activity and did not respond to colchicine, likely not gout.  Patient states it's due to shoes that are too tight.        Tension headache/Depression: Frequency improved back on Venlafaxine. Declines need for increased dose at this time.       Low Vitamin D: Last Vit D levels low normal. Will resend Rx. If not covered, patient will purchase OTC.     GERD: No concerns with symptoms.        PLAN:                            Check blood glucose twice daily. I sent prescriptions to [facility].   Stop Colchicine.   Restart Vitamin D3 5000 units daily.   Stop Ozempic. Switch to Mounjaro 5 mg weekly.     Follow-up: Return in about 9 weeks (around 10/4/2024) for Medication Management Pharmacist, in  "person.    with PCP       SUBJECTIVE/OBJECTIVE:                          Leora Sanchez is a 53 year old female coming in for a follow-up visit. She was referred to me from Jaky Gaspar MD. [employer]  by phone (ID# [phone]).  Today's visit is a follow-up MT visit from 2024.       Reason for visit: Medication Management. \"My blood sugar is not getting lower. Can you change the shots?\"      Allergies/ADRs: Reviewed in chart  Past Medical History: Reviewed in chart  Tobacco: She reports that she has never smoked. She has never been exposed to tobacco smoke. She has never used smokeless tobacco.  Alcohol:  reports no history of alcohol use.       Medication Adherence/Access:     Son and  help with managing meds at home.    Brings meds to the visit today.      Jardiance  #30 - half full   Imdur  #90~ 15 remain   OTC aspirin   Effexor -  #90 ~45 remain   Allopurinol  #90 - ~30 remain   Colchicine  #32 - empty   Atorvastatin  #90 - half   Metformin  #180 - ~60+ remain   Losartan 6/10 #90 ~30 remain   Toprol  #180 -  ~60+ remain   Zetia -  #90 -  ~60+ remain   Furosemide  #90   Omeprazole  #90     Type 2 Diabetes:   Ozempic 2 mg weekly   Metformin 500 mg ER 1 tab twice daily. - was only using 1 tab due to confusion. Reviewed prescribed dose at last visit.   Jardiance 10 mg daily     Shows a picture of Victoza and wonders if she can switch to this injection.       Blood sugar monitorin time(s) daily; Ranges: (patient reported)     Fastin, 164, 156, 117, 127, 132, 187, 158, 161, 121, 122, 147, 130, 148,      Doesn't eat a lot. Eats enough to not be hungry. Chicken, fish, pork, vegetables, brown rice. Drinks just water.      Hemoglobin A1C   Date Value Ref Range Status   2024 9.7 (H) 0.0 - 5.6 % Final   2023 6.9 (H) 0.0 - 5.6 % Final     Comment:     Normal <5.7%   Prediabetes 5.7-6.4%    Diabetes 6.5% or higher     Note: Adopted from " ADA consensus guidelines.   10/03/2023 7.4 (H) 0.0 - 5.6 % Final     Comment:     Normal <5.7%   Prediabetes 5.7-6.4%    Diabetes 6.5% or higher     Note: Adopted from ADA consensus guidelines.   01/18/2011 5.7 4.2 - 6.1 % Final      Microalbumin Urine mg/dL   Date Value Ref Range Status   12/07/2021 <0.50 0.00 - 1.99 mg/dL Final      Creatinine Urine mg/dL   Date Value Ref Range Status   06/23/2023 114.0 mg/dL Final     Comment:     The reference ranges have not been established in urine creatinine. The results should be integrated into the clinical context for interpretation.   12/07/2021 101 mg/dL Final       Creatinine   Date Value Ref Range Status   06/28/2024 0.94 0.51 - 0.95 mg/dL Final   04/26/2013 0.82 0.60 - 1.10 mg/dL Final        Wt Readings from Last 3 Encounters:   05/17/24 214 lb (97.1 kg)   04/05/24 218 lb (98.9 kg)   12/19/23 218 lb (98.9 kg)            Nonischemic cardiomyopathy/Angina/CAD:   Aspirin 81 mg daily,  Atorvastatin 80 mg daily  Isosorbide mononitrate 60 mg ER daily  Nitroglycerin 0.4 mg as needed.  Ezetimibe 10 mg daily    Denies recent chest pains.     Recent Labs   Lab Test 11/29/23  1141 10/03/23  0942   CHOL 176 181   HDL 83 70   LDL 68 88   TRIG 123 114          HFrEF:  Furosemide 20 mg daily   Losartan 100 mg daily  Isosorbide mononitrate 60 mg ER daily  Metoprolol succinate 100 mg 0.5 tab daily       BP Readings from Last 3 Encounters:   08/02/24 110/74   06/28/24 118/76   05/17/24 108/71      Pulse Readings from Last 3 Encounters:   08/02/24 85   06/28/24 101   05/17/24 76     Wt Readings from Last 3 Encounters:   05/17/24 214 lb (97.1 kg)   04/05/24 218 lb (98.9 kg)   12/19/23 218 lb (98.9 kg)             Chronic gout:   Allopurinol 300 mg daily. I  At last visit, started Colchicine 0.6 mg daily      This medicine didn't really help.   Pain flares with walking.     Uric Acid   Date Value Ref Range Status   06/28/2024 4.9 2.4 - 5.7 mg/dL Final       Depression:   Tension  "headache:  Acetaminophen 500 mg 2 tabs 3 times daily as needed  At last visit, switched from Duloxetine to Venlafaxine 75 mg ER daily, which patient had previously used (was on higher doses in the past).     Headaches are better with Venlafaxine. Reports two headaches in the last month.         Low Vitamin D: Prescribed Vitamin D3 5000 units daily .    Doesn't have. Has to purchase OTC.     Vitamin D Deficiency Screening Results:  Lab Results   Component Value Date    VITDT 23 07/26/2023        GERD: Prescribed Omeprazole 40 mg daily     \"Sometimes\" - only happens when hungry.       Today's Vitals: /74   Pulse 85   ----------------      I spent 45 minutes with this patient today. All changes were made via collaborative practice agreement with Jaky Gaspar MD. A copy of the visit note was provided to the patient's provider(s).    A summary of these recommendations was given to the patient.    Quinton WilloughbyD  Medication Therapy Management (MTM) Pharmacist  [facility]          Medication Therapy Recommendations  Chronic gout of right foot    Current Medication: colchicine (COLCRYS) 0.6 MG tablet (Discontinued)   Rationale: No medical indication at this time - Unnecessary medication therapy - Indication   Recommendation: Discontinue Medication   Status: Accepted per CPA         Type 2 diabetes mellitus with diabetic polyneuropathy, without long-term current use of insulin (H)    Current Medication: Semaglutide, 2 MG/DOSE, (OZEMPIC, 2 MG/DOSE,) 8 MG/3ML pen (Discontinued)   Rationale: More effective medication available - Ineffective medication - Effectiveness   Recommendation: Change Medication - Mounjaro 5 MG/0.5ML Sopn   Status: Accepted per CPA         Vitamin D deficiency    Current Medication: vitamin D3 (CHOLECALCIFEROL) 125 MCG (5000 UT) tablet   Rationale: Medication product not available - Adherence - Adherence   Recommendation: Provide Adherence Intervention   Status: " Accepted per CPA

## 2024-08-02 ENCOUNTER — OFFICE VISIT (OUTPATIENT)
Dept: PHARMACY | Facility: CLINIC | Age: 54
End: 2024-08-02

## 2024-08-02 ENCOUNTER — TELEPHONE (OUTPATIENT)
Dept: FAMILY MEDICINE | Facility: CLINIC | Age: 54
End: 2024-08-02
Payer: MEDICARE

## 2024-08-02 VITALS — DIASTOLIC BLOOD PRESSURE: 74 MMHG | HEART RATE: 85 BPM | SYSTOLIC BLOOD PRESSURE: 110 MMHG

## 2024-08-02 DIAGNOSIS — E11.22 TYPE 2 DIABETES MELLITUS WITH STAGE 3A CHRONIC KIDNEY DISEASE, WITHOUT LONG-TERM CURRENT USE OF INSULIN (H): ICD-10-CM

## 2024-08-02 DIAGNOSIS — M1A.0710 CHRONIC GOUT OF RIGHT FOOT, UNSPECIFIED CAUSE: ICD-10-CM

## 2024-08-02 DIAGNOSIS — E11.65 TYPE 2 DIABETES MELLITUS WITH HYPERGLYCEMIA, WITHOUT LONG-TERM CURRENT USE OF INSULIN (H): Primary | ICD-10-CM

## 2024-08-02 DIAGNOSIS — E55.9 VITAMIN D DEFICIENCY: ICD-10-CM

## 2024-08-02 DIAGNOSIS — I50.20 HEART FAILURE WITH REDUCED EJECTION FRACTION (H): ICD-10-CM

## 2024-08-02 DIAGNOSIS — N18.31 STAGE 3A CHRONIC KIDNEY DISEASE (H): ICD-10-CM

## 2024-08-02 DIAGNOSIS — I42.8 NONISCHEMIC CARDIOMYOPATHY (H): ICD-10-CM

## 2024-08-02 DIAGNOSIS — N18.31 TYPE 2 DIABETES MELLITUS WITH STAGE 3A CHRONIC KIDNEY DISEASE, WITHOUT LONG-TERM CURRENT USE OF INSULIN (H): ICD-10-CM

## 2024-08-02 DIAGNOSIS — G44.209 TENSION HEADACHE: ICD-10-CM

## 2024-08-02 DIAGNOSIS — R12 HEARTBURN: ICD-10-CM

## 2024-08-02 DIAGNOSIS — I25.119 CORONARY ARTERY DISEASE INVOLVING NATIVE CORONARY ARTERY OF NATIVE HEART WITH ANGINA PECTORIS (H): ICD-10-CM

## 2024-08-02 PROCEDURE — 99207 PR NO CHARGE LOS: CPT | Performed by: PHARMACIST

## 2024-08-02 NOTE — TELEPHONE ENCOUNTER
MARY PA REQUEST    Please route outcome to MTM/RPH: Art    Prior Authorization Retail Medication Request    Medication/Dose: Mounjaro   ICD code (if different than what is on RX):  NA  Previously Tried and Failed:  Metformin, Jardiance, Ozempic.   Rationale:  Insufficient Control     Insurance Name:  [facility]   Insurance ID:  [id number]

## 2024-08-02 NOTE — PATIENT INSTRUCTIONS
"Recommendations from today's MTM visit:                                                         Check blood glucose twice daily. I sent prescriptions to [facility].   Stop Colchicine.   Restart Vitamin D3 5000 units daily.   Stop Ozempic. Switch to Mounjaro 5 mg weekly.     Follow-up: Return in about 9 weeks (around 10/4/2024) for Medication Management Pharmacist, in person.    It was great speaking with you today.  I value your experience and would be very thankful for your time in providing feedback in our clinic survey. In the next few days, you may receive an email or text message from [employer] with a link to a survey related to your  clinical pharmacist.\"     To schedule another MTM appointment, please call the clinic directly or you may call the MTM scheduling line at [phone].    My Clinical Pharmacist's contact information:                                                      Please feel free to contact me with any questions or concerns you have.      Art Thakkar, PharmD  Medication Therapy Management (MTM) Pharmacist  [facility]        "

## 2024-08-05 ENCOUNTER — OFFICE VISIT (OUTPATIENT)
Dept: CARDIOLOGY | Facility: CLINIC | Age: 54
End: 2024-08-05
Attending: INTERNAL MEDICINE
Payer: MEDICARE

## 2024-08-05 VITALS
HEIGHT: 59 IN | HEART RATE: 99 BPM | DIASTOLIC BLOOD PRESSURE: 76 MMHG | SYSTOLIC BLOOD PRESSURE: 112 MMHG | RESPIRATION RATE: 24 BRPM | BODY MASS INDEX: 41.12 KG/M2 | WEIGHT: 204 LBS | OXYGEN SATURATION: 97 %

## 2024-08-05 DIAGNOSIS — I10 ESSENTIAL HYPERTENSION: ICD-10-CM

## 2024-08-05 DIAGNOSIS — I50.20 HEART FAILURE WITH REDUCED EJECTION FRACTION (H): ICD-10-CM

## 2024-08-05 DIAGNOSIS — E78.5 HYPERLIPIDEMIA LDL GOAL <70: ICD-10-CM

## 2024-08-05 DIAGNOSIS — I25.119 CORONARY ARTERY DISEASE INVOLVING NATIVE CORONARY ARTERY OF NATIVE HEART WITH ANGINA PECTORIS (H): Primary | ICD-10-CM

## 2024-08-05 DIAGNOSIS — E66.01 MORBID OBESITY (H): ICD-10-CM

## 2024-08-05 PROCEDURE — 99214 OFFICE O/P EST MOD 30 MIN: CPT | Performed by: INTERNAL MEDICINE

## 2024-08-05 NOTE — LETTER
8/5/2024    Jaky Gaspar MD  [address] [place] [id number]    RE: Leora Sanchez       Dear Colleague,     I had the pleasure of seeing Leora Sanchez in the [facility].         The patient does not speak English.  Her medical history is translated by a professional [facility] .    Assessment/Plan:   1.  Nonischemic cardiomyopathy, chronic congestive heart failure with preserved ejection fraction, class II: The patient is compensated well.  No signs of fluid retention.  She is on low-salt diet.   Laboratory tests are reviewed, all stable.  Continue losartan 100 mg daily, metoprolol  mg daily and Imdur 60 mg daily.  Continue furosemide 20 mg daily.     3.  Coronary artery disease, no obstructive lesion per coronary angiogram in May 2019, chest pain: Her chest pain has been stable for long time.  There is no interval change.  Her nuclear stress test was negative for inducible myocardial ischemia.  Continue Imdur, metoprolol, aspirin and Lipitor.       4.  Essential hypertension: Her blood pressure is controlled with losartan 100 mg daily and metoprolol  mg daily.       5.  Dyslipidemia: On Lipitor 80 mg daily.  LDL was well-controlled.  Continue lifestyle modification.     6.  Obesity: Continue lifestyle modification.  Continue to lose weight as discussed.    Thank you for the opportunity to be involved in the care of Leora Sanchez. If you have any questions, please feel free to contact me.  I will see the patient again in 6 months and as needed.    Much or all of the text in this note was generated through the use of Dragon Dictate voice-to-text software. Errors in spelling or words which seem out of context are unintentional. Sound alike errors, in particular, may have escaped editing.       History of Present Illness:   It is my pleasure to see Leora Sanchez at the [facility] clinic for routine cardiology follow-up.  Leora Sanchez is a 54 year old female with a medical history  of coronary artery disease, nonischemic cardiomyopathy, improved left ventricular ejection fraction to normal range, chronic congestive heart failure with preserved ejection fraction, essential hypertension, hyperlipidemia, obesity and stage III CKD.    The patient states that she has intermittent chest pain, not frequent.  She describes her chest pain as located anterior chest, aching pain, mild in severity, no radiation, can be relieved by nitroglycerin in 10 minutes, 1-2 times a month, not exertional.  She had similar chest pain for a long time.  She has mild shortness of breath on exertion which has been stable.  She lost more than 10 pounds over the last several months.  She had no palpitations, dizziness, orthopnea, PND.  Her leg edema has been controlled well with furosemide 20 mg daily.  Her blood pressure and heart rate are controlled.              Past Medical History:     Patient Active Problem List   Diagnosis     Essential hypertension     Moderate episode of recurrent major depressive disorder (H)     Perforation of right tympanic membrane     Heart failure with reduced ejection fraction (H)     Type 2 diabetes mellitus with diabetic polyneuropathy, without long-term current use of insulin (H)     Right-sided sensorineural hearing loss     Pulmonary nodules     Coronary artery disease involving native coronary artery of native heart with angina pectoris (H24)     Nonischemic cardiomyopathy (H)     Mixed incontinence     Hypokalemia     Hyperlipidemia LDL goal <70     Heartburn     Ganglion cyst of left foot     Class 3 severe obesity due to excess calories with body mass index (BMI) of 40.0 to 44.9 in adult (H)     Stage 3a chronic kidney disease (H)     Tension headache     Bilateral dry eyes     Anemia, unspecified type     Great toe pain, right     Fatty liver     Chronic gout of right foot     Vitamin D deficiency     Diabetic polyneuropathy associated with type 2 diabetes mellitus (H)     Chronic  midline low back pain with bilateral sciatica       Past Surgical History:     Past Surgical History:   Procedure Laterality Date     CV CORONARY ANGIOGRAM N/A 2019    Procedure: Coronary Angiogram;  Surgeon: Car Box MD;  Location: [facility];  Service: Cardiology     CV LEFT HEART CATHETERIZATION WITHOUT LEFT VENTRICULOGRAM Left 10/31/2017    Procedure: Left Heart Catheterization Without Left Ventriculogram;  Surgeon: Jonathan Duque MD;  Location: [facility];  Service:      CV LEFT HEART CATHETERIZATION WITHOUT LEFT VENTRICULOGRAM Left 2019    Procedure: Left Heart Catheterization Without Left Ventriculogram;  Surgeon: Car Box MD;  Location: [facility];  Service: Cardiology     DILATION AND CURETTAGE       ENT SURGERY       INNER EAR SURGERY Right      MASTOIDECTOMY Right      CT CATH PLACEMENT & NJX CORONARY ART ANGIO IMG S&I N/A 10/31/2017    Procedure: Coronary Angiogram;  Surgeon: Jonathan Duque MD;  Location: [facility];  Service: Cardiology       Family History:     Family History   Problem Relation Age of Onset     Diabetes Mother      Hypertension Mother      Uterine Cancer Mother      Diverticulitis Mother      Other - See Comments Father          in [place]     No Known Problems Sister      No Known Problems Daughter      No Known Problems Daughter      No Known Problems Daughter      No Known Problems Daughter      No Known Problems Son      No Known Problems Son      No Known Problems Son      No Known Problems Son         Social History:    reports that she has never smoked. She has never been exposed to tobacco smoke. She has never used smokeless tobacco. She reports that she does not drink alcohol and does not use drugs.    Review of Systems:   12 systems are reviewed negative except for in HPI.    Meds:     Current Outpatient Medications:      acetaminophen (MAPAP) 500 MG tablet, Take 2  tablets (1,000 mg) by mouth 3 times daily as needed for mild pain, Disp: 100 tablet, Rfl: 11     aspirin 81 MG EC tablet, Take 1 tablet (81 mg) by mouth daily, Disp: 90 tablet, Rfl: 4     atorvastatin (LIPITOR) 80 MG tablet, TAKE 1 TABLET (80 MG TOTAL) BY MOUTH DAILY/ TXHUA HNUB NOJ 1 LUB [facility] RO, Disp: 90 tablet, Rfl: 3     B Complex-Biotin-FA (B COMPLEX 100 TR) TBCR, Take 1 tablet by mouth daily, Disp: 90 tablet, Rfl: 4     blood glucose (CONTOUR NEXT TEST) test strip, 1 strip by In Vitro route 2 times daily, Disp: 100 strip, Rfl: 3     blood glucose (NO BRAND SPECIFIED) lancets standard, Use to test blood sugar 2 times daily or as directed., Disp: 100 Lancet, Rfl: 3     ciprofloxacin-dexAMETHasone (CIPRODEX) 0.3-0.1 % otic suspension, Place 4 drops to right ear 2x daily for 10 days, Disp: 10 mL, Rfl: 0     empagliflozin (JARDIANCE) 10 MG TABS tablet, Take 1 tablet (10 mg) by mouth daily, Disp: 90 tablet, Rfl: 1     ezetimibe (ZETIA) 10 MG tablet, TAKE 1 PILL BY MOUTH EVERY DAY/ NOJ IB LUB IB [facility], Disp: 90 tablet, Rfl: 4     furosemide (LASIX) 20 MG tablet, Take 1 tablet (20 mg) by mouth daily, Disp: 90 tablet, Rfl: 4     isosorbide mononitrate (IMDUR) 60 MG 24 hr tablet, Take 1 tablet (60 mg) by mouth daily, Disp: 90 tablet, Rfl: 3     losartan (COZAAR) 100 MG tablet, TAKE 1 PILL BY MOUTH DAILY FOR BLOOD PRESSURE / TXHUA HNUB NOJ 1 LUB [facility] SIAB, Disp: 90 tablet, Rfl: 3     metFORMIN (GLUCOPHAGE XR) 500 MG 24 hr tablet, Take 1 tablet (500 mg) by mouth 2 times daily (with meals), Disp: 360 tablet, Rfl: 4     metoprolol succinate ER (TOPROL XL) 50 MG 24 hr tablet, Take 2 tablets (100 mg) by mouth daily, Disp: 180 tablet, Rfl: 1     nitroGLYcerin (NITROSTAT) 0.4 MG sublingual tablet, For chest pain place 1 tablet under the tongue every 5 minutes for 3 doses. If symptoms persist 5 minutes after 1st dose call 911., Disp: 25 tablet, Rfl: 4      "omeprazole (PRILOSEC) 40 MG DR capsule, TAKE 1 PILL BY MOUTH EVERY DAY/ TXJANEA HNUB NOJ 1 LUB TSHUAJ PAB ZOO LUB PLAB, Disp: 90 capsule, Rfl: 1     sulfacetamide-prednisoLONE (VASOCIDIN) 10-0.23 % ophthalmic solution, Place 4 drops to right ear 2x daily for 10 days, Disp: 5 mL, Rfl: 0     tirzepatide (MOUNJARO) 5 MG/0.5ML pen, Inject 5 mg subcutaneously every 7 days, Disp: 2 mL, Rfl: 0     venlafaxine (EFFEXOR XR) 75 MG 24 hr capsule, Take 1 capsule (75 mg) by mouth daily, Disp: 90 capsule, Rfl: 0     vitamin D3 (CHOLECALCIFEROL) 125 MCG (5000 UT) tablet, Take 1 tablet (125 mcg) by mouth daily, Disp: 90 tablet, Rfl: 1    Allergies:   Patient has no known allergies.      Objective:      Physical Exam  92.5 kg (204 lb)  1.505 m (4' 11.25\")  Body mass index is 40.86 kg/m .  /76 (BP Location: Right arm, Patient Position: Sitting, Cuff Size: Adult Large)   Pulse 99   Resp 24   Ht 1.505 m (4' 11.25\")   Wt 92.5 kg (204 lb)   SpO2 97%   BMI 40.86 kg/m      General Appearance:   Awake, Alert, No acute distress.   HEENT:  Pupil equal and reactive to light. No scleral icterus; the mucous membranes were moist.   Neck: No cervical bruits. No JVD. No thyromegaly.     Chest: The spine was straight. The chest was symmetric.   Lungs:   Respirations unlabored; Lungs are clear to auscultation. No crackles. No wheezing.   Cardiovascular:   Regular rhythm and rate, normal first and second heart sounds with no murmurs. No rubs or gallops.    Abdomen:  Obese. Soft. No tenderness. Non-distended. Bowels sounds are present   Extremities: Equal tibial pulses. No leg edema.   Skin: No rashes or ulcers. Warm, Dry.   Musculoskeletal: No tenderness. No deformity.   Neurologic: Mood and affect are appropriate. No focal deficits.         EKG: Personally reviewed  Normal sinus rhythm   Nonspecific T wave abnormality   Abnormal ECG   When compared with ECG of 29-MAR-2018 16:41,   No significant change was found     Cardiac Imaging " Studies  ECHO on 5-:  Technically difficult study due to patient's body habitus  Left ventricle ejection fraction is moderately decreased. The estimated left ventricular ejection fraction is 45% with global hypokinesis.  Right ventricle poorly visualized. CT is normal which would suggest normal right ventricular systolic function.  No significant valvular heart disease identified.  When compared to the previous study dated 1/29/2018, overall left ventricular function appears slightly lower.     Coronary angiogram on 5-:  Angiography via left radial   LM normal  LAD 40-50% prox LAD disease with FFR 0.86  Circ OM 1 40-50% narrowing with FFR 0.96  RCA min dz     Stress cardiac MRI on 2-:  1.  Lexiscan stress cardiac MRI is negative for inducible myocardial ischemia.   2.  Lexiscan stress ECG is negative for inducible myocardial ischemia.  3.  No previous myocardial infarction is identified.  4.  Normal left ventricular size, wall thickness and systolic function. The calculated left ventricular ejection fraction is 65%.  5.  Normal right ventricular size and systolic function.  6.  No obvious valvular disease.    Nuclear stress test on September 27, 2023:     A prior study was conducted on 5/10/2019.  This study has no change when compared with the prior study.     Pharmacological regadenoson stress ECG is negative for inducible myocardial ischemia.     Pharmacological Regadenoson nuclear stress test is negative for inducible myocardial ischemia or infarction.     Normal left ventricular size, wall motion and systolic function.  The calculated left ventricular ejection fraction is 70%.     The patient is at a low risk of future cardiac ischemic events.    Lab Review   Lab Results   Component Value Date     12/07/2021     04/26/2013    CO2 26 12/07/2021    CO2 26.0 08/15/2011    BUN 28 12/07/2021    BUN 18 04/26/2013     Lab Results   Component Value Date    WBC 5.8 12/07/2021    HGB 11.5  "12/07/2021    HCT 35.4 12/07/2021    MCV 94 12/07/2021     12/07/2021     Lab Results   Component Value Date    CHOL 176 11/29/2023    CHOL 181 10/03/2023    CHOL 183 06/23/2023     Lab Results   Component Value Date    HDL 83 11/29/2023    HDL 70 10/03/2023    HDL 68 06/23/2023     No components found for: \"LDLCALC\"  Lab Results   Component Value Date    TRIG 123 11/29/2023    TRIG 114 10/03/2023    TRIG 132 06/23/2023     No results found for: \"CHOLHDL\"  Lab Results   Component Value Date    TROPONINI <0.01 08/20/2020     Lab Results   Component Value Date    BNP 12 08/20/2020     Lab Results   Component Value Date    TSH 1.97 12/15/2020                 Thank you for allowing me to participate in the care of your patient.      Sincerely,     Buddy Roy MD     [facility]  cc:   Sendy Banda, PABLO CNP  No address on file      "

## 2024-08-05 NOTE — PROGRESS NOTES
The patient does not speak English.  Her medical history is translated by a professional [facility] [place] .    Assessment/Plan:   1.  Nonischemic cardiomyopathy, chronic congestive heart failure with preserved ejection fraction, class II: The patient is compensated well.  No signs of fluid retention.  She is on low-salt diet.   Laboratory tests are reviewed, all stable.  Continue losartan 100 mg daily, metoprolol  mg daily and Imdur 60 mg daily.  Continue furosemide 20 mg daily.     3.  Coronary artery disease, no obstructive lesion per coronary angiogram in May 2019, chest pain: Her chest pain has been stable for long time.  There is no interval change.  Her nuclear stress test was negative for inducible myocardial ischemia.  Continue Imdur, metoprolol, aspirin and Lipitor.       4.  Essential hypertension: Her blood pressure is controlled with losartan 100 mg daily and metoprolol  mg daily.       5.  Dyslipidemia: On Lipitor 80 mg daily.  LDL was well-controlled.  Continue lifestyle modification.     6.  Obesity: Continue lifestyle modification.  Continue to lose weight as discussed.    Thank you for the opportunity to be involved in the care of Leora Sanchez. If you have any questions, please feel free to contact me.  I will see the patient again in 6 months and as needed.    Much or all of the text in this note was generated through the use of Dragon Dictate voice-to-text software. Errors in spelling or words which seem out of context are unintentional. Sound alike errors, in particular, may have escaped editing.       History of Present Illness:   It is my pleasure to see Leora Sanchez at the [facility] clinic for routine cardiology follow-up.  Leora Sanchez is a 54 year old female with a medical history of coronary artery disease, nonischemic cardiomyopathy, improved left ventricular ejection fraction to normal range, chronic congestive heart failure with preserved ejection  fraction, essential hypertension, hyperlipidemia, obesity and stage III CKD.    The patient states that she has intermittent chest pain, not frequent.  She describes her chest pain as located anterior chest, aching pain, mild in severity, no radiation, can be relieved by nitroglycerin in 10 minutes, 1-2 times a month, not exertional.  She had similar chest pain for a long time.  She has mild shortness of breath on exertion which has been stable.  She lost more than 10 pounds over the last several months.  She had no palpitations, dizziness, orthopnea, PND.  Her leg edema has been controlled well with furosemide 20 mg daily.  Her blood pressure and heart rate are controlled.              Past Medical History:     Patient Active Problem List   Diagnosis    Essential hypertension    Moderate episode of recurrent major depressive disorder (H)    Perforation of right tympanic membrane    Heart failure with reduced ejection fraction (H)    Type 2 diabetes mellitus with diabetic polyneuropathy, without long-term current use of insulin (H)    Right-sided sensorineural hearing loss    Pulmonary nodules    Coronary artery disease involving native coronary artery of native heart with angina pectoris (H24)    Nonischemic cardiomyopathy (H)    Mixed incontinence    Hypokalemia    Hyperlipidemia LDL goal <70    Heartburn    Ganglion cyst of left foot    Class 3 severe obesity due to excess calories with body mass index (BMI) of 40.0 to 44.9 in adult (H)    Stage 3a chronic kidney disease (H)    Tension headache    Bilateral dry eyes    Anemia, unspecified type    Great toe pain, right    Fatty liver    Chronic gout of right foot    Vitamin D deficiency    Diabetic polyneuropathy associated with type 2 diabetes mellitus (H)    Chronic midline low back pain with bilateral sciatica       Past Surgical History:     Past Surgical History:   Procedure Laterality Date    CV CORONARY ANGIOGRAM N/A 5/13/2019    Procedure: Coronary  Angiogram;  Surgeon: Car Box MD;  Location: [facility];  Service: Cardiology    CV LEFT HEART CATHETERIZATION WITHOUT LEFT VENTRICULOGRAM Left 10/31/2017    Procedure: Left Heart Catheterization Without Left Ventriculogram;  Surgeon: Jonathan Duque MD;  Location: [facility];  Service:     CV LEFT HEART CATHETERIZATION WITHOUT LEFT VENTRICULOGRAM Left 2019    Procedure: Left Heart Catheterization Without Left Ventriculogram;  Surgeon: Car Box MD;  Location: [facility];  Service: Cardiology    DILATION AND CURETTAGE      ENT SURGERY      INNER EAR SURGERY Right 1994    MASTOIDECTOMY Right 1996    AR CATH PLACEMENT & NJX CORONARY ART ANGIO IMG S&I N/A 10/31/2017    Procedure: Coronary Angiogram;  Surgeon: Jonathan uDque MD;  Location: [facility];  Service: Cardiology       Family History:     Family History   Problem Relation Age of Onset    Diabetes Mother     Hypertension Mother     Uterine Cancer Mother     Diverticulitis Mother     Other - See Comments Father          in [place]    No Known Problems Sister     No Known Problems Daughter     No Known Problems Daughter     No Known Problems Daughter     No Known Problems Daughter     No Known Problems Son     No Known Problems Son     No Known Problems Son     No Known Problems Son         Social History:    reports that she has never smoked. She has never been exposed to tobacco smoke. She has never used smokeless tobacco. She reports that she does not drink alcohol and does not use drugs.    Review of Systems:   12 systems are reviewed negative except for in HPI.    Meds:     Current Outpatient Medications:     acetaminophen (MAPAP) 500 MG tablet, Take 2 tablets (1,000 mg) by mouth 3 times daily as needed for mild pain, Disp: 100 tablet, Rfl: 11    aspirin 81 MG EC tablet, Take 1 tablet (81 mg) by mouth daily, Disp: 90 tablet, Rfl: 4    atorvastatin (LIPITOR) 80 MG  tablet, TAKE 1 TABLET (80 MG TOTAL) BY MOUTH DAILY/ TXHUA HNUB NOJ 1 LUB [facility] RO, Disp: 90 tablet, Rfl: 3    B Complex-Biotin-FA (B COMPLEX 100 TR) TBCR, Take 1 tablet by mouth daily, Disp: 90 tablet, Rfl: 4    blood glucose (CONTOUR NEXT TEST) test strip, 1 strip by In Vitro route 2 times daily, Disp: 100 strip, Rfl: 3    blood glucose (NO BRAND SPECIFIED) lancets standard, Use to test blood sugar 2 times daily or as directed., Disp: 100 Lancet, Rfl: 3    ciprofloxacin-dexAMETHasone (CIPRODEX) 0.3-0.1 % otic suspension, Place 4 drops to right ear 2x daily for 10 days, Disp: 10 mL, Rfl: 0    empagliflozin (JARDIANCE) 10 MG TABS tablet, Take 1 tablet (10 mg) by mouth daily, Disp: 90 tablet, Rfl: 1    ezetimibe (ZETIA) 10 MG tablet, TAKE 1 PILL BY MOUTH EVERY DAY/ NOJ IB LUB IB UB [facility], Disp: 90 tablet, Rfl: 4    furosemide (LASIX) 20 MG tablet, Take 1 tablet (20 mg) by mouth daily, Disp: 90 tablet, Rfl: 4    isosorbide mononitrate (IMDUR) 60 MG 24 hr tablet, Take 1 tablet (60 mg) by mouth daily, Disp: 90 tablet, Rfl: 3    losartan (COZAAR) 100 MG tablet, TAKE 1 PILL BY MOUTH DAILY FOR BLOOD PRESSURE / TXHUA UB NO 1 LUB [facility] SIAB, Disp: 90 tablet, Rfl: 3    metFORMIN (GLUCOPHAGE XR) 500 MG 24 hr tablet, Take 1 tablet (500 mg) by mouth 2 times daily (with meals), Disp: 360 tablet, Rfl: 4    metoprolol succinate ER (TOPROL XL) 50 MG 24 hr tablet, Take 2 tablets (100 mg) by mouth daily, Disp: 180 tablet, Rfl: 1    nitroGLYcerin (NITROSTAT) 0.4 MG sublingual tablet, For chest pain place 1 tablet under the tongue every 5 minutes for 3 doses. If symptoms persist 5 minutes after 1st dose call 911., Disp: 25 tablet, Rfl: 4    omeprazole (PRILOSEC) 40 MG DR capsule, TAKE 1 PILL BY MOUTH EVERY DAY/ DAGOBERTO BREAUXUB NOJ 1 LUB TSHUAJ PAB ZOO LUB PLAB, Disp: 90 capsule, Rfl: 1    sulfacetamide-prednisoLONE (VASOCIDIN) 10-0.23 % ophthalmic solution, Place 4 drops to  "right ear 2x daily for 10 days, Disp: 5 mL, Rfl: 0    tirzepatide (MOUNJARO) 5 MG/0.5ML pen, Inject 5 mg subcutaneously every 7 days, Disp: 2 mL, Rfl: 0    venlafaxine (EFFEXOR XR) 75 MG 24 hr capsule, Take 1 capsule (75 mg) by mouth daily, Disp: 90 capsule, Rfl: 0    vitamin D3 (CHOLECALCIFEROL) 125 MCG (5000 UT) tablet, Take 1 tablet (125 mcg) by mouth daily, Disp: 90 tablet, Rfl: 1    Allergies:   Patient has no known allergies.      Objective:      Physical Exam  92.5 kg (204 lb)  1.505 m (4' 11.25\")  Body mass index is 40.86 kg/m .  /76 (BP Location: Right arm, Patient Position: Sitting, Cuff Size: Adult Large)   Pulse 99   Resp 24   Ht 1.505 m (4' 11.25\")   Wt 92.5 kg (204 lb)   SpO2 97%   BMI 40.86 kg/m      General Appearance:   Awake, Alert, No acute distress.   HEENT:  Pupil equal and reactive to light. No scleral icterus; the mucous membranes were moist.   Neck: No cervical bruits. No JVD. No thyromegaly.     Chest: The spine was straight. The chest was symmetric.   Lungs:   Respirations unlabored; Lungs are clear to auscultation. No crackles. No wheezing.   Cardiovascular:   Regular rhythm and rate, normal first and second heart sounds with no murmurs. No rubs or gallops.    Abdomen:  Obese. Soft. No tenderness. Non-distended. Bowels sounds are present   Extremities: Equal tibial pulses. No leg edema.   Skin: No rashes or ulcers. Warm, Dry.   Musculoskeletal: No tenderness. No deformity.   Neurologic: Mood and affect are appropriate. No focal deficits.         EKG: Personally reviewed  Normal sinus rhythm   Nonspecific T wave abnormality   Abnormal ECG   When compared with ECG of 29-MAR-2018 16:41,   No significant change was found     Cardiac Imaging Studies  ECHO on 5-:  Technically difficult study due to patient's body habitus  Left ventricle ejection fraction is moderately decreased. The estimated left ventricular ejection fraction is 45% with global hypokinesis.  Right ventricle " poorly visualized. CT is normal which would suggest normal right ventricular systolic function.  No significant valvular heart disease identified.  When compared to the previous study dated 1/29/2018, overall left ventricular function appears slightly lower.     Coronary angiogram on 5-:  Angiography via left radial   LM normal  LAD 40-50% prox LAD disease with FFR 0.86  Circ OM 1 40-50% narrowing with FFR 0.96  RCA min dz     Stress cardiac MRI on 2-:  1.  Lexiscan stress cardiac MRI is negative for inducible myocardial ischemia.   2.  Lexiscan stress ECG is negative for inducible myocardial ischemia.  3.  No previous myocardial infarction is identified.  4.  Normal left ventricular size, wall thickness and systolic function. The calculated left ventricular ejection fraction is 65%.  5.  Normal right ventricular size and systolic function.  6.  No obvious valvular disease.    Nuclear stress test on September 27, 2023:    A prior study was conducted on 5/10/2019.  This study has no change when compared with the prior study.    Pharmacological regadenoson stress ECG is negative for inducible myocardial ischemia.    Pharmacological Regadenoson nuclear stress test is negative for inducible myocardial ischemia or infarction.    Normal left ventricular size, wall motion and systolic function.  The calculated left ventricular ejection fraction is 70%.    The patient is at a low risk of future cardiac ischemic events.    Lab Review   Lab Results   Component Value Date     12/07/2021     04/26/2013    CO2 26 12/07/2021    CO2 26.0 08/15/2011    BUN 28 12/07/2021    BUN 18 04/26/2013     Lab Results   Component Value Date    WBC 5.8 12/07/2021    HGB 11.5 12/07/2021    HCT 35.4 12/07/2021    MCV 94 12/07/2021     12/07/2021     Lab Results   Component Value Date    CHOL 176 11/29/2023    CHOL 181 10/03/2023    CHOL 183 06/23/2023     Lab Results   Component Value Date    HDL 83 11/29/2023     "HDL 70 10/03/2023    HDL 68 06/23/2023     No components found for: \"LDLCALC\"  Lab Results   Component Value Date    TRIG 123 11/29/2023    TRIG 114 10/03/2023    TRIG 132 06/23/2023     No results found for: \"CHOLHDL\"  Lab Results   Component Value Date    TROPONINI <0.01 08/20/2020     Lab Results   Component Value Date    BNP 12 08/20/2020     Lab Results   Component Value Date    TSH 1.97 12/15/2020             "

## 2024-08-06 NOTE — TELEPHONE ENCOUNTER
Prior Authorization Not Needed per Insurance    Medication: MOUNJARO 5 MG/0.5ML SC SOPN  Insurance Company: [employer] Part D - Phone [phone] Fax [phone]  Expected CoPay: $    Pharmacy Filling the Rx: [facility] [id number] [place], [place] - [address] [place] [address] E  Pharmacy Notified:   Patient Notified:

## 2024-08-13 DIAGNOSIS — K21.9 GASTROESOPHAGEAL REFLUX DISEASE WITHOUT ESOPHAGITIS: ICD-10-CM

## 2024-08-13 DIAGNOSIS — I50.20 HEART FAILURE WITH REDUCED EJECTION FRACTION (H): ICD-10-CM

## 2024-08-13 RX ORDER — OMEPRAZOLE 40 MG/1
CAPSULE, DELAYED RELEASE ORAL
Qty: 90 CAPSULE | Refills: 1 | Status: SHIPPED | OUTPATIENT
Start: 2024-08-13

## 2024-08-14 RX ORDER — ISOSORBIDE MONONITRATE 60 MG/1
TABLET, EXTENDED RELEASE ORAL
Qty: 90 TABLET | Refills: 3 | Status: SHIPPED | OUTPATIENT
Start: 2024-08-14

## 2024-09-05 DIAGNOSIS — E11.22 TYPE 2 DIABETES MELLITUS WITH STAGE 3A CHRONIC KIDNEY DISEASE, WITHOUT LONG-TERM CURRENT USE OF INSULIN (H): ICD-10-CM

## 2024-09-05 DIAGNOSIS — N18.31 TYPE 2 DIABETES MELLITUS WITH STAGE 3A CHRONIC KIDNEY DISEASE, WITHOUT LONG-TERM CURRENT USE OF INSULIN (H): ICD-10-CM

## 2024-09-06 ENCOUNTER — OFFICE VISIT (OUTPATIENT)
Dept: FAMILY MEDICINE | Facility: CLINIC | Age: 54
End: 2024-09-06
Payer: MEDICARE

## 2024-09-06 ENCOUNTER — TELEPHONE (OUTPATIENT)
Dept: OPHTHALMOLOGY | Facility: CLINIC | Age: 54
End: 2024-09-06

## 2024-09-06 ENCOUNTER — ORDERS ONLY (AUTO-RELEASED) (OUTPATIENT)
Dept: FAMILY MEDICINE | Facility: CLINIC | Age: 54
End: 2024-09-06

## 2024-09-06 VITALS
WEIGHT: 205 LBS | OXYGEN SATURATION: 98 % | RESPIRATION RATE: 21 BRPM | DIASTOLIC BLOOD PRESSURE: 87 MMHG | TEMPERATURE: 97.4 F | SYSTOLIC BLOOD PRESSURE: 138 MMHG | HEIGHT: 59 IN | HEART RATE: 82 BPM | BODY MASS INDEX: 41.33 KG/M2

## 2024-09-06 DIAGNOSIS — Z23 ENCOUNTER FOR IMMUNIZATION: ICD-10-CM

## 2024-09-06 DIAGNOSIS — E11.42 TYPE 2 DIABETES MELLITUS WITH DIABETIC POLYNEUROPATHY, WITHOUT LONG-TERM CURRENT USE OF INSULIN (H): ICD-10-CM

## 2024-09-06 DIAGNOSIS — N18.31 STAGE 3A CHRONIC KIDNEY DISEASE (H): ICD-10-CM

## 2024-09-06 DIAGNOSIS — R91.8 PULMONARY NODULES: ICD-10-CM

## 2024-09-06 DIAGNOSIS — I10 ESSENTIAL HYPERTENSION: ICD-10-CM

## 2024-09-06 DIAGNOSIS — I50.20 HEART FAILURE WITH REDUCED EJECTION FRACTION (H): ICD-10-CM

## 2024-09-06 DIAGNOSIS — Z12.11 SCREEN FOR COLON CANCER: ICD-10-CM

## 2024-09-06 DIAGNOSIS — Z23 NEED FOR SHINGLES VACCINE: ICD-10-CM

## 2024-09-06 DIAGNOSIS — D64.9 ANEMIA, UNSPECIFIED TYPE: ICD-10-CM

## 2024-09-06 DIAGNOSIS — H57.89 RED EYE: ICD-10-CM

## 2024-09-06 DIAGNOSIS — Z00.00 ENCOUNTER FOR MEDICARE ANNUAL WELLNESS EXAM: ICD-10-CM

## 2024-09-06 DIAGNOSIS — Z12.11 SCREEN FOR COLON CANCER: Primary | ICD-10-CM

## 2024-09-06 PROBLEM — E87.6 HYPOKALEMIA: Status: RESOLVED | Noted: 2021-11-14 | Resolved: 2024-09-06

## 2024-09-06 LAB
ALBUMIN SERPL BCG-MCNC: 3.7 G/DL (ref 3.5–5.2)
ALP SERPL-CCNC: 94 U/L (ref 40–150)
ALT SERPL W P-5'-P-CCNC: 116 U/L (ref 0–50)
ANION GAP SERPL CALCULATED.3IONS-SCNC: 12 MMOL/L (ref 7–15)
AST SERPL W P-5'-P-CCNC: 69 U/L (ref 0–45)
BILIRUB SERPL-MCNC: 0.7 MG/DL
BUN SERPL-MCNC: 24.9 MG/DL (ref 6–20)
CALCIUM SERPL-MCNC: 9.5 MG/DL (ref 8.8–10.4)
CHLORIDE SERPL-SCNC: 106 MMOL/L (ref 98–107)
CREAT SERPL-MCNC: 0.86 MG/DL (ref 0.51–0.95)
CREAT UR-MCNC: 88.7 MG/DL
EGFRCR SERPLBLD CKD-EPI 2021: 79 ML/MIN/1.73M2
ERYTHROCYTE [DISTWIDTH] IN BLOOD BY AUTOMATED COUNT: 13 % (ref 10–15)
GLUCOSE SERPL-MCNC: 100 MG/DL (ref 70–99)
HBA1C MFR BLD: 8.9 % (ref 0–5.6)
HCO3 SERPL-SCNC: 22 MMOL/L (ref 22–29)
HCT VFR BLD AUTO: 41.9 % (ref 35–47)
HGB BLD-MCNC: 13.3 G/DL (ref 11.7–15.7)
MCH RBC QN AUTO: 31.4 PG (ref 26.5–33)
MCHC RBC AUTO-ENTMCNC: 31.7 G/DL (ref 31.5–36.5)
MCV RBC AUTO: 99 FL (ref 78–100)
MICROALBUMIN UR-MCNC: <12 MG/L
MICROALBUMIN/CREAT UR: NORMAL MG/G{CREAT}
PLATELET # BLD AUTO: 197 10E3/UL (ref 150–450)
POTASSIUM SERPL-SCNC: 4.7 MMOL/L (ref 3.4–5.3)
PROT SERPL-MCNC: 6.4 G/DL (ref 6.4–8.3)
RBC # BLD AUTO: 4.24 10E6/UL (ref 3.8–5.2)
SODIUM SERPL-SCNC: 140 MMOL/L (ref 135–145)
WBC # BLD AUTO: 8.2 10E3/UL (ref 4–11)

## 2024-09-06 PROCEDURE — 90673 RIV3 VACCINE NO PRESERV IM: CPT | Performed by: FAMILY MEDICINE

## 2024-09-06 PROCEDURE — 80053 COMPREHEN METABOLIC PANEL: CPT | Performed by: FAMILY MEDICINE

## 2024-09-06 PROCEDURE — 36415 COLL VENOUS BLD VENIPUNCTURE: CPT | Performed by: FAMILY MEDICINE

## 2024-09-06 PROCEDURE — 82570 ASSAY OF URINE CREATININE: CPT | Performed by: FAMILY MEDICINE

## 2024-09-06 PROCEDURE — 85027 COMPLETE CBC AUTOMATED: CPT | Performed by: FAMILY MEDICINE

## 2024-09-06 PROCEDURE — G0439 PPPS, SUBSEQ VISIT: HCPCS | Performed by: FAMILY MEDICINE

## 2024-09-06 PROCEDURE — 83036 HEMOGLOBIN GLYCOSYLATED A1C: CPT | Performed by: FAMILY MEDICINE

## 2024-09-06 PROCEDURE — G0008 ADMIN INFLUENZA VIRUS VAC: HCPCS | Performed by: FAMILY MEDICINE

## 2024-09-06 PROCEDURE — 99214 OFFICE O/P EST MOD 30 MIN: CPT | Mod: 25 | Performed by: FAMILY MEDICINE

## 2024-09-06 PROCEDURE — 82043 UR ALBUMIN QUANTITATIVE: CPT | Performed by: FAMILY MEDICINE

## 2024-09-06 RX ORDER — SEMAGLUTIDE 2.68 MG/ML
2 INJECTION, SOLUTION SUBCUTANEOUS
COMMUNITY
Start: 2024-08-14

## 2024-09-06 RX ORDER — METFORMIN HCL 500 MG
TABLET, EXTENDED RELEASE 24 HR ORAL
Qty: 180 TABLET | Refills: 4 | Status: SHIPPED | OUTPATIENT
Start: 2024-09-06

## 2024-09-06 ASSESSMENT — PATIENT HEALTH QUESTIONNAIRE - PHQ9: SUM OF ALL RESPONSES TO PHQ QUESTIONS 1-9: 5

## 2024-09-06 NOTE — PROGRESS NOTES
Preventive Care Visit  [facility]  Jaky Gaspar MD, Family Medicine  Sep 6, 2024  {Provider  Link to SmartSet :783933}    {PROVIDER CHARTING PREFERENCE:411051}    Shahab Corey is a 54 year old, presenting for the following:  Wellness Visit        9/6/2024    12:53 PM   Additional Questions   Roomed by M   Accompanied by self     {ROOMER if patient is in their first year of Medicare a vision screen is required click here to document the Vison screen and then refresh the note to pull in results  :119384}    Health Care Directive  Patient does not have a Health Care Directive or Living Will: {ADVANCE_DIRECTIVE_STATUS:311048}    HPI  ***  {MA/LPN/RN Pre-Provider Visit Orders- hCG/UA/Strep (Optional):029194}  {SUPERLIST (Optional):939012}  {additonal problems for provider to add (Optional):529111}      9/6/2024   General Health   How would you rate your overall physical health? (!) POOR   Feel stress (tense, anxious, or unable to sleep) Only a little      (!) STRESS CONCERN      9/6/2024   Nutrition   Diet: Regular (no restrictions)    Diabetic       Multiple values from one day are sorted in reverse-chronological order         9/6/2024   Exercise   Days per week of moderate/strenous exercise 5 days   Average minutes spent exercising at this level 10 min            9/6/2024   Social Factors   Frequency of gathering with friends or relatives More than three times a week   Worry food won't last until get money to buy more No   Food not last or not have enough money for food? No   Do you have housing? (Housing is defined as stable permanent housing and does not include staying ouside in a car, in a tent, in an abandoned building, in an overnight shelter, or couch-surfing.) Yes   Are you worried about losing your housing? No   Lack of transportation? No   Unable to get utilities (heat,electricity)? No            9/6/2024   Fall Risk   Fallen 2 or more times in the past year? No   Trouble with  walking or balance? Yes   Gait Speed Test (Document in seconds) 5   Gait Speed Test Interpretation Less than or equal to 5.00 seconds - PASS             9/6/2024   Activities of Daily Living- Home Safety   Needs help with the following daily activites None of the above   Safety concerns in the home None of the above            9/6/2024   Dental   Dentist two times every year? (!) NO            9/6/2024   Hearing Screening   Hearing concerns? (!) IT'S HARD TO FOLLOW A CONVERSATION IN A NOISY RESTAURANT OR CROWDED ROOM.    (!) TROUBLE UNDESTANDING A SPEAKER IN A PUBLIC MEETING OR [employer] SERVICE.       Multiple values from one day are sorted in reverse-chronological order         9/6/2024   Driving Risk Screening   Patient/family members have concerns about driving No            9/6/2024   General Alertness/Fatigue Screening   Have you been more tired than usual lately? (!) YES            9/6/2024   Urinary Incontinence Screening   Bothered by leaking urine in past 6 months Yes             Today's PHQ-9 Score:       9/6/2024    12:57 PM   PHQ-9 SCORE   PHQ-9 Total Score 5         9/6/2024   Substance Use   Alcohol more than 3/day or more than 7/wk No   Do you have a current opioid prescription? No   How severe/bad is pain from 1 to 10? 8/10   Do you use any other substances recreationally? No        Social History     Tobacco Use    Smoking status: Never     Passive exposure: Never    Smokeless tobacco: Never    Tobacco comments:     no passive exposure   Substance Use Topics    Alcohol use: No    Drug use: No     {Provider  If there are gaps in the social history shown above, please follow the link to update and then refresh the note Link to Social and Substance History :340170}      6/30/2023   LAST FHS-7 RESULTS   1st degree relative breast or ovarian cancer Yes   Any relative bilateral breast cancer No   Any male have breast cancer No   Any ONE woman have BOTH breast AND ovarian cancer No   Any woman with  breast cancer before 50yrs No   2 or more relatives with breast AND/OR ovarian cancer No   2 or more relatives with breast AND/OR bowel cancer No        {If any of the questions to the FHS7 are answered yes, consider referral for genetic counseling.    Additional indications for genetic referral include personal history of breast or ovarian cancer, genetic mutation in 1st degree relative which increases risk of breast cancer including BRCA1, BRCA2, MICHELLE, PALB 2, TP53, CHEK2, PTEN, CDH1, STK11 (per ACS) and/or 1st degree relative with history of pancreatic or high-risk prostate cancer (per NCCN):369804}   {Mammogram Decision Support (Optional):241392}      History of abnormal Pap smear: { :276801}        Latest Ref Rng & Units 6/2/2022     3:27 PM 2/8/2016     1:07 PM   PAP / HPV   PAP  Negative for Intraepithelial Lesion or Malignancy (NILM)  Negative for squamous intraepithelial lesion or malignancy  Electronically signed by Ana Mcfarland CT (ASCP) on 2/12/2016 at 11:06 AM      HPV 16 DNA Negative Negative     HPV 18 DNA Negative Negative     Other HR HPV Negative Negative       ASCVD Risk   The 10-year ASCVD risk score (Chantel LOPEZ, et al., 2019) is: 3.5%    Values used to calculate the score:      Age: 54 years      Sex: Female      Is Non- : No      Diabetic: Yes      Tobacco smoker: No      Systolic Blood Pressure: 138 mmHg      Is BP treated: Yes      HDL Cholesterol: 83 mg/dL      Total Cholesterol: 176 mg/dL    {Link to Fracture Risk Assessment Tool (Optional):525609}    {Provider  REQUIRED FOR AWV Use the storyboard to review patient history, after sections have been marked as reviewed, refresh note to capture documentation:578922}  {Provider   REQUIRED AWV use this link to review and update sexual activity history  after section has been marked as reviewed, refresh note to capture documentation:896299}  Reviewed and updated as needed this visit by Provider                     {HISTORY OPTIONS (Optional):813243}  Current providers sharing in care for this patient include:  Patient Care Team:  Jaky Gaspar MD as PCP - General (Family Practice)  Wendy Lind MD as MD (Pulmonary Disease)  Art Thakkar, PharmD as Pharmacist (Pharmacist)  Elisabet Malone CHW as Community Health Worker (Primary Care - CC)  Nickie Cuevas, RN as Lead Care Coordinator (Primary Care - CC)  Jaky Gaspar MD as Assigned PCP  Jose Maria Bass MD as Assigned Surgical Provider  Philip Anderson MD as MD (Cardiovascular & Thoracic Surgery)  Art Thakkar, PharmD as Assigned MTM Pharmacist  Sendy Banda APRN CNP as Assigned Heart and Vascular Provider    The following health maintenance items are reviewed in Epic and correct as of today:  Health Maintenance   Topic Date Due    HF ACTION PLAN  Never done    COLORECTAL CANCER SCREENING  Never done    EYE EXAM  12/21/2022    MEDICARE ANNUAL WELLNESS VISIT  06/23/2024    MICROALBUMIN  06/23/2024    ANNUAL REVIEW OF HM ORDERS  06/23/2024    A1C  08/17/2024    INFLUENZA VACCINE (1) 09/01/2024    ZOSTER IMMUNIZATION (2 of 2) 12/23/2023    DIABETIC FOOT EXAM  10/27/2024    LIPID  11/29/2024    PHQ-9  12/06/2024    BMP  12/28/2024    ALT  05/17/2025    CBC  05/17/2025    HEMOGLOBIN  05/17/2025    MAMMO SCREENING  06/30/2025    HPV TEST  06/02/2027    PAP  06/02/2027    ADVANCE CARE PLANNING  06/25/2028    DTAP/TDAP/TD IMMUNIZATION (4 - Td or Tdap) 12/07/2031    TSH W/FREE T4 REFLEX  Completed    HEPATITIS C SCREENING  Completed    HIV SCREENING  Completed    DEPRESSION ACTION PLAN  Completed    Pneumococcal Vaccine: Pediatrics (0 to 5 Years) and At-Risk Patients (6 to 64 Years)  Completed    URINALYSIS  Completed    COVID-19 Vaccine  Completed    HPV IMMUNIZATION  Aged Out    MENINGITIS IMMUNIZATION  Aged Out    RSV MONOCLONAL ANTIBODY  Aged Out    HEPATITIS B IMMUNIZATION  Discontinued       {ROS Picklists (Optional):421420}    "  Objective    Exam  /87 (BP Location: Right arm, Patient Position: Sitting, Cuff Size: Adult Large)   Pulse 82   Temp 97.4  F (36.3  C) (Temporal)   Resp 21   Ht 1.5 m (4' 11.06\")   Wt 93 kg (205 lb)   SpO2 98%   BMI 41.33 kg/m     Estimated body mass index is 41.33 kg/m  as calculated from the following:    Height as of this encounter: 1.5 m (4' 11.06\").    Weight as of this encounter: 93 kg (205 lb).    Physical Exam  {Exam Choices (Optional):541811}        9/6/2024   Mini Cog   Clock Draw Score 2 Normal   3 Item Recall 0 objects recalled   Mini Cog Total Score 2        {A Mini-Cog total score of 0-2 suggests the possibility of dementia, score of 3-5 suggests no dementia:338413}         Signed Electronically by: Jaky Gaspar MD  {Email feedback regarding this note to [email or web address]   :016909}    Prior to immunization administration, verified patients identity using patient s name and date of birth. Please see Immunization Activity for additional information.     Screening Questionnaire for Adult Immunization    Are you sick today?   No   Do you have allergies to medications, food, a vaccine component or latex?   No   Have you ever had a serious reaction after receiving a vaccination?   No   Do you have a long-term health problem with heart, lung, kidney, or metabolic disease (e.g., diabetes), asthma, a blood disorder, no spleen, complement component deficiency, a cochlear implant, or a spinal fluid leak?  Are you on long-term aspirin therapy?   No   Do you have cancer, leukemia, HIV/AIDS, or any other immune system problem?   No   Do you have a parent, brother, or sister with an immune system problem?   No   In the past 3 months, have you taken medications that affect  your immune system, such as prednisone, other steroids, or anticancer drugs; drugs for the treatment of rheumatoid arthritis, Crohn s disease, or psoriasis; or have you had radiation treatments?   No "   Have you had a seizure, or a brain or other nervous system problem?   No   During the past year, have you received a transfusion of blood or blood    products, or been given immune (gamma) globulin or antiviral drug?   No   For women: Are you pregnant or is there a chance you could become       pregnant during the next month?   No   Have you received any vaccinations in the past 4 weeks?   No     Immunization questionnaire answers were all negative.      Patient instructed to remain in clinic for 15 minutes afterwards, and to report any adverse reactions.     Screening performed by ANN Jc MA on 9/6/2024 at 1:04 PM.   Answers submitted by the patient for this visit:  Patient Health Questionnaire (Submitted on 9/6/2024)  PHQ9 TOTAL SCORE: Incomplete

## 2024-09-06 NOTE — TELEPHONE ENCOUNTER
[facility] Call Center    Phone Message    May a detailed message be left on voicemail: yes     Reason for Call: Symptoms or Concerns     If patient has red-flag symptoms, warm transfer to triage line    Current symptom or concern: Diabetic Eye exaM,  red eye, green pus coming from eye, irritated eye feels like something is poking her eye.     Symptoms have been present for:  1 week(s)    Has patient previously been seen for this? No    Are there any new or worsening symptoms? Yes: all symptoms worsening, referral in epic. Did not triage due to protocols if pt has referral send high TE    Action Taken: Other: P EYE     Travel Screening: Not Applicable     Date of Service:

## 2024-09-06 NOTE — TELEPHONE ENCOUNTER
Home/mobile:[phone]    Called without  and no answer and mailbox full at [phone]    Called with  at 1439 and spoke to pt    Pt reports h/o of ability to press on eye and express yellow/green discharge where eye meets nose.    In past week not expressing when presses on area.    Pt notices peasized bump in area for 1-2 years and can express the bump and fluid comes out-- fluid not coming out now.    Some redness noted this week on eye.    Scheduled Monday with Dr. Rossi at [facility] location.    Reviewed date/time/location/duration/hospital based clinic.    Reviewed if has worsening swelling/pain/fever over week to proceed to urgent care or may go to [address] [place] [address] ED/[facility]    Pt/family seemed comfortable with scheduling/plan.    Rojelio Sabillon RN 2:57 PM 09/06/24

## 2024-09-06 NOTE — PATIENT INSTRUCTIONS
"Patient Education   Tami Nabila Xeeb Saib Xyuas Kom Tiv Thaiv Tus Kheej  Nov yog ib co tami nabila xeeb uas peb yeej meem muab jg tib neeg pab lawv noj qab nyob zoo. Rasheed zaum koj pab pawg saib xyuas yuav muaj ib co tami nabila xeeb uas tshwj xeeb jg koj nkaus xwb. Thov nrog koj pab pawg saib xyuas sib madhuri txog rasheed yam uas koj toob noah kom tiv thaiv koj tus kheej.  Jakob Coj Lub Neej  Es xaws xais ntau donal 150 feeb txhua lub aguirre tiam (30 feeb txhua hnub, 5 hnub ib lub aguirre tiam).  Ua yam pab cov leeg muaj zog tuaj 2 zaug txhua lub aguirre tiam. Rasheed yam no pab koj tswj hwm koj lub cev qhov hnyav thiab tiv thaiv ntawm kab mob.  Txhob haus luam yeeb.  Pleev tshuaj tiv thaiv tshav kub kom thiaj tsis raug mob khees xaws ntawm nqaij tawv.  Txhua 2 mus jg 4 xyoos yuav tau kuaj koj lub tsev jg qhov radon. Radon yog ib mariam sarah uas tsis muaj xim tsis muaj ntxhiab uas mob tau koj cov ntsws. Kom thiaj kawm ntxiv, mus saib [email or web address] thiab ntaus ntawv \"Radon in Homes.\"  Ceev cov phom kom tsis muaj mos txwv jg hauv thiab muab xauv juan hauv ib qho chaw nyab xeeb xws li lub txhoj, los yog muab xauv juan thiab muab cov yawm sij zais juan. Muab cov mos txwv xauv jg hauv lwm qhov chaw nrug cov phom. Kom thiaj kawm ntxiv, mus saib [email or web address] thiab ntaus ntawv \"safe gun storage.\"  Jakob Noj Qab Huv  Noj 5 qho txiv hmab txiv ntoo thiab zaub txhua hnub.  Noj nplem nplej, txhuv xim av thiab cov fawm muaj nplej (los theej nplem dawb, txhuv dawb, thiab fawm dawb).  Ua tib zoo noj calcium thiab vitamin D ntau. Saib cov ntawv lo jg pob khoom noj thiab siv zog noj kom txog 100% RDA (qhov ntau hauv ib hnub).  Yeej meem kuaj ntsuas  Txhua 6 lub hli mus kuaj hniav thiab muab tu.  Txhua xyoo mus saib koj pab pawg saib xyuas jakob noj qab nyob zoo kom sib madhuri txog:  Ib yam dab tsi uas hloov ntawm koj txoj jakob noj qab nyob zoo.  Cov tshuaj uas koj pab pawg tau hais kom siv.  Jakob saib xyuas kom tiv thaiv, jakob npaj jg tsev neeg, thiab yuav ua li sabrina tiv " thaiv ntawm kab mob uas ntev.  Jakob txhaj tshuaj (koob tshuaj tiv thaiv)   Cov koob tshuaj HPV (txog thaum muaj 25 xyoo), yog koj yeej tsis tau txais donal li.  Cov koob tshuaj Hepatitis B Kab Mob Siab (txog thaum muaj 58 xyoos), yog koj yeej tsis tau txais donal li.  Koob tshuaj COVID-19: Txais koob tshuaj no thaum txog caij txais.  Koob tshuaj tiv thaiv ntawm mob khaub thuas: Txais txhua xyoo.  Koob tshuaj tiv thaiv mob daig tsaig: Txais txhua 10 xyoo.  Pneumococcal, hepatitis A, thiab RSV cov koob tshuaj: Nug koj pab pawg saib xyuas warren puas tsim nyog jg koj txais cov no raws li koj qhov pheej hmoo.  Koob tshuaj tiv thaiv ntawm kab mob sawv hlwv (jg cov muaj 49 xyoo rov cami).  Jakob kuaj ntsuas jg jakob noj qab nyob zoo  Kuaj mob ntshav qab zib:  Pib thaum muaj 34 xyoos, Mus kuaj mob ntshav qab zib txhua 2 xyoos los yog ntau zog.  Yog koj tseem tsis tau txog 34 xyoos, nug koj pab pawg saib xyuas warren puas tsim nyog jg koj kuaj mob ntshav qab zib.  Kuaj cholesterol: Thaum muaj 38 xyoo, pib kuaj cholesterol txhua 4 xyoos, los yog ntau donal ntawd yog kws fern mob qhia.  Kuaj txha qhov tuab (DEXA): Thaum txog 49 xyoo lawd, nug koj pab pawg saib xyuas warren puas tsim nyog jg koj kuaj txha warren puas ruaj.  Kab Mob Siab Hepatitis C: Kuaj ib zaug hauv koj lub neej.  Kuaj Txoj Hlab Ntshav Hauv Plab: Nrog koj tus kws fern mob madhuri txog jakob kuaj ntsuas no yog koj:  Tau haus luam yeeb ib zaug li; thiab  Yog txiv neej; thiab  Nruab hnub nyoog 64 thiab 74.  Mob Noah Cees (kis mob dhau ntawm jakob sib deev)  Ua ntej muaj 23 xyoos: Nug koj pab pawg saib xyuas warren puas tsim nyog kuaj jg mob noah cees.  Abdon qab muaj 23 xyoos: Mus kuaj jg mob noah cees yog koj muaj jakob pheej hmoo. Koj muaj jakob pheej hmoo jg mob noah cees (suav nrog HIV) yog:  Koj sib deev nrog ntau donal ib tug neeg.  Koj tsis siv cov hnab looj qau thaum sib deev.  Koj los yog koj tus khub deev kuaj pom tias muaj mob noah cees lawm.  Yog koj muaj jakob pheej hmoo jg mob noah cees  HIV, nug txog cov tshuaj PrEP kom tiv thaiv ntawm HIV.  Mus kuaj jg mob noah cees HIV yam ntau donal ib zaug hauv koj lub neej, txawm yog koj muaj jakob pheej hmoo jg mob noah cees HIV los tsis muaj los xij.  Kuaj jg mob khees xaws  Kuaj lub ncauj tsev me nyuam jg mob khees xaws: Yog koj muaj ib lub ncauj tsev me nyuam, april yuav tau ib sij kuaj lub ncauj tsev me nyuam seb puas muaj mob khees xaws pib thaum muaj 20 xyoos. Neeg feem coob uas ib sij kuaj lub ncauj tsev me nyuam thiab tsis pom dab tsi txawv lawv tsum tau nicki qab muaj 64 xyoos. Nrog koj tus kws fern mob sib madhuri txog qhov no.  Kuaj lub mis jg mob khees xaws (mammogram): Yog koj tau muaj mis donal li, yuav tau ib sij kuaj lub mis pib thaum muaj 40 xyoo. Jakob kuaj no yog kom saib seb puas muaj mob khees xaws hauv lub mis.  Kuaj Txoj Hnyuv Jg Mob Khees Xaws: Yeej tseem ceeb pib kuaj txoj hnyuv jg mob khees xaws pib thaum muaj 44 xyoos.  Txhua 9 xyoo yuav tau kuaj txoj hnyuv (los yog ntau zog yog koj muaj jakob pheej hmoo) Los sis, nug koj tus kws fern mob txog jakob kuaj thooj quav FIT txhua xyoo los yog Cologuard txhua 2 xyoos.  Kom thiaj kawm ntxiv txog rasheed mariam kuaj no, mus saib: [email or web address].  Yog xav tau jakob pab txog qhov no, mus saib: [email or web address].  Kuaj lub juliano kua phev (prostate) jg mob khees xaws: Yog koj muaj ib lub juliano kua phev (prostate) thiab nruab hnub nyoog 54 mus jg 68, nug koj tus kws fern mob warren puas tsim nyog jg koj kuaj lub juliano kua phev.  Kuaj ntsws jg mob khees xaws: Yog koj haus luam yeeb garner sim no los yog tau haus yav tas los uas muaj hnub nyoog nruab 50 xyoos mus jg 79 xyoo, nug koj pab pawg saib xyuas warren puas tsim nyog jg koj yeej meem kuaj lub ntsws jg mob khees xaws.    Jg cov mariam phiaj jakob siv ua ntaub ntawv qhia nkaus xwb. Yuav tsis pauv jakob qhia los ntawm koj qhov chaw fern mob. Copyright ([employer])   2023 [facility].   Txhua txoj cally raug tswj tseg lawm. Tshaj xyuas los ntawm [facility]. Tellja [id number] - REV 04/24.  Preventing Falls: Care Instructions  Injuries and health problems such as trouble walking or poor eyesight can increase your risk of falling. So can some medicines. But there are things you can do to help prevent falls. You can exercise to get stronger. You can also arrange your home to make it safer.    Talk to your doctor about the medicines you take. Ask if any of them increase the risk of falls and whether they can be changed or stopped.   Try to exercise regularly. It can help improve your strength and balance. This can help lower your risk of falling.     Practice fall safety and prevention.    Wear low-heeled shoes that fit well and give your feet good support. Talk to your doctor if you have foot problems that make this hard.  Carry a cellphone or wear a medical alert device that you can use to call for help.  Use stepladders instead of chairs to reach high objects. Don't climb if you're at risk for falls. Ask for help, if needed.  Wear the correct eyeglasses, if you need them.    Make your home safer.    Remove rugs, cords, clutter, and furniture from walkways.  Keep your house well lit. Use night-lights in hallways and bathrooms.  Install and use sturdy handrails on stairways.  Wear nonskid footwear, even inside. Don't walk barefoot or in socks without shoes.    Be safe outside.    Use handrails, curb cuts, and ramps whenever possible.  Keep your hands free by using a shoulder bag or backpack.  Try to walk in well-lit areas. Watch out for uneven ground, changes in pavement, and debris.  Be careful in the winter. Walk on the grass or gravel when sidewalks are slippery. Use de-icer on steps and walkways. Add non-slip devices to shoes.    Put grab bars and nonskid mats in your shower or tub and near the toilet. Try to use a shower chair or bath bench when bathing.   Get into a tub or shower by putting in your weaker leg first. Get out with your  "strong side first. Have a phone or medical alert device in the bathroom with you.   Where can you learn more?  Go to https://www.[employer].net/patiented  Enter G117 in the search box to learn more about \"Preventing Falls: Care Instructions.\"  Current as of: July 17, 2023               Content Version: 14.0    4370-8378 [employer].   Care instructions adapted under license by your healthcare professional. If you have questions about a medical condition or this instruction, always ask your healthcare professional. [employer] disclaims any warranty or liability for your use of this information.      Hearing Loss: Care Instructions  Overview     Hearing loss is a sudden or slow decrease in how well you hear. It can range from slight to profound. Permanent hearing loss can occur with aging. It also can happen when you are exposed long-term to loud noise. Examples include listening to loud music, riding motorcycles, or being around other loud machines.  Hearing loss can affect your work and home life. It can make you feel lonely or depressed. You may feel that you have lost your independence. But hearing aids and other devices can help you hear better and feel connected to others.  Follow-up care is a key part of your treatment and safety. Be sure to make and go to all appointments, and call your doctor if you are having problems. It's also a good idea to know your test results and keep a list of the medicines you take.  How can you care for yourself at home?  Avoid loud noises whenever possible. This helps keep your hearing from getting worse.  Always wear hearing protection around loud noises.  Wear a hearing aid as directed.  A professional can help you pick a hearing aid that will work best for you.  You can also get hearing aids over the counter for mild to moderate hearing loss.  Have hearing tests as your doctor suggests. They can show whether your hearing has changed. Your hearing aid " "may need to be adjusted.  Use other devices as needed. These may include:  Telephone amplifiers and hearing aids that can connect to a television, stereo, radio, or microphone.  Devices that use lights or vibrations. These alert you to the doorbell, a ringing telephone, or a baby monitor.  Television closed-captioning. This shows the words at the bottom of the screen. Most new TVs can do this.  TTY (text telephone). This lets you type messages back and forth on the telephone instead of talking or listening. These devices are also called TDD. When messages are typed on the keyboard, they are sent over the phone line to a receiving TTY. The message is shown on a monitor.  Use text messaging, social media, and email if it is hard for you to communicate by telephone.  Try to learn a listening technique called speechreading. It is not lipreading. You pay attention to people's gestures, expressions, posture, and tone of voice. These clues can help you understand what a person is saying. Face the person you are talking to, and have them face you. Make sure the lighting is good. You need to see the other person's face clearly.  Think about counseling if you need help to adjust to your hearing loss.  When should you call for help?  Watch closely for changes in your health, and be sure to contact your doctor if:    You think your hearing is getting worse.     You have new symptoms, such as dizziness or nausea.   Where can you learn more?  Go to https://www.[employer].net/patiented  Enter R798 in the search box to learn more about \"Hearing Loss: Care Instructions.\"  Current as of: September 27, 2023               Content Version: 14.0    7790-8530 [employer].   Care instructions adapted under license by your healthcare professional. If you have questions about a medical condition or this instruction, always ask your healthcare professional. [employer] disclaims any warranty or liability for your use " of this information.      Learning About Sleeping Well  What does sleeping well mean?     Sleeping well means getting enough sleep to feel good and stay healthy. How much sleep is enough varies among people.  The number of hours you sleep and how you feel when you wake up are both important. If you do not feel refreshed, you probably need more sleep. Another sign of not getting enough sleep is feeling tired during the day.  Experts recommend that adults get at least 7 or more hours of sleep per day. Children and older adults need more sleep.  Why is getting enough sleep important?  Getting enough quality sleep is a basic part of good health. When your sleep suffers, your physical health, mood, and your thoughts can suffer too. You may find yourself feeling more grumpy or stressed. Not getting enough sleep also can lead to serious problems, including injury, accidents, anxiety, and depression.  What might cause poor sleeping?  Many things can cause sleep problems, including:  Changes to your sleep schedule.  Stress. Stress can be caused by fear about a single event, such as giving a speech. Or you may have ongoing stress, such as worry about work or school.  Depression, anxiety, and other mental or emotional conditions.  Changes in your sleep habits or surroundings. This includes changes that happen where you sleep, such as noise, light, or sleeping in a different bed. It also includes changes in your sleep pattern, such as having jet lag or working a late shift.  Health problems, such as pain, breathing problems, and restless legs syndrome.  Lack of regular exercise.  Using alcohol, nicotine, or caffeine before bed.  How can you help yourself?  Here are some tips that may help you sleep more soundly and wake up feeling more refreshed.  Your sleeping area   Use your bedroom only for sleeping and sex. A bit of light reading may help you fall asleep. But if it doesn't, do your reading elsewhere in the house. Try not to  "use your TV, computer, smartphone, or tablet while you are in bed.  Be sure your bed is big enough to stretch out comfortably, especially if you have a sleep partner.  Keep your bedroom quiet, dark, and cool. Use curtains, blinds, or a sleep mask to block out light. To block out noise, use earplugs, soothing music, or a \"white noise\" machine.  Your evening and bedtime routine   Create a relaxing bedtime routine. You might want to take a warm shower or bath, or listen to soothing music.  Go to bed at the same time every night. And get up at the same time every morning, even if you feel tired.  What to avoid   Limit caffeine (coffee, tea, caffeinated sodas) during the day, and don't have any for at least 6 hours before bedtime.  Avoid drinking alcohol before bedtime. Alcohol can cause you to wake up more often during the night.  Try not to smoke or use tobacco, especially in the evening. Nicotine can keep you awake.  Limit naps during the day, especially close to bedtime.  Avoid lying in bed awake for too long. If you can't fall asleep or if you wake up in the middle of the night and can't get back to sleep within about 20 minutes, get out of bed and go to another room until you feel sleepy.  Avoid taking medicine right before bed that may keep you awake or make you feel hyper or energized. Your doctor can tell you if your medicine may do this and if you can take it earlier in the day.  If you can't sleep   Imagine yourself in a peaceful, pleasant scene. Focus on the details and feelings of being in a place that is relaxing.  Get up and do a quiet or boring activity until you feel sleepy.  Avoid drinking any liquids before going to bed to help prevent waking up often to use the bathroom.  Where can you learn more?  Go to https://www.[employer].net/patiented  Enter J942 in the search box to learn more about \"Learning About Sleeping Well.\"  Current as of: July 10, 2023  Content Version: 14.1    8601-1620 [employer].   Care instructions adapted under license by your healthcare professional. If you have questions about a medical condition or this instruction, always ask your healthcare professional. [employer] disclaims any warranty or liability for your use of this information.    Bladder Training: Care Instructions  Your Care Instructions     Bladder training is used to treat urge incontinence and stress incontinence. Urge incontinence means that the need to urinate comes on so fast that you can't get to a toilet in time. Stress incontinence means that you leak urine because of pressure on your bladder. For example, it may happen when you laugh, cough, or lift something heavy.  Bladder training can increase how long you can wait before you have to urinate. It can also help your bladder hold more urine. And it can give you better control over the urge to urinate.  It is important to remember that bladder training takes a few weeks to a few months to make a difference. You may not see results right away, but don't give up.  Follow-up care is a key part of your treatment and safety. Be sure to make and go to all appointments, and call your doctor if you are having problems. It's also a good idea to know your test results and keep a list of the medicines you take.  How can you care for yourself at home?  Work with your doctor to come up with a bladder training program that is right for you. You may use one or more of the following methods.  Delayed urination  In the beginning, try to keep from urinating for 5 minutes after you first feel the need to go.  While you wait, take deep, slow breaths to relax. Kegel exercises can also help you delay the need to go to the bathroom.  After some practice, when you can easily wait 5 minutes to urinate, try to wait 10 minutes before you urinate.  Slowly increase the waiting period until you are able to control when you have to urinate.  Scheduled urination  Empty  "your bladder when you first wake up in the morning.  Schedule times throughout the day when you will urinate.  Start by going to the bathroom every hour, even if you don't need to go.  Slowly increase the time between trips to the bathroom.  When you have found a schedule that works well for you, keep doing it.  If you wake up during the night and have to urinate, do it. Apply your schedule to waking hours only.  Kegel exercises  These tighten and strengthen pelvic muscles, which can help you control the flow of urine. (If doing these exercises causes pain, stop doing them and talk with your doctor.) To do Kegel exercises:  Squeeze your muscles as if you were trying not to pass gas. Or squeeze your muscles as if you were stopping the flow of urine. Your belly, legs, and buttocks shouldn't move.  Hold the squeeze for 3 seconds, then relax for 5 to 10 seconds.  Start with 3 seconds, then add 1 second each week until you are able to squeeze for 10 seconds.  Repeat the exercise 10 times a session. Do 3 to 8 sessions a day.  When should you call for help?  Watch closely for changes in your health, and be sure to contact your doctor if:    Your incontinence is getting worse.     You do not get better as expected.   Where can you learn more?  Go to https://www.[employer].net/patiented  Enter V684 in the search box to learn more about \"Bladder Training: Care Instructions.\"  Current as of: November 15, 2023               Content Version: 14.0    6472-6693 [employer].   Care instructions adapted under license by your healthcare professional. If you have questions about a medical condition or this instruction, always ask your healthcare professional. [employer] disclaims any warranty or liability for your use of this information.      Learning About Depression Screening  What is depression screening?  Depression screening is a way to see if you have depression symptoms. It may be done by a doctor or " "counselor. It's often part of a routine checkup. That's because your mental health is just as important as your physical health.  Depression is a mental health condition that affects how you feel, think, and act. You may:  Have less energy.  Lose interest in your daily activities.  Feel sad and grouchy for a long time.  Depression is very common. It affects people of all ages.  Many things can lead to depression. Some people become depressed after they have a stroke or find out they have a major illness like cancer or heart disease. The death of a loved one or a breakup may lead to depression. It can run in families. Most experts believe that a combination of inherited genes and stressful life events can cause it.  What happens during screening?  You may be asked to fill out a form about your depression symptoms. You and the doctor will discuss your answers. The doctor may ask you more questions to learn more about how you think, act, and feel.  What happens after screening?  If you have symptoms of depression, your doctor will talk to you about your options.  Doctors usually treat depression with medicines or counseling. Often, combining the two works best. Many people don't get help because they think that they'll get over the depression on their own. But people with depression may not get better unless they get treatment.  The cause of depression is not well understood. There may be many factors involved. But if you have depression, it's not your fault.  A serious symptom of depression is thinking about death or suicide. If you or someone you care about talks about this or about feeling hopeless, get help right away.  It's important to know that depression can be treated. Medicine, counseling, and self-care may help.  Where can you learn more?  Go to https://www.healthwise.net/patiented  Enter T185 in the search box to learn more about \"Learning About Depression Screening.\"  Current as of: June 24, 2023  Content " Version: 14.1 2006-2024 [employer].   Care instructions adapted under license by your healthcare professional. If you have questions about a medical condition or this instruction, always ask your healthcare professional. [employer] disclaims any warranty or liability for your use of this information.

## 2024-09-06 NOTE — PROGRESS NOTES
Preventive Care Visit  [facility]  Jaky Gaspar MD, Family Medicine  Sep 6, 2024      Assessment & Plan     Need for shingles vaccine  pharmacy  - zoster vaccine recombinant adjuvanted (SHINGRIX) injection  Dispense: 0.5 mL; Refill: 0    Screen for colon cancer  - MAURILIO([employer])    Stage 3a chronic kidney disease (H)  Cr improved with mild dehydration. Continue glp-1 and arb   - Albumin Random Urine Quantitative with Creat Ratio  - Comprehensive metabolic panel (BMP + Alb, Alk Phos, ALT, AST, Total. Bili, TP)  - Comprehensive metabolic panel (BMP + Alb, Alk Phos, ALT, AST, Total. Bili, TP)  - Albumin Random Urine Quantitative with Creat Ratio    Encounter for Medicare annual wellness exam  - REVIEW OF HEALTH MAINTENANCE PROTOCOL ORDERS    Anemia, unspecified type  Resolved today- continue to monitor   - CBC with platelets  - CBC with platelets    Essential hypertension  BP Readings from Last 3 Encounters:   09/06/24 138/87   08/05/24 112/76   08/02/24 110/74        controlled today.  Plan for bloodpressure management includes ongoing focus on healthy DASH type diet and increased activity, encouraged to avoid tobacco products and limit alcohol use, stress reduction, continue metoprolol ER 100mg ( will switch the 2 50mg to 1 100mg daily) losartan 100mg daily, imdur 60mg daily, lasix 20mg daily.  Labwork and meds ordered and reviewed as below  Potassium   Date Value Ref Range Status   09/06/2024 4.7 3.4 - 5.3 mmol/L Final   06/02/2022 4.0 3.5 - 5.0 mmol/L Final   04/26/2013 3.8 3.5 - 5.0 mmol/L Final      Creatinine   Date Value Ref Range Status   09/06/2024 0.86 0.51 - 0.95 mg/dL Final   04/26/2013 0.82 0.60 - 1.10 mg/dL Final          Pulmonary nodules  Due for follow-up lung CT- will help pt schedule, ordered by pulmonary last winter.      Type 2 diabetes mellitus with diabetic polyneuropathy, without long-term current use of insulin (H)  Un Controlled but improving  Addressed  smoking status and aspirin therapy.  Recommended annual eye exam and dental cares. Reviewed foot cares and foot exam.  Blood pressure and lipid management reviewed today.  Vaccines reviewed and updated.  Plan for glucose management includes ongoing focus on healthy diabetic diet and increased activity, currently on ozempic 2mg and trying to switch to mounjaro 5mg and titrate up insurance pending, metformin XR 500mg bid and titrate up as tolerated per MTM and diabetes education, increase jardiance from 10 to 25mg daily.  Labs ordered as below including:     Hemoglobin A1C   Date Value Ref Range Status   09/06/2024 8.9 (H) 0.0 - 5.6 % Final     Comment:     Normal <5.7%   Prediabetes 5.7-6.4%    Diabetes 6.5% or higher     Note: Adopted from ADA consensus guidelines.   05/17/2024 9.7 (H) 0.0 - 5.6 % Final   11/29/2023 6.9 (H) 0.0 - 5.6 % Final     Comment:     Normal <5.7%   Prediabetes 5.7-6.4%    Diabetes 6.5% or higher     Note: Adopted from ADA consensus guidelines.   01/18/2011 5.7 4.2 - 6.1 % Final    ,   Lab Results   Component Value Date    LDL 68 11/29/2023   ,   Creatinine   Date Value Ref Range Status   09/06/2024 0.86 0.51 - 0.95 mg/dL Final   04/26/2013 0.82 0.60 - 1.10 mg/dL Final        - Albumin Random Urine Quantitative with Creat Ratio  - HEMOGLOBIN A1C  - Comprehensive metabolic panel (BMP + Alb, Alk Phos, ALT, AST, Total. Bili, TP)  - Adult Eye  Referral  - HEMOGLOBIN A1C  - Comprehensive metabolic panel (BMP + Alb, Alk Phos, ALT, AST, Total. Bili, TP)  - Albumin Random Urine Quantitative with Creat Ratio    Encounter for immunization  - INFLUENZA VACCINE IM 18-64 YRS (FLUBLOK)    Red eye  Will help pt schedule with ophth next week if possible.   - Adult Eye  Referral      Patient has been advised of split billing requirements and indicates understanding: Yes        Counseling  Appropriate preventive services were addressed with this patient via screening, questionnaire, or  discussion as appropriate for fall prevention, nutrition, physical activity, Tobacco-use cessation, social engagement, weight loss and cognition.  Checklist reviewing preventive services available has been given to the patient.  Reviewed patient's diet, addressing concerns and/or questions.   The patient was instructed to see the dentist every 6 months.   She is at risk for psychosocial distress and has been provided with information to reduce risk.   Discussed possible causes of fatigue. The patient was provided with written information regarding signs of hearing loss.   Information on urinary incontinence and treatment options given to patient.   The patient's PHQ-9 score is consistent with mild depression. She was provided with information regarding depression.       Shahab   May is a 54 year old, presenting for the following:  Wellness Visit        9/6/2024    12:53 PM   Additional Questions   Roomed by M   Accompanied by self         Health Care Directive  Patient does not have a Health Care Directive or Living Will: Discussed advance care planning with patient; information given to patient to review.    HPI  Lung nodule- plan for surgery but this hasn't happened yet. Pt recalls that she was told that it seems to be stable and could just keep watching it.    Last CT 11/2023   IMPRESSION:   1.  Slight interval increase in the previously described dominant solid nodule in the left upper lobe measuring 1.5 x 1.5 x 2.2 cm x 5 x 1.1 x 2.1 cm. No new nodules are identified. Recommend follow-up as clinically indicated. Consider consultation with pulmonology.    Right eye has been more red and watering.  Right sided headache   Drainage when in the sun     Reviewed meds- no problems with taking them or side effects.    Pill box set up weekly by  and son   Does take weekly glp-1 - both mounjaro and ozempic.    Getting ozempic but trying to switch to mounjaro once approved by insurance.    Checking sugars daily  fasting sugars 110-130 and after eating 140-160s.     Ears are stable- no current drops.   Dry and not bothering her.    Has follow-up with ENG later this month     Last cardiology 8/2024- stable no changes.      Last period was 8 years ago.      Some difficulty swallowing over the past year. Feels like food gets stuck easily and has to make herself throw up to resolve this feeling. Worse if she eats fast.   No choking or coughing.  Better if she eats slowly, fully chews her food, eats softer foods, drinks plenty of water with her food, etc.            9/6/2024   General Health   How would you rate your overall physical health? (!) POOR   Feel stress (tense, anxious, or unable to sleep) Only a little      (!) STRESS CONCERN      9/6/2024   Nutrition   Diet: Regular (no restrictions)    Diabetic       Multiple values from one day are sorted in reverse-chronological order         9/6/2024   Exercise   Days per week of moderate/strenous exercise 5 days   Average minutes spent exercising at this level 10 min            9/6/2024   Social Factors   Frequency of gathering with friends or relatives More than three times a week   Worry food won't last until get money to buy more No   Food not last or not have enough money for food? No   Do you have housing? (Housing is defined as stable permanent housing and does not include staying ouside in a car, in a tent, in an abandoned building, in an overnight shelter, or couch-surfing.) Yes   Are you worried about losing your housing? No   Lack of transportation? No   Unable to get utilities (heat,electricity)? No            9/6/2024   Fall Risk   Fallen 2 or more times in the past year? No   Trouble with walking or balance? Yes   Gait Speed Test (Document in seconds) 5   Gait Speed Test Interpretation Less than or equal to 5.00 seconds - PASS             9/6/2024   Activities of Daily Living- Home Safety   Needs help with the following daily activites None of the above   Safety  concerns in the home None of the above            9/6/2024   Dental   Dentist two times every year? (!) NO            9/6/2024   Hearing Screening   Hearing concerns? (!) IT'S HARD TO FOLLOW A CONVERSATION IN A NOISY RESTAURANT OR CROWDED ROOM.    (!) TROUBLE UNDESTANDING A SPEAKER IN A PUBLIC MEETING OR [employer] SERVICE.       Multiple values from one day are sorted in reverse-chronological order         9/6/2024   Driving Risk Screening   Patient/family members have concerns about driving No            9/6/2024   General Alertness/Fatigue Screening   Have you been more tired than usual lately? (!) YES            9/6/2024   Urinary Incontinence Screening   Bothered by leaking urine in past 6 months Yes             Today's PHQ-9 Score:       9/6/2024    12:57 PM   PHQ-9 SCORE   PHQ-9 Total Score 5         9/6/2024   Substance Use   Alcohol more than 3/day or more than 7/wk No   Do you have a current opioid prescription? No   How severe/bad is pain from 1 to 10? 8/10   Do you use any other substances recreationally? No        Social History     Tobacco Use    Smoking status: Never     Passive exposure: Never    Smokeless tobacco: Never    Tobacco comments:     no passive exposure   Substance Use Topics    Alcohol use: No    Drug use: No           6/30/2023   LAST FHS-7 RESULTS   1st degree relative breast or ovarian cancer Yes   Any relative bilateral breast cancer No   Any male have breast cancer No   Any ONE woman have BOTH breast AND ovarian cancer No   Any woman with breast cancer before 50yrs No   2 or more relatives with breast AND/OR ovarian cancer No   2 or more relatives with breast AND/OR bowel cancer No           Mammogram Screening - Annual screen due to history of breast cancer, carcinoma in situ, or hyperplasia      History of abnormal Pap smear: No - age 30- 64 PAP with HPV every 5 years recommended        Latest Ref Rng & Units 6/2/2022     3:27 PM 2/8/2016     1:07 PM   PAP / HPV   PAP  Negative for  Intraepithelial Lesion or Malignancy (NILM)  Negative for squamous intraepithelial lesion or malignancy  Electronically signed by Ana Mcfarland CT (ASCP) on 2/12/2016 at 11:06 AM      HPV 16 DNA Negative Negative     HPV 18 DNA Negative Negative     Other HR HPV Negative Negative       ASCVD Risk   The 10-year ASCVD risk score (Chantel LOPEZ, et al., 2019) is: 3.5%    Values used to calculate the score:      Age: 54 years      Sex: Female      Is Non- : No      Diabetic: Yes      Tobacco smoker: No      Systolic Blood Pressure: 138 mmHg      Is BP treated: Yes      HDL Cholesterol: 83 mg/dL      Total Cholesterol: 176 mg/dL          Reviewed and updated as needed this visit by Provider   Tobacco  Allergies  Meds  Problems  Med Hx  Surg Hx  Fam Hx  Soc   Hx Sexual Activity            Current providers sharing in care for this patient include:  Patient Care Team:  Jaky Gaspar MD as PCP - General (Family Practice)  Wendy Lind MD as MD (Pulmonary Disease)  Art Thakkar, PharmD as Pharmacist (Pharmacist)  Elisabet Malone CHW as Community Health Worker (Primary Care - CC)  Nickie Cuevas, RN as Lead Care Coordinator (Primary Care - CC)  Jaky Gaspar MD as Assigned PCP  Jose Maria Bass MD as Assigned Surgical Provider  Philip Anderson MD as MD (Cardiovascular & Thoracic Surgery)  Art Thakkar, PharmD as Assigned MTM Pharmacist  Sendy Banda APRN CNP as Assigned Heart and Vascular Provider    The following health maintenance items are reviewed in Epic and correct as of today:  Health Maintenance   Topic Date Due    HF ACTION PLAN  Never done    COLORECTAL CANCER SCREENING  Never done    ZOSTER IMMUNIZATION (2 of 2) 12/23/2023    DIABETIC FOOT EXAM  10/27/2024    LIPID  11/29/2024    A1C  12/06/2024    PHQ-9  12/06/2024    BMP  03/06/2025    MAMMO SCREENING  06/30/2025    MEDICARE ANNUAL WELLNESS VISIT  09/06/2025    ALT  09/06/2025     "MICROALBUMIN  09/06/2025    ANNUAL REVIEW OF HM ORDERS  09/06/2025    CBC  09/06/2025    HEMOGLOBIN  09/06/2025    EYE EXAM  09/09/2025    HPV TEST  06/02/2027    PAP  06/02/2027    ADVANCE CARE PLANNING  09/06/2029    DTAP/TDAP/TD IMMUNIZATION (4 - Td or Tdap) 12/07/2031    TSH W/FREE T4 REFLEX  Completed    HEPATITIS C SCREENING  Completed    HIV SCREENING  Completed    DEPRESSION ACTION PLAN  Completed    INFLUENZA VACCINE  Completed    Pneumococcal Vaccine: Pediatrics (0 to 5 Years) and At-Risk Patients (6 to 64 Years)  Completed    URINALYSIS  Completed    COVID-19 Vaccine  Completed    HPV IMMUNIZATION  Aged Out    MENINGITIS IMMUNIZATION  Aged Out    RSV MONOCLONAL ANTIBODY  Aged Out    HEPATITIS B IMMUNIZATION  Discontinued            Objective    Exam  /87 (BP Location: Right arm, Patient Position: Sitting, Cuff Size: Adult Large)   Pulse 82   Temp 97.4  F (36.3  C) (Temporal)   Resp 21   Ht 1.5 m (4' 11.06\")   Wt 93 kg (205 lb)   SpO2 98%   BMI 41.33 kg/m     Estimated body mass index is 41.33 kg/m  as calculated from the following:    Height as of this encounter: 1.5 m (4' 11.06\").    Weight as of this encounter: 93 kg (205 lb).    Physical Exam  Complete 10 point ROS completed today as part of the exam and patient denies any symptoms as reviewed in HPI     Wt Readings from Last 3 Encounters:   09/06/24 93 kg (205 lb)   08/05/24 92.5 kg (204 lb)   05/17/24 97.1 kg (214 lb)     Weight 10/2023 212lb     No LMP recorded. Patient is postmenopausal.    All normal as below except abnormalities include: right eye with red film/lateral of the iris that appears inflammed.    General is a  54 year old sitting comfortably in no apparent distress   HEENT:  TM are clear bilaterally.  Eye exams within normal   Neck: Supple without lymphadenopathy or thyromegally  CV: Regular rate and rhythm S1S2 without rubs, murmurs or gallops,   Lungs: Clear to auscultation bilaterally  Abd:  +BS, soft NT/ND,  No masses " or organomegally,   Extremities: Warm, No Edema, 2+ Pedal and radial pulses bilaterally  Skin: No lesions or rashes noted  Neuro: Able to ambulate around the exam room with equal movement, strength and normal coordination of the upper and lower extremeties symmetrically    History summarized from1-2:cardiology 2024, ent 2024   Old Records-1: Outside allergies, meds, problems and immunizations were reconciled as needed from CareEverywhere  Radiology tests reviewed-1: CT 2023   IMPRESSION:   1.  Lobulated semisolid predominantly ground-glass left upper lobe nodule has significantly enlarged since 2020 raising possibility of low-grade/indolent adenocarcinoma. Infectious/inflammatory etiology consider less likely. Recommend pulmonary   consultation for further management and consideration tissue diagnosis. Percutaneous biopsy would be challenging given little soft tissue component.  2.  Lungs otherwise clear. No other new/enlarging nodules.  3.  No lymphadenopathy.  Lab tests reviewed-1: 2024      Follow-up Visit   Expected date:  Jan 06, 2025 (Approximate)      Follow Up Appointment Details:     Follow-up with whom?: Me    Follow-Up for what?: Chronic Disease f/u    Chronic Disease f/u:  Diabetes  Hypertension  Hyperlipidemia  CKD       How?: In Person             Follow-up Visit   Expected date:  Sep 13, 2025 (Approximate)      Follow Up Appointment Details:     Follow-up with whom?: PCP    Follow-Up for what?: Medicare Wellness    Welcome or Annual?: Annual Wellness    How?: In Person                     Jaky Gaspar MD         9/6/2024   Mini Cog   Clock Draw Score 2 Normal   3 Item Recall 0 objects recalled   Mini Cog Total Score 2                 Signed Electronically by: Jaky Gaspar MD      Prior to immunization administration, verified patients identity using patient s name and date of birth. Please see Immunization Activity for additional information.     Screening Questionnaire for Adult Immunization    Are  you sick today?   No   Do you have allergies to medications, food, a vaccine component or latex?   No   Have you ever had a serious reaction after receiving a vaccination?   No   Do you have a long-term health problem with heart, lung, kidney, or metabolic disease (e.g., diabetes), asthma, a blood disorder, no spleen, complement component deficiency, a cochlear implant, or a spinal fluid leak?  Are you on long-term aspirin therapy?   Yes   Do you have cancer, leukemia, HIV/AIDS, or any other immune system problem?   No   Do you have a parent, brother, or sister with an immune system problem?   No   In the past 3 months, have you taken medications that affect  your immune system, such as prednisone, other steroids, or anticancer drugs; drugs for the treatment of rheumatoid arthritis, Crohn s disease, or psoriasis; or have you had radiation treatments?   No   Have you had a seizure, or a brain or other nervous system problem?   No   During the past year, have you received a transfusion of blood or blood    products, or been given immune (gamma) globulin or antiviral drug?   No   For women: Are you pregnant or is there a chance you could become       pregnant during the next month?   No   Have you received any vaccinations in the past 4 weeks?   No     Immunization questionnaire was positive for at least one answer.  Notified .      Patient instructed to remain in clinic for 15 minutes afterwards, and to report any adverse reactions.     Screening performed by ANN Jc MA on 9/6/2024 at 1:08 PM.   Answers submitted by the patient for this visit:  Patient Health Questionnaire (Submitted on 9/6/2024)  PHQ9 TOTAL SCORE: Incomplete

## 2024-09-09 ENCOUNTER — OFFICE VISIT (OUTPATIENT)
Dept: OPHTHALMOLOGY | Facility: CLINIC | Age: 54
End: 2024-09-09
Attending: OPTOMETRIST
Payer: MEDICARE

## 2024-09-09 DIAGNOSIS — H04.123 DRY EYES, BILATERAL: ICD-10-CM

## 2024-09-09 DIAGNOSIS — H10.433 CHRONIC FOLLICULAR CONJUNCTIVITIS OF BOTH EYES: Primary | ICD-10-CM

## 2024-09-09 DIAGNOSIS — Z01.00 DIABETIC EYE EXAM (H): ICD-10-CM

## 2024-09-09 DIAGNOSIS — H52.203 MYOPIA OF BOTH EYES WITH ASTIGMATISM AND PRESBYOPIA: ICD-10-CM

## 2024-09-09 DIAGNOSIS — H52.4 MYOPIA OF BOTH EYES WITH ASTIGMATISM AND PRESBYOPIA: ICD-10-CM

## 2024-09-09 DIAGNOSIS — H52.13 MYOPIA OF BOTH EYES WITH ASTIGMATISM AND PRESBYOPIA: ICD-10-CM

## 2024-09-09 DIAGNOSIS — E11.9 DIABETIC EYE EXAM (H): ICD-10-CM

## 2024-09-09 PROCEDURE — 92014 COMPRE OPH EXAM EST PT 1/>: CPT | Performed by: OPTOMETRIST

## 2024-09-09 PROCEDURE — T1013 SIGN LANG/ORAL INTERPRETER: HCPCS | Mod: U4

## 2024-09-09 PROCEDURE — G0463 HOSPITAL OUTPT CLINIC VISIT: HCPCS | Performed by: OPTOMETRIST

## 2024-09-09 PROCEDURE — 92015 DETERMINE REFRACTIVE STATE: CPT | Mod: GY

## 2024-09-09 RX ORDER — ERYTHROMYCIN 5 MG/G
0.5 OINTMENT OPHTHALMIC AT BEDTIME
Qty: 5 G | Refills: 1 | Status: SHIPPED | OUTPATIENT
Start: 2024-09-09 | End: 2024-09-23

## 2024-09-09 ASSESSMENT — VISUAL ACUITY
OS_SC: 20/50
OD_PH_SC: 20/30
METHOD: SNELLEN - LINEAR
OS_PH_SC: 20/30
OD_SC: 20/80

## 2024-09-09 ASSESSMENT — REFRACTION_MANIFEST
OS_ADD: +2.25
OS_CYLINDER: +0.25
OS_SPHERE: -1.00
OD_AXIS: 100
OD_SPHERE: -1.50
OD_CYLINDER: +0.50
OD_ADD: +2.25
OS_AXIS: 030

## 2024-09-09 ASSESSMENT — SLIT LAMP EXAM - LIDS
COMMENTS: NORMAL
COMMENTS: NORMAL

## 2024-09-09 ASSESSMENT — EXTERNAL EXAM - RIGHT EYE: OD_EXAM: NORMAL

## 2024-09-09 ASSESSMENT — TONOMETRY
OS_IOP_MMHG: 18
IOP_METHOD: TONOPEN
OD_IOP_MMHG: 18

## 2024-09-09 ASSESSMENT — CONF VISUAL FIELD
METHOD: COUNTING FINGERS
OD_SUPERIOR_NASAL_RESTRICTION: 0
OD_INFERIOR_TEMPORAL_RESTRICTION: 0
OD_SUPERIOR_TEMPORAL_RESTRICTION: 0
OD_INFERIOR_NASAL_RESTRICTION: 0
OD_NORMAL: 1

## 2024-09-09 ASSESSMENT — CUP TO DISC RATIO
OD_RATIO: 0.25
OS_RATIO: 0.25

## 2024-09-09 ASSESSMENT — EXTERNAL EXAM - LEFT EYE: OS_EXAM: NORMAL

## 2024-09-09 NOTE — PATIENT INSTRUCTIONS
NO DBR each eye   Explained diabetes and the eyes   Reinforced BS control    Pt has matter and watery eyes in the morning   Has to clean out matter to see  New prescription for glasses   Erythro ointment at night after warm compresses and lid cleaning  Morning and night   Recheck 1 week

## 2024-09-09 NOTE — NURSING NOTE
Chief Complaints and History of Present Illnesses   Patient presents with    Diabetic Eye Exam Follow Up     New patient for DM eye exam.       Chief Complaint(s) and History of Present Illness(es)       Diabetic Eye Exam Follow Up              Vision: is blurred for near and is blurred for distance    Associated symptoms: blurred vision and discharge    Treatments tried: no treatments    Pain scale: 2/10    Comments: New patient for DM eye exam.                Comments    The patient reports right eye discharge and discomfort.    The patient uses OTC eye glasses but they are not helpful.  Lab Results       Component                Value               Date                       A1C                      8.9                 09/06/2024                 A1C                      9.7                 05/17/2024                 A1C                      6.9                 11/29/2023                 A1C                      7.4                 10/03/2023                 A1C                      8.8                 06/23/2023                 A1C                      5.7                 01/18/2011             Valeria Randall COA, COA 9:21 AM 09/09/2024

## 2024-09-09 NOTE — PROGRESS NOTES
Assessment & Plan       Leora Sanchez is a 54 year old female with the following diagnoses:   1. Chronic follicular conjunctivitis of both eyes - Both Eyes    2. Dry eyes, bilateral - Both Eyes    3. Myopia of both eyes with astigmatism and presbyopia - Both Eyes    4. Diabetic eye exam (H) - Both Eyes        Pt has matter and watery eyes in the morning   Has to clean out matter to see  New prescription for glasses     NO DBR each eye   Explained diabetes and the eyes   Reinforced BS control  Erythro ointment at night after warm compresses and lid cleaning  Morning and night   Recheck 1 week   Yearly exam   Patient disposition:   Return in about 1 week (around 9/16/2024).          Complete documentation of historical and exam elements from today's encounter can be found in the full encounter summary report (not reduplicated in this progress note). I personally obtained the chief complaint(s) and history of present illness.  I confirmed and edited as necessary the review of systems, past medical/surgical history, family history, social history, and examination findings as documented by others; and I examined the patient myself. I personally reviewed the relevant tests, images, and reports as documented above. I formulated and edited as necessary the assessment and plan and discussed the findings and management plan with the patient and family.  Dr. Shaka Rossi

## 2024-09-10 ENCOUNTER — TELEPHONE (OUTPATIENT)
Dept: FAMILY MEDICINE | Facility: CLINIC | Age: 54
End: 2024-09-10
Payer: MEDICARE

## 2024-09-10 RX ORDER — METOPROLOL SUCCINATE 100 MG/1
100 TABLET, EXTENDED RELEASE ORAL DAILY
Qty: 90 TABLET | Refills: 1 | Status: SHIPPED | OUTPATIENT
Start: 2024-09-10

## 2024-09-10 NOTE — RESULT ENCOUNTER NOTE
Team - please call patient with results and send result letter with this information if patient desires for their records. Refer to [employer] result note as needed.      Her kidney tests are healthy   Her liver tests are still elevated from the fat stored but stable     Her sugars are better but still higher than last fall   I am going to make her jardiance higher from 10mg to 25mg daily- 1 pill a day of new rx sent to pharmacy today.     Normal blood counts- no anemia     I am going to change her metoprolol 2 of the 50mg tabs to 1 of the 100mg tabs daily to help decrease her pill burden

## 2024-09-10 NOTE — TELEPHONE ENCOUNTER
----- Message from Jaky Gaspar sent at 9/10/2024 12:49 PM CDT -----  Team - please call patient with results and send result letter with this information if patient desires for their records. Refer to [facility] result note as needed.      Her kidney tests are healthy   Her liver tests are still elevated from the fat stored but stable     Her sugars are better but still higher than last fall   I am going to make her jardiance higher from 10mg to 25mg daily- 1 pill a day of new rx sent to pharmacy today.     Normal blood counts- no anemia     I am going to change her metoprolol 2 of the 50mg tabs to 1 of the 100mg tabs daily to help decrease her pill burden

## 2024-09-10 NOTE — LETTER
September 26, 2024      Leora Sanchez  [address] [place] [address]        Dear ,    We are writing to inform you of your test results from your 9/6/24 appointment with Dr. Gaspar.    Your kidney tests are healthy.   Your liver tests are still elevated from the fat stored but stable.     Your sugars are better, but still higher than last year.  I am going to make your Jardiance dose higher. I sent an order for Jardiance 25mg - 1 pill a day to your pharmacy.     Normal blood counts- no anemia.     I am going to change your metoprolol to one of the 100mg tabs daily to help decrease your pill burden.    Resulted Orders   HEMOGLOBIN A1C   Result Value Ref Range    Hemoglobin A1C 8.9 (H) 0.0 - 5.6 %      Comment:      Normal <5.7%   Prediabetes 5.7-6.4%    Diabetes 6.5% or higher     Note: Adopted from ADA consensus guidelines.    Narrative    Results consistent with previous, repeat testing unnecessary    Comprehensive metabolic panel (BMP + Alb, Alk Phos, ALT, AST, Total. Bili, TP)   Result Value Ref Range    Sodium 140 135 - 145 mmol/L    Potassium 4.7 3.4 - 5.3 mmol/L    Carbon Dioxide (CO2) 22 22 - 29 mmol/L    Anion Gap 12 7 - 15 mmol/L    Urea Nitrogen 24.9 (H) 6.0 - 20.0 mg/dL    Creatinine 0.86 0.51 - 0.95 mg/dL    GFR Estimate 79 >60 mL/min/1.73m2      Comment:      eGFR calculated using 2021 CKD-EPI equation.    Calcium 9.5 8.8 - 10.4 mg/dL      Comment:      Reference intervals for this test were updated on 7/16/2024 to reflect our healthy population more accurately. There may be differences in the flagging of prior results with similar values performed with this method. Those prior results can be interpreted in the context of the updated reference intervals.    Chloride 106 98 - 107 mmol/L    Glucose 100 (H) 70 - 99 mg/dL    Alkaline Phosphatase 94 40 - 150 U/L    AST 69 (H) 0 - 45 U/L     (H) 0 - 50 U/L    Protein Total 6.4 6.4 - 8.3 g/dL    Albumin 3.7 3.5 - 5.2 g/dL    Bilirubin Total  0.7 <=1.2 mg/dL   CBC with platelets   Result Value Ref Range    WBC Count 8.2 4.0 - 11.0 10e3/uL    RBC Count 4.24 3.80 - 5.20 10e6/uL    Hemoglobin 13.3 11.7 - 15.7 g/dL    Hematocrit 41.9 35.0 - 47.0 %    MCV 99 78 - 100 fL    MCH 31.4 26.5 - 33.0 pg    MCHC 31.7 31.5 - 36.5 g/dL    RDW 13.0 10.0 - 15.0 %    Platelet Count 197 150 - 450 10e3/uL   Albumin Random Urine Quantitative with Creat Ratio   Result Value Ref Range    Creatinine Urine mg/dL 88.7 mg/dL      Comment:      The reference ranges have not been established in urine creatinine. The results should be integrated into the clinical context for interpretation.    Albumin Urine mg/L <12.0 mg/L      Comment:      The reference ranges have not been established in urine albumin. The results should be integrated into the clinical context for interpretation.    Albumin Urine mg/g Cr        Comment:      Unable to calculate, urine albumin and/or urine creatinine is outside detectable limits.  Microalbuminuria is defined as an albumin:creatinine ratio of 17 to 299 for males and 25 to 299 for females. A ratio of albumin:creatinine of 300 or higher is indicative of overt proteinuria.  Due to biologic variability, positive results should be confirmed by a second, first-morning random or 24-hour timed urine specimen. If there is discrepancy, a third specimen is recommended. When 2 out of 3 results are in the microalbuminuria range, this is evidence for incipient nephropathy and warrants increased efforts at glucose control, blood pressure control, and institution of therapy with an angiotensin-converting-enzyme (ACE) inhibitor (if the patient can tolerate it).         If you have any questions or concerns, please call the clinic at the number listed above.         Sincerely,      Primary Care RN for Jaky Gaspar MD

## 2024-09-16 ENCOUNTER — OFFICE VISIT (OUTPATIENT)
Dept: OTOLARYNGOLOGY | Facility: CLINIC | Age: 54
End: 2024-09-16
Payer: MEDICARE

## 2024-09-16 DIAGNOSIS — Z98.890 HISTORY OF EAR SURGERY: Primary | ICD-10-CM

## 2024-09-16 DIAGNOSIS — H90.A31 MIXED CONDUCTIVE AND SENSORINEURAL HEARING LOSS OF RIGHT EAR WITH RESTRICTED HEARING OF LEFT EAR: ICD-10-CM

## 2024-09-16 PROCEDURE — T1013 SIGN LANG/ORAL INTERPRETER: HCPCS | Mod: U3

## 2024-09-16 PROCEDURE — 99213 OFFICE O/P EST LOW 20 MIN: CPT | Performed by: OTOLARYNGOLOGY

## 2024-09-16 NOTE — LETTER
9/16/2024      Leora Sanchez  [address] [place] [address]      Dear Colleague,    Thank you for referring your patient, Leora Sanchez, to the [facility]. Please see a copy of my visit note below.    FOLLOW UP VISIT NOTE    Patient presents with:  RECHECK: Follow up right middle ear growth, drainage. Gel foam packed on 6/7/24 - audio done same day. No more pain or drainage.        HISTORY OF PRESENT ILLNESS    May was seen in follow up after previous 6/7/2024 visit for recheck.  The right ear is not longer draining or surgical.    History of ear surgery 1995 ([facility]) for ? Infection.  She has a second surgery on the right ear in 7-8 years ago by Dr. Thomas (retired now) for TM perforation and dizziness.         REVIEW OF SYSTEMS    Review of Systems: a 10-system review is reviewed at this encounter.  See scanned document.       No Known Allergies        PHYSICAL EXAM:        HEAD: Normal appearance and symmetry:  No cutaneous lesions.      EAR Exam:     RIGHT: surgical ear anterior/posterior peforations and cartilage graft in between; large meatoplasty   LEFT:    monomeric portions present (inferiorly)  NOSE:    Dorsum:   straight       ORAL CAVITY/OROPHARYNX:    Lips:  Normal.     NECK:  Trachea:  midline     NEURO:   Alert and Oriented    GAIT AND STATION:  normal     RESPIRATORY:   Symmetry and Respiratory effort    PSYCH:   normal mood and affect    SKIN:  warm and dry         IMPRESSION:   Encounter Diagnoses   Name Primary?     History of ear surgery Yes     Mixed conductive and sensorineural hearing loss of right ear with restricted hearing of left ear             RECOMMENDATIONS:    Water precautions;  Consider trial of hearing aids (left ear)  Return 6 months      Again, thank you for allowing me to participate in the care of your patient.        Sincerely,        Jose Maria Bass MD

## 2024-09-16 NOTE — PROGRESS NOTES
FOLLOW UP VISIT NOTE    Patient presents with:  RECHECK: Follow up right middle ear growth, drainage. Gel foam packed on 6/7/24 - audio done same day. No more pain or drainage.        HISTORY OF PRESENT ILLNESS    May was seen in follow up after previous 6/7/2024 visit for recheck.  The right ear is not longer draining or surgical.    History of ear surgery 1995 ([facility]) for ? Infection.  She has a second surgery on the right ear in 7-8 years ago by Dr. Thomas (retired now) for TM perforation and dizziness.         REVIEW OF SYSTEMS    Review of Systems: a 10-system review is reviewed at this encounter.  See scanned document.       No Known Allergies        PHYSICAL EXAM:        HEAD: Normal appearance and symmetry:  No cutaneous lesions.      EAR Exam:     RIGHT: surgical ear anterior/posterior peforations and cartilage graft in between; large meatoplasty   LEFT:    monomeric portions present (inferiorly)  NOSE:    Dorsum:   straight       ORAL CAVITY/OROPHARYNX:    Lips:  Normal.     NECK:  Trachea:  midline     NEURO:   Alert and Oriented    GAIT AND STATION:  normal     RESPIRATORY:   Symmetry and Respiratory effort    PSYCH:   normal mood and affect    SKIN:  warm and dry         IMPRESSION:   Encounter Diagnoses   Name Primary?    History of ear surgery Yes    Mixed conductive and sensorineural hearing loss of right ear with restricted hearing of left ear             RECOMMENDATIONS:    Water precautions;  Consider trial of hearing aids (left ear)  Return 6 months

## 2024-09-16 NOTE — PATIENT INSTRUCTIONS
You were seen in the ENT Clinic today by Dr. Bass. If you have any questions or concerns after your appointment, please contact us (see below).    2.   Please return to clinic in 6 months    3.   Consider a trial of hearing aids    4.   Keep your ear dry- use a cotton ball with Vaseline on it while you are in the shower or bath. Use ear plugs if swimming.      How to Contact Us:  Send a [employer] message to your provider. Our team will respond to you via [employer]. Occasionally, we will need to call you to get further information.  For urgent matters (Monday-Friday), call the ENT Clinic: [phone] and speak with a call center team member - they will route your call appropriately.   If you'd like to speak directly with a nurse, please find our contact information below. We do our best to check voicemail frequently throughout the day, and will work to call you back within 1-2 days. For urgent matters, please use the general clinic phone numbers listed above.      Liz Trevizo RN  [facility]  ENT  [facility]  [address] [place] [id number]  [email or web address]  Office: [phone]  Fax: [phone]

## 2024-09-17 ENCOUNTER — OFFICE VISIT (OUTPATIENT)
Dept: OPHTHALMOLOGY | Facility: CLINIC | Age: 54
End: 2024-09-17
Attending: OPHTHALMOLOGY
Payer: MEDICARE

## 2024-09-17 DIAGNOSIS — H01.002 BLEPHARITIS OF LOWER EYELIDS OF BOTH EYES, UNSPECIFIED TYPE: Primary | ICD-10-CM

## 2024-09-17 DIAGNOSIS — H01.005 BLEPHARITIS OF LOWER EYELIDS OF BOTH EYES, UNSPECIFIED TYPE: Primary | ICD-10-CM

## 2024-09-17 PROCEDURE — G0463 HOSPITAL OUTPT CLINIC VISIT: HCPCS

## 2024-09-17 PROCEDURE — 99204 OFFICE O/P NEW MOD 45 MIN: CPT | Mod: GC | Performed by: OPHTHALMOLOGY

## 2024-09-17 RX ORDER — NEOMYCIN SULFATE, POLYMYXIN B SULFATE, AND DEXAMETHASONE 3.5; 10000; 1 MG/G; [USP'U]/G; MG/G
0.5 OINTMENT OPHTHALMIC 2 TIMES DAILY
Qty: 3.5 G | Refills: 0 | Status: SHIPPED | OUTPATIENT
Start: 2024-09-17

## 2024-09-17 ASSESSMENT — VISUAL ACUITY
METHOD: SNELLEN - LINEAR
OS_SC+: +3
OD_SC: 20/50
OD_PH_SC: 20/20
CORRECTION_TYPE: GLASSES
OD_SC+: +2
OS_SC: 20/50

## 2024-09-17 ASSESSMENT — TONOMETRY
OD_IOP_MMHG: 14
IOP_METHOD: TONOPEN
OS_IOP_MMHG: 15

## 2024-09-17 NOTE — PROGRESS NOTES
"HPI       Conjunctivitis Follow Up    In both eyes.  Associated symptoms include foreign body sensation, tearing and discharge.  Negative for eye pain.  Pain was noted as 0/10.  Treatments tried include ointment and warm compresses. Additional comments: Visit for follow up for Chronic follicular conjunctivitis of both eyes - Both Eyes              Comments    Patient reports \"same\" since last visit, and stable vision.  Patient reports gritty each eye.     Interpeter via phone: [phone] Elisabet See     Ocular Meds:   Erythromycin goran BID  W/c BID or more if home   L/s BID     DM.   Lab Results       Component                Value               Date                       A1C                      8.9                 09/06/2024                 A1C                      9.7                 05/17/2024                 A1C                      6.9                 11/29/2023                 A1C                      7.4                 10/03/2023                 A1C                      8.8                 06/23/2023                    Janessa Gautam OA 8:51 AM September 17, 2024             Last edited by Janessa Gautam on 9/17/2024  8:52 AM.        History obtained via interpretor     Leora Sanchez is a 53 yo female with history of T2DM who presents for one week follow up. Patient originally contacted our triage line complaining of ability to press on the eye and express yellow/green discharge where the eye meets the nose, although that has not been a problem recently. She also noticed a \"peasized bump\"  in the area for 1-2 years and fluid would come out when she'd press on the bump. Eye was red for the past week.    She was seen by Dr. Rossi on 9/9. Exam at that time noted 1+ injection of both eyes - otherwise normal. She was diagnosed with chronic follicular conjunctivitis of both eyes and started on erythromycin ointment at night after warm compresses and lid cleaning.    Today she states she has been using erythromycin BID and " "warm compresses 2-3x per day for one week but has not had any improvement in \"sandiness\" sensation. She is irritated in both eyes but right eye is worse.     Review of systems for the eyes was negative other than the pertinent positives/negatives listed in the HPI.      Assessment & Plan      May X Daniel is a 54 year old female with the following diagnoses:   1. 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 She was previously diagnosed last week with chronic follicular conjunctivitis of both eyes - no follicles or papillae noted on exam today. She has signs of MGD and blepharitis which is the most likely etiology of her FBS. If drainage from right eye persists after full trial of treatment of eyelid inflammation we will consider plastics referral for evaluation of right NLDO.  - Stop erythromycin goran   - Start maxitrol ointment BID, both eyes  - Continue warm compresses at least BID, both eyes    Patient disposition: Follow up with optometry in two months  Return in about 2 months (around 11/17/2024) for Undilated follow up with optometry (Mya or Nico).         Attending Physician Attestation:  Complete documentation of historical and exam elements from today's encounter can be found in the full encounter summary report (not reduplicated in this progress note).  I personally obtained the chief complaint(s) and history of present illness.  I confirmed and edited as necessary the review of systems, past medical/surgical history, family history, social history, and examination findings as documented by others; and I examined the patient myself.  I personally reviewed the relevant tests, images, and reports as documented above.  I formulated and edited as necessary the assessment and plan and discussed the findings and management plan with the patient and family. . - " Jorge Cai MD

## 2024-09-17 NOTE — PATIENT INSTRUCTIONS
Please stop erythromycin ointment.  Please start maxitrol ointment twice daily  Please use warm compresses at least twice daily both eyes    Warm compresses process: Heat up a warm compress (uncooked rice in a new, clean sock) to a safe temperature in the microwave, place it over the closed eye for 5 minutes, remove it from the eye, and then massage your eyelids gently (put your finger on your bottom eyelid and roll up in the direction of your eyeball, then put your finger on your top eyelid and roll down in the direction of your eyeball).

## 2024-09-17 NOTE — NURSING NOTE
"Chief Complaints and History of Present Illnesses   Patient presents with    Conjunctivitis Follow Up     Visit for follow up for Chronic follicular conjunctivitis of both eyes - Both Eyes      Chief Complaint(s) and History of Present Illness(es)       Conjunctivitis Follow Up              Laterality: both eyes    Associated symptoms: foreign body sensation, tearing and discharge.  Negative for eye pain    Pain scale: 0/10    Treatments tried: ointment and warm compresses    Comments: Visit for follow up for Chronic follicular conjunctivitis of both eyes - Both Eyes               Comments    Patient reports \"same\" since last visit, and stable vision.  Patient reports gritty each eye.     Interpeter via phone: [phone] Elisabet See     Ocular Meds:   Erythromycin goran BID  W/c BID or more if home   L/s BID     DM.   Lab Results       Component                Value               Date                       A1C                      8.9                 09/06/2024                 A1C                      9.7                 05/17/2024                 A1C                      6.9                 11/29/2023                 A1C                      7.4                 10/03/2023                 A1C                      8.8                 06/23/2023                    Janessa CARMICHAEL 8:51 AM September 17, 2024                      "

## 2024-09-19 ASSESSMENT — EXTERNAL EXAM - LEFT EYE: OS_EXAM: TATTOO LINER

## 2024-09-19 ASSESSMENT — EXTERNAL EXAM - RIGHT EYE: OD_EXAM: TATTOO LINER

## 2024-09-20 ENCOUNTER — APPOINTMENT (OUTPATIENT)
Dept: INTERPRETER SERVICES | Facility: CLINIC | Age: 54
End: 2024-09-20
Payer: MEDICARE

## 2024-09-20 NOTE — TELEPHONE ENCOUNTER
Pt called with   Attempted twice but voice mail not set up   Pt will need to be called again    Saskia SANCHEZ RN, BSN  [facility]

## 2024-09-23 ENCOUNTER — HOSPITAL ENCOUNTER (OUTPATIENT)
Facility: HOSPITAL | Age: 54
Setting detail: OBSERVATION
Discharge: HOME | End: 2024-09-24
Attending: EMERGENCY MEDICINE | Admitting: EMERGENCY MEDICINE
Payer: MEDICARE

## 2024-09-23 ENCOUNTER — APPOINTMENT (OUTPATIENT)
Dept: CT IMAGING | Facility: HOSPITAL | Age: 54
End: 2024-09-23
Attending: EMERGENCY MEDICINE
Payer: MEDICARE

## 2024-09-23 DIAGNOSIS — I95.9 HYPOTENSION, UNSPECIFIED HYPOTENSION TYPE: ICD-10-CM

## 2024-09-23 DIAGNOSIS — R91.8 LUNG MASS: Primary | ICD-10-CM

## 2024-09-23 LAB
ALBUMIN SERPL BCG-MCNC: 4.2 G/DL (ref 3.5–5.2)
ALBUMIN UR-MCNC: 10 MG/DL
ALP SERPL-CCNC: 122 U/L (ref 40–150)
ALT SERPL W P-5'-P-CCNC: 169 U/L (ref 0–50)
ANION GAP SERPL CALCULATED.3IONS-SCNC: 16 MMOL/L (ref 7–15)
APPEARANCE UR: CLEAR
AST SERPL W P-5'-P-CCNC: 141 U/L (ref 0–45)
BACTERIA #/AREA URNS HPF: ABNORMAL /HPF
BASOPHILS # BLD AUTO: 0.1 10E3/UL (ref 0–0.2)
BASOPHILS NFR BLD AUTO: 1 %
BILIRUB DIRECT SERPL-MCNC: <0.2 MG/DL (ref 0–0.3)
BILIRUB SERPL-MCNC: 0.4 MG/DL
BILIRUB UR QL STRIP: NEGATIVE
BUN SERPL-MCNC: 27 MG/DL (ref 6–20)
CALCIUM SERPL-MCNC: 10.1 MG/DL (ref 8.8–10.4)
CHLORIDE SERPL-SCNC: 99 MMOL/L (ref 98–107)
COLOR UR AUTO: ABNORMAL
CREAT SERPL-MCNC: 1.8 MG/DL (ref 0.51–0.95)
EGFRCR SERPLBLD CKD-EPI 2021: 33 ML/MIN/1.73M2
EOSINOPHIL # BLD AUTO: 0.1 10E3/UL (ref 0–0.7)
EOSINOPHIL NFR BLD AUTO: 1 %
ERYTHROCYTE [DISTWIDTH] IN BLOOD BY AUTOMATED COUNT: 13.4 % (ref 10–15)
FLUAV RNA SPEC QL NAA+PROBE: NEGATIVE
FLUBV RNA RESP QL NAA+PROBE: NEGATIVE
GLUCOSE BLDC GLUCOMTR-MCNC: 225 MG/DL (ref 70–99)
GLUCOSE SERPL-MCNC: 235 MG/DL (ref 70–99)
GLUCOSE UR STRIP-MCNC: >1000 MG/DL
HCO3 SERPL-SCNC: 26 MMOL/L (ref 22–29)
HCT VFR BLD AUTO: 46.8 % (ref 35–47)
HGB BLD-MCNC: 15.6 G/DL (ref 11.7–15.7)
HGB UR QL STRIP: NEGATIVE
HOLD SPECIMEN: NORMAL
IMM GRANULOCYTES # BLD: 0.2 10E3/UL
IMM GRANULOCYTES NFR BLD: 2 %
KETONES UR STRIP-MCNC: NEGATIVE MG/DL
LACTATE SERPL-SCNC: 2.4 MMOL/L (ref 0.7–2)
LACTATE SERPL-SCNC: 2.9 MMOL/L (ref 0.7–2)
LEUKOCYTE ESTERASE UR QL STRIP: NEGATIVE
LYMPHOCYTES # BLD AUTO: 1.9 10E3/UL (ref 0.8–5.3)
LYMPHOCYTES NFR BLD AUTO: 21 %
MAGNESIUM SERPL-MCNC: 2.4 MG/DL (ref 1.7–2.3)
MCH RBC QN AUTO: 32.2 PG (ref 26.5–33)
MCHC RBC AUTO-ENTMCNC: 33.3 G/DL (ref 31.5–36.5)
MCV RBC AUTO: 97 FL (ref 78–100)
MONOCYTES # BLD AUTO: 0.9 10E3/UL (ref 0–1.3)
MONOCYTES NFR BLD AUTO: 10 %
MUCOUS THREADS #/AREA URNS LPF: PRESENT /LPF
NEUTROPHILS # BLD AUTO: 6 10E3/UL (ref 1.6–8.3)
NEUTROPHILS NFR BLD AUTO: 66 %
NITRATE UR QL: NEGATIVE
NRBC # BLD AUTO: 0 10E3/UL
NRBC BLD AUTO-RTO: 0 /100
NT-PROBNP SERPL-MCNC: 126 PG/ML (ref 0–900)
PH UR STRIP: 6 [PH] (ref 5–7)
PLATELET # BLD AUTO: 260 10E3/UL (ref 150–450)
POTASSIUM SERPL-SCNC: 3.7 MMOL/L (ref 3.4–5.3)
PROT SERPL-MCNC: 7.3 G/DL (ref 6.4–8.3)
RBC # BLD AUTO: 4.84 10E6/UL (ref 3.8–5.2)
RBC URINE: <1 /HPF
RSV RNA SPEC NAA+PROBE: NEGATIVE
SARS-COV-2 RNA RESP QL NAA+PROBE: NEGATIVE
SODIUM SERPL-SCNC: 141 MMOL/L (ref 135–145)
SP GR UR STRIP: 1.02 (ref 1–1.03)
SQUAMOUS EPITHELIAL: 1 /HPF
TROPONIN T SERPL HS-MCNC: 29 NG/L
TROPONIN T SERPL HS-MCNC: 40 NG/L
UROBILINOGEN UR STRIP-MCNC: <2 MG/DL
WBC # BLD AUTO: 9.1 10E3/UL (ref 4–11)
WBC URINE: 2 /HPF

## 2024-09-23 PROCEDURE — 258N000003 HC RX IP 258 OP 636: Performed by: EMERGENCY MEDICINE

## 2024-09-23 PROCEDURE — 250N000011 HC RX IP 250 OP 636: Performed by: EMERGENCY MEDICINE

## 2024-09-23 PROCEDURE — 80053 COMPREHEN METABOLIC PANEL: CPT | Performed by: EMERGENCY MEDICINE

## 2024-09-23 PROCEDURE — 96365 THER/PROPH/DIAG IV INF INIT: CPT

## 2024-09-23 PROCEDURE — 85025 COMPLETE CBC W/AUTO DIFF WBC: CPT | Performed by: EMERGENCY MEDICINE

## 2024-09-23 PROCEDURE — 82962 GLUCOSE BLOOD TEST: CPT

## 2024-09-23 PROCEDURE — 99222 1ST HOSP IP/OBS MODERATE 55: CPT | Mod: AI | Performed by: HOSPITALIST

## 2024-09-23 PROCEDURE — 36415 COLL VENOUS BLD VENIPUNCTURE: CPT | Performed by: EMERGENCY MEDICINE

## 2024-09-23 PROCEDURE — 81001 URINALYSIS AUTO W/SCOPE: CPT | Performed by: EMERGENCY MEDICINE

## 2024-09-23 PROCEDURE — 99285 EMERGENCY DEPT VISIT HI MDM: CPT | Mod: 25

## 2024-09-23 PROCEDURE — 71250 CT THORAX DX C-: CPT | Mod: MG

## 2024-09-23 PROCEDURE — 83605 ASSAY OF LACTIC ACID: CPT | Performed by: EMERGENCY MEDICINE

## 2024-09-23 PROCEDURE — 83880 ASSAY OF NATRIURETIC PEPTIDE: CPT | Performed by: EMERGENCY MEDICINE

## 2024-09-23 PROCEDURE — 83735 ASSAY OF MAGNESIUM: CPT | Performed by: EMERGENCY MEDICINE

## 2024-09-23 PROCEDURE — 93005 ELECTROCARDIOGRAM TRACING: CPT | Performed by: STUDENT IN AN ORGANIZED HEALTH CARE EDUCATION/TRAINING PROGRAM

## 2024-09-23 PROCEDURE — 96361 HYDRATE IV INFUSION ADD-ON: CPT

## 2024-09-23 PROCEDURE — G0378 HOSPITAL OBSERVATION PER HR: HCPCS

## 2024-09-23 PROCEDURE — 99291 CRITICAL CARE FIRST HOUR: CPT

## 2024-09-23 PROCEDURE — 87637 SARSCOV2&INF A&B&RSV AMP PRB: CPT | Performed by: EMERGENCY MEDICINE

## 2024-09-23 PROCEDURE — 84484 ASSAY OF TROPONIN QUANT: CPT | Performed by: EMERGENCY MEDICINE

## 2024-09-23 PROCEDURE — 93005 ELECTROCARDIOGRAM TRACING: CPT | Performed by: EMERGENCY MEDICINE

## 2024-09-23 PROCEDURE — 87040 BLOOD CULTURE FOR BACTERIA: CPT | Performed by: EMERGENCY MEDICINE

## 2024-09-23 RX ORDER — PIPERACILLIN SODIUM, TAZOBACTAM SODIUM 3; .375 G/15ML; G/15ML
3.38 INJECTION, POWDER, LYOPHILIZED, FOR SOLUTION INTRAVENOUS ONCE
Status: COMPLETED | OUTPATIENT
Start: 2024-09-23 | End: 2024-09-23

## 2024-09-23 RX ADMIN — SODIUM CHLORIDE 1000 ML: 9 INJECTION, SOLUTION INTRAVENOUS at 19:53

## 2024-09-23 RX ADMIN — SODIUM CHLORIDE 500 ML: 9 INJECTION, SOLUTION INTRAVENOUS at 21:01

## 2024-09-23 RX ADMIN — PIPERACILLIN AND TAZOBACTAM 3.38 G: 3; .375 INJECTION, POWDER, FOR SOLUTION INTRAVENOUS at 20:21

## 2024-09-23 ASSESSMENT — COLUMBIA-SUICIDE SEVERITY RATING SCALE - C-SSRS
2. HAVE YOU ACTUALLY HAD ANY THOUGHTS OF KILLING YOURSELF IN THE PAST MONTH?: NO
1. IN THE PAST MONTH, HAVE YOU WISHED YOU WERE DEAD OR WISHED YOU COULD GO TO SLEEP AND NOT WAKE UP?: NO
6. HAVE YOU EVER DONE ANYTHING, STARTED TO DO ANYTHING, OR PREPARED TO DO ANYTHING TO END YOUR LIFE?: NO

## 2024-09-23 ASSESSMENT — ACTIVITIES OF DAILY LIVING (ADL)
ADLS_ACUITY_SCORE: 35

## 2024-09-24 ENCOUNTER — VIRTUAL VISIT (OUTPATIENT)
Dept: INTERPRETER SERVICES | Facility: CLINIC | Age: 54
End: 2024-09-24

## 2024-09-24 ENCOUNTER — APPOINTMENT (OUTPATIENT)
Dept: PHYSICAL THERAPY | Facility: HOSPITAL | Age: 54
End: 2024-09-24
Attending: HOSPITALIST
Payer: MEDICARE

## 2024-09-24 ENCOUNTER — VIRTUAL VISIT (OUTPATIENT)
Dept: INTERPRETER SERVICES | Facility: CLINIC | Age: 54
End: 2024-09-24
Payer: MEDICARE

## 2024-09-24 ENCOUNTER — APPOINTMENT (OUTPATIENT)
Dept: OCCUPATIONAL THERAPY | Facility: HOSPITAL | Age: 54
End: 2024-09-24
Attending: HOSPITALIST
Payer: MEDICARE

## 2024-09-24 ENCOUNTER — PATIENT OUTREACH (OUTPATIENT)
Dept: ONCOLOGY | Facility: CLINIC | Age: 54
End: 2024-09-24

## 2024-09-24 VITALS
BODY MASS INDEX: 40.86 KG/M2 | RESPIRATION RATE: 16 BRPM | SYSTOLIC BLOOD PRESSURE: 128 MMHG | OXYGEN SATURATION: 96 % | TEMPERATURE: 98 F | HEART RATE: 101 BPM | WEIGHT: 202.7 LBS | DIASTOLIC BLOOD PRESSURE: 80 MMHG

## 2024-09-24 DIAGNOSIS — R91.8 PULMONARY NODULES: Primary | ICD-10-CM

## 2024-09-24 LAB
ANION GAP SERPL CALCULATED.3IONS-SCNC: 12 MMOL/L (ref 7–15)
BUN SERPL-MCNC: 19.7 MG/DL (ref 6–20)
CALCIUM SERPL-MCNC: 8.7 MG/DL (ref 8.8–10.4)
CHLORIDE SERPL-SCNC: 107 MMOL/L (ref 98–107)
CREAT SERPL-MCNC: 1.18 MG/DL (ref 0.51–0.95)
EGFRCR SERPLBLD CKD-EPI 2021: 54 ML/MIN/1.73M2
ERYTHROCYTE [DISTWIDTH] IN BLOOD BY AUTOMATED COUNT: 13.5 % (ref 10–15)
GLUCOSE BLDC GLUCOMTR-MCNC: 140 MG/DL (ref 70–99)
GLUCOSE BLDC GLUCOMTR-MCNC: 143 MG/DL (ref 70–99)
GLUCOSE BLDC GLUCOMTR-MCNC: 144 MG/DL (ref 70–99)
GLUCOSE BLDC GLUCOMTR-MCNC: 176 MG/DL (ref 70–99)
GLUCOSE SERPL-MCNC: 141 MG/DL (ref 70–99)
HCO3 SERPL-SCNC: 24 MMOL/L (ref 22–29)
HCT VFR BLD AUTO: 41.2 % (ref 35–47)
HGB BLD-MCNC: 13.1 G/DL (ref 11.7–15.7)
MCH RBC QN AUTO: 31.4 PG (ref 26.5–33)
MCHC RBC AUTO-ENTMCNC: 31.8 G/DL (ref 31.5–36.5)
MCV RBC AUTO: 99 FL (ref 78–100)
PLATELET # BLD AUTO: 219 10E3/UL (ref 150–450)
POTASSIUM SERPL-SCNC: 4.4 MMOL/L (ref 3.4–5.3)
RBC # BLD AUTO: 4.17 10E6/UL (ref 3.8–5.2)
SODIUM SERPL-SCNC: 143 MMOL/L (ref 135–145)
WBC # BLD AUTO: 7.3 10E3/UL (ref 4–11)

## 2024-09-24 PROCEDURE — 80048 BASIC METABOLIC PNL TOTAL CA: CPT | Performed by: HOSPITALIST

## 2024-09-24 PROCEDURE — 96361 HYDRATE IV INFUSION ADD-ON: CPT

## 2024-09-24 PROCEDURE — 97535 SELF CARE MNGMENT TRAINING: CPT | Mod: GO

## 2024-09-24 PROCEDURE — T1013 SIGN LANG/ORAL INTERPRETER: HCPCS | Mod: U4,TEL,95 | Performed by: INTERPRETER

## 2024-09-24 PROCEDURE — 258N000003 HC RX IP 258 OP 636: Performed by: HOSPITALIST

## 2024-09-24 PROCEDURE — 36415 COLL VENOUS BLD VENIPUNCTURE: CPT | Performed by: HOSPITALIST

## 2024-09-24 PROCEDURE — 97165 OT EVAL LOW COMPLEX 30 MIN: CPT | Mod: GO

## 2024-09-24 PROCEDURE — 99239 HOSP IP/OBS DSCHRG MGMT >30: CPT | Mod: FS | Performed by: EMERGENCY MEDICINE

## 2024-09-24 PROCEDURE — 82962 GLUCOSE BLOOD TEST: CPT

## 2024-09-24 PROCEDURE — G0378 HOSPITAL OBSERVATION PER HR: HCPCS

## 2024-09-24 PROCEDURE — 99207 PR APP CREDIT; MD BILLING SHARED VISIT: CPT

## 2024-09-24 PROCEDURE — 250N000012 HC RX MED GY IP 250 OP 636 PS 637: Performed by: HOSPITALIST

## 2024-09-24 PROCEDURE — 97530 THERAPEUTIC ACTIVITIES: CPT | Mod: GO

## 2024-09-24 PROCEDURE — 85027 COMPLETE CBC AUTOMATED: CPT | Performed by: HOSPITALIST

## 2024-09-24 PROCEDURE — 97162 PT EVAL MOD COMPLEX 30 MIN: CPT | Mod: GP | Performed by: PHYSICAL THERAPIST

## 2024-09-24 PROCEDURE — 250N000011 HC RX IP 250 OP 636: Performed by: HOSPITALIST

## 2024-09-24 PROCEDURE — T1013 SIGN LANG/ORAL INTERPRETER: HCPCS | Mod: GT,TEL,95 | Performed by: INTERPRETER

## 2024-09-24 PROCEDURE — 97116 GAIT TRAINING THERAPY: CPT | Mod: GP | Performed by: PHYSICAL THERAPIST

## 2024-09-24 PROCEDURE — 96372 THER/PROPH/DIAG INJ SC/IM: CPT | Performed by: HOSPITALIST

## 2024-09-24 PROCEDURE — 250N000013 HC RX MED GY IP 250 OP 250 PS 637: Performed by: HOSPITALIST

## 2024-09-24 RX ORDER — VENLAFAXINE HYDROCHLORIDE 75 MG/1
75 CAPSULE, EXTENDED RELEASE ORAL DAILY
Status: DISCONTINUED | OUTPATIENT
Start: 2024-09-24 | End: 2024-09-24 | Stop reason: HOSPADM

## 2024-09-24 RX ORDER — NICOTINE POLACRILEX 4 MG
15-30 LOZENGE BUCCAL
Status: DISCONTINUED | OUTPATIENT
Start: 2024-09-24 | End: 2024-09-24 | Stop reason: HOSPADM

## 2024-09-24 RX ORDER — FUROSEMIDE 20 MG
20 TABLET ORAL DAILY
Status: DISCONTINUED | OUTPATIENT
Start: 2024-09-24 | End: 2024-09-24 | Stop reason: HOSPADM

## 2024-09-24 RX ORDER — ACETAMINOPHEN 650 MG/1
650 SUPPOSITORY RECTAL EVERY 4 HOURS PRN
Status: DISCONTINUED | OUTPATIENT
Start: 2024-09-24 | End: 2024-09-24 | Stop reason: HOSPADM

## 2024-09-24 RX ORDER — EZETIMIBE 10 MG/1
10 TABLET ORAL AT BEDTIME
Status: DISCONTINUED | OUTPATIENT
Start: 2024-09-24 | End: 2024-09-24 | Stop reason: HOSPADM

## 2024-09-24 RX ORDER — METFORMIN HCL 500 MG
500 TABLET, EXTENDED RELEASE 24 HR ORAL 2 TIMES DAILY WITH MEALS
Status: DISCONTINUED | OUTPATIENT
Start: 2024-09-24 | End: 2024-09-24 | Stop reason: HOSPADM

## 2024-09-24 RX ORDER — AMOXICILLIN 250 MG
2 CAPSULE ORAL 2 TIMES DAILY PRN
Status: DISCONTINUED | OUTPATIENT
Start: 2024-09-24 | End: 2024-09-24 | Stop reason: HOSPADM

## 2024-09-24 RX ORDER — LOSARTAN POTASSIUM 50 MG/1
100 TABLET ORAL DAILY
Status: DISCONTINUED | OUTPATIENT
Start: 2024-09-24 | End: 2024-09-24 | Stop reason: HOSPADM

## 2024-09-24 RX ORDER — DEXTROSE MONOHYDRATE 25 G/50ML
25-50 INJECTION, SOLUTION INTRAVENOUS
Status: DISCONTINUED | OUTPATIENT
Start: 2024-09-24 | End: 2024-09-24 | Stop reason: HOSPADM

## 2024-09-24 RX ORDER — ASPIRIN 81 MG/1
81 TABLET ORAL DAILY
Status: DISCONTINUED | OUTPATIENT
Start: 2024-09-24 | End: 2024-09-24 | Stop reason: HOSPADM

## 2024-09-24 RX ORDER — ISOSORBIDE MONONITRATE 30 MG/1
60 TABLET, EXTENDED RELEASE ORAL DAILY
Status: DISCONTINUED | OUTPATIENT
Start: 2024-09-24 | End: 2024-09-24 | Stop reason: HOSPADM

## 2024-09-24 RX ORDER — ONDANSETRON 2 MG/ML
4 INJECTION INTRAMUSCULAR; INTRAVENOUS EVERY 6 HOURS PRN
Status: DISCONTINUED | OUTPATIENT
Start: 2024-09-24 | End: 2024-09-24 | Stop reason: HOSPADM

## 2024-09-24 RX ORDER — LOSARTAN POTASSIUM 50 MG/1
50 TABLET ORAL DAILY
Qty: 30 TABLET | Refills: 0 | Status: SHIPPED | OUTPATIENT
Start: 2024-09-24

## 2024-09-24 RX ORDER — SODIUM CHLORIDE 9 MG/ML
INJECTION, SOLUTION INTRAVENOUS CONTINUOUS
Status: DISCONTINUED | OUTPATIENT
Start: 2024-09-24 | End: 2024-09-24 | Stop reason: HOSPADM

## 2024-09-24 RX ORDER — ACETAMINOPHEN 325 MG/1
650 TABLET ORAL EVERY 4 HOURS PRN
Status: DISCONTINUED | OUTPATIENT
Start: 2024-09-24 | End: 2024-09-24 | Stop reason: HOSPADM

## 2024-09-24 RX ORDER — AMOXICILLIN 250 MG
1 CAPSULE ORAL 2 TIMES DAILY PRN
Status: DISCONTINUED | OUTPATIENT
Start: 2024-09-24 | End: 2024-09-24 | Stop reason: HOSPADM

## 2024-09-24 RX ORDER — METOPROLOL SUCCINATE 100 MG/1
100 TABLET, EXTENDED RELEASE ORAL DAILY
Status: DISCONTINUED | OUTPATIENT
Start: 2024-09-24 | End: 2024-09-24 | Stop reason: HOSPADM

## 2024-09-24 RX ORDER — NITROGLYCERIN 0.4 MG/1
0.4 TABLET SUBLINGUAL EVERY 5 MIN PRN
Status: DISCONTINUED | OUTPATIENT
Start: 2024-09-24 | End: 2024-09-24 | Stop reason: HOSPADM

## 2024-09-24 RX ORDER — ONDANSETRON 4 MG/1
4 TABLET, ORALLY DISINTEGRATING ORAL EVERY 6 HOURS PRN
Status: DISCONTINUED | OUTPATIENT
Start: 2024-09-24 | End: 2024-09-24 | Stop reason: HOSPADM

## 2024-09-24 RX ORDER — HEPARIN SODIUM 5000 [USP'U]/.5ML
5000 INJECTION, SOLUTION INTRAVENOUS; SUBCUTANEOUS EVERY 12 HOURS
Status: DISCONTINUED | OUTPATIENT
Start: 2024-09-24 | End: 2024-09-24 | Stop reason: HOSPADM

## 2024-09-24 RX ORDER — ATORVASTATIN CALCIUM 40 MG/1
80 TABLET, FILM COATED ORAL EVERY EVENING
Status: DISCONTINUED | OUTPATIENT
Start: 2024-09-24 | End: 2024-09-24 | Stop reason: HOSPADM

## 2024-09-24 RX ADMIN — HEPARIN SODIUM 5000 UNITS: 10000 INJECTION, SOLUTION INTRAVENOUS; SUBCUTANEOUS at 09:00

## 2024-09-24 RX ADMIN — INSULIN ASPART 1 UNITS: 100 INJECTION, SOLUTION INTRAVENOUS; SUBCUTANEOUS at 08:57

## 2024-09-24 RX ADMIN — ASPIRIN 81 MG: 81 TABLET, COATED ORAL at 08:57

## 2024-09-24 RX ADMIN — OMEPRAZOLE 40 MG: 20 CAPSULE, DELAYED RELEASE ORAL at 08:58

## 2024-09-24 RX ADMIN — ACETAMINOPHEN 650 MG: 325 TABLET ORAL at 09:04

## 2024-09-24 RX ADMIN — SODIUM CHLORIDE: 9 INJECTION, SOLUTION INTRAVENOUS at 02:04

## 2024-09-24 RX ADMIN — VENLAFAXINE HYDROCHLORIDE 75 MG: 75 CAPSULE, EXTENDED RELEASE ORAL at 08:59

## 2024-09-24 ASSESSMENT — ACTIVITIES OF DAILY LIVING (ADL)
ADLS_ACUITY_SCORE: 35
ADLS_ACUITY_SCORE: 22
DEPENDENT_IADLS:: INDEPENDENT;TRANSPORTATION
ADLS_ACUITY_SCORE: 22
ADLS_ACUITY_SCORE: 22
ADLS_ACUITY_SCORE: 35
ADLS_ACUITY_SCORE: 22
ADLS_ACUITY_SCORE: 35
ADLS_ACUITY_SCORE: 22

## 2024-09-24 NOTE — PROGRESS NOTES
New Patient: Interventional Pulmonary (Lung nodule) Nurse Navigator Note     Referring provider:   Referred By    Provider Department Location Phone   Jaky Gaspar MD [facility] [phone]        Referred to (specialty): Interventional Pulmonary (Lung nodule)    Date Referral Received:   09/24/24     Evaluation for :  Lung nodule  suspicious lung nodule progressing over past year.     Clinical History (per Nurse review of records provided):    EXAM: CT CHEST ABDOMEN PELVIS W/O CONTRAST  LOCATION: [facility]  DATE: 9/23/2024  IMPRESSION:  1.  Enlargement of a subsolid left upper lobe nodule with a 0.8 cm solid component. This is concerning for a primary lung cancer. Recommend PET scan.  2.  No acute abnormality in the abdomen or pelvis.       Records Location (Care Everywhere, Media, etc.):   [facility]        Patient was seen previously by thoracic team, Dr. Anderson and Janessa Ospina.  I will reach out to team to set up follow up appointment as it looks like she may have been lost to follow up.    Zoila Maxwell, RN, BSN  Oncology New Patient Nurse Navigator   [facility]  [phone]

## 2024-09-24 NOTE — PLAN OF CARE
Problem: Adult Inpatient Plan of Care  Goal: Absence of Hospital-Acquired Illness or Injury  Outcome: Progressing  Intervention: Identify and Manage Fall Risk  Recent Flowsheet Documentation  Taken 9/24/2024 0700 by Shayy Cartagena RN  Safety Promotion/Fall Prevention: activity supervised  Taken 9/24/2024 0312 by Shayy Cartagena RN  Safety Promotion/Fall Prevention: activity supervised  Intervention: Prevent Skin Injury  Recent Flowsheet Documentation  Taken 9/24/2024 0700 by Shayy Cartagena RN  Body Position: position changed independently  Skin Protection: adhesive use limited  Device Skin Pressure Protection: adhesive use limited  Taken 9/24/2024 0312 by Shayy Cartagena RN  Body Position: position changed independently  Skin Protection: adhesive use limited  Device Skin Pressure Protection: adhesive use limited  Intervention: Prevent and Manage VTE (Venous Thromboembolism) Risk  Recent Flowsheet Documentation  Taken 9/24/2024 0700 by Shayy Cartagena RN  VTE Prevention/Management: compression stockings on  Taken 9/24/2024 0312 by Shayy Cartagena RN  VTE Prevention/Management: compression stockings on  Intervention: Prevent Infection  Recent Flowsheet Documentation  Taken 9/24/2024 0700 by Shayy Cartagena RN  Infection Prevention: hand hygiene promoted  Taken 9/24/2024 0312 by Shayy Cartagena RN  Infection Prevention: hand hygiene promoted  Goal: Optimal Comfort and Wellbeing  Outcome: Progressing  Intervention: Monitor Pain and Promote Comfort  Recent Flowsheet Documentation  Taken 9/24/2024 0312 by Shayy Cartagena RN  Pain Management Interventions: medication (see MAR)     Problem: Fatigue  Goal: Improved Activity Tolerance  Outcome: Progressing  Intervention: Promote Improved Energy  Recent Flowsheet Documentation  Taken 9/24/2024 0700 by Shayy Cartagena RN  Activity Management: activity adjusted per tolerance  Taken 9/24/2024 0312 by Osiel  Shayy JUAREZ, RN  Activity Management: activity adjusted per tolerance  Assumed care at 2300  Admitted: Burred vision, Dizziness, Weakness  Vitals: VSS, afebrile  Neuro: A/O x 4. Used [employer] #[id number]   Cardiac:  NSR          Respiratory:  O2 saturation > 92% on RA.   GI/:  Adequate UO via ambulates to bathroom LBM 9/23 per pt. + BS,143. 176  Diet/appetite: Cardiac diet, no caffeine  Activity: SBA  Pain: Addressed with PRN medication, offered meds, pt refused  Skin: No adjustment needed  LDA's: R-PIV, infusing NS@ 75 mL/hr  Plan: OT/PT/ Care management consult

## 2024-09-24 NOTE — ED NOTES
[facility] ED Handoff Report    ED Chief Complaint: Hypotension, weakness    ED Diagnosis:  (I95.9) Hypotension, unspecified hypotension type  Comment:   Plan:     (R91.8) Lung mass  Comment:   Plan:        PMH:    Past Medical History:   Diagnosis Date    Anxiety     Chronic cough 2018    Chronic kidney disease     Congestive heart failure (H)     Depression     Dyslipidemia, goal LDL below 70 10/31/2017    Essential hypertension     GERD (gastroesophageal reflux disease)     Heart failure with reduced ejection fraction (H) 10/30/2017    Hypertension     Hypokalemia 2021    Nonischemic cardiomyopathy (H)     variable EF depending on the technique, date and reader; BNP normal in 2018    Nonocclusive coronary atherosclerosis of native coronary artery 10/31/2017    Obese     Pregnancy         Pyelonephritis 2018    Renal abscess     Spontaneous  2010    TM (tympanic membrane disorder)         Code Status:  Full Code     Falls Risk: Yes Band: Applied    Current Living Situation/Residence: lives with a significant other     Elimination Status: Continent: No     Activity Level: SBA    Patients Preferred Language:  Other: Radiation Monitoring Devices     Needed: Yes    Vital Signs:  /63   Pulse 89   Temp 98.3  F (36.8  C) (Oral)   Resp 24   Wt 91.9 kg (202 lb 11.2 oz)   SpO2 98%   BMI 40.86 kg/m       Cardiac Rhythm: NSR    Pain Score: 0/10    Is the Patient Confused:  No    Last Food or Drink: 2024    Focused Assessment:  Pt came earlier with MAP between 65-67, 1.5L of saline bolus given, currently MAP is at 70's. Pt has hx of CKD, DM2, and CHF. Pt on purewick because of weakness and hypotension. 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 Ct shows a left lung mass. Latest BG-176. PtA/O x 4    Tests Performed: Done: Labs and Imaging    Treatments Provided:  Zosyn, Saline bolus (see MAR)    Family Dynamics/Concerns: No    Family Updated On Visitor  Policy: Yes    Plan of Care Communicated to Family: Yes    Who Was Updated about Plan of Care:  at bedside    Belongings Checklist Done and Signed by Patient: Yes    Medications sent with patient: Insulin pen    Covid: symptomatic, negative      RN: Jean Garcia RN 9/24/2024 1:27 AM

## 2024-09-24 NOTE — H&P
[facility]    History and Physical - Hospitalist Service       Date of Admission:  9/23/2024    Assessment & Plan      May MARICHUY Sanchez is a 54 year old female admitted on 9/23/2024. She has h/o of diabetes, hypertension, hyperlipidemia, obesity, chronic follicle or conjunctivitis bilaterally, CKD, anemia history of pulmonary nodules, nonischemic cardiomyopathy,, heart failure with preserved ejection fraction, coronary artery disease presented  with chief complaints of weakness. Work up revealed BETITO, hypotension, lactic acidosis    Hypotension  -Patient has history of hypertension and is on multiple antihypertensives.  -Hold PTA losartan, Lasix, metoprolol, Imdur    BETITO on CKD-likely prerenal  Baseline creatinine is normal, creatinine noted to be 1.8 on admission  -Continue gentle hydration, repeat labs in a.m.  -Hold PTA losartan, Lasix  -Minimize nephrotoxins    Lactic acidosis likely due to dehydration  -Gentle IV hydration, encourage p.o.  -Abx given in ED, hold further abx. Viral panel negative. Blood cx obtained in ED  -Workup does not reveal any  infectious cause.  -Lactic trending down, monitor off abx    Weakness- multifactorial  -Place on fall precautions  PT/OT consulted    Diabetes with hyperglycemia   -Recent A1c 8.9  -Hold PTA metformin, Ozempic, Jardiance  -Place patient on sliding scale, monitor blood sugars, hypoglycemia protocol    History of nonischemic cardiomyopathy  History of heart failure with preserved ejection fraction  History of coronary artery disease  Hyperlipidemia  -Continue PTA aspirin, statin, Zetia, nitroglycerin as needed  -Holding PTA losartan, Lasix, Imdur.  Due to BETITO/hypotension    H/o Pulmonary nodules  -CT chest showed enlargement of the left upper lobe nodule with a 0.8 cm solid component concerning for primary lung cancer  -Follow up with out-pt pulmonology as prior planned.     Mood disorder- PTA venlafaxine           Observation Goals: -diagnostic  "tests and consults completed and resulted, -vital signs normal or at patient baseline, Nurse to notify provider when observation goals have been met and patient is ready for discharge.  Diet: Combination Diet No Caffeine Diet, Low Saturated Fat Na <2400mg Diet  DVT Prophylaxis: Heparin SQ  Park Catheter: Not present  Lines: None     Cardiac Monitoring: None  Code Status: Full Code      Clinically Significant Risk Factors Present on Admission                # Drug Induced Platelet Defect: home medication list includes an antiplatelet medication  # Acute Kidney Injury, unspecified: based on a >150% or 0.3 mg/dL increase in last creatinine compared to past 90 day average, will monitor renal function  # Hypertension: Noted on problem list        # DMII: A1C = 8.9 % (Ref range: 0.0 - 5.6 %) within past 6 months    # Severe Obesity: Estimated body mass index is 40.86 kg/m  as calculated from the following:    Height as of 9/6/24: 1.5 m (4' 11.06\").    Weight as of this encounter: 91.9 kg (202 lb 11.2 oz).              Disposition Plan     Medically Ready for Discharge: Anticipated Tomorrow           Shasta Saldana MD  Hospitalist Service  [facility]  Securely message with [employer] (more info)  Text page via [facility] Paging/Directory     ______________________________________________________________________    Chief Complaint   Weakness    History is obtained from the patient and her spouse by the bedside     History of Present Illness -  May X Daniel is a 54 year old female with past medical history of diabetes, hypertension, hyperlipidemia, obesity, chronic follicle or conjunctivitis bilaterally, CKD, anemia history of pulmonary nodules, nonischemic cardiomyopathy,, heart failure with preserved ejection fraction, coronary artery disease presented today with chief complaints of weakness.  Patient's spouse by the bedside and helps with interpretation.  Reported patient developed weakness on the day of " admission, has been feeling very tired.  Dizziness when she stands up.  Has generalized achiness.  Denies any fever, chest pain, shortness of breath, abdominal pain.  Feels nauseous but no emesis.  No dysuria or polyuria.  No diarrhea or constipation.  No sick contact.  No recent travel.  No COVID exposure.  No cough, tingling or numbness.  No palpitations.    On arrival to ED, patient noted to be hypotensive with blood pressure in 85/57.  Creatinine of 1.8.  WBC normal.  Lactate of 2.9.  Viral panel negative.  UA negative for UTI.  CT chest showed enlargement of the left upper lobe nodule with a 0.8 cm solid component concerning for primary lung cancer.  Patient will be admitted under observation for further monitoring.      Past Medical History    Past Medical History:   Diagnosis Date    Anxiety     Chronic cough 2018    Chronic kidney disease     Congestive heart failure (H)     Depression     Dyslipidemia, goal LDL below 70 10/31/2017    Essential hypertension     GERD (gastroesophageal reflux disease)     Heart failure with reduced ejection fraction (H) 10/30/2017    Hypertension     Hypokalemia 2021    Nonischemic cardiomyopathy (H)     variable EF depending on the technique, date and reader; BNP normal in 2018    Nonocclusive coronary atherosclerosis of native coronary artery 10/31/2017    Obese     Pregnancy         Pyelonephritis 2018    Renal abscess     Spontaneous  2010    TM (tympanic membrane disorder)        Past Surgical History   Past Surgical History:   Procedure Laterality Date    CV CORONARY ANGIOGRAM N/A 2019    Procedure: Coronary Angiogram;  Surgeon: Car Box MD;  Location: [facility] Cath Lab;  Service: Cardiology    CV LEFT HEART CATHETERIZATION WITHOUT LEFT VENTRICULOGRAM Left 10/31/2017    Procedure: Left Heart Catheterization Without Left Ventriculogram;  Surgeon: Jonathan Duque MD;  Location: [facility] Cath Lab;  Service:      CV LEFT HEART CATHETERIZATION WITHOUT LEFT VENTRICULOGRAM Left 2019    Procedure: Left Heart Catheterization Without Left Ventriculogram;  Surgeon: Car Box MD;  Location: [facility] Cath Lab;  Service: Cardiology    DILATION AND CURETTAGE      ENT SURGERY      INNER EAR SURGERY Right     MASTOIDECTOMY Right     AK CATH PLACEMENT & NJX CORONARY ART ANGIO IMG S&I N/A 10/31/2017    Procedure: Coronary Angiogram;  Surgeon: Jonathan Duque MD;  Location: [facility] Cath Lab;  Service: Cardiology       Prior to Admission Medications   Prior to Admission Medications   Prescriptions Last Dose Informant Patient Reported? Taking?   B Complex-Biotin-FA (B COMPLEX 100 TR) TBCR 2024 at am  No Yes   Sig: Take 1 tablet by mouth daily   OZEMPIC, 2 MG/DOSE, 8 MG/3ML pen 2024 at on   Yes Yes   Sig: Inject 2 mg subcutaneously every 7 days.   acetaminophen (MAPAP) 500 MG tablet Unknown at prn  No Yes   Sig: Take 2 tablets (1,000 mg) by mouth 3 times daily as needed for mild pain   aspirin 81 MG EC tablet 2024 at am  No Yes   Sig: Take 1 tablet (81 mg) by mouth daily   atorvastatin (LIPITOR) 80 MG tablet 2024 at am  No Yes   Sig: TAKE 1 TABLET (80 MG TOTAL) BY MOUTH DAILY/ TXHUA HNUB NOJ 1 LUB TSHUAJ PAB YANET NTSV MUAJ ROJ   blood glucose (CONTOUR NEXT TEST) test strip   No No   Si strip by In Vitro route 2 times daily   blood glucose (NO BRAND SPECIFIED) lancets standard   No No   Sig: Use to test blood sugar 2 times daily or as directed.   empagliflozin (JARDIANCE) 25 MG TABS tablet 2024 at am  No Yes   Sig: Take 1 tablet (25 mg) by mouth daily.   ezetimibe (ZETIA) 10 MG tablet 2024 at am  No Yes   Sig: TAKE 1 PILL BY MOUTH EVERY DAY/ NOJ IB LUB IB HNUB PAB ZOO NTSHAV MUAJ ROJ   furosemide (LASIX) 20 MG tablet 2024 at am  No Yes   Sig: Take 1 tablet (20 mg) by mouth daily   isosorbide mononitrate (IMDUR) 60 MG 24 hr tablet 2024 at am  No Yes    Sig: TAKE 1 PILL BY MOUTH EVERY DAY/ NOJ IB LUB IB HNUB PAB LUB PLAWV   losartan (COZAAR) 100 MG tablet 9/23/2024 at am  No Yes   Sig: TAKE 1 PILL BY MOUTH DAILY FOR BLOOD PRESSURE / TXHUA HNUB NOJ 1 LUB TSHUAJ PAB ZOO YANET NTSHAV SIAB   metFORMIN (GLUCOPHAGE XR) 500 MG 24 hr tablet 9/23/2024 at a,  No Yes   Sig: TAKE 1 TABLET (500 MG) BY MOUTH 2 TIMES DAILY (WITH MEALS)/ NOJ 1 LUB 2 ZAUG TXHUA HNUB NROG MOV PAB ZOO NTSHAV QAB ZIB   metoprolol succinate ER (TOPROL XL) 100 MG 24 hr tablet 9/23/2024 at am  No Yes   Sig: Take 1 tablet (100 mg) by mouth daily.   neomycin-polymyxin-dexAMETHasone (MAXITROL) 3.5-38372-1.1 ophthalmic ointment 9/23/2024 at am  No Yes   Sig: Place 0.1429 Applications (0.5 g) into both eyes 2 times daily.   nitroGLYcerin (NITROSTAT) 0.4 MG sublingual tablet Unknown at prn  No Yes   Sig: For chest pain place 1 tablet under the tongue every 5 minutes for 3 doses. If symptoms persist 5 minutes after 1st dose call 911.   omeprazole (PRILOSEC) 40 MG DR capsule 9/23/2024 at am  No Yes   Sig: TAKE 1 PILL BY MOUTH EVERY DAY/ TXHUA HNUB NOJ 1 LUB TSHUAJ PAB ZOO LUB PLAB   venlafaxine (EFFEXOR XR) 75 MG 24 hr capsule 9/23/2024 at am  No Yes   Sig: Take 1 capsule (75 mg) by mouth daily   vitamin D3 (CHOLECALCIFEROL) 125 MCG (5000 UT) tablet 9/23/2024 at am  No Yes   Sig: Take 1 tablet (125 mcg) by mouth daily      Facility-Administered Medications: None        Review of Systems    The 10 point Review of Systems is negative other than noted in the HPI or here.      Physical Exam   Vital Signs: Temp: 98.3  F (36.8  C) Temp src: Oral BP: 101/60 Pulse: 87   Resp: 23 SpO2: 92 % O2 Device: None (Room air)    Weight: 202 lbs 11.2 oz    General: Pleasant female,. obese, NAD  HEENT:EOMI, AT,NC  CVS:RRR, trace LE edema  RS:CTAB  Abd: Soft, NT,ND  Neurology:Grossly normal  Psy:Approrpiate affect      Medical Decision Making       >70 MINUTES SPENT BY ME on the date of service doing chart review, history, exam,  documentation & further activities per the note.  MANAGEMENT DISCUSSED with the following over the past 24 hours: Patient, spouse by bedside       Data     I have personally reviewed the following data over the past 24 hrs:    9.1  \   15.6   / 260     141 99 27.0 (H) /  235 (H)   3.7 26 1.80 (H) \     ALT: 169 (H) AST: 141 (H) AP: 122 TBILI: 0.4   ALB: 4.2 TOT PROTEIN: 7.3 LIPASE: N/A     Trop: 29 (H) BNP: 126     Procal: N/A CRP: N/A Lactic Acid: 2.4 (H)

## 2024-09-24 NOTE — PLAN OF CARE
Occupational Therapy Discharge Summary    Reason for therapy discharge:    Discharged to home with outpatient therapy.    Progress towards therapy goal(s). See goals on Care Plan in [facility] electronic health record for goal details.  Goals partially met.  Barriers to achieving goals:   discharge on same date as initial evaluation.    Therapy recommendation(s):    Continued therapy is recommended.  Rationale/Recommendations:  maximize I/ADL IND.

## 2024-09-24 NOTE — CONSULTS
Care Management Initial Consult    General Information  Assessment completed with: Spouse or significant other,    Type of CM/SW Visit: Initial Assessment    Primary Care Provider verified and updated as needed: Yes   Readmission within the last 30 days: no previous admission in last 30 days      Reason for Consult: discharge planning  Advance Care Planning: Advance Care Planning Reviewed: no concerns identified          Communication Assessment  Patient's communication style: spoken language (non-English)    Hearing Difficulty or Deaf: no   Wear Glasses or Blind: no    Cognitive  Cognitive/Neuro/Behavioral: WDL                      Living Environment:   People in home: spouse, child(miri), adult     Current living Arrangements: house      Able to return to prior arrangements: yes       Family/Social Support:  Care provided by:    Provides care for: no one  Marital Status:   Support system: , Children          Description of Support System: Supportive         Current Resources:   Patient receiving home care services: No        Community Resources: [facility]  Equipment currently used at home: none  Supplies currently used at home:      Employment/Financial:  Employment Status: unemployed        Financial Concerns:     Referral to Financial Worker: No       Does the patient's insurance plan have a 3 day qualifying hospital stay waiver?  No    Lifestyle & Psychosocial Needs:  Social Determinants of Health     Food Insecurity: Low Risk  (9/24/2024)    Food Insecurity     Within the past 12 months, did you worry that your food would run out before you got money to buy more?: No     Within the past 12 months, did the food you bought just not last and you didn t have money to get more?: No   Depression: Not at risk (9/6/2024)    PHQ-2     PHQ-2 Score: 1   Housing Stability: High Risk (9/24/2024)    Housing Stability     Do you have housing? : No     Are you worried about losing your housing?: No    Tobacco Use: Low Risk  (9/23/2024)    Patient History     Smoking Tobacco Use: Never     Smokeless Tobacco Use: Never     Passive Exposure: Never   Financial Resource Strain: Low Risk  (9/24/2024)    Financial Resource Strain     Within the past 12 months, have you or your family members you live with been unable to get utilities (heat, electricity) when it was really needed?: No   Alcohol Use: Not on file   Transportation Needs: Low Risk  (9/24/2024)    Transportation Needs     Within the past 12 months, has lack of transportation kept you from medical appointments, getting your medicines, non-medical meetings or appointments, work, or from getting things that you need?: No   Physical Activity: Insufficiently Active (9/6/2024)    Exercise Vital Sign     Days of Exercise per Week: 5 days     Minutes of Exercise per Session: 10 min   Interpersonal Safety: Low Risk  (9/6/2024)    Interpersonal Safety     Do you feel physically and emotionally safe where you currently live?: Yes     Within the past 12 months, have you been hit, slapped, kicked or otherwise physically hurt by someone?: No     Within the past 12 months, have you been humiliated or emotionally abused in other ways by your partner or ex-partner?: No   Stress: No Stress Concern Present (9/6/2024)    [employer] [place] [employer] - Occupational Stress Questionnaire     Feeling of Stress : Only a little   Social Connections: Unknown (9/6/2024)    Social Connection and Isolation Panel [NHANES]     Frequency of Communication with Friends and Family: Not on file     Frequency of Social Gatherings with Friends and Family: More than three times a week     Attends Nondenominational Services: Not on file     Active Member of Clubs or Organizations: Not on file     Attends Club or Organization Meetings: Not on file     Marital Status: Not on file   Health Literacy: Not on file       Functional Status:  Prior to admission patient needed assistance:   Dependent  ADLs:: Independent  Dependent IADLs:: Independent, Transportation  Assesssment of Functional Status: At functional baseline    Mental Health Status:  Mental Health Status: No Current Concerns       Chemical Dependency Status:  Chemical Dependency Status: No Current Concerns             Values/Beliefs:  Spiritual, Cultural Beliefs, Gnosticism Practices, Values that affect care:                 Discussed  Partnership in Safe Discharge Planning  document with patient/family: Yes: pt and family    Additional Information:  SW met with pt to introduce role of CM, complete  initial assessment, and to discuss needs at time of d/c. Pt comes from home; lives with spouse and family, and noted to be independent at baseline. Therapy recommending home with home care; pt agreeable and referral made to [facility] (referral send and accepted for RN PT OT). PT will send home walker with pt. Pt feels that there will be no addtional needs from CM at discharge, and will follow with PCP if needs arise post discharge.  11:57 AM    Brenna Kjellberg, BSW LSW  9/24/2024      Care Management Discharge Note    Discharge Date: 09/24/2024  Discharge Disposition: Home  Discharge Services: None  Discharge DME: Walker  Discharge Transportation:      Private pay costs discussed: Not applicable    Education Provided on the Discharge Plan: Yes  Persons Notified of Discharge Plans: yes  Patient/Family in Agreement with the Plan: yes    Handoff Referral Completed: No, handoff not indicated or clinically appropriate    Additional Information:

## 2024-09-24 NOTE — PLAN OF CARE
"PRIMARY DIAGNOSIS: \"GENERIC\" NURSING  OUTPATIENT/OBSERVATION GOALS TO BE MET BEFORE DISCHARGE:  ADLs back to baseline: Yes    Activity and level of assistance: Up with standby assistance.    Pain status: Improved-controlled with oral pain medications.    Return to near baseline physical activity: Yes     Discharge Planner Nurse   Safe discharge environment identified: Yes  Barriers to discharge: Yes       Entered by: Dayanara De Luna RN 09/24/2024 11:34 AM     Please review provider order for any additional goals.   Nurse to notify provider when observation goals have been met and patient is ready for discharge.Goal Outcome Evaluation:       VSS. Patient medicated for low back pain, Tylenol effective.                 "

## 2024-09-24 NOTE — ED TRIAGE NOTES
Pt reports dizziness, aching body, weakness, sweating, and blurred vision since this morning. Pt's BG was 130 this morning. Pt takes BP meds in the morning and denies taking more than normal.      Triage Assessment (Adult)       Row Name 09/23/24 1926          Triage Assessment    Airway WDL WDL        Respiratory WDL    Respiratory WDL WDL        Skin Circulation/Temperature WDL    Skin Circulation/Temperature WDL WDL        Cardiac WDL    Cardiac WDL X;rhythm     Pulse Rate & Regularity tachycardic        Peripheral/Neurovascular WDL    Peripheral Neurovascular WDL WDL        Cognitive/Neuro/Behavioral WDL    Cognitive/Neuro/Behavioral WDL WDL

## 2024-09-24 NOTE — DISCHARGE SUMMARY
"[facility]  Hospitalist Discharge Summary      Date of Admission:  9/23/2024  Date of Discharge:  9/24/2024  Discharging Provider: Krystal Harris NP  Discharge Service: Hospitalist Service    Discharge Diagnoses   Generalized weakness    Clinically Significant Risk Factors     # DMII: A1C = 8.9 % (Ref range: 0.0 - 5.6 %) within past 6 months  # Severe Obesity: Estimated body mass index is 40.86 kg/m  as calculated from the following:    Height as of 9/6/24: 1.5 m (4' 11.06\").    Weight as of this encounter: 91.9 kg (202 lb 11.2 oz).       Follow-ups Needed After Discharge   Follow-up Appointments     Follow-up and recommended labs and tests       Follow up with primary care provider, Jaky Gaspar, within 7 days for   hospital follow- up.  The following labs/tests are recommended: Follow-up   with primary care doctor recheck creatinine in 3 to 5 days.      Follow-up pulmonology outpatient as planned for lung mass  Consider follow-up outpatient PET scan            Unresulted Labs Ordered in the Past 30 Days of this Admission       Date and Time Order Name Status Description    9/23/2024  7:53 PM Blood Culture Line, venous In process             Discharge Disposition   Discharged to home  Condition at discharge: Stable    Hospital Course      May MARICHUY Sanchez is a 54 year old female admitted on 9/23/2024. She has h/o of diabetes, hypertension, hyperlipidemia, obesity, chronic follicle or conjunctivitis bilaterally, CKD, anemia history of pulmonary nodules, nonischemic cardiomyopathy,, heart failure with preserved ejection fraction, coronary artery disease presented  with chief complaints of weakness.  Patient states he feels improved today.  Denies weakness or dizziness.  Patient denies chest pain shortness of breath or palpitation.  Patient denies nausea vomiting or diarrhea. Patient states she feels well and would like to discharge to home.  Patient agrees plan to discharge to home and follow-up " with primary care doctor.     #Hypotension, improved  -Discharge blood pressure 128/80  Record blood pressures at home and bring to primary care doctor  -Patient has history of hypertension and is on multiple antihypertensives.  -Resume Lasix, metoprolol, Imdur on discharge  -Decrease Losartan to 50 mg daily, follow up closely with primary care for blood pressure management  -Patient instructed to return to hospital for increased dizziness or weakness    #BETITO on CKD-likely prerenal  Creatinine today 1.1 trending down from 1.8 yesterday  Baseline creatinine is normal, creatinine noted to be 1.8 on admission  -Continue gentle hydration, repeat labs in a.m.  -Resume Lasix  -Decrease Losartan to 50 mg daily, follow up closely with primary care for blood pressure management  -Patient instructed to return to hospital for increased dizziness or weakness  Follow up with primary care   -Minimize nephrotoxins  Follow-up with primary care doctor BMP in 3 to 5 days    #Lactic acidosis likely due to dehydration, improved  -Gentle IV hydration, encourage p.o.  -Abx given in ED, hold further abx. Viral panel negative. Blood cx obtained in ED  -Workup does not reveal any  infectious cause.  -Lactic trending down, monitor off abx    #Weakness- multifactorial  -Place on fall precautions  -PT/OT consulted recommend home PT RN and OT therapy, ordered for home     #Diabetes with hyperglycemia   -Recent A1c 8.9  -Hold PTA metformin, Ozempic, Jardiance  -Place patient on sliding scale, monitor blood sugars, hypoglycemia protocol    #History of nonischemic cardiomyopathy  #History of heart failure with preserved ejection fraction  #History of coronary artery disease  #Hyperlipidemia  -Continue PTA aspirin, statin, Zetia, nitroglycerin as needed  -Holding PTA losartan, Lasix, Imdur.  Due to BETITO/hypotension    #H/o Pulmonary nodules  -CT chest showed enlargement of the left upper lobe nodule with a 0.8 cm solid component concerning for  primary lung cancer  -Follow up with out-pt pulmonology as prior planned.     #Mood disorder-   PTA venlafaxine        Consultations This Hospital Stay   CARE MANAGEMENT / SOCIAL WORK IP CONSULT  PHYSICAL THERAPY ADULT IP CONSULT  OCCUPATIONAL THERAPY ADULT IP CONSULT    Code Status   Full Code    Time Spent on this Encounter   IKrystal NP, personally saw the patient today and spent greater than 30 minutes discharging this patient.       Krystal Harris NP  [facility] EXTENDED RECOVERY AND SHORT STAY  [address] [place] [id number]  Phone: [phone]  Fax: [phone]  ______________________________________________________________________    Physical Exam   Vital Signs: Temp: 98  F (36.7  C) Temp src: Oral BP: 128/80 Pulse: 101   Resp: 16 SpO2: 96 % O2 Device: None (Room air)    Weight: 202 lbs 11.2 oz  Constitutional: awake, alert, cooperative, no apparent distress, and appears stated age  Hematologic / Lymphatic: no cervical lymphadenopathy and no supraclavicular lymphadenopathy  Respiratory: No increased work of breathing, good air exchange, clear to auscultation bilaterally, no crackles or wheezing  Cardiovascular: Normal apical impulse, regular rate and rhythm, normal S1 and S2, no S3 or S4, and no murmur noted  GI: No scars, normal bowel sounds, soft, non-distended, non-tender, no masses palpated, no hepatosplenomegally  Skin: no bruising or bleeding, normal skin color, texture, turgor, and no redness, warmth, or swelling  Musculoskeletal: There is no redness, warmth, or swelling of the joints.   Neurologic: Awake, alert, oriented to name, place and time.    Neuropsychiatric: General: normal, calm, and normal eye contact       Primary Care Physician   Jaky Gaspar    Discharge Orders      Primary Care - Care Coordination Referral      Physical Therapy  Referral      Home Care Referral      Home Care Referral      Reason for your hospital stay    Generalized  weakness  Lung mass  Hypotension     Follow-up and recommended labs and tests     Follow up with primary care provider, Jaky Gaspar, within 7 days for hospital follow- up.  The following labs/tests are recommended: Follow-up with primary care doctor recheck creatinine in 3 to 5 days.      Follow-up pulmonology outpatient as planned for lung mass  Consider follow-up outpatient PET scan     Activity    Your activity upon discharge: activity as tolerated     Diet    Follow this diet upon discharge: Current Diet:Orders Placed This Encounter      Combination Diet No Caffeine Diet, Low Saturated Fat Na <2400mg Diet       Significant Results and Procedures   Most Recent 3 CBC's:  Recent Labs   Lab Test 09/24/24  0620 09/23/24 1947 09/06/24  1435   WBC 7.3 9.1 8.2   HGB 13.1 15.6 13.3   MCV 99 97 99    260 197     Most Recent 3 BMP's:  Recent Labs   Lab Test 09/24/24  1144 09/24/24  0733 09/24/24  0620 09/24/24  0058 09/23/24 1947 09/23/24  1922 09/06/24  1435   NA  --   --  143  --  141  --  140   POTASSIUM  --   --  4.4  --  3.7  --  4.7   CHLORIDE  --   --  107  --  99  --  106   CO2  --   --  24  --  26  --  22   BUN  --   --  19.7  --  27.0*  --  24.9*   CR  --   --  1.18*  --  1.80*  --  0.86   ANIONGAP  --   --  12  --  16*  --  12   LYNN  --   --  8.7*  --  10.1  --  9.5   * 140* 141*   < > 235*   < > 100*    < > = values in this interval not displayed.   ,   Results for orders placed or performed during the hospital encounter of 09/23/24   CT Chest Abdomen Pelvis w/o Contrast    Narrative    EXAM: CT CHEST ABDOMEN PELVIS W/O CONTRAST  LOCATION: [facility]  DATE: 9/23/2024    INDICATION: hypotension, new BETITO  COMPARISON: CT chest 11/16/2023  TECHNIQUE: CT scan of the chest, abdomen, and pelvis was performed without IV contrast. Multiplanar reformats were obtained. Dose reduction techniques were used.   CONTRAST: None.    FINDINGS: Moderate motion artifact.    LUNGS AND  PLEURA: A subsolid nodule in the left upper lobe measures 1.9 x 1.4 cm, previously 1.5 x 1.5 cm, with a central solid component measuring 0.8 cm, previously 0.5 cm, image 64:4. A stable solid 0.2 cm right middle lobe nodule, image 127:4.   Calcified granuloma. No pleural effusion.    MEDIASTINUM/AXILLAE: Trace air in the right ventricle and main pulmonary artery likely represents sequela of intravenous line placement. No lymphadenopathy.    CORONARY ARTERY CALCIFICATION: Mild.    HEPATOBILIARY: Hepatic steatosis.    PANCREAS: Normal.    SPLEEN: Normal.    ADRENAL GLANDS: Normal.    KIDNEYS/BLADDER: Normal.    BOWEL: Diverticulosis of the colon. No acute inflammatory change. No obstruction. Normal appendix.    LYMPH NODES: Normal.    VASCULATURE: No abdominal aortic aneurysm.    PELVIC ORGANS: Normal.    MUSCULOSKELETAL: Degenerative changes. Osseous demineralization.      Impression    IMPRESSION:  1.  Enlargement of a subsolid left upper lobe nodule with a 0.8 cm solid component. This is concerning for a primary lung cancer. Recommend PET scan.  2.  No acute abnormality in the abdomen or pelvis.             Discharge Medications   Current Discharge Medication List        CONTINUE these medications which have NOT CHANGED    Details   acetaminophen (MAPAP) 500 MG tablet Take 2 tablets (1,000 mg) by mouth 3 times daily as needed for mild pain  Qty: 100 tablet, Refills: 11    Associated Diagnoses: Tension headache      aspirin 81 MG EC tablet Take 1 tablet (81 mg) by mouth daily  Qty: 90 tablet, Refills: 4    Associated Diagnoses: Type 2 diabetes mellitus with stage 3a chronic kidney disease, without long-term current use of insulin      atorvastatin (LIPITOR) 80 MG tablet TAKE 1 TABLET (80 MG TOTAL) BY MOUTH DAILY/ TXHUA HNUB NOJ 1 LUB TSHUAJ PAB YANET NTSHAV MUAJ ROJ  Qty: 90 tablet, Refills: 3    Associated Diagnoses: Nonischemic cardiomyopathy      B Complex-Biotin-FA (B COMPLEX 100 TR) TBCR Take 1 tablet by mouth  daily  Qty: 90 tablet, Refills: 4    Associated Diagnoses: Anemia, unspecified type      empagliflozin (JARDIANCE) 25 MG TABS tablet Take 1 tablet (25 mg) by mouth daily.  Qty: 90 tablet, Refills: 1    Associated Diagnoses: Type 2 diabetes mellitus with diabetic polyneuropathy, without long-term current use of insulin      ezetimibe (ZETIA) 10 MG tablet TAKE 1 PILL BY MOUTH EVERY DAY/ NOJ IB LUB IB HNUB PAB ZOO NTSHAV MUAJ ROJ  Qty: 90 tablet, Refills: 4    Associated Diagnoses: Coronary artery disease involving native coronary artery of native heart with angina pectoris (H24)      furosemide (LASIX) 20 MG tablet Take 1 tablet (20 mg) by mouth daily  Qty: 90 tablet, Refills: 4    Associated Diagnoses: Heart failure with reduced ejection fraction      isosorbide mononitrate (IMDUR) 60 MG 24 hr tablet TAKE 1 PILL BY MOUTH EVERY DAY/ NOJ IB LUB IB HNUB PAB LUB PLAWV  Qty: 90 tablet, Refills: 3    Associated Diagnoses: Heart failure with reduced ejection fraction      losartan (COZAAR) 100 MG tablet TAKE 1 PILL BY MOUTH DAILY FOR BLOOD PRESSURE / TXHUA HNUB NOJ 1 LUB TSHUAJ PAB ZOO YANET NTSHAV SIAB  Qty: 90 tablet, Refills: 3    Associated Diagnoses: Essential hypertension      metFORMIN (GLUCOPHAGE XR) 500 MG 24 hr tablet TAKE 1 TABLET (500 MG) BY MOUTH 2 TIMES DAILY (WITH MEALS)/ NOJ 1 LUB 2 ZAUG TXHUA HNUB NROG MOV PAB ZOO NTSHAV QAB ZIB  Qty: 180 tablet, Refills: 4    Associated Diagnoses: Type 2 diabetes mellitus with stage 3a chronic kidney disease, without long-term current use of insulin      metoprolol succinate ER (TOPROL XL) 100 MG 24 hr tablet Take 1 tablet (100 mg) by mouth daily.  Qty: 90 tablet, Refills: 1    Associated Diagnoses: Essential hypertension; Heart failure with reduced ejection fraction      neomycin-polymyxin-dexAMETHasone (MAXITROL) 3.5-21194-8.1 ophthalmic ointment Place 0.1429 Applications (0.5 g) into both eyes 2 times daily.  Qty: 3.5 g, Refills: 0    Associated Diagnoses: Blepharitis of  lower eyelids of both eyes, unspecified type      nitroGLYcerin (NITROSTAT) 0.4 MG sublingual tablet For chest pain place 1 tablet under the tongue every 5 minutes for 3 doses. If symptoms persist 5 minutes after 1st dose call 911.  Qty: 25 tablet, Refills: 4    Associated Diagnoses: Nonischemic cardiomyopathy      omeprazole (PRILOSEC) 40 MG DR capsule TAKE 1 PILL BY MOUTH EVERY DAY/ TXHUA HNUB NOJ 1 LUB TSHUAJ PAB ZOO LUB PLAB  Qty: 90 capsule, Refills: 1    Associated Diagnoses: Gastroesophageal reflux disease without esophagitis      OZEMPIC, 2 MG/DOSE, 8 MG/3ML pen Inject 2 mg subcutaneously every 7 days.      venlafaxine (EFFEXOR XR) 75 MG 24 hr capsule Take 1 capsule (75 mg) by mouth daily  Qty: 90 capsule, Refills: 0    Comments: Replaces Duloxetine  Associated Diagnoses: Tension headache      vitamin D3 (CHOLECALCIFEROL) 125 MCG (5000 UT) tablet Take 1 tablet (125 mcg) by mouth daily  Qty: 90 tablet, Refills: 1    Associated Diagnoses: Vitamin D deficiency      blood glucose (CONTOUR NEXT TEST) test strip 1 strip by In Vitro route 2 times daily  Qty: 100 strip, Refills: 3    Associated Diagnoses: Type 2 diabetes mellitus with hyperglycemia, without long-term current use of insulin      blood glucose (NO BRAND SPECIFIED) lancets standard Use to test blood sugar 2 times daily or as directed.  Qty: 100 Lancet, Refills: 3    Associated Diagnoses: Type 2 diabetes mellitus with stage 3a chronic kidney disease, without long-term current use of insulin           Allergies   No Known Allergies

## 2024-09-24 NOTE — PHARMACY-ADMISSION MEDICATION HISTORY
Pharmacist Admission Medication History    Admission medication history is complete. The information provided in this note is only as accurate as the sources available at the time of the update.    Information Source(s): Patient, Family member, Prescription bottles, and CareEverywhere/SureScripts via in-person    Pertinent Information:     Changes made to PTA medication list:  Added: None  Deleted: None  Changed: None    Allergies reviewed with patient and updates made in EHR: yes    Medication History Completed By: ALEKSANDER POLLARD RPH 9/23/2024 9:45 PM    PTA Med List   Medication Sig Last Dose    acetaminophen (MAPAP) 500 MG tablet Take 2 tablets (1,000 mg) by mouth 3 times daily as needed for mild pain Unknown at prn    aspirin 81 MG EC tablet Take 1 tablet (81 mg) by mouth daily 9/23/2024 at am    atorvastatin (LIPITOR) 80 MG tablet TAKE 1 TABLET (80 MG TOTAL) BY MOUTH DAILY/ TXHUA HNUB NOJ 1 LUB TSHUAJ PAB YANET [facility] ROJ 9/23/2024 at am    B Complex-Biotin-FA (B COMPLEX 100 TR) TBCR Take 1 tablet by mouth daily 9/23/2024 at am    empagliflozin (JARDIANCE) 25 MG TABS tablet Take 1 tablet (25 mg) by mouth daily. 9/23/2024 at am    ezetimibe (ZETIA) 10 MG tablet TAKE 1 PILL BY MOUTH EVERY DAY/ NOJ IB LUB IB [facility] 9/23/2024 at am    furosemide (LASIX) 20 MG tablet Take 1 tablet (20 mg) by mouth daily 9/23/2024 at am    isosorbide mononitrate (IMDUR) 60 MG 24 hr tablet TAKE 1 PILL BY MOUTH EVERY DAY/ NOJ IB LUB IB HNUB PAB LUB PLAWV 9/23/2024 at am    losartan (COZAAR) 100 MG tablet TAKE 1 PILL BY MOUTH DAILY FOR BLOOD PRESSURE / TXHUA HNUB NOJ 1 LUB TSHUAJ [facility]AB [facility]OO [facility] SI[facility] 9/23/2024 at am    metFORMIN (GLUCOPHAGE XR) 500 MG 24 hr tablet TAKE 1 TABLET (500 MG) BY MOUTH 2 TIMES DAILY (WITH MEALS)/ NOJ 1 LUB 2 ZAUG TXHUA HNUB NROG MOV PAB ZOO [facility] QAB ZIB 9/23/2024 at a,    metoprolol succinate ER (TOPROL XL) 100 MG 24 hr tablet Take 1 tablet (100 mg) by mouth daily. 9/23/2024 at am     neomycin-polymyxin-dexAMETHasone (MAXITROL) 3.5-66944-8.1 ophthalmic ointment Place 0.1429 Applications (0.5 g) into both eyes 2 times daily. 9/23/2024 at am    nitroGLYcerin (NITROSTAT) 0.4 MG sublingual tablet For chest pain place 1 tablet under the tongue every 5 minutes for 3 doses. If symptoms persist 5 minutes after 1st dose call 911. Unknown at prn    omeprazole (PRILOSEC) 40 MG DR capsule TAKE 1 PILL BY MOUTH EVERY DAY/ CORBYA HNUB NOJ 1 LUB TSHUAJ PAB ZOO LUB PLAB 9/23/2024 at am    OZEMPIC, 2 MG/DOSE, 8 MG/3ML pen Inject 2 mg subcutaneously every 7 days. 9/18/2024 at on Wednesdays    venlafaxine (EFFEXOR XR) 75 MG 24 hr capsule Take 1 capsule (75 mg) by mouth daily 9/23/2024 at am    vitamin D3 (CHOLECALCIFEROL) 125 MCG (5000 UT) tablet Take 1 tablet (125 mcg) by mouth daily 9/23/2024 at am

## 2024-09-24 NOTE — ED PROVIDER NOTES
EMERGENCY DEPARTMENT ENCOUNTER      NAME: Leora Sanchez  AGE: 54 year old female  YOB: 1970  MRN: [id number]  EVALUATION DATE & TIME: 9/23/2024  7:37 PM    PCP: Jaky Gaspar    ED PROVIDER: Jael Javed MD      Chief Complaint   Patient presents with    Hypotension    Eye Problem         FINAL IMPRESSION:  1. Lung mass    2. Hypotension, unspecified hypotension type          ED COURSE & MEDICAL DECISION MAKING:    Pertinent Labs & Imaging studies reviewed. (See chart for details)    8:07 PM I introduced myself to the patient, obtained patient history, performed a physical exam, and discussed plan for ED workup including potential diagnostic laboratory/imaging studies and interventions.    54 year old female with multiple medical problems as below who presents to the Emergency Department for evaluation of hypotension.  On exam, patient is mildly tachycardic and hypotensive into the upper 70s.  Large-bore IV access was established and she was started on a liter of IV fluids.  Blood pressure was improving with this.  She was into the low 90s with fluids.  She is afebrile.  Reporting feelings of near syncope likely related to her hypotension.  She is on multiple blood pressure medications which could be a culprit for this.  Also consider dehydration, infection, cardiac cause, etc.  No focal neurologic deficits.  She is awake alert answering questions appropriately.  Family is helping to interpret here.  Sepsis order set initiated with 1 peripheral culture due to shortage and this is the policy of our facility.  Initial lactic acid elevated at 2.9 and with this and her hypotension will treat with broad-spectrum antibiotics Zosyn to cover for possible sepsis until further evaluation.  She was given additional 500 mL of IV fluids with continued improvement in her blood pressure.  BMP within normal limits.  No obvious sign of volume overload on exam.  CBC reveals white blood cell 9.1 with hemoglobin of  15.6.    .  BMP with a creatinine of 1.80 which is up from 2 weeks ago and it was normal at 0.86.  That she does have signs of an BETITO.  Initial troponin 40 and on repeat down to 29.  EKG does show some nonspecific ST changes that actually do appear similar to 2020 and may be slightly more prominent in 2021 but appears this has occurred previously.  She has no chest pain at this time.  With decrease in troponin felt that acute coronary syndrome was less likely.  May be having some demand ischemia related to her hypertension as well.  Remained without any chest pain here.  Hepatic panel reveals AST of 141 and ALT of 169 she has a history of fatty liver.  Normal alk phos and bilirubin and no abdominal tenderness.  CT of the chest abdomen pelvis without contrast to the BETITO reveals enlargement of subsolid left upper lobe nodule with 0.8 cm solid component.  This is concerning for primary lung cancer.  No other acute abnormality in the abdomen or pelvis or chest.  Spoke with the patient and her family and they state that she is actually known about this previously and there was discussion of biopsy in the past but she had not wanted to go through with that at that time.  We did discuss the potential for cancer and they voiced understanding.  Urinalysis unremarkable.  Cause for hypotension not yet definitely determined.  Blood pressure medication may be playing a role.  She is improving here but will admit for further observation and workup.  Patient and her family in agreement with this plan.  Hospitalist accepts the patient.  She was admitted in stable condition.    ED Course as of 09/24/24 2214   Mon Sep 23, 2024   2141 I talked with Dr. Saldana, hospitalist, who accepts the patient for observation admission.        At the conclusion of the encounter I discussed the results of all of the tests and the disposition. The questions were answered. The patient or family acknowledged understanding and was agreeable with the  care plan.     Critical Care  Performed by: Jael Javed MD  Authorized by: Jael Javed MD     Total critical care time: 30 minutes  Critical care time was exclusive of separately billable procedures and treating other patients.  Critical care was necessary to treat or prevent imminent or life-threatening deterioration of the following conditions: hypotension  Critical care was time spent personally by me on the following activities: development of treatment plan with patient or surrogate, discussions with consultants, examination of patient, evaluation of patient's response to treatment, obtaining history from patient or surrogate, ordering and performing treatments and interventions, ordering and review of laboratory studies, ordering and review of radiographic studies and re-evaluation of patient's condition, this excludes any separately billable procedures.        Medical Decision Making  Obtained supplemental history:Supplemental history obtained?: Documented in chart  Reviewed external records: External records reviewed?: Documented in chart  Care impacted by chronic illness:Chronic Kidney Disease, Diabetes, Heart Disease, Hyperlipidemia, and Hypertension  Care significantly affected by social determinants of health:N/A  Did you consider but not order tests?: Work up considered but not performed and documented in chart, if applicable  Did you interpret images independently?: Independent interpretation of ECG and images noted in documentation, when applicable.  Consultation discussion with other provider:Did you involve another provider (consultant, , pharmacy, etc.)?: I discussed the care with another health care provider, see documentation for details.  Admit.    Not Applicable      MEDICATIONS GIVEN IN THE EMERGENCY:  Medications   sodium chloride 0.9% BOLUS 1,000 mL (1,000 mLs Intravenous $New Bag 9/23/24 1953)   piperacillin-tazobactam (ZOSYN) 3.375 g vial to attach to  mL bag (3.375  g Intravenous $New Bag 9/23/24 2021)   sodium chloride 0.9% BOLUS 500 mL (has no administration in time range)       NEW PRESCRIPTIONS STARTED AT TODAY'S ER VISIT  New Prescriptions    No medications on file          =================================================================    HPI    Patient information was obtained from: patient     Use of : N/A, family members interpreting in the room.         Leora Sanchez is a 54 year old female with a pertinent history of stage 3 chronic kidney disease, CHF, type 2 diabetes, hypertension, hyperlipidemia, and fatty liver who presents to this ED for evaluation of hypotension and light headedness.     Patient reports feeling fine yesterday. This morning however, she had low blood pressure with associated symptoms of blurred vision, lightheaded/dizziness, body aches. She reports this morning she had chest pain for 10 minutes that then resolved. She reports some nausea but no vomiting. She also felt like fainting earlier but did not have a syncopal episode. Says that she did not take any extra blood pressure medications. She took all her blood pressure medications in the morning and one in the afternoon. Says laying flat feels better for her and she feels worse when sitting or standing where she gets short of breath along with her other symptoms. Patient has also noticed intermittent swelling in her legs (none currently) and intermittent numbness in her fingertips. Denies anyone sick around her, diarrhea, melena, hematochezia, cough, and rhinorrhea.     Patient and family is unsure if patient has ever had issues with low blood pressure in the past. They do not know if she is on a blood thinner.       REVIEW OF SYSTEMS   Pertinent positives and negatives are documented in the HPI. All other systems reviewed and are negative.      PAST MEDICAL HISTORY:  Past Medical History:   Diagnosis Date    Anxiety     Chronic cough 02/12/2018    Chronic kidney disease      Congestive heart failure (H)     Depression     Dyslipidemia, goal LDL below 70 10/31/2017    Essential hypertension     GERD (gastroesophageal reflux disease)     Heart failure with reduced ejection fraction (H) 10/30/2017    Hypertension     Hypokalemia 2021    Nonischemic cardiomyopathy (H)     variable EF depending on the technique, date and reader; BNP normal in     Nonocclusive coronary atherosclerosis of native coronary artery 10/31/2017    Obese     Pregnancy         Pyelonephritis 2018    Renal abscess     Spontaneous  2010    TM (tympanic membrane disorder)        PAST SURGICAL HISTORY:  Past Surgical History:   Procedure Laterality Date    CV CORONARY ANGIOGRAM N/A 2019    Procedure: Coronary Angiogram;  Surgeon: Car Box MD;  Location: [facility];  Service: Cardiology    CV LEFT HEART CATHETERIZATION WITHOUT LEFT VENTRICULOGRAM Left 10/31/2017    Procedure: Left Heart Catheterization Without Left Ventriculogram;  Surgeon: Jonathan Duque MD;  Location: [facility];  Service:     CV LEFT HEART CATHETERIZATION WITHOUT LEFT VENTRICULOGRAM Left 2019    Procedure: Left Heart Catheterization Without Left Ventriculogram;  Surgeon: Car Box MD;  Location: [facility];  Service: Cardiology    DILATION AND CURETTAGE      ENT SURGERY      INNER EAR SURGERY Right     MASTOIDECTOMY Right     MN CATH PLACEMENT & NJX CORONARY ART ANGIO IMG S&I N/A 10/31/2017    Procedure: Coronary Angiogram;  Surgeon: Jonathan Duque MD;  Location: [facility];  Service: Cardiology           CURRENT MEDICATIONS:    acetaminophen (MAPAP) 500 MG tablet  aspirin 81 MG EC tablet  atorvastatin (LIPITOR) 80 MG tablet  B Complex-Biotin-FA (B COMPLEX 100 TR) TBCR  blood glucose (CONTOUR NEXT TEST) test strip  blood glucose (NO BRAND SPECIFIED) lancets standard  empagliflozin (JARDIANCE) 25 MG TABS tablet  erythromycin  (ROMYCIN) 5 MG/GM ophthalmic ointment  ezetimibe (ZETIA) 10 MG tablet  furosemide (LASIX) 20 MG tablet  isosorbide mononitrate (IMDUR) 60 MG 24 hr tablet  losartan (COZAAR) 100 MG tablet  metFORMIN (GLUCOPHAGE XR) 500 MG 24 hr tablet  metoprolol succinate ER (TOPROL XL) 100 MG 24 hr tablet  neomycin-polymyxin-dexAMETHasone (MAXITROL) 3.5-84682-4.1 ophthalmic ointment  nitroGLYcerin (NITROSTAT) 0.4 MG sublingual tablet  omeprazole (PRILOSEC) 40 MG DR capsule  OZEMPIC, 2 MG/DOSE, 8 MG/3ML pen  tirzepatide (MOUNJARO) 5 MG/0.5ML pen  venlafaxine (EFFEXOR XR) 75 MG 24 hr capsule  vitamin D3 (CHOLECALCIFEROL) 125 MCG (5000 UT) tablet        ALLERGIES:  No Known Allergies    FAMILY HISTORY:  Family History   Problem Relation Age of Onset    Diabetes Mother     Hypertension Mother     Uterine Cancer Mother     Diverticulitis Mother     Other - See Comments Father          in [place]    No Known Problems Sister     No Known Problems Daughter     No Known Problems Daughter     No Known Problems Daughter     No Known Problems Daughter     No Known Problems Son     No Known Problems Son     No Known Problems Son     No Known Problems Son        SOCIAL HISTORY:   Social History     Socioeconomic History    Marital status:      Spouse name: Juan    Number of children: 8   Occupational History    Occupation: SSDI   Tobacco Use    Smoking status: Never     Passive exposure: Never    Smokeless tobacco: Never    Tobacco comments:     no passive exposure   Substance and Sexual Activity    Alcohol use: No    Drug use: No    Sexual activity: Yes     Partners: Male     Birth control/protection: Post-menopausal   Social History Narrative    Lives with  Juan who can read and speak English and helps her with meds, son and daughter-in-law      Social Determinants of Health     Financial Resource Strain: Low Risk  (2024)    Financial Resource Strain     Within the past 12 months, have you or your family  members you live with been unable to get utilities (heat, electricity) when it was really needed?: No   Food Insecurity: Low Risk  (9/6/2024)    Food Insecurity     Within the past 12 months, did you worry that your food would run out before you got money to buy more?: No     Within the past 12 months, did the food you bought just not last and you didn t have money to get more?: No   Transportation Needs: Low Risk  (9/6/2024)    Transportation Needs     Within the past 12 months, has lack of transportation kept you from medical appointments, getting your medicines, non-medical meetings or appointments, work, or from getting things that you need?: No   Physical Activity: Insufficiently Active (9/6/2024)    Exercise Vital Sign     Days of Exercise per Week: 5 days     Minutes of Exercise per Session: 10 min   Stress: No Stress Concern Present (9/6/2024)    [place] [employer] - Occupational Stress Questionnaire     Feeling of Stress : Only a little   Social Connections: Unknown (9/6/2024)    Social Connection and Isolation Panel [NHANES]     Frequency of Social Gatherings with Friends and Family: More than three times a week   Interpersonal Safety: Low Risk  (9/6/2024)    Interpersonal Safety     Do you feel physically and emotionally safe where you currently live?: Yes     Within the past 12 months, have you been hit, slapped, kicked or otherwise physically hurt by someone?: No     Within the past 12 months, have you been humiliated or emotionally abused in other ways by your partner or ex-partner?: No   Housing Stability: Low Risk  (9/6/2024)    Housing Stability     Do you have housing? : Yes     Are you worried about losing your housing?: No       VITALS:  BP (!) 86/54   Pulse 86   Temp 98.3  F (36.8  C) (Oral)   Resp 29   Wt 91.9 kg (202 lb 11.2 oz)   SpO2 98%   BMI 40.86 kg/m      PHYSICAL EXAM    Physical Exam  Constitutional: NAD, GCS 15  HENT: Normocephalic, Atraumatic, Bilateral  external ears normal, Oropharynx normal, mucous membranes moist, Nose normal. Neck- Normal range of motion, No tenderness, Supple, No stridor.    Eyes: PERRL, EOMI, Conjunctiva normal, No discharge.   Respiratory: Normal breath sounds, No respiratory distress, No wheezing or crackles, Speaks in full sentences easily.    Cardiovascular: Normal heart rate, Regular rhythm, No murmurs, No rubs, No gallops. 2+ radial pulses bilaterally  GI: Bowel sounds normal, Soft, No tenderness, No masses, No rebound or guarding.  Musculoskeletal: 2+ DP pulses. No notable lower extremity edema. No cyanosis, No clubbing. Good range of motion in all major joints. No tenderness to palpation or major deformities noted.   Integument: Warm, Dry, No erythema, No rash. No petechiae.  Neurologic: Alert & oriented x 3, 5/5 strength in all 4 extremities bilaterally. Sensation intact to light touch in all 4 extremities and the face bilaterally. No focal deficits noted.  Psychiatric: Affect normal, Judgment normal, Mood normal. Cooperative.      LAB:  All pertinent labs reviewed and interpreted.  Results for orders placed or performed during the hospital encounter of 09/23/24   Glucose by meter   Result Value Ref Range    GLUCOSE BY METER POCT 225 (H) 70 - 99 mg/dL   Basic metabolic panel   Result Value Ref Range    Sodium 141 135 - 145 mmol/L    Potassium 3.7 3.4 - 5.3 mmol/L    Chloride 99 98 - 107 mmol/L    Carbon Dioxide (CO2) 26 22 - 29 mmol/L    Anion Gap 16 (H) 7 - 15 mmol/L    Urea Nitrogen 27.0 (H) 6.0 - 20.0 mg/dL    Creatinine 1.80 (H) 0.51 - 0.95 mg/dL    GFR Estimate 33 (L) >60 mL/min/1.73m2    Calcium 10.1 8.8 - 10.4 mg/dL    Glucose 235 (H) 70 - 99 mg/dL   Result Value Ref Range    Troponin T, High Sensitivity 40 (H) <=14 ng/L   CBC with platelets and differential   Result Value Ref Range    WBC Count 9.1 4.0 - 11.0 10e3/uL    RBC Count 4.84 3.80 - 5.20 10e6/uL    Hemoglobin 15.6 11.7 - 15.7 g/dL    Hematocrit 46.8 35.0 - 47.0 %     MCV 97 78 - 100 fL    MCH 32.2 26.5 - 33.0 pg    MCHC 33.3 31.5 - 36.5 g/dL    RDW 13.4 10.0 - 15.0 %    Platelet Count 260 150 - 450 10e3/uL    % Neutrophils 66 %    % Lymphocytes 21 %    % Monocytes 10 %    % Eosinophils 1 %    % Basophils 1 %    % Immature Granulocytes 2 %    NRBCs per 100 WBC 0 <1 /100    Absolute Neutrophils 6.0 1.6 - 8.3 10e3/uL    Absolute Lymphocytes 1.9 0.8 - 5.3 10e3/uL    Absolute Monocytes 0.9 0.0 - 1.3 10e3/uL    Absolute Eosinophils 0.1 0.0 - 0.7 10e3/uL    Absolute Basophils 0.1 0.0 - 0.2 10e3/uL    Absolute Immature Granulocytes 0.2 <=0.4 10e3/uL    Absolute NRBCs 0.0 10e3/uL   Hepatic function panel   Result Value Ref Range    Protein Total 7.3 6.4 - 8.3 g/dL    Albumin 4.2 3.5 - 5.2 g/dL    Bilirubin Total 0.4 <=1.2 mg/dL    Alkaline Phosphatase 122 40 - 150 U/L     (H) 0 - 45 U/L     (H) 0 - 50 U/L    Bilirubin Direct <0.20 0.00 - 0.30 mg/dL   Lactic acid whole blood with 1x repeat in 2 hr when >2   Result Value Ref Range    Lactic Acid, Initial 2.9 (H) 0.7 - 2.0 mmol/L   Nt probnp inpatient (BNP)   Result Value Ref Range    N terminal Pro BNP Inpatient 126 0 - 900 pg/mL   Result Value Ref Range    Magnesium 2.4 (H) 1.7 - 2.3 mg/dL       RADIOLOGY:  Reviewed all pertinent imaging. Please see official radiology report.  CT Chest Abdomen Pelvis w/o Contrast   Final Result   IMPRESSION:   1.  Enlargement of a subsolid left upper lobe nodule with a 0.8 cm solid component. This is concerning for a primary lung cancer. Recommend PET scan.   2.  No acute abnormality in the abdomen or pelvis.                   EKG:    Performed at: 7:47 pm    Impression: Sinus rhythm. Non specific ST changes. Appears similar to 2/18/21 (slightly more pronounced) and 8/20/2020 (similar to this).       I have independently reviewed and interpreted the EKG(s) documented above.    PROCEDURES:   None      [facility] Documentation:   CMS Diagnoses:               Martine GRISSOM am  serving as a scribe to document services personally performed by Jael Javed MD based on my observation and the provider's statements to me. I, Jael Javed MD, attest that Martine Escobar is acting in a scribe capacity, has observed my performance of the services and has documented them in accordance with my direction.    Jael Javed MD  [facility] EMERGENCY DEPARTMENT  [address] [place] [id number] [phone]      Jael Javed MD  09/24/24 5063

## 2024-09-24 NOTE — PROGRESS NOTES
09/24/24 0850   Appointment Info   Signing Clinician's Name / Credentials (OT) Ying Ibrahim OTR/L   Quick Adds   Quick Adds Certification       Present yes   Language Hmong   Living Environment   People in Home spouse;child(miri), adult  (son and daughter-in-law)   Current Living Arrangements house   Living Environment Comments walk-in shower   Self-Care   Equipment Currently Used at Home none   Activity/Exercise/Self-Care Comment IND for BADLs   Instrumental Activities of Daily Living (IADL)   IADL Comments family completes most household tasks, pt has been limited by back pain and LE weakness. holds onto spouse for mobility out of house.   General Information   Onset of Illness/Injury or Date of Surgery 09/23/24   Referring Physician Shasta Saldana MD   Patient/Family Therapy Goal Statement (OT) go home   Additional Occupational Profile Info/Pertinent History of Current Problem PMH h/o of diabetes, hypertension, hyperlipidemia, obesity, chronic follicle or conjunctivitis bilaterally, CKD, anemia history of pulmonary nodules, nonischemic cardiomyopathy,, heart failure with preserved ejection fraction, coronary artery disease presented  with chief complaints of weakness. Work up revealed BETITO, hypotension, lactic acidosis   Existing Precautions/Restrictions fall   Cognitive Status Examination   Affect/Mental Status (Cognitive) WFL   Follows Commands WFL   Pain Assessment   Patient Currently in Pain Yes, see Vital Sign flowsheet   Range of Motion Comprehensive   General Range of Motion bilateral upper extremity ROM WFL   Strength Comprehensive (MMT)   Comment, General Manual Muscle Testing (MMT) Assessment WFL for ADLs   Transfers   Transfers sit-stand transfer;toilet transfer   Sit-Stand Transfer   Sit-Stand Gary (Transfers) supervision   Toilet Transfer   Type (Toilet Transfer) sit-stand;stand-sit   [place] Level (Toilet Transfer) supervision   Balance   Balance Comments CGA  to return to room from bathroom, using IV pole heavily for support   Activities of Daily Living   BADL Assessment/Intervention lower body dressing;toileting   Lower Body Dressing Assessment/Training   [place] Level (Lower Body Dressing) minimum assist (75% patient effort)   Toileting   [place] Level (Toileting) supervision   Clinical Impression   Criteria for Skilled Therapeutic Interventions Met (OT) Yes, treatment indicated   OT Diagnosis dec BADL IND   OT Problem List-Impairments impacting ADL problems related to;activity tolerance impaired;mobility;pain   Assessment of Occupational Performance 3-5 Performance Deficits   Identified Performance Deficits dressing, toileting, bathing, household mobility, functional transfers   Planned Therapy Interventions (OT) ADL retraining;strengthening;transfer training;home program guidelines;progressive activity/exercise   Clinical Decision Making Complexity (OT) problem focused assessment/low complexity   Risk & Benefits of therapy have been explained evaluation/treatment results reviewed;participants included;patient   OT Total Evaluation Time   OT Eval, Low Complexity Minutes (80375) 10   Therapy Certification   Medical Diagnosis Hypotension   Start of Care Date 09/24/24   Certification date from 09/24/24   Certification date to 10/01/24   OT Goals   Therapy Frequency (OT) 4 times/week   OT Predicted Duration/Target Date for Goal Attainment 09/30/24   OT Goals Lower Body Dressing;Toilet Transfer/Toileting   OT: Lower Body Dressing Modified independent   OT: Toilet Transfer/Toileting Modified independent;toilet transfer;cleaning and garment management   Self-Care/Home Management   Self-Care/Home Mgmt/ADL, Compensatory, Meal Prep Minutes (53352) 11   Symptoms Noted During/After Treatment (Meal Preparation/Planning Training) increased pain   Treatment Detail/Skilled Intervention Educ on positioning in figure 4 to increase ease and decrease pain during LB dressing.  With cues for adjustments of positioning, able to don/doff bilateral socks using figure 4 with increased effort to achieve position. Educ on use of shower chair to increase safety and ease of bathing. Reports limited room for chair in shower.   Therapeutic Activities   Therapeutic Activity Minutes (45967) 13   Symptoms noted during/after treatment increased pain;dizziness   Treatment Detail/Skilled Intervention Increased time for  use and monitoring vitals. Reporting dizziness increase once standing but also reporting dizziness with activity is her baseline since ear surgery in 1990's. BP sitting 121/80, standing 106/67, sitting 125/75. Educ on use of FWW to decrease back pain and safety during activity. Following educ, SBA sit <> stand from EOB, SBA/CGA with FWW for 20ft mobility in room. handoff to PT at end of session.   OT Discharge Planning   OT Plan 1-2 more sessions: review LB dressing figure 4 or intro AE, toileting, mobility with FWW   OT Discharge Recommendation (DC Rec) home with assist;home with home care occupational therapy   OT Rationale for DC Rec lives with supportive family who can assist with all I/ADLs. appears to be nearing baseline.   OT Brief overview of current status SBA/CGA   OT Equipment Needed at Discharge shower chair    [facility]  OUTPATIENT OCCUPATIONAL THERAPY  EVALUATION  PLAN OF TREATMENT FOR OUTPATIENT REHABILITATION  (COMPLETE FOR INITIAL CLAIMS ONLY)  Patient's Last Name, First Name, M.I.  YOB: 1970  Sanchez,May  X                          Provider's Name  [facility] Medical Record No.  [id number]                             Onset Date:  09/23/24   Start of Care Date:  09/24/24   Type:     ___PT   _X_OT   ___SLP Medical Diagnosis:  Hypotension                    OT Diagnosis:  dec BADL IND Visits from SOC:  1     See note for plan of treatment, functional goals and certification details    I  CERTIFY THE NEED FOR THESE SERVICES FURNISHED UNDER        THIS PLAN OF TREATMENT AND WHILE UNDER MY CARE     (Physician co-signature of this document indicates review and certification of the therapy plan).

## 2024-09-24 NOTE — PLAN OF CARE
Goal Outcome Evaluation:      Plan of Care Reviewed With: patient, spouse      Patient discharging home. Discharge instructions to be given to patient and family, all of patient's home medications given to patient. Family will transport.

## 2024-09-24 NOTE — PROGRESS NOTES
Admission OBS short Stay  Admitted from: ED  Via: stretcher   Accompanied by: family   Belongings: Placed in closet; valuables sent home with family.   Admission paperwork: complete.   Teaching: Call don't fall, use of console, meal times, visiting hours, orientation to unit, when to call for the RN (angina/sob/dizzyness, etc.), and stressed the importance of safety.   Access: PIV   Telemetry: Placed on pt

## 2024-09-24 NOTE — PROGRESS NOTES
"   09/24/24 0900   Appointment Info   Signing Clinician's Name / Credentials (PT) Kristin Tran, PT, DPT   Quick Adds   Quick Adds Certification   Living Environment   People in Home spouse;child(miri), adult   Current Living Arrangements house   Home Accessibility stairs to enter home;stairs within home   Number of Stairs, Main Entrance 2   Stair Railings, Main Entrance none   Number of Stairs, Within Home, Primary greater than 10 stairs   Stair Railings, Within Home, Primary railings safe and in good condition   Self-Care   Equipment Currently Used at Home none   Fall history within last six months yes   Number of times patient has fallen within last six months   (multiple outdoors per patient; occurs when she gets up too quickly from sitting position)   Activity/Exercise/Self-Care Comment Pt independent with ADLs.   General Information   Onset of Illness/Injury or Date of Surgery 09/23/24   Referring Physician Shasta Saldana MD   Patient/Family Therapy Goals Statement (PT) None stated.   Pertinent History of Current Problem (include personal factors and/or comorbidities that impact the POC) Per H&P: \"54 year old female admitted on 9/23/2024. She has h/o of diabetes, hypertension, hyperlipidemia, obesity, chronic follicle or conjunctivitis bilaterally, CKD, anemia history of pulmonary nodules, nonischemic cardiomyopathy,, heart failure with preserved ejection fraction, coronary artery disease presented  with chief complaints of weakness. Work up revealed BETITO, hypotension, lactic acidosis     \"   Existing Precautions/Restrictions no known precautions/restrictions   Pain Assessment   Patient Currently in Pain Yes, see Vital Sign flowsheet  (reports chronic low back pain)   Range of Motion (ROM)   Range of Motion ROM is WFL   Strength (Manual Muscle Testing)   Strength (Manual Muscle Testing) Deficits observed during functional mobility   Bed Mobility   Comment, (Bed Mobility) Pt seated at EOB at start and end of " session. Based on functional mobility observed, pt likely to have no difficulties with transfers.   Transfers   Transfers sit-stand transfer   Sit-Stand Transfer   Sit-Stand [place] (Transfers) contact guard   Assistive Device (Sit-Stand Transfers) walker, front-wheeled   Gait/Stairs (Locomotion)   [place] Level (Gait) contact guard;verbal cues   Assistive Device (Gait) walker, front-wheeled   Distance in Feet (Gait) 60'   Pattern (Gait) step-through   Deviations/Abnormal Patterns (Gait) lyla decreased;gait speed decreased   Negotiation (Stairs) stairs independence;handrail location;number of steps;ascending technique;descending technique   [place] Level (Stairs) contact guard;verbal cues   Handrail Location (Stairs) left side (ascending);left side (descending)   Number of Steps (Stairs) 3   Ascending Technique (Stairs) step-to-step   Descending Technique (Stairs) step-to-step   Clinical Impression   Criteria for Skilled Therapeutic Intervention Yes, treatment indicated   PT Diagnosis (PT) impaired functional mobility   Influenced by the following impairments decreased strength, impaired balance   Functional limitations due to impairments transfers, ambulation   Clinical Presentation (PT Evaluation Complexity) evolving   Clinical Presentation Rationale Clinical judgment.   Clinical Decision Making (Complexity) moderate complexity   Planned Therapy Interventions (PT) balance training;bed mobility training;gait training;home exercise program;neuromuscular re-education;patient/family education;strengthening;stair training;transfer training   Risk & Benefits of therapy have been explained evaluation/treatment results reviewed;participants voiced agreement with care plan;participants included;patient   PT Total Evaluation Time   PT Eval, Moderate Complexity Minutes (50816) 10   Therapy Certification   Start of care date 09/24/24   Certification date from 09/24/24   Certification date to 10/01/24    Medical Diagnosis lung mass   Physical Therapy Goals   PT Frequency Daily   PT Predicted Duration/Target Date for Goal Attainment 10/01/24   PT Goals Bed Mobility;Transfers;Gait;Stairs   PT: Bed Mobility Independent;Supine to/from sit   PT: Transfers Supervision/stand-by assist;Sit to/from stand;Assistive device   PT: Gait Supervision/stand-by assist;Assistive device;Rolling walker;Greater than 200 feet   PT: Stairs Supervision/stand-by assist;3 stairs   Interventions   Interventions Quick Adds Gait Training   Gait Training   Gait Training Minutes (74559) 15   Symptoms Noted During/After Treatment (Gait Training) none   Treatment Detail/Skilled Intervention With FWW. Patient ambulates second walk without AD for 20'. Reports feeling safer with a FWW.   Distance in Feet additional 60' with FWW, 20' without device   [place] Level (Gait Training) contact guard   Physical Assistance Level (Gait Training) verbal cues;1 person assist   Assistive Device (Gait Training) rolling walker   Gait Analysis Deviations decreased lyla;decreased step length   Impairments (Gait Analysis/Training) balance impaired;strength decreased   PT Discharge Planning   PT Plan gait with FWW vs without device, stairs   PT Discharge Recommendation (DC Rec) home with assist;home with outpatient physical therapy   PT Rationale for DC Rec Patient able to complete transfers and ambulation with stand-by to contact guard assist of 1. Recommend outpatient PT for back pain.   PT Brief overview of current status Sit <> stand, SBA. Ambulates 120' with FWW, CGA. Ambulates 20' without device.   PT Equipment Needed at Discharge walker, rolling   Total Session Time   Timed Code Treatment Minutes 15   Total Session Time (sum of timed and untimed services) 25     [facility]  OUTPATIENT PHYSICAL THERAPY EVALUATION  PLAN OF TREATMENT FOR OUTPATIENT REHABILITATION  (COMPLETE FOR INITIAL CLAIMS ONLY)  Patient's Last Name, First  Name, M.I.  YOB: 1970  Sanchez,May  X                        Provider's Name  [facility] Medical Record No.  [id number]                             Onset Date:  09/23/24   Start of Care Date:  09/24/24   Type:     _X_PT   ___OT   ___SLP Medical Diagnosis:  lung mass              PT Diagnosis:  impaired functional mobility Visits from SOC:  1     See note for plan of treatment, functional goals and certification details    I CERTIFY THE NEED FOR THESE SERVICES FURNISHED UNDER        THIS PLAN OF TREATMENT AND WHILE UNDER MY CARE     (Physician co-signature of this document indicates review and certification of the therapy plan).

## 2024-09-25 ENCOUNTER — PATIENT OUTREACH (OUTPATIENT)
Dept: FAMILY MEDICINE | Facility: CLINIC | Age: 54
End: 2024-09-25
Payer: MEDICARE

## 2024-09-25 ENCOUNTER — APPOINTMENT (OUTPATIENT)
Dept: INTERPRETER SERVICES | Facility: CLINIC | Age: 54
End: 2024-09-25
Payer: MEDICARE

## 2024-09-25 DIAGNOSIS — G44.209 TENSION HEADACHE: ICD-10-CM

## 2024-09-25 RX ORDER — VENLAFAXINE HYDROCHLORIDE 75 MG/1
CAPSULE, EXTENDED RELEASE ORAL
Qty: 90 CAPSULE | Refills: 0 | Status: SHIPPED | OUTPATIENT
Start: 2024-09-25

## 2024-09-25 NOTE — TELEPHONE ENCOUNTER
Transitions of Care Outreach  Chief Complaint   Patient presents with    Hospital F/U       Most Recent Admission Date: 9/23/2024   Most Recent Admission Diagnosis: Lung mass - R91.8  Hypotension, unspecified hypotension type - I95.9     Most Recent Discharge Date: 9/24/2024   Most Recent Discharge Diagnosis: Hypotension, unspecified hypotension type - I95.9  Lung mass - R91.8     Transitions of Care Assessment    Discharge Assessment  How are you doing now that you are home?: Dizziness resolved, weakness has improved. Patient is still feeling fatigued, but is resting at home.  How are your symptoms? (Red Flag symptoms escalate to triage hotline per guidelines): Improved  Do you know how to contact your clinic care team if you have future questions or changes to your health status? : Yes  Does the patient have their discharge instructions? : Yes  Does the patient have questions regarding their discharge instructions? : No  Were you started on any new medications or were there changes to any of your previous medications? : Yes (Losartan decreased to 50mg daily)  Does the patient have all of their medications?: Yes  Do you have questions regarding any of your medications? : No  Do you have all of your needed medical supplies or equipment (DME)?  (i.e. oxygen tank, CPAP, cane, etc.): Yes    Follow up Plan     Discharge Follow-Up  Discharge follow up appointment scheduled in alignment with recommended follow up timeframe or Transitions of Risk Category? (Low = within 30 days; Moderate= within 14 days; High= within 7 days): No (Pulmonology follow-up not scheduled, but in process. Sending separate TE to PCP to coordinate PCP hospital follow-up as patient has limited transportation.)  Patient's follow up appointment not scheduled: Patient accepted scheduling support. Appt scheduled/requested per protocol.    Future Appointments   Date Time Provider Department Center   9/27/2024 11:20 AM MICCT1 J[facility]IC M[facility] IMG    10/18/2024 10:30 AM [facility] Thak[facility], [facility]   11/22/2024 10:00 AM Andrey Roque Chi, OD [facility] CLIN   1/10/2025  1:00 PM [facility]   3/14/2025  2:20 PM [facility]   9/12/2025  1:00 PM [facility]       Outpatient Plan as outlined on AVS reviewed with patient.    For any urgent concerns, please contact our 24 hour nurse triage line: [phone] ([phone])       Helen Chan RN    \

## 2024-09-25 NOTE — PLAN OF CARE
PT Discharge Summary    Reason for therapy discharge:    Discharged to home with home therapy.    Progress towards therapy goal(s). See goals on Care Plan in [facility] electronic health record for goal details.  Goals not met.  Barriers to achieving goals:   discharge from facility.    Therapy recommendation(s):    Continued therapy is recommended.  Rationale/Recommendations:  PT goals not met.

## 2024-09-25 NOTE — TELEPHONE ENCOUNTER
Dr. Gaspar - Please advise on hospital discharge follow-up appt scheduling.  Patient instructed to see PCP within 7 days of 9/24/24.  Patient only has transportation to come to clinic Friday 9/27, prefers to see PCP for in-person appointment.    OK to DB this Friday? Should patient see alternate provider? Please route response to nursing team so we can contact patient to schedule.    Helen Chan RN  [facility]

## 2024-09-26 ENCOUNTER — APPOINTMENT (OUTPATIENT)
Dept: INTERPRETER SERVICES | Facility: CLINIC | Age: 54
End: 2024-09-26
Payer: MEDICARE

## 2024-09-26 NOTE — TELEPHONE ENCOUNTER
Called patient with [facility]  [facility]. Relayed result message. Patient verbalizes understanding and agreement with plan.    Helen Chan RN  [facility]

## 2024-09-26 NOTE — TELEPHONE ENCOUNTER
Called patient.    Future Appointments 9/26/2024 - 3/25/2025        Date Visit Type Length Department Provider     9/27/2024  3:20 PM ED/HOSP FOLLOW UP 40 min [facility] FAMILY MEDICINE/OB Jaky Gaspar MD    Location Instructions:     [facility] is located at [address] [place] [address] in [place], at the intersection of [facility]. This is one block south of the [facility]. Free parking is available in the lot directly north of the clinic across [facility]. The clinic is near stops along bus [address] and [address].              10/18/2024 10:30 AM RETURN - MTM 60 min SPRS MTM Art Thakkar PharmD    Location Instructions:     We are located at [address] [place] [address]. We offer free parking in the lot on [address] across the street from the clinic. Please check in at the  through the main entrance.              11/22/2024 10:00 AM NEW ADULT EYE 30 min [facility] Andrey Roque Chi, OD    Location Instructions:     Located in the [facility].&nbsp; Parking is available in the Patient/Visitor parking ramp located on the corner of [facility] and [facility]. Due to increased security to all of the Health Science Buildings on the [facility], the entrances to [facility] and the [facility] will not be open until 7am and may have a  stationed at the entrances. Please bring this letter with you in case you are asked for proof of appointment.  PARKING OPTIONS: Eye Clinic Parking/Shuttle Options  Service Hours: Mon - Sun: 24hrs  service is available for patients with mobility limitations. [facility] Hours: Mon - Fri: 7:00 am - 4:30 pm&nbsp; Desk Number: [phone] Shuttle Hours: Patient/Family Shuttle Mon - Fri: 7:00 am - 7:00 pm Shuttle request number: [phone] Ramp Escort Service Mon - Sun: 2:00 pm - 8:00 pm  Arrival Process for : a. Arrive at [facility] b. Inform   staff they are going to eye clinic c. Handoff keys to  and take ticket d. Request a shuttle ride to PWB  Arrival Process for Self-Parking: a. Park in the Patient/Visitors Ramp b. Call the shuttle request number from your personal devise and request a ride to PWB. Wait by the elevator lobby and communicate to the request line which floor for pick-up  Exit Process for : a. Connect with Ambassador team to radio for shuttle to take back to [address] b. Present your ticket to  and pay to retrieve the vehicle  Exit Process for Self-Parking: a. Connect with Ambassador team to radio for ramp escort to their vehicle (ramp escort is unable to accommodate any wheelchairs please wait in the lobby while the shuttle escorts their care takers to their cars in the ramp for a quicker  cycle) b. Let  know which floor to deliver you to  This appointment is in a hospital-based location.&nbsp; Before your visit, you may want to check with your insurance company for coverage and referral options, including cost differences between services provided in different clinic settings.&nbsp; For more information visit this link on the [employer] Website:&nbsp; [email or web address]              1/10/2025  1:00 PM OFFICE VISIT 20 min [facility] FAMILY MEDICINE/OB Jaky Gaspar MD    Location Instructions:     [facility] is located at [address] [place] [address] in [place], at the intersection of [facility]. This is one block south of the [facility]. Free parking is available in the lot directly north of the clinic across [facility]. The clinic is near stops along bus routes 3 and [address].              3/14/2025  2:20 PM RETURN EAR 20 min MPL[facility] Jose Maria Sheppard MD    Location Instructions:     We are located in the [facility] at [address], [address], [place], [place].&nbsp; Our building is located across the street from [facility] and  offers free parking in our on-site lot. Please take the stairs or elevator to the second floor of the [facility] and [facility] Center and check in at the  located in the Specialty Clinic, [address].                    Helen Chan RN  [facility]

## 2024-09-27 ENCOUNTER — OFFICE VISIT (OUTPATIENT)
Dept: FAMILY MEDICINE | Facility: CLINIC | Age: 54
End: 2024-09-27
Payer: MEDICARE

## 2024-09-27 ENCOUNTER — TELEPHONE (OUTPATIENT)
Dept: FAMILY MEDICINE | Facility: CLINIC | Age: 54
End: 2024-09-27

## 2024-09-27 VITALS
WEIGHT: 207 LBS | HEART RATE: 101 BPM | DIASTOLIC BLOOD PRESSURE: 70 MMHG | OXYGEN SATURATION: 94 % | RESPIRATION RATE: 18 BRPM | HEIGHT: 59 IN | TEMPERATURE: 97.4 F | SYSTOLIC BLOOD PRESSURE: 106 MMHG | BODY MASS INDEX: 41.73 KG/M2

## 2024-09-27 DIAGNOSIS — M54.42 CHRONIC BILATERAL LOW BACK PAIN WITH LEFT-SIDED SCIATICA: ICD-10-CM

## 2024-09-27 DIAGNOSIS — Z09 HOSPITAL DISCHARGE FOLLOW-UP: ICD-10-CM

## 2024-09-27 DIAGNOSIS — R91.8 LUNG MASS: Primary | ICD-10-CM

## 2024-09-27 DIAGNOSIS — G89.29 CHRONIC BILATERAL LOW BACK PAIN WITH LEFT-SIDED SCIATICA: ICD-10-CM

## 2024-09-27 PROCEDURE — 90480 ADMN SARSCOV2 VAC 1/ONLY CMP: CPT | Performed by: FAMILY MEDICINE

## 2024-09-27 PROCEDURE — 91320 SARSCV2 VAC 30MCG TRS-SUC IM: CPT | Performed by: FAMILY MEDICINE

## 2024-09-27 PROCEDURE — 99214 OFFICE O/P EST MOD 30 MIN: CPT | Mod: 25 | Performed by: FAMILY MEDICINE

## 2024-09-27 NOTE — TELEPHONE ENCOUNTER
Reason for Call:  Home care referral     What are your questions or concerns:  Rossana  from [facility] calling to inform provider that they have tried many attempt to connect with pt but was unable so they will be closing the referral.        Clindamycin Pregnancy And Lactation Text: This medication can be used in pregnancy if certain situations. Clindamycin is also present in breast milk.

## 2024-09-27 NOTE — PROGRESS NOTES
Assessment & Plan     Lung mass  ***    Chronic bilateral low back pain with left-sided sciatica  ***    Hospital discharge follow-up  ***  - Basic metabolic panel  (Ca, Cl, CO2, Creat, Gluc, K, Na, BUN)  - Med Therapy Management Referral          MED REC REQUIRED{TIP  Click the link below to document or use med rec list, use list to pull in response :[id number]}  Post Medication Reconciliation Status: discharge medications reconciled, continue medications without change      Shahab Corey is a 54 year old, presenting for the following health issues:  Hospital F/U        9/27/2024     3:14 PM   Additional Questions   Roomed by Ginny     [facility]        Hospital Follow-up Visit:    Hospital/Nursing Home/IP Rehab Facility: [facility]  Date of Admission: 09/23/2024  Date of Discharge: 09/24/2024  Reason(s) for Admission: Fatigue, poor vision  Was the patient in the ICU or did the patient experience delirium during hospitalization?  No  Do you have any other stressors you would like to discuss with your provider? No    Problems taking medications regularly:  None  Medication changes since discharge: None  Problems adhering to non-medication therapy:  None    Summary of hospitalization:  [facility] discharge summary reviewed  Diagnostic Tests/Treatments reviewed.  Follow up needed: Cr  Other Healthcare Providers Involved in Patient s Care:         None  Update since discharge: improved. {TIP  Include information from family/caregivers, SNF, Care Coordination :993117}        Plan of care communicated with patient     {Reference  Coding guidelines- Moderate Complexity F2F/Video within 7 - 14 days of discharge [id number], High Complexity F2F/Video within 7 days [id number] or eufrsy47 days [id number] :[id number]}    Patient says she's been doing well since hospital discharge. Says she's just a little tired but thinks she's ok overall. Fatigue and vision problems resolved. Not having any problems  "with her meds. Does have follow up scheduled. Denies breathing difficulties, coughing, wheezing, fevers, chest pain/pressure.     Notes burning, shooting pain in L hip down to foot since early this year, difficult to walk. Pain with exertion. Lower back pain is always present, but pain doesn't shoot down leg unless walking. Has not found anything that helps. Tylenol not helpful. Seems to be worsening. Has not been seen for this previously. Has not experienced before. R side is fine.     Ok for Covid shot today (has already had flu shot this year).    PHQ-9 score:        9/6/2024    12:57 PM   PHQ   PHQ-9 Total Score 5   Q9: Thoughts of better off dead/self-harm past 2 weeks Not at all             Objective    /70 (BP Location: Left arm, Patient Position: Sitting, Cuff Size: Adult Large)   Pulse 101   Temp 97.4  F (36.3  C) (Temporal)   Resp 18   Ht 1.499 m (4' 11\")   Wt 93.9 kg (207 lb)   SpO2 94%   BMI 41.81 kg/m    Body mass index is 41.81 kg/m .  Physical Exam   Complete 10 point ROS completed today as part of the exam and patient denies any symptoms as reviewed in HPI     Wt Readings from Last 3 Encounters:   09/27/24 93.9 kg (207 lb)   09/23/24 91.9 kg (202 lb 11.2 oz)   09/06/24 93 kg (205 lb)       No LMP recorded. Patient is postmenopausal.    All normal as below except abnormalities include: ***  General is a  54 year old sitting comfortably in no apparent distress   HEENT:  TM are clear bilaterally.  Eye exams within normal   Neck: Supple without lymphadenopathy or thyromegally  CV: Regular rate and rhythm S1S2 without rubs, murmurs or gallops,   Lungs: Clear to auscultation bilaterally  Abd:  +BS, soft NT/ND,  No masses or organomegally,   Extremities: Warm, No Edema, 2+ Pedal and radial pulses bilaterally  Skin: No lesions or rashes noted  Neuro: Able to ambulate around the exam room with equal movement, strength and normal coordination of the upper and lower extremeties " symmetrically    History summarized from1-2:hospital records from 9/23 to 9/24 reviewed.  Presented to ED with weakness and hypotension.  Imdur on hold- continue lasix and metoprolol.  Decreased losartan to 50mg daily.  Close follow-up with pcp.  BETITO- cr 1.8 down to 1.1.  decrease losartan.  Follow-up with pcp for bmp. Lactic acidosis due to dehydration.  Improved with fluids.  Home pt/ot/home care.  Diabetes- home diabetes meds resumed on discharge. Chest ct showing enlarging left upper lobe nodule concerning for lung cancer.  Follow-up with outpt pulmonary.   Discharge meds all the same as on admission.    Old Records-1: Outside allergies, meds, problems and immunizations were reconciled as needed from CareEverywhere  Radiology tests reviewed-1: lung ct from 9/2024 reviewed.   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  Lateral ischemia?          Jaky Gaspar MD     Patient was seen with ELMO Amaral-S  Physician Attestation   I, Jaky Gaspar, saw this patient and have discussed and personally reviewed information outlined in this document.    I agree with the findings and plan of care as documented in the note.      Jaky Gaspar MD          Prior to immunization administration, verified patients identity using patient s name and date of birth. Please see Immunization Activity for additional information.     Screening Questionnaire for Adult Immunization    Are you sick today?   No   Do you have allergies to medications, food, a vaccine component or latex?   No   Have you ever had a serious reaction after receiving a vaccination?   No   Do you have a long-term health problem with heart, lung, kidney, or metabolic disease (e.g., diabetes), asthma, a blood disorder, no spleen, complement component deficiency, a cochlear implant, or a spinal fluid leak?  Are you on long-term aspirin therapy?   Yes   Do you have cancer, leukemia, HIV/AIDS,  or any other immune system problem?   No   Do you have a parent, brother, or sister with an immune system problem?   No   In the past 3 months, have you taken medications that affect  your immune system, such as prednisone, other steroids, or anticancer drugs; drugs for the treatment of rheumatoid arthritis, Crohn s disease, or psoriasis; or have you had radiation treatments?   No   Have you had a seizure, or a brain or other nervous system problem?   No   During the past year, have you received a transfusion of blood or blood    products, or been given immune (gamma) globulin or antiviral drug?   No   For women: Are you pregnant or is there a chance you could become       pregnant during the next month?   No   Have you received any vaccinations in the past 4 weeks?   Yes     Immunization questionnaire was positive for at least one answer.  Notified .      Patient instructed to remain in clinic for 15 minutes afterwards, and to report any adverse reactions.     Screening performed by Ginny Pace CMA on 9/27/2024 at 3:18 PM.        Signed Electronically by: Jaky Gaspar MD  {Email feedback regarding this note to [email or web address]   :[id number]}  Answers submitted by the patient for this visit:  Patient Health Questionnaire (Submitted on 9/27/2024)  PHQ9 TOTAL SCORE: Incomplete

## 2024-09-27 NOTE — TELEPHONE ENCOUNTER
" --     Home care has outreached a total of 4 times between 9/25 & 9/26. Patient's VM is not set-up. 1 VM had been left with her son.     At this time a new order is needed if home care services are desired. Educate patient to answer the \"[facility]\" called ID and if possible to set up a VM box.     Claudia Rojas RN  [facility]    "

## 2024-09-29 LAB — BACTERIA BLD CULT: NO GROWTH

## 2024-09-30 ENCOUNTER — LAB (OUTPATIENT)
Dept: LAB | Facility: CLINIC | Age: 54
End: 2024-09-30
Payer: MEDICARE

## 2024-09-30 DIAGNOSIS — Z09 HOSPITAL DISCHARGE FOLLOW-UP: ICD-10-CM

## 2024-09-30 LAB
ANION GAP SERPL CALCULATED.3IONS-SCNC: 12 MMOL/L (ref 7–15)
ATRIAL RATE - MUSE: 94 BPM
BUN SERPL-MCNC: 22.8 MG/DL (ref 6–20)
CALCIUM SERPL-MCNC: 9.1 MG/DL (ref 8.8–10.4)
CHLORIDE SERPL-SCNC: 108 MMOL/L (ref 98–107)
CREAT SERPL-MCNC: 0.87 MG/DL (ref 0.51–0.95)
DIASTOLIC BLOOD PRESSURE - MUSE: 57 MMHG
EGFRCR SERPLBLD CKD-EPI 2021: 78 ML/MIN/1.73M2
GLUCOSE SERPL-MCNC: 118 MG/DL (ref 70–99)
HCO3 SERPL-SCNC: 24 MMOL/L (ref 22–29)
INTERPRETATION ECG - MUSE: NORMAL
P AXIS - MUSE: 37 DEGREES
POTASSIUM SERPL-SCNC: 4.2 MMOL/L (ref 3.4–5.3)
PR INTERVAL - MUSE: 142 MS
QRS DURATION - MUSE: 76 MS
QT - MUSE: 384 MS
QTC - MUSE: 480 MS
R AXIS - MUSE: -23 DEGREES
SODIUM SERPL-SCNC: 144 MMOL/L (ref 135–145)
SYSTOLIC BLOOD PRESSURE - MUSE: 85 MMHG
T AXIS - MUSE: 125 DEGREES
VENTRICULAR RATE- MUSE: 94 BPM

## 2024-09-30 PROCEDURE — 36415 COLL VENOUS BLD VENIPUNCTURE: CPT

## 2024-09-30 PROCEDURE — 80048 BASIC METABOLIC PNL TOTAL CA: CPT

## 2024-10-08 ENCOUNTER — DOCUMENTATION ONLY (OUTPATIENT)
Dept: CARDIAC REHAB | Facility: HOSPITAL | Age: 54
End: 2024-10-08
Payer: MEDICARE

## 2024-10-08 DIAGNOSIS — R91.8 LUNG MASS: Primary | ICD-10-CM

## 2024-10-17 NOTE — PROGRESS NOTES
"Medication Therapy Management (MTM) Encounter    ASSESSMENT:                            Medication Adherence/Access: Unable to fully reconcile as patient did not have meds at the visit. Put reported and outside history fairly consistent.     Blepharitis:   Maxitrol not helpful. Has follow-up with ophthalmology scheduled.     Type 2 Diabetes: A1C above goal <7%. Fasting sugars sometimes at goal , sometimes above. Did not get Mounjaro as prescribed at last MTM visit. PA was already approved. Will resend Rx to pharmacy.      Nonischemic cardiomyopathy/Angina/CAD:: Last LDL at goal <70.      HFrEF: Weight stable. blood pressure above goal <130/80, but at goal <140/90. Pulse at goal .  Was at goal at last PCP visit, even with lower dose of Losartan. Increase today may be stress related to recent death in the family. Will continue to monitor for now.        Chronic gout: Last uric acid at goal <6.        Tension headache/Depression: Last PHQ9 at goal < 5. Mood worsened in the last two days due to recent passing of nephew. Headaches well controlled. Continue to monitor.       Low Vitamin D: Last Vit D levels low normal. Patient purchasing OTC.     GERD: No concerns with symptoms.        PLAN:                            Stop Ozempic. Switch to Mounjaro 5 mg weekly.    Follow-up: Return in about 4 weeks (around 11/15/2024) for Medication Management Pharmacist, in person.         SUBJECTIVE/OBJECTIVE:                          Leora Sanchez is a 54 year old female coming in for a follow-up visit. She was referred to me from Jaky Gaspar MD. [employer]  by video (ID# [id number]).  Today's visit is a follow-up MTM visit from 08/2/2024.     Since last MTM visit, hospitalized at [facility] from 9/23-9/24/24 for general weakness.     Reason for visit: Medication Management. \"Fluid around the eyes. Puffy\"       Allergies/ADRs: Reviewed in chart  Past Medical History: Reviewed in chart  Tobacco: She reports that " she has never smoked. She has never been exposed to tobacco smoke. She has never used smokeless tobacco.  Alcohol:  reports no history of alcohol use.       Medication Adherence/Access:     Son and  help with managing meds at home.    Did not bring medicines or meter to the visit today.     Blepharitis:   Saw ophthalmology in September.   Switched from erythryomycin to Maxitrol twice daily.     Recommended to do warm compresses. Which she has been doing.     Ointment not helpful. Compress only helpful while she's doing it.        Type 2 Diabetes:     Metformin 500 mg ER 1 tab twice daily.   Jardiance 10 mg daily  At last visit, switched from Ozempic to Mounjaro 5 mg weekly.     States she was never called about Mounjaro so never go it. Continued Ozempic.     Blood sugar monitorin time(s) daily; Ranges: (patient reported)     Sometimes high - 170-180, sometimes low -1teens.       Doesn't eat a lot. Eats enough to not be hungry. Chicken, fish, pork, vegetables, brown rice. Drinks just water.      Hemoglobin A1C   Date Value Ref Range Status   2024 8.9 (H) 0.0 - 5.6 % Final     Comment:     Normal <5.7%   Prediabetes 5.7-6.4%    Diabetes 6.5% or higher     Note: Adopted from ADA consensus guidelines.   2024 9.7 (H) 0.0 - 5.6 % Final   2023 6.9 (H) 0.0 - 5.6 % Final     Comment:     Normal <5.7%   Prediabetes 5.7-6.4%    Diabetes 6.5% or higher     Note: Adopted from ADA consensus guidelines.   2011 5.7 4.2 - 6.1 % Final      Microalbumin Urine mg/dL   Date Value Ref Range Status   2021 <0.50 0.00 - 1.99 mg/dL Final      Creatinine Urine mg/dL   Date Value Ref Range Status   2024 88.7 mg/dL Final     Comment:     The reference ranges have not been established in urine creatinine. The results should be integrated into the clinical context for interpretation.   2021 101 mg/dL Final       Creatinine   Date Value Ref Range Status   2024 0.87 0.51 - 0.95 mg/dL Final    04/26/2013 0.82 0.60 - 1.10 mg/dL Final        Wt Readings from Last 3 Encounters:   09/27/24 207 lb (93.9 kg)   09/23/24 202 lb 11.2 oz (91.9 kg)   09/06/24 205 lb (93 kg)            Nonischemic cardiomyopathy/Angina/CAD:   Aspirin 81 mg daily,  Atorvastatin 80 mg daily  Isosorbide mononitrate 60 mg ER daily  Nitroglycerin 0.4 mg as needed.  Ezetimibe 10 mg daily    Denies recent chest pains.     Recent Labs   Lab Test 11/29/23  1141 10/03/23  0942   CHOL 176 181   HDL 83 70   LDL 68 88   TRIG 123 114          HFrEF:  Furosemide 20 mg daily   Losartan 50 mg daily - dose decreased at hospital discharge.   Isosorbide mononitrate 60 mg ER daily  Metoprolol succinate 100 mg 0.5 tab daily     No dizziness/lightheadedness since discharge.   Does not check blood pressure at home.     BP Readings from Last 3 Encounters:   10/18/24 134/86   09/27/24 106/70   09/24/24 128/80      Pulse Readings from Last 3 Encounters:   10/18/24 72   09/27/24 101   09/24/24 101     Wt Readings from Last 3 Encounters:   09/27/24 207 lb (93.9 kg)   09/23/24 202 lb 11.2 oz (91.9 kg)   09/06/24 205 lb (93 kg)             Chronic gout:   Allopurinol 300 mg daily.         Uric Acid   Date Value Ref Range Status   06/28/2024 4.9 2.4 - 5.7 mg/dL Final       Depression:   Tension headache:  Acetaminophen 500 mg 2 tabs 3 times daily as needed  Venlafaxine 75 mg ER daily, which patient had previously used (was on higher doses in the past).     Headaches are better with Venlafaxine. No headaches in the last month.     Nephew was involved in a car accident two days ago. He and the other passenger passed away. Has been sad about this. Hasn't been sleeping well since.     Prior to this, mood was stable.         10/27/2023    12:49 PM 5/17/2024     3:33 PM 9/6/2024    12:57 PM   PHQ   PHQ-9 Total Score 7 10 5   Q9: Thoughts of better off dead/self-harm past 2 weeks Not at all Not at all Not at all          Low Vitamin D: Prescribed Vitamin D3 5000 units  "daily .   Doesn't have. Has to purchase OTC. Gets from [employer].     Vitamin D Deficiency Screening Results:  Lab Results   Component Value Date    VITDT 23 07/26/2023        GERD: Prescribed Omeprazole 40 mg daily     \"Sometimes\" - only happens when hungry.       Today's Vitals: /86   Pulse 72   ----------------  MED REC REQUIRED  Post Medication Reconciliation Status:  Discharge medications reconciled and changed, see notes/orders      I spent 45 minutes with this patient today. All changes were made via collaborative practice agreement with Jaky Gaspar MD. A copy of the visit note was provided to the patient's provider(s).    A summary of these recommendations was given to the patient.    Art Thakkar, QuintonD  Medication Therapy Management (MTM) Pharmacist  [facility] [place] [facility]          Medication Therapy Recommendations  Type 2 diabetes mellitus with diabetic polyneuropathy, without long-term current use of insulin (H)    Current Medication: Tirzepatide 5 MG/0.5ML SOAJ   Rationale: Medication product not available - Adherence - Adherence   Recommendation: Provide Adherence Intervention   Status: Accepted per CPA                      "

## 2024-10-18 ENCOUNTER — OFFICE VISIT (OUTPATIENT)
Dept: PHARMACY | Facility: CLINIC | Age: 54
End: 2024-10-18
Attending: FAMILY MEDICINE
Payer: MEDICARE

## 2024-10-18 VITALS
SYSTOLIC BLOOD PRESSURE: 134 MMHG | WEIGHT: 204 LBS | BODY MASS INDEX: 41.2 KG/M2 | DIASTOLIC BLOOD PRESSURE: 86 MMHG | HEART RATE: 72 BPM

## 2024-10-18 DIAGNOSIS — R12 HEARTBURN: ICD-10-CM

## 2024-10-18 DIAGNOSIS — I25.119 CORONARY ARTERY DISEASE INVOLVING NATIVE CORONARY ARTERY OF NATIVE HEART WITH ANGINA PECTORIS (H): ICD-10-CM

## 2024-10-18 DIAGNOSIS — N18.31 TYPE 2 DIABETES MELLITUS WITH STAGE 3A CHRONIC KIDNEY DISEASE, WITHOUT LONG-TERM CURRENT USE OF INSULIN (H): Primary | ICD-10-CM

## 2024-10-18 DIAGNOSIS — E55.9 VITAMIN D DEFICIENCY: ICD-10-CM

## 2024-10-18 DIAGNOSIS — G44.209 TENSION HEADACHE: ICD-10-CM

## 2024-10-18 DIAGNOSIS — N18.31 STAGE 3A CHRONIC KIDNEY DISEASE (H): ICD-10-CM

## 2024-10-18 DIAGNOSIS — I42.8 NONISCHEMIC CARDIOMYOPATHY (H): ICD-10-CM

## 2024-10-18 DIAGNOSIS — E11.22 TYPE 2 DIABETES MELLITUS WITH STAGE 3A CHRONIC KIDNEY DISEASE, WITHOUT LONG-TERM CURRENT USE OF INSULIN (H): Primary | ICD-10-CM

## 2024-10-18 DIAGNOSIS — M1A.0710 CHRONIC GOUT OF RIGHT FOOT, UNSPECIFIED CAUSE: ICD-10-CM

## 2024-10-18 DIAGNOSIS — I50.20 HEART FAILURE WITH REDUCED EJECTION FRACTION (H): ICD-10-CM

## 2024-10-18 PROCEDURE — 99207 PR NO CHARGE LOS: CPT | Performed by: PHARMACIST

## 2024-10-18 NOTE — PATIENT INSTRUCTIONS
"Recommendations from today's MTM visit:                                                         Stop Ozempic. Switch to Mounjaro 5 mg weekly.    Follow-up: Return in about 4 weeks (around 11/15/2024) for Medication Management Pharmacist, in person.    It was great speaking with you today.  I value your experience and would be very thankful for your time in providing feedback in our clinic survey. In the next few days, you may receive an email or text message from [employer] with a link to a survey related to your  clinical pharmacist.\"     To schedule another MTM appointment, please call the clinic directly or you may call the MTM scheduling line at [phone].    My Clinical Pharmacist's contact information:                                                      Please feel free to contact me with any questions or concerns you have.      Art Thakkar, PharmD  Medication Therapy Management (MTM) Pharmacist  [facility]      "

## 2024-10-21 ENCOUNTER — DOCUMENTATION ONLY (OUTPATIENT)
Dept: CARDIAC REHAB | Facility: HOSPITAL | Age: 54
End: 2024-10-21
Payer: MEDICARE

## 2024-10-21 DIAGNOSIS — R26.89 IMPAIRED GAIT AND MOBILITY: Primary | ICD-10-CM

## 2024-10-21 DIAGNOSIS — R91.8 LUNG MASS: ICD-10-CM

## 2024-10-22 ENCOUNTER — HOSPITAL ENCOUNTER (EMERGENCY)
Facility: HOSPITAL | Age: 54
Discharge: HOME | End: 2024-10-22
Attending: EMERGENCY MEDICINE | Admitting: EMERGENCY MEDICINE
Payer: MEDICARE

## 2024-10-22 ENCOUNTER — APPOINTMENT (OUTPATIENT)
Dept: RADIOLOGY | Facility: HOSPITAL | Age: 54
End: 2024-10-22
Attending: STUDENT IN AN ORGANIZED HEALTH CARE EDUCATION/TRAINING PROGRAM
Payer: MEDICARE

## 2024-10-22 VITALS
HEIGHT: 60 IN | TEMPERATURE: 97.7 F | SYSTOLIC BLOOD PRESSURE: 110 MMHG | DIASTOLIC BLOOD PRESSURE: 66 MMHG | RESPIRATION RATE: 20 BRPM | WEIGHT: 204.7 LBS | HEART RATE: 91 BPM | OXYGEN SATURATION: 97 % | BODY MASS INDEX: 40.19 KG/M2

## 2024-10-22 DIAGNOSIS — I95.1 ORTHOSTATIC HYPOTENSION: ICD-10-CM

## 2024-10-22 DIAGNOSIS — R42 DIZZINESS: ICD-10-CM

## 2024-10-22 DIAGNOSIS — S42.292A HUMERAL HEAD FRACTURE, LEFT, CLOSED, INITIAL ENCOUNTER: ICD-10-CM

## 2024-10-22 LAB
ANION GAP SERPL CALCULATED.3IONS-SCNC: 12 MMOL/L (ref 7–15)
BASOPHILS # BLD AUTO: 0 10E3/UL (ref 0–0.2)
BASOPHILS NFR BLD AUTO: 0 %
BUN SERPL-MCNC: 26.1 MG/DL (ref 6–20)
CALCIUM SERPL-MCNC: 9.1 MG/DL (ref 8.8–10.4)
CHLORIDE SERPL-SCNC: 105 MMOL/L (ref 98–107)
CREAT SERPL-MCNC: 0.96 MG/DL (ref 0.51–0.95)
EGFRCR SERPLBLD CKD-EPI 2021: 70 ML/MIN/1.73M2
EOSINOPHIL # BLD AUTO: 0.1 10E3/UL (ref 0–0.7)
EOSINOPHIL NFR BLD AUTO: 1 %
ERYTHROCYTE [DISTWIDTH] IN BLOOD BY AUTOMATED COUNT: 13.7 % (ref 10–15)
FLUAV RNA SPEC QL NAA+PROBE: NEGATIVE
FLUBV RNA RESP QL NAA+PROBE: NEGATIVE
GLUCOSE SERPL-MCNC: 193 MG/DL (ref 70–99)
HCO3 SERPL-SCNC: 23 MMOL/L (ref 22–29)
HCT VFR BLD AUTO: 44.4 % (ref 35–47)
HGB BLD-MCNC: 13.7 G/DL (ref 11.7–15.7)
IMM GRANULOCYTES # BLD: 0.1 10E3/UL
IMM GRANULOCYTES NFR BLD: 1 %
LYMPHOCYTES # BLD AUTO: 1.8 10E3/UL (ref 0.8–5.3)
LYMPHOCYTES NFR BLD AUTO: 19 %
MAGNESIUM SERPL-MCNC: 2.5 MG/DL (ref 1.7–2.3)
MCH RBC QN AUTO: 30.7 PG (ref 26.5–33)
MCHC RBC AUTO-ENTMCNC: 30.9 G/DL (ref 31.5–36.5)
MCV RBC AUTO: 100 FL (ref 78–100)
MONOCYTES # BLD AUTO: 0.6 10E3/UL (ref 0–1.3)
MONOCYTES NFR BLD AUTO: 7 %
NEUTROPHILS # BLD AUTO: 6.6 10E3/UL (ref 1.6–8.3)
NEUTROPHILS NFR BLD AUTO: 72 %
NRBC # BLD AUTO: 0 10E3/UL
NRBC BLD AUTO-RTO: 0 /100
PLATELET # BLD AUTO: 227 10E3/UL (ref 150–450)
POTASSIUM SERPL-SCNC: 4.8 MMOL/L (ref 3.4–5.3)
RBC # BLD AUTO: 4.46 10E6/UL (ref 3.8–5.2)
RSV RNA SPEC NAA+PROBE: NEGATIVE
SARS-COV-2 RNA RESP QL NAA+PROBE: NEGATIVE
SODIUM SERPL-SCNC: 140 MMOL/L (ref 135–145)
TROPONIN T SERPL HS-MCNC: 17 NG/L
WBC # BLD AUTO: 9.2 10E3/UL (ref 4–11)

## 2024-10-22 PROCEDURE — 84484 ASSAY OF TROPONIN QUANT: CPT | Performed by: FAMILY MEDICINE

## 2024-10-22 PROCEDURE — 23600 CLTX PROX HUMRL FX W/O MNPJ: CPT | Mod: LT

## 2024-10-22 PROCEDURE — 99285 EMERGENCY DEPT VISIT HI MDM: CPT | Mod: 25

## 2024-10-22 PROCEDURE — 250N000013 HC RX MED GY IP 250 OP 250 PS 637: Performed by: EMERGENCY MEDICINE

## 2024-10-22 PROCEDURE — 83735 ASSAY OF MAGNESIUM: CPT | Performed by: FAMILY MEDICINE

## 2024-10-22 PROCEDURE — 73060 X-RAY EXAM OF HUMERUS: CPT | Mod: LT

## 2024-10-22 PROCEDURE — 87637 SARSCOV2&INF A&B&RSV AMP PRB: CPT | Performed by: FAMILY MEDICINE

## 2024-10-22 PROCEDURE — 80048 BASIC METABOLIC PNL TOTAL CA: CPT | Performed by: FAMILY MEDICINE

## 2024-10-22 PROCEDURE — 85004 AUTOMATED DIFF WBC COUNT: CPT | Performed by: FAMILY MEDICINE

## 2024-10-22 PROCEDURE — 36415 COLL VENOUS BLD VENIPUNCTURE: CPT | Performed by: FAMILY MEDICINE

## 2024-10-22 PROCEDURE — 93005 ELECTROCARDIOGRAM TRACING: CPT | Performed by: FAMILY MEDICINE

## 2024-10-22 RX ORDER — HYDROCODONE BITARTRATE AND ACETAMINOPHEN 5; 325 MG/1; MG/1
1 TABLET ORAL EVERY 6 HOURS PRN
Qty: 10 TABLET | Refills: 0 | Status: SHIPPED | OUTPATIENT
Start: 2024-10-22 | End: 2024-10-25

## 2024-10-22 RX ORDER — HYDROCODONE BITARTRATE AND ACETAMINOPHEN 5; 325 MG/1; MG/1
1 TABLET ORAL ONCE
Status: COMPLETED | OUTPATIENT
Start: 2024-10-22 | End: 2024-10-22

## 2024-10-22 RX ADMIN — HYDROCODONE BITARTRATE AND ACETAMINOPHEN 1 TABLET: 5; 325 TABLET ORAL at 20:27

## 2024-10-22 ASSESSMENT — ACTIVITIES OF DAILY LIVING (ADL): ADLS_ACUITY_SCORE: 35

## 2024-10-22 ASSESSMENT — COLUMBIA-SUICIDE SEVERITY RATING SCALE - C-SSRS
6. HAVE YOU EVER DONE ANYTHING, STARTED TO DO ANYTHING, OR PREPARED TO DO ANYTHING TO END YOUR LIFE?: NO
1. IN THE PAST MONTH, HAVE YOU WISHED YOU WERE DEAD OR WISHED YOU COULD GO TO SLEEP AND NOT WAKE UP?: NO
2. HAVE YOU ACTUALLY HAD ANY THOUGHTS OF KILLING YOURSELF IN THE PAST MONTH?: NO

## 2024-10-22 NOTE — ED TRIAGE NOTES
"Pt to triage via w/c c/o a fall due to dizziness this afternoon outside. Pt states she didn't feel well today so she went outside, pt became lightheaded with \"blurred vision\" then fell onto her left side on the asphalt. Pt landed on her left arm. Pt states she had to lay on the ground for about 30 minutes until somebody could help her up to standing.  Pt c/o left shoulder and left upper arm pain. Denies head injury. Denies pain to lower extremities. Pt otherwise states her dizziness/lightheadedness has resolved and that \"feels better now\".     Triage Assessment (Adult)       Row Name 10/22/24 9001          Triage Assessment    Airway WDL WDL        Respiratory WDL    Respiratory WDL WDL        Cognitive/Neuro/Behavioral WDL    Cognitive/Neuro/Behavioral WDL X  Pt reports feeling dizzy today and fell onto her left arm.                     "

## 2024-10-23 DIAGNOSIS — Z03.89 ENCOUNTER FOR OBSERVATION FOR OTHER SUSPECTED DISEASES AND CONDITIONS RULED OUT: ICD-10-CM

## 2024-10-23 DIAGNOSIS — E11.9 DIABETIC EYE EXAM (H): Primary | ICD-10-CM

## 2024-10-23 DIAGNOSIS — Z01.00 DIABETIC EYE EXAM (H): Primary | ICD-10-CM

## 2024-10-23 NOTE — DISCHARGE INSTRUCTIONS
Do not take your losartan.  Continue your other medications for blood pressure: Imdur, metoprolol, Lasix.  Call your doctor tomorrow to discuss lowering your losartan dose or discontinuing it entirely.
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
## 2024-10-24 ENCOUNTER — TRANSFERRED RECORDS (OUTPATIENT)
Dept: HEALTH INFORMATION MANAGEMENT | Facility: CLINIC | Age: 54
End: 2024-10-24
Payer: MEDICARE

## 2024-10-25 LAB
ATRIAL RATE - MUSE: 92 BPM
DIASTOLIC BLOOD PRESSURE - MUSE: NORMAL MMHG
INTERPRETATION ECG - MUSE: NORMAL
P AXIS - MUSE: 44 DEGREES
PR INTERVAL - MUSE: 136 MS
QRS DURATION - MUSE: 74 MS
QT - MUSE: 382 MS
QTC - MUSE: 472 MS
R AXIS - MUSE: -8 DEGREES
SYSTOLIC BLOOD PRESSURE - MUSE: NORMAL MMHG
T AXIS - MUSE: 104 DEGREES
VENTRICULAR RATE- MUSE: 92 BPM

## 2024-10-28 PROBLEM — K75.81 NONALCOHOLIC STEATOHEPATITIS (NASH): Status: ACTIVE | Noted: 2023-06-25

## 2024-10-28 PROBLEM — I45.81 LONG QT SYNDROME: Status: ACTIVE | Noted: 2024-10-28

## 2024-10-28 PROBLEM — I42.8 NONISCHEMIC CARDIOMYOPATHY (H): Status: ACTIVE | Noted: 2017-11-14

## 2024-11-08 ENCOUNTER — TRANSFERRED RECORDS (OUTPATIENT)
Dept: HEALTH INFORMATION MANAGEMENT | Facility: CLINIC | Age: 54
End: 2024-11-08
Payer: MEDICARE

## 2024-11-14 NOTE — PROGRESS NOTES
Medication Therapy Management (MTM) Encounter    ASSESSMENT:                            Medication Adherence/Access: No concerns noted.      Humeral head fracture:  Continued pain. Did not get Oxycodone per [facility] notes. Contacted pharmacy. They do not have Rx on file. Left message with [facility] care team to request refill. Question if fall was related to low blood glucose episode. See below.       Type 2 Diabetes: A1C above goal <7%. Fasting sugars typically above goal , but fasting sugars at goal. Question if patient is having overnight lows with rebound highs in the AM. Medicare will not cover personal CGM as patient is not on insulin. With concerns that patient is dropping low and may have caused fall, will hold Jardiance today.     Nonischemic cardiomyopathy/Angina/CAD:: Last LDL at goal <70.      HFrEF: Weight stable. blood pressure at goal <130/80. Pulse at goal  even without Losartan use. Continue without losartan for now. Continue to monitor. As we're stopping Jardiance today, blood pressure may increase and may need to restart ARB.     Chronic gout: Last uric acid at goal <6.        Tension headache/Depression: Last PHQ9 at goal < 5. Headaches again worsened. Was previously on a higher dose of Venlafaxine in the past. Will increase today.       Low Vitamin D: Last Vit D levels low normal. Patient purchasing OTC.     GERD: No concerns with symptoms.        PLAN:                            Stop Jardiance.   Continue without Losartan.   Increase Venlafaxine to 150 mg ER daily - you can use two 75 mg ER caps at a time to use up your current supply.   Left message with [facility] to get refills of Oxycodone. They will contact you directly.     Follow-up: Return in about 3 months (around 2/15/2025) for Medication Management Pharmacist, in person.  12/6 with CDE   1/10 with PCP       SUBJECTIVE/OBJECTIVE:                          Leora Sanchez is a 54 year old female coming in for a follow-up  "visit. She was referred to me from Jaky Gaspar MD. [facility]  by phone (ID# [id number]).  Today's visit is a follow-up MTM visit from 10/18/2024.     Since last MTM visit, Ed visit on 10/22 for dizziness, orthostatic hypotension, left humeral head fracture    Reason for visit: Medication Management.  \"I broke my arm and they told me to come see you\"       Allergies/ADRs: Reviewed in chart  Past Medical History: Reviewed in chart  Tobacco: She reports that she has never smoked. She has never been exposed to tobacco smoke. She has never used smokeless tobacco.  Alcohol:  reports no history of alcohol use.       Medication Adherence/Access:     Son and  help with managing meds at home.    Did not bring medicines or meter to the visit today.     Not at visit: Oxycodone - ran out   Vitamin D - purchases OTC - at home.   B Complex  Furosemide - fill  - has it at home.   Not using Losartan.       Humeral head fracture:  Was given hydrocodone and ED discharge.  To follow-up with Ortho.  Ortho notes indicate patient is healing well.  Was given oxycodone 5 mg 1 tab every 4-6 hours as needed for severe pain.     Patient reports before fall she was dizzy, blurry, then fell, was also sweaty.     Patient encouraged to wean sling use    Starts to hurt with activities. Okay at rest.   Has to use right arm to lift left arm and that causes pain. Shoots up to an 8.     Using Acetaminophen. Not helpful.    Ortho notes indicate they would send Oxycodone. Dispense history shows only 1 fill in October.         Type 2 Diabetes:     Metformin 500 mg ER 1 tab twice daily.   Jardiance 10 mg daily  At last visit, switched from Ozempic to Mounjaro 5 mg weekly.     Still getting Ozempic pens delivered. Not using any of this.     Blood sugar monitorin time(s) daily; Ranges: (patient reported)     AM: 176, 120, 180, 78, 118, 142, 164,   PM: 140, 116, 111, 172, 128, 99, 124, 147,     Denies other symptom of " lows. Just having episodes of blurry vision.       Doesn't eat a lot. Eats enough to not be hungry. Chicken, fish, pork, vegetables, brown rice. Drinks just water.      Hemoglobin A1C   Date Value Ref Range Status   09/06/2024 8.9 (H) 0.0 - 5.6 % Final     Comment:     Normal <5.7%   Prediabetes 5.7-6.4%    Diabetes 6.5% or higher     Note: Adopted from ADA consensus guidelines.   05/17/2024 9.7 (H) 0.0 - 5.6 % Final   11/29/2023 6.9 (H) 0.0 - 5.6 % Final     Comment:     Normal <5.7%   Prediabetes 5.7-6.4%    Diabetes 6.5% or higher     Note: Adopted from ADA consensus guidelines.   01/18/2011 5.7 4.2 - 6.1 % Final      Microalbumin Urine mg/dL   Date Value Ref Range Status   12/07/2021 <0.50 0.00 - 1.99 mg/dL Final      Creatinine Urine mg/dL   Date Value Ref Range Status   09/06/2024 88.7 mg/dL Final     Comment:     The reference ranges have not been established in urine creatinine. The results should be integrated into the clinical context for interpretation.   12/07/2021 101 mg/dL Final       Creatinine   Date Value Ref Range Status   10/22/2024 0.96 (H) 0.51 - 0.95 mg/dL Final   04/26/2013 0.82 0.60 - 1.10 mg/dL Final        Wt Readings from Last 3 Encounters:   10/22/24 204 lb 11.2 oz (92.9 kg)   10/18/24 204 lb (92.5 kg)   09/27/24 207 lb (93.9 kg)            Nonischemic cardiomyopathy/Angina/CAD:   Aspirin 81 mg daily,  Atorvastatin 80 mg daily  Isosorbide mononitrate 60 mg ER daily  Nitroglycerin 0.4 mg as needed.  Ezetimibe 10 mg daily    Denies recent chest pains.     Recent Labs   Lab Test 11/29/23  1141 10/03/23  0942   CHOL 176 181   HDL 83 70   LDL 68 88   TRIG 123 114          HFrEF:  Furosemide 20 mg daily - not taking daily due to urinary frequency.   Losartan 50 mg daily - held at ED discharge.   Isosorbide mononitrate 60 mg ER daily  Metoprolol succinate 100 mg 0.5 tab daily        BP Readings from Last 3 Encounters:   11/15/24 109/79   10/22/24 110/66   10/18/24 134/86      Pulse Readings from  "Last 3 Encounters:   11/15/24 99   10/22/24 91   10/18/24 72     Wt Readings from Last 3 Encounters:   10/22/24 204 lb 11.2 oz (92.9 kg)   10/18/24 204 lb (92.5 kg)   09/27/24 207 lb (93.9 kg)           Chronic gout:   Allopurinol 300 mg daily.         Uric Acid   Date Value Ref Range Status   06/28/2024 4.9 2.4 - 5.7 mg/dL Final       Depression:   Tension headache:  Acetaminophen 500 mg 2 tabs 3 times daily as needed  Venlafaxine 75 mg ER daily, which patient had previously used (was on higher doses in the past).     Continues to have 4-5 headaches a month.            10/27/2023    12:49 PM 5/17/2024     3:33 PM 9/6/2024    12:57 PM   PHQ   PHQ-9 Total Score 7 10 5   Q9: Thoughts of better off dead/self-harm past 2 weeks Not at all Not at all  Not at all       Patient-reported          Low Vitamin D: Prescribed Vitamin D3 5000 units daily .   Purchases OTC. Gets from [employer].     Vitamin D Deficiency Screening Results:  Lab Results   Component Value Date    VITDT 23 07/26/2023        GERD: Prescribed Omeprazole 40 mg daily     \"Sometimes\" - only happens when hungry.       Today's Vitals: /79   Pulse 99   ----------------       I spent 45 minutes with this patient today. All changes were made via collaborative practice agreement with Jaky Gaspar MD. A copy of the visit note was provided to the patient's provider(s).    A summary of these recommendations was given to the patient.    Art Thakkar, Solo  Medication Therapy Management (MTM) Pharmacist  [facility]          Medication Therapy Recommendations  Closed fracture of head of left humerus, sequela   1 Current Medication: oxyCODONE (ROXICODONE) 5 MG tablet   Current Medication Sig: Take 5 mg by mouth.   Rationale: Medication product not available - Adherence - Adherence   Recommendation: Provide Adherence Intervention   Status: Accepted per CPA   Identified Date: 11/15/2024 Completed Date: 11/15/2024         Heart failure with " reduced ejection fraction (H)   1 Current Medication: losartan (COZAAR) 50 MG tablet (Discontinued)   Current Medication Sig: Take 1 tablet (50 mg) by mouth daily.   Rationale: No medical indication at this time - Unnecessary medication therapy - Indication   Recommendation: Discontinue Medication   Status: Accepted per CPA   Identified Date: 11/15/2024 Completed Date: 11/15/2024         Tension headache   1 Current Medication: venlafaxine (EFFEXOR XR) 75 MG 24 hr capsule (Discontinued)   Current Medication Sig: TAKE 1 PILL BY MOUTH EVERY DAY/ NOJ IB LUB IB HNUB   Rationale: Dose too low - Dosage too low - Effectiveness   Recommendation: Increase Dose - venlafaxine 150 MG 24 hr capsule   Status: Accepted per CPA   Identified Date: 11/15/2024 Completed Date: 11/15/2024         Type 2 diabetes mellitus with stage 3a chronic kidney disease, without long-term current use of insulin (H)   1 Current Medication: empagliflozin (JARDIANCE) 25 MG TABS tablet   Current Medication Sig: Take 1 tablet (25 mg) by mouth daily.   Rationale: No medical indication at this time - Unnecessary medication therapy - Indication   Recommendation: Discontinue Medication   Status: Accepted per CPA   Identified Date: 11/15/2024 Completed Date: 11/15/2024

## 2024-11-15 ENCOUNTER — OFFICE VISIT (OUTPATIENT)
Dept: PHARMACY | Facility: CLINIC | Age: 54
End: 2024-11-15
Payer: MEDICARE

## 2024-11-15 VITALS — DIASTOLIC BLOOD PRESSURE: 79 MMHG | SYSTOLIC BLOOD PRESSURE: 109 MMHG | HEART RATE: 99 BPM

## 2024-11-15 DIAGNOSIS — E11.22 TYPE 2 DIABETES MELLITUS WITH STAGE 3A CHRONIC KIDNEY DISEASE, WITHOUT LONG-TERM CURRENT USE OF INSULIN (H): Primary | ICD-10-CM

## 2024-11-15 DIAGNOSIS — I42.8 NONISCHEMIC CARDIOMYOPATHY (H): ICD-10-CM

## 2024-11-15 DIAGNOSIS — R12 HEARTBURN: ICD-10-CM

## 2024-11-15 DIAGNOSIS — I50.20 HEART FAILURE WITH REDUCED EJECTION FRACTION (H): ICD-10-CM

## 2024-11-15 DIAGNOSIS — N18.31 STAGE 3A CHRONIC KIDNEY DISEASE (H): ICD-10-CM

## 2024-11-15 DIAGNOSIS — G44.209 TENSION HEADACHE: ICD-10-CM

## 2024-11-15 DIAGNOSIS — E55.9 VITAMIN D DEFICIENCY: ICD-10-CM

## 2024-11-15 DIAGNOSIS — M1A.0710 CHRONIC GOUT OF RIGHT FOOT, UNSPECIFIED CAUSE: ICD-10-CM

## 2024-11-15 DIAGNOSIS — S42.292S CLOSED FRACTURE OF HEAD OF LEFT HUMERUS, SEQUELA: ICD-10-CM

## 2024-11-15 DIAGNOSIS — N18.31 TYPE 2 DIABETES MELLITUS WITH STAGE 3A CHRONIC KIDNEY DISEASE, WITHOUT LONG-TERM CURRENT USE OF INSULIN (H): Primary | ICD-10-CM

## 2024-11-15 DIAGNOSIS — I25.119 CORONARY ARTERY DISEASE INVOLVING NATIVE CORONARY ARTERY OF NATIVE HEART WITH ANGINA PECTORIS (H): ICD-10-CM

## 2024-11-15 RX ORDER — VENLAFAXINE HYDROCHLORIDE 150 MG/1
150 CAPSULE, EXTENDED RELEASE ORAL DAILY
Qty: 90 CAPSULE | Refills: 1 | Status: SHIPPED | OUTPATIENT
Start: 2024-11-15

## 2024-11-15 NOTE — Clinical Note
Coming to see you on 12/6 - I'm stopped Jardiance today due to concerns of lows. High fasting with low post-prandial. Sensor placement might be unnecessary by then, but if you think it would be helpful, still place one.   Thanks!

## 2024-11-15 NOTE — PATIENT INSTRUCTIONS
"Recommendations from today's MTM visit:                                                         Stop Jardiance.   Continue without Losartan.   Increase Venlafaxine to 150 mg ER daily - you can use two 75 mg ER caps at a time to use up your current supply.   Left message with [facility] to get refills of Oxycodone. They will contact you directly.     Follow-up: Return in about 3 months (around 2/15/2025) for Medication Management Pharmacist, in person.  12/6 with CDE   1/10 with PCP     It was great speaking with you today.  I value your experience and would be very thankful for your time in providing feedback in our clinic survey. In the next few days, you may receive an email or text message from [employer] with a link to a survey related to your  clinical pharmacist.\"     To schedule another MTM appointment, please call the clinic directly or you may call the MTM scheduling line at [phone].    My Clinical Pharmacist's contact information:                                                      Please feel free to contact me with any questions or concerns you have.      Art Thakkar, PharmD  Medication Therapy Management (MTM) Pharmacist  [facility]      "

## 2024-11-21 ENCOUNTER — APPOINTMENT (OUTPATIENT)
Dept: INTERPRETER SERVICES | Facility: CLINIC | Age: 54
End: 2024-11-21
Payer: MEDICARE

## 2024-12-19 ENCOUNTER — ALLIED HEALTH/NURSE VISIT (OUTPATIENT)
Dept: EDUCATION SERVICES | Facility: CLINIC | Age: 54
End: 2024-12-19
Payer: MEDICARE

## 2024-12-19 DIAGNOSIS — E11.22 TYPE 2 DIABETES MELLITUS WITH STAGE 3A CHRONIC KIDNEY DISEASE, WITHOUT LONG-TERM CURRENT USE OF INSULIN (H): Primary | ICD-10-CM

## 2024-12-19 DIAGNOSIS — N18.31 TYPE 2 DIABETES MELLITUS WITH STAGE 3A CHRONIC KIDNEY DISEASE, WITHOUT LONG-TERM CURRENT USE OF INSULIN (H): Primary | ICD-10-CM

## 2024-12-19 NOTE — PROGRESS NOTES
Diabetes Self-Management Education & Support 20 minutes, no charge( not 30 minutes for DSMT) through professional  # [id number], Nickolas    Presents for: Follow-up    Type of Service: In Person Visit      Assessment  May was accompanied by her spouse, Juan Sanchez, for today's visit. On 12/6/24 a libreview pro sensor was placed and today we reviewed the data. Average Blood glucose for the last two weekswas 181 mg/dl with 55% TIR. We reviewed the information and elevations, with emphasis on medication compliance, portion control for rice/noodles, and adding in activity after eating. May completed a 4th dose of Mounjaro 5 mg today and per PCP, she will advance to 7.5 mg on 12/26.Elisabet also takes Metformin 1000 mg/day.  She has had meat aversions since starting Mounjaro, and consumes tofu, milk and yogurt for her protein sources. We discussed s/s of Mounjaro and when to notify her PCP. May is walking 10 minutes/daily in her home. She is scheduled for a PCP follow up on 1/10/25.     Patient's most recent   Lab Results   Component Value Date    A1C 8.9 09/06/2024    A1C 5.7 01/18/2011     is not meeting goal of <8.0    Diabetes knowledge and skills assessment:   Patient is knowledgeable in diabetes management concepts related to: topics reviewed    Based on learning assessment above, most appropriate setting for further diabetes education would be: Individual setting.    Care Plan and Education Provided:  Healthy Eating: protein aversion, Being Active: Finding a physical activity routine that works for you, Monitoring: Frequency of monitoring, Individual glucose targets, and Log and interpret results, Taking Medication: does for Mounjaro, and Reducing Risks: Eye care and Goal for A1c, how it relates to glucose and how often to check    Patient verbalized understanding of diabetes self-management education concepts discussed, opportunities for ongoing education and support, and recommendations provided  "today.    Plan  Continue to walk 10 minutes daily, inside your home.  Increase Mounjaro to 7.5 mg weekly  Continue to take Metformin 500 mg two tablets daily.   Test blood sugar once daily in the morning before eating.   Bring your blood sugar meter to your appointment with Dr. Gaspar on 1/10/25.     Topics to cover at upcoming visits: Monitoring and Taking Medication    Follow-up:  PCP on 1/10/25  MTM on 2/14/25    See Care Plan for co-developed, patient-state behavior change goals.    Education Materials Provided:  No new materials provided today      Subjective/Objective  May is an 54 year old year old, presenting for the following diabetes education related to: Presents for: Follow-up  Accompanied by: Self, , Spouse  Diabetes education in the past 24mo: Yes  Focus of Visit: Monitoring, Taking Medication, Being Active  Diabetes type: Type 2  Disease course: Improving  Transportation concerns: Yes  Other concerns:: Language barrier  Cultural Influences/Ethnic Background:  Not  or       Diabetes Symptoms & Complications:  Disease course: Improving       Patient Problem List and Family Medical History reviewed for relevant medical history, current medical status, and diabetes risk factors.    Vitals:  There were no vitals taken for this visit.  Estimated body mass index is 40.65 kg/m  as calculated from the following:    Height as of 10/22/24: 1.511 m (4' 11.5\").    Weight as of 10/22/24: 92.9 kg (204 lb 11.2 oz).   Last 3 BP:   BP Readings from Last 3 Encounters:   11/15/24 109/79   10/22/24 110/66   10/18/24 134/86       History   Smoking Status    Never   Smokeless Tobacco    Never       Labs:  Lab Results   Component Value Date    A1C 8.9 09/06/2024    A1C 5.7 01/18/2011     Lab Results   Component Value Date     10/22/2024     09/24/2024     06/02/2022     04/26/2013     Lab Results   Component Value Date    LDL 68 11/29/2023     08/20/2020     " "04/26/2013     HDL Cholesterol   Date Value Ref Range Status   04/26/2013 65 >39 mg/dL Final     Direct Measure HDL   Date Value Ref Range Status   11/29/2023 83 >=50 mg/dL Final   ]  GFR Estimate   Date Value Ref Range Status   10/22/2024 70 >60 mL/min/1.73m2 Final     Comment:     eGFR calculated using 2021 CKD-EPI equation.   04/15/2021 49 (L) >60 mL/min/1.73m2 Final   04/26/2013 > 60 >60 ml/min/1.73m2 Final     GFR, ESTIMATED POCT   Date Value Ref Range Status   03/31/2023 >60 >60 mL/min/1.73m2 Final     GFR Estimate If Black   Date Value Ref Range Status   04/15/2021 60 (L) >60 mL/min/1.73m2 Final   04/26/2013 > 60 >60 ml/min/1.73m2 Final     Lab Results   Component Value Date    CR 0.96 10/22/2024    CR 0.82 04/26/2013     No results found for: \"MICROALBUMIN\"    Healthy Eating:  Healthy Eating Assessed Today: Yes  Meals include: Lunch, Dinner  Breakfast: coffee with honey  Lunch: traditional Hmong diet: rice( one fist size), leafy greens or other vegetables  Dinner: similiar to noon meal  Snacks: not to much, some fruit on occasion  Other: Bernard meal is usually when she eats out, one kids size portion, maybe 2x/month.  with mounjaro she has not been eating meat due to the smell, she will have tofu or yogurt or milk  Beverages: Water  Has patient met with a dietitian in the past?: Yes    Being Active:  Being Active Assessed Today: Yes (walking daily around the block 10 minutes)  Exercise:: Yes  Days per week of moderate to strenuous exercise (like a brisk walk): 6  On average, minutes per day of exercise at this level: 10  Exercise Minutes per Week: 60    Monitoring:  Monitoring Assessed Today: Yes (macho pro download)  Did patient bring glucose meter to appointment? : No  Times checking blood sugar at home (number): 1  Times checking blood sugar at home (per): Day    Macho pro data:          Taking Medications:  Diabetes Medication(s)       Biguanides       metFORMIN (GLUCOPHAGE XR) 500 MG 24 hr tablet TAKE 1 " TABLET (500 MG) BY MOUTH 2 TIMES DAILY (WITH MEALS)/ NOJ 1 LUB 2 ZAUG TXHUA HNUB NROG MOV PAB ZOO NTSHAV QAB ZIB       Sodium-Glucose Co-Transporter 2 (SGLT2) Inhibitors       empagliflozin (JARDIANCE) 25 MG TABS tablet Take 1 tablet (25 mg) by mouth daily.     Patient not taking: Reported on 12/19/2024       Incretin Mimetic Agents       Tirzepatide (MOUNJARO) 7.5 MG/0.5ML SOAJ Inject 0.5 mLs (7.5 mg) subcutaneously every 7 days.          Taking Medication Assessed Today: Yes  Current Treatments: Diet, Oral Medication (taken by mouth), Non-insulin Injectables  Problems taking diabetes medications regularly?: No  Diabetes medication side effects?: Yes (aversion to meat with Mounjaro)  Reducing Risks:  Reducing Risks Assessed Today: Yes  Diabetes Risks: Age over 45 years, Ethnicity, Sedentary Lifestyle  Has dilated eye exam at least once a year?: Yes (patient is unsure when she had her last eye exam)    Alexandra Rivera RD  Time Spent: 20 minutes  Encounter Type: Individual    Any diabetes medication dose changes were made via the CDCES Standing Orders under the patient's referring provider.

## 2024-12-19 NOTE — LETTER
12/19/2024         RE: Leora Sanchez  [address] [place] [address]        Dear Colleague,    Thank you for referring your patient, Leora Sanchez, to the [facility]. Please see a copy of my visit note below.    Diabetes Self-Management Education & Support 20 minutes, no charge( not 30 minutes for DSMT) through professional  # [id number], Nickolas    Presents for: Follow-up    Type of Service: In Person Visit      Assessment  May was accompanied by her spouse, Juan Sanchez, for today's visit. On 12/6/24 a Portable Scores pro sensor was placed and today we reviewed the data. Average Blood glucose for the last two weekswas 181 mg/dl with 55% TIR. We reviewed the information and elevations, with emphasis on medication compliance, portion control for rice/noodles, and adding in activity after eating. May completed a 4th dose of Mounjaro 5 mg today and per PCP, she will advance to 7.5 mg on 12/26.Elisabet also takes Metformin 1000 mg/day.  She has had meat aversions since starting Mounjaro, and consumes tofu, milk and yogurt for her protein sources. We discussed s/s of Mounjaro and when to notify her PCP. May is walking 10 minutes/daily in her home. She is scheduled for a PCP follow up on 1/10/25.     Patient's most recent   Lab Results   Component Value Date    A1C 8.9 09/06/2024    A1C 5.7 01/18/2011     is not meeting goal of <8.0    Diabetes knowledge and skills assessment:   Patient is knowledgeable in diabetes management concepts related to: topics reviewed    Based on learning assessment above, most appropriate setting for further diabetes education would be: Individual setting.    Care Plan and Education Provided:  Healthy Eating: protein aversion, Being Active: Finding a physical activity routine that works for you, Monitoring: Frequency of monitoring, Individual glucose targets, and Log and interpret results, Taking Medication: does for Mounjaro, and Reducing Risks: Eye care and Goal for  "A1c, how it relates to glucose and how often to check    Patient verbalized understanding of diabetes self-management education concepts discussed, opportunities for ongoing education and support, and recommendations provided today.    Plan  Continue to walk 10 minutes daily, inside your home.  Increase Mounjaro to 7.5 mg weekly  Continue to take Metformin 500 mg two tablets daily.   Test blood sugar once daily in the morning before eating.   Bring your blood sugar meter to your appointment with Dr. Gaspar on 1/10/25.     Topics to cover at upcoming visits: Monitoring and Taking Medication    Follow-up:  PCP on 1/10/25  MTM on 2/14/25    See Care Plan for co-developed, patient-state behavior change goals.    Education Materials Provided:  No new materials provided today      Subjective/Objective  May is an 54 year old year old, presenting for the following diabetes education related to: Presents for: Follow-up  Accompanied by: Self, , Spouse  Diabetes education in the past 24mo: Yes  Focus of Visit: Monitoring, Taking Medication, Being Active  Diabetes type: Type 2  Disease course: Improving  Transportation concerns: Yes  Other concerns:: Language barrier  Cultural Influences/Ethnic Background:  Not  or       Diabetes Symptoms & Complications:  Disease course: Improving       Patient Problem List and Family Medical History reviewed for relevant medical history, current medical status, and diabetes risk factors.    Vitals:  There were no vitals taken for this visit.  Estimated body mass index is 40.65 kg/m  as calculated from the following:    Height as of 10/22/24: 1.511 m (4' 11.5\").    Weight as of 10/22/24: 92.9 kg (204 lb 11.2 oz).   Last 3 BP:   BP Readings from Last 3 Encounters:   11/15/24 109/79   10/22/24 110/66   10/18/24 134/86       History   Smoking Status     Never   Smokeless Tobacco     Never       Labs:  Lab Results   Component Value Date    A1C 8.9 09/06/2024    A1C 5.7 " "01/18/2011     Lab Results   Component Value Date     10/22/2024     09/24/2024     06/02/2022     04/26/2013     Lab Results   Component Value Date    LDL 68 11/29/2023     08/20/2020     04/26/2013     HDL Cholesterol   Date Value Ref Range Status   04/26/2013 65 >39 mg/dL Final     Direct Measure HDL   Date Value Ref Range Status   11/29/2023 83 >=50 mg/dL Final   ]  GFR Estimate   Date Value Ref Range Status   10/22/2024 70 >60 mL/min/1.73m2 Final     Comment:     eGFR calculated using 2021 CKD-EPI equation.   04/15/2021 49 (L) >60 mL/min/1.73m2 Final   04/26/2013 > 60 >60 ml/min/1.73m2 Final     GFR, ESTIMATED POCT   Date Value Ref Range Status   03/31/2023 >60 >60 mL/min/1.73m2 Final     GFR Estimate If Black   Date Value Ref Range Status   04/15/2021 60 (L) >60 mL/min/1.73m2 Final   04/26/2013 > 60 >60 ml/min/1.73m2 Final     Lab Results   Component Value Date    CR 0.96 10/22/2024    CR 0.82 04/26/2013     No results found for: \"MICROALBUMIN\"    Healthy Eating:  Healthy Eating Assessed Today: Yes  Meals include: Lunch, Dinner  Breakfast: coffee with honey  Lunch: traditional Hmong diet: rice( one fist size), leafy greens or other vegetables  Dinner: similiar to noon meal  Snacks: not to much, some fruit on occasion  Other: Bernard meal is usually when she eats out, one kids size portion, maybe 2x/month.  with mounjaro she has not been eating meat due to the smell, she will have tofu or yogurt or milk  Beverages: Water  Has patient met with a dietitian in the past?: Yes    Being Active:  Being Active Assessed Today: Yes (walking daily around the block 10 minutes)  Exercise:: Yes  Days per week of moderate to strenuous exercise (like a brisk walk): 6  On average, minutes per day of exercise at this level: 10  Exercise Minutes per Week: 60    Monitoring:  Monitoring Assessed Today: Yes (Migoa pro download)  Did patient bring glucose meter to appointment? : No  Times " checking blood sugar at home (number): 1  Times checking blood sugar at home (per): Day    Macho pro data:          Taking Medications:  Diabetes Medication(s)       Biguanides       metFORMIN (GLUCOPHAGE XR) 500 MG 24 hr tablet TAKE 1 TABLET (500 MG) BY MOUTH 2 TIMES DAILY (WITH MEALS)/ NOJ 1 LUB 2 ZAUG TXHUA HNUB NROG MOV PAB ZOO NTSHAV QAB ZIB       Sodium-Glucose Co-Transporter 2 (SGLT2) Inhibitors       empagliflozin (JARDIANCE) 25 MG TABS tablet Take 1 tablet (25 mg) by mouth daily.     Patient not taking: Reported on 12/19/2024       Incretin Mimetic Agents       Tirzepatide (MOUNJARO) 7.5 MG/0.5ML SOAJ Inject 0.5 mLs (7.5 mg) subcutaneously every 7 days.          Taking Medication Assessed Today: Yes  Current Treatments: Diet, Oral Medication (taken by mouth), Non-insulin Injectables  Problems taking diabetes medications regularly?: No  Diabetes medication side effects?: Yes (aversion to meat with Mounjaro)  Reducing Risks:  Reducing Risks Assessed Today: Yes  Diabetes Risks: Age over 45 years, Ethnicity, Sedentary Lifestyle  Has dilated eye exam at least once a year?: Yes (patient is unsure when she had her last eye exam)    Alexandra Rivera RD  Time Spent: 20 minutes  Encounter Type: Individual    Any diabetes medication dose changes were made via the CDCES Standing Orders under the patient's referring provider.

## 2024-12-19 NOTE — PATIENT INSTRUCTIONS
Continue to walk 10 minutes daily, inside your home.  Increase Mounjaro to 7.5 mg weekly  Continue to take Metformin 500 mg two tablets daily.   Test blood sugar once daily in the morning before eating.   Bring your blood sugar meter to your appointment with Dr. Gaspar on 1/10/25.

## 2025-01-01 ENCOUNTER — TELEPHONE (OUTPATIENT)
Dept: FAMILY MEDICINE | Facility: CLINIC | Age: 55
End: 2025-01-01

## 2025-01-01 ENCOUNTER — APPOINTMENT (OUTPATIENT)
Dept: ULTRASOUND IMAGING | Facility: HOSPITAL | Age: 55
DRG: 871 | End: 2025-01-01
Payer: MEDICARE

## 2025-01-01 ENCOUNTER — APPOINTMENT (OUTPATIENT)
Dept: GENERAL RADIOLOGY | Facility: CLINIC | Age: 55
DRG: 082 | End: 2025-01-01
Attending: STUDENT IN AN ORGANIZED HEALTH CARE EDUCATION/TRAINING PROGRAM
Payer: MEDICARE

## 2025-01-01 ENCOUNTER — APPOINTMENT (OUTPATIENT)
Dept: CT IMAGING | Facility: HOSPITAL | Age: 55
DRG: 871 | End: 2025-01-01
Attending: INTERNAL MEDICINE
Payer: MEDICARE

## 2025-01-01 ENCOUNTER — APPOINTMENT (OUTPATIENT)
Dept: SPEECH THERAPY | Facility: HOSPITAL | Age: 55
DRG: 871 | End: 2025-01-01
Attending: INTERNAL MEDICINE
Payer: MEDICARE

## 2025-01-01 ENCOUNTER — APPOINTMENT (OUTPATIENT)
Dept: CARDIOLOGY | Facility: CLINIC | Age: 55
DRG: 082 | End: 2025-01-01
Attending: STUDENT IN AN ORGANIZED HEALTH CARE EDUCATION/TRAINING PROGRAM
Payer: MEDICARE

## 2025-01-01 ENCOUNTER — MEDICAL CORRESPONDENCE (OUTPATIENT)
Dept: HEALTH INFORMATION MANAGEMENT | Facility: CLINIC | Age: 55
End: 2025-01-01

## 2025-01-01 ENCOUNTER — APPOINTMENT (OUTPATIENT)
Dept: CT IMAGING | Facility: CLINIC | Age: 55
DRG: 082 | End: 2025-01-01
Attending: STUDENT IN AN ORGANIZED HEALTH CARE EDUCATION/TRAINING PROGRAM
Payer: MEDICARE

## 2025-01-01 ENCOUNTER — APPOINTMENT (OUTPATIENT)
Dept: CT IMAGING | Facility: HOSPITAL | Age: 55
DRG: 871 | End: 2025-01-01
Attending: EMERGENCY MEDICINE
Payer: MEDICARE

## 2025-01-01 ENCOUNTER — APPOINTMENT (OUTPATIENT)
Dept: INTERVENTIONAL RADIOLOGY/VASCULAR | Facility: HOSPITAL | Age: 55
DRG: 871 | End: 2025-01-01
Attending: PSYCHIATRY & NEUROLOGY
Payer: MEDICARE

## 2025-01-01 ENCOUNTER — HOSPITAL ENCOUNTER (INPATIENT)
Facility: CLINIC | Age: 55
LOS: 1 days | DRG: 082 | End: 2025-08-30
Attending: INTERNAL MEDICINE | Admitting: STUDENT IN AN ORGANIZED HEALTH CARE EDUCATION/TRAINING PROGRAM
Payer: MEDICARE

## 2025-01-01 ENCOUNTER — APPOINTMENT (OUTPATIENT)
Dept: MRI IMAGING | Facility: HOSPITAL | Age: 55
DRG: 871 | End: 2025-01-01
Attending: PSYCHIATRY & NEUROLOGY
Payer: MEDICARE

## 2025-01-01 ENCOUNTER — APPOINTMENT (OUTPATIENT)
Dept: RADIOLOGY | Facility: HOSPITAL | Age: 55
DRG: 871 | End: 2025-01-01
Attending: INTERNAL MEDICINE
Payer: MEDICARE

## 2025-01-01 ENCOUNTER — APPOINTMENT (OUTPATIENT)
Dept: ULTRASOUND IMAGING | Facility: HOSPITAL | Age: 55
DRG: 871 | End: 2025-01-01
Attending: INTERNAL MEDICINE
Payer: MEDICARE

## 2025-01-01 ENCOUNTER — APPOINTMENT (OUTPATIENT)
Dept: INTERVENTIONAL RADIOLOGY/VASCULAR | Facility: HOSPITAL | Age: 55
DRG: 871 | End: 2025-01-01
Payer: MEDICARE

## 2025-01-01 ENCOUNTER — APPOINTMENT (OUTPATIENT)
Dept: CARDIOLOGY | Facility: HOSPITAL | Age: 55
DRG: 871 | End: 2025-01-01
Attending: INTERNAL MEDICINE
Payer: MEDICARE

## 2025-01-01 VITALS
OXYGEN SATURATION: 97 % | SYSTOLIC BLOOD PRESSURE: 98 MMHG | BODY MASS INDEX: 38.79 KG/M2 | WEIGHT: 198.63 LBS | TEMPERATURE: 99.9 F | DIASTOLIC BLOOD PRESSURE: 62 MMHG

## 2025-01-01 DIAGNOSIS — Z71.89 OTHER SPECIFIED COUNSELING: Chronic | ICD-10-CM

## 2025-01-01 LAB
A PHAGOCYTOPH DNA BLD QL NAA+PROBE: NOT DETECTED
ABO + RH BLD: NORMAL
ACANTHOCYTES BLD QL SMEAR: SLIGHT
ALBUMIN MFR UR ELPH: 318 MG/DL
ALBUMIN SERPL BCG-MCNC: 2.1 G/DL (ref 3.5–5.2)
ALBUMIN SERPL BCG-MCNC: 2.9 G/DL (ref 3.5–5.2)
ALP SERPL-CCNC: 120 U/L (ref 40–150)
ALP SERPL-CCNC: 141 U/L (ref 40–150)
ALT SERPL W P-5'-P-CCNC: 26 U/L (ref 0–50)
ALT SERPL W P-5'-P-CCNC: 31 U/L (ref 0–50)
ANION GAP SERPL CALCULATED.3IONS-SCNC: 17 MMOL/L (ref 7–15)
ANION GAP SERPL CALCULATED.3IONS-SCNC: 18 MMOL/L (ref 7–15)
APTT PPP: 63 SECONDS (ref 22–38)
AST SERPL W P-5'-P-CCNC: 115 U/L (ref 0–45)
AST SERPL W P-5'-P-CCNC: 81 U/L (ref 0–45)
ATRIAL RATE - MUSE: 105 BPM
BABESIA DNA BLD QL NAA+PROBE: NOT DETECTED
BACTERIA UR CULT: ABNORMAL
BASE EXCESS BLDV CALC-SCNC: -2.5 MMOL/L (ref -3–3)
BASOPHILS # BLD MANUAL: 0.06 10E3/UL (ref 0–0.2)
BASOPHILS # BLD MANUAL: 0.07 10E3/UL (ref 0–0.2)
BASOPHILS NFR BLD MANUAL: 3 %
BASOPHILS NFR BLD MANUAL: 3 %
BILIRUB DIRECT SERPL-MCNC: 3.62 MG/DL (ref 0–0.3)
BILIRUB SERPL-MCNC: 3.6 MG/DL
BILIRUB SERPL-MCNC: 5 MG/DL
BITE CELLS BLD QL SMEAR: SLIGHT
BLD GP AB SCN SERPL QL: NEGATIVE
BLD PROD TYP BPU: NORMAL
BLOOD COMPONENT TYPE: NORMAL
BUN SERPL-MCNC: 20 MG/DL (ref 6–20)
BUN SERPL-MCNC: 21.2 MG/DL (ref 6–20)
BURR CELLS BLD QL SMEAR: SLIGHT
CALCIUM SERPL-MCNC: 8.9 MG/DL (ref 8.8–10.4)
CALCIUM SERPL-MCNC: 9.5 MG/DL (ref 8.8–10.4)
CHLORIDE SERPL-SCNC: 107 MMOL/L (ref 98–107)
CHLORIDE SERPL-SCNC: 107 MMOL/L (ref 98–107)
CODING SYSTEM: NORMAL
CORTIS SERPL-MCNC: 13.4 UG/DL
CREAT SERPL-MCNC: 3.92 MG/DL (ref 0.51–0.95)
CREAT SERPL-MCNC: 4.35 MG/DL (ref 0.51–0.95)
CREAT UR-MCNC: 167 MG/DL
DACRYOCYTES BLD QL SMEAR: SLIGHT
DAT IGG-SP REAG RBC QL: NORMAL
DAT POLY: NORMAL
DIASTOLIC BLOOD PRESSURE - MUSE: NORMAL MMHG
EGFRCR SERPLBLD CKD-EPI 2021: 11 ML/MIN/1.73M2
EGFRCR SERPLBLD CKD-EPI 2021: 13 ML/MIN/1.73M2
EHRLICHIA DNA SPEC QL NAA+PROBE: NOT DETECTED
ELLIPTOCYTES BLD QL SMEAR: SLIGHT
EOSINOPHIL # BLD MANUAL: 0 10E3/UL (ref 0–0.7)
EOSINOPHIL # BLD MANUAL: 0.06 10E3/UL (ref 0–0.7)
EOSINOPHIL NFR BLD MANUAL: 0 %
EOSINOPHIL NFR BLD MANUAL: 3 %
ERYTHROCYTE [DISTWIDTH] IN BLOOD BY AUTOMATED COUNT: 18.6 % (ref 10–15)
ERYTHROCYTE [DISTWIDTH] IN BLOOD BY AUTOMATED COUNT: 18.6 % (ref 10–15)
ERYTHROCYTE [DISTWIDTH] IN BLOOD BY AUTOMATED COUNT: 19.1 % (ref 10–15)
FERRITIN SERPL-MCNC: 399 NG/ML (ref 11–328)
FIBRINOGEN PPP-MCNC: 268 MG/DL (ref 170–510)
FRAGMENTS BLD QL SMEAR: ABNORMAL
GLUCOSE BLDC GLUCOMTR-MCNC: 100 MG/DL (ref 70–99)
GLUCOSE BLDC GLUCOMTR-MCNC: 73 MG/DL (ref 70–99)
GLUCOSE BLDC GLUCOMTR-MCNC: 77 MG/DL (ref 70–99)
GLUCOSE BLDC GLUCOMTR-MCNC: 79 MG/DL (ref 70–99)
GLUCOSE BLDC GLUCOMTR-MCNC: 80 MG/DL (ref 70–99)
GLUCOSE BLDC GLUCOMTR-MCNC: 82 MG/DL (ref 70–99)
GLUCOSE BLDC GLUCOMTR-MCNC: 94 MG/DL (ref 70–99)
GLUCOSE BLDC GLUCOMTR-MCNC: 99 MG/DL (ref 70–99)
GLUCOSE SERPL-MCNC: 104 MG/DL (ref 70–99)
GLUCOSE SERPL-MCNC: 78 MG/DL (ref 70–99)
HAPTOGLOB SERPL-MCNC: 79 MG/DL (ref 30–200)
HCO3 BLDV-SCNC: 23 MMOL/L (ref 21–28)
HCO3 SERPL-SCNC: 20 MMOL/L (ref 22–29)
HCO3 SERPL-SCNC: 20 MMOL/L (ref 22–29)
HCT VFR BLD AUTO: 26 % (ref 35–47)
HCT VFR BLD AUTO: 27.9 % (ref 35–47)
HCT VFR BLD AUTO: 29.3 % (ref 35–47)
HGB BLD-MCNC: 8.5 G/DL (ref 11.7–15.7)
HGB BLD-MCNC: 9.2 G/DL (ref 11.7–15.7)
HGB BLD-MCNC: 9.8 G/DL (ref 11.7–15.7)
HIV 1+2 AB+HIV1 P24 AG SERPL QL IA: NONREACTIVE
INR PPP: 1.59 (ref 0.85–1.15)
INR PPP: 1.65 (ref 0.85–1.15)
INTERPRETATION ECG - MUSE: NORMAL
IRON BINDING CAPACITY (ROCHE): NORMAL
IRON SATN MFR SERPL: NORMAL %
IRON SERPL-MCNC: 113 UG/DL (ref 37–145)
ISSUE DATE AND TIME: NORMAL
LACTATE SERPL-SCNC: 5.4 MMOL/L (ref 0.7–2)
LDH SERPL L TO P-CCNC: 402 U/L (ref 0–250)
LVEF ECHO: NORMAL
LYMPHOCYTES # BLD MANUAL: 0.43 10E3/UL (ref 0.8–5.3)
LYMPHOCYTES # BLD MANUAL: 0.64 10E3/UL (ref 0.8–5.3)
LYMPHOCYTES NFR BLD MANUAL: 18 %
LYMPHOCYTES NFR BLD MANUAL: 32 %
MAGNESIUM SERPL-MCNC: 1.8 MG/DL (ref 1.7–2.3)
MCH RBC QN AUTO: 27.8 PG (ref 26.5–33)
MCH RBC QN AUTO: 27.8 PG (ref 26.5–33)
MCH RBC QN AUTO: 28.1 PG (ref 26.5–33)
MCHC RBC AUTO-ENTMCNC: 32.7 G/DL (ref 31.5–36.5)
MCHC RBC AUTO-ENTMCNC: 33 G/DL (ref 31.5–36.5)
MCHC RBC AUTO-ENTMCNC: 33.4 G/DL (ref 31.5–36.5)
MCV RBC AUTO: 83 FL (ref 78–100)
MCV RBC AUTO: 84.3 FL (ref 78–100)
MCV RBC AUTO: 85.8 FL (ref 78–100)
METAMYELOCYTES # BLD MANUAL: 0.02 10E3/UL
METAMYELOCYTES # BLD MANUAL: 0.08 10E3/UL
METAMYELOCYTES NFR BLD MANUAL: 1 %
METAMYELOCYTES NFR BLD MANUAL: 4 %
MONOCYTES # BLD MANUAL: 0.19 10E3/UL (ref 0–1.3)
MONOCYTES # BLD MANUAL: 0.28 10E3/UL (ref 0–1.3)
MONOCYTES NFR BLD MANUAL: 14 %
MONOCYTES NFR BLD MANUAL: 8 %
MYELOCYTES # BLD MANUAL: 0.02 10E3/UL
MYELOCYTES NFR BLD MANUAL: 1 %
NEUTROPHILS # BLD MANUAL: 0.86 10E3/UL (ref 1.6–8.3)
NEUTROPHILS # BLD MANUAL: 1.68 10E3/UL (ref 1.6–8.3)
NEUTROPHILS NFR BLD MANUAL: 43 %
NEUTROPHILS NFR BLD MANUAL: 70 %
O2/TOTAL GAS SETTING VFR VENT: 50 %
OXYHGB MFR BLDV: 78 % (ref 70–75)
P AXIS - MUSE: 30 DEGREES
PCO2 BLDV: 43 MM HG (ref 40–50)
PH BLDV: 7.34 [PH] (ref 7.32–7.43)
PHOSPHATE SERPL-MCNC: 2.1 MG/DL (ref 2.5–4.5)
PHOSPHATE SERPL-MCNC: 2.1 MG/DL (ref 2.5–4.5)
PLAT MORPH BLD: ABNORMAL
PLAT MORPH BLD: ABNORMAL
PLATELET # BLD AUTO: 54 10E3/UL (ref 150–450)
PLATELET # BLD AUTO: 78 10E3/UL (ref 150–450)
PLATELET # BLD AUTO: 97 10E3/UL (ref 150–450)
PO2 BLDV: 46 MM HG (ref 25–47)
POTASSIUM SERPL-SCNC: 3.3 MMOL/L (ref 3.4–5.3)
POTASSIUM SERPL-SCNC: 3.3 MMOL/L (ref 3.4–5.3)
POTASSIUM SERPL-SCNC: 3.6 MMOL/L (ref 3.4–5.3)
PR INTERVAL - MUSE: 150 MS
PROT SERPL-MCNC: 4.3 G/DL (ref 6.4–8.3)
PROT SERPL-MCNC: 4.5 G/DL (ref 6.4–8.3)
PROT/CREAT 24H UR: 1.9 MG/MG CR (ref 0–0.2)
PROTHROMBIN TIME: 18.5 SECONDS (ref 11.8–14.8)
PROTHROMBIN TIME: 19 SECONDS (ref 11.8–14.8)
QRS DURATION - MUSE: 84 MS
QT - MUSE: 384 MS
QTC - MUSE: 507 MS
R AXIS - MUSE: 12 DEGREES
RBC # BLD AUTO: 3.03 10E6/UL (ref 3.8–5.2)
RBC # BLD AUTO: 3.31 10E6/UL (ref 3.8–5.2)
RBC # BLD AUTO: 3.53 10E6/UL (ref 3.8–5.2)
RBC MORPH BLD: ABNORMAL
RBC MORPH BLD: ABNORMAL
RETICS # AUTO: 0.01 10E6/UL (ref 0.03–0.1)
RETICS/RBC NFR AUTO: 0.34 % (ref 0.5–2)
SAO2 % BLDV: 79.6 % (ref 70–75)
SODIUM SERPL-SCNC: 144 MMOL/L (ref 135–145)
SODIUM SERPL-SCNC: 145 MMOL/L (ref 135–145)
SPECIMEN EXP DATE BLD: NORMAL
SYSTOLIC BLOOD PRESSURE - MUSE: NORMAL MMHG
T AXIS - MUSE: 41 DEGREES
TARGETS BLD QL SMEAR: SLIGHT
TARGETS BLD QL SMEAR: SLIGHT
UNIT ABO/RH: NORMAL
UNIT NUMBER: NORMAL
UNIT STATUS: NORMAL
UNIT TYPE ISBT: 5100
URATE SERPL-MCNC: 5.4 MG/DL (ref 2.4–5.7)
VENTRICULAR RATE- MUSE: 105 BPM
WBC # BLD AUTO: 1.83 10E3/UL (ref 4–11)
WBC # BLD AUTO: 2 10E3/UL (ref 4–11)
WBC # BLD AUTO: 2.38 10E3/UL (ref 4–11)

## 2025-01-01 PROCEDURE — 36592 COLLECT BLOOD FROM PICC: CPT | Performed by: STUDENT IN AN ORGANIZED HEALTH CARE EDUCATION/TRAINING PROGRAM

## 2025-01-01 PROCEDURE — 82962 GLUCOSE BLOOD TEST: CPT

## 2025-01-01 PROCEDURE — P9047 ALBUMIN (HUMAN), 25%, 50ML: HCPCS | Performed by: STUDENT IN AN ORGANIZED HEALTH CARE EDUCATION/TRAINING PROGRAM

## 2025-01-01 PROCEDURE — 250N000011 HC RX IP 250 OP 636: Performed by: INTERNAL MEDICINE

## 2025-01-01 PROCEDURE — 74176 CT ABD & PELVIS W/O CONTRAST: CPT

## 2025-01-01 PROCEDURE — 70450 CT HEAD/BRAIN W/O DYE: CPT

## 2025-01-01 PROCEDURE — 99238 HOSP IP/OBS DSCHRG MGMT 30/<: CPT | Performed by: INTERNAL MEDICINE

## 2025-01-01 PROCEDURE — 85018 HEMOGLOBIN: CPT | Performed by: STUDENT IN AN ORGANIZED HEALTH CARE EDUCATION/TRAINING PROGRAM

## 2025-01-01 PROCEDURE — 200N000001 HC R&B ICU

## 2025-01-01 PROCEDURE — 93308 TTE F-UP OR LMTD: CPT | Mod: 26 | Performed by: INTERNAL MEDICINE

## 2025-01-01 PROCEDURE — 86900 BLOOD TYPING SEROLOGIC ABO: CPT | Performed by: INTERNAL MEDICINE

## 2025-01-01 PROCEDURE — 85007 BL SMEAR W/DIFF WBC COUNT: CPT | Performed by: INTERNAL MEDICINE

## 2025-01-01 PROCEDURE — 83010 ASSAY OF HAPTOGLOBIN QUANT: CPT | Performed by: STUDENT IN AN ORGANIZED HEALTH CARE EDUCATION/TRAINING PROGRAM

## 2025-01-01 PROCEDURE — 258N000003 HC RX IP 258 OP 636: Performed by: INTERNAL MEDICINE

## 2025-01-01 PROCEDURE — 87468 ANAPLSMA PHGCYTOPHLM AMP PRB: CPT | Performed by: STUDENT IN AN ORGANIZED HEALTH CARE EDUCATION/TRAINING PROGRAM

## 2025-01-01 PROCEDURE — 71045 X-RAY EXAM CHEST 1 VIEW: CPT

## 2025-01-01 PROCEDURE — 87389 HIV-1 AG W/HIV-1&-2 AB AG IA: CPT | Performed by: STUDENT IN AN ORGANIZED HEALTH CARE EDUCATION/TRAINING PROGRAM

## 2025-01-01 PROCEDURE — 99291 CRITICAL CARE FIRST HOUR: CPT | Performed by: STUDENT IN AN ORGANIZED HEALTH CARE EDUCATION/TRAINING PROGRAM

## 2025-01-01 PROCEDURE — 99222 1ST HOSP IP/OBS MODERATE 55: CPT | Performed by: STUDENT IN AN ORGANIZED HEALTH CARE EDUCATION/TRAINING PROGRAM

## 2025-01-01 PROCEDURE — 84132 ASSAY OF SERUM POTASSIUM: CPT | Performed by: INTERNAL MEDICINE

## 2025-01-01 PROCEDURE — 93005 ELECTROCARDIOGRAM TRACING: CPT

## 2025-01-01 PROCEDURE — 84100 ASSAY OF PHOSPHORUS: CPT | Performed by: STUDENT IN AN ORGANIZED HEALTH CARE EDUCATION/TRAINING PROGRAM

## 2025-01-01 PROCEDURE — 86870 RBC ANTIBODY IDENTIFICATION: CPT | Performed by: INTERNAL MEDICINE

## 2025-01-01 PROCEDURE — 82533 TOTAL CORTISOL: CPT | Performed by: STUDENT IN AN ORGANIZED HEALTH CARE EDUCATION/TRAINING PROGRAM

## 2025-01-01 PROCEDURE — 93325 DOPPLER ECHO COLOR FLOW MAPG: CPT | Mod: 26 | Performed by: INTERNAL MEDICINE

## 2025-01-01 PROCEDURE — 93321 DOPPLER ECHO F-UP/LMTD STD: CPT | Mod: 26 | Performed by: INTERNAL MEDICINE

## 2025-01-01 PROCEDURE — 250N000011 HC RX IP 250 OP 636: Performed by: STUDENT IN AN ORGANIZED HEALTH CARE EDUCATION/TRAINING PROGRAM

## 2025-01-01 PROCEDURE — 85027 COMPLETE CBC AUTOMATED: CPT | Performed by: INTERNAL MEDICINE

## 2025-01-01 PROCEDURE — 70551 MRI BRAIN STEM W/O DYE: CPT

## 2025-01-01 PROCEDURE — 36573 INSJ PICC RS&I 5 YR+: CPT

## 2025-01-01 PROCEDURE — 93321 DOPPLER ECHO F-UP/LMTD STD: CPT

## 2025-01-01 PROCEDURE — 999N000157 HC STATISTIC RCP TIME EA 10 MIN

## 2025-01-01 PROCEDURE — 250N000009 HC RX 250: Performed by: INTERNAL MEDICINE

## 2025-01-01 PROCEDURE — 999N000040 HC STATISTIC CONSULT NO CHARGE VASC ACCESS

## 2025-01-01 PROCEDURE — 83615 LACTATE (LD) (LDH) ENZYME: CPT | Performed by: STUDENT IN AN ORGANIZED HEALTH CARE EDUCATION/TRAINING PROGRAM

## 2025-01-01 PROCEDURE — 76705 ECHO EXAM OF ABDOMEN: CPT

## 2025-01-01 PROCEDURE — 80053 COMPREHEN METABOLIC PANEL: CPT | Performed by: INTERNAL MEDICINE

## 2025-01-01 PROCEDURE — 82728 ASSAY OF FERRITIN: CPT | Performed by: INTERNAL MEDICINE

## 2025-01-01 PROCEDURE — 85384 FIBRINOGEN ACTIVITY: CPT | Performed by: STUDENT IN AN ORGANIZED HEALTH CARE EDUCATION/TRAINING PROGRAM

## 2025-01-01 PROCEDURE — 99207 PR APP CREDIT; MD BILLING SHARED VISIT: CPT | Performed by: INTERNAL MEDICINE

## 2025-01-01 PROCEDURE — 86860 RBC ANTIBODY ELUTION: CPT | Performed by: INTERNAL MEDICINE

## 2025-01-01 PROCEDURE — 85045 AUTOMATED RETICULOCYTE COUNT: CPT | Performed by: INTERNAL MEDICINE

## 2025-01-01 PROCEDURE — 83735 ASSAY OF MAGNESIUM: CPT | Performed by: INTERNAL MEDICINE

## 2025-01-01 PROCEDURE — 82805 BLOOD GASES W/O2 SATURATION: CPT | Performed by: STUDENT IN AN ORGANIZED HEALTH CARE EDUCATION/TRAINING PROGRAM

## 2025-01-01 PROCEDURE — 86880 COOMBS TEST DIRECT: CPT | Performed by: INTERNAL MEDICINE

## 2025-01-01 PROCEDURE — 84550 ASSAY OF BLOOD/URIC ACID: CPT | Performed by: STUDENT IN AN ORGANIZED HEALTH CARE EDUCATION/TRAINING PROGRAM

## 2025-01-01 PROCEDURE — 85610 PROTHROMBIN TIME: CPT | Performed by: INTERNAL MEDICINE

## 2025-01-01 PROCEDURE — 85610 PROTHROMBIN TIME: CPT | Performed by: STUDENT IN AN ORGANIZED HEALTH CARE EDUCATION/TRAINING PROGRAM

## 2025-01-01 PROCEDURE — 82248 BILIRUBIN DIRECT: CPT | Performed by: INTERNAL MEDICINE

## 2025-01-01 PROCEDURE — 94660 CPAP INITIATION&MGMT: CPT

## 2025-01-01 PROCEDURE — P9035 PLATELET PHERES LEUKOREDUCED: HCPCS | Performed by: INTERNAL MEDICINE

## 2025-01-01 PROCEDURE — 99222 1ST HOSP IP/OBS MODERATE 55: CPT | Performed by: PHYSICIAN ASSISTANT

## 2025-01-01 PROCEDURE — 258N000003 HC RX IP 258 OP 636: Performed by: STUDENT IN AN ORGANIZED HEALTH CARE EDUCATION/TRAINING PROGRAM

## 2025-01-01 PROCEDURE — 83605 ASSAY OF LACTIC ACID: CPT | Performed by: STUDENT IN AN ORGANIZED HEALTH CARE EDUCATION/TRAINING PROGRAM

## 2025-01-01 PROCEDURE — 999N000190 HC STATISTIC VAT ROUNDS

## 2025-01-01 PROCEDURE — 93970 EXTREMITY STUDY: CPT

## 2025-01-01 PROCEDURE — 86850 RBC ANTIBODY SCREEN: CPT | Performed by: INTERNAL MEDICINE

## 2025-01-01 PROCEDURE — 82040 ASSAY OF SERUM ALBUMIN: CPT | Performed by: STUDENT IN AN ORGANIZED HEALTH CARE EDUCATION/TRAINING PROGRAM

## 2025-01-01 PROCEDURE — 99153 MOD SED SAME PHYS/QHP EA: CPT

## 2025-01-01 PROCEDURE — 83540 ASSAY OF IRON: CPT | Performed by: INTERNAL MEDICINE

## 2025-01-01 PROCEDURE — 86901 BLOOD TYPING SEROLOGIC RH(D): CPT | Performed by: INTERNAL MEDICINE

## 2025-01-01 PROCEDURE — 85730 THROMBOPLASTIN TIME PARTIAL: CPT | Performed by: STUDENT IN AN ORGANIZED HEALTH CARE EDUCATION/TRAINING PROGRAM

## 2025-01-01 PROCEDURE — 99223 1ST HOSP IP/OBS HIGH 75: CPT | Performed by: INTERNAL MEDICINE

## 2025-01-01 PROCEDURE — 87106 FUNGI IDENTIFICATION YEAST: CPT | Performed by: INTERNAL MEDICINE

## 2025-01-01 PROCEDURE — 84156 ASSAY OF PROTEIN URINE: CPT | Performed by: STUDENT IN AN ORGANIZED HEALTH CARE EDUCATION/TRAINING PROGRAM

## 2025-01-01 RX ORDER — ACETAMINOPHEN 650 MG/1
650 SUPPOSITORY RECTAL EVERY 4 HOURS PRN
Status: DISCONTINUED | OUTPATIENT
Start: 2025-01-01 | End: 2025-01-01 | Stop reason: HOSPADM

## 2025-01-01 RX ORDER — BISACODYL 10 MG
10 SUPPOSITORY, RECTAL RECTAL
Status: DISCONTINUED | OUTPATIENT
Start: 2025-09-02 | End: 2025-01-01 | Stop reason: HOSPADM

## 2025-01-01 RX ORDER — HYDROMORPHONE HYDROCHLORIDE 1 MG/ML
1 SOLUTION ORAL
Refills: 0 | Status: DISCONTINUED | OUTPATIENT
Start: 2025-01-01 | End: 2025-01-01 | Stop reason: HOSPADM

## 2025-01-01 RX ORDER — ONDANSETRON 4 MG/1
4 TABLET, ORALLY DISINTEGRATING ORAL EVERY 6 HOURS PRN
Status: DISCONTINUED | OUTPATIENT
Start: 2025-01-01 | End: 2025-01-01 | Stop reason: HOSPADM

## 2025-01-01 RX ORDER — PROCHLORPERAZINE MALEATE 10 MG
10 TABLET ORAL EVERY 6 HOURS PRN
Status: DISCONTINUED | OUTPATIENT
Start: 2025-01-01 | End: 2025-01-01 | Stop reason: HOSPADM

## 2025-01-01 RX ORDER — DEXTROSE MONOHYDRATE AND SODIUM CHLORIDE 5; .45 G/100ML; G/100ML
INJECTION, SOLUTION INTRAVENOUS CONTINUOUS
Status: DISCONTINUED | OUTPATIENT
Start: 2025-01-01 | End: 2025-01-01

## 2025-01-01 RX ORDER — HYDROMORPHONE HYDROCHLORIDE 2 MG/1
2 TABLET ORAL
Refills: 0 | Status: DISCONTINUED | OUTPATIENT
Start: 2025-01-01 | End: 2025-01-01 | Stop reason: HOSPADM

## 2025-01-01 RX ORDER — POLYETHYLENE GLYCOL 3350 17 G/17G
17 POWDER, FOR SOLUTION ORAL DAILY PRN
Status: DISCONTINUED | OUTPATIENT
Start: 2025-01-01 | End: 2025-01-01 | Stop reason: HOSPADM

## 2025-01-01 RX ORDER — LORAZEPAM 1 MG/1
1 TABLET ORAL
Status: DISCONTINUED | OUTPATIENT
Start: 2025-01-01 | End: 2025-01-01 | Stop reason: HOSPADM

## 2025-01-01 RX ORDER — ONDANSETRON 2 MG/ML
4 INJECTION INTRAMUSCULAR; INTRAVENOUS EVERY 6 HOURS PRN
Status: DISCONTINUED | OUTPATIENT
Start: 2025-01-01 | End: 2025-01-01 | Stop reason: HOSPADM

## 2025-01-01 RX ORDER — SENNOSIDES 8.6 MG
1 TABLET ORAL 2 TIMES DAILY PRN
Status: DISCONTINUED | OUTPATIENT
Start: 2025-01-01 | End: 2025-01-01 | Stop reason: HOSPADM

## 2025-01-01 RX ORDER — DEXTROSE MONOHYDRATE 25 G/50ML
25-50 INJECTION, SOLUTION INTRAVENOUS
Status: DISCONTINUED | OUTPATIENT
Start: 2025-01-01 | End: 2025-01-01

## 2025-01-01 RX ORDER — CHLOROTHIAZIDE SODIUM 500 MG/1
500 INJECTION INTRAVENOUS ONCE
Status: COMPLETED | OUTPATIENT
Start: 2025-01-01 | End: 2025-01-01

## 2025-01-01 RX ORDER — HYDROMORPHONE HYDROCHLORIDE 1 MG/ML
2 SOLUTION ORAL
Refills: 0 | Status: DISCONTINUED | OUTPATIENT
Start: 2025-01-01 | End: 2025-01-01 | Stop reason: HOSPADM

## 2025-01-01 RX ORDER — DEXTROSE MONOHYDRATE 50 MG/ML
INJECTION, SOLUTION INTRAVENOUS CONTINUOUS
Status: DISCONTINUED | OUTPATIENT
Start: 2025-01-01 | End: 2025-01-01

## 2025-01-01 RX ORDER — HYDRALAZINE HYDROCHLORIDE 20 MG/ML
10 INJECTION INTRAMUSCULAR; INTRAVENOUS EVERY 4 HOURS PRN
Status: DISCONTINUED | OUTPATIENT
Start: 2025-01-01 | End: 2025-01-01 | Stop reason: HOSPADM

## 2025-01-01 RX ORDER — BUMETANIDE 0.25 MG/ML
4 INJECTION, SOLUTION INTRAMUSCULAR; INTRAVENOUS EVERY 8 HOURS
Status: DISCONTINUED | OUTPATIENT
Start: 2025-01-01 | End: 2025-01-01 | Stop reason: HOSPADM

## 2025-01-01 RX ORDER — DEXTROSE MONOHYDRATE 25 G/50ML
25-50 INJECTION, SOLUTION INTRAVENOUS
Status: DISCONTINUED | OUTPATIENT
Start: 2025-01-01 | End: 2025-01-01 | Stop reason: HOSPADM

## 2025-01-01 RX ORDER — NALOXONE HYDROCHLORIDE 0.4 MG/ML
0.2 INJECTION, SOLUTION INTRAMUSCULAR; INTRAVENOUS; SUBCUTANEOUS
Status: DISCONTINUED | OUTPATIENT
Start: 2025-01-01 | End: 2025-01-01 | Stop reason: HOSPADM

## 2025-01-01 RX ORDER — DEXTROSE MONOHYDRATE 100 MG/ML
INJECTION, SOLUTION INTRAVENOUS CONTINUOUS
Status: DISCONTINUED | OUTPATIENT
Start: 2025-01-01 | End: 2025-01-01 | Stop reason: HOSPADM

## 2025-01-01 RX ORDER — CARBOXYMETHYLCELLULOSE SODIUM 5 MG/ML
1 SOLUTION/ DROPS OPHTHALMIC
Status: DISCONTINUED | OUTPATIENT
Start: 2025-01-01 | End: 2025-01-01

## 2025-01-01 RX ORDER — BUMETANIDE 0.25 MG/ML
4 INJECTION, SOLUTION INTRAMUSCULAR; INTRAVENOUS EVERY 12 HOURS
Status: DISCONTINUED | OUTPATIENT
Start: 2025-01-01 | End: 2025-01-01

## 2025-01-01 RX ORDER — MINERAL OIL/HYDROPHIL PETROLAT
OINTMENT (GRAM) TOPICAL
Status: DISCONTINUED | OUTPATIENT
Start: 2025-01-01 | End: 2025-01-01 | Stop reason: HOSPADM

## 2025-01-01 RX ORDER — POTASSIUM CHLORIDE 29.8 MG/ML
20 INJECTION INTRAVENOUS ONCE
Status: COMPLETED | OUTPATIENT
Start: 2025-01-01 | End: 2025-01-01

## 2025-01-01 RX ORDER — CARBOXYMETHYLCELLULOSE SODIUM 5 MG/ML
1-2 SOLUTION/ DROPS OPHTHALMIC
Status: DISCONTINUED | OUTPATIENT
Start: 2025-01-01 | End: 2025-01-01 | Stop reason: HOSPADM

## 2025-01-01 RX ORDER — NICOTINE POLACRILEX 4 MG
15-30 LOZENGE BUCCAL
Status: DISCONTINUED | OUTPATIENT
Start: 2025-01-01 | End: 2025-01-01

## 2025-01-01 RX ORDER — MORPHINE SULFATE 2 MG/ML
2 INJECTION, SOLUTION INTRAMUSCULAR; INTRAVENOUS
Status: DISCONTINUED | OUTPATIENT
Start: 2025-01-01 | End: 2025-01-01 | Stop reason: HOSPADM

## 2025-01-01 RX ORDER — DEXTROSE MONOHYDRATE 100 MG/ML
INJECTION, SOLUTION INTRAVENOUS CONTINUOUS PRN
Status: DISCONTINUED | OUTPATIENT
Start: 2025-01-01 | End: 2025-01-01 | Stop reason: HOSPADM

## 2025-01-01 RX ORDER — NICOTINE POLACRILEX 4 MG
15-30 LOZENGE BUCCAL
Status: DISCONTINUED | OUTPATIENT
Start: 2025-01-01 | End: 2025-01-01 | Stop reason: HOSPADM

## 2025-01-01 RX ORDER — WATER 10 ML/10ML
INJECTION INTRAMUSCULAR; INTRAVENOUS; SUBCUTANEOUS
Status: COMPLETED
Start: 2025-01-01 | End: 2025-01-01

## 2025-01-01 RX ORDER — ALBUMIN (HUMAN) 12.5 G/50ML
25 SOLUTION INTRAVENOUS EVERY 6 HOURS
Status: DISCONTINUED | OUTPATIENT
Start: 2025-01-01 | End: 2025-01-01

## 2025-01-01 RX ORDER — LORAZEPAM 2 MG/ML
1 INJECTION INTRAMUSCULAR
Status: DISCONTINUED | OUTPATIENT
Start: 2025-01-01 | End: 2025-01-01 | Stop reason: HOSPADM

## 2025-01-01 RX ORDER — NALOXONE HYDROCHLORIDE 0.4 MG/ML
0.1 INJECTION, SOLUTION INTRAMUSCULAR; INTRAVENOUS; SUBCUTANEOUS
Status: DISCONTINUED | OUTPATIENT
Start: 2025-01-01 | End: 2025-01-01 | Stop reason: HOSPADM

## 2025-01-01 RX ORDER — MORPHINE SULFATE 2 MG/ML
1 INJECTION, SOLUTION INTRAMUSCULAR; INTRAVENOUS
Status: DISCONTINUED | OUTPATIENT
Start: 2025-01-01 | End: 2025-01-01 | Stop reason: HOSPADM

## 2025-01-01 RX ORDER — THIAMINE HYDROCHLORIDE 100 MG/ML
300 INJECTION, SOLUTION INTRAMUSCULAR; INTRAVENOUS DAILY
Status: DISCONTINUED | OUTPATIENT
Start: 2025-01-01 | End: 2025-01-01 | Stop reason: HOSPADM

## 2025-01-01 RX ADMIN — LORAZEPAM 1 MG: 2 INJECTION INTRAMUSCULAR; INTRAVENOUS at 16:42

## 2025-01-01 RX ADMIN — DEXTROSE AND SODIUM CHLORIDE: 5; .45 INJECTION, SOLUTION INTRAVENOUS at 10:04

## 2025-01-01 RX ADMIN — ALBUMIN HUMAN 25 G: 0.25 SOLUTION INTRAVENOUS at 00:01

## 2025-01-01 RX ADMIN — THIAMINE HYDROCHLORIDE 300 MG: 100 INJECTION, SOLUTION INTRAMUSCULAR; INTRAVENOUS at 08:03

## 2025-01-01 RX ADMIN — DEXTROSE: 5 SOLUTION INTRAVENOUS at 06:30

## 2025-01-01 RX ADMIN — BUMETANIDE 4 MG: 0.25 INJECTION INTRAMUSCULAR; INTRAVENOUS at 13:36

## 2025-01-01 RX ADMIN — MORPHINE SULFATE 2 MG: 2 INJECTION, SOLUTION INTRAMUSCULAR; INTRAVENOUS at 17:06

## 2025-01-01 RX ADMIN — DEXTROSE AND SODIUM CHLORIDE: 5; .45 INJECTION, SOLUTION INTRAVENOUS at 05:34

## 2025-01-01 RX ADMIN — POTASSIUM CHLORIDE 20 MEQ: 29.8 INJECTION, SOLUTION INTRAVENOUS at 05:30

## 2025-01-01 RX ADMIN — ALBUMIN HUMAN 25 G: 0.25 SOLUTION INTRAVENOUS at 12:20

## 2025-01-01 RX ADMIN — ALBUMIN HUMAN 25 G: 0.25 SOLUTION INTRAVENOUS at 17:40

## 2025-01-01 RX ADMIN — SODIUM PHOSPHATE, MONOBASIC, MONOHYDRATE AND SODIUM PHOSPHATE, DIBASIC, ANHYDROUS 9 MMOL: 142; 276 INJECTION, SOLUTION INTRAVENOUS at 06:38

## 2025-01-01 RX ADMIN — PANTOPRAZOLE SODIUM 40 MG: 40 INJECTION, POWDER, FOR SOLUTION INTRAVENOUS at 08:03

## 2025-01-01 RX ADMIN — BUMETANIDE 4 MG: 0.25 INJECTION INTRAMUSCULAR; INTRAVENOUS at 20:55

## 2025-01-01 RX ADMIN — PHYTONADIONE 10 MG: 10 INJECTION, EMULSION INTRAMUSCULAR; INTRAVENOUS; SUBCUTANEOUS at 10:06

## 2025-01-01 RX ADMIN — MORPHINE SULFATE 2 MG: 2 INJECTION, SOLUTION INTRAMUSCULAR; INTRAVENOUS at 16:42

## 2025-01-01 RX ADMIN — ALBUMIN HUMAN 25 G: 0.25 SOLUTION INTRAVENOUS at 04:25

## 2025-01-01 RX ADMIN — PANTOPRAZOLE SODIUM 40 MG: 40 INJECTION, POWDER, FOR SOLUTION INTRAVENOUS at 10:14

## 2025-01-01 RX ADMIN — CHLOROTHIAZIDE SODIUM 500 MG: 500 INJECTION, POWDER, LYOPHILIZED, FOR SOLUTION INTRAVENOUS at 12:58

## 2025-01-01 RX ADMIN — THIAMINE HYDROCHLORIDE 300 MG: 100 INJECTION, SOLUTION INTRAMUSCULAR; INTRAVENOUS at 09:12

## 2025-01-01 ASSESSMENT — ACTIVITIES OF DAILY LIVING (ADL)
ADLS_ACUITY_SCORE: 71
ADLS_ACUITY_SCORE: 77
ADLS_ACUITY_SCORE: 71
ADLS_ACUITY_SCORE: 77
ADLS_ACUITY_SCORE: 77
ADLS_ACUITY_SCORE: 73
ADLS_ACUITY_SCORE: 75
ADLS_ACUITY_SCORE: 71
ADLS_ACUITY_SCORE: 63
ADLS_ACUITY_SCORE: 71
ADLS_ACUITY_SCORE: 73
ADLS_ACUITY_SCORE: 71
ADLS_ACUITY_SCORE: 71
ADLS_ACUITY_SCORE: 75
ADLS_ACUITY_SCORE: 75
ADLS_ACUITY_SCORE: 71
ADLS_ACUITY_SCORE: 75
ADLS_ACUITY_SCORE: 71
ADLS_ACUITY_SCORE: 79
ADLS_ACUITY_SCORE: 77
ADLS_ACUITY_SCORE: 75
ADLS_ACUITY_SCORE: 77
DEPENDENT_IADLS:: CLEANING;COOKING;LAUNDRY;SHOPPING;TRANSPORTATION;MEAL PREPARATION;MEDICATION MANAGEMENT
ADLS_ACUITY_SCORE: 77
ADLS_ACUITY_SCORE: 77
ADLS_ACUITY_SCORE: 75
ADLS_ACUITY_SCORE: 77
ADLS_ACUITY_SCORE: 75
ADLS_ACUITY_SCORE: 79
ADLS_ACUITY_SCORE: 71
ADLS_ACUITY_SCORE: 79
ADLS_ACUITY_SCORE: 75
ADLS_ACUITY_SCORE: 79
ADLS_ACUITY_SCORE: 77
ADLS_ACUITY_SCORE: 71
ADLS_ACUITY_SCORE: 77

## 2025-01-09 ENCOUNTER — VIRTUAL VISIT (OUTPATIENT)
Dept: PULMONOLOGY | Facility: CLINIC | Age: 55
End: 2025-01-09
Payer: MEDICARE

## 2025-01-09 DIAGNOSIS — R91.1 LUNG NODULE: Primary | ICD-10-CM

## 2025-01-09 ASSESSMENT — PAIN SCALES - GENERAL: PAINLEVEL_OUTOF10: NO PAIN (0)

## 2025-01-09 NOTE — PROGRESS NOTES
Virtual Visit Details    Type of service:  Video Visit   Unsuccessful connection  I have asked Manju Armando to reach out to her for an in-person visit.    THORACIC SURGERY - ESTABLISHED PATIENT OFFICE VISIT       Dear Dr. Rutherford,     I saw Leora Sanchez in follow-up for the evaluation and treatment of a subsolid nodule of the LEFT upper lobe.     HPI  Ms. Leora Sanchez is a 53 year old female patient who presents with a growing asymptomatic MONIQUE subsolid nodule..             ECOG performance status  1- Mild physical restriction, sedentary                 Previsit Tests   PFT (2023): FEV1 87% predicted, 2.13 L, DLCO 87%  CT scan (2024): LEFT upper lobe 1.6 cm pulmonary Nodule (minimal interval growth compared to 2023) with an unchanged <5 mm solid component, without mediastinal adenopathy, with no pleural effusion. The nodule has doubled in size since .          CT chest 3/2020      Cardiac stress MRI with contrast (2021): Normal, no inducible ischemia, no previous infarcts, EF 65% (per Dr. Roy's note from 3/1/2022)  HgbA1c 8.8 (2023) (generally between 6.5 and 8)  Covid vaccination status: Vaccinated     PMH  Reviewed, as below     Past Medical History        Past Medical History:   Diagnosis Date    Anxiety      Chronic cough 2018    Congestive heart failure (H)      Depression      Dyslipidemia, goal LDL below 70 10/31/2017    Essential hypertension      GERD (gastroesophageal reflux disease)      Heart failure with reduced ejection fraction (H) 10/30/2017    Hypertension      Nonischemic cardiomyopathy (H)       variable EF depending on the technique, date and reader; BNP normal in 2018    Nonocclusive coronary atherosclerosis of native coronary artery 10/31/2017    Pregnancy           Pyelonephritis 2018    Renal abscess      Spontaneous  2010    TM (tympanic membrane disorder)        Diabetes mellitus (metformin)  Morbid obesity  Fall risk (fell 3 times last year)       PS  Reviewed, as below     Past Surgical History         Past Surgical History:   Procedure Laterality Date    CV CORONARY ANGIOGRAM N/A 5/13/2019     Procedure: Coronary Angiogram;  Surgeon: Car Box MD;  Location: [facility];  Service: Cardiology    CV LEFT HEART CATHETERIZATION WITHOUT LEFT VENTRICULOGRAM Left 10/31/2017     Procedure: Left Heart Catheterization Without Left Ventriculogram;  Surgeon: Jonathan Duque MD;  Location: [facility];  Service:     CV LEFT HEART CATHETERIZATION WITHOUT LEFT VENTRICULOGRAM Left 5/13/2019     Procedure: Left Heart Catheterization Without Left Ventriculogram;  Surgeon: Car Box MD;  Location: [facility];  Service: Cardiology    DILATION AND CURETTAGE   2010    ENT SURGERY        INNER EAR SURGERY Right 1994    MASTOIDECTOMY Right 1996    [place] CATH PLACEMENT & NJX CORONARY ART ANGIO IMG S&I N/A 10/31/2017     Procedure: Coronary Angiogram;  Surgeon: Jonathan Duque MD;  Location: [facility] Lab;  Service: Cardiology         ROS  Chronic RIGHT lumbar pain of unclear etiology  She can walk for 7-8 minutes before getting short of breath     Allergies   No Known Allergies        Current Facility-Administered Medications       Current Outpatient Medications   Medication    acetaminophen (MAPAP) 500 MG tablet    allopurinol (ZYLOPRIM) 100 MG tablet    aspirin 81 MG EC tablet    atorvastatin (LIPITOR) 80 MG tablet    B Complex-Biotin-FA (B COMPLEX 100 TR) TBCR    blood glucose (CONTOUR NEXT TEST) test strip    blood glucose (NO BRAND SPECIFIED) lancets standard    furosemide (LASIX) 40 MG tablet    isosorbide mononitrate (IMDUR) 60 MG 24 hr tablet    losartan (COZAAR) 100 MG tablet    metFORMIN (GLUCOPHAGE XR) 500 MG 24 hr tablet    metoprolol succinate ER (TOPROL XL) 100 MG 24 hr tablet    nitroGLYcerin (NITROSTAT) 0.4 MG sublingual tablet    omeprazole (PRILOSEC) 40 MG DR capsule    potassium chloride ER  "(K-TAB/KLOR-CON) 10 MEQ CR tablet    semaglutide (OZEMPIC, 0.25 OR 0.5 MG/DOSE,) 2 MG/3ML pen    venlafaxine (EFFEXOR XR) 75 MG 24 hr capsule    vitamin D3 (CHOLECALCIFEROL) 125 MCG (5000 UT) tablet      No current facility-administered medications for this visit.            ETOH: No  TOBACCO: Never  OTHER DRUGS: No     Physical examination  /78 (BP Location: Right arm, Patient Position: Sitting, Cuff Size: Adult Regular)   Pulse 62   Ht 1.499 m (4' 11\")   Wt 95.3 kg (210 lb)   SpO2 98%   BMI 42.41 kg/m       Her exam is non-contributory     From a personal perspective, she is here with her , Juan. Mrs. Sanchez speaks Mexican quite well, but she hasn't used it in many years. Dog in [facility] is \"de\" or \"ou\". They have 8 children (4/4) and 8 grandchildren (10 mo to 13 y), the 9th grandchild is expected in December. They have a baby shower tomorrow. Mrs. Sanchez is traveling to [facility] in October for the first time since she left for [facility] in 1979.      IMPRESSION   53 year old female patient with LEFT upper lobe subsolid nodule.     Stage: Indeterminate pulmonary lesion, IF this were a cancer, clinical stage I     PLAN  I spent 45 min on the date of the encounter in chart review, patient visit, review of tests, documentation and/or discussion with other providers about the issues documented above. I reviewed the plan as follows:  I communicated with Hubert regarding DM management and Manuela regarding preop cardiac clearance.  PAC  Repeat chest CT.  She will discuss this with her kids tomorrow and we'll call her (I asked Manju Armando to call her).  I think it will be safe for her to travel in October and to have surgery afterwards.     I appreciate the opportunity to participate in the care of your patient and will keep you updated.  Sincerely,     Philip Anderson MD      "

## 2025-01-09 NOTE — NURSING NOTE
Current patient location: [address] [place] [address]    Is the patient currently in the state of [place]? YES    Visit mode:VIDEO    If the visit is dropped, the patient can be reconnected by: VIDEO VISIT: Text to cell phone:   Telephone Information:   Mobile [phone]       Will anyone else be joining the visit? NO  (If patient encounters technical issues they should call [phone]    How would you like to obtain your AVS? MyChart    Are changes needed to the allergy or medication list? Pt stated no changes to allergies and Pt stated no med changes    Are refills needed on medications prescribed by this physician? NO    Rooming Documentation:  Questionnaire(s) completed    Reason for visit: RECHECK     Natalie SULLIVAN

## 2025-01-23 ENCOUNTER — ONCOLOGY VISIT (OUTPATIENT)
Dept: PULMONOLOGY | Facility: CLINIC | Age: 55
End: 2025-01-23
Payer: MEDICARE

## 2025-01-23 VITALS
DIASTOLIC BLOOD PRESSURE: 90 MMHG | HEART RATE: 80 BPM | OXYGEN SATURATION: 98 % | SYSTOLIC BLOOD PRESSURE: 170 MMHG | BODY MASS INDEX: 40.35 KG/M2 | WEIGHT: 203.2 LBS

## 2025-01-23 DIAGNOSIS — R91.1 LUNG NODULE: Primary | ICD-10-CM

## 2025-01-23 PROCEDURE — 99214 OFFICE O/P EST MOD 30 MIN: CPT | Performed by: THORACIC SURGERY (CARDIOTHORACIC VASCULAR SURGERY)

## 2025-01-23 NOTE — LETTER
2025      RE: Leora Sanchez  [address] [place] [address]       THORACIC SURGERY - ESTABLISHED PATIENT OFFICE VISIT       Dear Dr. Rutherford,     I saw Leora Sanchez in follow-up for the evaluation and treatment of a subsolid nodule of the LEFT upper lobe.     HPI  Ms. Leora Sanchez is a 53 year old female patient who presents with a growing asymptomatic MONIQUE subsolid nodule..             ECOG performance status  1- Mild physical restriction, sedentary                 Previsit Tests   CT 2025: Minimal increase in size. Unchanged, ill-defined solid component. S 1-2. Chronic non-healed LEFT humeral fracture.    PFT (2023): FEV1 87% predicted, 2.13 L, DLCO 87%  CT scan (2024): LEFT upper lobe 1.6 cm pulmonary Nodule (minimal interval growth compared to 2023) with an unchanged <5 mm solid component, without mediastinal adenopathy, with no pleural effusion. The nodule has doubled in size since .           CT chest 3/2020       Cardiac stress MRI with contrast (2021): Normal, no inducible ischemia, no previous infarcts, EF 65% (per Dr. Roy's note from 3/1/2022)  HgbA1c 8.8 (2023) (generally between 6.5 and 8)  Covid vaccination status: Vaccinated     PMH  Reviewed, as below     Past Medical History           Past Medical History:   Diagnosis Date     Anxiety       Chronic cough 2018     Congestive heart failure (H)       Depression       Dyslipidemia, goal LDL below 70 10/31/2017     Essential hypertension       GERD (gastroesophageal reflux disease)       Heart failure with reduced ejection fraction (H) 10/30/2017     Hypertension       Nonischemic cardiomyopathy (H)       variable EF depending on the technique, date and reader; BNP normal in 2018     Nonocclusive coronary atherosclerosis of native coronary artery 10/31/2017     Pregnancy            Pyelonephritis 2018     Renal abscess       Spontaneous  2010     TM (tympanic membrane disorder)        Diabetes  mellitus (metformin)  Morbid obesity  Fall risk (fell 3 times last year)      PSH  Reviewed, as below     Past Surgical History             Past Surgical History:   Procedure Laterality Date     CV CORONARY ANGIOGRAM N/A 5/13/2019     Procedure: Coronary Angiogram;  Surgeon: Car Box MD;  Location: [facility];  Service: Cardiology     CV LEFT HEART CATHETERIZATION WITHOUT LEFT VENTRICULOGRAM Left 10/31/2017     Procedure: Left Heart Catheterization Without Left Ventriculogram;  Surgeon: Jonathan Duque MD;  Location: [facility];  Service:      CV LEFT HEART CATHETERIZATION WITHOUT LEFT VENTRICULOGRAM Left 5/13/2019     Procedure: Left Heart Catheterization Without Left Ventriculogram;  Surgeon: Car Box MD;  Location: [facility];  Service: Cardiology     DILATION AND CURETTAGE   2010     ENT SURGERY         INNER EAR SURGERY Right 1994     MASTOIDECTOMY Right 1996     [place] CATH PLACEMENT & NJX CORONARY ART ANGIO IMG S&I N/A 10/31/2017     Procedure: Coronary Angiogram;  Surgeon: Jonathan Duque MD;  Location: [facility];  Service: Cardiology         ROS  Chronic RIGHT lumbar pain of unclear etiology  She can walk for 7-8 minutes before getting short of breath     Allergies   No Known Allergies         Current Facility-Administered Medications         Current Outpatient Medications   Medication     acetaminophen (MAPAP) 500 MG tablet     allopurinol (ZYLOPRIM) 100 MG tablet     aspirin 81 MG EC tablet     atorvastatin (LIPITOR) 80 MG tablet     B Complex-Biotin-FA (B COMPLEX 100 TR) TBCR     blood glucose (CONTOUR NEXT TEST) test strip     blood glucose (NO BRAND SPECIFIED) lancets standard     furosemide (LASIX) 40 MG tablet     isosorbide mononitrate (IMDUR) 60 MG 24 hr tablet     losartan (COZAAR) 100 MG tablet     metFORMIN (GLUCOPHAGE XR) 500 MG 24 hr tablet     metoprolol succinate ER (TOPROL XL) 100 MG 24 hr tablet     nitroGLYcerin  "(NITROSTAT) 0.4 MG sublingual tablet     omeprazole (PRILOSEC) 40 MG DR capsule     potassium chloride ER (K-TAB/KLOR-CON) 10 MEQ CR tablet     semaglutide (OZEMPIC, 0.25 OR 0.5 MG/DOSE,) 2 MG/3ML pen     venlafaxine (EFFEXOR XR) 75 MG 24 hr capsule     vitamin D3 (CHOLECALCIFEROL) 125 MCG (5000 UT) tablet      No current facility-administered medications for this visit.            ETOH: No  TOBACCO: Never  OTHER DRUGS: No     Physical examination  BP (!) 170/90 (BP Location: Right arm, Patient Position: Sitting, Cuff Size: Adult Large)   Pulse 80   Wt 92.2 kg (203 lb 3.2 oz)   SpO2 98%   BMI 40.35 kg/m         Her exam is non-contributory     From a personal perspective, she is here with her , Juan. Mrs. Sanchez speaks Yoruba quite well, but she hasn't used it in many years. Dog in [facility] is \"chuyita\". They have 8 children (4/4) and 8 grandchildren (10 mo to 13 y), the 9th grandchild is expected in December. They have a baby shower tomorrow. Mrs. Sanchez was planning to travel to [facility] in October for the first time since she left for [facility] in 1979, however, she had to cancel the trip.     IMPRESSION   53 year old female patient with LEFT upper lobe subsolid nodule.     Stage: Indeterminate pulmonary lesion, IF this were a cancer, clinical stage I     PLAN  I spent 25 min on the date of the encounter in chart review, patient visit, review of tests, documentation and/or discussion with other providers about the issues documented above. I reviewed the plan as follows:    She wishes to wait - she agreed with surgery if it keeps growing  Follow-up in 6 months  I communicated with Hubert regarding DM management   Repeat chest CT.       I appreciate the opportunity to participate in the care of your patient and will keep you updated.  Sincerely,     MD Philip Bhatia MD      "

## 2025-01-23 NOTE — PROGRESS NOTES
THORACIC SURGERY - ESTABLISHED PATIENT OFFICE VISIT       Dear Dr. Rutherford,     I saw Leora Sanchez in follow-up for the evaluation and treatment of a subsolid nodule of the LEFT upper lobe.     HPI  MsNacho Sanchez is a 53 year old female patient who presents with a growing asymptomatic MONIQUE subsolid nodule..             ECOG performance status  1- Mild physical restriction, sedentary                 Previsit Tests   CT 2025: Minimal increase in size. Unchanged, ill-defined solid component. S 1-2. Chronic non-healed LEFT humeral fracture.    PFT (2023): FEV1 87% predicted, 2.13 L, DLCO 87%  CT scan (2024): LEFT upper lobe 1.6 cm pulmonary Nodule (minimal interval growth compared to 2023) with an unchanged <5 mm solid component, without mediastinal adenopathy, with no pleural effusion. The nodule has doubled in size since .           CT chest 3/2020       Cardiac stress MRI with contrast (2021): Normal, no inducible ischemia, no previous infarcts, EF 65% (per Dr. Roy's note from 3/1/2022)  HgbA1c 8.8 (2023) (generally between 6.5 and 8)  Covid vaccination status: Vaccinated     PMH  Reviewed, as below     Past Medical History           Past Medical History:   Diagnosis Date    Anxiety      Chronic cough 2018    Congestive heart failure (H)      Depression      Dyslipidemia, goal LDL below 70 10/31/2017    Essential hypertension      GERD (gastroesophageal reflux disease)      Heart failure with reduced ejection fraction (H) 10/30/2017    Hypertension      Nonischemic cardiomyopathy (H)       variable EF depending on the technique, date and reader; BNP normal in 2018    Nonocclusive coronary atherosclerosis of native coronary artery 10/31/2017    Pregnancy           Pyelonephritis 2018    Renal abscess      Spontaneous  2010    TM (tympanic membrane disorder)        Diabetes mellitus (metformin)  Morbid obesity  Fall risk (fell 3 times last year)      PSH  Reviewed,  as below     Past Surgical History             Past Surgical History:   Procedure Laterality Date    CV CORONARY ANGIOGRAM N/A 5/13/2019     Procedure: Coronary Angiogram;  Surgeon: Car Box MD;  Location: [facility] Cath Lab;  Service: Cardiology    CV LEFT HEART CATHETERIZATION WITHOUT LEFT VENTRICULOGRAM Left 10/31/2017     Procedure: Left Heart Catheterization Without Left Ventriculogram;  Surgeon: Jonathan Duque MD;  Location: [facility] Cath Lab;  Service:     CV LEFT HEART CATHETERIZATION WITHOUT LEFT VENTRICULOGRAM Left 5/13/2019     Procedure: Left Heart Catheterization Without Left Ventriculogram;  Surgeon: Car Box MD;  Location: [facility] Cath Lab;  Service: Cardiology    DILATION AND CURETTAGE   2010    ENT SURGERY        INNER EAR SURGERY Right 1994    MASTOIDECTOMY Right 1996    [place] CATH PLACEMENT & NJX CORONARY ART ANGIO IMG S&I N/A 10/31/2017     Procedure: Coronary Angiogram;  Surgeon: Jonathan Duque MD;  Location: [facility] Cath Lab;  Service: Cardiology         ROS  Chronic RIGHT lumbar pain of unclear etiology  She can walk for 7-8 minutes before getting short of breath     Allergies   No Known Allergies         Current Facility-Administered Medications         Current Outpatient Medications   Medication    acetaminophen (MAPAP) 500 MG tablet    allopurinol (ZYLOPRIM) 100 MG tablet    aspirin 81 MG EC tablet    atorvastatin (LIPITOR) 80 MG tablet    B Complex-Biotin-FA (B COMPLEX 100 TR) TBCR    blood glucose (CONTOUR NEXT TEST) test strip    blood glucose (NO BRAND SPECIFIED) lancets standard    furosemide (LASIX) 40 MG tablet    isosorbide mononitrate (IMDUR) 60 MG 24 hr tablet    losartan (COZAAR) 100 MG tablet    metFORMIN (GLUCOPHAGE XR) 500 MG 24 hr tablet    metoprolol succinate ER (TOPROL XL) 100 MG 24 hr tablet    nitroGLYcerin (NITROSTAT) 0.4 MG sublingual tablet    omeprazole (PRILOSEC) 40 MG DR capsule    potassium chloride ER (K-TAB/KLOR-CON)  "10 MEQ CR tablet    semaglutide (OZEMPIC, 0.25 OR 0.5 MG/DOSE,) 2 MG/3ML pen    venlafaxine (EFFEXOR XR) 75 MG 24 hr capsule    vitamin D3 (CHOLECALCIFEROL) 125 MCG (5000 UT) tablet      No current facility-administered medications for this visit.            ETOH: No  TOBACCO: Never  OTHER DRUGS: No     Physical examination  BP (!) 170/90 (BP Location: Right arm, Patient Position: Sitting, Cuff Size: Adult Large)   Pulse 80   Wt 92.2 kg (203 lb 3.2 oz)   SpO2 98%   BMI 40.35 kg/m         Her exam is non-contributory     From a personal perspective, she is here with her , Juan. Mrs. Sanchez speaks Irish quite well, but she hasn't used it in many years. Dog in [facility] is \"chuyita\". They have 8 children (4/4) and 8 grandchildren (10 mo to 13 y), the 9th grandchild is expected in December. They have a baby shower tomorrow. Mrs. Sanchez was planning to travel to [facility] in October for the first time since she left for [facility] in 1979, however, she had to cancel the trip.     IMPRESSION   53 year old female patient with LEFT upper lobe subsolid nodule.     Stage: Indeterminate pulmonary lesion, IF this were a cancer, clinical stage I     PLAN  I spent 25 min on the date of the encounter in chart review, patient visit, review of tests, documentation and/or discussion with other providers about the issues documented above. I reviewed the plan as follows:    She wishes to wait - she agreed with surgery if it keeps growing  Follow-up in 6 months  I communicated with Hubert regarding DM management   Repeat chest CT.       I appreciate the opportunity to participate in the care of your patient and will keep you updated.  Sincerely,     Philip Anderson MD   "

## 2025-01-27 ENCOUNTER — TRANSFERRED RECORDS (OUTPATIENT)
Dept: HEALTH INFORMATION MANAGEMENT | Facility: CLINIC | Age: 55
End: 2025-01-27
Payer: MEDICARE

## 2025-01-29 NOTE — PROGRESS NOTES
5/30/2025  3:40 PM OFFICE VISIT 20 min [facility]/Jaky Argueta MD   Location Instructions:    [facility] is located at [address] [place] [address] in [place], at the intersection of [facility]. This is one block south of the [facility]. Free parking is available in the lot directly north of the clinic across [facility]. The clinic is near stops along bus [address].

## 2025-01-31 ENCOUNTER — APPOINTMENT (OUTPATIENT)
Dept: INTERPRETER SERVICES | Facility: CLINIC | Age: 55
End: 2025-01-31
Payer: MEDICARE

## 2025-02-03 ENCOUNTER — APPOINTMENT (OUTPATIENT)
Dept: INTERPRETER SERVICES | Facility: CLINIC | Age: 55
End: 2025-02-03
Payer: MEDICARE

## 2025-02-06 DIAGNOSIS — K21.9 GASTROESOPHAGEAL REFLUX DISEASE WITHOUT ESOPHAGITIS: ICD-10-CM

## 2025-02-06 RX ORDER — OMEPRAZOLE 40 MG/1
CAPSULE, DELAYED RELEASE ORAL
Qty: 90 CAPSULE | Refills: 1 | Status: SHIPPED | OUTPATIENT
Start: 2025-02-06

## 2025-02-10 DIAGNOSIS — E11.22 TYPE 2 DIABETES MELLITUS WITH STAGE 3A CHRONIC KIDNEY DISEASE, WITHOUT LONG-TERM CURRENT USE OF INSULIN (H): ICD-10-CM

## 2025-02-10 DIAGNOSIS — N18.31 TYPE 2 DIABETES MELLITUS WITH STAGE 3A CHRONIC KIDNEY DISEASE, WITHOUT LONG-TERM CURRENT USE OF INSULIN (H): ICD-10-CM

## 2025-02-10 RX ORDER — TIRZEPATIDE 10 MG/.5ML
10 INJECTION, SOLUTION SUBCUTANEOUS
Qty: 2 ML | Refills: 5 | Status: SHIPPED | OUTPATIENT
Start: 2025-02-10

## 2025-02-10 NOTE — TELEPHONE ENCOUNTER
Lab Results   Component Value Date    A1C 8.9 09/06/2024    A1C 9.7 05/17/2024    A1C 6.9 11/29/2023    A1C 7.4 10/03/2023    A1C 8.8 06/23/2023    A1C 5.7 01/18/2011     With sugars running higher will increase her mounjaro to 10mg weekly.

## 2025-02-11 NOTE — TELEPHONE ENCOUNTER
RN called and relayed message below from Dr. Gaspar. Patient verbalized understanding and in agreement with plan.     Fermin RIVERA RN  [facility]

## 2025-02-17 ENCOUNTER — TELEPHONE (OUTPATIENT)
Dept: FAMILY MEDICINE | Facility: CLINIC | Age: 55
End: 2025-02-17
Payer: MEDICARE

## 2025-02-17 ENCOUNTER — APPOINTMENT (OUTPATIENT)
Dept: INTERPRETER SERVICES | Facility: CLINIC | Age: 55
End: 2025-02-17
Payer: MEDICARE

## 2025-02-17 NOTE — TELEPHONE ENCOUNTER
"----- Message from Art Thakkar sent at 2/17/2025 10:06 AM CST -----  Please inform patient of lab results:     1. Kidney function stable - showing some dehydration. Encourage pushing fluids.   2. LDL \"bad cholesterol\" above goal <55. As discussed on Friday, restart Ezetimibe 10 mg daily (in addition to current Atorvastatin).   3. Vitamin D levels are in the normal range. Continue current supplement to maintain these levels.   "

## 2025-02-17 NOTE — TELEPHONE ENCOUNTER
Clinician message relayed via phone. Patient verbalizes understanding, denies questions at time of call.    [facility] : Vishnu Chan RN  [facility]

## 2025-03-03 DIAGNOSIS — I50.20 HEART FAILURE WITH REDUCED EJECTION FRACTION (H): ICD-10-CM

## 2025-03-03 DIAGNOSIS — I10 ESSENTIAL HYPERTENSION: ICD-10-CM

## 2025-03-03 DIAGNOSIS — I42.8 NONISCHEMIC CARDIOMYOPATHY (H): ICD-10-CM

## 2025-03-03 RX ORDER — ATORVASTATIN CALCIUM 80 MG/1
TABLET, FILM COATED ORAL
Qty: 90 TABLET | Refills: 1 | Status: SHIPPED | OUTPATIENT
Start: 2025-03-03

## 2025-03-03 RX ORDER — METOPROLOL SUCCINATE 100 MG/1
TABLET, EXTENDED RELEASE ORAL
Qty: 90 TABLET | Refills: 0 | Status: SHIPPED | OUTPATIENT
Start: 2025-03-03

## 2025-03-05 ENCOUNTER — OFFICE VISIT (OUTPATIENT)
Dept: FAMILY MEDICINE | Facility: CLINIC | Age: 55
End: 2025-03-05
Payer: MEDICARE

## 2025-03-05 VITALS
HEART RATE: 110 BPM | OXYGEN SATURATION: 99 % | BODY MASS INDEX: 39.72 KG/M2 | SYSTOLIC BLOOD PRESSURE: 118 MMHG | WEIGHT: 200 LBS | RESPIRATION RATE: 21 BRPM | TEMPERATURE: 98.1 F | DIASTOLIC BLOOD PRESSURE: 76 MMHG

## 2025-03-05 DIAGNOSIS — M79.604 PAIN OF RIGHT LOWER EXTREMITY: ICD-10-CM

## 2025-03-05 DIAGNOSIS — M25.561 ACUTE PAIN OF RIGHT KNEE: Primary | ICD-10-CM

## 2025-03-05 PROCEDURE — T1013 SIGN LANG/ORAL INTERPRETER: HCPCS | Performed by: INTERPRETER

## 2025-03-05 RX ORDER — LIDOCAINE HYDROCHLORIDE 10 MG/ML
4 INJECTION, SOLUTION INFILTRATION; PERINEURAL ONCE
Status: COMPLETED | OUTPATIENT
Start: 2025-03-05 | End: 2025-03-05

## 2025-03-05 RX ORDER — NAPROXEN 500 MG/1
500 TABLET ORAL 2 TIMES DAILY WITH MEALS
Qty: 14 TABLET | Refills: 0 | Status: SHIPPED | OUTPATIENT
Start: 2025-03-05 | End: 2025-03-05

## 2025-03-05 RX ORDER — METHYLPREDNISOLONE ACETATE 80 MG/ML
80 INJECTION, SUSPENSION INTRA-ARTICULAR; INTRALESIONAL; INTRAMUSCULAR; SOFT TISSUE ONCE
Status: COMPLETED | OUTPATIENT
Start: 2025-03-05 | End: 2025-03-05

## 2025-03-05 RX ORDER — METHYLPREDNISOLONE ACETATE 80 MG/ML
80 INJECTION, SUSPENSION INTRA-ARTICULAR; INTRALESIONAL; INTRAMUSCULAR; SOFT TISSUE ONCE
Status: DISCONTINUED | OUTPATIENT
Start: 2025-03-05 | End: 2025-03-05

## 2025-03-05 RX ORDER — LIDOCAINE HYDROCHLORIDE 10 MG/ML
4 INJECTION, SOLUTION EPIDURAL; INFILTRATION; INTRACAUDAL; PERINEURAL ONCE
Status: DISCONTINUED | OUTPATIENT
Start: 2025-03-05 | End: 2025-03-05

## 2025-03-05 RX ADMIN — METHYLPREDNISOLONE ACETATE 80 MG: 80 INJECTION, SUSPENSION INTRA-ARTICULAR; INTRALESIONAL; INTRAMUSCULAR; SOFT TISSUE at 12:11

## 2025-03-05 RX ADMIN — LIDOCAINE HYDROCHLORIDE 4 ML: 10 INJECTION, SOLUTION INFILTRATION; PERINEURAL at 12:11

## 2025-03-05 ASSESSMENT — PATIENT HEALTH QUESTIONNAIRE - PHQ9
10. IF YOU CHECKED OFF ANY PROBLEMS, HOW DIFFICULT HAVE THESE PROBLEMS MADE IT FOR YOU TO DO YOUR WORK, TAKE CARE OF THINGS AT HOME, OR GET ALONG WITH OTHER PEOPLE: VERY DIFFICULT
SUM OF ALL RESPONSES TO PHQ QUESTIONS 1-9: 13
SUM OF ALL RESPONSES TO PHQ QUESTIONS 1-9: 13

## 2025-03-05 ASSESSMENT — ENCOUNTER SYMPTOMS: LEG PAIN: 1

## 2025-03-05 NOTE — PROGRESS NOTES
MSK: Large Joint Injection/Arthocentesis: R knee joint    Date/Time: 3/5/2025 12:17 PM    Performed by: Reed Lemon MD  Authorized by: Reed Lemon MD    Indications:  Pain  Needle Size:  22 G  Guidance: landmark guided    Approach:  Lateral  Location:  Knee      Aspirate amount (mL):  0  Aspirate:  Clear  Aspirate analysis: sent for lab analysis    Outcome:  Tolerated well, no immediate complications  Consent Given by:  Patient  Timeout: timeout called immediately prior to procedure    Prep: patient was prepped and draped in usual sterile fashion         Patient tolerated the procedure well  There were no complications.  After care instructions and precautions were discussed.

## 2025-03-05 NOTE — PROGRESS NOTES
"  Assessment & Plan     Acute pain of right knee  Pain of right lower extremity  With some effort, we did seem to localize the pain to eminating from the knee.  We discussed that this was not entirely clear.  Patient was hoping to get an injection into her knee to help the pain.  We discussed what would be expected from a corticosteroid injection into the knee, taking care to explain that this would treat pain in the knee joint, but if the cause was elsewhere, it may not be effective.    We discussed the risks, benefits, and alternatives to the injection.  A bit restrictions in options as it looks like her primary providers have wanted to avoid NSAIDs if possible.    After discussion of the options, the patient wished to proceed with a corticosteroid injection to the knee joint.    Please see the procedure note for details.    - lidocaine 1 % injection 4 mL  - methylPREDNISolone (DEPO-Medrol) injection 80 mg  - MSK: Large Joint Injection/Arthocentesis: R knee joint      BMI  Estimated body mass index is 39.72 kg/m  as calculated from the following:    Height as of 10/22/24: 1.511 m (4' 11.5\").    Weight as of this encounter: 90.7 kg (200 lb).     Shahab Corey is a 54 year old, presenting for the following health issues:  Leg Pain (States that both of her legs pain, right leg is more pain than left leg. X 3 week. Numbness pain.)        3/5/2025    11:09 AM   Additional Questions   Roomed by Daisha JUAREZ   Accompanied by      History of Present Illness       Reason for visit:  Leg pain  Symptom onset:  3-4 weeks ago  Symptoms include:  Leg pain, difficulty walking.  Symptom intensity:  Severe  Symptom progression:  Worsening  Had these symptoms before:  No  What makes it worse:  When walking and hanging feet.  What makes it better:  When sitting and sleep still   She is taking medications regularly.      54-year-old right sided lower extremity pain.  Lasting about 3 weeks.  Worse with walking.  Also painful with " sitting.  Getting to the point where she does not feel like she can stand and walk through a store.  Using 4 point walker to get throughout her home.  History of knee x-ray that showed mild degeneration.  Chronic pain on the left side 2.  Pain a bit hard to pin down.  Has pain that starts in the more distal thigh and radiates more proximally.  Denies swelling redness warmth.  Pain feels tingly.  Not taking any medication for it.    Does have history of diabetes and CKD 3.  Has avoided NSAIDs in the past due to that.  Also has prescription for lasix for hx of CAD and edema.  Does not take lasix daily.        Objective    /76 (BP Location: Right arm, Patient Position: Sitting, Cuff Size: Adult Large)   Pulse 110   Temp 98.1  F (36.7  C) (Tympanic)   Resp 21   Wt 90.7 kg (200 lb)   SpO2 99%   BMI 39.72 kg/m    Body mass index is 39.72 kg/m .  Physical Exam   GEN: alert, not distressed    Knee Right   No effusion  No erythema  No warmth  Tenderness to palpation at lateral joint line  Lachmans: negative   Anterior drawer: negative   Varus stress: non tender  Valgus stress: nontender  Rosanna:  negative   Patellar grind: negative      Knee range of motion is normal.  Hip range of motion is normal.  Straight leg raise negative.  Mild tenderness in lateral proximal thigh but does not seem very well localized in greater trochanteric area.        Signed Electronically by: Reed Lemon MD

## 2025-03-06 ENCOUNTER — TELEPHONE (OUTPATIENT)
Dept: FAMILY MEDICINE | Facility: CLINIC | Age: 55
End: 2025-03-06
Payer: MEDICARE

## 2025-03-06 NOTE — TELEPHONE ENCOUNTER
"\"[facility]         Previous Messages       ----- Message -----  From: Reed Lemon MD  Sent: 3/5/2025  12:17 PM CST  To: [facility] [place]    Please call [facility] to cancel naproxen\"      Writer called [facility] and cancelled Naproxen order from Dr. Lemon.    TATIANA PeckN, RN-[facility]        "

## 2025-03-18 ENCOUNTER — APPOINTMENT (OUTPATIENT)
Dept: CT IMAGING | Facility: HOSPITAL | Age: 55
End: 2025-03-18
Attending: EMERGENCY MEDICINE
Payer: MEDICARE

## 2025-03-18 ENCOUNTER — HOSPITAL ENCOUNTER (EMERGENCY)
Facility: HOSPITAL | Age: 55
Discharge: HOME | End: 2025-03-19
Attending: EMERGENCY MEDICINE | Admitting: EMERGENCY MEDICINE
Payer: MEDICARE

## 2025-03-18 DIAGNOSIS — R07.89 ATYPICAL CHEST PAIN: ICD-10-CM

## 2025-03-18 DIAGNOSIS — N17.9 AKI (ACUTE KIDNEY INJURY): ICD-10-CM

## 2025-03-18 DIAGNOSIS — R19.7 DIARRHEA, UNSPECIFIED TYPE: ICD-10-CM

## 2025-03-18 DIAGNOSIS — D64.9 ANEMIA, UNSPECIFIED TYPE: ICD-10-CM

## 2025-03-18 LAB
ALBUMIN SERPL BCG-MCNC: 3.2 G/DL (ref 3.5–5.2)
ALP SERPL-CCNC: 95 U/L (ref 40–150)
ALT SERPL W P-5'-P-CCNC: 160 U/L (ref 0–50)
ANION GAP SERPL CALCULATED.3IONS-SCNC: 12 MMOL/L (ref 7–15)
AST SERPL W P-5'-P-CCNC: 250 U/L (ref 0–45)
BASOPHILS # BLD AUTO: 0 10E3/UL (ref 0–0.2)
BASOPHILS NFR BLD AUTO: 0 %
BILIRUB DIRECT SERPL-MCNC: 0.1 MG/DL (ref 0–0.3)
BILIRUB SERPL-MCNC: 0.2 MG/DL
BUN SERPL-MCNC: 55 MG/DL (ref 6–20)
CALCIUM SERPL-MCNC: 7.6 MG/DL (ref 8.8–10.4)
CHLORIDE SERPL-SCNC: 112 MMOL/L (ref 98–107)
CREAT SERPL-MCNC: 1.33 MG/DL (ref 0.51–0.95)
EGFRCR SERPLBLD CKD-EPI 2021: 47 ML/MIN/1.73M2
EOSINOPHIL # BLD AUTO: 0.1 10E3/UL (ref 0–0.7)
EOSINOPHIL NFR BLD AUTO: 1 %
ERYTHROCYTE [DISTWIDTH] IN BLOOD BY AUTOMATED COUNT: 15.8 % (ref 10–15)
GLUCOSE SERPL-MCNC: 234 MG/DL (ref 70–99)
HCO3 SERPL-SCNC: 18 MMOL/L (ref 22–29)
HCT VFR BLD AUTO: 26 % (ref 35–47)
HGB BLD-MCNC: 8.1 G/DL (ref 11.7–15.7)
HOLD SPECIMEN: NORMAL
IMM GRANULOCYTES # BLD: 0.2 10E3/UL
IMM GRANULOCYTES NFR BLD: 3 %
LIPASE SERPL-CCNC: 109 U/L (ref 13–60)
LYMPHOCYTES # BLD AUTO: 0.8 10E3/UL (ref 0.8–5.3)
LYMPHOCYTES NFR BLD AUTO: 10 %
MCH RBC QN AUTO: 32 PG (ref 26.5–33)
MCHC RBC AUTO-ENTMCNC: 31.2 G/DL (ref 31.5–36.5)
MCV RBC AUTO: 103 FL (ref 78–100)
MONOCYTES # BLD AUTO: 0.4 10E3/UL (ref 0–1.3)
MONOCYTES NFR BLD AUTO: 5 %
NEUTROPHILS # BLD AUTO: 6.1 10E3/UL (ref 1.6–8.3)
NEUTROPHILS NFR BLD AUTO: 81 %
NRBC # BLD AUTO: 0.4 10E3/UL
NRBC BLD AUTO-RTO: 5 /100
NT-PROBNP SERPL-MCNC: 87 PG/ML (ref 0–900)
PLATELET # BLD AUTO: 219 10E3/UL (ref 150–450)
POTASSIUM SERPL-SCNC: 4.8 MMOL/L (ref 3.4–5.3)
PROT SERPL-MCNC: 5.4 G/DL (ref 6.4–8.3)
RBC # BLD AUTO: 2.53 10E6/UL (ref 3.8–5.2)
SODIUM SERPL-SCNC: 142 MMOL/L (ref 135–145)
TROPONIN T SERPL HS-MCNC: 29 NG/L
WBC # BLD AUTO: 7.6 10E3/UL (ref 4–11)

## 2025-03-18 PROCEDURE — 96361 HYDRATE IV INFUSION ADD-ON: CPT

## 2025-03-18 PROCEDURE — 83690 ASSAY OF LIPASE: CPT | Performed by: EMERGENCY MEDICINE

## 2025-03-18 PROCEDURE — 96374 THER/PROPH/DIAG INJ IV PUSH: CPT | Mod: 59

## 2025-03-18 PROCEDURE — 250N000013 HC RX MED GY IP 250 OP 250 PS 637: Performed by: EMERGENCY MEDICINE

## 2025-03-18 PROCEDURE — 85025 COMPLETE CBC W/AUTO DIFF WBC: CPT | Performed by: EMERGENCY MEDICINE

## 2025-03-18 PROCEDURE — 71275 CT ANGIOGRAPHY CHEST: CPT

## 2025-03-18 PROCEDURE — 82565 ASSAY OF CREATININE: CPT | Performed by: EMERGENCY MEDICINE

## 2025-03-18 PROCEDURE — 84484 ASSAY OF TROPONIN QUANT: CPT | Performed by: EMERGENCY MEDICINE

## 2025-03-18 PROCEDURE — 250N000011 HC RX IP 250 OP 636: Performed by: EMERGENCY MEDICINE

## 2025-03-18 PROCEDURE — 258N000003 HC RX IP 258 OP 636: Performed by: EMERGENCY MEDICINE

## 2025-03-18 PROCEDURE — 83880 ASSAY OF NATRIURETIC PEPTIDE: CPT | Performed by: EMERGENCY MEDICINE

## 2025-03-18 PROCEDURE — 80048 BASIC METABOLIC PNL TOTAL CA: CPT | Performed by: EMERGENCY MEDICINE

## 2025-03-18 PROCEDURE — 74177 CT ABD & PELVIS W/CONTRAST: CPT

## 2025-03-18 PROCEDURE — 82040 ASSAY OF SERUM ALBUMIN: CPT | Performed by: EMERGENCY MEDICINE

## 2025-03-18 PROCEDURE — 99285 EMERGENCY DEPT VISIT HI MDM: CPT | Mod: 25

## 2025-03-18 PROCEDURE — 36415 COLL VENOUS BLD VENIPUNCTURE: CPT | Performed by: EMERGENCY MEDICINE

## 2025-03-18 PROCEDURE — 82248 BILIRUBIN DIRECT: CPT | Performed by: EMERGENCY MEDICINE

## 2025-03-18 RX ORDER — ASPIRIN 81 MG/1
324 TABLET, CHEWABLE ORAL ONCE
Status: COMPLETED | OUTPATIENT
Start: 2025-03-18 | End: 2025-03-18

## 2025-03-18 RX ORDER — IOPAMIDOL 755 MG/ML
75 INJECTION, SOLUTION INTRAVASCULAR ONCE
Status: COMPLETED | OUTPATIENT
Start: 2025-03-19 | End: 2025-03-19

## 2025-03-18 RX ADMIN — ASPIRIN 81 MG CHEWABLE TABLET 324 MG: 81 TABLET CHEWABLE at 23:09

## 2025-03-18 RX ADMIN — SODIUM CHLORIDE 1000 ML: 9 INJECTION, SOLUTION INTRAVENOUS at 23:08

## 2025-03-18 RX ADMIN — FAMOTIDINE 20 MG: 10 INJECTION, SOLUTION INTRAVENOUS at 23:08

## 2025-03-18 ASSESSMENT — ACTIVITIES OF DAILY LIVING (ADL): ADLS_ACUITY_SCORE: 56

## 2025-03-19 VITALS
RESPIRATION RATE: 27 BRPM | TEMPERATURE: 98 F | OXYGEN SATURATION: 98 % | HEIGHT: 59 IN | SYSTOLIC BLOOD PRESSURE: 100 MMHG | HEART RATE: 96 BPM | DIASTOLIC BLOOD PRESSURE: 59 MMHG | BODY MASS INDEX: 40.32 KG/M2 | WEIGHT: 200 LBS

## 2025-03-19 LAB
ADV 40+41 DNA STL QL NAA+NON-PROBE: NEGATIVE
ASTRO TYP 1-8 RNA STL QL NAA+NON-PROBE: NEGATIVE
C CAYETANENSIS DNA STL QL NAA+NON-PROBE: NEGATIVE
C DIFF TOX B STL QL: NEGATIVE
CAMPYLOBACTER DNA SPEC NAA+PROBE: NEGATIVE
CRYPTOSP DNA STL QL NAA+NON-PROBE: NEGATIVE
E COLI O157 DNA STL QL NAA+NON-PROBE: ABNORMAL
E HISTOLYT DNA STL QL NAA+NON-PROBE: NEGATIVE
EAEC ASTA GENE ISLT QL NAA+PROBE: NEGATIVE
EC STX1+STX2 GENES STL QL NAA+NON-PROBE: NEGATIVE
EPEC EAE GENE STL QL NAA+NON-PROBE: NEGATIVE
ETEC LTA+ST1A+ST1B TOX ST NAA+NON-PROBE: NEGATIVE
G LAMBLIA DNA STL QL NAA+NON-PROBE: NEGATIVE
HOLD SPECIMEN: NORMAL
NOROVIRUS GI+II RNA STL QL NAA+NON-PROBE: POSITIVE
P SHIGELLOIDES DNA STL QL NAA+NON-PROBE: NEGATIVE
RVA RNA STL QL NAA+NON-PROBE: NEGATIVE
SALMONELLA SP RPOD STL QL NAA+PROBE: NEGATIVE
SAPO I+II+IV+V RNA STL QL NAA+NON-PROBE: NEGATIVE
SHIGELLA SP+EIEC IPAH ST NAA+NON-PROBE: NEGATIVE
TROPONIN T SERPL HS-MCNC: 24 NG/L
V CHOLERAE DNA SPEC QL NAA+PROBE: NEGATIVE
VIBRIO DNA SPEC NAA+PROBE: NEGATIVE
Y ENTEROCOL DNA STL QL NAA+PROBE: NEGATIVE

## 2025-03-19 PROCEDURE — 84484 ASSAY OF TROPONIN QUANT: CPT | Performed by: EMERGENCY MEDICINE

## 2025-03-19 PROCEDURE — 36415 COLL VENOUS BLD VENIPUNCTURE: CPT | Performed by: EMERGENCY MEDICINE

## 2025-03-19 PROCEDURE — 96361 HYDRATE IV INFUSION ADD-ON: CPT

## 2025-03-19 PROCEDURE — 258N000003 HC RX IP 258 OP 636: Performed by: EMERGENCY MEDICINE

## 2025-03-19 PROCEDURE — 87493 C DIFF AMPLIFIED PROBE: CPT | Performed by: EMERGENCY MEDICINE

## 2025-03-19 PROCEDURE — 87507 IADNA-DNA/RNA PROBE TQ 12-25: CPT | Performed by: EMERGENCY MEDICINE

## 2025-03-19 PROCEDURE — 250N000011 HC RX IP 250 OP 636: Performed by: EMERGENCY MEDICINE

## 2025-03-19 RX ADMIN — SODIUM CHLORIDE 1000 ML: 9 INJECTION, SOLUTION INTRAVENOUS at 02:26

## 2025-03-19 RX ADMIN — IOPAMIDOL 75 ML: 755 INJECTION, SOLUTION INTRAVENOUS at 00:06

## 2025-03-19 ASSESSMENT — ACTIVITIES OF DAILY LIVING (ADL)
ADLS_ACUITY_SCORE: 56

## 2025-03-19 NOTE — DISCHARGE INSTRUCTIONS
Your hemoglobin is 8.1. Please follow-up with your regular doctor for re-check of this.     Your liver function tests are elevated, , , Lipase 109. Please follow-up with your regular doctor for re-check of this.

## 2025-03-19 NOTE — ED PROVIDER NOTES
EMERGENCY DEPARTMENT ENCOUNTER      NAME: Leora Sanchez  AGE: 54 year old female  YOB: 1970  MRN: [id number]  EVALUATION DATE & TIME: 3/18/2025 10:34 PM    PCP: Jaky Gaspar    ED PROVIDER: Nati Shabazz MD      Chief Complaint   Patient presents with    Chest Pain    Diarrhea    Dizziness    Eye Problem         FINAL IMPRESSION:  1. BETITO (acute kidney injury)    2. Diarrhea, unspecified type    3. Anemia, unspecified type    4. Atypical chest pain          ED COURSE & MEDICAL DECISION MAKING:    10:42 AM I met with the patient to obtain patient history and performed a physical exam with use of language line [employer] . Discussed plan for ED work up including potential diagnostic studies and interventions.   2:50 AM Reassessed and updated patient with findings with use of language line [employer] . Notified patient about incidental image findings of a pulmonary nodule that needs follow up. She was aware of the nodule and notes that it was stable.   3:50 AM Per nurse, patient is able to walk.   3:50 AM Reevaluated and updated the patient with findings with use of language line [employer] . We discussed the plan for discharge and the patient is agreeable. Reviewed supportive cares, symptomatic treatment, outpatient follow up, and reasons to return to the Emergency Department. Patient to be discharged by ED RN.      Pertinent Labs & Imaging studies reviewed. (See chart for details)  54 year old female presents to the Emergency Department for evaluation of chest pain, feeling lightheaded, short of breath.  She reports she is having significant watery diarrhea over the last 24 to 48 hours.  She has had decreased oral intake.  The patient.  Patient's blood pressure is low.  She denies any melena or hematochezia.  She is a diabetic and her sugars have been running 100s to 200s.  She states she has a history of chest pain which previously resolved, but it has not returned.  She also has  an epigastric burning sensation.  She denies any constitutional symptoms.  Symptoms likely secondary to hypovolemia from diarrhea, nausea, decreased oral intake.  Will give IV fluids.  Given the chest pain, will also obtain cardiac workup.  Patient's hemoglobin is 8.1 which is a sudden drop.  Waiting for CT of the chest, CT abdomen pelvis.     CT of the chest, CT abdomen pelvis with no significant findings.  Right upper quadrant ultrasound also unremarkable.  Patient with slight elevation in LFTs.  This was discussed with the patient.  Hemoglobin of 8.1.  Would recommend follow-up with her hemoglobin.  Patient reports that she does have an appointment scheduled for this Friday at 3 PM.  I also discussed incidental finding of a pulmonary nodule on CT chest.  She is aware of this and states that this is being watched by her primary care provider and has been stable.    Patient's chest pain has completely resolved.  After 2 L of IV fluids, she felt markedly improved, she no longer felt lightheaded, no longer felt she had blurry vision.  She was able to ambulate without difficulty.  I suspect the patient's symptoms are secondary to nausea, diarrhea, being dehydrated which is improved with IV hydration.  I did put in a follow-up with rapid access cardiology clinic for low risk chest pain.  Dehydration did cause a mild BETITO, treated with IV fluids.    At the conclusion of the encounter I discussed the results of all of the tests and the disposition. The questions were answered. The patient or family acknowledged understanding and was agreeable with the care plan.     Medical Decision Making  Obtained supplemental history:Supplemental history obtained?: Family Member/Significant Other  Reviewed external records: External records reviewed?: No  Care impacted by chronic illness:Chronic Kidney Disease, Diabetes, Heart Disease, Hyperlipidemia, and Hypertension  Did you consider but not order tests?: Work up considered but not  "performed and documented in chart, if applicable  Did you interpret images independently?: Independent interpretation of ECG and images noted in documentation, when applicable.  Consultation discussion with other provider:Did you involve another provider (consultant, , pharmacy, etc.)?: No  Discharge. No recommendations on prescription strength medication(s). See documentation for any additional details.    MIPS: CT Pulmonary Angiogram:Patient is moderate to high risk for PE.        MEDICATIONS GIVEN IN THE EMERGENCY:  Medications   aspirin (ASA) chewable tablet 324 mg (324 mg Oral $Given 3/18/25 2309)   sodium chloride 0.9% BOLUS 1,000 mL (0 mLs Intravenous Stopped 3/19/25 0120)   famotidine (PEPCID) injection 20 mg (20 mg Intravenous $Given 3/18/25 2308)   iopamidol (ISOVUE-370) solution 75 mL (75 mLs Intravenous $Given 3/19/25 0006)   sodium chloride 0.9% BOLUS 1,000 mL (0 mLs Intravenous Stopped 3/19/25 0350)       NEW PRESCRIPTIONS STARTED AT TODAY'S ER VISIT  New Prescriptions    No medications on file          =================================================================    HPI    Patient information was obtained from: patient and family members    Use of : yes (phone) Language: Kandy Sanchez is a 54 year old female with a pertinent history of hypertension, heart failure, type 2 diabetes, coronary artery disease, hyperlipidemia, chronic kidney disease, gout who presents to this ED by walk-in for evaluation of chest pain, diarrhea, and dizziness.     Patient presents to the ED for concerns of lightheadedness, dizziness, and chest pain that began yesterday (3/17/25). She states that she feels like passing out. Her chest pain is central and \"sharp\". She also endorses shortness of breath. She states that drinking hot was decreases her pain temporarily. Taking deep breaths increases her chest pain. She also endorses diarrhea, nausea, and burning abdominal pain that began yesterday. She " has had 10 episodes of watery diarrhea. She denies recent antibiotic use and travel. She states that her appetite is reduced but she is drinking water. Last week her blood sugar was in the 100-200 range. She did not check today.     She also endorses 1 month of right hip and knee pain that make it hard to walk. She has been following with her primary care provider for the right leg pain.     She states that she has always had chest pain and her PCP prescribed her medications that she has been taking but the chest pain has currently returned. She denies history of heart attack.     Patient denies any hematochezia, fever, chills, sweats, and sick contact. Patient states no other complaints or concerns at this time.       PAST MEDICAL HISTORY:  Past Medical History:   Diagnosis Date    Anxiety     Chronic cough 2018    Chronic kidney disease     Congestive heart failure (H)     Depression     Dyslipidemia, goal LDL below 70 10/31/2017    Essential hypertension     GERD (gastroesophageal reflux disease)     Heart failure with reduced ejection fraction (H) 10/30/2017    Hypertension     Hypokalemia 2021    Nonischemic cardiomyopathy (H)     variable EF depending on the technique, date and reader; BNP normal in     Nonocclusive coronary atherosclerosis of native coronary artery 10/31/2017    Obese     Perforation of right tympanic membrane 2013    S/p repair in  by Dr. Thomas at [facility].      Pregnancy         Pyelonephritis 2018    Renal abscess     Spontaneous  2010    TM (tympanic membrane disorder)        PAST SURGICAL HISTORY:  Past Surgical History:   Procedure Laterality Date    CV CORONARY ANGIOGRAM N/A 2019    Procedure: Coronary Angiogram;  Surgeon: Car Box MD;  Location: [facility] Cath Lab;  Service: Cardiology    CV LEFT HEART CATHETERIZATION WITHOUT LEFT VENTRICULOGRAM Left 10/31/2017    Procedure: Left Heart Catheterization Without  Left Ventriculogram;  Surgeon: Jonathan Duque MD;  Location: [facility] Cath Lab;  Service:     CV LEFT HEART CATHETERIZATION WITHOUT LEFT VENTRICULOGRAM Left 2019    Procedure: Left Heart Catheterization Without Left Ventriculogram;  Surgeon: Car Box MD;  Location: [facility] Cath Lab;  Service: Cardiology    DILATION AND CURETTAGE      ENT SURGERY      INNER EAR SURGERY Right 1994    MASTOIDECTOMY Right 1996    NC CATH PLACEMENT & NJX CORONARY ART ANGIO IMG S&I N/A 10/31/2017    Procedure: Coronary Angiogram;  Surgeon: Jonathan Duque MD;  Location: [facility] Cath Lab;  Service: Cardiology           CURRENT MEDICATIONS:    acetaminophen (MAPAP) 500 MG tablet  aspirin 81 MG EC tablet  atorvastatin (LIPITOR) 80 MG tablet  B Complex-Biotin-FA (B COMPLEX 100 TR) TBCR  blood glucose (CONTOUR NEXT TEST) test strip  blood glucose (NO BRAND SPECIFIED) lancets standard  ezetimibe (ZETIA) 10 MG tablet  furosemide (LASIX) 20 MG tablet  isosorbide mononitrate (IMDUR) 60 MG 24 hr tablet  losartan (COZAAR) 25 MG tablet  metFORMIN (GLUCOPHAGE XR) 500 MG 24 hr tablet  metoprolol succinate ER (TOPROL XL) 100 MG 24 hr tablet  MOUNJARO 10 MG/0.5ML SOAJ  nitroGLYcerin (NITROSTAT) 0.4 MG sublingual tablet  omeprazole (PRILOSEC) 40 MG DR capsule  venlafaxine (EFFEXOR XR) 150 MG 24 hr capsule  vitamin D3 (CHOLECALCIFEROL) 125 MCG (5000 UT) tablet        ALLERGIES:  No Known Allergies    FAMILY HISTORY:  Family History   Problem Relation Age of Onset    Diabetes Mother     Hypertension Mother     Uterine Cancer Mother     Diverticulitis Mother     Other - See Comments Father          in [place]    No Known Problems Sister     No Known Problems Daughter     No Known Problems Daughter     No Known Problems Daughter     No Known Problems Daughter     No Known Problems Son     No Known Problems Son     No Known Problems Son     No Known Problems Son        SOCIAL HISTORY:   Social History      Socioeconomic History    Marital status:      Spouse name: Juan    Number of children: 8   Occupational History    Occupation: SSD[employer]   Tobacco Use    Smoking status: Never     Passive exposure: Never    Smokeless tobacco: Never    Tobacco comments:     no passive exposure   Vaping Use    Vaping status: Never Used   Substance and Sexual Activity    Alcohol use: No    Drug use: No    Sexual activity: Yes     Partners: Male     Birth control/protection: Post-menopausal   Social History Narrative    Lives with  Juan who can read and speak English and helps her with meds, son and daughter-in-law      Social Drivers of Health     Financial Resource Strain: Low Risk  (9/24/2024)    Financial Resource Strain     Within the past 12 months, have you or your family members you live with been unable to get utilities (heat, electricity) when it was really needed?: No   Food Insecurity: Low Risk  (9/24/2024)    Food Insecurity     Within the past 12 months, did you worry that your food would run out before you got money to buy more?: No     Within the past 12 months, did the food you bought just not last and you didn t have money to get more?: No   Transportation Needs: Low Risk  (9/24/2024)    Transportation Needs     Within the past 12 months, has lack of transportation kept you from medical appointments, getting your medicines, non-medical meetings or appointments, work, or from getting things that you need?: No   Physical Activity: Insufficiently Active (9/6/2024)    Exercise Vital Sign     Days of Exercise per Week: 5 days     Minutes of Exercise per Session: 10 min   Stress: No Stress Concern Present (9/6/2024)    [employer] - Occupational Stress Questionnaire     Feeling of Stress : Only a little   Social Connections: Unknown (9/6/2024)    Social Connection and Isolation Panel [NHANES]     Frequency of Social Gatherings with Friends and Family: More than three times a  "week   Interpersonal Safety: Low Risk  (9/6/2024)    Interpersonal Safety     Do you feel physically and emotionally safe where you currently live?: Yes     Within the past 12 months, have you been hit, slapped, kicked or otherwise physically hurt by someone?: No     Within the past 12 months, have you been humiliated or emotionally abused in other ways by your partner or ex-partner?: No   Housing Stability: High Risk (9/24/2024)    Housing Stability     Do you have housing? : No     Are you worried about losing your housing?: No       VITALS:  /64   Pulse 93   Temp 98  F (36.7  C) (Oral)   Resp 19   Ht 1.499 m (4' 11\")   Wt 90.7 kg (200 lb)   SpO2 98%   BMI 40.40 kg/m      PHYSICAL EXAM    Constitutional: Well developed, Well nourished, NAD  HENT: Normocephalic, Atraumatic, Bilateral external ears normal, Oropharynx normal, mucous membranes moist, Nose normal.   Neck- Normal range of motion, No tenderness, Supple, No stridor.  Eyes: PERRL, EOMI, Conjunctiva normal, No discharge.   Respiratory: Normal breath sounds, No respiratory distress  Cardiovascular: Normal heart rate, Regular rhythm  GI: Bowel sounds normal, Soft.  Diffuse abdominal tenderness.    Musculoskeletal: No edema. Good range of motion in all major joints. No tenderness to palpation or major deformities noted.   Integument: Warm, Dry, No erythema, No rash  Neurologic: Alert & oriented x 3, Normal motor function, Normal sensory function, No focal deficits noted. Normal gait.   Psychiatric: Affect normal, Judgment normal, Mood normal.      LAB:  All pertinent labs reviewed and interpreted.  Results for orders placed or performed during the hospital encounter of 03/18/25   CT Chest Pulmonary Embolism w Contrast    Impression    IMPRESSION:   1.  No acute abnormality identified in the chest, abdomen, or pelvis.  2.  No visualized pulmonary embolus.  3.  A few colonic diverticula are present, without evidence of diverticulitis.  4.  2 cm " pulmonary nodule in the left upper lobe that is mostly groundglass attenuation, unchanged. This is nonspecific, but a primary pulmonary neoplasm is possible.   CT Abdomen Pelvis w Contrast    Impression    IMPRESSION:   1.  No acute abnormality identified in the chest, abdomen, or pelvis.  2.  No visualized pulmonary embolus.  3.  A few colonic diverticula are present, without evidence of diverticulitis.  4.  2 cm pulmonary nodule in the left upper lobe that is mostly groundglass attenuation, unchanged. This is nonspecific, but a primary pulmonary neoplasm is possible.   US Abdomen Limited    Impression    IMPRESSION:   1. Unremarkable appearance of the gallbladder.  2. No biliary dilatation.  3. Mild to moderate diffuse fatty infiltration of the liver.       Extra Blue Top Tube   Result Value Ref Range    Hold Specimen JIC    Extra Red Top Tube   Result Value Ref Range    Hold Specimen JIC    Extra Green Top (Lithium Heparin) Tube   Result Value Ref Range    Hold Specimen JIC    Extra Purple Top Tube   Result Value Ref Range    Hold Specimen JIC    Extra Green Top (Lithium Heparin) ON ICE   Result Value Ref Range    Hold Specimen JIC    Basic metabolic panel   Result Value Ref Range    Sodium 142 135 - 145 mmol/L    Potassium 4.8 3.4 - 5.3 mmol/L    Chloride 112 (H) 98 - 107 mmol/L    Carbon Dioxide (CO2) 18 (L) 22 - 29 mmol/L    Anion Gap 12 7 - 15 mmol/L    Urea Nitrogen 55.0 (H) 6.0 - 20.0 mg/dL    Creatinine 1.33 (H) 0.51 - 0.95 mg/dL    GFR Estimate 47 (L) >60 mL/min/1.73m2    Calcium 7.6 (L) 8.8 - 10.4 mg/dL    Glucose 234 (H) 70 - 99 mg/dL   Result Value Ref Range    Troponin T, High Sensitivity 29 (H) <=14 ng/L   Hepatic panel   Result Value Ref Range    Protein Total 5.4 (L) 6.4 - 8.3 g/dL    Albumin 3.2 (L) 3.5 - 5.2 g/dL    Bilirubin Total 0.2 <=1.2 mg/dL    Alkaline Phosphatase 95 40 - 150 U/L     (H) 0 - 45 U/L     (H) 0 - 50 U/L    Bilirubin Direct 0.10 0.00 - 0.30 mg/dL   Result Value  Ref Range    Lipase 109 (H) 13 - 60 U/L   Nt probnp inpatient (BNP)   Result Value Ref Range    N terminal Pro BNP Inpatient 87 0 - 900 pg/mL   CBC with platelets and differential   Result Value Ref Range    WBC Count 7.6 4.0 - 11.0 10e3/uL    RBC Count 2.53 (L) 3.80 - 5.20 10e6/uL    Hemoglobin 8.1 (L) 11.7 - 15.7 g/dL    Hematocrit 26.0 (L) 35.0 - 47.0 %     (H) 78 - 100 fL    MCH 32.0 26.5 - 33.0 pg    MCHC 31.2 (L) 31.5 - 36.5 g/dL    RDW 15.8 (H) 10.0 - 15.0 %    Platelet Count 219 150 - 450 10e3/uL    % Neutrophils 81 %    % Lymphocytes 10 %    % Monocytes 5 %    % Eosinophils 1 %    % Basophils 0 %    % Immature Granulocytes 3 %    NRBCs per 100 WBC 5 (H) <1 /100    Absolute Neutrophils 6.1 1.6 - 8.3 10e3/uL    Absolute Lymphocytes 0.8 0.8 - 5.3 10e3/uL    Absolute Monocytes 0.4 0.0 - 1.3 10e3/uL    Absolute Eosinophils 0.1 0.0 - 0.7 10e3/uL    Absolute Basophils 0.0 0.0 - 0.2 10e3/uL    Absolute Immature Granulocytes 0.2 <=0.4 10e3/uL    Absolute NRBCs 0.4 10e3/uL   Result Value Ref Range    Troponin T, High Sensitivity 24 (H) <=14 ng/L   C. difficile Toxin B PCR with reflex to C. difficile EIA    Specimen: Per Rectum; Stool   Result Value Ref Range    C Difficile Toxin B by PCR Negative Negative       RADIOLOGY:  Reviewed all pertinent imaging. Please see official radiology report.  US Abdomen Limited   Final Result   IMPRESSION:    1. Unremarkable appearance of the gallbladder.   2. No biliary dilatation.   3. Mild to moderate diffuse fatty infiltration of the liver.            CT Abdomen Pelvis w Contrast   Final Result   IMPRESSION:    1.  No acute abnormality identified in the chest, abdomen, or pelvis.   2.  No visualized pulmonary embolus.   3.  A few colonic diverticula are present, without evidence of diverticulitis.   4.  2 cm pulmonary nodule in the left upper lobe that is mostly groundglass attenuation, unchanged. This is nonspecific, but a primary pulmonary neoplasm is possible.      CT  Chest Pulmonary Embolism w Contrast   Final Result   IMPRESSION:    1.  No acute abnormality identified in the chest, abdomen, or pelvis.   2.  No visualized pulmonary embolus.   3.  A few colonic diverticula are present, without evidence of diverticulitis.   4.  2 cm pulmonary nodule in the left upper lobe that is mostly groundglass attenuation, unchanged. This is nonspecific, but a primary pulmonary neoplasm is possible.          EKG:    Performed at: March 18, 2025 22:42:20. [facility]    Impression: Sinus rhythm. Nonspecific ST and T wave abnormality. Prolonged QT. Abnormal ECG. When compared with ECG of 22-Oct-2024 18:48, No significant change was found.     Rate: 90 BPM   Rhythm: Sinus     MS Interval: 144 ms   QRS Interval: 84 ms   QTc Interval: 469 ms   ST Changes: no acute ischemia  Comparison: When compared with ECG of 22-Oct-2024 18:48, No significant change was found.     I have independently reviewed and interpreted the EKG(s) documented above.        I, Duke Osuna, am serving as a scribe to document services personally performed by Nati Shabazz MD, based on my observation and the provider's statements to me. I, Nati Shabazz MD, attest that Duke Osuna is acting in a scribe capacity, has observed my performance of the services and has documented them in accordance with my direction.    Nati Shabazz MD  Emergency Medicine  [facility] EMERGENCY DEPARTMENT  [address] [place] [id number]  [phone]       Nati Shabazz MD  03/19/25 0412

## 2025-03-19 NOTE — ED NOTES
The patient was able to ambulate with the assistance of a walker which is what she uses at home. MD updated.

## 2025-03-19 NOTE — ED TRIAGE NOTES
Pt reports at 1000 03/17/2025 started having, CP, dizziness, diarrhea and blurred vision. Pt hypotensive in triage.  Pt reports she is diabetic, checked  her BG this morning buyt couldn't remember what it was.     Triage Assessment (Adult)       Row Name 03/18/25 5509          Triage Assessment    Airway WDL WDL        Respiratory WDL    Respiratory WDL WDL        Skin Circulation/Temperature WDL    Skin Circulation/Temperature WDL WDL        Cardiac WDL    Cardiac WDL X;chest pain        Chest Pain Assessment    Chest Pain Location midsternal        Peripheral/Neurovascular WDL    Peripheral Neurovascular WDL WDL        Cognitive/Neuro/Behavioral WDL    Cognitive/Neuro/Behavioral WDL WDL

## 2025-03-20 ENCOUNTER — TELEPHONE (OUTPATIENT)
Dept: NURSING | Facility: CLINIC | Age: 55
End: 2025-03-20
Payer: MEDICARE

## 2025-03-20 NOTE — TELEPHONE ENCOUNTER
[facility]    Reason for call: Lab Result Notification     Lab Result (including Rx patient on, if applicable).  If culture, copy of lab report at bottom.  Lab Result: Enteric Bacteria and Virus Panel PCR  Component      Latest Ref Rng 3/19/2025  12:23 AM   Norovirus Gl/Gll      Negative  Positive !     Legend:  ! Abnormal    Creatinine Level (mg/dl)   Creatinine   Date Value Ref Range Status   03/18/2025 1.33 (H) 0.51 - 0.95 mg/dL Final   04/26/2013 0.82 0.60 - 1.10 mg/dL Final    Creatinine clearance (ml/min), if applicable    Serum creatinine: 1.33 mg/dL (H) 03/18/25 2241  Estimated creatinine clearance: 68.4 mL/min (A)     With Mapori   RN Recommendations/Instructions per [place] ED lab result protocol:   [facility] ED lab result protocol utilized: Enteric bacteria and virus's    3/19/25 Presented to ED with symptoms: chest pain, feeling lightheaded, short of breath. She reports she is having significant watery diarrhea (10 episodes) over the last 24 to 48 hours. She has had decreased oral intake     Patient's current Symptoms at time of telephone encounter:   Pt states no diarrhea since seen in ED 3/19/25.  Pt states she is hydrated.      Patient/care giver notified to contact your PCP clinic or return to the Emergency department if your:  Symptoms return.  Symptoms worsen or other concerning symptoms.    Charla Hurst RN

## 2025-03-26 ENCOUNTER — TELEPHONE (OUTPATIENT)
Dept: FAMILY MEDICINE | Facility: CLINIC | Age: 55
End: 2025-03-26
Payer: MEDICARE

## 2025-03-26 DIAGNOSIS — D64.9 ANEMIA, UNSPECIFIED TYPE: Primary | ICD-10-CM

## 2025-03-26 NOTE — TELEPHONE ENCOUNTER
"----- Message from Art Thakkar sent at 3/26/2025  9:33 AM CDT -----  Please inform patient:   1. Kidney function improved compared to ED visit.   2. LDL \"bad cholesterol\" now at goal less than 55   "

## 2025-03-26 NOTE — TELEPHONE ENCOUNTER
"Contacted patient using an  and relayed message below  Appointment scheduled on 3/31/25 with Dr Gaspar  No further action needed    Estefanía Salcedo RN  [facility]    ----- Message from Jaky Gaspar sent at 3/26/2025  1:13 PM CDT -----  Team - please call patient with results and send result letter with this information if patient desires for their records. Refer to [facility] result note as needed.      Her blood is even lower- I am not sure why this is happening.   Please put her on my schedule this week or next week to review further     1. Kidney function improved compared to ED visit.   2. LDL \"bad cholesterol\" now at goal less than 55       "

## 2025-03-30 LAB
ABO + RH BLD: NORMAL
BLD GP AB SCN SERPL QL: NEGATIVE
SPECIMEN EXP DATE BLD: NORMAL

## 2025-03-31 ENCOUNTER — ANCILLARY PROCEDURE (OUTPATIENT)
Dept: GENERAL RADIOLOGY | Facility: CLINIC | Age: 55
End: 2025-03-31
Attending: FAMILY MEDICINE
Payer: MEDICARE

## 2025-03-31 ENCOUNTER — OFFICE VISIT (OUTPATIENT)
Dept: FAMILY MEDICINE | Facility: CLINIC | Age: 55
End: 2025-03-31
Payer: MEDICARE

## 2025-03-31 ENCOUNTER — E-CONSULT (OUTPATIENT)
Dept: ONCOLOGY | Facility: CLINIC | Age: 55
End: 2025-03-31
Payer: MEDICARE

## 2025-03-31 VITALS
TEMPERATURE: 98.2 F | BODY MASS INDEX: 40.32 KG/M2 | RESPIRATION RATE: 15 BRPM | SYSTOLIC BLOOD PRESSURE: 110 MMHG | HEIGHT: 59 IN | DIASTOLIC BLOOD PRESSURE: 62 MMHG | WEIGHT: 200 LBS | OXYGEN SATURATION: 99 %

## 2025-03-31 DIAGNOSIS — F33.1 MODERATE EPISODE OF RECURRENT MAJOR DEPRESSIVE DISORDER (H): ICD-10-CM

## 2025-03-31 DIAGNOSIS — M25.561 ACUTE PAIN OF RIGHT KNEE: ICD-10-CM

## 2025-03-31 DIAGNOSIS — E66.813 CLASS 3 SEVERE OBESITY DUE TO EXCESS CALORIES WITH SERIOUS COMORBIDITY AND BODY MASS INDEX (BMI) OF 40.0 TO 44.9 IN ADULT (H): ICD-10-CM

## 2025-03-31 DIAGNOSIS — I25.119 CORONARY ARTERY DISEASE INVOLVING NATIVE CORONARY ARTERY OF NATIVE HEART WITH ANGINA PECTORIS: ICD-10-CM

## 2025-03-31 DIAGNOSIS — R91.8 MASS OF UPPER LOBE OF LEFT LUNG: ICD-10-CM

## 2025-03-31 DIAGNOSIS — I10 HYPERTENSION GOAL BP (BLOOD PRESSURE) < 130/80: ICD-10-CM

## 2025-03-31 DIAGNOSIS — M25.561 CHRONIC PAIN OF RIGHT KNEE: ICD-10-CM

## 2025-03-31 DIAGNOSIS — K75.81 METABOLIC DYSFUNCTION-ASSOCIATED STEATOHEPATITIS (MASH): ICD-10-CM

## 2025-03-31 DIAGNOSIS — E11.42 TYPE 2 DIABETES MELLITUS WITH DIABETIC POLYNEUROPATHY, WITHOUT LONG-TERM CURRENT USE OF INSULIN (H): ICD-10-CM

## 2025-03-31 DIAGNOSIS — G89.29 CHRONIC PAIN OF RIGHT KNEE: ICD-10-CM

## 2025-03-31 DIAGNOSIS — N18.31 STAGE 3A CHRONIC KIDNEY DISEASE (H): ICD-10-CM

## 2025-03-31 DIAGNOSIS — R26.81 GAIT INSTABILITY: ICD-10-CM

## 2025-03-31 DIAGNOSIS — D53.9 MACROCYTIC ANEMIA: Primary | ICD-10-CM

## 2025-03-31 DIAGNOSIS — E66.01 CLASS 3 SEVERE OBESITY DUE TO EXCESS CALORIES WITH SERIOUS COMORBIDITY AND BODY MASS INDEX (BMI) OF 40.0 TO 44.9 IN ADULT (H): ICD-10-CM

## 2025-03-31 LAB
ALBUMIN SERPL BCG-MCNC: 3.4 G/DL (ref 3.5–5.2)
ALP SERPL-CCNC: 104 U/L (ref 40–150)
ALT SERPL W P-5'-P-CCNC: 83 U/L (ref 0–50)
AMYLASE SERPL-CCNC: 65 U/L (ref 28–100)
ANION GAP SERPL CALCULATED.3IONS-SCNC: 10 MMOL/L (ref 7–15)
AST SERPL W P-5'-P-CCNC: 62 U/L (ref 0–45)
BILIRUB SERPL-MCNC: 0.3 MG/DL
BUN SERPL-MCNC: 41.6 MG/DL (ref 6–20)
CALCIUM SERPL-MCNC: 9.1 MG/DL (ref 8.8–10.4)
CHLORIDE SERPL-SCNC: 111 MMOL/L (ref 98–107)
CREAT SERPL-MCNC: 0.91 MG/DL (ref 0.51–0.95)
EGFRCR SERPLBLD CKD-EPI 2021: 74 ML/MIN/1.73M2
ERYTHROCYTE [DISTWIDTH] IN BLOOD BY AUTOMATED COUNT: 16.3 % (ref 10–15)
EST. AVERAGE GLUCOSE BLD GHB EST-MCNC: 169 MG/DL
GLUCOSE SERPL-MCNC: 221 MG/DL (ref 70–99)
HBA1C MFR BLD: 7.5 % (ref 0–5.6)
HCO3 SERPL-SCNC: 21 MMOL/L (ref 22–29)
HCT VFR BLD AUTO: 26.5 % (ref 35–47)
HGB BLD-MCNC: 8.1 G/DL (ref 11.7–15.7)
LDH SERPL L TO P-CCNC: 391 U/L (ref 0–250)
LIPASE SERPL-CCNC: 68 U/L (ref 13–60)
MAGNESIUM SERPL-MCNC: 2 MG/DL (ref 1.7–2.3)
MCH RBC QN AUTO: 31.4 PG (ref 26.5–33)
MCHC RBC AUTO-ENTMCNC: 30.6 G/DL (ref 31.5–36.5)
MCV RBC AUTO: 103 FL (ref 78–100)
PLATELET # BLD AUTO: 255 10E3/UL (ref 150–450)
POTASSIUM SERPL-SCNC: 5.1 MMOL/L (ref 3.4–5.3)
PROT SERPL-MCNC: 5.6 G/DL (ref 6.4–8.3)
RBC # BLD AUTO: 2.58 10E6/UL (ref 3.8–5.2)
SODIUM SERPL-SCNC: 142 MMOL/L (ref 135–145)
TSH SERPL DL<=0.005 MIU/L-ACNC: 1.59 UIU/ML (ref 0.3–4.2)
TSH SERPL DL<=0.005 MIU/L-ACNC: 1.6 UIU/ML (ref 0.3–4.2)
URATE SERPL-MCNC: 8.2 MG/DL (ref 2.4–5.7)
WBC # BLD AUTO: 6.5 10E3/UL (ref 4–11)

## 2025-03-31 PROCEDURE — 82150 ASSAY OF AMYLASE: CPT | Performed by: FAMILY MEDICINE

## 2025-03-31 PROCEDURE — 99215 OFFICE O/P EST HI 40 MIN: CPT | Performed by: FAMILY MEDICINE

## 2025-03-31 PROCEDURE — 83036 HEMOGLOBIN GLYCOSYLATED A1C: CPT | Performed by: FAMILY MEDICINE

## 2025-03-31 PROCEDURE — 99451 NTRPROF PH1/NTRNET/EHR 5/>: CPT | Performed by: INTERNAL MEDICINE

## 2025-03-31 PROCEDURE — 96127 BRIEF EMOTIONAL/BEHAV ASSMT: CPT | Performed by: FAMILY MEDICINE

## 2025-03-31 PROCEDURE — 3074F SYST BP LT 130 MM HG: CPT | Performed by: FAMILY MEDICINE

## 2025-03-31 PROCEDURE — 86901 BLOOD TYPING SEROLOGIC RH(D): CPT | Performed by: FAMILY MEDICINE

## 2025-03-31 PROCEDURE — 73502 X-RAY EXAM HIP UNI 2-3 VIEWS: CPT | Mod: TC | Performed by: RADIOLOGY

## 2025-03-31 PROCEDURE — T1013 SIGN LANG/ORAL INTERPRETER: HCPCS | Mod: 59 | Performed by: INTERPRETER

## 2025-03-31 PROCEDURE — 80053 COMPREHEN METABOLIC PANEL: CPT | Performed by: FAMILY MEDICINE

## 2025-03-31 PROCEDURE — 84550 ASSAY OF BLOOD/URIC ACID: CPT | Performed by: FAMILY MEDICINE

## 2025-03-31 PROCEDURE — 86900 BLOOD TYPING SEROLOGIC ABO: CPT | Performed by: FAMILY MEDICINE

## 2025-03-31 PROCEDURE — 86850 RBC ANTIBODY SCREEN: CPT | Performed by: FAMILY MEDICINE

## 2025-03-31 PROCEDURE — 83690 ASSAY OF LIPASE: CPT | Performed by: FAMILY MEDICINE

## 2025-03-31 PROCEDURE — 99000 SPECIMEN HANDLING OFFICE-LAB: CPT | Performed by: FAMILY MEDICINE

## 2025-03-31 PROCEDURE — 84443 ASSAY THYROID STIM HORMONE: CPT | Performed by: FAMILY MEDICINE

## 2025-03-31 PROCEDURE — 82668 ASSAY OF ERYTHROPOIETIN: CPT | Mod: 90 | Performed by: FAMILY MEDICINE

## 2025-03-31 PROCEDURE — 83735 ASSAY OF MAGNESIUM: CPT | Performed by: FAMILY MEDICINE

## 2025-03-31 PROCEDURE — G2211 COMPLEX E/M VISIT ADD ON: HCPCS | Performed by: FAMILY MEDICINE

## 2025-03-31 PROCEDURE — 83615 LACTATE (LD) (LDH) ENZYME: CPT | Performed by: FAMILY MEDICINE

## 2025-03-31 PROCEDURE — 3078F DIAST BP <80 MM HG: CPT | Performed by: FAMILY MEDICINE

## 2025-03-31 PROCEDURE — 85027 COMPLETE CBC AUTOMATED: CPT | Performed by: FAMILY MEDICINE

## 2025-03-31 PROCEDURE — T1013 SIGN LANG/ORAL INTERPRETER: HCPCS | Performed by: INTERPRETER

## 2025-03-31 PROCEDURE — 36415 COLL VENOUS BLD VENIPUNCTURE: CPT | Performed by: FAMILY MEDICINE

## 2025-03-31 PROCEDURE — 73560 X-RAY EXAM OF KNEE 1 OR 2: CPT | Mod: TC | Performed by: RADIOLOGY

## 2025-03-31 ASSESSMENT — PATIENT HEALTH QUESTIONNAIRE - PHQ9
SUM OF ALL RESPONSES TO PHQ QUESTIONS 1-9: 0
10. IF YOU CHECKED OFF ANY PROBLEMS, HOW DIFFICULT HAVE THESE PROBLEMS MADE IT FOR YOU TO DO YOUR WORK, TAKE CARE OF THINGS AT HOME, OR GET ALONG WITH OTHER PEOPLE: NOT DIFFICULT AT ALL
SUM OF ALL RESPONSES TO PHQ QUESTIONS 1-9: 0

## 2025-03-31 NOTE — PROGRESS NOTES
Assessment & Plan     Macrocytic anemia  With known CHF/CAD and symtpomatic will move forward with blood transfusion as outpt.  Consent signed with pt using in person [employer] .  Testing so far wnl- Retic count 8%, normal b12/FA/copper,   Known elevated LFTs- Not sure INR but not able to add on to labs today. No obvious bleeding.  Normal iron- elevated ferritin.  Known left lung nodule suspicious for malignancy- follow-up in 6 months with imaging.  Normal thyroid.  Normal spep.  Will get e-consult for hematology to see if any additional labs indicated prior to transfusion.  Known gastritis- no obvious GI bleed but checking FIT next.  Consider GI consultation.  Elevated A1c and abd pain- no obvious pancreatic lesions noted.  No obvious liver lesions noted.    - CBC with platelets  - TSH with free T4 reflex  - CBC with platelets  - TSH with free T4 reflex  - TSH with free T4 reflex  - Home Care Referral  - ABO/Rh type and screen  - Adult E-Consult to Hematology (Outpt Provider to Specialist Written Question & Response)  - TSH with free T4 reflex    Type 2 diabetes mellitus with diabetic polyneuropathy, without long-term current use of insulin (H)  Unknown Controlled due to anemia.  Addressed smoking status and aspirin therapy.  Recommended annual eye exam and dental cares. Reviewed foot cares and foot exam.  Blood pressure and lipid management reviewed today.  Vaccines reviewed and updated.  Plan for glucose management includes ongoing focus on healthy diabetic diet and increased activity, awaiting cr function. Consider jardiance.  Currently on mounjaro 10mg weekly- increase as tolerated. Metforming 1000mg bid- adjust depending on renal function.  Labs ordered as below including:     Hemoglobin A1C   Date Value Ref Range Status   03/31/2025 7.5 (H) 0.0 - 5.6 % Final     Comment:     Normal <5.7%   Prediabetes 5.7-6.4%    Diabetes 6.5% or higher     Note: Adopted from ADA consensus guidelines.   02/14/2025  9.3 (H) 0.0 - 5.6 % Final     Comment:     Normal <5.7%   Prediabetes 5.7-6.4%    Diabetes 6.5% or higher     Note: Adopted from ADA consensus guidelines.   09/06/2024 8.9 (H) 0.0 - 5.6 % Final     Comment:     Normal <5.7%   Prediabetes 5.7-6.4%    Diabetes 6.5% or higher     Note: Adopted from ADA consensus guidelines.   01/18/2011 5.7 4.2 - 6.1 % Final    ,   Lab Results   Component Value Date    LDL 49 03/21/2025   ,   Creatinine   Date Value Ref Range Status   03/21/2025 0.81 0.51 - 0.95 mg/dL Final   04/26/2013 0.82 0.60 - 1.10 mg/dL Final        - Comprehensive metabolic panel (BMP + Alb, Alk Phos, ALT, AST, Total. Bili, TP)  - Hemoglobin A1c  - Comprehensive metabolic panel (BMP + Alb, Alk Phos, ALT, AST, Total. Bili, TP)  - Hemoglobin A1c  - Home Care Referral    Lung mass left upper lobe  Suspicious for stage 1 cancer.  Declining surgery- plan was for repeat imaging 7/2025     Hypertension goal BP (blood pressure) < 130/80  BP Readings from Last 3 Encounters:   03/31/25 110/62   03/21/25 102/78   03/19/25 100/59        controlled today.  Plan for bloodpressure management includes ongoing focus on healthy DASH type diet and increased activity, encouraged to avoid tobacco products and limit alcohol use, stress reduction, continue losartan 25mg daily, metoprolol XL 100mg daily, imdur 60mg daily, lasix 20mg ezekiel,  .  Labwork and meds ordered and reviewed as below  Potassium   Date Value Ref Range Status   03/21/2025 4.1 3.4 - 5.3 mmol/L Final   06/02/2022 4.0 3.5 - 5.0 mmol/L Final   04/26/2013 3.8 3.5 - 5.0 mmol/L Final      Creatinine   Date Value Ref Range Status   03/21/2025 0.81 0.51 - 0.95 mg/dL Final   04/26/2013 0.82 0.60 - 1.10 mg/dL Final          Coronary artery disease involving native coronary artery of native heart with angina pectoris  Follow-up with cardiology reviewed- stable on statin, b-blocker, imdur, arb.  Careful about anemia- need hgb to be higher to prevent ischemia.      Class 3 severe  obesity due to excess calories with serious comorbidity and body mass index (BMI) of 40.0 to 44.9 in adult (H)  Reviewed LSM- has decreased appetite.  On mounjaro without weight loss.      Moderate episode of recurrent major depressive disorder (H)  PHQ-9 score:        3/31/2025     8:05 AM   PHQ   PHQ-9 Total Score 0   Q9: Thoughts of better off dead/self-harm past 2 weeks Not at all   Notes she is doing well- stressed about her health and just not feeling well.  Continue effexor 150mg daily.  Consider titrating down on dose as tolerated but will defer for now.  Bp well controlled.     Stage 3a chronic kidney disease (H)  Stable- continue ARB.      Metabolic dysfunction-associated steatohepatitis (MASH)  Rechecking labs.  With elevated LFTs lately may need to cut back on statin.    - Lipase  - Amylase  - Magnesium  - Lipase  - Amylase  - Magnesium  - Home Care Referral    Acute pain of right knee  S/p injection of steroid last visit but still unable to put weight on right knee due to severe pain.  No obvious redness/swelling.  Known gout in right foot.  Will check xray of right hip and knee to make sure no bone issues.  Check gout level.  Transport chair to help prevent falls.  Home care with pt/ot for safety.    - Wheelchair Order for DME - ONLY FOR DME  - Uric acid  - XR Knee Standing Right 2 Views  - XR Hip Right 2-3 Views  - Home Care Referral    Chronic pain of right knee  As above.      Gait instability  As above.    - Home Care Referral      The longitudinal plan of care for the diagnosis(es)/condition(s) as documented were addressed during this visit. Due to the added complexity in care, I will continue to support May in the subsequent management and with ongoing continuity of care.    54 minutes spent by me on the date of the encounter doing chart review, history and exam, documentation and further activities per the note      BMI  Estimated body mass index is 40.4 kg/m  as calculated from the following:    " Height as of this encounter: 1.499 m (4' 11\").    Weight as of this encounter: 90.7 kg (200 lb).   Weight management plan: Discussed healthy diet and exercise guidelines        Shahab Corey is a 54 year old, presenting for the following health issues:  Abnormal Labs        3/31/2025     8:01 AM   Additional Questions   Roomed by Stacey   Accompanied by      History of Present Illness       Reason for visit:  Abnormal labs    She eats 2-3 servings of fruits and vegetables daily.She consumes 0 sweetened beverage(s) daily.She exercises with enough effort to increase her heart rate 9 or less minutes per day.  She exercises with enough effort to increase her heart rate 3 or less days per week.   She is taking medications regularly.      ED visit for not feeling well- found to have Noravirus and anemia.  Reviewed note today:      Since then- feeling very tired and weak, no energy.  Aware that she has low hgb.  Often feels like she is going to pass out and tired like she is pregnant.  Just wants to lay in bed and sleep.  Poor appetite- meat does not appeal to her at all.  Ongoing right knee pain and right thigh/hip pain.     Has raised toilet seat.  Has shower bench, etc.   Wants wheel chair or something that she can use for family to push her around- does not have strength to move the wheel chair herself.      Trying to push fluids but hard to go to the bathroom due to pain in her leg so may be limiting fluids a little.  Aware that she has dehydration and kidney issues from dehydration.  Using a wheel chair in clinic today because she can't walk . Using seated walker at home for her family to push her around.      Oncology visit 1/23/25 reviewed- Subsolid nodule MONIQUE. Clincal stage 1 if cancer-  follow-up in 6months an dif growing consider surgery.  Needs better control of diabetes prior to surgery.      Dr Lemon 3/2025 for knee pain - got injection of right knee with steroids.    Pt notes it helped the " throbbing pain and skin sensitivity only.  Can only take 1 step at a time.  Home bound.      Cardiology follow-up 3/21/25- stable symptoms on imdur.  Known CAD- on statin and zetia.   Follow-up with pcp on anemia.      MTM- 3/21/25 reviewed- ongoing stomach pain, not likely from mounjaro.  Continue high dose PPI.  Add pepcid.  Anemia. Leg pain- add topical diclofenac.  Gout with last uric acid okay.      Diabetes- higher 110-250.    Metformin and mounjaro injection weekly. Consider jardiance next.     Heartburn and stomach pain.  Mild heartburn daily.  Got a pill bottle but in English and not sure what this if for- said to take twice a day for pain.  Did try it and seems to help with stomach pain.  Regular soft BM.  No black or blood stools.  No nausea/vomiting.  Food doesn't taste good- doesn't feel like eating due to stomach pain.  Only eating a small amount of food bid.      Mental health doing okay- just stressed about health.     Not taking tylenol.      Legs are more swollen- worse with activity and keeping legs down.   Better when her feet are elevated and she is resting more.  Legs/feet feel really cold.      Left arm fracture 10/2024.  Did follow-up with ortho on this and healing but not lining up well- shifted.      Last xray of right knee 2023-   IMPRESSION: Right knee shows a faint lucency seen along the superior aspect of the right patella is favored to be an osteophyte although a subtle nondisplaced fracture is difficult to completely exclude however there is no joint effusion. Suspect small   loose body along the posterior aspect of the right knee. Mild to moderate medial and lateral compartment degenerative change.     Views of the left knee show multiple loose bodies and tricompartmental arthrosis which is moderate in the medial compartment. No fracture or joint effusion.    Has had transfusion with bloody nose and heavy vagina bleeding in past  No vaginal bleeding now.  No blood nose now.           "  Objective    /62   Temp 98.2  F (36.8  C) (Oral)   Resp 15   Ht 1.499 m (4' 11\")   Wt 90.7 kg (200 lb)   SpO2 99%   BMI 40.40 kg/m    Body mass index is 40.4 kg/m .  Physical Exam   Complete 10 point ROS completed today as part of the exam and patient denies any symptoms as reviewed in HPI     Wt Readings from Last 3 Encounters:   03/31/25 90.7 kg (200 lb)   03/21/25 91.6 kg (202 lb)   03/18/25 90.7 kg (200 lb)       No LMP recorded. Patient is postmenopausal.    All normal as below except abnormalities include: no abdominal pain on palpation today   General is a  54 year old sitting comfortably in no apparent distress   HEENT:  TM are clear bilaterally.  Eye exams within normal   Neck: Supple without lymphadenopathy or thyromegally  CV: Regular rate and rhythm S1S2 without rubs, murmurs or gallops,   Lungs: Clear to auscultation bilaterally  Abd:  +BS, soft NT/ND,  No masses or organomegally,   Extremities: Warm, No Edema, 2+ Pedal and radial pulses bilaterally  Skin: No lesions or rashes noted  Neuro: Able to ambulate around the exam room with equal movement, strength and normal coordination of the upper and lower extremeties symmetrically    Independent historian: here with      History summarized from1-2:as above   Old Records-1: Outside allergies, meds, problems and immunizations were reconciled as needed from [facility]  Radiology tests reviewed-1: abd us 3/2025-   IMPRESSION:   1. Unremarkable appearance of the gallbladder.  2. No biliary dilatation.  3. Mild to moderate diffuse fatty infiltration of the liver.    Chest/abd/pelvic CT 3/2025   IMPRESSION:   1.  No acute abnormality identified in the chest, abdomen, or pelvis.  2.  No visualized pulmonary embolus.  3.  A few colonic diverticula are present, without evidence of diverticulitis.  4.  2 cm pulmonary nodule in the left upper lobe that is mostly groundglass attenuation, unchanged. This is nonspecific, but a primary pulmonary " neoplasm is possible.    Lab tests reviewed-1: [id number]        Jaky Gaspar MD             Signed Electronically by: Jaky Gaspar MD

## 2025-03-31 NOTE — PROGRESS NOTES
3/31/2025     E-Consult has been accepted.    Interprofessional consultation requested by:  Jaky Gaspar MD      Clinical Question/Purpose: MY CLINICAL QUESTION IS: Anemia- known Lung nodule, any testing needed prior to blood transfusion?      Patient assessment and information reviewed:   Anemia appears new over last 4-5 months (hemoglobin 13 in 2024, now 8)  Macrocytosis (borderline at baseline at , now frankly macrocytic )  Normal white count and platelet counts  Normal vitamin B12 and folate and copper  SPEP without monoclonal protein  Normal renal function  Retic 8% (in setting of anemia)  Normal iron studies aside from elevated ferritin  Anemia may be related to inflammation (although relatively abrupt onset)  Could be anemia related to malignancy (if there is underlying solid tumor, apparently lung nodule with suspicious features)  Could be anemia of primary hematologic malignancy (MDS) although patient relatively young    Recommendations:    --Would check LDH, haptoglobin, JEIMY to exclude autoimmune hemolytic process (or nonimmune hemolysis)  --Would check free light chains, EPO level  --Transfuse to maintain >7  --Continue workup of lung nodule per primary care  --Reasonable to see hematology for in person visit (nonurgent)    The recommendations provided in this E-Consult are based on a review of clinical data pertinent to the clinical question presented, without a review of the patient's complete medical record or, the benefit of a comprehensive in-person or virtual patient evaluation. This consultation should not replace the clinical judgement and evaluation of the provider ordering this E-Consult. Any new clinical issues, or changes in patient status since the filing of this E-Consult will need to be taken into account when assessing these recommendations. Please contact me if you have further questions.    My total time spent reviewing clinical information and formulating assessment  was 10 minutes.        Nette Rodriguez MD

## 2025-04-02 LAB — EPO SERPL-ACNC: 45 MU/ML

## 2025-04-08 ENCOUNTER — MEDICAL CORRESPONDENCE (OUTPATIENT)
Dept: HEALTH INFORMATION MANAGEMENT | Facility: CLINIC | Age: 55
End: 2025-04-08

## 2025-04-09 ENCOUNTER — ORDERS ONLY (AUTO-RELEASED) (OUTPATIENT)
Dept: FAMILY MEDICINE | Facility: CLINIC | Age: 55
End: 2025-04-09
Payer: MEDICARE

## 2025-04-09 ENCOUNTER — TELEPHONE (OUTPATIENT)
Dept: FAMILY MEDICINE | Facility: CLINIC | Age: 55
End: 2025-04-09
Payer: MEDICARE

## 2025-04-09 DIAGNOSIS — D64.9 ANEMIA, UNSPECIFIED TYPE: ICD-10-CM

## 2025-04-09 DIAGNOSIS — M1A.0710 CHRONIC GOUT OF RIGHT FOOT, UNSPECIFIED CAUSE: Primary | ICD-10-CM

## 2025-04-09 NOTE — TELEPHONE ENCOUNTER
Home Care is calling regarding an established patient with [facility].  Requesting orders from: Jaky Gaspar RN APPROVED: RN able to provide verbal orders.  Home Care will send orders for signature.  RN will close encounter.  Is this a request for a temporary pause in the home care episode?  No    Orders Requested    Skilled Nursing  Request for initial certification (first set of orders)   Frequency: 1 time a week for 5 weeks, and then once every 2 weeks for 4 weeks.  RN gave verbal order: Yes      Dr. Gaspar did recommended PT & OT for patient, but at this time patient is refusing it. They will keep working on it. Nickie will call back, if patient decided to try PT & OT.    Phone number Home Care can be reached at: [phone]  Okay to leave a detailed message?: Yes    Contacts       Contact Date/Time Type Contact Phone/Fax    04/09/2025 03:33 PM CDT Phone (Incoming) GRACE uF with [facility] (Home Care) [phone]     Secure .          Jan Null RN

## 2025-04-09 NOTE — TELEPHONE ENCOUNTER
Attempted to contact patient, patient's spouse answered and asked writer to call back another time as patient is not available.    [facility] [place] : [phone]    Helen Chan RN  [facility]

## 2025-04-09 NOTE — TELEPHONE ENCOUNTER
----- Message from Jaky Gaspar sent at 4/9/2025  1:18 PM CDT -----  Team - please call patient with results and send result letter with this information if patient desires for their records. Refer to [facility] result note as needed.      Her blood isn't low enough to need a blood transfusion.    I would like her to recheck her labs again when she comes back to see Art in May.   I may have her see a blood specialist next.      Gout level is a little high - this may be cause for her knee pain.   Would she consider taking allopurinol gout pill daily.  We could have her see a knee specialist if the knee pain is still pretty bad.   Right hip and knee xrays were okay - mild arthritis     Kidney tests are healthy  Liver tests are much better than earlier in March     Diabetes seems to be a little better now     Normal thyroid level     Still waiting for her FIT stool test to return and make sure she isn't losing blood from her gut.

## 2025-04-18 ENCOUNTER — APPOINTMENT (OUTPATIENT)
Dept: INTERPRETER SERVICES | Facility: CLINIC | Age: 55
End: 2025-04-18
Payer: MEDICARE

## 2025-04-18 NOTE — TELEPHONE ENCOUNTER
Attempt #2. No answer.  is not set up. Please relay message below from Dr. Gaspar upon return call.      Fermin RIVERA RN  [facility]

## 2025-04-19 DIAGNOSIS — R12 HEARTBURN: ICD-10-CM

## 2025-04-19 RX ORDER — FAMOTIDINE 20 MG/1
TABLET, FILM COATED ORAL
Qty: 60 TABLET | Refills: 10 | Status: SHIPPED | OUTPATIENT
Start: 2025-04-19

## 2025-04-21 RX ORDER — ALLOPURINOL 100 MG/1
100 TABLET ORAL DAILY
Qty: 90 TABLET | Refills: 1 | Status: SHIPPED | OUTPATIENT
Start: 2025-04-21

## 2025-04-21 NOTE — TELEPHONE ENCOUNTER
Contacted patient using an  and relayed message below.  Patient agrees to take allopurinol gout pill daily.    Message routed to Dr Gaspar for medication refill    Estefanía Salcedo RN  [facility]

## 2025-04-22 NOTE — TELEPHONE ENCOUNTER
Spoked to pt, relayed allopurinol  for gout and famotidine for heartburn / ulcer medications have been sent to [facility] in [facility]. Pt verb understanding, will  medication today.     No other questions or concerns.    Tania MARTINO RN  [facility]

## 2025-05-02 ENCOUNTER — TELEPHONE (OUTPATIENT)
Dept: FAMILY MEDICINE | Facility: CLINIC | Age: 55
End: 2025-05-02

## 2025-05-02 NOTE — TELEPHONE ENCOUNTER
I was able to reach patient and discuss results, however infusion center is now closed. Left voicemail with infusion center staff requesting appointment ASAP next week for patient for blood transfusion.     RN Team: please follow up on Monday 5/5 to ensure patient is able to schedule transfusion ASAP.     Consent form is in chart    Ly Vincent RN BSN  [facility]

## 2025-05-02 NOTE — TELEPHONE ENCOUNTER
First attempt: called with , unable to leave message as mailbox is not set up. Made 2 attempts. Only one number in chart. Will continue to make additional attempts.     Of note, consent is in chart & signed by patient on 3/21/25--this is valid for one year      ----- Message from Jaky Hubert sent at 5/2/2025 12:46 PM CDT -----  Team - please call patient with results and send result letter with this information if patient desires for their records. Refer to [facility] result note as needed.      Her hemoglobin is now under 7.0 - I would like her to go in for a blood transfusion of 1u PRBC next week        Her consent was signed 4/2/25- hopefully this is valid?  If not please set her up for a virtual visit this afternoon or Monday to review prior to blood transfusion.

## 2025-05-05 ENCOUNTER — APPOINTMENT (OUTPATIENT)
Dept: INTERPRETER SERVICES | Facility: CLINIC | Age: 55
End: 2025-05-05
Payer: MEDICARE

## 2025-05-05 ENCOUNTER — TELEPHONE (OUTPATIENT)
Dept: FAMILY MEDICINE | Facility: CLINIC | Age: 55
End: 2025-05-05
Payer: MEDICARE

## 2025-05-05 DIAGNOSIS — R91.8 MASS OF UPPER LOBE OF LEFT LUNG: Primary | ICD-10-CM

## 2025-05-05 DIAGNOSIS — D53.9 MACROCYTIC ANEMIA: ICD-10-CM

## 2025-05-05 DIAGNOSIS — D53.9 MACROCYTIC ANEMIA: Primary | ICD-10-CM

## 2025-05-05 LAB
ABO + RH BLD: NORMAL
BLD GP AB SCN SERPL QL: NEGATIVE
SPECIMEN EXP DATE BLD: NORMAL

## 2025-05-05 NOTE — TELEPHONE ENCOUNTER
Contacted patient using an  and relayed message below  Scheduled blood transfusion  [facility] 5/6/25 @ /9:40 [id number] [place] [address]. Needs labs drawn prior to infusion  [facility] @ 10:30 [id number] [facility]  [id number]    Called multiple infusion centers and no appointments available for 10 days - 2 weeks.    Message routed to Dr Gaspar for NACHO Salcedo RN  [facility]

## 2025-05-05 NOTE — TELEPHONE ENCOUNTER
----- Message from Art Thakkar sent at 5/5/2025  9:54 AM CDT -----  Uric Acid that causes gout is still high. I would like to increase patient back to her previous Allopurinol dose of 300 mg daily. New Rx sent.

## 2025-05-05 NOTE — TELEPHONE ENCOUNTER
RN attempted to contact patient, but no answer. Unable to leave a message as no voicemail set up. Will try patient later.    Estefanía Salcedo RN  [facility]    Scheduled blood transfusion  [facility] 5/6/25 @ /9:[address] [place] [facility]. Needs labs drawn prior to infusion  [facility] @ 10:30 [address] [facility]  [id number]    Called multiple infusion centers and no appointments available for 10 days - 2 weeks.

## 2025-05-05 NOTE — TELEPHONE ENCOUNTER
1st: Attempted to call patient - no answer and VM not set up. Will make future attempts to contact patient, please relay clinician message should patient return call.    [facility] [place] [phone]    Helen Chan RN  [facility]

## 2025-05-06 ENCOUNTER — TELEPHONE (OUTPATIENT)
Dept: FAMILY MEDICINE | Facility: CLINIC | Age: 55
End: 2025-05-06

## 2025-05-06 ENCOUNTER — PATIENT OUTREACH (OUTPATIENT)
Dept: ONCOLOGY | Facility: CLINIC | Age: 55
End: 2025-05-06

## 2025-05-06 ENCOUNTER — INFUSION THERAPY VISIT (OUTPATIENT)
Dept: INFUSION THERAPY | Facility: CLINIC | Age: 55
End: 2025-05-06
Attending: FAMILY MEDICINE
Payer: MEDICARE

## 2025-05-06 ENCOUNTER — LAB (OUTPATIENT)
Dept: LAB | Facility: CLINIC | Age: 55
End: 2025-05-06
Payer: MEDICARE

## 2025-05-06 VITALS
RESPIRATION RATE: 16 BRPM | TEMPERATURE: 97.9 F | SYSTOLIC BLOOD PRESSURE: 122 MMHG | DIASTOLIC BLOOD PRESSURE: 76 MMHG | OXYGEN SATURATION: 99 % | HEART RATE: 77 BPM

## 2025-05-06 DIAGNOSIS — G44.209 TENSION HEADACHE: ICD-10-CM

## 2025-05-06 DIAGNOSIS — D53.9 MACROCYTIC ANEMIA: Primary | ICD-10-CM

## 2025-05-06 DIAGNOSIS — D53.9 MACROCYTIC ANEMIA: ICD-10-CM

## 2025-05-06 LAB
BLD PROD TYP BPU: NORMAL
BLOOD COMPONENT TYPE: NORMAL
CODING SYSTEM: NORMAL
CROSSMATCH: NORMAL
ERYTHROCYTE [DISTWIDTH] IN BLOOD BY AUTOMATED COUNT: 15.8 % (ref 10–15)
HCT VFR BLD AUTO: 23.4 % (ref 35–47)
HGB BLD-MCNC: 6.7 G/DL (ref 11.7–15.7)
ISSUE DATE AND TIME: NORMAL
MCH RBC QN AUTO: 26.9 PG (ref 26.5–33)
MCHC RBC AUTO-ENTMCNC: 28.6 G/DL (ref 31.5–36.5)
MCV RBC AUTO: 94 FL (ref 78–100)
PLATELET # BLD AUTO: 357 10E3/UL (ref 150–450)
RBC # BLD AUTO: 2.49 10E6/UL (ref 3.8–5.2)
UNIT ABO/RH: NORMAL
UNIT NUMBER: NORMAL
UNIT STATUS: NORMAL
UNIT TYPE ISBT: 6200
WBC # BLD AUTO: 8.9 10E3/UL (ref 4–11)

## 2025-05-06 PROCEDURE — 86923 COMPATIBILITY TEST ELECTRIC: CPT | Performed by: FAMILY MEDICINE

## 2025-05-06 PROCEDURE — 36415 COLL VENOUS BLD VENIPUNCTURE: CPT

## 2025-05-06 PROCEDURE — 85027 COMPLETE CBC AUTOMATED: CPT

## 2025-05-06 PROCEDURE — 86901 BLOOD TYPING SEROLOGIC RH(D): CPT

## 2025-05-06 PROCEDURE — T1013 SIGN LANG/ORAL INTERPRETER: HCPCS | Mod: GT | Performed by: INTERPRETER

## 2025-05-06 PROCEDURE — 36430 TRANSFUSION BLD/BLD COMPNT: CPT

## 2025-05-06 PROCEDURE — P9016 RBC LEUKOCYTES REDUCED: HCPCS | Performed by: FAMILY MEDICINE

## 2025-05-06 RX ORDER — HEPARIN SODIUM,PORCINE 10 UNIT/ML
5-20 VIAL (ML) INTRAVENOUS DAILY PRN
OUTPATIENT
Start: 2025-05-06

## 2025-05-06 RX ORDER — HEPARIN SODIUM,PORCINE 10 UNIT/ML
5-20 VIAL (ML) INTRAVENOUS DAILY PRN
Status: CANCELLED | OUTPATIENT
Start: 2025-05-06

## 2025-05-06 RX ORDER — EPINEPHRINE 1 MG/ML
0.3 INJECTION, SOLUTION INTRAMUSCULAR; SUBCUTANEOUS EVERY 5 MIN PRN
Status: CANCELLED | OUTPATIENT
Start: 2025-05-06

## 2025-05-06 RX ORDER — DIPHENHYDRAMINE HYDROCHLORIDE 50 MG/ML
50 INJECTION, SOLUTION INTRAMUSCULAR; INTRAVENOUS
Start: 2025-05-06

## 2025-05-06 RX ORDER — DIPHENHYDRAMINE HYDROCHLORIDE 50 MG/ML
50 INJECTION, SOLUTION INTRAMUSCULAR; INTRAVENOUS
Status: CANCELLED
Start: 2025-05-06

## 2025-05-06 RX ORDER — EPINEPHRINE 1 MG/ML
0.3 INJECTION, SOLUTION INTRAMUSCULAR; SUBCUTANEOUS EVERY 5 MIN PRN
OUTPATIENT
Start: 2025-05-06

## 2025-05-06 RX ORDER — VENLAFAXINE HYDROCHLORIDE 150 MG/1
CAPSULE, EXTENDED RELEASE ORAL
Qty: 90 CAPSULE | Refills: 0 | Status: SHIPPED | OUTPATIENT
Start: 2025-05-06

## 2025-05-06 RX ORDER — HEPARIN SODIUM (PORCINE) LOCK FLUSH IV SOLN 100 UNIT/ML 100 UNIT/ML
5 SOLUTION INTRAVENOUS
OUTPATIENT
Start: 2025-05-06

## 2025-05-06 RX ORDER — HEPARIN SODIUM (PORCINE) LOCK FLUSH IV SOLN 100 UNIT/ML 100 UNIT/ML
5 SOLUTION INTRAVENOUS
Status: CANCELLED | OUTPATIENT
Start: 2025-05-06

## 2025-05-06 NOTE — PROGRESS NOTES
Hematology referral reviewed for [facility], see below.    Referral reason: referral received from primary care for patient with known anemia, had e-consult, did follow up labs with decrease in Hgb from 8.1 on 3/31 when e-consult completed to 6.7 today, scheduled for PRBC's    Clinical question entered by referring provider or through order transcription: Progressive anemia with abnormal labs based on e-consult- would like formal consult now     Referral received via: Internal referral by [facility]    Current abnormal labs:   Lab Results   Component Value Date    WBC 8.9 05/06/2025     Lab Results   Component Value Date    RBC 2.49 05/06/2025     Lab Results   Component Value Date    HGB 6.7 05/06/2025     Lab Results   Component Value Date    HCT 23.4 05/06/2025     Lab Results   Component Value Date    MCV 94 05/06/2025     Lab Results   Component Value Date    MCH 26.9 05/06/2025     Lab Results   Component Value Date    MCHC 28.6 05/06/2025     Lab Results   Component Value Date    RDW 15.8 05/06/2025     Lab Results   Component Value Date     05/06/2025    and   Ferritin   Date Value Ref Range Status   03/19/2025 690 (H) 11 - 328 ng/mL Final     Iron   Date Value Ref Range Status   03/19/2025 60 37 - 145 ug/dL Final     Iron Binding Capacity   Date Value Ref Range Status   03/19/2025 251 240 - 430 ug/dL Final         Outreach: Call placed to patient but unable to connect or leave voicemail.    Plan: Triage instructions updated and sent to NPS for completion.

## 2025-05-06 NOTE — TELEPHONE ENCOUNTER
Writer called with  ID [id number]. Relayed message below from Art. Patient verbalized understanding and in agreement with plan.     Fermin RIVERA RN  [facility]

## 2025-05-06 NOTE — TELEPHONE ENCOUNTER
With progressive anemia and abnormal labs based on heme/onc e-consult.  Will order formal brooklynn/onc consultation

## 2025-05-06 NOTE — TELEPHONE ENCOUNTER
Dr. Gaspar/Covering Clinician-Please review and advise.  Per chart review, patient is scheduled today for blood transfusion at 1030.    DATE/TIME OF CALL RECEIVED FROM LAB:  05/06/25 at 10:41 AM   LAB TEST:  Hemoglobin  LAB VALUE:  6.7  PROVIDER NOTIFIED?: Yes  PROVIDER NAME: Dr. Gaspar and Dr. Jenkins  DATE/TIME LAB VALUE REPORTED TO PROVIDER: 5/6/2025, 1045  MECHANISM OF PROVIDER NOTIFICATION:  Routed telephone encounter  Thank you!  TATIANA PeckN, RN-[facility]

## 2025-05-06 NOTE — PROGRESS NOTES
Infusion Nursing Note:  May X Daniel presents today for 1 unit PRBCs.    Patient seen by provider today: No   present during visit today: Yes: Kandy.    Note: N/A.      Intravenous Access:  Peripheral IV placed.    Treatment Conditions:  Lab Results   Component Value Date    HGB 6.7 (LL) 05/06/2025    WBC 8.9 05/06/2025    ANEU 4.2 03/21/2025     05/06/2025        Results reviewed, labs MET treatment parameters, ok to proceed with treatment.  Blood transfusion consent signed 3/31/25.      Post Infusion Assessment:  Patient tolerated infusion without incident.  Blood return noted pre and post infusion.  Site patent and intact, free from redness, edema or discomfort.  No evidence of extravasations.  Access discontinued per protocol.       Discharge Plan:   Discharge instructions reviewed with: Patient.  Patient and/or family verbalized understanding of discharge instructions and all questions answered.  AVS to patient via [employer].  Patient discharged in stable condition accompanied by: self.  Departure Mode: Ambulatory with walker.      Alicia Cai RN

## 2025-05-16 ENCOUNTER — TELEPHONE (OUTPATIENT)
Dept: CARDIOLOGY | Facility: CLINIC | Age: 55
End: 2025-05-16
Payer: MEDICARE

## 2025-05-16 ENCOUNTER — MEDICAL CORRESPONDENCE (OUTPATIENT)
Dept: HEALTH INFORMATION MANAGEMENT | Facility: CLINIC | Age: 55
End: 2025-05-16
Payer: MEDICARE

## 2025-05-16 NOTE — TELEPHONE ENCOUNTER
[facility] Call Center    Phone Message    May a detailed message be left on voicemail: yes     Reason for Call: Other: FYI per [facility] care: Patient has edema on legs [facility] care wants to give Dr Roy an update. She has plus 2 edema to both lower legs, she plus 3 edema to feet.  Patient does not have compression socks. Patient has been taking 40 mg of Lasix daily. Legs are cool to touch and shiny, kind of like that leathery look. Patient has what appears to be venous discolorations, especially to right lower leg which patient has noted for the past 2-3 days. Does not complain of an increased SOB. Okay to call patient.     Action Taken: Other: cardio    Travel Screening: Not Applicable     Date of Service:

## 2025-05-16 NOTE — TELEPHONE ENCOUNTER
Spoke with patient with help from . Pt notes having swelling in her feet and thinks she is retaining fluid. Pt reports she is compliant taking the lasix 40mg daily. Pt reports weight as stable, notes her most recent weight today was 197lbs. Pt states slightly more SOB when walking longer distances but is able to walk around house with out SOB.   Pt reports some bruising on her legs.     Advised patient if worsening SOB, increased weight gain or bruising worsening for her to be evaluated at the hospital. Pt verbalized understanding, asking if furosemide should be increased.   Explained will update MD and call back with recommendations.   Pt verbalized understanding and agrees to plan.-judson

## 2025-05-19 NOTE — TELEPHONE ENCOUNTER
Called patient with help from  x2-- unable to reach. Unable to leave voicemail.-judson  ____________  ----- Message -----  From: Feliciano Jimenes  Sent: 5/19/2025   8:22 AM CDT  To: Nancy Geronimo, RN    I think she should be seen in [facility], when I saw her CHF was not an issue    Could you please call her back today and see if she can get an apt asap    Thank you   ----- Message -----  From: Nancy Geronimo, RN  Sent: 5/16/2025   5:02 PM CDT  To: Feliciano Jimenes    ----- Message from Nancy Geronimo RN sent at 5/16/2025  5:02 PM CDT -----    May you please see notes and advise in 's stead?  Thank you.  -judson

## 2025-05-19 NOTE — TELEPHONE ENCOUNTER
Spoke with patient and updated her of 's recommendations. Pt verbalized understanding.  Advised patient if worsening SOB, increased weight gain or bruising worsening for her to be evaluated at the hospital.    Transferred to scheduling to set up [facility] appt. inocencio

## 2025-05-21 ENCOUNTER — TELEPHONE (OUTPATIENT)
Dept: FAMILY MEDICINE | Facility: CLINIC | Age: 55
End: 2025-05-21

## 2025-05-21 DIAGNOSIS — N18.31 TYPE 2 DIABETES MELLITUS WITH STAGE 3A CHRONIC KIDNEY DISEASE, WITHOUT LONG-TERM CURRENT USE OF INSULIN (H): ICD-10-CM

## 2025-05-21 DIAGNOSIS — E55.9 VITAMIN D DEFICIENCY: ICD-10-CM

## 2025-05-21 DIAGNOSIS — E11.22 TYPE 2 DIABETES MELLITUS WITH STAGE 3A CHRONIC KIDNEY DISEASE, WITHOUT LONG-TERM CURRENT USE OF INSULIN (H): ICD-10-CM

## 2025-05-21 NOTE — TELEPHONE ENCOUNTER
Forms/Letter Request    Type of form/letter: OTHER: Authorize new RX       Do we have the form/letter: Yes: PCP in basket    Who is the form from? [facility] (if other please explain)    Where did/will the form come from? form was faxed in    When is form/letter needed by: ASAP    How would you like the form/letter returned: Fax : [phone]    Patient Notified form requests are processed in 5-7 business days:No

## 2025-05-22 NOTE — TELEPHONE ENCOUNTER
[facility] called to check if the form was received and how long the process will take. Informed caller that the form was passed to PCP and will update them once it has been completed.

## 2025-05-27 ENCOUNTER — TELEPHONE (OUTPATIENT)
Dept: FAMILY MEDICINE | Facility: CLINIC | Age: 55
End: 2025-05-27
Payer: MEDICARE

## 2025-05-27 NOTE — TELEPHONE ENCOUNTER
PT will need to list off what meds she is wanting sent to the new pharmacy.  PT will need to list off the med names and doses.  We are not able to address a generic message.   Please list the med names and doses.  Sending to [facility] nurse [place].  Thanks!  Isabell Triage RN  [facility]/ [phone]                  Giovanni Sanchez [facility] Medication Refill Pool - Primary Care         Previous Messages       ----- Message -----  From: Alexandra Rivera RD  Sent: 5/23/2025   1:43 PM CDT  To: [facility] Primary Care Clinic Pool  Subject: change of pharmacy for medications              May requests that her medications be sent to a new pharmacy:    [facility]    Primary #: [phone].  Secondary#: [phone]      Thank you,    Alexandra

## 2025-05-27 NOTE — TELEPHONE ENCOUNTER
Discussed with patient. She is unable to verify which medications she takes or needs transferred to new pharmacy but confirms she has about one month remaining on everything--not in need of urgent refill/transfer. She plans to bring all of her medications with her to office visit with PCP on Friday this week for med review.     Ly Vincent RN BSN  [facility]

## 2025-05-27 NOTE — TELEPHONE ENCOUNTER
pharmacy calls back inquiring about status of request. Writer inform caller message was sent to provider- awaiting for response. Writer will send provider another message. Caller verbalizes understanding.

## 2025-05-28 NOTE — TELEPHONE ENCOUNTER
Looks like pt is trying to switch pharmacy to [facility]?     Please confirm with pt and then set up refills for everything she needs with the correct pharmacy for me to review and sign.

## 2025-05-28 NOTE — TELEPHONE ENCOUNTER
RECORDS STATUS - ALL OTHER DIAGNOSIS      RECORDS RECEIVED FROM: [facility]   NOTES STATUS DETAILS   OFFICE NOTE from referring provider Epic 3/31/25: Dr. Jaky Gaspar   OFFICE NOTE from medical oncologist Epic 3/31/25: Dr. Nette Rodriguez   MEDICATION LIST [facility]    LABS     PATHOLOGY REPORTS Report in Epic Morphology:  3/21/25: [id number]   ANYTHING RELATED TO DIAGNOSIS Epic Most recent 5/23/25

## 2025-05-29 NOTE — TELEPHONE ENCOUNTER
Arian OROPEZA -- did you receive this written fax request in clinic? All the medications needed should be listed on there. If so, please send the list of medications to the RN team for processing.     Pharmacy calling to check status of this. Advised that it is not clear if the fax was received and if patient requested this. We will look into it and call pharmacy if we need further information. Per the 5/27 TE patient did request this but is unable to list the needed prescriptions.     Claudia Rojas RN  [facility]

## 2025-05-30 ENCOUNTER — OFFICE VISIT (OUTPATIENT)
Dept: FAMILY MEDICINE | Facility: CLINIC | Age: 55
End: 2025-05-30
Payer: MEDICARE

## 2025-05-30 ENCOUNTER — ANCILLARY PROCEDURE (OUTPATIENT)
Dept: GENERAL RADIOLOGY | Facility: CLINIC | Age: 55
End: 2025-05-30
Attending: FAMILY MEDICINE
Payer: MEDICARE

## 2025-05-30 ENCOUNTER — LAB (OUTPATIENT)
Dept: CARDIOLOGY | Facility: CLINIC | Age: 55
End: 2025-05-30
Payer: MEDICARE

## 2025-05-30 VITALS
DIASTOLIC BLOOD PRESSURE: 78 MMHG | BODY MASS INDEX: 39.72 KG/M2 | RESPIRATION RATE: 21 BRPM | TEMPERATURE: 97.6 F | OXYGEN SATURATION: 99 % | SYSTOLIC BLOOD PRESSURE: 119 MMHG | HEIGHT: 59 IN | WEIGHT: 197 LBS | HEART RATE: 90 BPM

## 2025-05-30 DIAGNOSIS — M54.41 CHRONIC MIDLINE LOW BACK PAIN WITH BILATERAL SCIATICA: ICD-10-CM

## 2025-05-30 DIAGNOSIS — I42.8 NONISCHEMIC CARDIOMYOPATHY (H): ICD-10-CM

## 2025-05-30 DIAGNOSIS — R12 HEARTBURN: ICD-10-CM

## 2025-05-30 DIAGNOSIS — M54.42 CHRONIC MIDLINE LOW BACK PAIN WITH BILATERAL SCIATICA: ICD-10-CM

## 2025-05-30 DIAGNOSIS — N18.31 STAGE 3A CHRONIC KIDNEY DISEASE (H): ICD-10-CM

## 2025-05-30 DIAGNOSIS — I10 ESSENTIAL HYPERTENSION: ICD-10-CM

## 2025-05-30 DIAGNOSIS — K21.9 GASTROESOPHAGEAL REFLUX DISEASE WITHOUT ESOPHAGITIS: ICD-10-CM

## 2025-05-30 DIAGNOSIS — D53.9 MACROCYTIC ANEMIA: ICD-10-CM

## 2025-05-30 DIAGNOSIS — I50.20 HEART FAILURE WITH REDUCED EJECTION FRACTION (H): ICD-10-CM

## 2025-05-30 DIAGNOSIS — E11.22 TYPE 2 DIABETES MELLITUS WITH STAGE 3A CHRONIC KIDNEY DISEASE, WITHOUT LONG-TERM CURRENT USE OF INSULIN (H): ICD-10-CM

## 2025-05-30 DIAGNOSIS — N18.31 TYPE 2 DIABETES MELLITUS WITH STAGE 3A CHRONIC KIDNEY DISEASE, WITHOUT LONG-TERM CURRENT USE OF INSULIN (H): ICD-10-CM

## 2025-05-30 DIAGNOSIS — E11.42 TYPE 2 DIABETES MELLITUS WITH DIABETIC POLYNEUROPATHY, WITHOUT LONG-TERM CURRENT USE OF INSULIN (H): ICD-10-CM

## 2025-05-30 DIAGNOSIS — Z12.31 VISIT FOR SCREENING MAMMOGRAM: Primary | ICD-10-CM

## 2025-05-30 DIAGNOSIS — G89.29 CHRONIC MIDLINE LOW BACK PAIN WITH BILATERAL SCIATICA: ICD-10-CM

## 2025-05-30 DIAGNOSIS — Z23 NEED FOR VACCINATION: ICD-10-CM

## 2025-05-30 DIAGNOSIS — Z12.11 SCREEN FOR COLON CANCER: ICD-10-CM

## 2025-05-30 DIAGNOSIS — I25.119 CORONARY ARTERY DISEASE INVOLVING NATIVE CORONARY ARTERY OF NATIVE HEART WITH ANGINA PECTORIS: ICD-10-CM

## 2025-05-30 DIAGNOSIS — M1A.0710 CHRONIC GOUT OF RIGHT FOOT, UNSPECIFIED CAUSE: ICD-10-CM

## 2025-05-30 DIAGNOSIS — G44.209 TENSION HEADACHE: ICD-10-CM

## 2025-05-30 LAB
ANION GAP SERPL CALCULATED.3IONS-SCNC: 14 MMOL/L (ref 7–15)
BUN SERPL-MCNC: 27.4 MG/DL (ref 6–20)
CALCIUM SERPL-MCNC: 8.7 MG/DL (ref 8.8–10.4)
CHLORIDE SERPL-SCNC: 107 MMOL/L (ref 98–107)
CREAT SERPL-MCNC: 0.8 MG/DL (ref 0.51–0.95)
EGFRCR SERPLBLD CKD-EPI 2021: 87 ML/MIN/1.73M2
ERYTHROCYTE [DISTWIDTH] IN BLOOD BY AUTOMATED COUNT: 17.5 % (ref 10–15)
FOLATE SERPL-MCNC: 9.1 NG/ML (ref 4.6–34.8)
GLUCOSE SERPL-MCNC: 257 MG/DL (ref 70–99)
HCO3 SERPL-SCNC: 20 MMOL/L (ref 22–29)
HCT VFR BLD AUTO: 24.2 % (ref 35–47)
HGB BLD-MCNC: 7 G/DL (ref 11.7–15.7)
MCH RBC QN AUTO: 24.6 PG (ref 26.5–33)
MCHC RBC AUTO-ENTMCNC: 28.9 G/DL (ref 31.5–36.5)
MCV RBC AUTO: 85 FL (ref 78–100)
PLATELET # BLD AUTO: 335 10E3/UL (ref 150–450)
POTASSIUM SERPL-SCNC: 4.6 MMOL/L (ref 3.4–5.3)
RBC # BLD AUTO: 2.84 10E6/UL (ref 3.8–5.2)
SODIUM SERPL-SCNC: 141 MMOL/L (ref 135–145)
WBC # BLD AUTO: 9.9 10E3/UL (ref 4–11)

## 2025-05-30 PROCEDURE — 80048 BASIC METABOLIC PNL TOTAL CA: CPT

## 2025-05-30 PROCEDURE — T1013 SIGN LANG/ORAL INTERPRETER: HCPCS | Performed by: INTERPRETER

## 2025-05-30 PROCEDURE — 91320 SARSCV2 VAC 30MCG TRS-SUC IM: CPT | Performed by: FAMILY MEDICINE

## 2025-05-30 PROCEDURE — G2211 COMPLEX E/M VISIT ADD ON: HCPCS | Performed by: FAMILY MEDICINE

## 2025-05-30 PROCEDURE — 99214 OFFICE O/P EST MOD 30 MIN: CPT | Mod: 25 | Performed by: FAMILY MEDICINE

## 2025-05-30 PROCEDURE — 82607 VITAMIN B-12: CPT | Performed by: FAMILY MEDICINE

## 2025-05-30 PROCEDURE — 82746 ASSAY OF FOLIC ACID SERUM: CPT | Performed by: FAMILY MEDICINE

## 2025-05-30 PROCEDURE — 72100 X-RAY EXAM L-S SPINE 2/3 VWS: CPT | Mod: TC | Performed by: RADIOLOGY

## 2025-05-30 PROCEDURE — 84443 ASSAY THYROID STIM HORMONE: CPT | Performed by: FAMILY MEDICINE

## 2025-05-30 PROCEDURE — 3074F SYST BP LT 130 MM HG: CPT | Performed by: FAMILY MEDICINE

## 2025-05-30 PROCEDURE — 86618 LYME DISEASE ANTIBODY: CPT | Performed by: FAMILY MEDICINE

## 2025-05-30 PROCEDURE — 85027 COMPLETE CBC AUTOMATED: CPT | Performed by: FAMILY MEDICINE

## 2025-05-30 PROCEDURE — 3078F DIAST BP <80 MM HG: CPT | Performed by: FAMILY MEDICINE

## 2025-05-30 PROCEDURE — 36415 COLL VENOUS BLD VENIPUNCTURE: CPT

## 2025-05-30 PROCEDURE — 84550 ASSAY OF BLOOD/URIC ACID: CPT | Performed by: FAMILY MEDICINE

## 2025-05-30 PROCEDURE — 90480 ADMN SARSCOV2 VAC 1/ONLY CMP: CPT | Performed by: FAMILY MEDICINE

## 2025-05-30 RX ORDER — VENLAFAXINE HYDROCHLORIDE 150 MG/1
150 CAPSULE, EXTENDED RELEASE ORAL DAILY
Qty: 90 CAPSULE | Refills: 4 | Status: SHIPPED | OUTPATIENT
Start: 2025-05-30

## 2025-05-30 RX ORDER — OMEPRAZOLE 40 MG/1
CAPSULE, DELAYED RELEASE ORAL
Qty: 90 CAPSULE | Refills: 1 | Status: SHIPPED | OUTPATIENT
Start: 2025-05-30

## 2025-05-30 RX ORDER — METOPROLOL SUCCINATE 100 MG/1
100 TABLET, EXTENDED RELEASE ORAL DAILY
Qty: 90 TABLET | Refills: 1 | Status: SHIPPED | OUTPATIENT
Start: 2025-05-30

## 2025-05-30 RX ORDER — EZETIMIBE 10 MG/1
10 TABLET ORAL DAILY
Qty: 90 TABLET | Refills: 1 | Status: SHIPPED | OUTPATIENT
Start: 2025-05-30

## 2025-05-30 RX ORDER — ALLOPURINOL 300 MG/1
300 TABLET ORAL DAILY
Qty: 90 TABLET | Refills: 1 | Status: SHIPPED | OUTPATIENT
Start: 2025-05-30

## 2025-05-30 RX ORDER — ATORVASTATIN CALCIUM 80 MG/1
TABLET, FILM COATED ORAL
Qty: 90 TABLET | Refills: 1 | Status: SHIPPED | OUTPATIENT
Start: 2025-05-30

## 2025-05-30 RX ORDER — METFORMIN HYDROCHLORIDE 500 MG/1
1000 TABLET, EXTENDED RELEASE ORAL 2 TIMES DAILY WITH MEALS
Qty: 360 TABLET | Refills: 1 | Status: SHIPPED | OUTPATIENT
Start: 2025-05-30

## 2025-05-30 RX ORDER — ISOSORBIDE MONONITRATE 60 MG/1
60 TABLET, EXTENDED RELEASE ORAL DAILY
Qty: 90 TABLET | Refills: 1 | Status: SHIPPED | OUTPATIENT
Start: 2025-05-30

## 2025-05-30 RX ORDER — FAMOTIDINE 20 MG/1
20 TABLET, FILM COATED ORAL 2 TIMES DAILY PRN
Qty: 180 TABLET | Refills: 1 | Status: SHIPPED | OUTPATIENT
Start: 2025-05-30

## 2025-05-30 RX ORDER — LOSARTAN POTASSIUM 25 MG/1
25 TABLET ORAL DAILY
Qty: 90 TABLET | Refills: 1 | Status: SHIPPED | OUTPATIENT
Start: 2025-05-30

## 2025-05-30 ASSESSMENT — PATIENT HEALTH QUESTIONNAIRE - PHQ9: SUM OF ALL RESPONSES TO PHQ QUESTIONS 1-9: 5

## 2025-05-30 NOTE — PROGRESS NOTES
Assessment & Plan     Need for vaccination  ***    Visit for screening mammogram  ***  - MA Screening Bilateral w/ Get    Screen for colon cancer  ***  - Fecal colorectal cancer screen FIT - Future (S+30)    Type 2 diabetes mellitus with diabetic polyneuropathy, without long-term current use of insulin (H)  ***  - tirzepatide (MOUNJARO) 12.5 MG/0.5ML SOAJ auto-injector pen  Dispense: 6 mL; Refill: 1    Stage 3a chronic kidney disease (H)  ***  - losartan (COZAAR) 25 MG tablet  Dispense: 90 tablet; Refill: 1    Macrocytic anemia  ***  - CBC with platelets  - TSH with free T4 reflex  - Vitamin B12  - Folate    Chronic gout of right foot, unspecified cause  ***  - Uric acid  - allopurinol (ZYLOPRIM) 300 MG tablet  Dispense: 90 tablet; Refill: 1    Type 2 diabetes mellitus with stage 3a chronic kidney disease, without long-term current use of insulin (H)  ***  - metFORMIN (GLUCOPHAGE XR) 500 MG 24 hr tablet  Dispense: 360 tablet; Refill: 1    Chronic midline low back pain with bilateral sciatica  ***  - XR Lumbar Spine 2/3 Views  - LYME DISEASE TOTAL ANTIBODIES WITH REFLEX TO CONFIRMATION    Tension headache  ***  - venlafaxine (EFFEXOR XR) 150 MG 24 hr capsule  Dispense: 90 capsule; Refill: 4    Heartburn  ***  - famotidine (PEPCID) 20 MG tablet  Dispense: 180 tablet; Refill: 1    Heart failure with reduced ejection fraction (H)  ***  - isosorbide mononitrate (IMDUR) 60 MG 24 hr tablet  Dispense: 90 tablet; Refill: 1  - metoprolol succinate ER (TOPROL XL) 100 MG 24 hr tablet  Dispense: 90 tablet; Refill: 1  - losartan (COZAAR) 25 MG tablet  Dispense: 90 tablet; Refill: 1    Essential hypertension  ***  - metoprolol succinate ER (TOPROL XL) 100 MG 24 hr tablet  Dispense: 90 tablet; Refill: 1    Nonischemic cardiomyopathy (H)  ***  - atorvastatin (LIPITOR) 80 MG tablet  Dispense: 90 tablet; Refill: 1    Coronary artery disease involving native coronary artery of native heart with angina pectoris  ***  - ezetimibe  (ZETIA) 10 MG tablet  Dispense: 90 tablet; Refill: 1    Gastroesophageal reflux disease without esophagitis  ***  - omeprazole (PRILOSEC) 40 MG DR capsule  Dispense: 90 capsule; Refill: 1              Follow-up       Subjective   May is a 54 year old, presenting for the following health issues:  RECHECK (WANT dm supplies to be send to new pharmacy  )        5/30/2025     3:42 PM   Additional Questions   Roomed by M   Accompanied by SELF     History of Present Illness       Reason for visit:  F/U    She eats 2-3 servings of fruits and vegetables daily.She consumes 0 sweetened beverage(s) daily.   She is taking medications regularly.      Diabetes- cgm not covered.  Checking sugars daily fasting or after eating.    Fasting 120-130s up to 200s.  After eating 150-300.  Mounjaro is currently 10mg  weekly and doing okay on this without a lot of nausea.  Weight stable.      Heart- taking water pill daily and peeing a lot.  Swelling comes back any time she sits or walks.  Swelling better in the morning before she gets out of bed.  Follow-up with cardiology 6/13.  Does have compression stockings but doesn't always wear because hot and uncomfortable when she leaves the house. Taking lasix 40mg daily.       Working with M- next visit 6/20.  Reviewed note from earlier this month.      Anemia- did get 1uprbc and notes that it helped her feet not feel so cold but otherwise no major changes.  Has oncology visit 7/18 and is willing to move this sooner after our discussion today.  Chest CT follow-up 7/14/25 to follow-up on lung nodule along with follow-up with pulmonary clinic on this date.      Low back pain and leg pain.  Wondering if there is anything that can be prescribed.  Hard to walk due to pain.  Using seated walker more.  Did get hip xray and knee xray 3/2025 at her last visit.  Pain is in lower back and move down towards the legs.  Knees are weak.  Hard getting in to the car has to sit and then physically lift legs in  "to the car from the ground.      Bruising easily.  Legs are swollen and has been massaging her legs and getting bruised easily.  Legs are tingling.      Wants meds sent to mail order pharmacy         Objective    /78 (BP Location: Right arm, Patient Position: Sitting, Cuff Size: Adult Regular)   Pulse 90   Temp 97.6  F (36.4  C) (Temporal)   Resp 21   Ht 1.49 m (4' 10.66\")   Wt 89.4 kg (197 lb)   SpO2 99%   BMI 40.25 kg/m    Body mass index is 40.25 kg/m .  Physical Exam   Complete 10 point ROS completed today as part of the exam and patient denies any symptoms as reviewed in HPI     Wt Readings from Last 3 Encounters:   05/30/25 89.4 kg (197 lb)   05/23/25 88.9 kg (196 lb)   05/23/25 88.5 kg (195 lb)    Weight 3/31 200lb     No LMP recorded. Patient is postmenopausal.    All normal as below except abnormalities include: ***  General is a  54 year old sitting comfortably in no apparent distress   HEENT:  TM are clear bilaterally.  Eye exams within normal   Neck: Supple without lymphadenopathy or thyromegally  CV: Regular rate and rhythm S1S2 without rubs, murmurs or gallops,   Lungs: Clear to auscultation bilaterally  Abd:  +BS, soft NT/ND,  No masses or organomegally,   Extremities: Warm, No Edema, 2+ Pedal and radial pulses bilaterally  Skin: No lesions or rashes noted  Neuro: Able to ambulate around the exam room with equal movement, strength and normal coordination of the upper and lower extremeties symmetrically  Pelvic:*** Deferred as pt is asymptomatic and not due for screening   Normal external genitalia.  Healthy normal vaginal mucosa.  Health appearing cervix.  Testing obtained without pain or difficulty.      Breast: Normal breast exam.  No nipple changes, breast appear symmetric without nodules/lumps or skin changes. No lymphadenopathy noted.      Independent historian: ***    History summarized from1-2:***  Old Records-1: Outside allergies, meds, problems and immunizations were reconciled " as needed from CareEverywhere  ***  Radiology tests reviewed-1: ***  Lab tests reviewed-1: ***  Medicine tests reviewed-1: ***    Jaky Gaspar MD             Signed Electronically by: Jaky Gaspar MD  {Email feedback regarding this note to [email or web address]   :[id number]}   "regularly.      Diabetes- cgm not covered.  Checking sugars daily fasting or after eating.    Fasting 120-130s up to 200s.  After eating 150-300.  Mounjaro is currently 10mg  weekly and doing okay on this without a lot of nausea.  Weight stable.      Heart- taking water pill daily and peeing a lot.  Swelling comes back any time she sits or walks.  Swelling better in the morning before she gets out of bed.  Follow-up with cardiology 6/13.  Does have compression stockings but doesn't always wear because hot and uncomfortable when she leaves the house. Taking lasix 40mg daily.       Working with MFM- next visit 6/20.  Reviewed note from earlier this month.      Anemia- did get 1uprbc and notes that it helped her feet not feel so cold but otherwise no major changes.  Has oncology visit 7/18 and is willing to move this sooner after our discussion today.  Chest CT follow-up 7/14/25 to follow-up on lung nodule along with follow-up with pulmonary clinic on this date.      Low back pain and leg pain.  Wondering if there is anything that can be prescribed.  Hard to walk due to pain.  Using seated walker more.  Did get hip xray and knee xray 3/2025 at her last visit.  Pain is in lower back and move down towards the legs.  Knees are weak.  Hard getting in to the car has to sit and then physically lift legs in to the car from the ground.      Bruising easily.  Legs are swollen and has been massaging her legs and getting bruised easily.  Legs are tingling.      Wants meds sent to mail order pharmacy         Objective    /78 (BP Location: Right arm, Patient Position: Sitting, Cuff Size: Adult Regular)   Pulse 90   Temp 97.6  F (36.4  C) (Temporal)   Resp 21   Ht 1.49 m (4' 10.66\")   Wt 89.4 kg (197 lb)   SpO2 99%   BMI 40.25 kg/m    Body mass index is 40.25 kg/m .  Physical Exam   Complete 10 point ROS completed today as part of the exam and patient denies any symptoms as reviewed in HPI     Wt Readings from Last 3 " Encounters:   05/30/25 89.4 kg (197 lb)   05/23/25 88.9 kg (196 lb)   05/23/25 88.5 kg (195 lb)    Weight 3/31 200lb     No LMP recorded. Patient is postmenopausal.    All normal as below except abnormalities include: pt appears tired but improved from previous visits- more energy, improved mood and improved color to face but still pale compared to her normal baseline.  Exam grossly normal today.    General is a  54 year old sitting comfortably in no apparent distress   HEENT:  TM are clear bilaterally.  Eye exams within normal   Neck: Supple without lymphadenopathy or thyromegally  CV: Regular rate and rhythm S1S2 without rubs, murmurs or gallops,   Lungs: Clear to auscultation bilaterally  Abd:  +BS, soft NT/ND,  No masses or organomegally,   Extremities: Warm, No Edema, 2+ Pedal and radial pulses bilaterally  Skin: No lesions or rashes noted  Neuro: Able to ambulate around the exam room with equal movement, strength and normal coordination of the upper and lower extremeties symmetrically    History summarized from1-2:cardiology 5/2025 reviewed.    Old Records-1: Outside allergies, meds, problems and immunizations were reconciled as needed from CareEverywhere  Radiology tests reviewed-1: hip and knee xrays reviewed from 3/2025  Lab tests reviewed-1: 2025    Jaky Gaspar MD             Signed Electronically by: Jaky Gaspar MD

## 2025-05-30 NOTE — PROGRESS NOTES
Prior to immunization administration, verified patients identity using patient s name and date of birth. Please see Immunization Activity for additional information.     Screening Questionnaire for Adult Immunization    Are you sick today?   No   Do you have allergies to medications, food, a vaccine component or latex?   No   Have you ever had a serious reaction after receiving a vaccination?   No   Do you have a long-term health problem with heart, lung, kidney, or metabolic disease (e.g., diabetes), asthma, a blood disorder, no spleen, complement component deficiency, a cochlear implant, or a spinal fluid leak?  Are you on long-term aspirin therapy?   Yes   Do you have cancer, leukemia, HIV/AIDS, or any other immune system problem?   No   Do you have a parent, brother, or sister with an immune system problem?   No   In the past 3 months, have you taken medications that affect  your immune system, such as prednisone, other steroids, or anticancer drugs; drugs for the treatment of rheumatoid arthritis, Crohn s disease, or psoriasis; or have you had radiation treatments?   No   Have you had a seizure, or a brain or other nervous system problem?   No   During the past year, have you received a transfusion of blood or blood    products, or been given immune (gamma) globulin or antiviral drug?   No   For women: Are you pregnant or is there a chance you could become       pregnant during the next month?   No   Have you received any vaccinations in the past 4 weeks?   No     Immunization questionnaire was positive for at least one answer.  Notified .      Patient instructed to remain in clinic for 15 minutes afterwards, and to report any adverse reactions.     Screening performed by ANN Jc MA on 5/30/2025 at 3:43 PM.

## 2025-05-31 LAB
TSH SERPL DL<=0.005 MIU/L-ACNC: 0.81 UIU/ML (ref 0.3–4.2)
URATE SERPL-MCNC: 4.1 MG/DL (ref 2.4–5.7)
VIT B12 SERPL-MCNC: 386 PG/ML (ref 232–1245)

## 2025-06-02 ENCOUNTER — PATIENT OUTREACH (OUTPATIENT)
Dept: CARE COORDINATION | Facility: CLINIC | Age: 55
End: 2025-06-02
Payer: MEDICARE

## 2025-06-02 LAB — B BURGDOR IGG+IGM SER QL: 0.15

## 2025-06-02 RX ORDER — ASPIRIN 81 MG/1
TABLET, COATED ORAL
Qty: 90 TABLET | Refills: 11 | Status: SHIPPED | OUTPATIENT
Start: 2025-06-02

## 2025-06-02 NOTE — TELEPHONE ENCOUNTER
Writer called and spoke to patient.  Pt confirmed she is still taking Asprin 81 mg.     Routed to PCP to review.    Fermin RIVERA RN  [facility]

## 2025-06-03 ENCOUNTER — RESULTS FOLLOW-UP (OUTPATIENT)
Dept: OBGYN | Facility: CLINIC | Age: 55
End: 2025-06-03
Payer: MEDICARE

## 2025-06-03 ENCOUNTER — RESULTS FOLLOW-UP (OUTPATIENT)
Dept: CARDIOLOGY | Facility: CLINIC | Age: 55
End: 2025-06-03
Payer: MEDICARE

## 2025-06-03 DIAGNOSIS — M1A.0710 CHRONIC GOUT OF RIGHT FOOT, UNSPECIFIED CAUSE: ICD-10-CM

## 2025-06-03 RX ORDER — COLCHICINE 0.6 MG/1
0.6 TABLET ORAL
Qty: 60 TABLET | Refills: 1 | Status: SHIPPED | OUTPATIENT
Start: 2025-06-03

## 2025-06-03 NOTE — TELEPHONE ENCOUNTER
Clinic RN: Please investigate patient's chart or contact patient if the information cannot be found because last Rx was sent on 5/2/25. Patient should have 1 refill left and should contact her pharmacy.     Fermin RIVERA RN  [facility]

## 2025-06-03 NOTE — TELEPHONE ENCOUNTER
Writer called with  ID [id number]. Relayed message below from Dr. Gaspar. Patient verbalized understanding and in agreement with plan. Scheduled lab only appointment on 6/6/25 at 1400.    Fermin RIVERA RN  [facility]

## 2025-06-03 NOTE — RESULT ENCOUNTER NOTE
Team - please call patient with results and send result letter with this information if patient desires for their records. Refer to [facility] result note as needed.      Her hgb is a little lower but not low enough to need a transfusion  I would like her to repeat her blood test end of the week with a lab only ordered today     Her gout level is better- stay on her gout pill     Normal thyroid level     B12 is low- is she still taking her b12 pill daily?

## 2025-06-03 NOTE — TELEPHONE ENCOUNTER
Contacted patient using an  and she is switching pharmacies. Unable to transfer to remaining refill to mail order pharmacy.  Medication refilled.    Estefanía Salcedo RN  [facility]

## 2025-06-06 NOTE — TELEPHONE ENCOUNTER
Pharmacy received the refill request except two, which I created a new TE for medication refill request. Completing task.

## 2025-06-09 ENCOUNTER — APPOINTMENT (OUTPATIENT)
Dept: INTERPRETER SERVICES | Facility: CLINIC | Age: 55
End: 2025-06-09
Payer: MEDICARE

## 2025-06-09 ENCOUNTER — PATIENT OUTREACH (OUTPATIENT)
Dept: GASTROENTEROLOGY | Facility: CLINIC | Age: 55
End: 2025-06-09
Payer: MEDICARE

## 2025-06-09 DIAGNOSIS — Z12.11 SPECIAL SCREENING FOR MALIGNANT NEOPLASMS, COLON: Primary | ICD-10-CM

## 2025-06-09 NOTE — PROGRESS NOTES
"Patient has had a positive FIT. Screening colonoscopy is being recommended to be completed within 3 months per protocol. Patient notified via US mail.    [facility] Screening Colonoscopy Referral Review    Patient meets the inclusion criteria for screening colonoscopy standing order.    Ordering/Referring Provider:  Jaky Gaspar      BMI: Estimated body mass index is 40.25 kg/m  as calculated from the following:    Height as of 5/30/25: 1.49 m (4' 10.66\").    Weight as of 5/30/25: 89.4 kg (197 lb).     Sedation:  Does patient have any of the following conditions affecting sedation?  No medical conditions affecting sedation.    Previous Scopes:  Any previous recommendations or follow up needs based on previous scope?  na / No recommendations.    Medical Concerns to Postpone Order:  Does patient have any of the following medical concerns that should postpone/delay colonoscopy referral?  No medical conditions affecting colonoscopy referral.    Final Referral Details:  Based on patient's medical history patient is appropriate for referral order with moderate sedation. If patient's BMI > 50 do not schedule in ASC.  "

## 2025-06-10 ENCOUNTER — HOSPITAL ENCOUNTER (OUTPATIENT)
Facility: CLINIC | Age: 55
End: 2025-06-10
Attending: COLON & RECTAL SURGERY | Admitting: COLON & RECTAL SURGERY
Payer: MEDICARE

## 2025-06-10 ENCOUNTER — TELEPHONE (OUTPATIENT)
Dept: GASTROENTEROLOGY | Facility: CLINIC | Age: 55
End: 2025-06-10
Payer: MEDICARE

## 2025-06-10 NOTE — TELEPHONE ENCOUNTER
"Endoscopy Scheduling Screen    Caller: patient    Have you had any respiratory illness or flu-like symptoms in the last 10 days?  No    What is your communication preference for Instructions and/or Bowel Prep?   Mail/USPS    What insurance is in the chart?  Other:  MEDICARE    Ordering/Referring Provider: ALEKSANDER VELASCO   (If ordering provider performs procedure, schedule with ordering provider unless otherwise instructed. )    BMI: Estimated body mass index is 40.25 kg/m  as calculated from the following:    Height as of 5/30/25: 1.49 m (4' 10.66\").    Weight as of 5/30/25: 89.4 kg (197 lb).     Sedation Ordered  moderate sedation.   If patient BMI > 50 do not schedule in ASC.    If patient BMI > 45 do not schedule at [facility].    Are you taking methadone or Suboxone?  NO, No RN review required.    Have you been diagnosed and are being treated for severe PTSD or severe anxiety?  NO, No RN review required.    Are you taking any prescription medications for pain 3 or more times per week?   NO, No RN review required.    Do you have a history of malignant hyperthermia?  No    (Females) Are you currently pregnant?   No     Have you been diagnosed or told you have pulmonary hypertension?   No    Do you have an LVAD?  No    Have you been told you have moderate to severe sleep apnea?  No.    Have you been told you have COPD, asthma, or any other lung disease?  No    Has your doctor ordered any cardiac tests like echo, angiogram, stress test, ablation, or EKG, that you have not completed yet?  No    Do you  have a history of any heart conditions?  Yes     Have you had any hospitalizations  in the last year for heart related issues, for example a stent placement, heart attack, or cardiomyopathy?  No    Do you have any implantable devices in your body (pacemaker, ICD)?  No    Do you take nitroglycerine?  Yes, more than 1 time per week (Hospital Only)    Have you ever had or are you waiting for an organ transplant?  No. Continue " "scheduling, no site restrictions.    Have you had a stroke or transient ischemic attack (TIA aka \"mini stroke\") in the last 2 years?   No.    Have you been diagnosed with or been told you have cirrhosis of the liver?   No.    Are you currently on dialysis?   No    Do you need assistance transferring?   Yes (Hospital Only)    BMI: Estimated body mass index is 40.25 kg/m  as calculated from the following:    Height as of 5/30/25: 1.49 m (4' 10.66\").    Weight as of 5/30/25: 89.4 kg (197 lb).     Is patients BMI > 40 and scheduling location [facility]?  Yes (If MAC sedation is ordered, schedule PAC eval)    Do you take an injectable or oral medication for weight loss or diabetes (excluding insulin)?  No    Do you take the medication Naltrexone?  No    Do you take blood thinners?  No       Prep   Are you currently on dialysis or do you have chronic kidney disease?  No    Do you have a diagnosis of diabetes?  Yes (Golytely Prep)    Do you have a diagnosis of cystic fibrosis (CF)?  No    On a regular basis do you go 3 -5 days between bowel movements?  No    BMI > 40?  Yes (Extended Prep)    Preferred Pharmacy:    [facility] - [place], [place] - [address] [[id number]]         Final Scheduling Details     Procedure scheduled  Colonoscopy    Surgeon:  LARON     Date of procedure:  7/18/25     Pre-OP / PAC:   No - Not required for this site.    Location  SH - Per exclusion criteria.    Sedation   Moderate Sedation - Per order.      Patient Reminders:   You will receive a call from a Nurse to review instructions and health history.  This assessment must be completed prior to your procedure.  Failure to complete the Nurse assessment may result in the procedure being cancelled.      On the day of your procedure, please designate an adult(s) who can drive you home stay with you for the next 24 hours. The medicines used in the exam will make you sleepy. You will not be able to drive.      You cannot take public " transportation, ride share services, or non-medical taxi service without a responsible caregiver.  Medical transport services are allowed with the requirement that a responsible caregiver will receive you at your destination.  We require that drivers and caregivers are confirmed prior to your procedure.

## 2025-06-10 NOTE — TELEPHONE ENCOUNTER
Caller: Leora Sanchez  via [facility] Representative w/    Reason for Reschedule/Cancellation (please be detailed, any staff messages or encounters to note?):   Conflicting appointment    Did you cancel or rescheduled an EUS procedure? No.    Is screening questionnaire older than 3 months from the reschedule date.   If Yes, please complete screening questionnaire. No    Prior to reschedule please review:  Ordering Provider: ANGE VELASCO  Sedation Determined: CS  Does patient have any ASC Exclusions, please identify?: YES, LOW HEMOGLOBIN    Notes on Cancelled Procedure:  Procedure: Lower Endoscopy [Colonoscopy]   Date: 7/18/25  Location: [facility]; [address] [place] [address]   Surgeon: LARON    Rescheduled: Yes,   Procedure: Lower Endoscopy [Colonoscopy]    Date: 7/11/25   Location: [facility]; [address] [place] [address]    Surgeon: GINO   Sedation Level Scheduled  CS ,  Reason for Sedation Level PER ORDER   Instructions updated and sent: YES     Does patient need PAC or Pre -Op Rescheduled? : NO

## 2025-06-13 ENCOUNTER — HOSPITAL ENCOUNTER (INPATIENT)
Facility: HOSPITAL | Age: 55
LOS: 4 days | Discharge: HOME | DRG: 811 | End: 2025-06-17
Attending: EMERGENCY MEDICINE
Payer: MEDICARE

## 2025-06-13 ENCOUNTER — APPOINTMENT (OUTPATIENT)
Dept: RADIOLOGY | Facility: HOSPITAL | Age: 55
DRG: 811 | End: 2025-06-13
Attending: STUDENT IN AN ORGANIZED HEALTH CARE EDUCATION/TRAINING PROGRAM
Payer: MEDICARE

## 2025-06-13 DIAGNOSIS — K25.4 GASTROINTESTINAL HEMORRHAGE ASSOCIATED WITH GASTRIC ULCER: ICD-10-CM

## 2025-06-13 DIAGNOSIS — D50.0 IRON DEFICIENCY ANEMIA DUE TO CHRONIC BLOOD LOSS: Primary | ICD-10-CM

## 2025-06-13 DIAGNOSIS — R42 LIGHTHEADEDNESS: ICD-10-CM

## 2025-06-13 DIAGNOSIS — R19.5 POSITIVE FIT (FECAL IMMUNOCHEMICAL TEST): ICD-10-CM

## 2025-06-13 DIAGNOSIS — B37.2 CANDIDIASIS OF SKIN: ICD-10-CM

## 2025-06-13 DIAGNOSIS — R06.09 DOE (DYSPNEA ON EXERTION): ICD-10-CM

## 2025-06-13 DIAGNOSIS — Z71.89 OTHER SPECIFIED COUNSELING: Chronic | ICD-10-CM

## 2025-06-13 DIAGNOSIS — D64.9 ANEMIA, UNSPECIFIED TYPE: ICD-10-CM

## 2025-06-13 DIAGNOSIS — I50.20 HEART FAILURE WITH REDUCED EJECTION FRACTION (H): ICD-10-CM

## 2025-06-13 DIAGNOSIS — N18.31 STAGE 3A CHRONIC KIDNEY DISEASE (H): ICD-10-CM

## 2025-06-13 LAB
ABO + RH BLD: NORMAL
ANION GAP SERPL CALCULATED.3IONS-SCNC: 15 MMOL/L (ref 7–15)
APTT PPP: 26 SECONDS (ref 22–38)
B-OH-BUTYR SERPL-SCNC: <0.18 MMOL/L
BASOPHILS # BLD AUTO: 0 10E3/UL (ref 0–0.2)
BASOPHILS NFR BLD AUTO: 0 %
BLD GP AB SCN SERPL QL: NEGATIVE
BLD PROD TYP BPU: NORMAL
BLOOD COMPONENT TYPE: NORMAL
BUN SERPL-MCNC: 29 MG/DL (ref 6–20)
BURR CELLS BLD QL SMEAR: SLIGHT
CALCIUM SERPL-MCNC: 8.7 MG/DL (ref 8.8–10.4)
CHLORIDE SERPL-SCNC: 111 MMOL/L (ref 98–107)
CODING SYSTEM: NORMAL
CREAT SERPL-MCNC: 1.13 MG/DL (ref 0.51–0.95)
CROSSMATCH: NORMAL
EGFRCR SERPLBLD CKD-EPI 2021: 57 ML/MIN/1.73M2
ELLIPTOCYTES BLD QL SMEAR: SLIGHT
EOSINOPHIL # BLD AUTO: 0 10E3/UL (ref 0–0.7)
EOSINOPHIL NFR BLD AUTO: 0 %
ERYTHROCYTE [DISTWIDTH] IN BLOOD BY AUTOMATED COUNT: 17.4 % (ref 10–15)
GLUCOSE BLDC GLUCOMTR-MCNC: 122 MG/DL (ref 70–99)
GLUCOSE SERPL-MCNC: 285 MG/DL (ref 70–99)
HCO3 SERPL-SCNC: 17 MMOL/L (ref 22–29)
HCT VFR BLD AUTO: 25.9 % (ref 35–47)
HGB BLD-MCNC: 7.7 G/DL (ref 11.7–15.7)
HOLD SPECIMEN: NORMAL
IMM GRANULOCYTES # BLD: 0.1 10E3/UL
IMM GRANULOCYTES NFR BLD: 1 %
INR PPP: 1.01 (ref 0.85–1.15)
IRON BINDING CAPACITY (ROCHE): 422 UG/DL (ref 240–430)
IRON SATN MFR SERPL: 3 % (ref 15–46)
IRON SERPL-MCNC: 14 UG/DL (ref 37–145)
ISSUE DATE AND TIME: NORMAL
ISSUE DATE AND TIME: NORMAL
LACTATE SERPL-SCNC: 3.9 MMOL/L (ref 0.7–2)
LDH SERPL L TO P-CCNC: 420 U/L (ref 0–250)
LYMPHOCYTES # BLD AUTO: 0.7 10E3/UL (ref 0.8–5.3)
LYMPHOCYTES NFR BLD AUTO: 9 %
MAGNESIUM SERPL-MCNC: 1.8 MG/DL (ref 1.7–2.3)
MCH RBC QN AUTO: 25.6 PG (ref 26.5–33)
MCHC RBC AUTO-ENTMCNC: 29.7 G/DL (ref 31.5–36.5)
MCV RBC AUTO: 86 FL (ref 78–100)
MONOCYTES # BLD AUTO: 0.8 10E3/UL (ref 0–1.3)
MONOCYTES NFR BLD AUTO: 10 %
NEUTROPHILS # BLD AUTO: 6.6 10E3/UL (ref 1.6–8.3)
NEUTROPHILS NFR BLD AUTO: 80 %
NRBC # BLD AUTO: 0.3 10E3/UL
NRBC BLD AUTO-RTO: 4 /100
NT-PROBNP SERPL-MCNC: 234 PG/ML (ref 0–226)
PHOSPHATE SERPL-MCNC: 2.4 MG/DL (ref 2.5–4.5)
PLAT MORPH BLD: ABNORMAL
PLATELET # BLD AUTO: 295 10E3/UL (ref 150–450)
POTASSIUM SERPL-SCNC: 4.5 MMOL/L (ref 3.4–5.3)
PROTHROMBIN TIME: 13.5 SECONDS (ref 11.8–14.8)
RBC # BLD AUTO: 3.01 10E6/UL (ref 3.8–5.2)
RBC MORPH BLD: ABNORMAL
RETICS # AUTO: 0.09 10E6/UL (ref 0.03–0.1)
RETICS/RBC NFR AUTO: 2.9 % (ref 0.5–2)
SODIUM SERPL-SCNC: 143 MMOL/L (ref 135–145)
SPECIMEN EXP DATE BLD: NORMAL
UNIT ABO/RH: NORMAL
UNIT NUMBER: NORMAL
UNIT STATUS: NORMAL
UNIT TYPE ISBT: 6200
WBC # BLD AUTO: 8.3 10E3/UL (ref 4–11)

## 2025-06-13 PROCEDURE — 85027 COMPLETE CBC AUTOMATED: CPT | Performed by: EMERGENCY MEDICINE

## 2025-06-13 PROCEDURE — 85027 COMPLETE CBC AUTOMATED: CPT | Performed by: STUDENT IN AN ORGANIZED HEALTH CARE EDUCATION/TRAINING PROGRAM

## 2025-06-13 PROCEDURE — 82010 KETONE BODYS QUAN: CPT | Performed by: STUDENT IN AN ORGANIZED HEALTH CARE EDUCATION/TRAINING PROGRAM

## 2025-06-13 PROCEDURE — 85610 PROTHROMBIN TIME: CPT | Performed by: EMERGENCY MEDICINE

## 2025-06-13 PROCEDURE — 83615 LACTATE (LD) (LDH) ENZYME: CPT | Performed by: STUDENT IN AN ORGANIZED HEALTH CARE EDUCATION/TRAINING PROGRAM

## 2025-06-13 PROCEDURE — 84100 ASSAY OF PHOSPHORUS: CPT | Performed by: STUDENT IN AN ORGANIZED HEALTH CARE EDUCATION/TRAINING PROGRAM

## 2025-06-13 PROCEDURE — 82728 ASSAY OF FERRITIN: CPT | Performed by: STUDENT IN AN ORGANIZED HEALTH CARE EDUCATION/TRAINING PROGRAM

## 2025-06-13 PROCEDURE — 120N000001 HC R&B MED SURG/OB

## 2025-06-13 PROCEDURE — 85730 THROMBOPLASTIN TIME PARTIAL: CPT | Performed by: EMERGENCY MEDICINE

## 2025-06-13 PROCEDURE — 36415 COLL VENOUS BLD VENIPUNCTURE: CPT | Performed by: EMERGENCY MEDICINE

## 2025-06-13 PROCEDURE — 82746 ASSAY OF FOLIC ACID SERUM: CPT | Performed by: STUDENT IN AN ORGANIZED HEALTH CARE EDUCATION/TRAINING PROGRAM

## 2025-06-13 PROCEDURE — 83605 ASSAY OF LACTIC ACID: CPT | Performed by: STUDENT IN AN ORGANIZED HEALTH CARE EDUCATION/TRAINING PROGRAM

## 2025-06-13 PROCEDURE — 85007 BL SMEAR W/DIFF WBC COUNT: CPT | Performed by: EMERGENCY MEDICINE

## 2025-06-13 PROCEDURE — 80048 BASIC METABOLIC PNL TOTAL CA: CPT | Performed by: EMERGENCY MEDICINE

## 2025-06-13 PROCEDURE — 93005 ELECTROCARDIOGRAM TRACING: CPT | Performed by: EMERGENCY MEDICINE

## 2025-06-13 PROCEDURE — 83010 ASSAY OF HAPTOGLOBIN QUANT: CPT | Performed by: STUDENT IN AN ORGANIZED HEALTH CARE EDUCATION/TRAINING PROGRAM

## 2025-06-13 PROCEDURE — 86900 BLOOD TYPING SEROLOGIC ABO: CPT | Performed by: EMERGENCY MEDICINE

## 2025-06-13 PROCEDURE — 99285 EMERGENCY DEPT VISIT HI MDM: CPT

## 2025-06-13 PROCEDURE — 71045 X-RAY EXAM CHEST 1 VIEW: CPT

## 2025-06-13 PROCEDURE — 36415 COLL VENOUS BLD VENIPUNCTURE: CPT | Performed by: STUDENT IN AN ORGANIZED HEALTH CARE EDUCATION/TRAINING PROGRAM

## 2025-06-13 PROCEDURE — 85045 AUTOMATED RETICULOCYTE COUNT: CPT | Performed by: STUDENT IN AN ORGANIZED HEALTH CARE EDUCATION/TRAINING PROGRAM

## 2025-06-13 PROCEDURE — 250N000011 HC RX IP 250 OP 636: Performed by: EMERGENCY MEDICINE

## 2025-06-13 PROCEDURE — 99223 1ST HOSP IP/OBS HIGH 75: CPT | Performed by: STUDENT IN AN ORGANIZED HEALTH CARE EDUCATION/TRAINING PROGRAM

## 2025-06-13 PROCEDURE — 86923 COMPATIBILITY TEST ELECTRIC: CPT | Performed by: EMERGENCY MEDICINE

## 2025-06-13 PROCEDURE — P9016 RBC LEUKOCYTES REDUCED: HCPCS | Performed by: EMERGENCY MEDICINE

## 2025-06-13 PROCEDURE — 83550 IRON BINDING TEST: CPT | Performed by: STUDENT IN AN ORGANIZED HEALTH CARE EDUCATION/TRAINING PROGRAM

## 2025-06-13 PROCEDURE — 86923 COMPATIBILITY TEST ELECTRIC: CPT | Performed by: FAMILY MEDICINE

## 2025-06-13 PROCEDURE — 83735 ASSAY OF MAGNESIUM: CPT | Performed by: STUDENT IN AN ORGANIZED HEALTH CARE EDUCATION/TRAINING PROGRAM

## 2025-06-13 PROCEDURE — 82607 VITAMIN B-12: CPT | Performed by: STUDENT IN AN ORGANIZED HEALTH CARE EDUCATION/TRAINING PROGRAM

## 2025-06-13 PROCEDURE — 250N000013 HC RX MED GY IP 250 OP 250 PS 637: Performed by: STUDENT IN AN ORGANIZED HEALTH CARE EDUCATION/TRAINING PROGRAM

## 2025-06-13 PROCEDURE — 83880 ASSAY OF NATRIURETIC PEPTIDE: CPT | Performed by: STUDENT IN AN ORGANIZED HEALTH CARE EDUCATION/TRAINING PROGRAM

## 2025-06-13 RX ORDER — ONDANSETRON 2 MG/ML
4 INJECTION INTRAMUSCULAR; INTRAVENOUS EVERY 6 HOURS PRN
Status: DISCONTINUED | OUTPATIENT
Start: 2025-06-13 | End: 2025-06-17 | Stop reason: HOSPADM

## 2025-06-13 RX ORDER — VENLAFAXINE HYDROCHLORIDE 150 MG/1
150 CAPSULE, EXTENDED RELEASE ORAL DAILY
Status: DISCONTINUED | OUTPATIENT
Start: 2025-06-14 | End: 2025-06-17 | Stop reason: HOSPADM

## 2025-06-13 RX ORDER — AMOXICILLIN 250 MG
1 CAPSULE ORAL 2 TIMES DAILY PRN
Status: DISCONTINUED | OUTPATIENT
Start: 2025-06-13 | End: 2025-06-17 | Stop reason: HOSPADM

## 2025-06-13 RX ORDER — FAMOTIDINE 20 MG/1
20 TABLET, FILM COATED ORAL 2 TIMES DAILY PRN
Status: DISCONTINUED | OUTPATIENT
Start: 2025-06-13 | End: 2025-06-17 | Stop reason: HOSPADM

## 2025-06-13 RX ORDER — EPINEPHRINE 1 MG/ML
0.3 INJECTION, SOLUTION INTRAMUSCULAR; SUBCUTANEOUS EVERY 5 MIN PRN
OUTPATIENT
Start: 2025-06-13

## 2025-06-13 RX ORDER — FUROSEMIDE 20 MG/1
40 TABLET ORAL
Status: DISCONTINUED | OUTPATIENT
Start: 2025-06-13 | End: 2025-06-17 | Stop reason: HOSPADM

## 2025-06-13 RX ORDER — POLYETHYLENE GLYCOL 3350 17 G/17G
17 POWDER, FOR SOLUTION ORAL 2 TIMES DAILY PRN
Status: DISCONTINUED | OUTPATIENT
Start: 2025-06-13 | End: 2025-06-17 | Stop reason: HOSPADM

## 2025-06-13 RX ORDER — LOSARTAN POTASSIUM 25 MG/1
25 TABLET ORAL DAILY
Status: DISCONTINUED | OUTPATIENT
Start: 2025-06-14 | End: 2025-06-17 | Stop reason: HOSPADM

## 2025-06-13 RX ORDER — NICOTINE POLACRILEX 4 MG
15-30 LOZENGE BUCCAL
Status: DISCONTINUED | OUTPATIENT
Start: 2025-06-13 | End: 2025-06-17 | Stop reason: HOSPADM

## 2025-06-13 RX ORDER — HEPARIN SODIUM,PORCINE 10 UNIT/ML
5-20 VIAL (ML) INTRAVENOUS DAILY PRN
OUTPATIENT
Start: 2025-06-13

## 2025-06-13 RX ORDER — ALLOPURINOL 300 MG/1
300 TABLET ORAL DAILY
Status: DISCONTINUED | OUTPATIENT
Start: 2025-06-14 | End: 2025-06-17 | Stop reason: HOSPADM

## 2025-06-13 RX ORDER — HEPARIN SODIUM,PORCINE 10 UNIT/ML
5-20 VIAL (ML) INTRAVENOUS DAILY PRN
Status: DISCONTINUED | OUTPATIENT
Start: 2025-06-13 | End: 2025-06-13

## 2025-06-13 RX ORDER — DEXTROSE MONOHYDRATE 25 G/50ML
25-50 INJECTION, SOLUTION INTRAVENOUS
Status: DISCONTINUED | OUTPATIENT
Start: 2025-06-13 | End: 2025-06-17 | Stop reason: HOSPADM

## 2025-06-13 RX ORDER — ASPIRIN 81 MG/1
81 TABLET ORAL DAILY
COMMUNITY

## 2025-06-13 RX ORDER — ONDANSETRON 4 MG/1
4 TABLET, ORALLY DISINTEGRATING ORAL EVERY 6 HOURS PRN
Status: DISCONTINUED | OUTPATIENT
Start: 2025-06-13 | End: 2025-06-17 | Stop reason: HOSPADM

## 2025-06-13 RX ORDER — HEPARIN SODIUM (PORCINE) LOCK FLUSH IV SOLN 100 UNIT/ML 100 UNIT/ML
5 SOLUTION INTRAVENOUS
Status: DISCONTINUED | OUTPATIENT
Start: 2025-06-13 | End: 2025-06-13

## 2025-06-13 RX ORDER — ATORVASTATIN CALCIUM 40 MG/1
80 TABLET, FILM COATED ORAL DAILY
Status: DISCONTINUED | OUTPATIENT
Start: 2025-06-14 | End: 2025-06-17 | Stop reason: HOSPADM

## 2025-06-13 RX ORDER — EZETIMIBE 10 MG/1
10 TABLET ORAL DAILY
Status: DISCONTINUED | OUTPATIENT
Start: 2025-06-14 | End: 2025-06-17 | Stop reason: HOSPADM

## 2025-06-13 RX ORDER — OMEPRAZOLE 40 MG/1
40 CAPSULE, DELAYED RELEASE ORAL DAILY
Status: ON HOLD | COMMUNITY
End: 2025-06-17

## 2025-06-13 RX ORDER — ISOSORBIDE MONONITRATE 30 MG/1
60 TABLET, EXTENDED RELEASE ORAL DAILY
Status: DISCONTINUED | OUTPATIENT
Start: 2025-06-14 | End: 2025-06-17

## 2025-06-13 RX ORDER — CALCIUM CARBONATE 500 MG/1
1000 TABLET, CHEWABLE ORAL 4 TIMES DAILY PRN
Status: DISCONTINUED | OUTPATIENT
Start: 2025-06-13 | End: 2025-06-17 | Stop reason: HOSPADM

## 2025-06-13 RX ORDER — ACETAMINOPHEN 650 MG/1
650 SUPPOSITORY RECTAL EVERY 4 HOURS PRN
Status: DISCONTINUED | OUTPATIENT
Start: 2025-06-13 | End: 2025-06-17 | Stop reason: HOSPADM

## 2025-06-13 RX ORDER — METOPROLOL SUCCINATE 100 MG/1
100 TABLET, EXTENDED RELEASE ORAL DAILY
Status: DISCONTINUED | OUTPATIENT
Start: 2025-06-14 | End: 2025-06-14

## 2025-06-13 RX ORDER — DIPHENHYDRAMINE HYDROCHLORIDE 50 MG/ML
50 INJECTION, SOLUTION INTRAMUSCULAR; INTRAVENOUS
Start: 2025-06-13

## 2025-06-13 RX ORDER — LIDOCAINE 40 MG/G
CREAM TOPICAL
Status: DISCONTINUED | OUTPATIENT
Start: 2025-06-13 | End: 2025-06-17 | Stop reason: HOSPADM

## 2025-06-13 RX ORDER — ATORVASTATIN CALCIUM 80 MG/1
80 TABLET, FILM COATED ORAL DAILY
COMMUNITY

## 2025-06-13 RX ORDER — ACETAMINOPHEN 325 MG/1
650 TABLET ORAL EVERY 4 HOURS PRN
Status: DISCONTINUED | OUTPATIENT
Start: 2025-06-13 | End: 2025-06-17 | Stop reason: HOSPADM

## 2025-06-13 RX ORDER — HEPARIN SODIUM (PORCINE) LOCK FLUSH IV SOLN 100 UNIT/ML 100 UNIT/ML
5 SOLUTION INTRAVENOUS
OUTPATIENT
Start: 2025-06-13

## 2025-06-13 RX ORDER — AMOXICILLIN 250 MG
2 CAPSULE ORAL 2 TIMES DAILY PRN
Status: DISCONTINUED | OUTPATIENT
Start: 2025-06-13 | End: 2025-06-17 | Stop reason: HOSPADM

## 2025-06-13 RX ORDER — ASPIRIN 81 MG/1
81 TABLET ORAL DAILY
Status: DISCONTINUED | OUTPATIENT
Start: 2025-06-14 | End: 2025-06-17 | Stop reason: HOSPADM

## 2025-06-13 RX ADMIN — PANTOPRAZOLE SODIUM 40 MG: 40 INJECTION, POWDER, FOR SOLUTION INTRAVENOUS at 18:30

## 2025-06-13 RX ADMIN — FUROSEMIDE 40 MG: 20 TABLET ORAL at 21:51

## 2025-06-13 ASSESSMENT — ACTIVITIES OF DAILY LIVING (ADL)
ADLS_ACUITY_SCORE: 48
ADLS_ACUITY_SCORE: 56
ADLS_ACUITY_SCORE: 48
ADLS_ACUITY_SCORE: 56
ADLS_ACUITY_SCORE: 48

## 2025-06-13 ASSESSMENT — COLUMBIA-SUICIDE SEVERITY RATING SCALE - C-SSRS
2. HAVE YOU ACTUALLY HAD ANY THOUGHTS OF KILLING YOURSELF IN THE PAST MONTH?: NO
6. HAVE YOU EVER DONE ANYTHING, STARTED TO DO ANYTHING, OR PREPARED TO DO ANYTHING TO END YOUR LIFE?: NO
1. IN THE PAST MONTH, HAVE YOU WISHED YOU WERE DEAD OR WISHED YOU COULD GO TO SLEEP AND NOT WAKE UP?: NO

## 2025-06-13 NOTE — ED PROVIDER NOTES
EMERGENCY DEPARTMENT ENCOUNTER      NAME: Leora Sanchez  AGE: 54 year old female  YOB: 1970  MRN: [id number]  EVALUATION DATE & TIME: No admission date for patient encounter.    PCP: Jaky Gaspar    ED PROVIDER: Najma Collins MD    Chief Complaint   Patient presents with    Abnormal Labs         FINAL IMPRESSION:  1. Iron deficiency anemia due to chronic blood loss    2. Anemia, unspecified type    3. LAGUNAS (dyspnea on exertion)    4. Lightheadedness          ED COURSE & MEDICAL DECISION MAKING:    Pertinent Labs & Imaging studies reviewed. (See chart for details)  54 year old female with history of CHF, CAD, DM who presents to the Emergency Department for evaluation of dizziness, shortness of breath, noted to be anemic, sent from clinic for same.  Patient notes postural lightheadedness/dizziness and blurry vision.  Symptoms seem consistent with symptomatic anemia.  She has been anemic for some time and it does not appear like this has properly been worked up though she denies any epistaxis, hematemesis, melena, bright red blood per rectum.  Patient initially had low normal blood pressures in the 90s, though it looks like 100s to 110s as her baseline.  Was tachycardic in the 120s but otherwise has a benign exam.  Differential includes occult GI bleed, symptomatic anemia.    Patient initially seen evaluate myself in triage area due to boarding crisis.  IV established, blood obtained.  Given dose of IV PPI and consented for PRBC transfusion.  CBC notable for hemoglobin of 5.7.  Ordered 2 units PRBCs.  BMP notable for creatinine of 1.1 up slightly from her previous.  Glucose elevated at 285 and a known diabetic.  Patient will be admitted to medicine with GI consulting.      ED Course as of 06/13/25 2247 Fri Jun 13, 2025   1620 BP(!): 87/69   1620 Pulse(!): 123   1624 Introduced myself to the patient, obtained history of present illness, and performed initial physical exam at this time.     1700  Hemoglobin(!!): 5.7   1752 Glucose(!): 285  Known DM   1807 Admitted to Dr. Betancur       Medical Decision Making  I obtained history from Family Member/Significant Other  I reviewed the EMR: Outpatient Record: Clinic labs today hemoglobin is 5.7  Admit.    MIPS (CTPE, Dental pain, Park, Sinusitis, Asthma/COPD, Head Trauma): Not Applicable    SEPSIS: None          At the conclusion of the encounter I discussed the results of all of the tests and the disposition. The questions were answered. The patient or family acknowledged understanding and was agreeable with the care plan.    CRITICAL CARE:  Critical Care  Performed by: Najma Collins MD  Authorized by: Najma Collins MD     Total critical care time: 42 minutes  Criticalcare time was exclusive of separately billable procedures and treating other patients.  Critical care was necessary to treat or prevent imminent or life-threatening deterioration of the following conditions: Symptomatic anemia required transfusion  Critical care was time spent personally by me on the following activities: development of treatment plan with patient or surrogate, discussions with consultants, examination of patient, evaluation of patient's response totreatment, obtaining history from patient or surrogate, ordering and performing treatments and interventions, ordering and review of laboratory studies, ordering and review of radiographic studies and re-evaluation ofpatient's condition, this excludes any separately billable procedures.      MEDICATIONS GIVEN IN THE EMERGENCY:  Medications   lidocaine 1 % 0.1-1 mL (has no administration in time range)   lidocaine (LMX4) cream (has no administration in time range)   sodium chloride (PF) 0.9% PF flush 3 mL (3 mLs Intracatheter Not Given 6/13/25 1823)   sodium chloride (PF) 0.9% PF flush 3 mL (has no administration in time range)   acetaminophen (TYLENOL) tablet 650 mg (has no administration in time range)     Or   acetaminophen  (TYLENOL) Suppository 650 mg (has no administration in time range)   senna-docusate (SENOKOT-S/PERICOLACE) 8.6-50 MG per tablet 1 tablet (has no administration in time range)     Or   senna-docusate (SENOKOT-S/PERICOLACE) 8.6-50 MG per tablet 2 tablet (has no administration in time range)   polyethylene glycol (MIRALAX) Packet 17 g (has no administration in time range)   calcium carbonate (TUMS) chewable tablet 1,000 mg (has no administration in time range)   melatonin tablet 5 mg (has no administration in time range)   ondansetron (ZOFRAN ODT) ODT tab 4 mg (has no administration in time range)     Or   ondansetron (ZOFRAN) injection 4 mg (has no administration in time range)   pantoprazole (PROTONIX) injection 40 mg (has no administration in time range)   glucose gel 15-30 g (has no administration in time range)     Or   dextrose 50 % injection 25-50 mL (has no administration in time range)     Or   glucagon injection 1 mg (has no administration in time range)   insulin aspart (NovoLOG) injection (RAPID ACTING) (has no administration in time range)   insulin aspart (NovoLOG) injection (RAPID ACTING) (0 Units Subcutaneous Not Given 6/13/25 2107)   allopurinol (ZYLOPRIM) tablet 300 mg (has no administration in time range)   aspirin EC tablet 81 mg ( Oral Automatically Held 6/17/25 0900)   atorvastatin (LIPITOR) tablet 80 mg (has no administration in time range)   ezetimibe (ZETIA) tablet 10 mg (has no administration in time range)   furosemide (LASIX) tablet 40 mg (40 mg Oral $Given 6/13/25 2151)   isosorbide mononitrate (IMDUR) 24 hr tablet 60 mg ( Oral Automatically Held 6/17/25 0900)   losartan (COZAAR) tablet 25 mg ( Oral Automatically Held 6/17/25 0900)   metoprolol succinate ER (TOPROL XL) 24 hr tablet 100 mg ( Oral Automatically Held 6/17/25 0900)   famotidine (PEPCID) tablet 20 mg (has no administration in time range)   venlafaxine (EFFEXOR XR) 24 hr capsule 150 mg (has no administration in time range)    pantoprazole (PROTONIX) injection 40 mg (40 mg Intravenous $Given 6/13/25 9690)       NEW PRESCRIPTIONS STARTED AT TODAY'S ER VISIT  Current Discharge Medication List             =================================================================    HPI    Patient information was obtained from: Patient    Use of Intrepreter: Yes (Phone) - Language Kandy Sanchez is a 54 year old female with pertinent medical history of hypertension, HFrEF, type II diabetes, coronary artery disease, hyperlipidemia, and iron deficiency anemia who presents by private car with spouse for evaluation of abnormal lab results.     Patient arrives from clinic as patient's hemoglobin was at 5.0 for a blood transfusion and further evaluation. Patient appears more pale to spouse and herself. She endorses increased weakness, decreased appetite, and dizziness within the past week. Dizziness worsens with standing. Patient also endorses blurry vision that also worsens with standing, which could be attriubted to the dizziness. She has a history of anemia. She denies any noticeable signs of bleeding including hematochezia,hematuria, epistaxis, or vaginal bleeding. Patient also endorses increased shortness of breath since Tuesday (6/10). No other symptoms, complaints, or concerns at this time.     Per chart review:  6/13/2025 Patient received labs today for iron deficiency anemia and received a call from [facility] to present to the ED as hemoglobin was 5.7. Reccommended blood transfusion and colonoscopy.     PAST MEDICAL HISTORY:  Past Medical History:   Diagnosis Date    Anxiety     Chronic cough 02/12/2018    Chronic kidney disease     Congestive heart failure (H)     Depression     Dyslipidemia, goal LDL below 70 10/31/2017    Essential hypertension     GERD (gastroesophageal reflux disease)     Heart failure with reduced ejection fraction (H) 10/30/2017    Hypertension     Hypokalemia 11/14/2021    Nonischemic  cardiomyopathy (H)     variable EF depending on the technique, date and reader; BNP normal in 2018    Nonocclusive coronary atherosclerosis of native coronary artery 10/31/2017    Obese     Perforation of right tympanic membrane 2013    S/p repair in  by Dr. Thomas at [facility].      Pregnancy         Pyelonephritis 2018    Renal abscess     Spontaneous  2010    TM (tympanic membrane disorder)        PAST SURGICAL HISTORY:  Past Surgical History:   Procedure Laterality Date    CV CORONARY ANGIOGRAM N/A 2019    Procedure: Coronary Angiogram;  Surgeon: Car Box MD;  Location: [facility] Cath [facility];  Service: Cardiology    CV LEFT HEART CATHETERIZATION WITHOUT LEFT VENTRICULOGRAM Left 10/31/2017    Procedure: Left Heart Catheterization Without Left Ventriculogram;  Surgeon: Jonathan Duque MD;  Location: [facility];  Service:     CV LEFT HEART CATHETERIZATION WITHOUT LEFT VENTRICULOGRAM Left 2019    Procedure: Left Heart Catheterization Without Left Ventriculogram;  Surgeon: Car Box MD;  Location: [facility];  Service: Cardiology    DILATION AND CURETTAGE      ENT SURGERY      INNER EAR SURGERY Right     MASTOIDECTOMY Right     AR CATH PLACEMENT & NJX CORONARY ART ANGIO IMG S&I N/A 10/31/2017    Procedure: Coronary Angiogram;  Surgeon: Jonathan Duque MD;  Location: [facility];  Service: Cardiology       CURRENT MEDICATIONS:    Prior to Admission Medications   Prescriptions Last Dose Informant Patient Reported? Taking?   allopurinol (ZYLOPRIM) 300 MG tablet 2025 Morning  No Yes   Sig: Take 1 tablet (300 mg) by mouth daily.   aspirin 81 MG EC tablet 2025 Morning  Yes Yes   Sig: Take 81 mg by mouth daily.   atorvastatin (LIPITOR) 80 MG tablet 2025 Morning  Yes Yes   Sig: Take 80 mg by mouth daily.   colchicine (COLCRYS) 0.6 MG tablet 2025 Morning  No Yes   Sig: TAKE 1 TABLET BY  MOUTH TWICE DAILY   cyanocobalamin (VITAMIN B-12) 1000 MCG tablet 6/13/2025 Morning  No Yes   Sig: Take 1 tablet (1,000 mcg) by mouth daily.   ezetimibe (ZETIA) 10 MG tablet 6/13/2025 Morning  No Yes   Sig: Take 1 tablet (10 mg) by mouth daily.   famotidine (PEPCID) 20 MG tablet   No Yes   Sig: Take 1 tablet (20 mg) by mouth 2 times daily as needed.   furosemide (LASIX) 40 MG tablet 6/13/2025 Morning  No Yes   Sig: Take 1 tablet (40 mg) by mouth 2 times daily.   isosorbide mononitrate (IMDUR) 60 MG 24 hr tablet 6/13/2025 Morning  No Yes   Sig: Take 1 tablet (60 mg) by mouth daily.   losartan (COZAAR) 25 MG tablet 6/13/2025 Morning  No Yes   Sig: Take 1 tablet (25 mg) by mouth daily.   metFORMIN (GLUCOPHAGE XR) 500 MG 24 hr tablet 6/13/2025 Morning  No Yes   Sig: Take 2 tablets (1,000 mg) by mouth 2 times daily (with meals).   metoprolol succinate ER (TOPROL XL) 100 MG 24 hr tablet 6/13/2025 Morning  No Yes   Sig: Take 1 tablet (100 mg) by mouth daily.   nitroGLYcerin (NITROSTAT) 0.4 MG sublingual tablet   No Yes   Sig: For chest pain place 1 tablet under the tongue every 5 minutes for 3 doses. If symptoms persist 5 minutes after 1st dose call 911.   omeprazole (PRILOSEC) 40 MG DR capsule 6/13/2025 Morning  Yes Yes   Sig: Take 40 mg by mouth daily.   tirzepatide (MOUNJARO) 12.5 MG/0.5ML SOAJ auto-injector pen 6/13/2025 Morning  No Yes   Sig: Inject 0.5 mLs (12.5 mg) subcutaneously once a week.   venlafaxine (EFFEXOR XR) 150 MG 24 hr capsule 6/13/2025 Morning  No Yes   Sig: Take 1 capsule (150 mg) by mouth daily.   vitamin D3 (CHOLECALCIFEROL) 125 MCG (5000 UT) tablet 6/13/2025 Morning  No Yes   Sig: Take 1 tablet (125 mcg) by mouth daily.      Facility-Administered Medications: None       ALLERGIES:  No Known Allergies    FAMILY HISTORY:  Family History   Problem Relation Age of Onset    Diabetes Mother     Hypertension Mother     Uterine Cancer Mother     Diverticulitis Mother     Other - See Comments Father          " in [place] in [place]    No Known Problems Sister     No Known Problems Daughter     No Known Problems Daughter     No Known Problems Daughter     No Known Problems Daughter     No Known Problems Son     No Known Problems Son     No Known Problems Son     No Known Problems Son        SOCIAL HISTORY:  Social History     Tobacco Use    Smoking status: Never     Passive exposure: Never    Smokeless tobacco: Never    Tobacco comments:     no passive exposure   Vaping Use    Vaping status: Never Used   Substance Use Topics    Alcohol use: No    Drug use: No        VITALS:  Patient Vitals for the past 24 hrs:   BP Temp Temp src Pulse Resp SpO2 Height Weight   25 2220 (!) 148/97 98.2  F (36.8  C) Oral 114 20 99 % -- --   25 2101 118/75 98.2  F (36.8  C) Oral -- 16 99 % -- --   25 112/62 98.4  F (36.9  C) Oral 110 16 100 % -- --   25 110/61 98.3  F (36.8  C) Oral 108 16 99 % -- --   25 1903 110/71 -- -- 109 16 -- -- --   25 1746 100/67 98.1  F (36.7  C) Oral 112 18 98 % -- --   25 1725 99/63 98.1  F (36.7  C) Oral 112 18 97 % -- --   25 1700 101/57 -- -- 112 -- 100 % -- --   25 1606 (!) 87/69 -- -- (!) 123 -- -- -- --   25 1603 98/63 98  F (36.7  C) Oral (!) 124 18 100 % 1.499 m (4' 11\") 87.8 kg (193 lb 8 oz)       PHYSICAL EXAM    General Appearance: Well-appearing, well-nourished, no acute distress. Obese. Pale-appearing.  Head:  Normocephalic  Cardio: Tachycardic at 110s to 120s, low normal blood pressures  Pulm:  No respiratory distress  Back:  No CVA tenderness, normal ROM  Abdomen:  Soft, non-tender, non distended,no rebound or guarding.  Extremities: Normal gait  Skin:  Skin warm, dry, no rashes  Neuro:  Alert and oriented ×3     RADIOLOGY/LABS:  Reviewed all pertinent imaging. Please see official radiology report. All pertinent labs reviewed and interpreted.    Results for orders placed or performed during the hospital encounter of 25   XR " Chest Port 1 View    Impression    IMPRESSION: No pleural fluid or pneumothorax. No airspace disease or edema. The cardiomediastinal silhouette is enlarged.   Extra Blue Top Tube   Result Value Ref Range    Hold Specimen JIC    Extra Red Top Tube   Result Value Ref Range    Hold Specimen JIC    Extra Green Top (Lithium Heparin) Tube   Result Value Ref Range    Hold Specimen JIC    Extra Purple Top Tube   Result Value Ref Range    Hold Specimen JIC    Basic metabolic panel   Result Value Ref Range    Sodium 143 135 - 145 mmol/L    Potassium 4.5 3.4 - 5.3 mmol/L    Chloride 111 (H) 98 - 107 mmol/L    Carbon Dioxide (CO2) 17 (L) 22 - 29 mmol/L    Anion Gap 15 7 - 15 mmol/L    Urea Nitrogen 29.0 (H) 6.0 - 20.0 mg/dL    Creatinine 1.13 (H) 0.51 - 0.95 mg/dL    GFR Estimate 57 (L) >60 mL/min/1.73m2    Calcium 8.7 (L) 8.8 - 10.4 mg/dL    Glucose 285 (H) 70 - 99 mg/dL   INR   Result Value Ref Range    INR 1.01 0.85 - 1.15    PT 13.5 11.8 - 14.8 Seconds   Result Value Ref Range    aPTT 26 22 - 38 Seconds   CBC with platelets and differential   Result Value Ref Range    WBC Count 6.1 4.0 - 11.0 10e3/uL    RBC Count 2.47 (L) 3.80 - 5.20 10e6/uL    Hemoglobin 5.7 (LL) 11.7 - 15.7 g/dL    Hematocrit 21.2 (L) 35.0 - 47.0 %    MCV 86 78 - 100 fL    MCH 23.1 (L) 26.5 - 33.0 pg    MCHC 26.9 (L) 31.5 - 36.5 g/dL    RDW 19.4 (H) 10.0 - 15.0 %    Platelet Count 340 150 - 450 10e3/uL   Ketone Beta-Hydroxybutyrate Quantitative   Result Value Ref Range    Ketone (Beta-Hydroxybutyrate) Quantitative <0.18 <=0.30 mmol/L   Result Value Ref Range    Lactate Dehydrogenase 420 (H) 0 - 250 U/L   Iron and iron binding capacity   Result Value Ref Range    Iron 14 (L) 37 - 145 ug/dL    Iron Binding Capacity 422 240 - 430 ug/dL    Iron Sat Index 3 (L) 15 - 46 %   Lactic acid whole blood   Result Value Ref Range    Lactic Acid 3.9 (H) 0.7 - 2.0 mmol/L   Result Value Ref Range    Magnesium 1.8 1.7 - 2.3 mg/dL   Result Value Ref Range    Phosphorus 2.4  (L) 2.5 - 4.5 mg/dL   Glucose by meter   Result Value Ref Range    GLUCOSE BY METER POCT 122 (H) 70 - 99 mg/dL   NT-proBNP   Result Value Ref Range    NT-proBNP 234 (H) 0 - 226 pg/mL   CBC with platelets and differential   Result Value Ref Range    WBC Count 8.3 4.0 - 11.0 10e3/uL    RBC Count 3.01 (L) 3.80 - 5.20 10e6/uL    Hemoglobin 7.7 (L) 11.7 - 15.7 g/dL    Hematocrit 25.9 (L) 35.0 - 47.0 %    MCV 86 78 - 100 fL    MCH 25.6 (L) 26.5 - 33.0 pg    MCHC 29.7 (L) 31.5 - 36.5 g/dL    RDW 17.4 (H) 10.0 - 15.0 %    Platelet Count 295 150 - 450 10e3/uL   Reticulocyte count   Result Value Ref Range    % Reticulocyte 2.9 (H) 0.5 - 2.0 %    Absolute Reticulocyte 0.087 0.025 - 0.095 10e6/uL   Adult Type and Screen   Result Value Ref Range    ABO/RH(D) A POS     Antibody Screen Negative Negative    SPECIMEN EXPIRATION DATE 6/16/2025 11:59:00 PM CDT    Prepare red blood cells (unit)   Result Value Ref Range    Blood Component Type Red Blood Cells     Product Code X8821G51     Unit Status Transfused     Unit Number [id number]     CROSSMATCH Compatible     CODING SYSTEM [id number]     ISSUE DATE AND TIME 6/13/2025  5:25:00 PM CDT     UNIT ABO/RH A+     UNIT TYPE ISBT 6200    Prepare red blood cells (unit)   Result Value Ref Range    Blood Component Type Red Blood Cells     Product Code N0588R00     Unit Status Transfused     Unit Number [id number]     CROSSMATCH Compatible     CODING SYSTEM [id number]     ISSUE DATE AND TIME 6/13/2025  8:40:00 PM CDT     UNIT ABO/RH A+     UNIT TYPE ISBT 6200    Prepare red blood cells (unit)   Result Value Ref Range    Blood Component Type Red Blood Cells     Product Code X7391N16     Unit Status Ready for issue     Unit Number [id number]     CROSSMATCH Compatible     CODING SYSTEM [id number]        EKG:  Performed at: 16:57    Impression: Sinus tachycardia. ST and T wave abnormality, consider inferior ischemia. Abnormal ECG.    Rate: 113 bpm  Rhythm: Sinus tachycardia  Axis: 42 8 218  PA  Interval: 132 ms  QRS Interval: 80 ms  QTc Interval: 480 ms  ST Changes: ST and T wave abnormality, consider inferior ischemia.   Comparison: When compared with ECG of 18-March-2025, T wave inversion now evident in Inferior leads. Nonspecific T wave abnormality, worse in lateral leads.   I have independently reviewed and interpreted the EKG(s) documented above.      The creation of this record is based on the scribe s observations of the work being performed by Najma Collins MD and the provider s statements to them. It was created on her behalf by Bridgette White, a trained medical scribe. This document has been checked and approved by the attending provider.    Najma Collins MD  Emergency Medicine  [facility] EMERGENCY DEPARTMENT  [facility] [facility] [address] [phone]  Dept: [phone]       Najma Collins MD  06/13/25 6230

## 2025-06-13 NOTE — ED NOTES
"[facility] ED Handoff Report    ED Chief Complaint: Abnormal labs     ED Diagnosis:  (D64.9) Anemia, unspecified type  Comment:   Plan:     (R06.09) LAGUNAS (dyspnea on exertion)  Comment:   Plan:     (R42) Lightheadedness  Comment:   Plan:        PMH:    Past Medical History:   Diagnosis Date    Anxiety     Chronic cough 2018    Chronic kidney disease     Congestive heart failure (H)     Depression     Dyslipidemia, goal LDL below 70 10/31/2017    Essential hypertension     GERD (gastroesophageal reflux disease)     Heart failure with reduced ejection fraction (H) 10/30/2017    Hypertension     Hypokalemia 2021    Nonischemic cardiomyopathy (H)     variable EF depending on the technique, date and reader; BNP normal in     Nonocclusive coronary atherosclerosis of native coronary artery 10/31/2017    Obese     Perforation of right tympanic membrane 2013    S/p repair in  by Dr. Thomas at [facility].      Pregnancy         Pyelonephritis 2018    Renal abscess     Spontaneous  2010    TM (tympanic membrane disorder)         Code Status:  Prior     Falls Risk: Yes Band: Applied    Current Living Situation/Residence: lives with a significant other     Elimination Status: Continent: Yes     Activity Level: SBA w/ walker    Patients Preferred Language:  Other: understand and speaks little english. Needs  for 50%.      Needed: Yes Where is the patient located?    Vital Signs:  /67   Pulse 112   Temp 98.1  F (36.7  C) (Oral)   Resp 18   Ht 1.499 m (4' 11\")   Wt 87.8 kg (193 lb 8 oz)   SpO2 98%   BMI 39.08 kg/m       Cardiac Rhythm: NSR     Pain Score: 0/10    Is the Patient Confused:  No    Last Food or Drink: 25 at 1400    Focused Assessment:  Patient's skin is more pale than baseline. C/o SOB with activity, fatigue and lightheaded for the last week. Has had low hemoglobin in the past, unsure of anemia source.       Tests " Performed: Done: Labs and Imaging    Treatments Provided:  blood transfusion     Family Dynamics/Concerns: No    Family Updated On Visitor Policy: Yes    Plan of Care Communicated to Family: Yes    Who Was Updated about Plan of Care:  at bedside     Belongings Checklist Done and Signed by Patient: Yes    Medications sent with patient:     Covid: symptomatic, negative    Additional Information:     RN: Zofia Farris RN 6/13/2025 5:47 PM

## 2025-06-13 NOTE — ED TRIAGE NOTES
Patient stated referred from [facility] for evaluation of low hemoglobin. Patient also stated has been having cloudy vision since Tuesday.      Triage Assessment (Adult)       Row Name 06/13/25 5068          Triage Assessment    Airway WDL WDL        Respiratory WDL    Respiratory WDL WDL        Skin Circulation/Temperature WDL    Skin Circulation/Temperature WDL WDL        Cardiac WDL    Cardiac WDL WDL        Cognitive/Neuro/Behavioral WDL    Cognitive/Neuro/Behavioral WDL WDL

## 2025-06-13 NOTE — MEDICATION SCRIBE - ADMISSION MEDICATION HISTORY
Medication Scribe Admission Medication History    Admission medication history is complete. The information provided in this note is only as accurate as the sources available at the time of the update.    Information Source(s): Patient and Family member via in-person    Pertinent Information: Patient's medications are being co managed by patient and spouse, Juan Sanchez. Juan was present and assisted with history.     Changes made to PTA medication list:  Added: None  Deleted: None  Changed: None    Allergies reviewed with patient and updates made in EHR: yes    Medication History Completed By: Jerry Larry 6/13/2025 5:12 PM    PTA Med List   Medication Sig Note Last Dose/Taking    allopurinol (ZYLOPRIM) 300 MG tablet Take 1 tablet (300 mg) by mouth daily.  6/13/2025 Morning    aspirin 81 MG EC tablet Take 81 mg by mouth daily.  6/13/2025 Morning    atorvastatin (LIPITOR) 80 MG tablet Take 80 mg by mouth daily.  6/13/2025 Morning    colchicine (COLCRYS) 0.6 MG tablet TAKE 1 TABLET BY MOUTH TWICE DAILY  6/13/2025 Morning    cyanocobalamin (VITAMIN B-12) 1000 MCG tablet Take 1 tablet (1,000 mcg) by mouth daily.  6/13/2025 Morning    ezetimibe (ZETIA) 10 MG tablet Take 1 tablet (10 mg) by mouth daily.  6/13/2025 Morning    famotidine (PEPCID) 20 MG tablet Take 1 tablet (20 mg) by mouth 2 times daily as needed.  Taking As Needed    furosemide (LASIX) 40 MG tablet Take 1 tablet (40 mg) by mouth 2 times daily.  6/13/2025 Morning    isosorbide mononitrate (IMDUR) 60 MG 24 hr tablet Take 1 tablet (60 mg) by mouth daily.  6/13/2025 Morning    losartan (COZAAR) 25 MG tablet Take 1 tablet (25 mg) by mouth daily.  6/13/2025 Morning    metFORMIN (GLUCOPHAGE XR) 500 MG 24 hr tablet Take 2 tablets (1,000 mg) by mouth 2 times daily (with meals).  6/13/2025 Morning    metoprolol succinate ER (TOPROL XL) 100 MG 24 hr tablet Take 1 tablet (100 mg) by mouth daily.  6/13/2025 Morning    nitroGLYcerin (NITROSTAT) 0.4 MG sublingual  tablet For chest pain place 1 tablet under the tongue every 5 minutes for 3 doses. If symptoms persist 5 minutes after 1st dose call 911.  Taking    omeprazole (PRILOSEC) 40 MG DR capsule Take 40 mg by mouth daily.  6/13/2025 Morning    tirzepatide (MOUNJARO) 12.5 MG/0.5ML SOAJ auto-injector pen Inject 0.5 mLs (12.5 mg) subcutaneously once a week. 6/13/2025: Fridays 6/13/2025 Morning    venlafaxine (EFFEXOR XR) 150 MG 24 hr capsule Take 1 capsule (150 mg) by mouth daily.  6/13/2025 Morning    vitamin D3 (CHOLECALCIFEROL) 125 MCG (5000 UT) tablet Take 1 tablet (125 mcg) by mouth daily.  6/13/2025 Morning

## 2025-06-14 ENCOUNTER — APPOINTMENT (OUTPATIENT)
Dept: CARDIOLOGY | Facility: HOSPITAL | Age: 55
DRG: 811 | End: 2025-06-14
Attending: STUDENT IN AN ORGANIZED HEALTH CARE EDUCATION/TRAINING PROGRAM
Payer: MEDICARE

## 2025-06-14 LAB
ALBUMIN SERPL BCG-MCNC: 3.2 G/DL (ref 3.5–5.2)
ALP SERPL-CCNC: 154 U/L (ref 40–150)
ALT SERPL W P-5'-P-CCNC: 92 U/L (ref 0–50)
ANION GAP SERPL CALCULATED.3IONS-SCNC: 11 MMOL/L (ref 7–15)
AST SERPL W P-5'-P-CCNC: 86 U/L (ref 0–45)
BILIRUB SERPL-MCNC: 0.8 MG/DL
BUN SERPL-MCNC: 21.5 MG/DL (ref 6–20)
CALCIUM SERPL-MCNC: 8.7 MG/DL (ref 8.8–10.4)
CHLORIDE SERPL-SCNC: 110 MMOL/L (ref 98–107)
CREAT SERPL-MCNC: 0.99 MG/DL (ref 0.51–0.95)
EGFRCR SERPLBLD CKD-EPI 2021: 67 ML/MIN/1.73M2
ERYTHROCYTE [DISTWIDTH] IN BLOOD BY AUTOMATED COUNT: 17.3 % (ref 10–15)
FERRITIN SERPL-MCNC: 25 NG/ML (ref 11–328)
FOLATE SERPL-MCNC: 13.5 NG/ML (ref 4.6–34.8)
GLUCOSE BLDC GLUCOMTR-MCNC: 109 MG/DL (ref 70–99)
GLUCOSE BLDC GLUCOMTR-MCNC: 125 MG/DL (ref 70–99)
GLUCOSE BLDC GLUCOMTR-MCNC: 126 MG/DL (ref 70–99)
GLUCOSE BLDC GLUCOMTR-MCNC: 145 MG/DL (ref 70–99)
GLUCOSE BLDC GLUCOMTR-MCNC: 311 MG/DL (ref 70–99)
GLUCOSE SERPL-MCNC: 137 MG/DL (ref 70–99)
HAPTOGLOB SERPL-MCNC: 261 MG/DL (ref 30–200)
HCO3 SERPL-SCNC: 23 MMOL/L (ref 22–29)
HCT VFR BLD AUTO: 29.9 % (ref 35–47)
HGB BLD-MCNC: 9.3 G/DL (ref 11.7–15.7)
LACTATE SERPL-SCNC: 2 MMOL/L (ref 0.7–2)
LACTATE SERPL-SCNC: 3 MMOL/L (ref 0.7–2)
LVEF ECHO: NORMAL
MAGNESIUM SERPL-MCNC: 1.8 MG/DL (ref 1.7–2.3)
MCH RBC QN AUTO: 25.9 PG (ref 26.5–33)
MCHC RBC AUTO-ENTMCNC: 31.1 G/DL (ref 31.5–36.5)
MCV RBC AUTO: 83 FL (ref 78–100)
PHOSPHATE SERPL-MCNC: 2 MG/DL (ref 2.5–4.5)
PLATELET # BLD AUTO: 261 10E3/UL (ref 150–450)
POTASSIUM SERPL-SCNC: 4.3 MMOL/L (ref 3.4–5.3)
PROT SERPL-MCNC: 5.9 G/DL (ref 6.4–8.3)
RBC # BLD AUTO: 3.59 10E6/UL (ref 3.8–5.2)
SODIUM SERPL-SCNC: 144 MMOL/L (ref 135–145)
VIT B12 SERPL-MCNC: 866 PG/ML (ref 232–1245)
WBC # BLD AUTO: 7.7 10E3/UL (ref 4–11)

## 2025-06-14 PROCEDURE — 83605 ASSAY OF LACTIC ACID: CPT | Performed by: STUDENT IN AN ORGANIZED HEALTH CARE EDUCATION/TRAINING PROGRAM

## 2025-06-14 PROCEDURE — 120N000001 HC R&B MED SURG/OB

## 2025-06-14 PROCEDURE — 86803 HEPATITIS C AB TEST: CPT | Performed by: PHYSICIAN ASSISTANT

## 2025-06-14 PROCEDURE — 36415 COLL VENOUS BLD VENIPUNCTURE: CPT | Performed by: PHYSICIAN ASSISTANT

## 2025-06-14 PROCEDURE — 93306 TTE W/DOPPLER COMPLETE: CPT | Mod: 26 | Performed by: GENERAL ACUTE CARE HOSPITAL

## 2025-06-14 PROCEDURE — 87340 HEPATITIS B SURFACE AG IA: CPT | Performed by: PHYSICIAN ASSISTANT

## 2025-06-14 PROCEDURE — 250N000013 HC RX MED GY IP 250 OP 250 PS 637: Performed by: STUDENT IN AN ORGANIZED HEALTH CARE EDUCATION/TRAINING PROGRAM

## 2025-06-14 PROCEDURE — 99223 1ST HOSP IP/OBS HIGH 75: CPT | Performed by: GENERAL ACUTE CARE HOSPITAL

## 2025-06-14 PROCEDURE — 85014 HEMATOCRIT: CPT | Performed by: STUDENT IN AN ORGANIZED HEALTH CARE EDUCATION/TRAINING PROGRAM

## 2025-06-14 PROCEDURE — 86706 HEP B SURFACE ANTIBODY: CPT | Performed by: PHYSICIAN ASSISTANT

## 2025-06-14 PROCEDURE — 82310 ASSAY OF CALCIUM: CPT | Performed by: STUDENT IN AN ORGANIZED HEALTH CARE EDUCATION/TRAINING PROGRAM

## 2025-06-14 PROCEDURE — 36415 COLL VENOUS BLD VENIPUNCTURE: CPT | Performed by: STUDENT IN AN ORGANIZED HEALTH CARE EDUCATION/TRAINING PROGRAM

## 2025-06-14 PROCEDURE — 250N000013 HC RX MED GY IP 250 OP 250 PS 637: Performed by: INTERNAL MEDICINE

## 2025-06-14 PROCEDURE — 250N000011 HC RX IP 250 OP 636: Performed by: STUDENT IN AN ORGANIZED HEALTH CARE EDUCATION/TRAINING PROGRAM

## 2025-06-14 PROCEDURE — 83735 ASSAY OF MAGNESIUM: CPT | Performed by: STUDENT IN AN ORGANIZED HEALTH CARE EDUCATION/TRAINING PROGRAM

## 2025-06-14 PROCEDURE — 255N000002 HC RX 255 OP 636: Performed by: STUDENT IN AN ORGANIZED HEALTH CARE EDUCATION/TRAINING PROGRAM

## 2025-06-14 PROCEDURE — 84100 ASSAY OF PHOSPHORUS: CPT | Performed by: STUDENT IN AN ORGANIZED HEALTH CARE EDUCATION/TRAINING PROGRAM

## 2025-06-14 PROCEDURE — 250N000012 HC RX MED GY IP 250 OP 636 PS 637: Performed by: STUDENT IN AN ORGANIZED HEALTH CARE EDUCATION/TRAINING PROGRAM

## 2025-06-14 PROCEDURE — 86704 HEP B CORE ANTIBODY TOTAL: CPT | Performed by: PHYSICIAN ASSISTANT

## 2025-06-14 PROCEDURE — 99232 SBSQ HOSP IP/OBS MODERATE 35: CPT | Performed by: STUDENT IN AN ORGANIZED HEALTH CARE EDUCATION/TRAINING PROGRAM

## 2025-06-14 RX ORDER — METOPROLOL SUCCINATE 100 MG/1
100 TABLET, EXTENDED RELEASE ORAL DAILY
Status: DISCONTINUED | OUTPATIENT
Start: 2025-06-14 | End: 2025-06-17 | Stop reason: HOSPADM

## 2025-06-14 RX ORDER — METOPROLOL SUCCINATE 100 MG/1
100 TABLET, EXTENDED RELEASE ORAL DAILY
Status: DISCONTINUED | OUTPATIENT
Start: 2025-06-14 | End: 2025-06-14

## 2025-06-14 RX ADMIN — PANTOPRAZOLE SODIUM 40 MG: 40 INJECTION, POWDER, FOR SOLUTION INTRAVENOUS at 17:57

## 2025-06-14 RX ADMIN — EZETIMIBE 10 MG: 10 TABLET ORAL at 08:22

## 2025-06-14 RX ADMIN — PANTOPRAZOLE SODIUM 40 MG: 40 INJECTION, POWDER, FOR SOLUTION INTRAVENOUS at 06:23

## 2025-06-14 RX ADMIN — INSULIN ASPART 1 UNITS: 100 INJECTION, SOLUTION INTRAVENOUS; SUBCUTANEOUS at 17:54

## 2025-06-14 RX ADMIN — FUROSEMIDE 40 MG: 20 TABLET ORAL at 17:56

## 2025-06-14 RX ADMIN — POTASSIUM & SODIUM PHOSPHATES POWDER PACK 280-160-250 MG 1 PACKET: 280-160-250 PACK at 17:55

## 2025-06-14 RX ADMIN — FUROSEMIDE 40 MG: 20 TABLET ORAL at 08:21

## 2025-06-14 RX ADMIN — POTASSIUM & SODIUM PHOSPHATES POWDER PACK 280-160-250 MG 1 PACKET: 280-160-250 PACK at 13:47

## 2025-06-14 RX ADMIN — POTASSIUM & SODIUM PHOSPHATES POWDER PACK 280-160-250 MG 1 PACKET: 280-160-250 PACK at 20:54

## 2025-06-14 RX ADMIN — PERFLUTREN 2 ML: 6.52 INJECTION, SUSPENSION INTRAVENOUS at 11:46

## 2025-06-14 RX ADMIN — ALLOPURINOL 300 MG: 300 TABLET ORAL at 08:22

## 2025-06-14 RX ADMIN — VENLAFAXINE HYDROCHLORIDE 150 MG: 150 CAPSULE, EXTENDED RELEASE ORAL at 08:22

## 2025-06-14 RX ADMIN — ATORVASTATIN CALCIUM 80 MG: 40 TABLET, FILM COATED ORAL at 08:21

## 2025-06-14 RX ADMIN — METOPROLOL SUCCINATE 100 MG: 100 TABLET, EXTENDED RELEASE ORAL at 20:54

## 2025-06-14 ASSESSMENT — ACTIVITIES OF DAILY LIVING (ADL)
ADLS_ACUITY_SCORE: 50
ADLS_ACUITY_SCORE: 48
ADLS_ACUITY_SCORE: 50
ADLS_ACUITY_SCORE: 48
ADLS_ACUITY_SCORE: 50
ADLS_ACUITY_SCORE: 48
ADLS_ACUITY_SCORE: 50
ADLS_ACUITY_SCORE: 50
ADLS_ACUITY_SCORE: 48
ADLS_ACUITY_SCORE: 50
ADLS_ACUITY_SCORE: 48
ADLS_ACUITY_SCORE: 50
ADLS_ACUITY_SCORE: 50

## 2025-06-14 NOTE — PROGRESS NOTES
[facility]    Medicine Progress Note - Hospitalist Service    Date of Admission:  6/13/2025    Assessment & Plan   May MARICHUY Sanchez is a 54 year old female with a past medical history of HFrEF, CAD, Type 2 Diabetes who was admitted on 6/13/25 after presenting to [facility] ED for Dizziness and Shortness of Breath.     Acute on Chronic Anemia  - HGB in the ED 5.7. Recent HGB baseline ~7.0-8.0.  - Previous work up 03/2025 mostly unrevealing - see labs in chart  - Patient denies melena, BRBPR, or any other bleeding.  - s/p  2 units RBCs in the ED   - GI consult pending   - Checking LDH, haptoglobin, iron studies, B12/folate, peripheral smear  - Protonix 40mg IV BID  - Lactate 3.9 --> 3     HFrEF - Acute Exacerbation  - PTA HF Meds: Losartan 25mg, metoprolol 100mg, lasix 40mg BID  - Last Echo on file, 2019 - EF 45% w/ global hypokinesis.  - Significant LE edema in the ED. Not Hypoxic in ED.  - Holding home losartan, metoprolol with low BP in the ED  - continue  PTA Lasix 40mg PO BID for now   - Cardiology consult appreciated.  -Follow echocardiogram result     History of CAD with Stable Angina  - Non-obstructive disease on coronary angiogram 2019  - PTA Meds: aspirin, atorvastatin, zetia, ISMN  - Holding aspirin due to concern for bleeding  - Holding ISMN with low BP in the ED  - Continue home atorvastatin, zetia.     Type 2 Diabetes  - Last A1c was 7.5% in 03/2025  - Home medications include metformin, tirzepatide injections  - Hold home metformin, tirzepatide  - Sliding Scale Insulin with Glucose Checks             Diet: NPO for Procedure/Surgery per Anesthesia Guidelines Except for: Meds, Ice Chips; Clear liquids before procedure/surgery: ADULT (Age GREATER than or Equal to 18 years) - Clear liquids 2 hours before procedure/surgery    DVT Prophylaxis: Pneumatic Compression Devices  Park Catheter: Not present  Lines: None     Cardiac Monitoring: ACTIVE order. Indication: Tachyarrhythmias, acute (48  "hours)  Code Status: Full Code      Clinically Significant Risk Factors Present on Admission          # Hyperchloremia: Highest Cl = 111 mmol/L in last 2 days, will monitor as appropriate            # Drug Induced Platelet Defect: home medication list includes an antiplatelet medication   # Hypertension: Noted on problem list      # Anemia: based on hgb <11      # DMII: A1C = 7.5 % (Ref range: 0.0 - 5.6 %) within past 6 months    # Obesity: Estimated body mass index is 39.26 kg/m  as calculated from the following:    Height as of this encounter: 1.499 m (4' 11\").    Weight as of this encounter: 88.2 kg (194 lb 6.4 oz).              Social Drivers of Health    Food Insecurity: High Risk (6/13/2025)    Food Insecurity     Within the past 12 months, did you worry that your food would run out before you got money to buy more?: Yes     Within the past 12 months, did the food you bought just not last and you didn t have money to get more?: Yes   Depression: Not at risk (5/30/2025)    PHQ-2     PHQ-2 Score: 2   Recent Concern: Depression - At risk (3/5/2025)    PHQ-2     PHQ-2 Score: 6   Housing Stability: High Risk (6/13/2025)    Housing Stability     Do you have housing? : Yes     Are you worried about losing your housing?: Yes   Transportation Needs: High Risk (6/13/2025)    Transportation Needs     Within the past 12 months, has lack of transportation kept you from medical appointments, getting your medicines, non-medical meetings or appointments, work, or from getting things that you need?: Yes   Physical Activity: Insufficiently Active (9/6/2024)    Exercise Vital Sign     Days of Exercise per Week: 5 days     Minutes of Exercise per Session: 10 min   Social Connections: Unknown (9/6/2024)    Social Connection and Isolation Panel [NHANES]     Frequency of Social Gatherings with Friends and Family: More than three times a week          Disposition Plan     Medically Ready for Discharge: Anticipated in 2-4 Days       "       Dale Grimm MD  Hospitalist Service  [facility]  Securely message with Hillary (more info)  Text page via [employer] Paging/Directory   ______________________________________________________________________    Interval History   No distress noted.  Denies having chest pain or palpitation.  She states she feels relatively better today compared to yesterday.  Awaiting to be evaluated by gastroenterologist.  Management plan discussed with the patient and her spouse who was present at the bedside.    Physical Exam   Vital Signs: Temp: 98  F (36.7  C) Temp src: Oral BP: (!) 149/84 (RN Notified) Pulse: 114   Resp: 20 SpO2: 99 % O2 Device: None (Room air)    Weight: 194 lbs 6.4 oz    General Appearance: Acutely sick looking, no distress noted  Respiratory: Good air entry bilaterally  Cardiovascular: S1-S2 were heard, no murmur or gallop  GI: Abdomen, no tenderness, normoactive bowel sounds  Skin: Intact and warm  Other: +2 pedal and pretibial pitting edema    Medical Decision Making       40 MINUTES SPENT BY ME on the date of service doing chart review, history, exam, documentation & further activities per the note.      Data

## 2025-06-14 NOTE — CONSULTS
GASTROENTEROLOGY CONSULTATION      Leora Sanchez  [address] [place] [address]  54 year old female     Admission Date/Time: 6/13/2025  Primary Care Provider: Jaky Gaspar     We were asked to see the patient in consultation by Dr. Betancur for evaluation of acute on chronic anemia.    CC: dizziness, SOB     HPI:  Leora Sanchez is a 54 year old female with a past medical history significant for CHF, coronary artery disease, type 2 diabetes, admitted 6/13 with symptoms of shortness of breath, dizziness, fatigue and outpatient labs showing worsening anemia, found to also have CHF exacerbation.    Patient seen with the assistance of [facility]  by phone.  Patient has been followed by her primary care provider over the last few months for progressively worsening anemia.  She has gradually started to experience generalized fatigue/weakness, shortness of breath with walking, dizziness, intermittent chest pain.  She was seen by her primary care provider yesterday and labs showed worsening anemia prompting her to present to the emergency room.    She denies any melena or hematochezia.  He has had a few months of initially intermittent, now more constant epigastric burning without radiation.  This seems to be exacerbated by certain foods such as spicy or acidic foods.  She was started on a medication over the last few months.  She thinks this is omeprazole.  Omeprazole 40 mg daily is listed on her medications.  She reports compliance.  She thinks this helps with the pain.  She has also been taking famotidine 20 mg daily as needed.  Unsure if this is providing any additional benefit.  She denies any nausea, vomiting, weight loss.      Of note, patient is on Mounjaro 12.5 mg weekly, 81mg aspirin. No other blood thinners. Denies NSAIDs. Denies prior EGD or colonoscopy. No known family history of GI related cancers.      She has been anemic since March 2025 which time hemoglobin was around 8.1.  This declined to the high  sixes in early May.  Looks like she was transfused around that time.  Most recent hemoglobin prior to admission was 7 on 5/30.  In March, iron studies, vitamin B12, folate, TSH were normal.  Ferritin elevated at 690.  Percent reticulocytes elevated at 8. Haptoglbin on 5/2 was elevated 572. FIT test was positive on 6/6. She is currently scheduled for outpatient colonoscopy 7/11. Patient is not on oral iron and denies iron infusion in the past.     Hemoglobin on admission 5.7, MCV 86, platelets and WBC normal. She was transfused 2 units and repeat hemoglobin improved to 7.7. Most recent HGB this morning - 9.3. Repeat iron studies show low serum iron 14, iron sat 3% with normal TIBC. LDH elevated 261, B12 normal. Liver enzymes elevated for last several years. Today, total bilirubin 0.8, AST 86, ALT 92, . Peripheral smear results pending.    No abdominal imaging this admission. Had CT scan to evaluate epigastric pain in March 2025 that was unremarkable other than diverticulosis and 2cm pulmonary nodule. RUQ US 3/2025 for epigastric pain showed mild to moderate diffuse fatty liver, no gall stones or bile duct dilation. No concerning liver lesions.       PAST MEDICAL HISTORY:  Patient Active Problem List    Diagnosis Date Noted    Lightheadedness 06/13/2025     Priority: Medium    LAGUNAS (dyspnea on exertion) 06/13/2025     Priority: Medium    Anemia, unspecified type 06/13/2025     Priority: Medium    Class 3 severe obesity due to excess calories with serious comorbidity and body mass index (BMI) of 40.0 to 44.9 in adult (H) 03/31/2025     Priority: Medium    Chronic pain of right knee 03/31/2025     Priority: Medium    Gait instability 03/31/2025     Priority: Medium    Chronic midline low back pain with bilateral sciatica 05/17/2024     Priority: Medium    Vitamin D deficiency 10/27/2023     Priority: Medium    Diabetic polyneuropathy associated with type 2 diabetes mellitus (H) 10/27/2023     Priority: Medium     Metabolic dysfunction-associated steatohepatitis (MASH) 06/25/2023     Priority: Medium     LFTs abnormal.  Immune: hep A & B. Hep C neg 2016.      Chronic gout of right foot 06/25/2023     Priority: Medium    Great toe pain, right 06/23/2023     Priority: Medium    Iron deficiency anemia due to chronic blood loss 12/21/2021     Priority: Medium     Likely multifactorial- low b12 and low meat in diet      Tension headache 11/14/2021     Priority: Medium    Coronary artery disease involving native coronary artery of native heart with angina pectoris      Priority: Medium    Type 2 diabetes mellitus with diabetic polyneuropathy, without long-term current use of insulin (H) 08/20/2020     Priority: Medium    Stage 3a chronic kidney disease (H) 08/20/2020     Priority: Medium    Lung mass left upper lobe 11/21/2019     Priority: Medium     11/23: Enlarging subsolid left upper lobe nodule with a 0.8 cm solid component. This is concerning for a primary lung cancer. Recommend PET scan. Consider surgery  F10/2019.  Stable 3/2020        Right-sided sensorineural hearing loss 06/14/2019     Priority: Medium    Nonischemic cardiomyopathy (H) 11/14/2017     Priority: Medium     9/23: Pharmacological Regadenoson nuclear stress test is negative for inducible myocardial ischemia or infarction. EF 70%  2019: Left ventricle ejection fraction is moderately decreased. The estimated left ventricular ejection fraction is 45% with global hypokinesis.       Heart failure with reduced ejection fraction (H) 10/30/2017     Priority: Medium     9/23: EF 70%  2019: EF 45% with global hypokinesis.       Hyperlipidemia LDL goal <70 06/07/2016     Priority: Medium    Mixed incontinence 02/08/2016     Priority: Medium    Heartburn 02/08/2016     Priority: Medium    Ganglion cyst of left foot 02/08/2016     Priority: Medium    Bilateral dry eyes 02/08/2016     Priority: Medium    Hypertension goal BP (blood pressure) < 130/80 12/12/2012      Priority: Medium    Moderate episode of recurrent major depressive disorder (H) 12/12/2012     Priority: Medium    Long QT syndrome 10/28/2024     Priority: Low          ROS: A comprehensive ten point review of systems was negative aside from those in mentioned in the HPI.       MEDICATIONS:   Prior to Admission medications    Medication Sig Start Date End Date Taking? Authorizing Provider   allopurinol (ZYLOPRIM) 300 MG tablet Take 1 tablet (300 mg) by mouth daily. 5/30/25  Yes Jaky Gaspar MD   aspirin 81 MG EC tablet Take 81 mg by mouth daily.   Yes Reported, Patient   atorvastatin (LIPITOR) 80 MG tablet Take 80 mg by mouth daily.   Yes Reported, Patient   colchicine (COLCRYS) 0.6 MG tablet TAKE 1 TABLET BY MOUTH TWICE DAILY 6/3/25  Yes Jaky Gaspar MD   cyanocobalamin (VITAMIN B-12) 1000 MCG tablet Take 1 tablet (1,000 mcg) by mouth daily. 6/10/25  Yes Jaky Gaspar MD   ezetimibe (ZETIA) 10 MG tablet Take 1 tablet (10 mg) by mouth daily. 5/30/25  Yes Jaky Gaspar MD   famotidine (PEPCID) 20 MG tablet Take 1 tablet (20 mg) by mouth 2 times daily as needed. 5/30/25  Yes Jaky Gaspar MD   furosemide (LASIX) 40 MG tablet Take 1 tablet (40 mg) by mouth 2 times daily. 5/23/25  Yes Leonard Mcknight MD   isosorbide mononitrate (IMDUR) 60 MG 24 hr tablet Take 1 tablet (60 mg) by mouth daily. 5/30/25  Yes Jaky Gaspar MD   losartan (COZAAR) 25 MG tablet Take 1 tablet (25 mg) by mouth daily. 5/30/25  Yes Jaky Gaspar MD   metFORMIN (GLUCOPHAGE XR) 500 MG 24 hr tablet Take 2 tablets (1,000 mg) by mouth 2 times daily (with meals). 5/30/25  Yes Jaky Gaspar MD   metoprolol succinate ER (TOPROL XL) 100 MG 24 hr tablet Take 1 tablet (100 mg) by mouth daily. 5/30/25  Yes Jaky Gaspar MD   nitroGLYcerin (NITROSTAT) 0.4 MG sublingual tablet For chest pain place 1 tablet under the tongue every 5 minutes for 3 doses. If symptoms persist 5 minutes after 1st dose call 911. 6/6/25  Yes Jaky Gaspar MD   omeprazole  "(PRILOSEC) 40 MG DR capsule Take 40 mg by mouth daily.   Yes Reported, Patient   tirzepatide (MOUNJARO) 12.5 MG/0.5ML SOAJ auto-injector pen Inject 0.5 mLs (12.5 mg) subcutaneously once a week. 25  Yes Jaky Gaspar MD   venlafaxine (EFFEXOR XR) 150 MG 24 hr capsule Take 1 capsule (150 mg) by mouth daily. 25  Yes Jaky Gaspar MD   vitamin D3 (CHOLECALCIFEROL) 125 MCG (5000 UT) tablet Take 1 tablet (125 mcg) by mouth daily. 25  Yes Jaky Gaspar MD        ALLERGIES: No Known Allergies     SOCIAL HISTORY:  Social History     Tobacco Use    Smoking status: Never     Passive exposure: Never    Smokeless tobacco: Never    Tobacco comments:     no passive exposure   Vaping Use    Vaping status: Never Used   Substance Use Topics    Alcohol use: No    Drug use: No        FAMILY HISTORY:  Family History   Problem Relation Age of Onset    Diabetes Mother     Hypertension Mother     Uterine Cancer Mother     Diverticulitis Mother     Other - See Comments Father          in [place] in [place]    No Known Problems Sister     No Known Problems Daughter     No Known Problems Daughter     No Known Problems Daughter     No Known Problems Daughter     No Known Problems Son     No Known Problems Son     No Known Problems Son     No Known Problems Son         PHYSICAL EXAM:   /78 (BP Location: Left arm)   Pulse 107   Temp 98.4  F (36.9  C) (Oral)   Resp 24   Ht 1.499 m (4' 11\")   Wt 88.2 kg (194 lb 6.4 oz)   SpO2 98%   BMI 39.26 kg/m       PHYSICAL EXAM:  General: alert, oriented, NAD  SKIN: no suspicious lesions, rashes, jaundice, or spider angiomas  HEAD: Normocephalic. No masses, lesions, tenderness or abnormalities  NECK: Neck supple. No adenopathy. Thyroid symmetric, normal size.  EYES: No scleral icterus  ENT: ENT exam normal, no neck nodes or sinus tenderness  RESPIRATORY: negative, Good diaphragmatic excursion. Lungs clear  CARDIOVASCULAR: negative, PMI normal. No lifts, heaves, or thrills. RRR. " No murmurs, clicks gallops or rub  GASTROINTESTINAL: +BS, soft, NT, ND, no HSM, no masses/guarding/rebound  JOINT/EXTREMITIES: extremities normal- no gross deformities noted, gait normal and normal muscle tone  NEURO: Reflexes grossly normal and symmetric. Sensation grossly WNL.  PSYCH: no abnormal anxiety/depression  LYMPH: No anterior cervical, posterior cervical, or supraclavicular adenopathy     LABS:  I reviewed the patient's new clinical lab test results.   Recent Labs   Lab Test 06/14/25  0659 06/13/25  2209 06/13/25  1623 04/15/21  0939 03/29/20  1825 05/13/19  0528 05/10/19  0353   WBC 7.7 8.3 6.1   < > 5.9   < > 5.8   HGB 9.3* 7.7* 5.7*   < > 11.6*   < > 13.5   MCV 83 86 86   < > 92   < > 89    295 340   < > 227   < > 229   INR  --   --  1.01  --  0.90  --  0.85*    < > = values in this interval not displayed.     Recent Labs   Lab Test 06/14/25  0659 06/13/25  1623 05/30/25  1044    143 141   POTASSIUM 4.3 4.5 4.6   CHLORIDE 110* 111* 107   CO2 23 17* 20*   BUN 21.5* 29.0* 27.4*   ANIONGAP 11 15 14   LYNN 8.7* 8.7* 8.7*     Recent Labs   Lab Test 06/14/25  0659 03/31/25  0833 03/18/25  2241 09/23/24  2254 08/20/20  1519 08/20/20  1513 03/29/20  1825 12/27/19  0053   ALBUMIN 3.2* 3.4* 3.2*  --    < >  --  3.0*  --    BILITOTAL 0.8 0.3 0.2  --    < >  --  0.4  --    ALT 92* 83* 160*  --    < >  --  46*  --    AST 86* 62* 250*  --    < >  --  28  --    ALKPHOS 154* 104 95  --    < >  --  67  --    PROTEIN  --   --   --  10*  --  Negative  --  Negative   LIPASE  --  68* 109*  --   --   --  32  --    AMYLASE  --  65  --   --   --   --   --   --     < > = values in this interval not displayed.        IMAGING  I personally reviewed the patient's new imaging results.    @recent@  Recent Results (from the past 24 hours)   XR Chest Port 1 View    Narrative    EXAM: XR CHEST PORT 1 VIEW  LOCATION: [facility]  DATE: 6/13/2025    INDICATION: Hx Heart Failure with significant  swelling  COMPARISON: 2020      Impression    IMPRESSION: No pleural fluid or pneumothorax. No airspace disease or edema. The cardiomediastinal silhouette is enlarged.   Echocardiogram Complete   Result Value    LVEF  55-60%    [facility]    [id number]  Formerly [facility]  [id number]  [id number]^DEEJAY^[place] [address]     Name: EDUARDO PEDROZA  MRN: [id number]  : 1970  Study Date: 2025 11:26 AM  Age: 54 yrs  Gender: Female  Patient Location: [facility]  Reason For Study: Heart Failure  Ordering Physician: ALLAN VILLALBA  Performed By: ACE^^^^     BSA: 1.8 m2  Height: 59 in  Weight: 194 lb  HR: 108  BP: 137/70 mmHg  ______________________________________________________________________________  Procedure  Echocardiogram with two-dimensional, color and spectral Doppler. Definity (NDC  #18258-345) given intravenously. Adequate quality two-dimensional was  performed and interpreted. Adequate quality color and spectral Doppler were  performed and interpreted. Compared to the prior study dated 2019, there  are changes as noted.  ______________________________________________________________________________  Interpretation Summary     1. Left ventricular chamber size is normal. Mild concentric increase in wall  thickness. Systolic function is normal. The visually estimated left  ventricular ejection fraction is 55-60%.  2. Right ventricular chamber size and systolic function are normal.  3. No hemodynamically significant valvular abnormalities.  4. Non-aneurysmal enlargement of the ascending aorta measuring 4.4 cm.  5. Compared to the prior study dated 2019, the ascending aorta has  increased in size from 4.0 to 4.4 cm. Other findings are similar.  ______________________________________________________________________________  I      WMSI = 1.00     % Normal = 100     X - Cannot   0 -                      (2) - Mildly 2 -          Segments  Size  Interpret     Hyperkinetic 1 - Normal  Hypokinetic  Hypokinetic  1-2     small                                                 7 -          3-5      moderate  3 - Akinetic 4 -          5 -         6 - Akinetic Dyskinetic   6-14    large           Dyskinetic   Aneurysmal  w/scar       w/scar       15-16   diffuse     Left Ventricle  The left ventricle is normal in size. There is mild concentric left  ventricular hypertrophy. Left ventricular systolic function is normal. The  visual ejection fraction is 55-60%. Diastolic function not assessed due to  tachycardia. No regional wall motion abnormalities noted.     Right Ventricle  The right ventricle is normal size. The right ventricular systolic function is  normal.     Atria  Normal left atrial size. Right atrial size is normal.     Mitral Valve  Mitral valve leaflets appear normal. There is trace mitral regurgitation.  There is no mitral valve stenosis.     Tricuspid Valve  Tricuspid valve leaflets appear normal. There is trace tricuspid  regurgitation. Right ventricular systolic pressure could not be approximated  due to inadequate tricuspid regurgitation. There is no tricuspid stenosis.     Aortic Valve  Aortic valve leaflets appear normal. The aortic valve is trileaflet. No aortic  regurgitation is present. No aortic stenosis is present.     Pulmonic Valve  Normal pulmonic valve. There is trace to mild pulmonic valvular regurgitation.  There is no pulmonic valvular stenosis.     Vessels  The aorta root is normal. Ascending Aorta dilatation is present. IVC diameter  <2.1 cm collapsing >50% with sniff suggests a normal RA pressure of 3 mmHg.     Pericardium  There is no pericardial effusion.     Rhythm  The rhythm was sinus tachycardia.     ______________________________________________________________________________  MMode/2D Measurements & Calculations  IVSd: 1.3 cm  LVIDd: 4.5 cm  LVIDs: 3.4 cm  LVPWd: 1.3 cm  FS: 24.3 %     LV mass(C)d: 224.1 grams  LV mass(C)dI: 123.1  grams/m2  Ao root diam: 3.1 cm  LA dimension: 3.5 cm  asc Aorta Diam: 4.4 cm  LA/Ao: 1.1  LVOT diam: 2.3 cm  LVOT area: 4.2 cm2  Ao root diam index Ht(cm/m): 2.1  Ao root diam index BSA (cm/m2): 1.7  Asc Ao diam index BSA (cm/m2): 2.4  Asc Ao diam index Ht(cm/m): 2.9  EF Biplane: 61.0 %  LA Volume (BP): 33.5 ml     LA Volume Index (BP): 18.4 ml/m2  LA Volume Indexed (AL/bp): 20.4 ml/m2  RWT: 0.58     Time Measurements  MM HR: 108.0 BPM     Doppler Measurements & Calculations  MV E max gurvinder: 45.1 cm/sec  MV A max gurvinder: 85.9 cm/sec  MV E/A: 0.53     Ao V2 max: 107.0 cm/sec  Ao max P.0 mmHg  Ao V2 mean: 80.7 cm/sec  Ao mean PG: 3.0 mmHg  Ao V2 VTI: 17.4 cm  PALLAVI(I,D): 2.7 cm2  PALLAVI(V,D): 3.1 cm2  LV V1 max P.5 mmHg  LV V1 max: 79.0 cm/sec  LV V1 VTI: 11.5 cm  SV(LVOT): 47.8 ml  SI(LVOT): 26.2 ml/m2  PA acc time: 0.07 sec  PI end-d gurvinder: 140.0 cm/sec  AV Gurvinder Ratio (DI): 0.74  PALLAVI Index (cm2/m2): 1.5  E/E': 4.4  E/E' av.6  Lateral E/e': 4.4  Medial E/e': 8.8  Peak E' Gurvinder: 10.2 cm/sec  RV S Gurvinder: 17.0 cm/sec     ______________________________________________________________________________  Report approved by: Americo Brumfield MD on 2025 12:34 PM                Assessment:  54 year old female with a past medical history significant for CHF, coronary artery disease, type 2 diabetes, admitted  with symptoms of shortness of breath, dizziness, fatigue and outpatient labs showing worsening anemia, found to also have CHF exacerbation.    1. Acute on chronic normocytic anemia. No overt GI bleeding. Had positive FIT testing recently. This is in the setting of aspirin use. Progressively worsening anemia since 2025, baseline 7-8. HGB 5.7 on admission. No clear signs of hemolysis with labs so far. Evidence of iron deficiency this admission although in March this was normal. Not currently on iron replacement. Clinically has symptoms of GERD/gastritis with epigastric discomfort. Less likely biliary with normal  bilirubin and no gall stones on previous imaging. Lipase is normal.    2. CHF exacerbation. Cardiology evaluated and felt CHF exacerbation and symptoms likely due to worsening anemia. Echo here shows EF 55-60%. On home diuretics. Not requiring O2. CXR unremarkable.     3. Elevated liver enzymes. ALT > AST. This is chronic over last few years. Slightly worse today compared to 2 months ago (previously AST 62/ALT 83). This admit alk phos mildly elevated. US in March showed fatty liver, no gall stones, normal bile duct. Lipase is normal. Most likely secondary to MASLD. Platelets are normal. Other risk factors include DM, morbid obesity (BMI 39), HLD. On Mounjaro and statin. FIB-4 score indicates fibrosis likely (depending on degree of fibrosis/anemia could be related).    Plan:  --Follow HGB and transfuse prn.   --Await peripheral smear results.   --PPI 40mg daily.   --Trend LFTs.   --Check hepatitis B/C serologies.   --Avoid NSAIDs.   --Recommend outpatient MR elastography to measure liver stiffness.  --Will need eventual EGD/colonoscopy to evaluate anemia. Not urgent today. Tentatively Monday. Will evaluate patient tomorrow and order colon prep if deemed appropriate to move forward.   --Regular diet.     Discussed with Dr. Tillman.    Total time spent: 60 minutes was spent providing patient care, including patient evaluation, reviewing documentation/test results, and . Thank you for asking us to participate in the care of this patient.    Nancy Velazco, PAC  [facility] ([facility])

## 2025-06-14 NOTE — PROVIDER NOTIFICATION
Dr. Betancur admitting hospitalist updated on 2200 labs including hgb 7.7, lactic 3.9, and chest XR results. Ordered repeat lactic with AM labs.

## 2025-06-14 NOTE — CONSULTS
We have been asked to see Leora Sanchez at [facility] by Dr. Dale Grimm for evaluation and management of heart failure.    Impression and Plan     Assessment:  Acute on chronic heart failure with preserved left ventricular ejection fraction. She seems euvolemic and I suspect her symptoms are primarily due to anemia and her lower extremity edema may be in part due to venous insufficiency. Stress cardiac MRI 2/18/2021 showed normal left ventricular ejection fraction with no ischemia or myocardial fibrosis.   Nonobstructive coronary artery disease seen on coronary angiography 5/13/2019.  Chronic sinus tachycardia. Possible due to her anemia although this has persisted despite blood transfusion. She denies symptoms from this.  Hyperlipidemia.  Non insulin-dependent diabetes mellitus type 2. Last hemoglobin A1c 7.5%.  Acute on chronic normocytic anemia with iron deficiency. Unclear etiology but occult gastrointestinal blood loss may be a concern.  Morbid obesity with body mass index 39.26 kg/m .    Plan:  Transthoracic echocardiogram if able to be done during her hospitalization but if not, it is fine to do this as an outpatient and she does not need to remain hospitalized just to have her transthoracic echocardiogram.  Continue oral furosemide 40 mg twice daily  Resume her other cardiac medications when her blood pressure allows.  She has follow-up scheduled with her primary cardiologist Dr. Roy on 6/27/2025.  We will sign off at this time. Please do not hesitate to contact us with additional questions or concerns.    Primary Cardiologist: Dr. Buddy Roy    Clinically Significant Risk Factors Present on Admission          # Hyperchloremia: Highest Cl = 111 mmol/L in last 2 days, will monitor as appropriate          # Hypoalbuminemia: Lowest albumin = 3.2 g/dL at 6/14/2025  6:59 AM, will monitor as appropriate   # Drug Induced Platelet Defect: home medication list includes an antiplatelet medication   #  "Hypertension: Noted on problem list      # Anemia: based on hgb <11      # DMII: A1C = 7.5 % (Ref range: 0.0 - 5.6 %) within past 6 months    # Obesity: Estimated body mass index is 39.26 kg/m  as calculated from the following:    Height as of this encounter: 1.499 m (4' 11\").    Weight as of this encounter: 88.2 kg (194 lb 6.4 oz).             History of Present Illness      Ms. Leora Sanchez is a 54 year old female with a history of HFpEF, nonobstructive CAD and chronic anemia admitted yesterday with a hemoglobin of 5.7.    She has been feeling out breath with exertion for at least a few weeks. She was seen in cardiology clinic by Dr. Mcknight on 5/23/2025 who felt the patient may have some degree of HF decompensation so furosemide was increased to 40 mg twice daily and a TTE ordered although not yet done. It was noted that she was anemic at that time as well and thought that anemia could also be contributing to her symptoms.    She feels her main issues are hip and back pain and leg weakness which cause her to feel short of breath when doing anything physical. She currently denies any chest pain/pressure/tightness, shortness of breath at rest, light headedness/dizziness, pre-syncope, syncope, palpitations, paroxysmal nocturnal dyspnea (PND), or orthopnea. She does have chronic lower extremity edema.    On arrival to the ED she was tachycardic with borderline blood pressure. Besides her hemoglobin of 5.7, other noteworthy labs include a ferritin of 25, serum iron 14, NT-proBNP mildly elevated at 234, and lactate 3.9. ECG showed sinus tachycardia. CXR was unremarkable. She was transfused blood and her most recent hemoglobin has increased to 9.3. She feels better after transfusion.     Review of Systems:  Further review of systems is otherwise negative/noncontributory (based on review of medical record (admission H&P) and 13 point review of systems reviewed. Pertinent positives noted).    Cardiac Diagnostics     ECG " 6/13/2025 (personally reviewed and interpreted): sinus tachycardia with  bpm, nonspecific ST/T abnormality    Echocardiogram 5/13/2019 (results reviewed):     Technically difficult study due to patient's body habitus    Left ventricle ejection fraction is moderately decreased. The estimated left ventricular ejection fraction is 45% with global hypokinesis.    Right ventricle poorly visualized. CT is normal which would suggest normal right ventricular systolic function.    No significant valvular heart disease identified.    When compared to the previous study dated 1/29/2018, overall left ventricular function appears slightly lower.    Cardiac Cath 5/13/2019 (results reviewed):   Angiography via left radial   LM normal  LAD 40-50% prox LAD disease with FFR 0.86  Circ OM 1 40-50% narrowing with FFR 0.96  RCA min dz     LVEDP 7mmHg    Cardiac Stress Testing 9/27/2023 (results reviewed):     A prior study was conducted on 5/10/2019.  This study has no change when compared with the prior study.    Pharmacological regadenoson stress ECG is negative for inducible myocardial ischemia.    Pharmacological Regadenoson nuclear stress test is negative for inducible myocardial ischemia or infarction.    Normal left ventricular size, wall motion and systolic function.  The calculated left ventricular ejection fraction is 70%.    The patient is at a low risk of future cardiac ischemic events.    Stress cardiac MRI 2/18/2021 (results reviewed):  1. Lexiscan stress cardiac MRI is negative for inducible myocardial ischemia.  2. Lexiscan stress ECG is negative for inducible myocardial ischemia.  3. No previous myocardial infarction is identified.  4. Normal left ventricular size, wall thickness and systolic function. The calculated left ventricular ejection fraction is 65%.  5. Normal right ventricular size and systolic function.  6. No obvious valvular disease.    Medical History  Surgical History Family History Social History    Past Medical History:   Diagnosis Date    Anxiety     Chronic cough 2018    Chronic kidney disease     Congestive heart failure (H)     Depression     Dyslipidemia, goal LDL below 70 10/31/2017    Essential hypertension     GERD (gastroesophageal reflux disease)     Heart failure with reduced ejection fraction (H) 10/30/2017    Hypertension     Hypokalemia 2021    Nonischemic cardiomyopathy (H)     variable EF depending on the technique, date and reader; BNP normal in 2018    Nonocclusive coronary atherosclerosis of native coronary artery 10/31/2017    Obese     Perforation of right tympanic membrane 2013    S/p repair in  by Dr. Thomas at [facility].      Pregnancy         Pyelonephritis 2018    Renal abscess     Spontaneous  2010    TM (tympanic membrane disorder)      Past Surgical History:   Procedure Laterality Date    CV CORONARY ANGIOGRAM N/A 2019    Procedure: Coronary Angiogram;  Surgeon: Car Box MD;  Location: [facility] Cath Lab;  Service: Cardiology    CV LEFT HEART CATHETERIZATION WITHOUT LEFT VENTRICULOGRAM Left 10/31/2017    Procedure: Left Heart Catheterization Without Left Ventriculogram;  Surgeon: Jonathan Duque MD;  Location: [facility] Cath Lab;  Service:     CV LEFT HEART CATHETERIZATION WITHOUT LEFT VENTRICULOGRAM Left 2019    Procedure: Left Heart Catheterization Without Left Ventriculogram;  Surgeon: Car Box MD;  Location: [facility] Cath Lab;  Service: Cardiology    DILATION AND CURETTAGE      ENT SURGERY      INNER EAR SURGERY Right 1994    MASTOIDECTOMY Right     PA CATH PLACEMENT & NJX CORONARY ART ANGIO IMG S&I N/A 10/31/2017    Procedure: Coronary Angiogram;  Surgeon: Jonathan Duque MD;  Location: [facility] Cath Lab;  Service: Cardiology     Family History   Problem Relation Age of Onset    Diabetes Mother     Hypertension Mother     Uterine Cancer Mother     Diverticulitis  Mother     Other - See Comments Father          in [place]    No Known Problems Sister     No Known Problems Daughter     No Known Problems Daughter     No Known Problems Daughter     No Known Problems Daughter     No Known Problems Son     No Known Problems Son     No Known Problems Son     No Known Problems Son            Social History     Socioeconomic History    Marital status:      Spouse name: Juan    Number of children: 8    Years of education: Not on file    Highest education level: Not on file   Occupational History    Occupation: SSDI   Tobacco Use    Smoking status: Never     Passive exposure: Never    Smokeless tobacco: Never    Tobacco comments:     no passive exposure   Vaping Use    Vaping status: Never Used   Substance and Sexual Activity    Alcohol use: No    Drug use: No    Sexual activity: Yes     Partners: Male     Birth control/protection: Post-menopausal   Other Topics Concern    Not on file   Social History Narrative    Lives with  Juan who can read and speak English and helps her with meds, son and daughter-in-law      Social Drivers of Health     Financial Resource Strain: Low Risk  (2025)    Financial Resource Strain     Within the past 12 months, have you or your family members you live with been unable to get utilities (heat, electricity) when it was really needed?: No   Food Insecurity: High Risk (2025)    Food Insecurity     Within the past 12 months, did you worry that your food would run out before you got money to buy more?: Yes     Within the past 12 months, did the food you bought just not last and you didn t have money to get more?: Yes   Transportation Needs: High Risk (2025)    Transportation Needs     Within the past 12 months, has lack of transportation kept you from medical appointments, getting your medicines, non-medical meetings or appointments, work, or from getting things that you need?: Yes   Physical Activity:  "Insufficiently Active (9/6/2024)    Exercise Vital Sign     Days of Exercise per Week: 5 days     Minutes of Exercise per Session: 10 min   Stress: No Stress Concern Present (9/6/2024)    [employer] - Occupational Stress Questionnaire     Feeling of Stress : Only a little   Social Connections: Unknown (9/6/2024)    Social Connection and Isolation Panel [NHANES]     Frequency of Communication with Friends and Family: Not on file     Frequency of Social Gatherings with Friends and Family: More than three times a week     Attends Denominational Services: Not on file     Active Member of Clubs or Organizations: Not on file     Attends Club or Organization Meetings: Not on file     Marital Status: Not on file   Interpersonal Safety: Low Risk  (6/13/2025)    Interpersonal Safety     Do you feel physically and emotionally safe where you currently live?: Yes     Within the past 12 months, have you been hit, slapped, kicked or otherwise physically hurt by someone?: No     Within the past 12 months, have you been humiliated or emotionally abused in other ways by your partner or ex-partner?: No   Housing Stability: High Risk (6/13/2025)    Housing Stability     Do you have housing? : Yes     Are you worried about losing your housing?: Yes             Physical Examination   VITALS: /70 (BP Location: Left arm)   Pulse 109   Temp 98.1  F (36.7  C) (Oral)   Resp 20   Ht 1.499 m (4' 11\")   Wt 88.2 kg (194 lb 6.4 oz)   SpO2 98%   BMI 39.26 kg/m    BMI: Body mass index is 39.26 kg/m .  Wt Readings from Last 3 Encounters:   06/14/25 88.2 kg (194 lb 6.4 oz)   05/30/25 89.4 kg (197 lb)   05/23/25 88.9 kg (196 lb)       Intake/Output Summary (Last 24 hours) at 6/14/2025 1055  Last data filed at 6/14/2025 1004  Gross per 24 hour   Intake 600 ml   Output 1450 ml   Net -850 ml       General Appearance:  Alert, cooperative, no distress, appears stated age    Head:  Normocephalic, without obvious " abnormality, atraumatic   Eyes:  PERRL, conjunctiva/corneas clear, EOM's intact   Ears:  Normal external ear canals bilaterally   Nose: Nares normal, septum midline, no drainage   Throat: Lips, mucosa, and tongue normal; teeth and gums normal   Neck: Supple, symmetrical, trachea midline, no adenopathy, no carotid bruit or JVD    Back:   Symmetric, no abnormal curvature, ROM normal   Lungs:   Clear to auscultation bilaterally, respirations unlabored   Chest Wall:  No tenderness or deformity   Heart:  Regular rate and rhythm , S1, S2 normal,no murmur, rub or gallop   Abdomen:   Soft, non-tender, bowel sounds active all four quadrants,  no masses, no organomegaly   Extremities: Extremities normal, atraumatic, no cyanosis. Lower extremities are wrapped.   Skin: Skin color, texture, turgor normal, no rashes or lesions   Psychiatric: Normal affect, pleasant and cooperative    Neurologic: Alert and oriented X 3, Moves all 4 extremities            Imaging      CXR 6/13/2025 (results reviewed):  No pleural fluid or pneumothorax.   No airspace disease or edema.   The cardiomediastinal silhouette is enlarged.      Lab Results    Chemistry/lipid CBC Cardiac Enzymes/BNP/TSH/INR   Recent Labs   Lab Test 03/21/25  1551   CHOL 140   HDL 47*   LDL 49   TRIG 218*     Recent Labs   Lab Test 03/21/25  1551 02/14/25  1350 11/29/23  1141   LDL 49 70 68     Recent Labs   Lab Test 06/14/25  0819 06/14/25  0659   NA  --  144   POTASSIUM  --  4.3   CHLORIDE  --  110*   CO2  --  23   * 137*   BUN  --  21.5*   CR  --  0.99*   GFRESTIMATED  --  67   LYNN  --  8.7*     Recent Labs   Lab Test 06/14/25  0659 06/13/25  1623 05/30/25  1044   CR 0.99* 1.13* 0.80     Recent Labs   Lab Test 03/31/25  0833 02/14/25  1350 09/06/24  1435   A1C 7.5* 9.3* 8.9*          Recent Labs   Lab Test 06/14/25  0659   WBC 7.7   HGB 9.3*   HCT 29.9*   MCV 83        Recent Labs   Lab Test 06/14/25  0659 06/13/25  2209 06/13/25  1623   HGB 9.3* 7.7* 5.7*     Recent Labs   Lab Test 08/20/20  1519 03/30/20  0409 03/29/20  2213   TROPONINI <0.01 <0.01 <0.01     Recent Labs   Lab Test 06/13/25  1623 05/23/25  1147 03/18/25  2241 09/23/24  1947 04/05/24  1018 04/21/23  1401 08/20/20  1519 03/29/20  1825 05/10/19  0353   BNP  --   --   --   --   --   --  12 58 29   NTBNPI  --   --  87 126  --   --   --   --   --    NTBNP 234* 197  --   --  202   < >  --   --   --     < > = values in this interval not displayed.     Recent Labs   Lab Test 05/30/25  1656   TSH 0.81     Recent Labs   Lab Test 06/13/25  1623 03/29/20  1825 05/10/19  0353   INR 1.01 0.90 0.85*           Current Inpatient Scheduled Medications   Scheduled Meds:  Current Facility-Administered Medications   Medication Dose Route Frequency Provider Last Rate Last Admin    allopurinol (ZYLOPRIM) tablet 300 mg  300 mg Oral Daily oBbby Betancur MD   300 mg at 06/14/25 0822    [Held by provider] aspirin EC tablet 81 mg  81 mg Oral Daily Bobby Betancur MD        atorvastatin (LIPITOR) tablet 80 mg  80 mg Oral Daily Bobby Betancur MD   80 mg at 06/14/25 0821    ezetimibe (ZETIA) tablet 10 mg  10 mg Oral Daily Bobby Betancur MD   10 mg at 06/14/25 0822    furosemide (LASIX) tablet 40 mg  40 mg Oral BID Bobby Betancur MD   40 mg at 06/14/25 0821    insulin aspart (NovoLOG) injection (RAPID ACTING)  1-7 Units Subcutaneous TID AC Bobby Betancur MD        insulin aspart (NovoLOG) injection (RAPID ACTING)  1-5 Units Subcutaneous At Bedtime Bobby Betancur MD        [Held by provider] isosorbide mononitrate (IMDUR) 24 hr tablet 60 mg  60 mg Oral Daily Bobby Betancur MD        [Held by provider] losartan (COZAAR) tablet 25 mg  25 mg Oral Daily Bobby Betancur MD        [Held by provider] metoprolol succinate ER (TOPROL XL) 24 hr tablet 100 mg  100 mg Oral Daily Bobby Betancur MD        pantoprazole (PROTONIX) injection 40 mg  40 mg Intravenous BID Bobby Betancur MD   40 mg at 06/14/25 0623    sodium  chloride (PF) 0.9% PF flush 3 mL  3 mL Intracatheter Q8H Bobby Betancur MD   3 mL at 06/14/25 0158    venlafaxine (EFFEXOR XR) 24 hr capsule 150 mg  150 mg Oral Daily Bobby Betancur MD   150 mg at 06/14/25 0822          Medications Prior to Admission   Prior to Admission medications    Medication Sig Start Date End Date Taking? Authorizing Provider   allopurinol (ZYLOPRIM) 300 MG tablet Take 1 tablet (300 mg) by mouth daily. 5/30/25  Yes Jaky Gaspar MD   aspirin 81 MG EC tablet Take 81 mg by mouth daily.   Yes Reported, Patient   atorvastatin (LIPITOR) 80 MG tablet Take 80 mg by mouth daily.   Yes Reported, Patient   colchicine (COLCRYS) 0.6 MG tablet TAKE 1 TABLET BY MOUTH TWICE DAILY 6/3/25  Yes Jaky Gaspar MD   cyanocobalamin (VITAMIN B-12) 1000 MCG tablet Take 1 tablet (1,000 mcg) by mouth daily. 6/10/25  Yes Jaky Gaspar MD   ezetimibe (ZETIA) 10 MG tablet Take 1 tablet (10 mg) by mouth daily. 5/30/25  Yes Jaky Gaspar MD   famotidine (PEPCID) 20 MG tablet Take 1 tablet (20 mg) by mouth 2 times daily as needed. 5/30/25  Yes Jaky Gaspar MD   furosemide (LASIX) 40 MG tablet Take 1 tablet (40 mg) by mouth 2 times daily. 5/23/25  Yes Leonard Mcknight MD   isosorbide mononitrate (IMDUR) 60 MG 24 hr tablet Take 1 tablet (60 mg) by mouth daily. 5/30/25  Yes Jaky Gaspar MD   losartan (COZAAR) 25 MG tablet Take 1 tablet (25 mg) by mouth daily. 5/30/25  Yes Jaky Gaspar MD   metFORMIN (GLUCOPHAGE XR) 500 MG 24 hr tablet Take 2 tablets (1,000 mg) by mouth 2 times daily (with meals). 5/30/25  Yes Jaky Gaspar MD   metoprolol succinate ER (TOPROL XL) 100 MG 24 hr tablet Take 1 tablet (100 mg) by mouth daily. 5/30/25  Yes Jaky Gaspar MD   nitroGLYcerin (NITROSTAT) 0.4 MG sublingual tablet For chest pain place 1 tablet under the tongue every 5 minutes for 3 doses. If symptoms persist 5 minutes after 1st dose call 911. 6/6/25  Yes Jaky Gaspar MD   omeprazole (PRILOSEC) 40 MG DR capsule Take 40 mg  by mouth daily.   Yes Reported, Patient   tirzepatide (MOUNJARO) 12.5 MG/0.5ML SOAJ auto-injector pen Inject 0.5 mLs (12.5 mg) subcutaneously once a week. 5/30/25  Yes Jaky Gaspar MD   venlafaxine (EFFEXOR XR) 150 MG 24 hr capsule Take 1 capsule (150 mg) by mouth daily. 5/30/25  Yes Jaky Gaspar MD   vitamin D3 (CHOLECALCIFEROL) 125 MCG (5000 UT) tablet Take 1 tablet (125 mcg) by mouth daily. 6/6/25  Yes Jaky Gaspar MD Brent E. White, MD [facility]  Non-Invasive Cardiologist  [facility]  Pager [phone]

## 2025-06-14 NOTE — PROGRESS NOTES
Care Management received a consult to assess SDOH needs, specifically regarding food insecurity, housing, and transportation concerns. The writer met with the patient and her spouse to evaluate these needs.They both denied that the patient is experiencing any of the above concerns, suggesting that the patient may have misunderstood the initial questions. The patient resides with her family in a single-level home. Her spouse mentioned that she has some mobility issues but stated that she would decline home care services or TCU placement if recommended.    No additional needs or concerns were identified at this time. Her spouse will provide transportation upon discharge.    Please notify Care Management if any further concerns arise.    FABIAN Loo

## 2025-06-14 NOTE — H&P
[facility]    History and Physical - Hospitalist Service       Date of Admission:  6/13/2025    Assessment & Plan      May MARICHUY Sanchez is a 54 year old female with a past medical history of HFrEF, CAD, Type 2 Diabetes who was admitted on 6/13/25 after presenting to [facility] ED for Dizziness and Shortness of Breath.    #Acute on Chronic Anemia  - HGB in the ED 5.7. Recent HGB baseline ~7.0-8.0.  - Previous work up 03/2025 mostly unrevealing - see labs in chart  - Patient denies melena, BRBPR, or any other bleeding.  Plan  - Given 2 units RBCs in the ED - recheck HGB after  - Checking HGB q8h overnight  - GI consult order placed  - Checking LDH, haptoglobin, iron studies, B12/folate, peripheral smear  - Protonix 40mg IV BID    - Lactate 3.9 - given blood as above - recheck in in AM, trend from there.    #HFrEF - Acute Exacerbation  - PTA HF Meds: Losartan 25mg, metoprolol 100mg, lasix 40mg BID  - Last Echo on file, 2019 - EF 45% w/ global hypokinesis.  - Significant LE edema in the ED. Not Hypoxic in ED.  Plan  - Holding home losartan, metoprolol with low BP in the ED  - Will continue PTA Lasix 40mg PO BID for now - low threshold to hold if hypotension  - Adding BNP onto labs and checking CXR for fluid.  - Consult Cardiology to see in AM    #History of CAD with Stable Angina  - Non-obstructive disease on coronary angiogram 2019  - PTA Meds: aspirin, atorvastatin, zetia, ISMN  Plan  - Holding aspirin due to concern for bleeding  - Holding ISMN with low BP in the ED  - Continue home atorvastatin, zetia.    #Type 2 Diabetes  - Last A1c was 7.5% in 03/2025  - Home medications include metformin, tirzepatide injections  Plan  - Hold home metformin, tirzepatide  - Sliding Scale Insulin with Glucose Checks            Diet: Moderate Consistent Carb (60 g CHO per Meal) Diet  NPO for Procedure/Surgery per Anesthesia Guidelines Except for: Meds, Ice Chips; Clear liquids before procedure/surgery: ADULT (Age GREATER  "than or Equal to 18 years) - Clear liquids 2 hours before procedure/surgery    DVT Prophylaxis: Pneumatic Compression Devices and holding chemoprophylaxis with anemia.  Park Catheter: Not present  Lines: None     Cardiac Monitoring: ACTIVE order. Indication: Tachyarrhythmias, acute (48 hours)  Code Status: Full Code      Clinically Significant Risk Factors Present on Admission          # Hyperchloremia: Highest Cl = 111 mmol/L in last 2 days, will monitor as appropriate            # Drug Induced Platelet Defect: home medication list includes an antiplatelet medication   # Hypertension: Noted on problem list      # Anemia: based on hgb <11      # DMII: A1C = 7.5 % (Ref range: 0.0 - 5.6 %) within past 6 months    # Obesity: Estimated body mass index is 39.08 kg/m  as calculated from the following:    Height as of this encounter: 1.499 m (4' 11\").    Weight as of this encounter: 87.8 kg (193 lb 8 oz).              Disposition Plan     Medically Ready for Discharge: Anticipated in 2-4 Days           Bobby Betancur MD  Hospitalist Service  [facility]  Securely message with [employer] (more info)  Text page via [facility] Paging/Directory     ______________________________________________________________________    Chief Complaint   Shortness of Breath and Dizziness.    History is obtained from the patient, electronic health record, and emergency department physician    History of Present Illness   May MARICHUY Sanchez is a 54 year old female with a past medical history of HFrEF, CAD, Type 2 Diabetes who was admitted on 6/13/25 after presenting to [facility] ED for Dizziness and Shortness of Breath.    Per chart review, patient has had anemia for the past few months with hemoglobins around 7.0-8.0.  Patient had brief workup with PCP in 03/2025 which showed normal iron studies, normal vitamin B12, no signs hemolysis.  Slowly developed generalized weakness, shortness of breath with walking, intermittent chest pain, " dizziness on 6/10.  She went to her primary care doctor and had labs drawn.  The labs resulted with low hemoglobin and patient was instructed to go to the ED on .    Patient denies any bleeding including no blood in stool, no vaginal bleeding.  No melena no bright red blood per rectum.      Past Medical History    Past Medical History:   Diagnosis Date    Anxiety     Chronic cough 2018    Chronic kidney disease     Congestive heart failure (H)     Depression     Dyslipidemia, goal LDL below 70 10/31/2017    Essential hypertension     GERD (gastroesophageal reflux disease)     Heart failure with reduced ejection fraction (H) 10/30/2017    Hypertension     Hypokalemia 2021    Nonischemic cardiomyopathy (H)     variable EF depending on the technique, date and reader; BNP normal in     Nonocclusive coronary atherosclerosis of native coronary artery 10/31/2017    Obese     Perforation of right tympanic membrane 2013    S/p repair in  by Dr. Thomas at [facility].      Pregnancy         Pyelonephritis 2018    Renal abscess     Spontaneous  2010    TM (tympanic membrane disorder)        Past Surgical History   Past Surgical History:   Procedure Laterality Date    CV CORONARY ANGIOGRAM N/A 2019    Procedure: Coronary Angiogram;  Surgeon: Car Box MD;  Location: [facility] Cath Lab;  Service: Cardiology    CV LEFT HEART CATHETERIZATION WITHOUT LEFT VENTRICULOGRAM Left 10/31/2017    Procedure: Left Heart Catheterization Without Left Ventriculogram;  Surgeon: Jonathan Duque MD;  Location: [facility] Cath Lab;  Service:     CV LEFT HEART CATHETERIZATION WITHOUT LEFT VENTRICULOGRAM Left 2019    Procedure: Left Heart Catheterization Without Left Ventriculogram;  Surgeon: Car Box MD;  Location: [facility] Cath Lab;  Service: Cardiology    DILATION AND CURETTAGE      ENT SURGERY      INNER EAR SURGERY Right     MASTOIDECTOMY  Right 1996    MA CATH PLACEMENT & NJX CORONARY ART ANGIO IMG S&I N/A 10/31/2017    Procedure: Coronary Angiogram;  Surgeon: Jonathan Duque MD;  Location: [facility] Cath Lab;  Service: Cardiology       Prior to Admission Medications   Prior to Admission Medications   Prescriptions Last Dose Informant Patient Reported? Taking?   allopurinol (ZYLOPRIM) 300 MG tablet 6/13/2025 Morning  No Yes   Sig: Take 1 tablet (300 mg) by mouth daily.   aspirin 81 MG EC tablet 6/13/2025 Morning  Yes Yes   Sig: Take 81 mg by mouth daily.   atorvastatin (LIPITOR) 80 MG tablet 6/13/2025 Morning  Yes Yes   Sig: Take 80 mg by mouth daily.   colchicine (COLCRYS) 0.6 MG tablet 6/13/2025 Morning  No Yes   Sig: TAKE 1 TABLET BY MOUTH TWICE DAILY   cyanocobalamin (VITAMIN B-12) 1000 MCG tablet 6/13/2025 Morning  No Yes   Sig: Take 1 tablet (1,000 mcg) by mouth daily.   ezetimibe (ZETIA) 10 MG tablet 6/13/2025 Morning  No Yes   Sig: Take 1 tablet (10 mg) by mouth daily.   famotidine (PEPCID) 20 MG tablet   No Yes   Sig: Take 1 tablet (20 mg) by mouth 2 times daily as needed.   furosemide (LASIX) 40 MG tablet 6/13/2025 Morning  No Yes   Sig: Take 1 tablet (40 mg) by mouth 2 times daily.   isosorbide mononitrate (IMDUR) 60 MG 24 hr tablet 6/13/2025 Morning  No Yes   Sig: Take 1 tablet (60 mg) by mouth daily.   losartan (COZAAR) 25 MG tablet 6/13/2025 Morning  No Yes   Sig: Take 1 tablet (25 mg) by mouth daily.   metFORMIN (GLUCOPHAGE XR) 500 MG 24 hr tablet 6/13/2025 Morning  No Yes   Sig: Take 2 tablets (1,000 mg) by mouth 2 times daily (with meals).   metoprolol succinate ER (TOPROL XL) 100 MG 24 hr tablet 6/13/2025 Morning  No Yes   Sig: Take 1 tablet (100 mg) by mouth daily.   nitroGLYcerin (NITROSTAT) 0.4 MG sublingual tablet   No Yes   Sig: For chest pain place 1 tablet under the tongue every 5 minutes for 3 doses. If symptoms persist 5 minutes after 1st dose call 911.   omeprazole (PRILOSEC) 40 MG DR capsule 6/13/2025 Morning  Yes Yes    Sig: Take 40 mg by mouth daily.   tirzepatide (MOUNJARO) 12.5 MG/0.5ML SOAJ auto-injector pen 2025 Morning  No Yes   Sig: Inject 0.5 mLs (12.5 mg) subcutaneously once a week.   venlafaxine (EFFEXOR XR) 150 MG 24 hr capsule 2025 Morning  No Yes   Sig: Take 1 capsule (150 mg) by mouth daily.   vitamin D3 (CHOLECALCIFEROL) 125 MCG (5000 UT) tablet 2025 Morning  No Yes   Sig: Take 1 tablet (125 mcg) by mouth daily.      Facility-Administered Medications: None        Social History   I have reviewed this patient's social history and updated it with pertinent information if needed.  Social History     Tobacco Use    Smoking status: Never     Passive exposure: Never    Smokeless tobacco: Never    Tobacco comments:     no passive exposure   Vaping Use    Vaping status: Never Used   Substance Use Topics    Alcohol use: No    Drug use: No         Family History   I have reviewed this patient's family history and updated it with pertinent information if needed.  Family History   Problem Relation Age of Onset    Diabetes Mother     Hypertension Mother     Uterine Cancer Mother     Diverticulitis Mother     Other - See Comments Father          in [place]    No Known Problems Sister     No Known Problems Daughter     No Known Problems Daughter     No Known Problems Daughter     No Known Problems Daughter     No Known Problems Son     No Known Problems Son     No Known Problems Son     No Known Problems Son          Allergies   No Known Allergies     Physical Exam   Vital Signs: Temp: 98.1  F (36.7  C) Temp src: Oral BP: 100/67 Pulse: 112   Resp: 18 SpO2: 98 % O2 Device: None (Room air)    Weight: 193 lbs 8 oz    General: Alert and Oriented x3. Answers questions appropriately. Follows commands.  Resp: Breath sounds equal throughout. No crackles. No wheezing.  Cardio: Regular rate and rhythm. No murmurs. No friction rub.  Abd: Soft. Non-distended. Non-tender. Bowel sounds normal. No rebound  tenderness.  MSK:   - 2+ Pitting Edema in Lower Extremities bilaterally below knees  Neuro: CN 2-12 grossly intact. Strength 5/5 in all 4 extremities.      Medical Decision Making       75 MINUTES SPENT BY ME on the date of service doing chart review, history, exam, documentation & further activities per the note.  MANAGEMENT DISCUSSED with the following over the past 24 hours: RN, ED Physician   Tests ORDERED & REVIEWED in the past 24 hours:  - BMP  - CBC  - BNP  - Coags/INR  Medical complexity over the past 24 hours:  - Prescription DRUG MANAGEMENT performed  - Treatment limited by SOCIAL DETERMINANTS OF HEALTH      Data     I have personally reviewed the following data over the past 24 hrs:    6.1  \   5.7 (LL)   / 340     143 111 (H) 29.0 (H) /  122 (H)   4.5 17 (L) 1.13 (H) \     INR:  1.01 PTT:  26   D-dimer:  N/A Fibrinogen:  N/A     Ferritin:  N/A % Retic:  N/A LDH:  420 (H)       Imaging results reviewed over the past 24 hrs:   No results found for this or any previous visit (from the past 24 hours).

## 2025-06-14 NOTE — PROVIDER NOTIFICATION
Notified Dr. García who is cross cover for hospitalists regarding patient's HR that is in the 130s this evening. Will continue to monitor while awaiting new orders.

## 2025-06-14 NOTE — PLAN OF CARE
Problem: Adult Inpatient Plan of Care  Goal: Optimal Comfort and Wellbeing  6/14/2025 0525 by Miranda Kowalski RN  Outcome: Progressing  6/13/2025 2056 by Miranda Kowalski RN  Outcome: Progressing     Problem: Anemia  Goal: Anemia Symptom Improvement  6/14/2025 0525 by Miranda Kowalski RN  Outcome: Progressing  6/13/2025 2056 by Miranda Kowalski RN  Outcome: Progressing     Problem: Fluid Volume Excess  Goal: Fluid Balance  6/14/2025 0525 by Miranda Kowalski RN  Outcome: Progressing  6/13/2025 2056 by Miranda Kowalski RN  Outcome: Progressing   Goal Outcome Evaluation:    Slept well. Alert, oriented x4. Denies pain. Arrived to unit shortly before 2000. Assist x1 in room. First unit of PRBC's infusing when transferred from ED, second unit infused before midnight. Hgb re-check 7.7 with another check in AM. Lactic 3.9- Dr. Betancur aware. Chest XR completed evening shift- MD updated. Put in orders to re-check lactic in AM. Pt without any obvious signs of bleeding. BLE ankle and foot edema of 3-4+, and +1 legs. SCD's in place. Saline locked after blood transfused. Afebrile but tachycardic 105-120 overnight. Tele- Sinus tachy. Bloodsugars 122,125. NPO since midnight. Cards and GI consulted. K, mag, phos protocols, all re-check this am. Wt in flowsheets.     Temp: 98  F (36.7  C) Temp src: Oral BP: (!) 149/84 (RN Notified) Pulse: 114   Resp: 20 SpO2: 99 % O2 Device: None (Room air)

## 2025-06-15 LAB
ALBUMIN SERPL BCG-MCNC: 3.4 G/DL (ref 3.5–5.2)
ALP SERPL-CCNC: 162 U/L (ref 40–150)
ALT SERPL W P-5'-P-CCNC: 92 U/L (ref 0–50)
ANION GAP SERPL CALCULATED.3IONS-SCNC: 12 MMOL/L (ref 7–15)
AST SERPL W P-5'-P-CCNC: 74 U/L (ref 0–45)
BILIRUB SERPL-MCNC: 0.6 MG/DL
BUN SERPL-MCNC: 20 MG/DL (ref 6–20)
CALCIUM SERPL-MCNC: 8.2 MG/DL (ref 8.8–10.4)
CHLORIDE SERPL-SCNC: 102 MMOL/L (ref 98–107)
CREAT SERPL-MCNC: 1.03 MG/DL (ref 0.51–0.95)
EGFRCR SERPLBLD CKD-EPI 2021: 64 ML/MIN/1.73M2
ERYTHROCYTE [DISTWIDTH] IN BLOOD BY AUTOMATED COUNT: 18 % (ref 10–15)
GLUCOSE BLDC GLUCOMTR-MCNC: 137 MG/DL (ref 70–99)
GLUCOSE BLDC GLUCOMTR-MCNC: 176 MG/DL (ref 70–99)
GLUCOSE BLDC GLUCOMTR-MCNC: 182 MG/DL (ref 70–99)
GLUCOSE BLDC GLUCOMTR-MCNC: 182 MG/DL (ref 70–99)
GLUCOSE BLDC GLUCOMTR-MCNC: 203 MG/DL (ref 70–99)
GLUCOSE SERPL-MCNC: 150 MG/DL (ref 70–99)
HBV CORE AB SERPL QL IA: REACTIVE
HBV SURFACE AB SERPL IA-ACNC: 23.2 M[IU]/ML
HBV SURFACE AB SERPL IA-ACNC: REACTIVE M[IU]/ML
HBV SURFACE AG SERPL QL IA: NONREACTIVE
HCO3 SERPL-SCNC: 29 MMOL/L (ref 22–29)
HCT VFR BLD AUTO: 31.5 % (ref 35–47)
HCV AB SERPL QL IA: NONREACTIVE
HGB BLD-MCNC: 10 G/DL (ref 11.7–15.7)
HOLD SPECIMEN: NORMAL
MAGNESIUM SERPL-MCNC: 1.2 MG/DL (ref 1.7–2.3)
MAGNESIUM SERPL-MCNC: 2.8 MG/DL (ref 1.7–2.3)
MCH RBC QN AUTO: 25.9 PG (ref 26.5–33)
MCHC RBC AUTO-ENTMCNC: 31.7 G/DL (ref 31.5–36.5)
MCV RBC AUTO: 82 FL (ref 78–100)
PHOSPHATE SERPL-MCNC: 3.7 MG/DL (ref 2.5–4.5)
PLATELET # BLD AUTO: 288 10E3/UL (ref 150–450)
POTASSIUM SERPL-SCNC: 3.8 MMOL/L (ref 3.4–5.3)
PROT SERPL-MCNC: 6.2 G/DL (ref 6.4–8.3)
RBC # BLD AUTO: 3.86 10E6/UL (ref 3.8–5.2)
SODIUM SERPL-SCNC: 143 MMOL/L (ref 135–145)
WBC # BLD AUTO: 8.2 10E3/UL (ref 4–11)

## 2025-06-15 PROCEDURE — 83735 ASSAY OF MAGNESIUM: CPT | Performed by: STUDENT IN AN ORGANIZED HEALTH CARE EDUCATION/TRAINING PROGRAM

## 2025-06-15 PROCEDURE — 99232 SBSQ HOSP IP/OBS MODERATE 35: CPT | Performed by: STUDENT IN AN ORGANIZED HEALTH CARE EDUCATION/TRAINING PROGRAM

## 2025-06-15 PROCEDURE — 80053 COMPREHEN METABOLIC PANEL: CPT | Performed by: STUDENT IN AN ORGANIZED HEALTH CARE EDUCATION/TRAINING PROGRAM

## 2025-06-15 PROCEDURE — 250N000013 HC RX MED GY IP 250 OP 250 PS 637: Performed by: INTERNAL MEDICINE

## 2025-06-15 PROCEDURE — 36415 COLL VENOUS BLD VENIPUNCTURE: CPT | Performed by: INTERNAL MEDICINE

## 2025-06-15 PROCEDURE — 250N000011 HC RX IP 250 OP 636: Performed by: STUDENT IN AN ORGANIZED HEALTH CARE EDUCATION/TRAINING PROGRAM

## 2025-06-15 PROCEDURE — 87517 HEPATITIS B DNA QUANT: CPT | Performed by: INTERNAL MEDICINE

## 2025-06-15 PROCEDURE — 36415 COLL VENOUS BLD VENIPUNCTURE: CPT | Performed by: STUDENT IN AN ORGANIZED HEALTH CARE EDUCATION/TRAINING PROGRAM

## 2025-06-15 PROCEDURE — 85018 HEMOGLOBIN: CPT | Performed by: STUDENT IN AN ORGANIZED HEALTH CARE EDUCATION/TRAINING PROGRAM

## 2025-06-15 PROCEDURE — 84100 ASSAY OF PHOSPHORUS: CPT | Performed by: STUDENT IN AN ORGANIZED HEALTH CARE EDUCATION/TRAINING PROGRAM

## 2025-06-15 PROCEDURE — 120N000001 HC R&B MED SURG/OB

## 2025-06-15 PROCEDURE — 250N000013 HC RX MED GY IP 250 OP 250 PS 637: Performed by: STUDENT IN AN ORGANIZED HEALTH CARE EDUCATION/TRAINING PROGRAM

## 2025-06-15 RX ORDER — MAGNESIUM SULFATE 4 G/50ML
4 INJECTION INTRAVENOUS ONCE
Status: COMPLETED | OUTPATIENT
Start: 2025-06-15 | End: 2025-06-15

## 2025-06-15 RX ADMIN — FUROSEMIDE 40 MG: 20 TABLET ORAL at 09:27

## 2025-06-15 RX ADMIN — ALLOPURINOL 300 MG: 300 TABLET ORAL at 09:27

## 2025-06-15 RX ADMIN — METOPROLOL SUCCINATE 100 MG: 100 TABLET, EXTENDED RELEASE ORAL at 09:26

## 2025-06-15 RX ADMIN — PANTOPRAZOLE SODIUM 40 MG: 40 INJECTION, POWDER, FOR SOLUTION INTRAVENOUS at 17:20

## 2025-06-15 RX ADMIN — EZETIMIBE 10 MG: 10 TABLET ORAL at 09:28

## 2025-06-15 RX ADMIN — ACETAMINOPHEN 650 MG: 325 TABLET ORAL at 09:26

## 2025-06-15 RX ADMIN — VENLAFAXINE HYDROCHLORIDE 150 MG: 150 CAPSULE, EXTENDED RELEASE ORAL at 09:28

## 2025-06-15 RX ADMIN — ATORVASTATIN CALCIUM 80 MG: 40 TABLET, FILM COATED ORAL at 09:27

## 2025-06-15 RX ADMIN — MAGNESIUM SULFATE HEPTAHYDRATE 4 G: 4 INJECTION, SOLUTION INTRAVENOUS at 13:55

## 2025-06-15 RX ADMIN — INSULIN ASPART 1 UNITS: 100 INJECTION, SOLUTION INTRAVENOUS; SUBCUTANEOUS at 17:19

## 2025-06-15 RX ADMIN — ACETAMINOPHEN 650 MG: 325 TABLET ORAL at 17:29

## 2025-06-15 RX ADMIN — PANTOPRAZOLE SODIUM 40 MG: 40 INJECTION, POWDER, FOR SOLUTION INTRAVENOUS at 06:27

## 2025-06-15 RX ADMIN — FUROSEMIDE 40 MG: 20 TABLET ORAL at 17:20

## 2025-06-15 RX ADMIN — INSULIN ASPART 1 UNITS: 100 INJECTION, SOLUTION INTRAVENOUS; SUBCUTANEOUS at 13:58

## 2025-06-15 RX ADMIN — POLYETHYLENE GLYCOL 3350, SODIUM SULFATE ANHYDROUS, SODIUM BICARBONATE, SODIUM CHLORIDE, POTASSIUM CHLORIDE 4000 ML: 236; 22.74; 6.74; 5.86; 2.97 POWDER, FOR SOLUTION ORAL at 17:18

## 2025-06-15 RX ADMIN — INSULIN ASPART 1 UNITS: 100 INJECTION, SOLUTION INTRAVENOUS; SUBCUTANEOUS at 09:24

## 2025-06-15 ASSESSMENT — ACTIVITIES OF DAILY LIVING (ADL)
ADLS_ACUITY_SCORE: 49
ADLS_ACUITY_SCORE: 50
ADLS_ACUITY_SCORE: 49
ADLS_ACUITY_SCORE: 50
ADLS_ACUITY_SCORE: 49
ADLS_ACUITY_SCORE: 50
ADLS_ACUITY_SCORE: 49
ADLS_ACUITY_SCORE: 49
ADLS_ACUITY_SCORE: 50
ADLS_ACUITY_SCORE: 49
ADLS_ACUITY_SCORE: 50
ADLS_ACUITY_SCORE: 50
ADLS_ACUITY_SCORE: 49

## 2025-06-15 NOTE — PLAN OF CARE
Problem: Adult Inpatient Plan of Care  Goal: Plan of Care Review  Description: The Plan of Care Review/Shift note should be completed every shift.  The Outcome Evaluation is a brief statement about your assessment that the patient is improving, declining, or no change.  This information will be displayed automatically on your shift  note.  Outcome: Progressing   Goal Outcome Evaluation:         NPO for most of the day until GI rounded. Restarted her on a diet. Tolerating food just fine. Good appetite. Assist of one to the bathroom. Taking PO lasix and is voiding adequately. Tachy. Her heart rate has been slowly going up. At 130 for a HR cross cover Dr. García was notified. She stated to restart Metoprolol and I am awaiting that order to go in. She has +3 edema on her ankles and feet. Patient took a shower today.

## 2025-06-15 NOTE — PROGRESS NOTES
Gastroenterology progress note    SUBJECTIVE:  A [employer] phone  is used for our visit.  She notes no abdominal pain.  Has not noticed any GI bleeding.  Shortness of breath is improved.  She has been urinating frequently with the medication she was given.  Would like to proceed with upper endoscopy and colonoscopy.    OBJECTIVE:  Vitals last 24 hours  Temp:  [98.2  F (36.8  C)-98.6  F (37  C)] 98.2  F (36.8  C)  Pulse:  [] 94  Resp:  [20] 20  BP: (101-135)/(66-85) 101/66  SpO2:  [97 %-99 %] 97 % O2 Device: None (Room air)    Body mass index is 39.26 kg/m .  General: Laying in bed in no acute distress  Eyes: No scleral icterus  Heart: Regular rate and rhythm with no murmurs rubs or gallops  Lungs: Clear to auscultation bilaterally  Abdomen: Soft, positive bowel sounds, nontender to palpation  Extremities: 1+ pedal edema bilateral lower extremities  Psych: Appropriate mood and affect  Neuro: Alert and oriented      LABS:  CBC:  Recent Labs   Lab Test 06/15/25  0654   WBC 8.2   RBC 3.86   HGB 10.0*   HCT 31.5*   MCV 82   MCH 25.9*   MCHC 31.7   RDW 18.0*          BMP:  Recent Labs   Lab 06/15/25  1341 06/15/25  0835 06/15/25  0654 06/14/25  0819 06/14/25  0659 06/13/25  2106 06/13/25  1623   NA  --   --  143  --  144  --  143   POTASSIUM  --   --  3.8  --  4.3  --  4.5   CHLORIDE  --   --  102  --  110*  --  111*   CO2  --   --  29  --  23  --  17*   * 176* 150*   < > 137*   < > 285*   CR  --   --  1.03*  --  0.99*  --  1.13*   BUN  --   --  20.0  --  21.5*  --  29.0*    < > = values in this interval not displayed.       Liver/Pancreas:  Recent Labs   Lab 06/15/25  0654 06/14/25  0659   AST 74* 86*   ALT 92* 92*   ALKPHOS 162* 154*   BILITOTAL 0.6 0.8     Recent Labs   Lab Test 06/13/25  1623   INR 1.01         IMAGING:  Echocardiogram Complete  Result Date: 6/14/2025  [id number] [facility] [id number]^ALLAN^  [address] [address]  Name: YOVANY  MAY X MRN: [id number] : 1970 Study Date: 2025 11:26 AM Age: 54 yrs Gender: Female Patient Location: [facility] Reason For Study: Heart Failure Ordering Physician: ALLAN VILLALBA Performed By: ACE^^^^  BSA: 1.8 m2 Height: 59 in Weight: 194 lb HR: 108 BP: 137/70 mmHg ______________________________________________________________________________ Procedure Echocardiogram with two-dimensional, color and spectral Doppler. Definity (NDC #12075-388) given intravenously. Adequate quality two-dimensional was performed and interpreted. Adequate quality color and spectral Doppler were performed and interpreted. Compared to the prior study dated 2019, there are changes as noted. ______________________________________________________________________________ Interpretation Summary  1. Left ventricular chamber size is normal. Mild concentric increase in wall thickness. Systolic function is normal. The visually estimated left ventricular ejection fraction is 55-60%. 2. Right ventricular chamber size and systolic function are normal. 3. No hemodynamically significant valvular abnormalities. 4. Non-aneurysmal enlargement of the ascending aorta measuring 4.4 cm. 5. Compared to the prior study dated 2019, the ascending aorta has increased in size from 4.0 to 4.4 cm. Other findings are similar. ______________________________________________________________________________ I      WMSI = 1.00     % Normal = 100  X - Cannot   0 -                      (2) - Mildly 2 -          Segments  Size Interpret    Hyperkinetic 1 - Normal  Hypokinetic  Hypokinetic  1-2     small                                                7 -          3-5    moderate 3 - Akinetic 4 -          5 -         6 - Akinetic Dyskinetic   6-14    large          Dyskinetic   Aneurysmal  w/scar       w/scar       15-16   diffuse  Left Ventricle The left ventricle is normal in size. There is mild concentric left ventricular hypertrophy. Left ventricular  systolic function is normal. The visual ejection fraction is 55-60%. Diastolic function not assessed due to tachycardia. No regional wall motion abnormalities noted.  Right Ventricle The right ventricle is normal size. The right ventricular systolic function is normal.  Atria Normal left atrial size. Right atrial size is normal.  Mitral Valve Mitral valve leaflets appear normal. There is trace mitral regurgitation. There is no mitral valve stenosis.  Tricuspid Valve Tricuspid valve leaflets appear normal. There is trace tricuspid regurgitation. Right ventricular systolic pressure could not be approximated due to inadequate tricuspid regurgitation. There is no tricuspid stenosis.  Aortic Valve Aortic valve leaflets appear normal. The aortic valve is trileaflet. No aortic regurgitation is present. No aortic stenosis is present.  Pulmonic Valve Normal pulmonic valve. There is trace to mild pulmonic valvular regurgitation. There is no pulmonic valvular stenosis.  Vessels The aorta root is normal. Ascending Aorta dilatation is present. IVC diameter <2.1 cm collapsing >50% with sniff suggests a normal RA pressure of 3 mmHg.  Pericardium There is no pericardial effusion.  Rhythm The rhythm was sinus tachycardia.  ______________________________________________________________________________ MMode/2D Measurements & Calculations IVSd: 1.3 cm LVIDd: 4.5 cm LVIDs: 3.4 cm LVPWd: 1.3 cm FS: 24.3 %  LV mass(C)d: 224.1 grams LV mass(C)dI: 123.1 grams/m2 Ao root diam: 3.1 cm LA dimension: 3.5 cm asc Aorta Diam: 4.4 cm LA/Ao: 1.1 LVOT diam: 2.3 cm LVOT area: 4.2 cm2 Ao root diam index Ht(cm/m): 2.1 Ao root diam index BSA (cm/m2): 1.7 Asc Ao diam index BSA (cm/m2): 2.4 Asc Ao diam index Ht(cm/m): 2.9 EF Biplane: 61.0 % LA Volume (BP): 33.5 ml  LA Volume Index (BP): 18.4 ml/m2 LA Volume Indexed (AL/bp): 20.4 ml/m2 RWT: 0.58  Time Measurements MM HR: 108.0 BPM  Doppler Measurements & Calculations MV E max fco: 45.1 cm/sec MV A max fco:  85.9 cm/sec MV E/A: 0.53  Ao V2 max: 107.0 cm/sec Ao max P.0 mmHg Ao V2 mean: 80.7 cm/sec Ao mean PG: 3.0 mmHg Ao V2 VTI: 17.4 cm PALLAVI(I,D): 2.7 cm2 PALLAVI(V,D): 3.1 cm2 LV V1 max P.5 mmHg LV V1 max: 79.0 cm/sec LV V1 VTI: 11.5 cm SV(LVOT): 47.8 ml SI(LVOT): 26.2 ml/m2 PA acc time: 0.07 sec PI end-d gurvinder: 140.0 cm/sec AV Gurvinder Ratio (DI): 0.74 PALLAVI Index (cm2/m2): 1.5 E/E': 4.4 E/E' av.6 Lateral E/e': 4.4 Medial E/e': 8.8 Peak E' Gurvinder: 10.2 cm/sec RV S Gurvinder: 17.0 cm/sec  ______________________________________________________________________________ Report approved by: Americo Brumfield MD on 2025 12:34 PM       XR Chest Port 1 View  Result Date: 2025  EXAM: XR CHEST PORT 1 VIEW LOCATION: [facility] DATE: 2025 INDICATION: Hx Heart Failure with significant swelling COMPARISON: 2020     IMPRESSION: No pleural fluid or pneumothorax. No airspace disease or edema. The cardiomediastinal silhouette is enlarged.        ASSESSMENT:  1.  Anemia of unclear etiology with positive fit test  2.  Elevated liver tests with positive hepatitis B core antibody  3.  Heart failure, seen by cardiology    Patient Active Problem List   Diagnosis    Hypertension goal BP (blood pressure) < 130/80    Moderate episode of recurrent major depressive disorder (H)    Heart failure with reduced ejection fraction (H)    Type 2 diabetes mellitus with diabetic polyneuropathy, without long-term current use of insulin (H)    Right-sided sensorineural hearing loss    Lung mass left upper lobe    Coronary artery disease involving native coronary artery of native heart with angina pectoris    Nonischemic cardiomyopathy (H)    Mixed incontinence    Hyperlipidemia LDL goal <70    Heartburn    Ganglion cyst of left foot    Stage 3a chronic kidney disease (H)    Tension headache    Bilateral dry eyes    Iron deficiency anemia due to chronic blood loss    Great toe pain, right    Metabolic dysfunction-associated  steatohepatitis (MASH)    Chronic gout of right foot    Vitamin D deficiency    Diabetic polyneuropathy associated with type 2 diabetes mellitus (H)    Chronic midline low back pain with bilateral sciatica    Long QT syndrome    Class 3 severe obesity due to excess calories with serious comorbidity and body mass index (BMI) of 40.0 to 44.9 in adult (H)    Chronic pain of right knee    Gait instability    Lightheadedness    LAGUNSA (dyspnea on exertion)    Anemia, unspecified type       PLAN:  - Will plan on upper endoscopy and colonoscopy tomorrow given her anemia.  We will start her on a clear liquid diet today.  N.p.o. after midnight except for colonoscopy prep.  - Colonoscopy prep: Drink 3 L of GoLytely this evening.  Drink 1 L in the morning finishing by 7am.  - Check hepatitis B DNA  - Continue daily PPI  - Continue to monitor LFTs and hemoglobin.  Blood transfusions per hospitalist service.    GI will continue to follow along.  Please call or page with questions.    Approximately 33 minutes of total time was spent providing patient care, including patient evaluation, reviewing documentation/test results, and .                                                                       Deja Tillman MD  Thank you for the opportunity to participate in the care of this patient.   Please feel free to call me with any questions or concerns.  Phone number [phone].

## 2025-06-15 NOTE — PLAN OF CARE
Problem: Anemia  Goal: Anemia Symptom Improvement  Outcome: Progressing     Problem: Adult Inpatient Plan of Care  Goal: Optimal Comfort and Wellbeing  Outcome: Progressing     Problem: Adult Inpatient Plan of Care  Goal: Readiness for Transition of Care  Outcome: Progressing   Goal Outcome Evaluation:    [id number]    Slept well. Denies pain. Alert, oriented. No signs of obvious bleeding this shift, and no reports of melena or BRBPR. Hgb re-check this am. GI following. Regular diet- tolerating well. Bloodsugars 311 and 137- received s/s for hs sugar. Tele- sinus tachycardia (115-130) with rare PVC 2000-midnight- Dr. García did order metoprolol one time dose and resumption of metoprolol for today. Pt NSR remainder of night, HR improved. Bilateral foot and ankle edema. Pt up independently with walker, did want SCD's removed for night. Pt, pt's  and son vocalizing wanting her to discharge today after speaking with doctors.     Temp: 98.2  F (36.8  C) Temp src: Oral BP: 103/68 Pulse: 91   Resp: 20 SpO2: 97 % O2 Device: None (Room air)

## 2025-06-15 NOTE — PROGRESS NOTES
[facility]    Medicine Progress Note - Hospitalist Service    Date of Admission:  6/13/2025    Assessment & Plan   Leora Sanchez is a 54 year old female with a past medical history of HFrEF, CAD, Type 2 Diabetes who was admitted on 6/13/25 after presenting to [facility] ED for Dizziness and Shortness of Breath.     Acute on Chronic Anemia  Lactic acidosis, improved   - HGB in the ED 5.7. Recent HGB baseline ~7.0-8.0.  - Previous work up 03/2025 mostly unrevealing - see labs in chart  - Patient denies melena, BRBPR, or any other bleeding.  - s/p  2 units RBCs in the ED   - GI consult appreciated. Possible colonoscopy tomorrow   - Peripheral smear result pending  - Protonix 40mg IV BID     HFrEF - Acute Exacerbation  - PTA HF Meds: Losartan 25mg, metoprolol 100mg, lasix 40mg BID  - Last Echo on file, 2019 - EF 45% w/ global hypokinesis.  - Holding home losartan, metoprolol with low BP in the ED  - continue  PTA Lasix 40mg PO BID for now   - Cardiology consult appreciated. Now SO  - Echocardiogram result is reported with estimated left ventricular ejection fraction of 55 to 60%, compared to previous study ascending aorta has increased in size from 4 to 4.4 cm     History of CAD with Stable Angina  - Non-obstructive disease on coronary angiogram 2019  - PTA Meds: aspirin, atorvastatin, zetia, ISMN  - Holding aspirin due to concern for bleeding  - Holding ISMN with low BP in the ED  - Continue home atorvastatin, zetia.     Type 2 Diabetes  - Last A1c was 7.5% in 03/2025  - Home medications include metformin, tirzepatide injections  - Hold home metformin, tirzepatide  - Sliding Scale Insulin with Glucose Checks    Elevated liver enzymes  -Appears to be chronic.  -Acute hepatitis workup is significant for hepatitis B core antibody being reactive and hepatitis B surface antibody also reactive.  Indicative of possible remote infection  -GI on board  -Continue to trend liver enzymes             Diet: Clear  "Liquid Diet    DVT Prophylaxis: Pneumatic Compression Devices  Park Catheter: Not present  Lines: None     Cardiac Monitoring: ACTIVE order. Indication: Tachyarrhythmias, acute (48 hours)  Code Status: Full Code      Clinically Significant Risk Factors          # Hyperchloremia: Highest Cl = 111 mmol/L in last 2 days, will monitor as appropriate      # Hypocalcemia: Lowest Ca = 8.2 mg/dL in last 2 days, will monitor and replace as appropriate   # Hypomagnesemia: Lowest Mg = 1.2 mg/dL in last 2 days, will replace as needed   # Hypoalbuminemia: Lowest albumin = 3.2 g/dL at 6/14/2025  6:59 AM, will monitor as appropriate     # Hypertension: Noted on problem list           # DMII: A1C = 7.5 % (Ref range: 0.0 - 5.6 %) within past 6 months, PRESENT ON ADMISSION  # Obesity: Estimated body mass index is 39.26 kg/m  as calculated from the following:    Height as of this encounter: 1.499 m (4' 11\").    Weight as of this encounter: 88.2 kg (194 lb 6.4 oz)., PRESENT ON ADMISSION            Social Drivers of Health    Food Insecurity: High Risk (6/13/2025)    Food Insecurity     Within the past 12 months, did you worry that your food would run out before you got money to buy more?: Yes     Within the past 12 months, did the food you bought just not last and you didn t have money to get more?: Yes   Depression: Not at risk (5/30/2025)    PHQ-2     PHQ-2 Score: 2   Recent Concern: Depression - At risk (3/5/2025)    PHQ-2     PHQ-2 Score: 6   Housing Stability: High Risk (6/13/2025)    Housing Stability     Do you have housing? : Yes     Are you worried about losing your housing?: Yes   Transportation Needs: High Risk (6/13/2025)    Transportation Needs     Within the past 12 months, has lack of transportation kept you from medical appointments, getting your medicines, non-medical meetings or appointments, work, or from getting things that you need?: Yes   Physical Activity: Insufficiently Active (9/6/2024)    Exercise Vital Sign "     Days of Exercise per Week: 5 days     Minutes of Exercise per Session: 10 min   Social Connections: Unknown (9/6/2024)    Social Connection and Isolation Panel [NHANES]     Frequency of Social Gatherings with Friends and Family: More than three times a week          Disposition Plan     Medically Ready for Discharge: Anticipated Tomorrow             Dale Grimm MD  Hospitalist Service  [facility]  Securely message with [employer] (more info)  Text page via [employer] Paging/Directory   ______________________________________________________________________    Interval History   No distress noted.  She states she is feeling relatively better now compared to yesterday.  She denies having chest pain or shortness of breath.  Management plan discussed with the patient and she expressed understanding. Hmong language translation was utilized via telephone    Physical Exam   Vital Signs: Temp: 98.5  F (36.9  C) Temp src: Oral BP: 113/75 Pulse: 91   Resp: 20 SpO2: 99 % O2 Device: None (Room air)    Weight: 194 lbs 6.4 oz    General Appearance:  Acutely sick looking, no distress noted  Respiratory: Good air entry bilaterally  Cardiovascular: S1-S2 were heard, no murmur or gallop  GI: Abdomen, no tenderness, normoactive bowel sounds  Skin: Intact and warm  Other:  +2 pedal and pretibial pitting edema    Medical Decision Making       40 MINUTES SPENT BY ME on the date of service doing chart review, history, exam, documentation & further activities per the note.      Data

## 2025-06-16 ENCOUNTER — ANESTHESIA EVENT (OUTPATIENT)
Dept: SURGERY | Facility: HOSPITAL | Age: 55
End: 2025-06-16
Payer: MEDICARE

## 2025-06-16 ENCOUNTER — ANESTHESIA (OUTPATIENT)
Dept: SURGERY | Facility: HOSPITAL | Age: 55
End: 2025-06-16
Payer: MEDICARE

## 2025-06-16 LAB
ACANTHOCYTES BLD QL SMEAR: SLIGHT
ALBUMIN SERPL BCG-MCNC: 3.3 G/DL (ref 3.5–5.2)
ALP SERPL-CCNC: 165 U/L (ref 40–150)
ALT SERPL W P-5'-P-CCNC: 74 U/L (ref 0–50)
ANION GAP SERPL CALCULATED.3IONS-SCNC: 14 MMOL/L (ref 7–15)
AST SERPL W P-5'-P-CCNC: 51 U/L (ref 0–45)
BASOPHILS # BLD MANUAL: 0 10E3/UL (ref 0–0.2)
BASOPHILS NFR BLD MANUAL: 0 %
BILIRUB SERPL-MCNC: 0.6 MG/DL
BUN SERPL-MCNC: 21.6 MG/DL (ref 6–20)
CALCIUM SERPL-MCNC: 8.5 MG/DL (ref 8.8–10.4)
CHLORIDE SERPL-SCNC: 98 MMOL/L (ref 98–107)
COLONOSCOPY: NORMAL
CREAT SERPL-MCNC: 1.16 MG/DL (ref 0.51–0.95)
EGFRCR SERPLBLD CKD-EPI 2021: 55 ML/MIN/1.73M2
ELLIPTOCYTES BLD QL SMEAR: SLIGHT
EOSINOPHIL # BLD MANUAL: 0 10E3/UL (ref 0–0.7)
EOSINOPHIL NFR BLD MANUAL: 0 %
ERYTHROCYTE [DISTWIDTH] IN BLOOD BY AUTOMATED COUNT: 18.3 % (ref 10–15)
ERYTHROCYTE [DISTWIDTH] IN BLOOD BY AUTOMATED COUNT: 19.4 % (ref 10–15)
GLUCOSE BLDC GLUCOMTR-MCNC: 186 MG/DL (ref 70–99)
GLUCOSE BLDC GLUCOMTR-MCNC: 192 MG/DL (ref 70–99)
GLUCOSE BLDC GLUCOMTR-MCNC: 197 MG/DL (ref 70–99)
GLUCOSE BLDC GLUCOMTR-MCNC: 207 MG/DL (ref 70–99)
GLUCOSE BLDC GLUCOMTR-MCNC: 356 MG/DL (ref 70–99)
GLUCOSE BLDC GLUCOMTR-MCNC: 509 MG/DL (ref 70–99)
GLUCOSE SERPL-MCNC: 202 MG/DL (ref 70–99)
HBV DNA SERPL NAA+PROBE-ACNC: NOT DETECTED IU/ML
HCO3 SERPL-SCNC: 27 MMOL/L (ref 22–29)
HCT VFR BLD AUTO: 21.2 % (ref 35–47)
HCT VFR BLD AUTO: 32.7 % (ref 35–47)
HGB BLD-MCNC: 10.1 G/DL (ref 11.7–15.7)
HGB BLD-MCNC: 5.7 G/DL (ref 11.7–15.7)
LYMPHOCYTES # BLD MANUAL: 0.4 10E3/UL (ref 0.8–5.3)
LYMPHOCYTES NFR BLD MANUAL: 6 %
MAGNESIUM SERPL-MCNC: 2.1 MG/DL (ref 1.7–2.3)
MCH RBC QN AUTO: 23.1 PG (ref 26.5–33)
MCH RBC QN AUTO: 25.6 PG (ref 26.5–33)
MCHC RBC AUTO-ENTMCNC: 26.9 G/DL (ref 31.5–36.5)
MCHC RBC AUTO-ENTMCNC: 30.9 G/DL (ref 31.5–36.5)
MCV RBC AUTO: 83 FL (ref 78–100)
MCV RBC AUTO: 86 FL (ref 78–100)
MONOCYTES # BLD MANUAL: 0.1 10E3/UL (ref 0–1.3)
MONOCYTES NFR BLD MANUAL: 2 %
NEUTROPHILS # BLD MANUAL: 5.6 10E3/UL (ref 1.6–8.3)
NEUTROPHILS NFR BLD MANUAL: 92 %
NRBC # BLD AUTO: 0.5 10E3/UL
NRBC BLD MANUAL-RTO: 8 %
PATH REPORT.COMMENTS IMP SPEC: NORMAL
PATH REPORT.COMMENTS IMP SPEC: NORMAL
PATH REPORT.FINAL DX SPEC: NORMAL
PATH REPORT.MICROSCOPIC SPEC OTHER STN: NORMAL
PATH REPORT.RELEVANT HX SPEC: NORMAL
PATH REV: ABNORMAL
PHOSPHATE SERPL-MCNC: 3.8 MG/DL (ref 2.5–4.5)
PLAT MORPH BLD: ABNORMAL
PLATELET # BLD AUTO: 308 10E3/UL (ref 150–450)
PLATELET # BLD AUTO: 340 10E3/UL (ref 150–450)
POTASSIUM SERPL-SCNC: 3.8 MMOL/L (ref 3.4–5.3)
PROT SERPL-MCNC: 6.2 G/DL (ref 6.4–8.3)
RBC # BLD AUTO: 2.47 10E6/UL (ref 3.8–5.2)
RBC # BLD AUTO: 3.94 10E6/UL (ref 3.8–5.2)
RBC MORPH BLD: ABNORMAL
SODIUM SERPL-SCNC: 139 MMOL/L (ref 135–145)
UPPER GI ENDOSCOPY: NORMAL
WBC # BLD AUTO: 6.1 10E3/UL (ref 4–11)
WBC # BLD AUTO: 8.5 10E3/UL (ref 4–11)

## 2025-06-16 PROCEDURE — 85060 BLOOD SMEAR INTERPRETATION: CPT | Performed by: PATHOLOGY

## 2025-06-16 PROCEDURE — 999N000141 HC STATISTIC PRE-PROCEDURE NURSING ASSESSMENT: Performed by: INTERNAL MEDICINE

## 2025-06-16 PROCEDURE — 360N000075 HC SURGERY LEVEL 2, PER MIN: Performed by: INTERNAL MEDICINE

## 2025-06-16 PROCEDURE — 88312 SPECIAL STAINS GROUP 1: CPT | Mod: TC | Performed by: INTERNAL MEDICINE

## 2025-06-16 PROCEDURE — 99232 SBSQ HOSP IP/OBS MODERATE 35: CPT | Performed by: STUDENT IN AN ORGANIZED HEALTH CARE EDUCATION/TRAINING PROGRAM

## 2025-06-16 PROCEDURE — 0DB78ZX EXCISION OF STOMACH, PYLORUS, VIA NATURAL OR ARTIFICIAL OPENING ENDOSCOPIC, DIAGNOSTIC: ICD-10-PCS | Performed by: INTERNAL MEDICINE

## 2025-06-16 PROCEDURE — 82040 ASSAY OF SERUM ALBUMIN: CPT | Performed by: STUDENT IN AN ORGANIZED HEALTH CARE EDUCATION/TRAINING PROGRAM

## 2025-06-16 PROCEDURE — 250N000011 HC RX IP 250 OP 636: Performed by: NURSE ANESTHETIST, CERTIFIED REGISTERED

## 2025-06-16 PROCEDURE — 258N000003 HC RX IP 258 OP 636: Performed by: NURSE ANESTHETIST, CERTIFIED REGISTERED

## 2025-06-16 PROCEDURE — 36415 COLL VENOUS BLD VENIPUNCTURE: CPT | Performed by: STUDENT IN AN ORGANIZED HEALTH CARE EDUCATION/TRAINING PROGRAM

## 2025-06-16 PROCEDURE — 272N000001 HC OR GENERAL SUPPLY STERILE: Performed by: INTERNAL MEDICINE

## 2025-06-16 PROCEDURE — 0DB38ZX EXCISION OF LOWER ESOPHAGUS, VIA NATURAL OR ARTIFICIAL OPENING ENDOSCOPIC, DIAGNOSTIC: ICD-10-PCS | Performed by: INTERNAL MEDICINE

## 2025-06-16 PROCEDURE — 370N000017 HC ANESTHESIA TECHNICAL FEE, PER MIN: Performed by: INTERNAL MEDICINE

## 2025-06-16 PROCEDURE — 250N000013 HC RX MED GY IP 250 OP 250 PS 637: Performed by: INTERNAL MEDICINE

## 2025-06-16 PROCEDURE — 258N000003 HC RX IP 258 OP 636: Performed by: PAIN MEDICINE

## 2025-06-16 PROCEDURE — 250N000013 HC RX MED GY IP 250 OP 250 PS 637: Performed by: STUDENT IN AN ORGANIZED HEALTH CARE EDUCATION/TRAINING PROGRAM

## 2025-06-16 PROCEDURE — 250N000025 HC SEVOFLURANE, PER MIN: Performed by: INTERNAL MEDICINE

## 2025-06-16 PROCEDURE — 83735 ASSAY OF MAGNESIUM: CPT | Performed by: STUDENT IN AN ORGANIZED HEALTH CARE EDUCATION/TRAINING PROGRAM

## 2025-06-16 PROCEDURE — 250N000009 HC RX 250: Performed by: NURSE ANESTHETIST, CERTIFIED REGISTERED

## 2025-06-16 PROCEDURE — 250N000011 HC RX IP 250 OP 636: Performed by: STUDENT IN AN ORGANIZED HEALTH CARE EDUCATION/TRAINING PROGRAM

## 2025-06-16 PROCEDURE — 710N000009 HC RECOVERY PHASE 1, LEVEL 1, PER MIN: Performed by: INTERNAL MEDICINE

## 2025-06-16 PROCEDURE — 84100 ASSAY OF PHOSPHORUS: CPT | Performed by: STUDENT IN AN ORGANIZED HEALTH CARE EDUCATION/TRAINING PROGRAM

## 2025-06-16 PROCEDURE — 120N000001 HC R&B MED SURG/OB

## 2025-06-16 PROCEDURE — 0DJD8ZZ INSPECTION OF LOWER INTESTINAL TRACT, VIA NATURAL OR ARTIFICIAL OPENING ENDOSCOPIC: ICD-10-PCS | Performed by: INTERNAL MEDICINE

## 2025-06-16 PROCEDURE — 0DB68ZX EXCISION OF STOMACH, VIA NATURAL OR ARTIFICIAL OPENING ENDOSCOPIC, DIAGNOSTIC: ICD-10-PCS | Performed by: INTERNAL MEDICINE

## 2025-06-16 PROCEDURE — 0DB98ZX EXCISION OF DUODENUM, VIA NATURAL OR ARTIFICIAL OPENING ENDOSCOPIC, DIAGNOSTIC: ICD-10-PCS | Performed by: INTERNAL MEDICINE

## 2025-06-16 PROCEDURE — 85014 HEMATOCRIT: CPT | Performed by: STUDENT IN AN ORGANIZED HEALTH CARE EDUCATION/TRAINING PROGRAM

## 2025-06-16 RX ORDER — DEXAMETHASONE SODIUM PHOSPHATE 4 MG/ML
INJECTION, SOLUTION INTRA-ARTICULAR; INTRALESIONAL; INTRAMUSCULAR; INTRAVENOUS; SOFT TISSUE PRN
Status: DISCONTINUED | OUTPATIENT
Start: 2025-06-16 | End: 2025-06-16

## 2025-06-16 RX ORDER — LIDOCAINE HYDROCHLORIDE 10 MG/ML
INJECTION, SOLUTION INFILTRATION; PERINEURAL PRN
Status: DISCONTINUED | OUTPATIENT
Start: 2025-06-16 | End: 2025-06-16

## 2025-06-16 RX ORDER — PROPOFOL 10 MG/ML
INJECTION, EMULSION INTRAVENOUS CONTINUOUS PRN
Status: DISCONTINUED | OUTPATIENT
Start: 2025-06-16 | End: 2025-06-16

## 2025-06-16 RX ORDER — SUCRALFATE ORAL 1 G/10ML
1 SUSPENSION ORAL
Status: DISCONTINUED | OUTPATIENT
Start: 2025-06-16 | End: 2025-06-17 | Stop reason: HOSPADM

## 2025-06-16 RX ORDER — ONDANSETRON 4 MG/1
4 TABLET, ORALLY DISINTEGRATING ORAL EVERY 30 MIN PRN
Status: DISCONTINUED | OUTPATIENT
Start: 2025-06-16 | End: 2025-06-16 | Stop reason: HOSPADM

## 2025-06-16 RX ORDER — OXYCODONE HYDROCHLORIDE 5 MG/1
5 TABLET ORAL
Refills: 0 | Status: CANCELLED | OUTPATIENT
Start: 2025-06-16

## 2025-06-16 RX ORDER — ONDANSETRON 2 MG/ML
INJECTION INTRAMUSCULAR; INTRAVENOUS PRN
Status: DISCONTINUED | OUTPATIENT
Start: 2025-06-16 | End: 2025-06-16

## 2025-06-16 RX ORDER — DEXAMETHASONE SODIUM PHOSPHATE 10 MG/ML
4 INJECTION, SOLUTION INTRAMUSCULAR; INTRAVENOUS
Status: CANCELLED | OUTPATIENT
Start: 2025-06-16

## 2025-06-16 RX ORDER — ONDANSETRON 2 MG/ML
4 INJECTION INTRAMUSCULAR; INTRAVENOUS EVERY 30 MIN PRN
Status: DISCONTINUED | OUTPATIENT
Start: 2025-06-16 | End: 2025-06-16 | Stop reason: HOSPADM

## 2025-06-16 RX ORDER — NALOXONE HYDROCHLORIDE 1 MG/ML
0.1 INJECTION INTRAMUSCULAR; INTRAVENOUS; SUBCUTANEOUS
Status: DISCONTINUED | OUTPATIENT
Start: 2025-06-16 | End: 2025-06-16 | Stop reason: HOSPADM

## 2025-06-16 RX ORDER — FENTANYL CITRATE 50 UG/ML
25 INJECTION, SOLUTION INTRAMUSCULAR; INTRAVENOUS EVERY 5 MIN PRN
Status: DISCONTINUED | OUTPATIENT
Start: 2025-06-16 | End: 2025-06-16 | Stop reason: HOSPADM

## 2025-06-16 RX ORDER — LIDOCAINE 40 MG/G
CREAM TOPICAL
Status: DISCONTINUED | OUTPATIENT
Start: 2025-06-16 | End: 2025-06-16 | Stop reason: HOSPADM

## 2025-06-16 RX ORDER — ONDANSETRON 2 MG/ML
4 INJECTION INTRAMUSCULAR; INTRAVENOUS EVERY 30 MIN PRN
Status: CANCELLED | OUTPATIENT
Start: 2025-06-16

## 2025-06-16 RX ORDER — NALOXONE HYDROCHLORIDE 0.4 MG/ML
0.1 INJECTION, SOLUTION INTRAMUSCULAR; INTRAVENOUS; SUBCUTANEOUS
Status: CANCELLED | OUTPATIENT
Start: 2025-06-16

## 2025-06-16 RX ORDER — SODIUM CHLORIDE, SODIUM LACTATE, POTASSIUM CHLORIDE, CALCIUM CHLORIDE 600; 310; 30; 20 MG/100ML; MG/100ML; MG/100ML; MG/100ML
INJECTION, SOLUTION INTRAVENOUS CONTINUOUS
Status: DISCONTINUED | OUTPATIENT
Start: 2025-06-16 | End: 2025-06-16 | Stop reason: HOSPADM

## 2025-06-16 RX ORDER — ONDANSETRON 4 MG/1
4 TABLET, ORALLY DISINTEGRATING ORAL EVERY 30 MIN PRN
Status: CANCELLED | OUTPATIENT
Start: 2025-06-16

## 2025-06-16 RX ORDER — SODIUM CHLORIDE, SODIUM LACTATE, POTASSIUM CHLORIDE, CALCIUM CHLORIDE 600; 310; 30; 20 MG/100ML; MG/100ML; MG/100ML; MG/100ML
INJECTION, SOLUTION INTRAVENOUS CONTINUOUS PRN
Status: DISCONTINUED | OUTPATIENT
Start: 2025-06-16 | End: 2025-06-16

## 2025-06-16 RX ORDER — PROPOFOL 10 MG/ML
INJECTION, EMULSION INTRAVENOUS PRN
Status: DISCONTINUED | OUTPATIENT
Start: 2025-06-16 | End: 2025-06-16

## 2025-06-16 RX ORDER — DEXAMETHASONE SODIUM PHOSPHATE 4 MG/ML
4 INJECTION, SOLUTION INTRA-ARTICULAR; INTRALESIONAL; INTRAMUSCULAR; INTRAVENOUS; SOFT TISSUE
Status: DISCONTINUED | OUTPATIENT
Start: 2025-06-16 | End: 2025-06-16 | Stop reason: HOSPADM

## 2025-06-16 RX ORDER — FENTANYL CITRATE 50 UG/ML
INJECTION, SOLUTION INTRAMUSCULAR; INTRAVENOUS PRN
Status: DISCONTINUED | OUTPATIENT
Start: 2025-06-16 | End: 2025-06-16

## 2025-06-16 RX ORDER — FENTANYL CITRATE 50 UG/ML
50 INJECTION, SOLUTION INTRAMUSCULAR; INTRAVENOUS EVERY 5 MIN PRN
Status: DISCONTINUED | OUTPATIENT
Start: 2025-06-16 | End: 2025-06-16 | Stop reason: HOSPADM

## 2025-06-16 RX ORDER — OXYCODONE HYDROCHLORIDE 5 MG/1
10 TABLET ORAL
Refills: 0 | Status: CANCELLED | OUTPATIENT
Start: 2025-06-16

## 2025-06-16 RX ADMIN — PROPOFOL 30 MG: 10 INJECTION, EMULSION INTRAVENOUS at 10:23

## 2025-06-16 RX ADMIN — VENLAFAXINE HYDROCHLORIDE 150 MG: 150 CAPSULE, EXTENDED RELEASE ORAL at 08:41

## 2025-06-16 RX ADMIN — PROPOFOL 125 MCG/KG/MIN: 10 INJECTION, EMULSION INTRAVENOUS at 10:23

## 2025-06-16 RX ADMIN — INSULIN ASPART 2 UNITS: 100 INJECTION, SOLUTION INTRAVENOUS; SUBCUTANEOUS at 12:16

## 2025-06-16 RX ADMIN — SUCRALFATE 1 G: 1 SUSPENSION ORAL at 12:16

## 2025-06-16 RX ADMIN — ONDANSETRON 4 MG: 2 INJECTION INTRAMUSCULAR; INTRAVENOUS at 10:36

## 2025-06-16 RX ADMIN — PANTOPRAZOLE SODIUM 40 MG: 40 INJECTION, POWDER, FOR SOLUTION INTRAVENOUS at 20:45

## 2025-06-16 RX ADMIN — SODIUM CHLORIDE, SODIUM LACTATE, POTASSIUM CHLORIDE, AND CALCIUM CHLORIDE: .6; .31; .03; .02 INJECTION, SOLUTION INTRAVENOUS at 10:15

## 2025-06-16 RX ADMIN — FUROSEMIDE 40 MG: 20 TABLET ORAL at 08:40

## 2025-06-16 RX ADMIN — FUROSEMIDE 40 MG: 20 TABLET ORAL at 17:40

## 2025-06-16 RX ADMIN — SODIUM CHLORIDE 8 MCG: 9 INJECTION, SOLUTION INTRAVENOUS at 10:24

## 2025-06-16 RX ADMIN — SODIUM CHLORIDE 4 MCG: 9 INJECTION, SOLUTION INTRAVENOUS at 10:29

## 2025-06-16 RX ADMIN — ACETAMINOPHEN 650 MG: 325 TABLET ORAL at 21:40

## 2025-06-16 RX ADMIN — ALLOPURINOL 300 MG: 300 TABLET ORAL at 08:41

## 2025-06-16 RX ADMIN — SUCRALFATE 1 G: 1 SUSPENSION ORAL at 21:30

## 2025-06-16 RX ADMIN — INSULIN ASPART 2 UNITS: 100 INJECTION, SOLUTION INTRAVENOUS; SUBCUTANEOUS at 08:41

## 2025-06-16 RX ADMIN — LIDOCAINE HYDROCHLORIDE 5 ML: 10 INJECTION, SOLUTION INFILTRATION; PERINEURAL at 10:23

## 2025-06-16 RX ADMIN — Medication 100 MG: at 10:30

## 2025-06-16 RX ADMIN — INSULIN ASPART 5 UNITS: 100 INJECTION, SOLUTION INTRAVENOUS; SUBCUTANEOUS at 17:39

## 2025-06-16 RX ADMIN — SODIUM CHLORIDE 4 MCG: 9 INJECTION, SOLUTION INTRAVENOUS at 10:25

## 2025-06-16 RX ADMIN — PROPOFOL 120 MG: 10 INJECTION, EMULSION INTRAVENOUS at 10:30

## 2025-06-16 RX ADMIN — FENTANYL CITRATE 50 MCG: 50 INJECTION, SOLUTION INTRAMUSCULAR; INTRAVENOUS at 10:47

## 2025-06-16 RX ADMIN — SODIUM CHLORIDE 4 MCG: 9 INJECTION, SOLUTION INTRAVENOUS at 10:23

## 2025-06-16 RX ADMIN — SODIUM CHLORIDE, SODIUM LACTATE, POTASSIUM CHLORIDE, AND CALCIUM CHLORIDE: .6; .31; .03; .02 INJECTION, SOLUTION INTRAVENOUS at 10:21

## 2025-06-16 RX ADMIN — ATORVASTATIN CALCIUM 80 MG: 40 TABLET, FILM COATED ORAL at 08:41

## 2025-06-16 RX ADMIN — PHENYLEPHRINE HYDROCHLORIDE 100 MCG: 10 INJECTION INTRAVENOUS at 10:44

## 2025-06-16 RX ADMIN — PANTOPRAZOLE SODIUM 40 MG: 40 INJECTION, POWDER, FOR SOLUTION INTRAVENOUS at 06:13

## 2025-06-16 RX ADMIN — ACETAMINOPHEN 650 MG: 325 TABLET ORAL at 08:41

## 2025-06-16 RX ADMIN — SUCRALFATE 1 G: 1 SUSPENSION ORAL at 17:39

## 2025-06-16 RX ADMIN — DEXAMETHASONE SODIUM PHOSPHATE 4 MG: 4 INJECTION, SOLUTION INTRA-ARTICULAR; INTRALESIONAL; INTRAMUSCULAR; INTRAVENOUS; SOFT TISSUE at 10:36

## 2025-06-16 RX ADMIN — FENTANYL CITRATE 50 MCG: 50 INJECTION, SOLUTION INTRAMUSCULAR; INTRAVENOUS at 10:50

## 2025-06-16 RX ADMIN — EZETIMIBE 10 MG: 10 TABLET ORAL at 08:40

## 2025-06-16 ASSESSMENT — ACTIVITIES OF DAILY LIVING (ADL)
ADLS_ACUITY_SCORE: 50
ADLS_ACUITY_SCORE: 49
ADLS_ACUITY_SCORE: 50
ADLS_ACUITY_SCORE: 49
ADLS_ACUITY_SCORE: 49
ADLS_ACUITY_SCORE: 50
ADLS_ACUITY_SCORE: 49
ADLS_ACUITY_SCORE: 49
ADLS_ACUITY_SCORE: 50
ADLS_ACUITY_SCORE: 49
ADLS_ACUITY_SCORE: 50
ADLS_ACUITY_SCORE: 49
ADLS_ACUITY_SCORE: 50
ADLS_ACUITY_SCORE: 49
ADLS_ACUITY_SCORE: 50
ADLS_ACUITY_SCORE: 50
ADLS_ACUITY_SCORE: 49

## 2025-06-16 NOTE — PROGRESS NOTES
[facility]    Medicine Progress Note - Hospitalist Service    Date of Admission:  6/13/2025    Assessment & Plan   Leora Sanchez is a 54 year old female with a past medical history of HFrEF, CAD, Type 2 Diabetes who was admitted on 6/13/25 after presenting to [facility] ED for Dizziness and Shortness of Breath.     Acute on Chronic Anemia  Lactic acidosis, improved   - HGB in the ED 5.7. Recent HGB baseline ~7.0-8.0.  - Previous work up 03/2025 mostly unrevealing - see labs in chart  - Patient denies melena, BRBPR, or any other bleeding.  - s/p  3-4 units of PRBC ? Thus far   - GI consult appreciated.  - Peripheral smear result: Is consistent with iron deficiency anemia.  - Protonix 40mg IV BID  - s/p EGD and colonoscopy on 6/16.  -EGD with moderately severe erosive esophagitis, nonbleeding gastric ulcer was clean ulcer base , Pj class III has been found.  Biopsied.  -Follow H. pylori result     HFrEF - Acute Exacerbation  - PTA HF Meds: Losartan 25mg, metoprolol 100mg, lasix 40mg BID  - Last Echo on file, 2019 - EF 45% w/ global hypokinesis.  - Holding home losartan, metoprolol with low BP in the ED  - continue  PTA Lasix 40mg PO BID for now   - Cardiology consult appreciated. Now SO  - Echocardiogram result is reported with estimated left ventricular ejection fraction of 55 to 60%, compared to previous study ascending aorta has increased in size from 4 to 4.4 cm     History of CAD with Stable Angina  - Non-obstructive disease on coronary angiogram 2019  - PTA Meds: aspirin, atorvastatin, zetia, ISMN  - Holding aspirin due to concern for bleeding  - Holding ISMN with low BP in the ED  - Continue home atorvastatin, zetia.     Type 2 Diabetes  - Last A1c was 7.5% in 03/2025  - Home medications include metformin, tirzepatide injections  - Hold home metformin, tirzepatide  - Sliding Scale Insulin with Glucose Checks    Elevated liver enzymes  -Appears to be chronic.  -Acute hepatitis workup is  "significant for hepatitis B core antibody being reactive and hepatitis B surface antibody also reactive.  Indicative of possible remote infection  -GI on board  -Continue to trend liver enzymes             Diet: Regular Diet Adult    DVT Prophylaxis: Pneumatic Compression Devices  Park Catheter: Not present  Lines: None     Cardiac Monitoring: None  Code Status: Full Code      Clinically Significant Risk Factors           # Hypocalcemia: Lowest Ca = 8.2 mg/dL in last 2 days, will monitor and replace as appropriate   # Hypomagnesemia: Lowest Mg = 1.2 mg/dL in last 2 days, will replace as needed   # Hypoalbuminemia: Lowest albumin = 3.2 g/dL at 6/14/2025  6:59 AM, will monitor as appropriate     # Hypertension: Noted on problem list           # DMII: A1C = 7.5 % (Ref range: 0.0 - 5.6 %) within past 6 months, PRESENT ON ADMISSION  # Obesity: Estimated body mass index is 39.26 kg/m  as calculated from the following:    Height as of this encounter: 1.499 m (4' 11\").    Weight as of this encounter: 88.2 kg (194 lb 6.4 oz)., PRESENT ON ADMISSION            Social Drivers of Health    Food Insecurity: High Risk (6/13/2025)    Food Insecurity     Within the past 12 months, did you worry that your food would run out before you got money to buy more?: Yes     Within the past 12 months, did the food you bought just not last and you didn t have money to get more?: Yes   Depression: Not at risk (5/30/2025)    PHQ-2     PHQ-2 Score: 2   Recent Concern: Depression - At risk (3/5/2025)    PHQ-2     PHQ-2 Score: 6   Housing Stability: High Risk (6/13/2025)    Housing Stability     Do you have housing? : Yes     Are you worried about losing your housing?: Yes   Transportation Needs: High Risk (6/13/2025)    Transportation Needs     Within the past 12 months, has lack of transportation kept you from medical appointments, getting your medicines, non-medical meetings or appointments, work, or from getting things that you need?: Yes "   Physical Activity: Insufficiently Active (9/6/2024)    Exercise Vital Sign     Days of Exercise per Week: 5 days     Minutes of Exercise per Session: 10 min   Social Connections: Unknown (9/6/2024)    Social Connection and Isolation Panel [NHANES]     Frequency of Social Gatherings with Friends and Family: More than three times a week          Disposition Plan     Medically Ready for Discharge: Anticipated in 2-4 Days         Dale Grimm MD  Hospitalist Service  [facility]  Securely message with [employer] (more info)  Text page via [employer] Paging/Directory   ______________________________________________________________________    Interval History   No distress noted.  Reports having lower back discomfort.  Awaiting to be called for endoscopy.  No new complaints.  Management plan discussed with the patient and she expressed understanding. [employer] language interpretation was utilized via telephone.  Also discussed with gastroenterologist.    Physical Exam   Vital Signs: Temp: 98.2  F (36.8  C) Temp src: Axillary BP: 111/71 Pulse: 104   Resp: 20 SpO2: 92 % O2 Device: None (Room air)    Weight: 194 lbs 6.4 oz    General Appearance:  Acutely sick looking, no distress noted  Respiratory: Good air entry bilaterally  Cardiovascular: S1-S2 were heard, no murmur or gallop  GI: Abdomen, no tenderness, normoactive bowel sounds  Skin: Intact and warm  Other:  +2 pedal and pretibial pitting edema    Medical Decision Making       40 MINUTES SPENT BY ME on the date of service doing chart review, history, exam, documentation & further activities per the note.      Data

## 2025-06-16 NOTE — ANESTHESIA POSTPROCEDURE EVALUATION
Patient: May X Sanchez    Procedure: Procedure(s):  ESOPHAGOGASTRODUODENOSCOPY WITH BIOSPSIES  COLONOSCOPY       Anesthesia Type:  General    Note:  Disposition: Inpatient   Postop Pain Control: Uneventful            Sign Out: Well controlled pain   PONV: No   Neuro/Psych: Uneventful            Sign Out: Acceptable/Baseline neuro status   Airway/Respiratory: Uneventful            Sign Out: Acceptable/Baseline resp. status   CV/Hemodynamics: Uneventful            Sign Out: Acceptable CV status; No obvious hypovolemia; No obvious fluid overload   Other NRE: NONE   DID A NON-ROUTINE EVENT OCCUR? No           Last vitals:  Vitals Value Taken Time   /77 06/16/25 11:16   Temp     Pulse 95 06/16/25 11:18   Resp 4 06/16/25 11:18   SpO2 99 % 06/16/25 11:18   Vitals shown include unfiled device data.    Electronically Signed By: Jodi Taylor MD  June 16, 2025  11:20 AM

## 2025-06-16 NOTE — PLAN OF CARE
Problem: Adult Inpatient Plan of Care  Goal: Readiness for Transition of Care  Outcome: Progressing   Goal Outcome Evaluation:    Patient had EGD and colonoscopy procedures today. Well tolerated and VSS post procedure. Calm and pleasant, denies pain, supportive family visiting at the bedside, eating and drinking well on a regular diet. Ambulating to bathroom and did urinate after procedure already.

## 2025-06-16 NOTE — PRE-PROCEDURE
Pre-procedure Note    Reason for procedure: anemia, FIT positive    History and Physical Reviewed: Reviewed, no changes.    Pre-sedation assessment:    General: alert, appears stated age, and cooperative  Airway: normal  Heart: regular rate and rhythm  Lungs: clear to auscultation bilaterally    Sedation Plan based on assessment: Moderate    Mallampati score: Class II (visualization of the soft palate, fauces, and uvula)          ASA Classification: ASA 3 - Patient with moderate systemic disease with functional limitations    Impression: Patient deemed adequate candidate for sedation    Risks, benefits and alternatives were discussed with the patient and informed consent was obtained.    Plan: colonoscopy and esophagogastroduodenoscopy                                                    Mariana Snowden M.D.  Thank you for the opportunity to participate in the care of this patient.   Please feel free to call me with any questions or concerns.  Phone number [phone].

## 2025-06-16 NOTE — PLAN OF CARE
Problem: Adult Inpatient Plan of Care  Goal: Plan of Care Review  Description: The Plan of Care Review/Shift note should be completed every shift.  The Outcome Evaluation is a brief statement about your assessment that the patient is improving, declining, or no change.  This information will be displayed automatically on your shift  note.  Outcome: Progressing  Flowsheets (Taken 6/16/2025 5139)  Plan of Care Reviewed With: patient  Overall Patient Progress: improving   Goal Outcome Evaluation:      Plan of Care Reviewed With: patient    Overall Patient Progress: improvingOverall Patient Progress: improving             Assumed care for this patient last night at 1900. Patient is admitted inpatient with lightheadedness. GI and Cardiology following. Plan is upper endoscopy and colonoscopy today. Patient has been bowel prepped and NPO since 0000. Diabetic. Spot check of blood glucose at 0200 was 192 mg/dl.  Mag, K, and Phos protocol.  Labs this morning.  Tele monitoring, NSR.

## 2025-06-16 NOTE — ANESTHESIA PROCEDURE NOTES
Airway       Patient location during procedure: OR       Procedure Start/Stop Times: 6/16/2025 10:32 AM  Staff -        Anesthesiologist:  Jodi Taylor MD       CRNA: Lela Zamora APRN CRNA       Performed By: CRNA  Consent for Airway        Urgency: emergent       Consent: The procedure was performed in an emergent situation.  Indications and Patient Condition       Indications for airway management: michelle-procedural       Induction type:RSI       Mask difficulty assessment: 0 - not attempted    Final Airway Details       Final airway type: endotracheal airway       Successful airway: ETT - single  Endotracheal Airway Details        ETT size (mm): 7.0       Cuffed: yes       Cuff volume (mL): 7       Successful intubation technique: video laryngoscopy       VL Blade Size: Glidescope 3       Grade View of Cords: 1       Adjucts: stylet       Position: Right       Measured from: lips       Secured at (cm): 22       Bite block used: None    Post intubation assessment        Placement verified by: capnometry, equal breath sounds and chest rise        Number of attempts at approach: 1       Secured with: tape       Ease of procedure: easy       Dentition: Intact and Unchanged    Medication(s) Administered   Medication Administration Time: 6/16/2025 10:32 AM

## 2025-06-16 NOTE — ANESTHESIA CARE TRANSFER NOTE
Patient: May X Sanchez    Procedure: Procedure(s):  ESOPHAGOGASTRODUODENOSCOPY WITH BIOSPSIES  COLONOSCOPY       Diagnosis: Anemia, unspecified type [D64.9]  Positive FIT (fecal immunochemical test) [R19.5]  Diagnosis Additional Information: No value filed.    Anesthesia Type:   General     Note:  Anesthesia Care Transfer Notewriter  Vitals:  Vitals Value Taken Time   /79 06/16/25 11:30   Temp 36.8  C (98.2  F) 06/16/25 11:16   Pulse 99 06/16/25 11:38   Resp 7 06/16/25 11:38   SpO2 93 % 06/16/25 11:38   Vitals shown include unfiled device data.    Electronically Signed By: Jodi Taylor MD  June 16, 2025  11:40 AM

## 2025-06-16 NOTE — PLAN OF CARE
Problem: Adult Inpatient Plan of Care  Goal: Plan of Care Review  Description: The Plan of Care Review/Shift note should be completed every shift.  The Outcome Evaluation is a brief statement about your assessment that the patient is improving, declining, or no change.  This information will be displayed automatically on your shift  note.  Outcome: Progressing   Goal Outcome Evaluation:         Patient is doing ok. She is urinating frequently due to lasix. She is up SBA. Mag bump was given x1 for Mag of 1.2. recheck is later this evening around 1900. Patient has started her bowel prep for for procedure for tomorrow. No time noted yet on the procedure.

## 2025-06-16 NOTE — ANESTHESIA PREPROCEDURE EVALUATION
Anesthesia Pre-Procedure Evaluation    Patient: Leora Sanchez   MRN: [id number] : 1970          Procedure : Procedure(s):  ESOPHAGOGASTRODUODENOSCOPY  COLONOSCOPY         Past Medical History:   Diagnosis Date     Anxiety      Chronic cough 2018     Chronic kidney disease      Congestive heart failure (H)      Depression      Dyslipidemia, goal LDL below 70 10/31/2017     Essential hypertension      GERD (gastroesophageal reflux disease)      Heart failure with reduced ejection fraction (H) 10/30/2017     Hypertension      Hypokalemia 2021     Nonischemic cardiomyopathy (H)     variable EF depending on the technique, date and reader; BNP normal in 2018     Nonocclusive coronary atherosclerosis of native coronary artery 10/31/2017     Obese      Perforation of right tympanic membrane 2013    S/p repair in  by Dr. Thomas at [facility].       Pregnancy          Pyelonephritis 2018     Renal abscess      Spontaneous  2010     TM (tympanic membrane disorder)       Past Surgical History:   Procedure Laterality Date     CV CORONARY ANGIOGRAM N/A 2019    Procedure: Coronary Angiogram;  Surgeon: Car Box MD;  Location: [facility] Cath Lab;  Service: Cardiology     CV LEFT HEART CATHETERIZATION WITHOUT LEFT VENTRICULOGRAM Left 10/31/2017    Procedure: Left Heart Catheterization Without Left Ventriculogram;  Surgeon: Jonathan Duque MD;  Location: [facility] Cath Lab;  Service:      CV LEFT HEART CATHETERIZATION WITHOUT LEFT VENTRICULOGRAM Left 2019    Procedure: Left Heart Catheterization Without Left Ventriculogram;  Surgeon: Car Box MD;  Location: [facility] Cath Lab;  Service: Cardiology     DILATION AND CURETTAGE       ENT SURGERY       INNER EAR SURGERY Right      MASTOIDECTOMY Right      AK CATH PLACEMENT & NJX CORONARY ART ANGIO IMG S&I N/A 10/31/2017    Procedure: Coronary Angiogram;  Surgeon: Jonathan Duque  MD;  Location: [facility];  Service: Cardiology      No Known Allergies   Social History     Tobacco Use     Smoking status: Never     Passive exposure: Never     Smokeless tobacco: Never     Tobacco comments:     no passive exposure   Substance Use Topics     Alcohol use: No      Wt Readings from Last 1 Encounters:   06/14/25 88.2 kg (194 lb 6.4 oz)        Anesthesia Evaluation   Pt has had prior anesthetic.     No history of anesthetic complications       ROS/MED HX  ENT/Pulmonary:       Neurologic:       Cardiovascular:     (+) Dyslipidemia hypertension- -  CAD -  - -      CHF                                METS/Exercise Tolerance:     Hematologic:     (+)      anemia,          Musculoskeletal:       GI/Hepatic:     (+) GERD,                   Renal/Genitourinary:       Endo:     (+)  type II DM,             Obesity,       Psychiatric/Substance Use:     (+) psychiatric history depression       Infectious Disease:       Malignancy:       Other:              Physical Exam  Airway  Mallampati: III  TM distance: >3 FB  Neck ROM: full    Cardiovascular - normal exam   Dental   (+) Modest Abnormalities - crowns, retainers, 1 or 2 missing teeth      Pulmonary - normal exam      Neurological - normal exam  She appears awake, alert and oriented x3.    Other Findings       OUTSIDE LABS:  CBC:   Lab Results   Component Value Date    WBC 8.5 06/16/2025    WBC 8.2 06/15/2025    HGB 10.1 (L) 06/16/2025    HGB 10.0 (L) 06/15/2025    HCT 32.7 (L) 06/16/2025    HCT 31.5 (L) 06/15/2025     06/16/2025     06/15/2025     BMP:   Lab Results   Component Value Date     06/16/2025     06/15/2025    POTASSIUM 3.8 06/16/2025    POTASSIUM 3.8 06/15/2025    CHLORIDE 98 06/16/2025    CHLORIDE 102 06/15/2025    CO2 27 06/16/2025    CO2 29 06/15/2025    BUN 21.6 (H) 06/16/2025    BUN 20.0 06/15/2025    CR 1.16 (H) 06/16/2025    CR 1.03 (H) 06/15/2025     (H) 06/16/2025     (H) 06/16/2025      COAGS:   Lab Results   Component Value Date    PTT 26 06/13/2025    INR 1.01 06/13/2025     POC:   Lab Results   Component Value Date    HCG Negative 05/13/2019    HCGS Negative 03/29/2020     HEPATIC:   Lab Results   Component Value Date    ALBUMIN 3.3 (L) 06/16/2025    PROTTOTAL 6.2 (L) 06/16/2025    ALT 74 (H) 06/16/2025    AST 51 (H) 06/16/2025    ALKPHOS 165 (H) 06/16/2025    BILITOTAL 0.6 06/16/2025     OTHER:   Lab Results   Component Value Date    LACT 2.0 06/14/2025    A1C 7.5 (H) 03/31/2025    LYNN 8.5 (L) 06/16/2025    PHOS 3.8 06/16/2025    MAG 2.1 06/16/2025    LIPASE 68 (H) 03/31/2025    AMYLASE 65 03/31/2025    TSH 0.81 05/30/2025    T4 0.92 03/21/2025       Anesthesia Plan    ASA Status:  3      NPO Status: NPO Appropriate   Anesthesia Type: MAC.  Airway: natural airway.  Induction: inhalational.  Maintenance: Balanced.   Techniques and Equipment:       - Monitoring Plan: standard ASA monitoring     Consents    Anesthesia Plan(s) and associated risks, benefits, and realistic alternatives discussed. Questions answered and patient/representative(s) expressed understanding.     - Discussed: CRNA     - Discussed with:  Patient,         - Pt is DNR/DNI Status: no DNR          Postoperative Care    Pain management: non-narcotic analgesics, plan for postoperative opioid use, multimodal analgesia.     Comments:                 Jodi Taylor MD    I have reviewed the pertinent notes and labs in the chart from the past 30 days and (re)examined the patient.  Any updates or changes from those notes are reflected in this note.    Clinically Significant Risk Factors           # Hypocalcemia: Lowest Ca = 8.2 mg/dL in last 2 days, will monitor and replace as appropriate   # Hypomagnesemia: Lowest Mg = 1.2 mg/dL in last 2 days, will replace as needed   # Hypoalbuminemia: Lowest albumin = 3.2 g/dL at 6/14/2025  6:59 AM, will monitor as appropriate     # Hypertension: Noted on problem list           #  "DMII: A1C = 7.5 % (Ref range: 0.0 - 5.6 %) within past 6 months, PRESENT ON ADMISSION  # Obesity: Estimated body mass index is 39.26 kg/m  as calculated from the following:    Height as of this encounter: 1.499 m (4' 11\").    Weight as of this encounter: 88.2 kg (194 lb 6.4 oz)., PRESENT ON ADMISSION                  "

## 2025-06-17 ENCOUNTER — VIRTUAL VISIT (OUTPATIENT)
Dept: INTERPRETER SERVICES | Facility: CLINIC | Age: 55
End: 2025-06-17
Payer: MEDICARE

## 2025-06-17 VITALS
DIASTOLIC BLOOD PRESSURE: 81 MMHG | RESPIRATION RATE: 18 BRPM | SYSTOLIC BLOOD PRESSURE: 121 MMHG | BODY MASS INDEX: 39.56 KG/M2 | WEIGHT: 196.21 LBS | HEIGHT: 59 IN | TEMPERATURE: 97.5 F | OXYGEN SATURATION: 97 % | HEART RATE: 104 BPM

## 2025-06-17 LAB
ALBUMIN SERPL BCG-MCNC: 3.2 G/DL (ref 3.5–5.2)
ALP SERPL-CCNC: 151 U/L (ref 40–150)
ALT SERPL W P-5'-P-CCNC: 59 U/L (ref 0–50)
ANION GAP SERPL CALCULATED.3IONS-SCNC: 13 MMOL/L (ref 7–15)
AST SERPL W P-5'-P-CCNC: 28 U/L (ref 0–45)
BILIRUB SERPL-MCNC: 0.4 MG/DL
BUN SERPL-MCNC: 29.6 MG/DL (ref 6–20)
CALCIUM SERPL-MCNC: 9 MG/DL (ref 8.8–10.4)
CHLORIDE SERPL-SCNC: 97 MMOL/L (ref 98–107)
CREAT SERPL-MCNC: 1.39 MG/DL (ref 0.51–0.95)
EGFRCR SERPLBLD CKD-EPI 2021: 45 ML/MIN/1.73M2
ERYTHROCYTE [DISTWIDTH] IN BLOOD BY AUTOMATED COUNT: 18.4 % (ref 10–15)
GLUCOSE BLDC GLUCOMTR-MCNC: 284 MG/DL (ref 70–99)
GLUCOSE BLDC GLUCOMTR-MCNC: 296 MG/DL (ref 70–99)
GLUCOSE BLDC GLUCOMTR-MCNC: 401 MG/DL (ref 70–99)
GLUCOSE SERPL-MCNC: 320 MG/DL (ref 70–99)
HCO3 SERPL-SCNC: 27 MMOL/L (ref 22–29)
HCT VFR BLD AUTO: 31 % (ref 35–47)
HGB BLD-MCNC: 9.6 G/DL (ref 11.7–15.7)
MAGNESIUM SERPL-MCNC: 2 MG/DL (ref 1.7–2.3)
MCH RBC QN AUTO: 25.4 PG (ref 26.5–33)
MCHC RBC AUTO-ENTMCNC: 31 G/DL (ref 31.5–36.5)
MCV RBC AUTO: 82 FL (ref 78–100)
PHOSPHATE SERPL-MCNC: 2.4 MG/DL (ref 2.5–4.5)
PLATELET # BLD AUTO: 298 10E3/UL (ref 150–450)
POTASSIUM SERPL-SCNC: 4 MMOL/L (ref 3.4–5.3)
PROT SERPL-MCNC: 6.2 G/DL (ref 6.4–8.3)
RBC # BLD AUTO: 3.78 10E6/UL (ref 3.8–5.2)
SODIUM SERPL-SCNC: 137 MMOL/L (ref 135–145)
WBC # BLD AUTO: 11 10E3/UL (ref 4–11)

## 2025-06-17 PROCEDURE — 84155 ASSAY OF PROTEIN SERUM: CPT | Performed by: STUDENT IN AN ORGANIZED HEALTH CARE EDUCATION/TRAINING PROGRAM

## 2025-06-17 PROCEDURE — 85014 HEMATOCRIT: CPT | Performed by: STUDENT IN AN ORGANIZED HEALTH CARE EDUCATION/TRAINING PROGRAM

## 2025-06-17 PROCEDURE — 250N000013 HC RX MED GY IP 250 OP 250 PS 637: Performed by: STUDENT IN AN ORGANIZED HEALTH CARE EDUCATION/TRAINING PROGRAM

## 2025-06-17 PROCEDURE — 99239 HOSP IP/OBS DSCHRG MGMT >30: CPT | Performed by: STUDENT IN AN ORGANIZED HEALTH CARE EDUCATION/TRAINING PROGRAM

## 2025-06-17 PROCEDURE — 250N000013 HC RX MED GY IP 250 OP 250 PS 637: Performed by: INTERNAL MEDICINE

## 2025-06-17 PROCEDURE — T1013 SIGN LANG/ORAL INTERPRETER: HCPCS | Mod: U4,TEL,95 | Performed by: INTERPRETER

## 2025-06-17 PROCEDURE — 250N000012 HC RX MED GY IP 250 OP 636 PS 637: Performed by: HOSPITALIST

## 2025-06-17 PROCEDURE — 36415 COLL VENOUS BLD VENIPUNCTURE: CPT | Performed by: STUDENT IN AN ORGANIZED HEALTH CARE EDUCATION/TRAINING PROGRAM

## 2025-06-17 PROCEDURE — 84100 ASSAY OF PHOSPHORUS: CPT | Performed by: STUDENT IN AN ORGANIZED HEALTH CARE EDUCATION/TRAINING PROGRAM

## 2025-06-17 PROCEDURE — 83735 ASSAY OF MAGNESIUM: CPT | Performed by: STUDENT IN AN ORGANIZED HEALTH CARE EDUCATION/TRAINING PROGRAM

## 2025-06-17 RX ORDER — SUCRALFATE ORAL 1 G/10ML
1 SUSPENSION ORAL
Qty: 1200 ML | Refills: 1 | Status: SHIPPED | OUTPATIENT
Start: 2025-06-17 | End: 2025-06-17

## 2025-06-17 RX ORDER — LOSARTAN POTASSIUM 25 MG/1
25 TABLET ORAL DAILY
DISCHARGE
Start: 2025-06-24

## 2025-06-17 RX ORDER — SUCRALFATE ORAL 1 G/10ML
1 SUSPENSION ORAL
Qty: 1200 ML | Refills: 1 | Status: SHIPPED | OUTPATIENT
Start: 2025-06-17

## 2025-06-17 RX ORDER — PANTOPRAZOLE SODIUM 40 MG/1
40 TABLET, DELAYED RELEASE ORAL
Status: DISCONTINUED | OUTPATIENT
Start: 2025-06-17 | End: 2025-06-17 | Stop reason: HOSPADM

## 2025-06-17 RX ORDER — PANTOPRAZOLE SODIUM 40 MG/1
40 TABLET, DELAYED RELEASE ORAL
Qty: 60 TABLET | Refills: 2 | Status: SHIPPED | OUTPATIENT
Start: 2025-06-17

## 2025-06-17 RX ORDER — ISOSORBIDE MONONITRATE 30 MG/1
60 TABLET, EXTENDED RELEASE ORAL DAILY
Status: DISCONTINUED | OUTPATIENT
Start: 2025-06-17 | End: 2025-06-17 | Stop reason: HOSPADM

## 2025-06-17 RX ORDER — PANTOPRAZOLE SODIUM 40 MG/1
40 TABLET, DELAYED RELEASE ORAL
Qty: 60 TABLET | Refills: 2 | Status: SHIPPED | OUTPATIENT
Start: 2025-06-17 | End: 2025-06-17

## 2025-06-17 RX ORDER — FUROSEMIDE 40 MG/1
40 TABLET ORAL
DISCHARGE
Start: 2025-06-24 | End: 2025-06-24

## 2025-06-17 RX ADMIN — PANTOPRAZOLE SODIUM 40 MG: 40 TABLET, DELAYED RELEASE ORAL at 08:59

## 2025-06-17 RX ADMIN — ISOSORBIDE MONONITRATE 60 MG: 30 TABLET, EXTENDED RELEASE ORAL at 10:42

## 2025-06-17 RX ADMIN — EZETIMIBE 10 MG: 10 TABLET ORAL at 08:58

## 2025-06-17 RX ADMIN — MICONAZOLE NITRATE ANTIFUNGAL POWDER: 2 POWDER TOPICAL at 09:08

## 2025-06-17 RX ADMIN — INSULIN GLARGINE 12 UNITS: 100 INJECTION, SOLUTION SUBCUTANEOUS at 01:04

## 2025-06-17 RX ADMIN — SUCRALFATE 1 G: 1 SUSPENSION ORAL at 05:09

## 2025-06-17 RX ADMIN — ALLOPURINOL 300 MG: 300 TABLET ORAL at 08:57

## 2025-06-17 RX ADMIN — ACETAMINOPHEN 650 MG: 325 TABLET ORAL at 10:41

## 2025-06-17 RX ADMIN — ATORVASTATIN CALCIUM 80 MG: 40 TABLET, FILM COATED ORAL at 08:57

## 2025-06-17 RX ADMIN — VENLAFAXINE HYDROCHLORIDE 150 MG: 150 CAPSULE, EXTENDED RELEASE ORAL at 09:00

## 2025-06-17 RX ADMIN — METOPROLOL SUCCINATE 100 MG: 100 TABLET, EXTENDED RELEASE ORAL at 08:58

## 2025-06-17 ASSESSMENT — ACTIVITIES OF DAILY LIVING (ADL)
ADLS_ACUITY_SCORE: 49

## 2025-06-17 NOTE — PLAN OF CARE
Goal Outcome Evaluation:      Plan of Care Reviewed With: patient, family    Overall Patient Progress: improvingOverall Patient Progress: improving    Outcome Evaluation: Pt is doing well today.  She feels ready to go home.  Pt only has back pain when she is up moving, this is not new for her.  Tylenol given.  Pt has a fungal rash under her breasts, miconazole powder applied and explained to the pt.  Family is here with her and helping interpret when she doesn't understand.  Pt is going home with family.

## 2025-06-17 NOTE — DISCHARGE SUMMARY
"[facility]  Hospitalist Discharge Summary      Date of Admission:  6/13/2025  Date of Discharge:  6/17/2025 12:25 PM  Discharging Provider: Dale Grimm MD  Discharge Service: Hospitalist Service    Discharge Diagnoses   Acute on chronic anemia  Lactic acidosis, improved  HFrEF acute exacerbation  History of CAD with stable angina  Elevated liver enzymes    Clinically Significant Risk Factors     # DMII: A1C = 7.5 % (Ref range: 0.0 - 5.6 %) within past 6 months  # Obesity: Estimated body mass index is 39.63 kg/m  as calculated from the following:    Height as of this encounter: 1.499 m (4' 11\").    Weight as of this encounter: 89 kg (196 lb 3.4 oz).       Follow-ups Needed After Discharge   Follow-up Appointments       Hospital Follow-up with Existing Primary Care Provider (PCP)          Schedule Primary Care visit within: 7 Days             Unresulted Labs Ordered in the Past 30 Days of this Admission       Date and Time Order Name Status Description    6/16/2025 10:37 AM Surgical Pathology Exam In process     6/13/2025  8:21 PM Prepare red blood cells (unit) Preliminary         These results will be followed up by     Discharge Disposition   Discharged to home  Condition at discharge: Stable    Hospital Course   May MARICHUY Sanchez is a 54 year old female with a past medical history of HFrEF, CAD, Type 2 Diabetes who was admitted on 6/13/25 after presenting to [facility] ED for Dizziness and Shortness of Breath.   Acute on Chronic Anemia  Lactic acidosis, improved   HGB in the ED 5.7. Recent HGB baseline ~7.0-8.0.  Previous work up 03/2025 mostly unrevealing - see labs in chart  Patient denies melena, BRBPR, or any other bleeding.  s/p  3-4 units of PRBC   GI consult appreciated.  Peripheral smear result: Is consistent with iron deficiency anemia.  Protonix 40mg PO BID  s/p EGD and colonoscopy on 6/16.  EGD with moderately severe erosive esophagitis, nonbleeding gastric ulcer was clean ulcer " unique Pj class III has been found.  Biopsied. Follow H. pylori result   HFrEF - Acute Exacerbation  PTA HF Meds: Losartan 25mg, metoprolol 100mg, lasix 40mg BID  Last Echo on file, 2019 - EF 45% w/ global hypokinesis.  Holding home losartan, metoprolol with low BP in the ED  continue  PTA Lasix 40mg PO BID for now   Cardiology consult appreciated. Now SO  Echocardiogram result is reported with estimated left ventricular ejection fraction of 55 to 60%, compared to previous study ascending aorta has increased in size from 4 to 4.4 cm   History of CAD with Stable Angina  Non-obstructive disease on coronary angiogram 2019  PTA Meds: aspirin, atorvastatin, zetia, ISMN  Holding aspirin due to concern for bleeding  Holding ISMN with low BP in the ED  Continue home atorvastatin, zetia.   Type 2 Diabetes  - Last A1c was 7.5% in 03/2025  - Home medications include metformin, tirzepatide injections  - Hold home metformin, tirzepatide  - Sliding Scale Insulin with Glucose Checks  Elevated liver enzymes  -Appears to be chronic.  -Acute hepatitis workup is significant for hepatitis B core antibody being reactive and hepatitis B surface antibody also reactive.  Indicative of possible remote infection    Patient is clinically stable enough to be discharged back home.  Cleared by GI for discharge.  Medication sent to her pharmacy.           Consultations This Hospital Stay   GASTROENTEROLOGY IP CONSULT  CARE MANAGEMENT / SOCIAL WORK IP CONSULT  CARDIOLOGY IP CONSULT  CARE MANAGEMENT / SOCIAL WORK IP CONSULT    Code Status   Full Code    Time Spent on this Encounter   I, Dale Grimm MD, personally saw the patient today and spent greater than 30 minutes discharging this patient.       Dale Grimm MD  [address] [address]  Phone: [phone]  Fax: [phone]  ______________________________________________________________________    Physical  Exam   Vital Signs: Temp: 97.5  F (36.4  C) Temp src: Oral BP: 121/81 Pulse: 104   Resp: 18 SpO2: 97 % O2 Device: None (Room air)    Weight: 196 lbs 3.35 oz    General Appearance:  Acutely sick looking, no distress noted  Respiratory: Good air entry bilaterally  Cardiovascular: S1-S2 were heard, no murmur or gallop  GI: Abdomen, no tenderness, normoactive bowel sounds  Skin: Intact and warm  Other:  +2 pedal and pretibial pitting edema          Primary Care Physician   Jaky Gaspar    Discharge Orders      Primary Care - Care Coordination Referral      Primary Care - Care Coordination Referral      Reason for your hospital stay    [place]     Activity    Your activity upon discharge: activity as tolerated     Diet    Follow this diet upon discharge: Current Diet:Orders Placed This Encounter      Regular Diet Adult     Hospital Follow-up with Existing Primary Care Provider (PCP)            Significant Results and Procedures     Discharge Medications      Review of your medicines        START taking        Dose / Directions   miconazole 2 % external powder  Commonly known as: MICATIN  Used for: Candidiasis of skin      Apply topically 2 times daily.  Quantity: 43 g  Refills: 0     pantoprazole 40 MG EC tablet  Commonly known as: PROTONIX  Used for: Gastrointestinal hemorrhage associated with gastric ulcer      Dose: 40 mg  Take 1 tablet (40 mg) by mouth 2 times daily (before meals).  Quantity: 60 tablet  Refills: 2     sucralfate 1 GM/10ML suspension  Commonly known as: CARAFATE  Used for: Gastrointestinal hemorrhage associated with gastric ulcer      Dose: 1 g  Take 10 mLs (1 g) by mouth 4 times daily (before meals and nightly).  Quantity: 1200 mL  Refills: 1            CHANGE how you take these medications        Dose / Directions   furosemide 40 MG tablet  Commonly known as: LASIX  This may have changed: These instructions start on June 24, 2025. If you are unsure what to do until then, ask your doctor or other care  provider.  Used for: Heart failure with reduced ejection fraction (H)      Dose: 40 mg  Start taking on: June 24, 2025  Take 1 tablet (40 mg) by mouth 2 times daily.  Refills: 0     losartan 25 MG tablet  Commonly known as: COZAAR  This may have changed: These instructions start on June 24, 2025. If you are unsure what to do until then, ask your doctor or other care provider.  Used for: Heart failure with reduced ejection fraction (H), Stage 3a chronic kidney disease (H)      Dose: 25 mg  Start taking on: June 24, 2025  Take 1 tablet (25 mg) by mouth daily.  Refills: 0            CONTINUE these medicines which have NOT CHANGED        Dose / Directions   allopurinol 300 MG tablet  Commonly known as: ZYLOPRIM  Used for: Chronic gout of right foot, unspecified cause      Dose: 300 mg  Take 1 tablet (300 mg) by mouth daily.  Quantity: 90 tablet  Refills: 1     aspirin 81 MG EC tablet      Dose: 81 mg  Take 81 mg by mouth daily.  Refills: 0     atorvastatin 80 MG tablet  Commonly known as: LIPITOR      Dose: 80 mg  Take 80 mg by mouth daily.  Refills: 0     cyanocobalamin 1000 MCG tablet  Commonly known as: VITAMIN B-12  Used for: Macrocytic anemia      Dose: 1,000 mcg  Take 1 tablet (1,000 mcg) by mouth daily.  Quantity: 90 tablet  Refills: 3     ezetimibe 10 MG tablet  Commonly known as: ZETIA  Used for: Coronary artery disease involving native coronary artery of native heart with angina pectoris      Dose: 10 mg  Take 1 tablet (10 mg) by mouth daily.  Quantity: 90 tablet  Refills: 1     famotidine 20 MG tablet  Commonly known as: PEPCID  Used for: Heartburn      Dose: 20 mg  Take 1 tablet (20 mg) by mouth 2 times daily as needed.  Quantity: 180 tablet  Refills: 1     isosorbide mononitrate 60 MG 24 hr tablet  Commonly known as: IMDUR  Used for: Heart failure with reduced ejection fraction (H)      Dose: 60 mg  Take 1 tablet (60 mg) by mouth daily.  Quantity: 90 tablet  Refills: 1     metFORMIN 500 MG 24 hr  tablet  Commonly known as: GLUCOPHAGE XR  Used for: Type 2 diabetes mellitus with stage 3a chronic kidney disease, without long-term current use of insulin (H)      Dose: 1,000 mg  Take 2 tablets (1,000 mg) by mouth 2 times daily (with meals).  Quantity: 360 tablet  Refills: 1     metoprolol succinate  MG 24 hr tablet  Commonly known as: TOPROL XL  Used for: Heart failure with reduced ejection fraction (H), Essential hypertension      Dose: 100 mg  Take 1 tablet (100 mg) by mouth daily.  Quantity: 90 tablet  Refills: 1     nitroGLYcerin 0.4 MG sublingual tablet  Commonly known as: NITROSTAT  Used for: Nonischemic cardiomyopathy (H)      For chest pain place 1 tablet under the tongue every 5 minutes for 3 doses. If symptoms persist 5 minutes after 1st dose call 911.  Quantity: 25 tablet  Refills: 4     tirzepatide 12.5 MG/0.5ML Soaj auto-injector pen  Commonly known as: MOUNJARO  Used for: Type 2 diabetes mellitus with diabetic polyneuropathy, without long-term current use of insulin (H)      Dose: 12.5 mg  Inject 0.5 mLs (12.5 mg) subcutaneously once a week.  Quantity: 6 mL  Refills: 1     venlafaxine 150 MG 24 hr capsule  Commonly known as: EFFEXOR XR  Used for: Tension headache      Dose: 150 mg  Take 1 capsule (150 mg) by mouth daily.  Quantity: 90 capsule  Refills: 4     vitamin D3 125 MCG (5000 UT) tablet  Commonly known as: CHOLECALCIFEROL  Used for: Vitamin D deficiency      Dose: 125 mcg  Take 1 tablet (125 mcg) by mouth daily.  Quantity: 90 tablet  Refills: 1            STOP taking      colchicine 0.6 MG tablet  Commonly known as: COLCRYS        omeprazole 40 MG DR capsule  Commonly known as: PriLOSEC                  Where to get your medicines        These medications were sent to [facility] - [place], [place] - [address]  [address], [place] [address]      Phone: [phone]   miconazole 2 % external powder  pantoprazole 40 MG EC tablet  sucralfate 1 GM/10ML  suspension       Allergies   No Known Allergies

## 2025-06-17 NOTE — PLAN OF CARE
Problem: Comorbidity Management  Goal: Blood Glucose Levels Within Targeted Range  Outcome: Progressing  Intervention: Monitor and Manage Glycemia  Recent Flowsheet Documentation  Taken 6/17/2025 0100 by Sofie Ellison RN  Medication Review/Management: medications reviewed     Problem: Skin Injury Risk Increased  Goal: Skin Health and Integrity  Outcome: Not Progressing   Goal Outcome Evaluation:    BS- 401 at midnight. Hospitalist notified and ordered BS checked every 4 hours as well the scheduled insulin. Patient has been asymptomatic with high blood sugar.   Patient refused 0200 BS checked.   BS at 0500- 296.   Rashes under left and right breast area,left breast rashes worse than right. Left note for AM rounder to order anti-fungal cream and assess the area.   Denies pain.

## 2025-06-17 NOTE — PLAN OF CARE
Goal Outcome Evaluation:         Problem: Adult Inpatient Plan of Care  Goal: Plan of Care Review  Outcome: Progressing     Problem: Adult Inpatient Plan of Care  Goal: Optimal Comfort and Wellbeing  Outcome: Progressing  Intervention: Monitor Pain and Promote Comfort  Recent Flowsheet Documentation  Taken 6/16/2025 2140 by Nicole Dent RN  Pain Management Interventions:   medication (see MAR)   emotional support     Problem: Comorbidity Management  Goal: Blood Glucose Levels Within Targeted Range  Outcome: Progressing  Intervention: Monitor and Manage Glycemia  Recent Flowsheet Documentation  Taken 6/16/2025 1630 by Nicole Dent RN  Medication Review/Management: medications reviewed         Pt is alert and oriented X4, calm and cooperative, rating low back pain #2, Tylenol given PRN X1.  Pt is ambulating in room independently with walker.  Blood glucose 356 pre-dinner, MD notified, at , MD notified and additional 5 units given X1 order.  Electrolyte protocols WDL, recheck in am.  Pt's sister visiting at bedside.  Plan is for Pt to return home at time of discharge.    Nicole Dent RN

## 2025-06-18 LAB
ATRIAL RATE - MUSE: 113 BPM
DIASTOLIC BLOOD PRESSURE - MUSE: 55 MMHG
INTERPRETATION ECG - MUSE: NORMAL
P AXIS - MUSE: 42 DEGREES
PR INTERVAL - MUSE: 132 MS
QRS DURATION - MUSE: 80 MS
QT - MUSE: 350 MS
QTC - MUSE: 480 MS
R AXIS - MUSE: 8 DEGREES
SYSTOLIC BLOOD PRESSURE - MUSE: 92 MMHG
T AXIS - MUSE: 218 DEGREES
VENTRICULAR RATE- MUSE: 113 BPM

## 2025-06-23 ENCOUNTER — DOCUMENTATION ONLY (OUTPATIENT)
Dept: GERIATRICS | Facility: CLINIC | Age: 55
End: 2025-06-23
Payer: MEDICARE

## 2025-06-23 LAB
PATH REPORT.COMMENTS IMP SPEC: NORMAL
PATH REPORT.FINAL DX SPEC: NORMAL
PATH REPORT.GROSS SPEC: NORMAL
PATH REPORT.MICROSCOPIC SPEC OTHER STN: NORMAL
PATH REPORT.RELEVANT HX SPEC: NORMAL
PHOTO IMAGE: NORMAL

## 2025-06-23 PROCEDURE — 88305 TISSUE EXAM BY PATHOLOGIST: CPT | Mod: 26 | Performed by: PATHOLOGY

## 2025-06-23 PROCEDURE — 88342 IMHCHEM/IMCYTCHM 1ST ANTB: CPT | Mod: 26 | Performed by: PATHOLOGY

## 2025-06-23 PROCEDURE — 88312 SPECIAL STAINS GROUP 1: CPT | Mod: 26 | Performed by: PATHOLOGY

## 2025-06-24 ENCOUNTER — LAB REQUISITION (OUTPATIENT)
Dept: LAB | Facility: CLINIC | Age: 55
End: 2025-06-24
Payer: MEDICARE

## 2025-06-24 ENCOUNTER — TRANSITIONAL CARE UNIT VISIT (OUTPATIENT)
Dept: GERIATRICS | Facility: CLINIC | Age: 55
End: 2025-06-24
Payer: MEDICARE

## 2025-06-24 ENCOUNTER — TELEPHONE (OUTPATIENT)
Dept: GASTROENTEROLOGY | Facility: CLINIC | Age: 55
End: 2025-06-24

## 2025-06-24 ENCOUNTER — TELEPHONE (OUTPATIENT)
Dept: GERIATRICS | Facility: CLINIC | Age: 55
End: 2025-06-24

## 2025-06-24 VITALS
DIASTOLIC BLOOD PRESSURE: 82 MMHG | HEART RATE: 118 BPM | OXYGEN SATURATION: 93 % | RESPIRATION RATE: 18 BRPM | TEMPERATURE: 98.2 F | HEIGHT: 59 IN | BODY MASS INDEX: 39.63 KG/M2 | SYSTOLIC BLOOD PRESSURE: 121 MMHG

## 2025-06-24 DIAGNOSIS — K21.00 GASTROESOPHAGEAL REFLUX DISEASE WITH ESOPHAGITIS, UNSPECIFIED WHETHER HEMORRHAGE: ICD-10-CM

## 2025-06-24 DIAGNOSIS — D64.9 ANEMIA, UNSPECIFIED: ICD-10-CM

## 2025-06-24 DIAGNOSIS — I50.20 HEART FAILURE WITH REDUCED EJECTION FRACTION (H): ICD-10-CM
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
## 2025-06-24 DIAGNOSIS — N18.31 STAGE 3A CHRONIC KIDNEY DISEASE (H): ICD-10-CM

## 2025-06-24 DIAGNOSIS — R53.81 PHYSICAL DECONDITIONING: ICD-10-CM

## 2025-06-24 DIAGNOSIS — E87.6 HYPOKALEMIA: ICD-10-CM

## 2025-06-24 DIAGNOSIS — F33.0 MILD EPISODE OF RECURRENT MAJOR DEPRESSIVE DISORDER: ICD-10-CM

## 2025-06-24 DIAGNOSIS — D50.0 IRON DEFICIENCY ANEMIA DUE TO CHRONIC BLOOD LOSS: ICD-10-CM

## 2025-06-24 DIAGNOSIS — E11.42 TYPE 2 DIABETES MELLITUS WITH DIABETIC POLYNEUROPATHY, WITHOUT LONG-TERM CURRENT USE OF INSULIN (H): ICD-10-CM

## 2025-06-24 DIAGNOSIS — M54.42 CHRONIC MIDLINE LOW BACK PAIN WITH BILATERAL SCIATICA: ICD-10-CM

## 2025-06-24 DIAGNOSIS — S72.101D CLOSED PERTROCHANTERIC FRACTURE OF FEMUR, RIGHT, WITH ROUTINE HEALING, SUBSEQUENT ENCOUNTER: ICD-10-CM

## 2025-06-24 DIAGNOSIS — K59.09 OTHER CONSTIPATION: ICD-10-CM

## 2025-06-24 DIAGNOSIS — M54.41 CHRONIC MIDLINE LOW BACK PAIN WITH BILATERAL SCIATICA: ICD-10-CM

## 2025-06-24 DIAGNOSIS — M80.00XD OSTEOPOROSIS WITH CURRENT PATHOLOGICAL FRACTURE WITH ROUTINE HEALING, UNSPECIFIED OSTEOPOROSIS TYPE, SUBSEQUENT ENCOUNTER: ICD-10-CM

## 2025-06-24 DIAGNOSIS — I25.119 CORONARY ARTERY DISEASE INVOLVING NATIVE CORONARY ARTERY OF NATIVE HEART WITH ANGINA PECTORIS: ICD-10-CM

## 2025-06-24 DIAGNOSIS — I10 ESSENTIAL HYPERTENSION: ICD-10-CM

## 2025-06-24 DIAGNOSIS — I50.9 CHRONIC CONGESTIVE HEART FAILURE, UNSPECIFIED HEART FAILURE TYPE (H): ICD-10-CM

## 2025-06-24 DIAGNOSIS — E78.5 HYPERLIPIDEMIA, UNSPECIFIED HYPERLIPIDEMIA TYPE: ICD-10-CM

## 2025-06-24 DIAGNOSIS — G89.29 CHRONIC MIDLINE LOW BACK PAIN WITH BILATERAL SCIATICA: ICD-10-CM

## 2025-06-24 PROBLEM — K20.90 ESOPHAGITIS DETERMINED BY BIOPSY: Status: ACTIVE | Noted: 2025-06-24

## 2025-06-24 PROBLEM — K31.A0 INTESTINAL METAPLASIA OF GASTRIC MUCOSA: Status: ACTIVE | Noted: 2025-06-24

## 2025-06-24 RX ORDER — FUROSEMIDE 20 MG/1
30 TABLET ORAL
Status: SHIPPED
Start: 2025-06-24 | End: 2025-06-25

## 2025-06-24 NOTE — TELEPHONE ENCOUNTER
"Patient had a colonoscopy/EGD completed inpatient on 6.16.2025.     Per 6.16.2025 colonoscopy notes \"repeat in 3 years\"    RN attempted to contact pt's  Juan per notes in \"contact\" section, however, per patient he is working right now.     RN spoke with pt with  services via phone. Pt stated to cancel 7.11.2025 colonoscopy as they are in the hospital currently. Message sent to endoscopy scheduling to cancel procedure.  Message also sent to Carri SHAH to inform as patient saw provider today.  .--------------------------------------------------------------------------------------------------------------------    Pre visit planning completed.      Procedure details:    Patient scheduled for Colonoscopy on 7.11.2025.     Arrival time: 1430. Procedure time 1515    Facility location: [facility]; [address] [place] [address]. Check in location: [address] [place] [address].     Sedation type: Conscious sedation     Pre op exam needed? No.    Indication for procedure: + FIT      Chart review:     Electronic implanted devices? No    Recent diagnosis of diverticulitis within the last 6 weeks? No      Medication review:    Diabetic? Yes. Oral diabetic medications: Metformin (glucophage): HOLD day of procedure.  Diabetic injectables: Mounjaro (Tirzepatide).  Weekly dosing of medication.  HOLD 7 days before procedure.  Follow up with managing provider.     Anticoagulants? Yes Rivaroxaban (Xarelto): Recommended HOLD 2 days before procedure.  Consult with your managing provider.  Per chart review pt was recently starterd on Xarelto x 4 weeks    Weight loss medication/injectable? No. Patient is on GLP-1 medication but for DM (see above).    Other medication HOLDING recommendations:  N/A      Prep for procedure:     Bowel prep recommendation: Extended Golytely.  Due to: GLP-1 agonist medication noted in chart            Kimberly Whittington RN  Endoscopy Procedure Pre Assessment   [phone] option " 3

## 2025-06-24 NOTE — TELEPHONE ENCOUNTER
I am hesitant to decrease her metoprolol just yet since her HR has been ranging in the 100s. Let's decrease the lasix to 30 mg BID. Order 2 sets of ortho BP.

## 2025-06-24 NOTE — PROGRESS NOTES
[facility] GERIATRICS    PRIMARY CARE PROVIDER AND CLINIC:  Jaky Gaspar MD, [address] [place] [id number]  Chief Complaint   Patient presents with    Hospital F/U      [facility] Record Number:  [id number]  Place of Service where encounter took place:  [facility]) [[id number]]    HPI: May MARICHUY Sanchez  is a 54 year old  (1970), admitted to the above facility from  [facility] . Hospital stay 6/18/25 through 6/23/25. HPI information obtained from: facility chart records, facility staff, patient report and [facility] chart review. PMH: T2DM, CAD, HFmrEF (recently improved EF 55-60%), CKD stage III.    **[facility] 6/13-6/17: admitted for acute on chronic iron deficiency anemia and HF exacerbation. Hgb 5.7 on admission. GI consulted; EGD and colonoscopy on 6/16; moderately severe erosive esophagitis, non-bleeding gastric ulcer; started on PPI and sucralfate. Metoprolol and losartan held for hypotension. Received 3-4 units PRBCs during hospital stay. Home meds resumed at hospital discharge.  **This hospital stay: Presented to ED for evaluation of right hip pain after ground-level fall; she was using the bathroom and reported feeling dizzy with blurry vision and fell backwards. Echo obtained with recovered EF 55-60% (6/14); no concerns of syncope; suspect hypotension contributing, PTA metoprolol 100 mg decreased to 50 mg; lasix, Imdur, and losartan held.  Imaging found right displaced hip fracture; Ortho consulted and underwent IMN on 6/19.  Hypokalemia noted and replaced during hospital stay.  Postop respiratory insufficiency noted; required 1-2L O2 while sleeping; sleep study recommended outpatient. Bone health team consulted; started calcium and vitamin D; order for outpatient DEXA; recommended follow-up outpatient bone health clinic in 6 weeks. Discharged to this facility for medical management, rehab, and nursing cares.    Today:  Nursing staff reports no acute medical concerns. Uses  " services for communication.    Patient is seen today for a visit in her room. She just finished with breakfast. Patient  is able to interpret over speaker phone. She feels \"okay\". Notes dizziness when she is standing; denies dizziness now when she is sitting. Has pains in her legs and low back; reports current pain medications are helpful. Worked with therapies this morning; recalls walking with a walker.  Appetite is \"not much\"; reports eating less than half of her meals. Last BM was \"last night\"; \"no\" belly pain. \"No\" issues with bladder. Sleeping \"better\" at night. Mood is baseline; \"No\" depression. Denies CP, palpitations, lightheadedness, fatigue, SOB, fever, chills, nausea/vomiting concerns today.      CODE STATUS/ADVANCE DIRECTIVES DISCUSSION:  Prior  CPR/Full code   ALLERGIES: No Known Allergies   PAST MEDICAL HISTORY:   Past Medical History:   Diagnosis Date    Anxiety     Chronic cough 2018    Chronic kidney disease     Congestive heart failure (H)     Depression     Dyslipidemia, goal LDL below 70 10/31/2017    Essential hypertension     GERD (gastroesophageal reflux disease)     Heart failure with reduced ejection fraction (H) 10/30/2017    Hypertension     Hypokalemia 2021    Nonischemic cardiomyopathy (H)     variable EF depending on the technique, date and reader; BNP normal in     Nonocclusive coronary atherosclerosis of native coronary artery 10/31/2017    Obese     Perforation of right tympanic membrane 2013    S/p repair in  by Dr. Thomas at [facility].      Pregnancy         Pyelonephritis 2018    Renal abscess     Spontaneous  2010    TM (tympanic membrane disorder)       PAST SURGICAL HISTORY:   has a past surgical history that includes ENT surgery; Dilation and curettage (); Inner ear surgery (Right, ); Mastoidectomy (Right, ); Pr Cath Placement & Njx Coronary Art Angio Img S&I (N/A, 10/31/2017); Cv Left Heart " Catheterization Without Left Ventriculogram (Left, 10/31/2017); Cv Coronary Angiogram (N/A, 5/13/2019); Cv Left Heart Catheterization Without Left Ventriculogram (Left, 5/13/2019); Esophagoscopy, gastroscopy, duodenoscopy (EGD), combined (N/A, 6/16/2025); and Colonoscopy (N/A, 6/16/2025).  FAMILY HISTORY: family history includes Diabetes in her mother; Diverticulitis in her mother; Hypertension in her mother; No Known Problems in her daughter, daughter, daughter, daughter, sister, son, son, son, and son; Other - See Comments in her father; Uterine Cancer in her mother.  SOCIAL HISTORY:   reports that she has never smoked. She has never been exposed to tobacco smoke. She has never used smokeless tobacco. She reports that she does not drink alcohol and does not use drugs.  Patient's living condition: lives with spouse    Post Discharge Medication Reconciliation Status:   MED REC REQUIRED  Post Medication Reconciliation Status: discharge medications reconciled and changed, per note/orders       Current Outpatient Medications   Medication Sig Dispense Refill    allopurinol (ZYLOPRIM) 300 MG tablet Take 1 tablet (300 mg) by mouth daily. 90 tablet 1    aspirin 81 MG EC tablet Take 81 mg by mouth daily.      atorvastatin (LIPITOR) 80 MG tablet Take 80 mg by mouth daily.      cyanocobalamin (VITAMIN B-12) 1000 MCG tablet Take 1 tablet (1,000 mcg) by mouth daily. 90 tablet 3    ezetimibe (ZETIA) 10 MG tablet Take 1 tablet (10 mg) by mouth daily. 90 tablet 1    famotidine (PEPCID) 20 MG tablet Take 1 tablet (20 mg) by mouth 2 times daily as needed. 180 tablet 1    furosemide (LASIX) 40 MG tablet Take 1 tablet (40 mg) by mouth 2 times daily.      isosorbide mononitrate (IMDUR) 60 MG 24 hr tablet Take 1 tablet (60 mg) by mouth daily. 90 tablet 1    losartan (COZAAR) 25 MG tablet Take 1 tablet (25 mg) by mouth daily.      metFORMIN (GLUCOPHAGE XR) 500 MG 24 hr tablet Take 2 tablets (1,000 mg) by mouth 2 times daily (with meals).  "360 tablet 1    metoprolol succinate ER (TOPROL XL) 100 MG 24 hr tablet Take 1 tablet (100 mg) by mouth daily. 90 tablet 1    miconazole (MICATIN) 2 % external powder Apply topically 2 times daily. 43 g 0    nitroGLYcerin (NITROSTAT) 0.4 MG sublingual tablet For chest pain place 1 tablet under the tongue every 5 minutes for 3 doses. If symptoms persist 5 minutes after 1st dose call 911. 25 tablet 4    pantoprazole (PROTONIX) 40 MG EC tablet Take 1 tablet (40 mg) by mouth 2 times daily (before meals). 60 tablet 2    sucralfate (CARAFATE) 1 GM/10ML suspension Take 10 mLs (1 g) by mouth 4 times daily (before meals and nightly). 1200 mL 1    tirzepatide (MOUNJARO) 12.5 MG/0.5ML SOAJ auto-injector pen Inject 0.5 mLs (12.5 mg) subcutaneously once a week. 6 mL 1    venlafaxine (EFFEXOR XR) 150 MG 24 hr capsule Take 1 capsule (150 mg) by mouth daily. 90 capsule 4    vitamin D3 (CHOLECALCIFEROL) 125 MCG (5000 UT) tablet Take 1 tablet (125 mcg) by mouth daily. 90 tablet 1     No current facility-administered medications for this visit.       ROS:  4 point ROS including Respiratory, CV, GI and , other than that noted in the HPI,  is negative    Vitals:  /82   Pulse 118   Temp 98.2  F (36.8  C)   Resp 18   Ht 1.499 m (4' 11\")   SpO2 93%   BMI 39.63 kg/m    Exam:  GENERAL APPEARANCE:  Alert, in no distress  HEENT:  atraumatic, EOM intact, moist mucus membranes  RESP:  non-labored breathing, lungs clear on auscultation, no respiratory distress, no cough  CV:  Rate regular, S1 S2 noted, 1+ BLE edema  ABDOMEN:  soft, non-distended, non-tender, bowel sounds active  M/S:   wheelchair bound, moves all extremities, strength and tone equal bilaterally, no calf pain  SKIN:  warm, dry, thin, fragile, no obvious rash, lesions, ulcerations or petechiae   NEURO:   Face is symmetric, examination of sensation by touch normal, follows and tracks, slow speech  PSYCH:  calm, cooperative      Lab/Diagnostic data:  Labs reviewed as " per [facility] and/or Care Everywhere.      ASSESSMENT/PLAN:       Fall, subsequent encounter  Closed pertrochanteric fracture of femur, right, with routine healing, subsequent encounter  Reason for hospitalization. Underwent IMN on 6/19. New on Robaxin and oxycodone. Today, notes pain is controlled with current medications.  -Continue DVT prophylaxis plan as ordered: Rivaroxaban mg daily x4 weeks (until 7/17); resume ASA 81 mg on 7/18  -discontinue APAP 650 mg q4h as needed  -start APAP 1000 mg TID  -continue oxycodone 2.5-5 mg q4h as needed; wean when able  -continue methocarbamol 500 mg 3 times daily as needed  - Follow-up with orthopedics as recommended (2 weeks for wound check and 6-8 weeks with Dr. Palma)    Osteoporosis with current pathological fracture with routine healing, unspecified osteoporosis type, subsequent encounter   In-patient bone health consulted during hospital stay; started on calcium and vit D.  -continue Ca and Vit D supplement  -DXA outpatient as ordered  -follow-up with bone health clinic as advised in 6 weeks    Chronic congestive heart failure, unspecified heart failure type (H)  Essential hypertension  Coronary artery disease involving native coronary artery of native heart with angina pectoris  Hyperlipidemia, unspecified hyperlipidemia type  EF improved; 45% w/ global hypokinesis (2019) -> 55-60% with ascending aorta increased in size from 4 to 4.4 cm (6/14/25). Hospitalization complicated by hypotension; metoprolol decreased to 50 mg. Discharged with PTA meds resumed. Today, //95 mmHg. -110 bpm. Weight 190# (6/24). 1+ peripheral edema.   -Continue ezetimibe 10 mg daily  Continue isosorbide mononitrate 60 mg daily  Continue metoprolol 100 mg daily  Continue losartan 25 mg daily  Continue furosemide 40 mg twice daily  Follow BP, HR, weights, clinical volume status; adjust medications as needed  -follow-up with cardiology as scheduled on 6/27    Hypokalemia  Replaced during  hospital stay.  Last K 3.8 (6/21/2025).  -BMP 6/25    Iron deficiency anemia due to chronic blood loss  Gastroesophageal reflux disease with esophagitis, unspecified whether hemorrhage  Reason for previous hospitalization; GI consulted, noted severe esophagitis and nonbleeding gastric ulcer; started on PPI and sucralfate. This hospital stay, acute blood loss anemia 2/2 surgery; last Hgb 9.3 (6/22/2025). Today, no signs of active bleeding.  - Monitor bleeding risk  - Continue PPI and sucralfate  - Check CBC on 6/25    Stage 3a chronic kidney disease (H)  Per chart history. Last Cr 1.39.  - Recheck BMP on 6/25    Type 2 diabetes mellitus with diabetic polyneuropathy, without long-term current use of insulin (H)  Last A1c 7.5%.   -Continue metformin 1000 mg twice daily  -Continue Tirzepatide subQ 12.5 mg once weekly  -Trend A1c periodically    Chronic midline low back pain with bilateral sciatica  Ongoing but tolerable.  -Continue pain control as noted above    Mild episode of recurrent major depressive disorder  -Continue venlafaxine  mg daily  -Monitor mood and behaviors-    Other constipation  Noted during hospital stay. Secondary to surgery and oxycodone use. Today, no concerns.   - Continue senna S 8.6 mg 1 tab twice daily  - Adjust bowel meds as needed    Physical deconditioning  Secondary to diagnoses above and recent hospitalization. In TCU for rehabilitation.   -PT/OT eval and treat - adv per recommendations   -SW available for safe discharge planning ongoing      65 minutes spent on the date of this encounter doing chart review, review labs, discussion with nursing staff, patient visit, and documentation.       Electronically signed by:  Dr. Carri Wright, DNP, APRN, AGNP-BC, PHN    This note was completed with the assistance of dictation software. Typos and word substitution-errors are expected and unintended.

## 2025-06-24 NOTE — TELEPHONE ENCOUNTER
"[facility] Triage Call    Provider: PABLO Cedillo DNP  Facility: [facility]  Facility Type:  TCU    Caller: Alexandra  Call Back Number: [phone]    Allergies:  No Known Allergies       SBAR:     S-(situation): Hypotension    B-(background): Dx of HF and HTN. Taking Metoprolol 100mg daily, Losartan 25mg daily, Isosorbide 60mg daily and Lasix 40mg BID.     A-(assessment): Pt BP at 1pm was 80/52. Nurse advised pt to lay down for 45min with feet elevated. Recheck BP at 2pm was 90/59 with . Pt is asymptomatic. Has already received all BP meds and second dose of Lasix for the day.      R-(recommendations): Please advise if any new orders or adjustments to medication.        Telephone encounter sent to:  PABLO Cedillo DNP    Please send response/orders to \"Geriatrics Nurse Pool\"    Kelley Bal RN      "

## 2025-06-25 ENCOUNTER — LAB REQUISITION (OUTPATIENT)
Dept: LAB | Facility: CLINIC | Age: 55
End: 2025-06-25
Payer: MEDICARE

## 2025-06-25 ENCOUNTER — TELEPHONE (OUTPATIENT)
Dept: GERIATRICS | Facility: CLINIC | Age: 55
End: 2025-06-25

## 2025-06-25 ENCOUNTER — RESULTS FOLLOW-UP (OUTPATIENT)
Dept: GERIATRICS | Facility: CLINIC | Age: 55
End: 2025-06-25

## 2025-06-25 ENCOUNTER — TELEPHONE (OUTPATIENT)
Dept: GERIATRICS | Facility: CLINIC | Age: 55
End: 2025-06-25
Payer: MEDICARE

## 2025-06-25 DIAGNOSIS — I50.9 HEART FAILURE, UNSPECIFIED (H): ICD-10-CM

## 2025-06-25 LAB
ANION GAP SERPL CALCULATED.3IONS-SCNC: 12 MMOL/L (ref 7–15)
BUN SERPL-MCNC: 18.1 MG/DL (ref 6–20)
CALCIUM SERPL-MCNC: 9.2 MG/DL (ref 8.8–10.4)
CHLORIDE SERPL-SCNC: 105 MMOL/L (ref 98–107)
CREAT SERPL-MCNC: 1.81 MG/DL (ref 0.51–0.95)
EGFRCR SERPLBLD CKD-EPI 2021: 32 ML/MIN/1.73M2
ERYTHROCYTE [DISTWIDTH] IN BLOOD BY AUTOMATED COUNT: 19.7 % (ref 10–15)
GLUCOSE SERPL-MCNC: 178 MG/DL (ref 70–99)
HCO3 SERPL-SCNC: 23 MMOL/L (ref 22–29)
HCT VFR BLD AUTO: 31.3 % (ref 35–47)
HGB BLD-MCNC: 8.9 G/DL (ref 11.7–15.7)
MCH RBC QN AUTO: 26.3 PG (ref 26.5–33)
MCHC RBC AUTO-ENTMCNC: 28.4 G/DL (ref 31.5–36.5)
MCV RBC AUTO: 92 FL (ref 78–100)
PLATELET # BLD AUTO: 308 10E3/UL (ref 150–450)
POTASSIUM SERPL-SCNC: 4.4 MMOL/L (ref 3.4–5.3)
RBC # BLD AUTO: 3.39 10E6/UL (ref 3.8–5.2)
SODIUM SERPL-SCNC: 140 MMOL/L (ref 135–145)
WBC # BLD AUTO: 7.1 10E3/UL (ref 4–11)

## 2025-06-25 PROCEDURE — P9604 ONE-WAY ALLOW PRORATED TRIP: HCPCS | Mod: ORL

## 2025-06-25 PROCEDURE — 36415 COLL VENOUS BLD VENIPUNCTURE: CPT

## 2025-06-25 PROCEDURE — 85027 COMPLETE CBC AUTOMATED: CPT | Mod: ORL

## 2025-06-25 PROCEDURE — 80048 BASIC METABOLIC PNL TOTAL CA: CPT | Mod: ORL

## 2025-06-25 PROCEDURE — 82310 ASSAY OF CALCIUM: CPT

## 2025-06-25 RX ORDER — FUROSEMIDE 40 MG/1
40 TABLET ORAL
COMMUNITY

## 2025-06-25 RX ORDER — OXYCODONE HYDROCHLORIDE 5 MG/1
2.5 TABLET ORAL EVERY 4 HOURS PRN
COMMUNITY

## 2025-06-25 RX ORDER — ACETAMINOPHEN 500 MG
1000 TABLET ORAL 3 TIMES DAILY
COMMUNITY

## 2025-06-25 RX ORDER — AMOXICILLIN 250 MG
2 CAPSULE ORAL 2 TIMES DAILY
COMMUNITY

## 2025-06-25 RX ORDER — METHOCARBAMOL 500 MG/1
500 TABLET, FILM COATED ORAL 3 TIMES DAILY PRN
COMMUNITY

## 2025-06-25 RX ORDER — POLYETHYLENE GLYCOL 3350 17 G/17G
1 POWDER, FOR SOLUTION ORAL DAILY
COMMUNITY

## 2025-06-25 NOTE — TELEPHONE ENCOUNTER
"[facility] Triage Call    Provider: PABLO Cedillo DNP  Facility: [facility]  Facility Type:  TCU    Caller: Ryann   Call Back Number: [phone]    Allergies:  No Known Allergies       SBAR:     S-(situation): Nursing is calling to report constipation.     B-(background): Hx of femur fx, on prn oxycodone.     A-(assessment): last BM 4 days prior, rectal check +, dulcolax suppository was given, currently on the toilet trying to have a BM. No abd pain or distention.     Per pt the hospital administer an enema     R-(recommendations): Nursing requesting scheduled bowel medications and possibly a prn enema.       Telephone encounter sent to:  PABLO Cedillo DNP    Please send response/orders to \"Geriatrics Nurse Pool\"    Jade Nance RN      "

## 2025-06-25 NOTE — TELEPHONE ENCOUNTER
Patient reported to me yesterday that her last BM was on 6/22. Increase current senna-s 8.6 1 tab BID to 2 tabs BID. Start miralax 17 g daily. Monitor effectiveness.

## 2025-06-26 ENCOUNTER — LAB REQUISITION (OUTPATIENT)
Dept: LAB | Facility: CLINIC | Age: 55
End: 2025-06-26
Payer: MEDICARE

## 2025-06-26 ENCOUNTER — RESULTS FOLLOW-UP (OUTPATIENT)
Dept: GERIATRICS | Facility: CLINIC | Age: 55
End: 2025-06-26

## 2025-06-26 DIAGNOSIS — D64.9 ANEMIA, UNSPECIFIED: ICD-10-CM

## 2025-06-26 LAB
ANION GAP SERPL CALCULATED.3IONS-SCNC: 14 MMOL/L (ref 7–15)
BUN SERPL-MCNC: 28.7 MG/DL (ref 6–20)
CALCIUM SERPL-MCNC: 9.3 MG/DL (ref 8.8–10.4)
CHLORIDE SERPL-SCNC: 104 MMOL/L (ref 98–107)
CREAT SERPL-MCNC: 2.95 MG/DL (ref 0.51–0.95)
EGFRCR SERPLBLD CKD-EPI 2021: 18 ML/MIN/1.73M2
GLUCOSE SERPL-MCNC: 186 MG/DL (ref 70–99)
HCO3 SERPL-SCNC: 21 MMOL/L (ref 22–29)
NT-PROBNP SERPL-MCNC: 257 PG/ML (ref 0–226)
POTASSIUM SERPL-SCNC: 4.7 MMOL/L (ref 3.4–5.3)
SODIUM SERPL-SCNC: 139 MMOL/L (ref 135–145)

## 2025-06-26 PROCEDURE — 83880 ASSAY OF NATRIURETIC PEPTIDE: CPT | Mod: ORL

## 2025-06-26 PROCEDURE — P9604 ONE-WAY ALLOW PRORATED TRIP: HCPCS | Mod: ORL

## 2025-06-26 PROCEDURE — 80048 BASIC METABOLIC PNL TOTAL CA: CPT | Mod: ORL

## 2025-06-26 PROCEDURE — 36415 COLL VENOUS BLD VENIPUNCTURE: CPT | Mod: ORL

## 2025-06-26 PROCEDURE — 82947 ASSAY GLUCOSE BLOOD QUANT: CPT

## 2025-07-01 ENCOUNTER — PATIENT OUTREACH (OUTPATIENT)
Dept: CARE COORDINATION | Facility: CLINIC | Age: 55
End: 2025-07-01
Payer: MEDICARE

## 2025-07-18 ENCOUNTER — PRE VISIT (OUTPATIENT)
Dept: ONCOLOGY | Facility: HOSPITAL | Age: 55
End: 2025-07-18
Payer: MEDICARE

## 2025-07-18 DIAGNOSIS — D50.9 MICROCYTIC ANEMIA: Primary | ICD-10-CM

## 2025-07-18 DIAGNOSIS — D64.89 ANEMIA DUE TO OTHER CAUSE, NOT CLASSIFIED: ICD-10-CM

## 2025-07-18 DIAGNOSIS — R74.8 ABNORMAL LEVELS OF OTHER SERUM ENZYMES: ICD-10-CM

## 2025-07-30 ENCOUNTER — TELEPHONE (OUTPATIENT)
Dept: FAMILY MEDICINE | Facility: CLINIC | Age: 55
End: 2025-07-30
Payer: MEDICARE

## 2025-07-30 NOTE — TELEPHONE ENCOUNTER
form has been signed, sent to HIM to be scanned, faxed to [phone] , task completed, closing encounter.  Ayana Chaudhary

## 2025-07-30 NOTE — TELEPHONE ENCOUNTER
Forms/Letter Request    Type of form/letter: Home Health Certification      Do we have the form/letter: Yes: pcp inbox     Who is the form from? Home care    Where did/will the form come from? form was faxed in    When is form/letter needed by: asap    How would you like the form/letter returned: Fax : [phone]

## 2025-08-01 ENCOUNTER — HOSPITAL ENCOUNTER (OUTPATIENT)
Dept: CT IMAGING | Facility: HOSPITAL | Age: 55
Discharge: HOME | End: 2025-08-01
Attending: THORACIC SURGERY (CARDIOTHORACIC VASCULAR SURGERY) | Admitting: THORACIC SURGERY (CARDIOTHORACIC VASCULAR SURGERY)
Payer: MEDICARE

## 2025-08-01 DIAGNOSIS — R91.1 LUNG NODULE: ICD-10-CM

## 2025-08-01 LAB — RADIOLOGIST FLAGS: ABNORMAL

## 2025-08-01 PROCEDURE — 71250 CT THORAX DX C-: CPT

## 2025-08-05 ENCOUNTER — TELEPHONE (OUTPATIENT)
Dept: ONCOLOGY | Facility: HOSPITAL | Age: 55
End: 2025-08-05
Payer: MEDICARE

## 2025-08-05 ENCOUNTER — APPOINTMENT (OUTPATIENT)
Dept: INTERPRETER SERVICES | Facility: CLINIC | Age: 55
End: 2025-08-05
Payer: MEDICARE

## 2025-08-07 ENCOUNTER — OFFICE VISIT (OUTPATIENT)
Dept: FAMILY MEDICINE | Facility: CLINIC | Age: 55
End: 2025-08-07
Payer: MEDICARE

## 2025-08-07 VITALS
RESPIRATION RATE: 18 BRPM | OXYGEN SATURATION: 97 % | HEART RATE: 116 BPM | BODY MASS INDEX: 35.75 KG/M2 | TEMPERATURE: 97.5 F | DIASTOLIC BLOOD PRESSURE: 74 MMHG | SYSTOLIC BLOOD PRESSURE: 107 MMHG | WEIGHT: 177 LBS

## 2025-08-07 DIAGNOSIS — I42.8 NONISCHEMIC CARDIOMYOPATHY (H): ICD-10-CM

## 2025-08-07 DIAGNOSIS — E11.42 DIABETIC POLYNEUROPATHY ASSOCIATED WITH TYPE 2 DIABETES MELLITUS (H): Primary | ICD-10-CM

## 2025-08-07 DIAGNOSIS — N18.31 STAGE 3A CHRONIC KIDNEY DISEASE (H): ICD-10-CM

## 2025-08-07 DIAGNOSIS — E11.42 TYPE 2 DIABETES MELLITUS WITH DIABETIC POLYNEUROPATHY, WITHOUT LONG-TERM CURRENT USE OF INSULIN (H): ICD-10-CM

## 2025-08-07 DIAGNOSIS — I10 ESSENTIAL HYPERTENSION: ICD-10-CM

## 2025-08-07 DIAGNOSIS — I25.119 CORONARY ARTERY DISEASE INVOLVING NATIVE CORONARY ARTERY OF NATIVE HEART WITH ANGINA PECTORIS: ICD-10-CM

## 2025-08-07 DIAGNOSIS — M80.051D: ICD-10-CM

## 2025-08-07 DIAGNOSIS — R91.8 MASS OF UPPER LOBE OF LEFT LUNG: ICD-10-CM

## 2025-08-07 DIAGNOSIS — D50.0 IRON DEFICIENCY ANEMIA DUE TO CHRONIC BLOOD LOSS: ICD-10-CM

## 2025-08-07 DIAGNOSIS — Z79.899 POLYPHARMACY: ICD-10-CM

## 2025-08-07 DIAGNOSIS — K21.9 GASTROESOPHAGEAL REFLUX DISEASE WITHOUT ESOPHAGITIS: ICD-10-CM

## 2025-08-07 PROBLEM — I42.9 CARDIOMYOPATHY (H): Status: ACTIVE | Noted: 2017-11-14

## 2025-08-07 PROBLEM — N18.32 STAGE 3B CHRONIC KIDNEY DISEASE (H): Status: ACTIVE | Noted: 2020-08-20

## 2025-08-07 LAB
ALBUMIN SERPL BCG-MCNC: 3 G/DL (ref 3.5–5.2)
ALP SERPL-CCNC: 147 U/L (ref 40–150)
ALT SERPL W P-5'-P-CCNC: 14 U/L (ref 0–50)
ANION GAP SERPL CALCULATED.3IONS-SCNC: 11 MMOL/L (ref 7–15)
AST SERPL W P-5'-P-CCNC: 53 U/L (ref 0–45)
BILIRUB SERPL-MCNC: 0.5 MG/DL
BUN SERPL-MCNC: 15 MG/DL (ref 6–20)
CALCIUM SERPL-MCNC: 11.6 MG/DL (ref 8.8–10.4)
CHLORIDE SERPL-SCNC: 109 MMOL/L (ref 98–107)
CREAT SERPL-MCNC: 1.47 MG/DL (ref 0.51–0.95)
EGFRCR SERPLBLD CKD-EPI 2021: 41 ML/MIN/1.73M2
ERYTHROCYTE [DISTWIDTH] IN BLOOD BY AUTOMATED COUNT: 19.4 % (ref 10–15)
GLUCOSE SERPL-MCNC: 149 MG/DL (ref 70–99)
HCO3 SERPL-SCNC: 22 MMOL/L (ref 22–29)
HCT VFR BLD AUTO: 32 % (ref 35–47)
HGB BLD-MCNC: 9.5 G/DL (ref 11.7–15.7)
MCH RBC QN AUTO: 25.7 PG (ref 26.5–33)
MCHC RBC AUTO-ENTMCNC: 29.7 G/DL (ref 31.5–36.5)
MCV RBC AUTO: 87 FL (ref 78–100)
PLATELET # BLD AUTO: 328 10E3/UL (ref 150–450)
POTASSIUM SERPL-SCNC: 3.7 MMOL/L (ref 3.4–5.3)
PROT SERPL-MCNC: 6 G/DL (ref 6.4–8.3)
RBC # BLD AUTO: 3.7 10E6/UL (ref 3.8–5.2)
SODIUM SERPL-SCNC: 142 MMOL/L (ref 135–145)
WBC # BLD AUTO: 4.9 10E3/UL (ref 4–11)

## 2025-08-07 RX ORDER — OXYCODONE HYDROCHLORIDE 5 MG/1
2.5-5 TABLET ORAL EVERY 4 HOURS PRN
Qty: 15 TABLET | Refills: 0 | Status: SHIPPED | OUTPATIENT
Start: 2025-08-07

## 2025-08-07 RX ORDER — TIRZEPATIDE 12.5 MG/.5ML
INJECTION, SOLUTION SUBCUTANEOUS
Qty: 6 ML | Refills: 11 | OUTPATIENT
Start: 2025-08-07

## 2025-08-12 ENCOUNTER — TELEPHONE (OUTPATIENT)
Dept: FAMILY MEDICINE | Facility: CLINIC | Age: 55
End: 2025-08-12
Payer: MEDICARE

## 2025-08-12 DIAGNOSIS — E11.42 TYPE 2 DIABETES MELLITUS WITH DIABETIC POLYNEUROPATHY, WITHOUT LONG-TERM CURRENT USE OF INSULIN (H): ICD-10-CM

## 2025-08-12 DIAGNOSIS — Z79.899 ENCOUNTER FOR LONG-TERM (CURRENT) USE OF MEDICATIONS: ICD-10-CM

## 2025-08-12 DIAGNOSIS — M1A.0710 CHRONIC GOUT OF RIGHT FOOT, UNSPECIFIED CAUSE: ICD-10-CM

## 2025-08-12 DIAGNOSIS — Z12.31 VISIT FOR SCREENING MAMMOGRAM: ICD-10-CM

## 2025-08-12 DIAGNOSIS — E78.5 HYPERLIPIDEMIA LDL GOAL <70: Primary | ICD-10-CM

## 2025-08-12 DIAGNOSIS — I10 HYPERTENSION GOAL BP (BLOOD PRESSURE) < 130/80: ICD-10-CM

## 2025-08-12 DIAGNOSIS — N18.32 STAGE 3B CHRONIC KIDNEY DISEASE (H): ICD-10-CM

## 2025-08-12 DIAGNOSIS — E55.9 VITAMIN D DEFICIENCY: ICD-10-CM

## 2025-08-12 DIAGNOSIS — D50.0 IRON DEFICIENCY ANEMIA DUE TO CHRONIC BLOOD LOSS: ICD-10-CM

## 2025-08-12 LAB
EST. AVERAGE GLUCOSE BLD GHB EST-MCNC: 157 MG/DL
HBA1C MFR BLD: 7.1 % (ref 0–5.6)

## 2025-08-12 RX ORDER — COLCHICINE 0.6 MG/1
0.6 TABLET ORAL
Qty: 60 TABLET | Refills: 1 | OUTPATIENT
Start: 2025-08-12

## 2025-08-14 ENCOUNTER — APPOINTMENT (OUTPATIENT)
Dept: INTERPRETER SERVICES | Facility: CLINIC | Age: 55
End: 2025-08-14
Payer: MEDICARE

## 2025-08-25 ENCOUNTER — MEDICAL CORRESPONDENCE (OUTPATIENT)
Dept: HEALTH INFORMATION MANAGEMENT | Facility: CLINIC | Age: 55
End: 2025-08-25

## 2025-08-25 ENCOUNTER — TELEPHONE (OUTPATIENT)
Dept: FAMILY MEDICINE | Facility: CLINIC | Age: 55
End: 2025-08-25

## 2025-08-25 ENCOUNTER — HOSPITAL ENCOUNTER (INPATIENT)
Facility: HOSPITAL | Age: 55
DRG: 871 | End: 2025-08-25
Attending: EMERGENCY MEDICINE | Admitting: INTERNAL MEDICINE
Payer: MEDICARE

## 2025-08-25 PROBLEM — G93.40 ENCEPHALOPATHY, UNSPECIFIED TYPE: Status: ACTIVE | Noted: 2025-08-25

## 2025-08-25 ASSESSMENT — ACTIVITIES OF DAILY LIVING (ADL)
ADLS_ACUITY_SCORE: 58
ADLS_ACUITY_SCORE: 60
ADLS_ACUITY_SCORE: 58
ADLS_ACUITY_SCORE: 60
ADLS_ACUITY_SCORE: 58
ADLS_ACUITY_SCORE: 58

## 2025-08-25 ASSESSMENT — COLUMBIA-SUICIDE SEVERITY RATING SCALE - C-SSRS: IS THE PATIENT NOT ABLE TO COMPLETE C-SSRS: UNABLE TO VERBALIZE

## 2025-08-26 ENCOUNTER — APPOINTMENT (OUTPATIENT)
Dept: NEUROLOGY | Facility: HOSPITAL | Age: 55
DRG: 871 | End: 2025-08-26
Attending: PSYCHIATRY & NEUROLOGY
Payer: MEDICARE

## 2025-08-26 ASSESSMENT — ACTIVITIES OF DAILY LIVING (ADL)
ADLS_ACUITY_SCORE: 77
ADLS_ACUITY_SCORE: 75
ADLS_ACUITY_SCORE: 70
ADLS_ACUITY_SCORE: 75
ADLS_ACUITY_SCORE: 60
ADLS_ACUITY_SCORE: 70
ADLS_ACUITY_SCORE: 75
ADLS_ACUITY_SCORE: 75
ADLS_ACUITY_SCORE: 70
ADLS_ACUITY_SCORE: 60
ADLS_ACUITY_SCORE: 70
ADLS_ACUITY_SCORE: 77
ADLS_ACUITY_SCORE: 73
ADLS_ACUITY_SCORE: 75
ADLS_ACUITY_SCORE: 61
ADLS_ACUITY_SCORE: 62
ADLS_ACUITY_SCORE: 75
ADLS_ACUITY_SCORE: 75
ADLS_ACUITY_SCORE: 73
ADLS_ACUITY_SCORE: 61
ADLS_ACUITY_SCORE: 77
DEPENDENT_IADLS:: INDEPENDENT;TRANSPORTATION
ADLS_ACUITY_SCORE: 75
ADLS_ACUITY_SCORE: 77

## 2025-08-27 ASSESSMENT — ACTIVITIES OF DAILY LIVING (ADL)
ADLS_ACUITY_SCORE: 70

## 2025-08-28 ASSESSMENT — ACTIVITIES OF DAILY LIVING (ADL)
ADLS_ACUITY_SCORE: 72

## 2025-08-29 PROBLEM — I63.9 STROKE DETERMINED BY CLINICAL ASSESSMENT (H): Status: ACTIVE | Noted: 2025-01-01

## 2025-08-29 ASSESSMENT — ACTIVITIES OF DAILY LIVING (ADL)
ADLS_ACUITY_SCORE: 72

## 2025-09-01 LAB — SCANNED LAB RESULT: NORMAL

## 2025-09-02 ENCOUNTER — PATIENT OUTREACH (OUTPATIENT)
Dept: CARE COORDINATION | Facility: CLINIC | Age: 55
End: 2025-09-02

## 2025-09-02 LAB
PATH REPORT.COMMENTS IMP SPEC: NORMAL
PATH REPORT.FINAL DX SPEC: NORMAL
PATH REPORT.MICROSCOPIC SPEC OTHER STN: NORMAL
PATH REPORT.MICROSCOPIC SPEC OTHER STN: NORMAL

## 2025-09-02 PROCEDURE — 85060 BLOOD SMEAR INTERPRETATION: CPT | Performed by: PATHOLOGY

## 2025-09-04 LAB — GLUCOSE BLDC GLUCOMTR-MCNC: 110 MG/DL (ref 70–99)

## (undated) DEVICE — SUCTION MANIFOLD NEPTUNE 2 SYS 1 PORT 702-025-000

## (undated) DEVICE — SOL WATER IRRIG 1000ML BOTTLE 2F7114

## (undated) DEVICE — TUBING SUCTION MEDI-VAC 1/4"X20' N620A

## (undated) DEVICE — FORCEP BIOPSY 2.3MM DISP COATED 000388

## (undated) RX ORDER — PROPOFOL 10 MG/ML
INJECTION, EMULSION INTRAVENOUS
Status: DISPENSED
Start: 2025-06-16

## (undated) RX ORDER — ONDANSETRON 2 MG/ML
INJECTION INTRAMUSCULAR; INTRAVENOUS
Status: DISPENSED
Start: 2025-06-16

## (undated) RX ORDER — FENTANYL CITRATE 50 UG/ML
INJECTION, SOLUTION INTRAMUSCULAR; INTRAVENOUS
Status: DISPENSED
Start: 2025-06-16

## (undated) RX ORDER — DEXAMETHASONE SODIUM PHOSPHATE 10 MG/ML
INJECTION, SOLUTION INTRAMUSCULAR; INTRAVENOUS
Status: DISPENSED
Start: 2025-06-16

## (undated) RX ORDER — LIDOCAINE HYDROCHLORIDE 10 MG/ML
INJECTION, SOLUTION EPIDURAL; INFILTRATION; INTRACAUDAL; PERINEURAL
Status: DISPENSED
Start: 2025-06-16